# Patient Record
Sex: MALE | Race: WHITE | NOT HISPANIC OR LATINO
[De-identification: names, ages, dates, MRNs, and addresses within clinical notes are randomized per-mention and may not be internally consistent; named-entity substitution may affect disease eponyms.]

---

## 2020-07-30 ENCOUNTER — APPOINTMENT (OUTPATIENT)
Dept: CARDIOLOGY | Facility: CLINIC | Age: 70
End: 2020-07-30
Payer: MEDICARE

## 2020-07-30 ENCOUNTER — NON-APPOINTMENT (OUTPATIENT)
Age: 70
End: 2020-07-30

## 2020-07-30 VITALS
HEIGHT: 70 IN | BODY MASS INDEX: 30.21 KG/M2 | OXYGEN SATURATION: 98 % | WEIGHT: 211 LBS | HEART RATE: 68 BPM | DIASTOLIC BLOOD PRESSURE: 87 MMHG | SYSTOLIC BLOOD PRESSURE: 133 MMHG

## 2020-07-30 DIAGNOSIS — E66.9 OBESITY, UNSPECIFIED: ICD-10-CM

## 2020-07-30 PROBLEM — Z00.00 ENCOUNTER FOR PREVENTIVE HEALTH EXAMINATION: Status: ACTIVE | Noted: 2020-07-30

## 2020-07-30 PROCEDURE — 93000 ELECTROCARDIOGRAM COMPLETE: CPT

## 2020-07-30 PROCEDURE — 99205 OFFICE O/P NEW HI 60 MIN: CPT

## 2020-08-10 PROBLEM — Z71.9 ENCOUNTER FOR CONSULTATION: Status: ACTIVE | Noted: 2020-08-10

## 2020-08-11 ENCOUNTER — APPOINTMENT (OUTPATIENT)
Dept: CARDIOTHORACIC SURGERY | Facility: CLINIC | Age: 70
End: 2020-08-11
Payer: MEDICARE

## 2020-08-11 ENCOUNTER — NON-APPOINTMENT (OUTPATIENT)
Age: 70
End: 2020-08-11

## 2020-08-11 ENCOUNTER — OUTPATIENT (OUTPATIENT)
Dept: OUTPATIENT SERVICES | Facility: HOSPITAL | Age: 70
LOS: 1 days | End: 2020-08-11
Payer: MEDICARE

## 2020-08-11 ENCOUNTER — APPOINTMENT (OUTPATIENT)
Dept: CARDIOTHORACIC SURGERY | Facility: CLINIC | Age: 70
End: 2020-08-11

## 2020-08-11 VITALS — WEIGHT: 206 LBS | BODY MASS INDEX: 29.49 KG/M2 | HEIGHT: 70 IN

## 2020-08-11 VITALS
OXYGEN SATURATION: 97 % | DIASTOLIC BLOOD PRESSURE: 87 MMHG | SYSTOLIC BLOOD PRESSURE: 134 MMHG | TEMPERATURE: 97.8 F | HEART RATE: 66 BPM | RESPIRATION RATE: 13 BRPM

## 2020-08-11 DIAGNOSIS — I35.0 NONRHEUMATIC AORTIC (VALVE) STENOSIS: ICD-10-CM

## 2020-08-11 DIAGNOSIS — Z71.9 COUNSELING, UNSPECIFIED: ICD-10-CM

## 2020-08-11 DIAGNOSIS — K62.5 HEMORRHAGE OF ANUS AND RECTUM: ICD-10-CM

## 2020-08-11 PROCEDURE — 93306 TTE W/DOPPLER COMPLETE: CPT | Mod: 26

## 2020-08-11 PROCEDURE — 93306 TTE W/DOPPLER COMPLETE: CPT

## 2020-08-11 PROCEDURE — 99205 OFFICE O/P NEW HI 60 MIN: CPT

## 2020-08-11 PROCEDURE — 99204 OFFICE O/P NEW MOD 45 MIN: CPT

## 2020-08-11 PROCEDURE — 93000 ELECTROCARDIOGRAM COMPLETE: CPT

## 2020-08-11 RX ORDER — DOCUSATE SODIUM 100 MG/1
100 CAPSULE ORAL DAILY
Refills: 0 | Status: ACTIVE | COMMUNITY
Start: 2020-08-11

## 2020-08-11 NOTE — PHYSICAL EXAM
[General Appearance - Well Developed] : well developed [Normal Appearance] : normal appearance [General Appearance - Well Nourished] : well nourished [Normal Conjunctiva] : the conjunctiva exhibited no abnormalities [Normal Oral Mucosa] : normal oral mucosa [Normal Rate] : normal [Rhythm Regular] : regular [Normal S1] : normal S1 [Normal S2] : normal S2 [No Gallop] : no gallop heard [II] : a grade 2 [2+] : left 2+ [No Pitting Edema] : no pitting edema present [Respiration, Rhythm And Depth] : normal respiratory rhythm and effort [] : no respiratory distress [Auscultation Breath Sounds / Voice Sounds] : lungs were clear to auscultation bilaterally [Bowel Sounds] : normal bowel sounds [Abdomen Soft] : soft [Abdomen Tenderness] : non-tender [Abnormal Walk] : normal gait [Nail Clubbing] : no clubbing of the fingernails [Skin Color & Pigmentation] : normal skin color and pigmentation [Cyanosis, Localized] : no localized cyanosis [Skin Turgor] : normal skin turgor [No Venous Stasis] : no venous stasis [Oriented To Time, Place, And Person] : oriented to person, place, and time [Impaired Insight] : insight and judgment were intact [Affect] : the affect was normal [Mood] : the mood was normal

## 2020-08-11 NOTE — PHYSICAL EXAM
[General Appearance - Alert] : alert [General Appearance - In No Acute Distress] : in no acute distress [Sclera] : the sclera and conjunctiva were normal [PERRL With Normal Accommodation] : pupils were equal in size, round, and reactive to light [Extraocular Movements] : extraocular movements were intact [Jugular Venous Distention Increased] : there was no jugular-venous distention [Outer Ear] : the ears and nose were normal in appearance [Respiration, Rhythm And Depth] : normal respiratory rhythm and effort [] : no respiratory distress [II] : a grade 2 [Examination Of The Chest] : the chest was normal in appearance [Chest Visual Inspection Thoracic Asymmetry] : no chest asymmetry [Bowel Sounds] : normal bowel sounds [Breast Appearance] : normal in appearance [Abdomen Soft] : soft [Cervical Lymph Nodes Enlarged Posterior Bilaterally] : posterior cervical [Skin Color & Pigmentation] : normal skin color and pigmentation [No CVA Tenderness] : no ~M costovertebral angle tenderness [Abnormal Walk] : normal gait [Oriented To Time, Place, And Person] : oriented to person, place, and time [Impaired Insight] : insight and judgment were intact [No Focal Deficits] : no focal deficits [Right Carotid Bruit] : no bruit heard over the right carotid [FreeTextEntry1] : deferred [Left Carotid Bruit] : no bruit heard over the left carotid

## 2020-08-11 NOTE — CONSULT LETTER
[FreeTextEntry2] : Jerry Carrasco M.D.\par [address] [facility]\[place] [address]\par [phone]\par [phone] [FreeTextEntry3] : London Barbour MD\par \par Cardiovascular & Thoracic Surgery\par Professor\par [facility]\par \par

## 2020-08-11 NOTE — HISTORY OF PRESENT ILLNESS
[FreeTextEntry1] : Mr. Sy is a 69 year old male with PMHx of HTN and prostate cancer (s/p radical prostatectomy in 2015).  He is referred by Dr Jerry Carrasco for evaluation of his aortic stenosis. He has had progressive worsening of dyspnea on exertion, especially with stairs (NYHA II). He also notes feeling fatigued "if I do some work" around the house, noting "I feel drained." He has some left sided chest discomfort but notes "that is rare"; he has no syncopal symptoms. His prostate cancer continues to be managed by [facility] and he receives Lupron every 3 months. Of note, he had radiation for his prostate cancer and developed subsequent radiation proctitis, for which he sees Dr. Guerrero (gastroenterologist affiliated with [place]).\par \par An echocardiogram done at [facility] on 6/15/20 showed an ROSALINDA of 0.73, an aortic velocity of 3.5 and a mean gradient of 28. Repeat echo was done today and will be reviewed in detail.  Preliminarily, the AS appears severe. He met with Dr Barbour, who has recommended he proceed with further TAVR work up at this time.\par \par STS risk for AVR: 0.79%

## 2020-08-11 NOTE — DATA REVIEWED
[FreeTextEntry1] : Echocardiogram on 6/20/2020 demonstrated\par severe AS, normal LV function, no pulmonary hypertension, normal LA size, no mitral regurgitation LVEF 60%. \par pGr 50 mmHg, mGr 28 mmHg, ROSALINDA 0.71 cm2\par Moderate aortic valve regurgitation

## 2020-08-11 NOTE — ASSESSMENT
[FreeTextEntry1] : Mr. Sy is a 69 year old male with PMHx of HTN and prostate cancer (s/p radical prostatectomy in 2015).  He is referred by Dr Jerry Carrasco for evaluation of his aortic stenosis, which appears severe on today's study. He was initially hesitant to proceed due to the COVID-19 pandemic, however after our discussion, seems amenable to proceed with scheduling his cardiac structural CT and cardiac catheterization. I discussed the potential risks and benefits of TAVR with him in detail and answered all of his questions. He states he will call when ready to proceed. He mentioned that Dr Barbour specifically discussed with him the risk of sudden cardiac death with untreated symptomatic severe aortic stenosis. \par

## 2020-08-11 NOTE — REVIEW OF SYSTEMS
[Feeling Tired] : feeling tired [Shortness Of Breath] : shortness of breath [SOB on Exertion] : shortness of breath during exertion [Joint Swelling] : joint swelling [Melena] : melarcadio [Easy Bleeding] : a tendency for easy bleeding [Negative] : Endocrine [Chest Pain] : no chest pain [Palpitations] : no palpitations [Joint Pain] : no joint pain [Dizziness] : no dizziness [Anxiety] : no anxiety

## 2020-08-11 NOTE — HISTORY OF PRESENT ILLNESS
[FreeTextEntry1] : Salvador is a 69 year old male with PMH of HTN, obesity, prostate cancer (s/p radical prostatectomy in 2015, radiation and now taking Lupron every three months, [place] Hutson [place] [facility] proctitis in 10/2019, rectal bleeding (f/b Dr Guerrero at [place]) and aortic valve stenosis. Recent echocardiogram on 6/20/2020 demonstrated normal LV function, no pulmonary hypertension, normal LA size, no mitral regurgitation LVEF 60%. Calcified aortic valve with pGr 50 mmHg, mGr 28 mmHg, ROSALINDA 0.71 cm2 and moderate aortic valve regurgitation. He was referred to valve clinic for consideration for EARLE. Dr. Duque referred patient to see Dr. Carrasco for complains of MAGAÑA when bringing laundry up the stairs. [facility] and [facility] reports "a scan there showed my aortic valve was calcified."

## 2020-08-11 NOTE — REVIEW OF SYSTEMS
[Dyspnea on exertion] : dyspnea during exertion [Negative] : Psychiatric [Fever] : no fever [Chills] : no chills [Headache] : no headache [Feeling Fatigued] : not feeling fatigued [Lower Ext Edema] : no extremity edema [Chest Pain] : no chest pain [Leg Claudication] : no intermittent leg claudication [Wheezing] : no wheezing [Cough] : no cough [Coughing Up Blood] : no hemoptysis

## 2020-08-11 NOTE — ASSESSMENT
[FreeTextEntry1] : Salvador is a 69 year old male with PMH of HTN, obesity, prostate cancer (s/p radical prostatectomy in 2015, radiation and now on hormonal therapy) and aortic valve stenosis. Recent echocardiogram on 6/20/2020 demonstrated normal LV function, no pulmonary hypertension, normal LA size, no mitral regurgitation LVEF 60%. Calcified aortic valve with pGr 50 mmHg, mGr 28 mmHg, ROSALINDA 0.71 cm2 and moderate aortic valve regurgitation. He was referred to valve clinic for consideration for EARLE. \par \par Echo repeated today is consistent with severe aortic valve stenosis.  The patient was recommended to undergo TAVR work up and surgery. He expressed some reluctance due to his ongoing rectal bleeding secondary to radiation proctitis and his scheduled Nuclear Stress test.  Dr. Quesada will speak with him further.  The patient was advised about the consequences of delaying surgery with regards to worsening symptoms and low risk of sudden death.

## 2020-08-20 ENCOUNTER — APPOINTMENT (OUTPATIENT)
Dept: CARDIOLOGY | Facility: CLINIC | Age: 70
End: 2020-08-20

## 2020-08-24 ENCOUNTER — APPOINTMENT (OUTPATIENT)
Dept: CARDIOLOGY | Facility: CLINIC | Age: 70
End: 2020-08-24
Payer: MEDICARE

## 2020-08-24 VITALS
OXYGEN SATURATION: 96 % | SYSTOLIC BLOOD PRESSURE: 118 MMHG | HEART RATE: 66 BPM | BODY MASS INDEX: 29.35 KG/M2 | WEIGHT: 205 LBS | DIASTOLIC BLOOD PRESSURE: 81 MMHG | HEIGHT: 70 IN

## 2020-08-24 PROCEDURE — 99214 OFFICE O/P EST MOD 30 MIN: CPT

## 2020-08-24 PROCEDURE — 93000 ELECTROCARDIOGRAM COMPLETE: CPT

## 2020-08-26 ENCOUNTER — RESULT REVIEW (OUTPATIENT)
Age: 70
End: 2020-08-26

## 2020-08-26 ENCOUNTER — APPOINTMENT (OUTPATIENT)
Dept: CARDIOLOGY | Facility: CLINIC | Age: 70
End: 2020-08-26

## 2020-08-26 ENCOUNTER — OUTPATIENT (OUTPATIENT)
Dept: OUTPATIENT SERVICES | Facility: HOSPITAL | Age: 70
LOS: 1 days | End: 2020-08-26
Payer: MEDICARE

## 2020-08-26 DIAGNOSIS — R07.9 CHEST PAIN, UNSPECIFIED: ICD-10-CM

## 2020-08-26 DIAGNOSIS — I35.0 NONRHEUMATIC AORTIC (VALVE) STENOSIS: ICD-10-CM

## 2020-08-26 PROCEDURE — 75572 CT HRT W/3D IMAGE: CPT | Mod: 26

## 2020-08-26 PROCEDURE — 75572 CT HRT W/3D IMAGE: CPT

## 2020-08-31 ENCOUNTER — APPOINTMENT (OUTPATIENT)
Dept: DISASTER EMERGENCY | Facility: CLINIC | Age: 70
End: 2020-08-31

## 2020-08-31 DIAGNOSIS — Z01.818 ENCOUNTER FOR OTHER PREPROCEDURAL EXAMINATION: ICD-10-CM

## 2020-09-01 LAB — SARS-COV-2 N GENE NPH QL NAA+PROBE: NOT DETECTED

## 2020-09-03 ENCOUNTER — OUTPATIENT (OUTPATIENT)
Dept: OUTPATIENT SERVICES | Facility: HOSPITAL | Age: 70
LOS: 1 days | End: 2020-09-03
Payer: MEDICARE

## 2020-09-03 VITALS
DIASTOLIC BLOOD PRESSURE: 66 MMHG | OXYGEN SATURATION: 98 % | RESPIRATION RATE: 16 BRPM | HEART RATE: 74 BPM | SYSTOLIC BLOOD PRESSURE: 124 MMHG

## 2020-09-03 VITALS
WEIGHT: 203.93 LBS | SYSTOLIC BLOOD PRESSURE: 137 MMHG | HEART RATE: 75 BPM | HEIGHT: 70 IN | TEMPERATURE: 98 F | OXYGEN SATURATION: 97 % | RESPIRATION RATE: 18 BRPM | DIASTOLIC BLOOD PRESSURE: 70 MMHG

## 2020-09-03 DIAGNOSIS — I35.0 NONRHEUMATIC AORTIC (VALVE) STENOSIS: ICD-10-CM

## 2020-09-03 DIAGNOSIS — Z90.79 ACQUIRED ABSENCE OF OTHER GENITAL ORGAN(S): Chronic | ICD-10-CM

## 2020-09-03 LAB
ALBUMIN SERPL ELPH-MCNC: 4.8 G/DL — SIGNIFICANT CHANGE UP (ref 3.3–5)
ALP SERPL-CCNC: 75 U/L — SIGNIFICANT CHANGE UP (ref 40–120)
ALT FLD-CCNC: 28 U/L — SIGNIFICANT CHANGE UP (ref 10–45)
ANION GAP SERPL CALC-SCNC: 13 MMOL/L — SIGNIFICANT CHANGE UP (ref 5–17)
AST SERPL-CCNC: 22 U/L — SIGNIFICANT CHANGE UP (ref 10–40)
BILIRUB SERPL-MCNC: 0.8 MG/DL — SIGNIFICANT CHANGE UP (ref 0.2–1.2)
BUN SERPL-MCNC: 22 MG/DL — SIGNIFICANT CHANGE UP (ref 7–23)
CALCIUM SERPL-MCNC: 10.6 MG/DL — HIGH (ref 8.4–10.5)
CHLORIDE SERPL-SCNC: 100 MMOL/L — SIGNIFICANT CHANGE UP (ref 96–108)
CO2 SERPL-SCNC: 26 MMOL/L — SIGNIFICANT CHANGE UP (ref 22–31)
CREAT SERPL-MCNC: 0.95 MG/DL — SIGNIFICANT CHANGE UP (ref 0.5–1.3)
GLUCOSE SERPL-MCNC: 117 MG/DL — HIGH (ref 70–99)
HCT VFR BLD CALC: 41.9 % — SIGNIFICANT CHANGE UP (ref 39–50)
HGB BLD-MCNC: 14.2 G/DL — SIGNIFICANT CHANGE UP (ref 13–17)
MCHC RBC-ENTMCNC: 32.2 PG — SIGNIFICANT CHANGE UP (ref 27–34)
MCHC RBC-ENTMCNC: 33.9 GM/DL — SIGNIFICANT CHANGE UP (ref 32–36)
MCV RBC AUTO: 95 FL — SIGNIFICANT CHANGE UP (ref 80–100)
NRBC # BLD: 0 /100 WBCS — SIGNIFICANT CHANGE UP (ref 0–0)
PLATELET # BLD AUTO: 236 K/UL — SIGNIFICANT CHANGE UP (ref 150–400)
POTASSIUM SERPL-MCNC: 4 MMOL/L — SIGNIFICANT CHANGE UP (ref 3.5–5.3)
POTASSIUM SERPL-SCNC: 4 MMOL/L — SIGNIFICANT CHANGE UP (ref 3.5–5.3)
PROT SERPL-MCNC: 7.5 G/DL — SIGNIFICANT CHANGE UP (ref 6–8.3)
RBC # BLD: 4.41 M/UL — SIGNIFICANT CHANGE UP (ref 4.2–5.8)
RBC # FLD: 12.1 % — SIGNIFICANT CHANGE UP (ref 10.3–14.5)
SODIUM SERPL-SCNC: 139 MMOL/L — SIGNIFICANT CHANGE UP (ref 135–145)
WBC # BLD: 8.63 K/UL — SIGNIFICANT CHANGE UP (ref 3.8–10.5)
WBC # FLD AUTO: 8.63 K/UL — SIGNIFICANT CHANGE UP (ref 3.8–10.5)

## 2020-09-03 PROCEDURE — 93005 ELECTROCARDIOGRAM TRACING: CPT

## 2020-09-03 PROCEDURE — C1769: CPT

## 2020-09-03 PROCEDURE — 93456 R HRT CORONARY ARTERY ANGIO: CPT

## 2020-09-03 PROCEDURE — 85027 COMPLETE CBC AUTOMATED: CPT

## 2020-09-03 PROCEDURE — C1887: CPT

## 2020-09-03 PROCEDURE — 93456 R HRT CORONARY ARTERY ANGIO: CPT | Mod: 26

## 2020-09-03 PROCEDURE — 93010 ELECTROCARDIOGRAM REPORT: CPT

## 2020-09-03 PROCEDURE — 99152 MOD SED SAME PHYS/QHP 5/>YRS: CPT

## 2020-09-03 PROCEDURE — C1894: CPT

## 2020-09-03 PROCEDURE — 80053 COMPREHEN METABOLIC PANEL: CPT

## 2020-09-03 PROCEDURE — 99153 MOD SED SAME PHYS/QHP EA: CPT

## 2020-09-03 RX ORDER — SODIUM CHLORIDE 9 MG/ML
3 INJECTION INTRAMUSCULAR; INTRAVENOUS; SUBCUTANEOUS EVERY 8 HOURS
Refills: 0 | Status: DISCONTINUED | OUTPATIENT
Start: 2020-09-03 | End: 2020-09-17

## 2020-09-03 NOTE — ASU DISCHARGE PLAN (ADULT/PEDIATRIC) - CARE PROVIDER_API CALL
Rich Quesada  INTERVENTIONAL CARDIOLOGY  [address] [place] [address]  Phone: [phone]  Fax: [phone]  Follow Up Time:     Jerry Carrasco (MD)  Cardiovascular Disease  [address] [place] [address]  Phone: [phone]  Fax: [phone]  Follow Up Time:

## 2020-09-03 NOTE — H&P CARDIOLOGY - HISTORY OF PRESENT ILLNESS
68 yo male with PMHx of HTN, prostate cancer in 2015 s/p radical prostatectomy, radiation in 2017, and Lupron on Q 3 months ([place]. Hutson [place] [facility]itis in 10/2019, rectal bleeding (f/b Dr Guerrero at [place]) and aortic valve stenosis. Recent echocardiogram on 6/20/2020 demonstrated normal LV function, no pulmonary hypertension, normal LA size, no mitral regurgitation LVEF 60%. Calcified aortic valve with pGr 50 mmHg, mGr 28 mmHg, ROSALINDA 0.71 cm2 and moderate aortic valve regurgitation. Pt reports he has been feeling MAGAÑA since early in 2019, referred to Dr. Carrasco and was referred to valve clinic for consideration for EARLE. [facility] and patient reports "a scan there showed my aortic valve was calcified."     < from: Transthoracic Echocardiogram (08.11.20 @ 09:28) >  1. Calcified trileaflet aortic valve with decreased opening. Peak transaortic valve gradient equals 58 mm Hg, mean transaortic valve gradient equals 33 mm Hg, estimated  aortic valve area equals 1 sqcm (by continuity equation), aortic valve velocity time integral equals 95 cm, the DVI=0.29, consistent with moderate-severe aortic stenosis. Mild  aortic regurgitation. < end of copied text >

## 2020-09-03 NOTE — H&P CARDIOLOGY - PSH
From: Zee Padilla  To: Bernard Peguero [place]   Thank you   Zee   H/O prostatectomy

## 2020-09-03 NOTE — ASU DISCHARGE PLAN (ADULT/PEDIATRIC) - CALL YOUR DOCTOR IF YOU HAVE ANY OF THE FOLLOWING:
Wound/Surgical Site with redness, or foul smelling discharge or pus/Increased irritability or sluggishness/Pain not relieved by Medications/Swelling that gets worse/Fever greater than (need to indicate Fahrenheit or Celsius)/Bleeding that does not stop/Numbness, tingling, color or temperature change to extremity

## 2020-09-09 ENCOUNTER — APPOINTMENT (OUTPATIENT)
Dept: CARDIOTHORACIC SURGERY | Facility: CLINIC | Age: 70
End: 2020-09-09
Payer: MEDICARE

## 2020-09-09 ENCOUNTER — OUTPATIENT (OUTPATIENT)
Dept: OUTPATIENT SERVICES | Facility: HOSPITAL | Age: 70
LOS: 1 days | End: 2020-09-09
Payer: MEDICARE

## 2020-09-09 ENCOUNTER — RESULT REVIEW (OUTPATIENT)
Age: 70
End: 2020-09-09

## 2020-09-09 VITALS
DIASTOLIC BLOOD PRESSURE: 85 MMHG | BODY MASS INDEX: 29.2 KG/M2 | WEIGHT: 204 LBS | OXYGEN SATURATION: 96 % | HEART RATE: 66 BPM | TEMPERATURE: 98 F | HEIGHT: 70 IN | RESPIRATION RATE: 14 BRPM | SYSTOLIC BLOOD PRESSURE: 127 MMHG

## 2020-09-09 DIAGNOSIS — S30.1XXS CONTUSION OF ABDOMINAL WALL, SEQUELA: ICD-10-CM

## 2020-09-09 DIAGNOSIS — Z90.79 ACQUIRED ABSENCE OF OTHER GENITAL ORGAN(S): Chronic | ICD-10-CM

## 2020-09-09 PROBLEM — I35.0 NONRHEUMATIC AORTIC (VALVE) STENOSIS: Chronic | Status: ACTIVE | Noted: 2020-09-03

## 2020-09-09 PROBLEM — I10 ESSENTIAL (PRIMARY) HYPERTENSION: Chronic | Status: ACTIVE | Noted: 2020-09-03

## 2020-09-09 PROCEDURE — 99213 OFFICE O/P EST LOW 20 MIN: CPT

## 2020-09-09 PROCEDURE — 93926 LOWER EXTREMITY STUDY: CPT

## 2020-09-09 PROCEDURE — 93926 LOWER EXTREMITY STUDY: CPT | Mod: 26,RT

## 2020-09-09 RX ORDER — MESALAMINE 4 G/60 ML
4 KIT RECTAL
Refills: 0 | Status: DISCONTINUED | COMMUNITY
Start: 2020-08-24 | End: 2020-09-09

## 2020-09-09 RX ORDER — ATENOLOL 50 MG/1
50 TABLET ORAL DAILY
Refills: 0 | Status: DISCONTINUED | COMMUNITY
End: 2020-09-09

## 2020-09-09 RX ORDER — CHLORTHALIDONE 25 MG/1
25 TABLET ORAL DAILY
Refills: 0 | Status: DISCONTINUED | COMMUNITY
End: 2020-09-09

## 2020-09-09 NOTE — REASON FOR VISIT
[Follow-Up - Clinic] : a clinic follow-up of [Aortic Stenosis] : aortic stenosis [FreeTextEntry1] : Post coronary angiogram

## 2020-09-09 NOTE — REVIEW OF SYSTEMS
[Feeling Fatigued] : feeling fatigued [Dyspnea on exertion] : dyspnea during exertion [Easy Bleeding] : a tendency for easy bleeding [Easy Bruising] : a tendency for easy bruising [Negative] : Heme/Lymph [Urinary Frequency] : urinary frequency [Fever] : no fever [Chills] : no chills [Shortness Of Breath] : no shortness of breath [Chest  Pressure] : no chest pressure [Chest Pain] : no chest pain [Lower Ext Edema] : no extremity edema [Palpitations] : no palpitations [Cough] : no cough [Dizziness] : no dizziness

## 2020-09-09 NOTE — DISCUSSION/SUMMARY
[FreeTextEntry1] : Mr. Sy comes to the office for evaluation of right groin bruising s/p coronary angiogram from  9/3/2020. He does have extensive bruising with small, ~ 2-3 cm hematoma at the right CFA puncture site with a strong CFA pulse without bruit and palpable distal pulses with brisk capillary refill. Will get right groin arterial duplex scan to evaluation for pseudoaneurysm prior to his TAVR procedure which is to be scheduled. Will call him with results later today.  He was instructed to call should the hematoma become larger.  Will schedule TAVR procedure as well.  \par \par Addendum: Duplex of the right CFA demonstrates a small hematoma, as palpated on exam ,without evidence of a PSA.

## 2020-09-09 NOTE — PHYSICAL EXAM
[General Appearance - Well Developed] : well developed [Normal Appearance] : normal appearance [Well Groomed] : well groomed [General Appearance - Well Nourished] : well nourished [No Deformities] : no deformities [General Appearance - In No Acute Distress] : no acute distress [Normal Conjunctiva] : the conjunctiva exhibited no abnormalities [Normal Oral Mucosa] : normal oral mucosa [No Oral Pallor] : no oral pallor [Normal Jugular Venous A Waves Present] : normal jugular venous A waves present [No Oral Cyanosis] : no oral cyanosis [Normal Jugular Venous V Waves Present] : normal jugular venous V waves present [No Jugular Venous Reynolds A Waves] : no jugular venous reynolds A waves [Respiration, Rhythm And Depth] : normal respiratory rhythm and effort [Exaggerated Use Of Accessory Muscles For Inspiration] : no accessory muscle use [Normal Rate] : normal [Auscultation Breath Sounds / Voice Sounds] : lungs were clear to auscultation bilaterally [Heart Rate ___] : [unfilled] bpm [Rhythm Regular] : regular [No Gallop] : no gallop heard [Normal S2] : normal S2 [Normal S1] : normal S1 [Distant] : the heart sounds were distant [II] : a grade 2 [No Abnormalities] : the abdominal aorta was not enlarged and no bruit was heard [No Pitting Edema] : no pitting edema present [Bowel Sounds] : normal bowel sounds [Abdomen Soft] : soft [Abdomen Tenderness] : non-tender [Nail Clubbing] : no clubbing of the fingernails [Petechial Hemorrhages (___cm)] : no petechial hemorrhages [Cyanosis, Localized] : no localized cyanosis [Skin Color & Pigmentation] : normal skin color and pigmentation [] : no rash [No Venous Stasis] : no venous stasis [Skin Turgor] : normal skin turgor [No Skin Ulcers] : no skin ulcer [Skin Lesions] : no skin lesions [Impaired Insight] : insight and judgment were intact [Oriented To Time, Place, And Person] : oriented to person, place, and time [Affect] : the affect was normal [Mood] : the mood was normal [Memory Recent] : recent memory was not impaired [Memory Remote] : remote memory was not impaired [No Anxiety] : not feeling anxious [2+] : left 2+ [Click] : no click [Pericardial Rub] : no pericardial rub [Bruit] : no bruit heard [Rt] : no varicose veins of the right leg [Lt] : no varicose veins of the left leg [FreeTextEntry1] : right groin with extensive purple bruising above and below groin and to midline extending to 1/2 way down anterior medial thigh. Tender to palpation. ~2-3 cm sized tender hematoma noted at CFA insertion site. No right femoral bruit. Brisk capillary refill distal extremities bilaterally.

## 2020-09-09 NOTE — HISTORY OF PRESENT ILLNESS
[FreeTextEntry1] : HERBIE KERR is a 70 year old male here on 09/09/2020, 4 weeks after he was last seen and 6 days after undergoing coronary angiogram in workup for evaluation for TAVR procedure. He called the office earlier in the week complaining of right groin bruising and mild discomfort post cath and comes in today for evaluation of his groin site. Today he reports bruising at the right groin which began the day after the angiogram and did increase but has been stable since about Saturday though the bruising seems to be crossing over to the midline slightly.  Denies coolness or color change of leg.  No problems with ambulation.  \par \par He does get short of breath with a full flight of stairs, especially if carrying something. He is able to walk his dog 4 blocks without problems. His symptoms have not progressed since he was seen, however. He currently denies fever, chills, palpitations, angina, dizziness, lightheadedness, syncope, or peripheral edema. His energy level is "a little low" and appetite is "fair". He has urinary frequency and urgency due to prostate issues. He wears protection in case he doesn't make it to the bathroom in time. \par \par PCP: Dr. Hesham Duque\par CARD: Dr. Jerry Carrasco\par

## 2020-09-10 ENCOUNTER — APPOINTMENT (OUTPATIENT)
Dept: CARDIOLOGY | Facility: CLINIC | Age: 70
End: 2020-09-10

## 2020-10-05 ENCOUNTER — RESULT REVIEW (OUTPATIENT)
Age: 70
End: 2020-10-05

## 2020-10-05 ENCOUNTER — APPOINTMENT (OUTPATIENT)
Dept: ULTRASOUND IMAGING | Facility: HOSPITAL | Age: 70
End: 2020-10-05

## 2020-10-05 ENCOUNTER — APPOINTMENT (OUTPATIENT)
Dept: DISASTER EMERGENCY | Facility: CLINIC | Age: 70
End: 2020-10-05

## 2020-10-05 ENCOUNTER — OUTPATIENT (OUTPATIENT)
Dept: OUTPATIENT SERVICES | Facility: HOSPITAL | Age: 70
LOS: 1 days | End: 2020-10-05
Payer: MEDICARE

## 2020-10-05 VITALS
SYSTOLIC BLOOD PRESSURE: 136 MMHG | HEIGHT: 68 IN | RESPIRATION RATE: 15 BRPM | TEMPERATURE: 98 F | DIASTOLIC BLOOD PRESSURE: 83 MMHG | WEIGHT: 201.06 LBS | HEART RATE: 68 BPM | OXYGEN SATURATION: 97 %

## 2020-10-05 DIAGNOSIS — I35.0 NONRHEUMATIC AORTIC (VALVE) STENOSIS: ICD-10-CM

## 2020-10-05 DIAGNOSIS — Z90.79 ACQUIRED ABSENCE OF OTHER GENITAL ORGAN(S): Chronic | ICD-10-CM

## 2020-10-05 DIAGNOSIS — Z01.818 ENCOUNTER FOR OTHER PREPROCEDURAL EXAMINATION: ICD-10-CM

## 2020-10-05 DIAGNOSIS — Z29.9 ENCOUNTER FOR PROPHYLACTIC MEASURES, UNSPECIFIED: ICD-10-CM

## 2020-10-05 DIAGNOSIS — Z90.89 ACQUIRED ABSENCE OF OTHER ORGANS: Chronic | ICD-10-CM

## 2020-10-05 LAB
A1C WITH ESTIMATED AVERAGE GLUCOSE RESULT: 5.4 % — SIGNIFICANT CHANGE UP (ref 4–5.6)
ANION GAP SERPL CALC-SCNC: 11 MMOL/L — SIGNIFICANT CHANGE UP (ref 5–17)
BLD GP AB SCN SERPL QL: NEGATIVE — SIGNIFICANT CHANGE UP
BUN SERPL-MCNC: 15 MG/DL — SIGNIFICANT CHANGE UP (ref 7–23)
CALCIUM SERPL-MCNC: 10 MG/DL — SIGNIFICANT CHANGE UP (ref 8.4–10.5)
CHLORIDE SERPL-SCNC: 100 MMOL/L — SIGNIFICANT CHANGE UP (ref 96–108)
CO2 SERPL-SCNC: 23 MMOL/L — SIGNIFICANT CHANGE UP (ref 22–31)
CREAT SERPL-MCNC: 0.77 MG/DL — SIGNIFICANT CHANGE UP (ref 0.5–1.3)
ESTIMATED AVERAGE GLUCOSE: 108 MG/DL — SIGNIFICANT CHANGE UP (ref 68–114)
GLUCOSE SERPL-MCNC: 119 MG/DL — HIGH (ref 70–99)
HCT VFR BLD CALC: 38.3 % — LOW (ref 39–50)
HGB BLD-MCNC: 12.7 G/DL — LOW (ref 13–17)
MCHC RBC-ENTMCNC: 32 PG — SIGNIFICANT CHANGE UP (ref 27–34)
MCHC RBC-ENTMCNC: 33.2 GM/DL — SIGNIFICANT CHANGE UP (ref 32–36)
MCV RBC AUTO: 96.5 FL — SIGNIFICANT CHANGE UP (ref 80–100)
MRSA PCR RESULT.: SIGNIFICANT CHANGE UP
NRBC # BLD: 0 /100 WBCS — SIGNIFICANT CHANGE UP (ref 0–0)
PLATELET # BLD AUTO: 204 K/UL — SIGNIFICANT CHANGE UP (ref 150–400)
POTASSIUM SERPL-MCNC: 3.4 MMOL/L — LOW (ref 3.5–5.3)
POTASSIUM SERPL-SCNC: 3.4 MMOL/L — LOW (ref 3.5–5.3)
RBC # BLD: 3.97 M/UL — LOW (ref 4.2–5.8)
RBC # FLD: 12.5 % — SIGNIFICANT CHANGE UP (ref 10.3–14.5)
RH IG SCN BLD-IMP: POSITIVE — SIGNIFICANT CHANGE UP
S AUREUS DNA NOSE QL NAA+PROBE: SIGNIFICANT CHANGE UP
SODIUM SERPL-SCNC: 134 MMOL/L — LOW (ref 135–145)
WBC # BLD: 6.38 K/UL — SIGNIFICANT CHANGE UP (ref 3.8–10.5)
WBC # FLD AUTO: 6.38 K/UL — SIGNIFICANT CHANGE UP (ref 3.8–10.5)

## 2020-10-05 PROCEDURE — 93880 EXTRACRANIAL BILAT STUDY: CPT

## 2020-10-05 PROCEDURE — 87641 MR-STAPH DNA AMP PROBE: CPT

## 2020-10-05 PROCEDURE — 87640 STAPH A DNA AMP PROBE: CPT

## 2020-10-05 PROCEDURE — 86900 BLOOD TYPING SEROLOGIC ABO: CPT

## 2020-10-05 PROCEDURE — 93880 EXTRACRANIAL BILAT STUDY: CPT | Mod: 26

## 2020-10-05 PROCEDURE — 80048 BASIC METABOLIC PNL TOTAL CA: CPT

## 2020-10-05 PROCEDURE — 85027 COMPLETE CBC AUTOMATED: CPT

## 2020-10-05 PROCEDURE — G0463: CPT

## 2020-10-05 PROCEDURE — 71046 X-RAY EXAM CHEST 2 VIEWS: CPT

## 2020-10-05 PROCEDURE — 71046 X-RAY EXAM CHEST 2 VIEWS: CPT | Mod: 26

## 2020-10-05 PROCEDURE — 83036 HEMOGLOBIN GLYCOSYLATED A1C: CPT

## 2020-10-05 PROCEDURE — 86850 RBC ANTIBODY SCREEN: CPT

## 2020-10-05 PROCEDURE — 86901 BLOOD TYPING SEROLOGIC RH(D): CPT

## 2020-10-05 NOTE — H&P PST ADULT - NSICDXPASTSURGICALHX_GEN_ALL_CORE_FT
PAST SURGICAL HISTORY:  H/O prostatectomy 2015    S/P T&A (status post tonsillectomy and adenoidectomy) child

## 2020-10-05 NOTE — H&P PST ADULT - NSICDXPROBLEM_GEN_ALL_CORE_FT
PROBLEM DIAGNOSES  Problem: Aortic stenosis  Assessment and Plan: Scheduled TAVR  covid swab completed today at [place]  sent for chest xray and carotids  nasal swab collected  fingerstick on admission    Problem: Need for prophylactic measure  Assessment and Plan: The Caprini score indicates that this patient is at high risk for a VTE event (score 6 or greater). Surgical patients in this group will benefit from both pharmacologic prophylaxis and intermittent compression devices.  The surgical team will determine the balance between VTE risk and bleeding risk, and other clinical considerations

## 2020-10-05 NOTE — H&P PST ADULT - HISTORY OF PRESENT ILLNESS
70 yo male with PMHx of HTN, prostate cancer in 2015 s/p radical prostatectomy, radiation in 2017, and Lupron on Q 3 months (followed by Dr. Hutson and Dr. Villalba at [facility]), radial proctitis in 10/2019, rectal bleeding (f/b Dr Guerrero at [place]) and aortic valve stenosis. Recent echocardiogram on 6/20/2020 demonstrated normal LV function, no pulmonary hypertension, normal LA size, no mitral regurgitation LVEF 60%. Calcified aortic valve with pGr 50 mmHg, mGr 28 mmHg, ROSALINDA 0.71 cm2 and moderate aortic valve regurgitation. Pt reports he has been feeling MAGAÑA since early in 2019, referred to Dr. Carrasco and was referred to valve clinic for consideration for EARLE. [facility] and patient reports "a scan there showed my aortic valve was calcified."     < from: Transthoracic Echocardiogram (08.11.20 @ 09:28) >  1. Calcified trileaflet aortic valve with decreased opening. Peak transaortic valve gradient equals 58 mm Hg, mean transaortic valve gradient equals 33 mm Hg, estimated  aortic valve area equals 1 sqcm (by continuity equation), aortic valve velocity time integral equals 95 cm, the DVI=0.29, consistent with moderate-severe aortic stenosis. Mild  aortic regurgitation. < end of copied text > 69 y/o male with PMHx of HTN, prostate cancer in 2015 s/p radical prostatectomy, radiation in 2017 now with radiation proctitis, rectal bleeding, and worsening aortic valve stenosis. Recent echocardiogram on 6/20/2020 demonstrated normal LV function, no pulmonary hypertension, normal LA size, no mitral regurgitation LVEF 60%. Calcified aortic valve with pGr 50 mmHg, mGr 28 mmHg, ROSALINDA 0.71 cm2 and moderate aortic valve regurgitation. Pt reports he has been feeling MAGAÑA and fatigue since early in 2019. Presents for TAVR.     *covid swab done on 10/5 at [place].

## 2020-10-05 NOTE — H&P PST ADULT - NSICDXPASTMEDICALHX_GEN_ALL_CORE_FT
PAST MEDICAL HISTORY:  Aortic stenosis     HTN (hypertension)     Proctitis, radiation from prostate treatment    Prostate cancer RT 2018 and prostatectomy in 2015    Rectal bleeding

## 2020-10-05 NOTE — H&P PST ADULT - TRANSFUSION REACTION, PREVIOUS, PROFILE
no Complex Repair And Rhombic Flap Text: The defect edges were debeveled with a #15 scalpel blade.  The primary defect was closed partially with a complex linear closure.  Given the location of the remaining defect, shape of the defect and the proximity to free margins a rhombic flap was deemed most appropriate for complete closure of the defect.  Using a sterile surgical marker, an appropriate advancement flap was drawn incorporating the defect and placing the expected incisions within the relaxed skin tension lines where possible.    The area thus outlined was incised deep to adipose tissue with a #15 scalpel blade.  The skin margins were undermined to an appropriate distance in all directions utilizing iris scissors.

## 2020-10-06 LAB — SARS-COV-2 N GENE NPH QL NAA+PROBE: NOT DETECTED

## 2020-10-19 ENCOUNTER — NON-APPOINTMENT (OUTPATIENT)
Age: 70
End: 2020-10-19

## 2020-11-11 ENCOUNTER — NON-APPOINTMENT (OUTPATIENT)
Age: 70
End: 2020-11-11

## 2020-11-12 PROBLEM — K62.7 RADIATION PROCTITIS: Chronic | Status: ACTIVE | Noted: 2020-09-03

## 2020-11-12 PROBLEM — C61 MALIGNANT NEOPLASM OF PROSTATE: Chronic | Status: ACTIVE | Noted: 2020-09-03

## 2020-11-16 ENCOUNTER — NON-APPOINTMENT (OUTPATIENT)
Age: 70
End: 2020-11-16

## 2020-11-16 ENCOUNTER — OUTPATIENT (OUTPATIENT)
Dept: OUTPATIENT SERVICES | Facility: HOSPITAL | Age: 70
LOS: 1 days | End: 2020-11-16
Payer: MEDICARE

## 2020-11-16 VITALS
TEMPERATURE: 97 F | HEART RATE: 67 BPM | HEIGHT: 70 IN | WEIGHT: 201.94 LBS | DIASTOLIC BLOOD PRESSURE: 79 MMHG | OXYGEN SATURATION: 96 % | SYSTOLIC BLOOD PRESSURE: 129 MMHG | RESPIRATION RATE: 16 BRPM

## 2020-11-16 DIAGNOSIS — Z90.89 ACQUIRED ABSENCE OF OTHER ORGANS: Chronic | ICD-10-CM

## 2020-11-16 DIAGNOSIS — Z01.818 ENCOUNTER FOR OTHER PREPROCEDURAL EXAMINATION: ICD-10-CM

## 2020-11-16 DIAGNOSIS — I10 ESSENTIAL (PRIMARY) HYPERTENSION: ICD-10-CM

## 2020-11-16 DIAGNOSIS — Z29.9 ENCOUNTER FOR PROPHYLACTIC MEASURES, UNSPECIFIED: ICD-10-CM

## 2020-11-16 DIAGNOSIS — I35.0 NONRHEUMATIC AORTIC (VALVE) STENOSIS: ICD-10-CM

## 2020-11-16 DIAGNOSIS — Z90.79 ACQUIRED ABSENCE OF OTHER GENITAL ORGAN(S): Chronic | ICD-10-CM

## 2020-11-16 DIAGNOSIS — Z87.19 PERSONAL HISTORY OF OTHER DISEASES OF THE DIGESTIVE SYSTEM: ICD-10-CM

## 2020-11-16 LAB
ANION GAP SERPL CALC-SCNC: 12 MMOL/L — SIGNIFICANT CHANGE UP (ref 5–17)
BLD GP AB SCN SERPL QL: NEGATIVE — SIGNIFICANT CHANGE UP
BUN SERPL-MCNC: 16 MG/DL — SIGNIFICANT CHANGE UP (ref 7–23)
CALCIUM SERPL-MCNC: 9.7 MG/DL — SIGNIFICANT CHANGE UP (ref 8.4–10.5)
CHLORIDE SERPL-SCNC: 97 MMOL/L — SIGNIFICANT CHANGE UP (ref 96–108)
CO2 SERPL-SCNC: 23 MMOL/L — SIGNIFICANT CHANGE UP (ref 22–31)
CREAT SERPL-MCNC: 0.8 MG/DL — SIGNIFICANT CHANGE UP (ref 0.5–1.3)
GLUCOSE SERPL-MCNC: 99 MG/DL — SIGNIFICANT CHANGE UP (ref 70–99)
HCT VFR BLD CALC: 40 % — SIGNIFICANT CHANGE UP (ref 39–50)
HGB BLD-MCNC: 13.7 G/DL — SIGNIFICANT CHANGE UP (ref 13–17)
MCHC RBC-ENTMCNC: 31.8 PG — SIGNIFICANT CHANGE UP (ref 27–34)
MCHC RBC-ENTMCNC: 34.3 GM/DL — SIGNIFICANT CHANGE UP (ref 32–36)
MCV RBC AUTO: 92.8 FL — SIGNIFICANT CHANGE UP (ref 80–100)
MRSA PCR RESULT.: SIGNIFICANT CHANGE UP
NRBC # BLD: 0 /100 WBCS — SIGNIFICANT CHANGE UP (ref 0–0)
PLATELET # BLD AUTO: 244 K/UL — SIGNIFICANT CHANGE UP (ref 150–400)
POTASSIUM SERPL-MCNC: 3.6 MMOL/L — SIGNIFICANT CHANGE UP (ref 3.5–5.3)
POTASSIUM SERPL-SCNC: 3.6 MMOL/L — SIGNIFICANT CHANGE UP (ref 3.5–5.3)
RBC # BLD: 4.31 M/UL — SIGNIFICANT CHANGE UP (ref 4.2–5.8)
RBC # FLD: 12.4 % — SIGNIFICANT CHANGE UP (ref 10.3–14.5)
RH IG SCN BLD-IMP: POSITIVE — SIGNIFICANT CHANGE UP
S AUREUS DNA NOSE QL NAA+PROBE: SIGNIFICANT CHANGE UP
SODIUM SERPL-SCNC: 132 MMOL/L — LOW (ref 135–145)
WBC # BLD: 7.8 K/UL — SIGNIFICANT CHANGE UP (ref 3.8–10.5)
WBC # FLD AUTO: 7.8 K/UL — SIGNIFICANT CHANGE UP (ref 3.8–10.5)

## 2020-11-16 PROCEDURE — 86850 RBC ANTIBODY SCREEN: CPT

## 2020-11-16 PROCEDURE — 85027 COMPLETE CBC AUTOMATED: CPT

## 2020-11-16 PROCEDURE — 86900 BLOOD TYPING SEROLOGIC ABO: CPT

## 2020-11-16 PROCEDURE — 80048 BASIC METABOLIC PNL TOTAL CA: CPT

## 2020-11-16 PROCEDURE — 86901 BLOOD TYPING SEROLOGIC RH(D): CPT

## 2020-11-16 PROCEDURE — 87640 STAPH A DNA AMP PROBE: CPT

## 2020-11-16 PROCEDURE — 87641 MR-STAPH DNA AMP PROBE: CPT

## 2020-11-16 PROCEDURE — G0463: CPT

## 2020-11-16 RX ORDER — VANCOMYCIN HCL 1 G
1500 VIAL (EA) INTRAVENOUS ONCE
Refills: 0 | Status: COMPLETED | OUTPATIENT
Start: 2020-12-09 | End: 2020-12-09

## 2020-11-16 NOTE — H&P PST ADULT - HISTORY OF PRESENT ILLNESS
71 y/o male with PMHx of HTN, prostate cancer in 2015 s/p radical prostatectomy, radiation in 2017 now with radiation proctitis, recently hospitalized at [facility] for lower GI bleeding, underwent colonoscopy 10/7/20 polyp removed (tubular adenoma), and source of bleeding was determined to be a 2.5 cm patch of abnormal oozing mucosa with adherent clot and exudate and exudate in the distal anterior rctal wall consistent with radiation proctitis, had follow up on 10/15/20, pt. still with dark pink mucous on tissue wiping, has been cleared by GI, Dr. Muñoz, Pt. was and worsening aortic valve stenosis. Recent echocardiogram on 6/20/2020 demonstrated normal LV function, no pulmonary hypertension, normal LA size, no mitral regurgitation LVEF 60%. Calcified aortic valve with pGr 50 mmHg, mGr 28 mmHg, ROSALINDA 0.71 cm2 and moderate aortic valve regurgitation. Pt reports he has been feeling MAGAÑA and fatigue since early in 2019. Presents for TAVR.     Pt. will scheduled COVID-19 for 12/7/20 at [facility]    aortic valve stenosis for 2019               69 y/o male with PMHx of HTN, prostate cancer in 2015 s/p radical prostatectomy, radiation in 2017 - radiation proctitis, recently hospitalized at [facility] for lower GI bleeding, underwent colonoscopy 10/7/20 polyp removed (tubular adenoma), and source of bleeding was determined to be a 2.5 cm patch of abnormal oozing mucosa with adherent clot and exudate in the distal anterior rectal wall consistent with radiation proctitis. Pt. has worsening aortic valve stenosis, Echocardiogram 8/20202 revealed moderate-sever aortic stenosis- pt. with MAGAÑA for about a year and with fatigue. Pt. presents to [facility] for scheduled TAVR on 12/9/20. Pt. denies COVID-19 infection this year, denies close contact with anyone that has been ill, no fever, cough, dyspnea in past two weeks. Pt. was scheduled to have this procedure done on 10/7/20 but cancelled due to GI bleed - presently states that normally he has some rectal ooze but has not had any in the last week - feels well today.    Pt. will scheduled COVID-19 for 12/7/20 at [place] site    Labs re-drawn today at [facility] (had PST on 9/3/20)               71 y/o male with PMHx of HTN, prostate cancer in 2015 s/p radical prostatectomy, radiation in 2017 - radiation proctitis, recently hospitalized at [facility] for lower GI bleeding, underwent colonoscopy 10/7/20 polyp removed (tubular adenoma), and source of bleeding was determined to be a 2.5 cm patch of abnormal oozing mucosa with adherent clot and exudate in the distal anterior rectal wall consistent with radiation proctitis. Pt. has worsening aortic valve stenosis, Echocardiogram 8/20202 revealed moderate-sever aortic stenosis- pt. with MAGAÑA for about a year and with fatigue. Pt. presents to [facility] for scheduled TAVR on 12/9/20. Pt. denies COVID-19 infection this year, denies close contact with anyone that has been ill, no fever, cough, dyspnea in past two weeks. Pt. was scheduled to have this procedure done on 10/7/20 but cancelled due to GI bleed - presently states that normally he has some rectal ooze but has not had any in the last week - feels well today.    Pt. will scheduled COVID-19 for 12/7/20 at [place] site    Labs re-drawn today at [facility] (had PST on 9/3/20)    Dr. Guerrero, GI,  clearance letter in chart

## 2020-11-16 NOTE — H&P PST ADULT - NSICDXPASTMEDICALHX_GEN_ALL_CORE_FT
PAST MEDICAL HISTORY:  Aortic stenosis     HTN (hypertension)     Proctitis, radiation from prostate treatment    Prostate cancer RT 2018 and prostatectomy in 2015    Rectal bleeding      PAST MEDICAL HISTORY:  Aortic stenosis     [place] (hard of hearing)     HTN (hypertension)     Proctitis, radiation from prostate treatment    Prostate cancer RT 2018 and prostatectomy in 2015    Rectal bleeding last hospitalization 10/6/20 2/2 radiation proctitis

## 2020-11-16 NOTE — H&P PST ADULT - RS GEN PE MLT RESP DETAILS PC
no rales/clear to auscultation bilaterally/no wheezes/no rhonchi/respirations non-labored respirations non-labored/clear to auscultation bilaterally

## 2020-11-16 NOTE — H&P PST ADULT - ASSESSMENT
CAPRINI SCORE [CLOT updated 18]    AGE RELATED RISK FACTORS                                                       MOBILITY RELATED FACTORS  [ ] Age 41-60 years                                            (1 Point)                    [ ] Bed rest                                                        (1 Point)  [ ] Age: 61-74 years                                           (2 Points)                  [ ] Plaster cast                                                   (2 Points)  [ ] Age= 75 years                                              (3 Points)                    [ ] Bed bound for more than 72 hours                 (2 Points)    DISEASE RELATED RISK FACTORS                                               GENDER SPECIFIC FACTORS  [ ] Edema in the lower extremities                       (1 Point)              [ ] Pregnancy                                                     (1 Point)  [ ] Varicose veins                                               (1 Point)                     [ ] Post-partum < 6 weeks                                   (1 Point)             [ ] BMI > 25 Kg/m2                                            (1 Point)                     [ ] Hormonal therapy  or oral contraception          (1 Point)                 [ ] Sepsis (in the previous month)                        (1 Point)               [ ] History of pregnancy complications                 (1 point)  [ ] Pneumonia or serious lung disease                                               [ ] Unexplained or recurrent                     (1 Point)           (in the previous month)                               (1 Point)  [ ] Abnormal pulmonary function test                     (1 Point)                 SURGERY RELATED RISK FACTORS  [ ] Acute myocardial infarction                              (1 Point)               [ ]  Section                                             (1 Point)  [ ] Congestive heart failure (in the previous month)  (1 Point)      [ ] Minor surgery                                                  (1 Point)   [ ] Inflammatory bowel disease                             (1 Point)               [ ] Arthroscopic surgery                                        (2 Points)  [ ] Central venous access                                      (2 Points)                [ ] General surgery lasting more than 45 minutes (2 points)  [ ] Present or previous malignancy                     (2 Points)                [ ] Elective arthroplasty                                         (5 points)    [ ] Stroke (in the previous month)                          (5 Points)                                                                                                                                                           HEMATOLOGY RELATED FACTORS                                                 TRAUMA RELATED RISK FACTORS  [ ] Prior episodes of VTE                                     (3 Points)                [ ] Fracture of the hip, pelvis, or leg                       (5 Points)  [ ] Positive family history for VTE                         (3 Points)             [ ] Acute spinal cord injury (in the previous month)  (5 Points)  [ ] Prothrombin 66095 A                                     (3 Points)               [ ] Paralysis  (less than 1 month)                             (5 Points)  [ ] Factor V Leiden                                             (3 Points)                  [ ] Multiple Trauma within 1 month                        (5 Points)  [ ] Lupus anticoagulants                                     (3 Points)                                                           [ ] Anticardiolipin antibodies                               (3 Points)                                                       [ ] High homocysteine in the blood                      (3 Points)                                             [ ] Other congenital or acquired thrombophilia      (3 Points)                                                [ ] Heparin induced thrombocytopenia                  (3 Points)                                     Total Score [    6      ]

## 2020-11-16 NOTE — H&P PST ADULT - NSICDXPROBLEM_GEN_ALL_CORE_FT
PROBLEM DIAGNOSES  Problem: Aortic stenosis  Assessment and Plan: Scheduled TAVR  covid swab completed today at [facility]  sent for chest xray and carotids  nasal swab collected  fingerstick on admission    Problem: Need for prophylactic measure  Assessment and Plan: The Caprini score indicates that this patient is at high risk for a VTE event (score 6 or greater). Surgical patients in this group will benefit from both pharmacologic prophylaxis and intermittent compression devices.  The surgical team will determine the balance between VTE risk and bleeding risk, and other clinical considerations         PROBLEM DIAGNOSES  Problem: Aortic stenosis  Assessment and Plan: TAVR 12/9/20  Pre-op instructions, including Chlorhexidine soap, provided - all questions answered  Labs done at [facility]  GI note in chart    Problem: Hypertension  Assessment and Plan: Continue BP meds as directed, including am of surgery with sip of water    Problem: History of proctitis  Assessment and Plan: continue enema and fiber supplements as directed    Problem: Prophylactic measure  Assessment and Plan: The Caprini score indicates that this patient is at high risk for a VTE event (score 6 or greater). Surgical patients in this group will benefit from both pharmacologic prophylaxis and intermittent compression devices.  The surgical team will determine the balance between VTE risk and bleeding risk, and other clinical considerations

## 2020-11-20 PROBLEM — H91.90 UNSPECIFIED HEARING LOSS, UNSPECIFIED EAR: Chronic | Status: ACTIVE | Noted: 2020-11-16

## 2020-11-20 PROBLEM — K62.5 HEMORRHAGE OF ANUS AND RECTUM: Chronic | Status: ACTIVE | Noted: 2020-09-03

## 2020-12-07 ENCOUNTER — APPOINTMENT (OUTPATIENT)
Dept: DISASTER EMERGENCY | Facility: CLINIC | Age: 70
End: 2020-12-07

## 2020-12-08 LAB — SARS-COV-2 N GENE NPH QL NAA+PROBE: NOT DETECTED

## 2020-12-09 ENCOUNTER — INPATIENT (INPATIENT)
Facility: HOSPITAL | Age: 70
LOS: 1 days | Discharge: HOME | DRG: 267 | End: 2020-12-11
Attending: THORACIC SURGERY (CARDIOTHORACIC VASCULAR SURGERY) | Admitting: THORACIC SURGERY (CARDIOTHORACIC VASCULAR SURGERY)
Payer: MEDICARE

## 2020-12-09 ENCOUNTER — APPOINTMENT (OUTPATIENT)
Dept: CARDIOTHORACIC SURGERY | Facility: HOSPITAL | Age: 70
End: 2020-12-09

## 2020-12-09 VITALS
RESPIRATION RATE: 16 BRPM | WEIGHT: 201.94 LBS | HEART RATE: 77 BPM | DIASTOLIC BLOOD PRESSURE: 83 MMHG | SYSTOLIC BLOOD PRESSURE: 141 MMHG | TEMPERATURE: 98 F | OXYGEN SATURATION: 99 % | HEIGHT: 70 IN

## 2020-12-09 DIAGNOSIS — I35.0 NONRHEUMATIC AORTIC (VALVE) STENOSIS: ICD-10-CM

## 2020-12-09 DIAGNOSIS — Z90.79 ACQUIRED ABSENCE OF OTHER GENITAL ORGAN(S): Chronic | ICD-10-CM

## 2020-12-09 DIAGNOSIS — Z95.2 PRESENCE OF PROSTHETIC HEART VALVE: ICD-10-CM

## 2020-12-09 DIAGNOSIS — Z90.89 ACQUIRED ABSENCE OF OTHER ORGANS: Chronic | ICD-10-CM

## 2020-12-09 LAB
ALBUMIN SERPL ELPH-MCNC: 3.5 G/DL — SIGNIFICANT CHANGE UP (ref 3.3–5)
ALP SERPL-CCNC: 63 U/L — SIGNIFICANT CHANGE UP (ref 40–120)
ALT FLD-CCNC: 16 U/L — SIGNIFICANT CHANGE UP (ref 10–45)
ANION GAP SERPL CALC-SCNC: 13 MMOL/L — SIGNIFICANT CHANGE UP (ref 5–17)
AST SERPL-CCNC: 17 U/L — SIGNIFICANT CHANGE UP (ref 10–40)
BASOPHILS # BLD AUTO: 0.05 K/UL — SIGNIFICANT CHANGE UP (ref 0–0.2)
BASOPHILS NFR BLD AUTO: 0.8 % — SIGNIFICANT CHANGE UP (ref 0–2)
BILIRUB SERPL-MCNC: 0.4 MG/DL — SIGNIFICANT CHANGE UP (ref 0.2–1.2)
BUN SERPL-MCNC: 14 MG/DL — SIGNIFICANT CHANGE UP (ref 7–23)
CALCIUM SERPL-MCNC: 8.4 MG/DL — SIGNIFICANT CHANGE UP (ref 8.4–10.5)
CHLORIDE SERPL-SCNC: 103 MMOL/L — SIGNIFICANT CHANGE UP (ref 96–108)
CO2 SERPL-SCNC: 23 MMOL/L — SIGNIFICANT CHANGE UP (ref 22–31)
CREAT SERPL-MCNC: 0.81 MG/DL — SIGNIFICANT CHANGE UP (ref 0.5–1.3)
EOSINOPHIL # BLD AUTO: 0.08 K/UL — SIGNIFICANT CHANGE UP (ref 0–0.5)
EOSINOPHIL NFR BLD AUTO: 1.3 % — SIGNIFICANT CHANGE UP (ref 0–6)
GLUCOSE BLDC GLUCOMTR-MCNC: 120 MG/DL — HIGH (ref 70–99)
GLUCOSE SERPL-MCNC: 107 MG/DL — HIGH (ref 70–99)
HCT VFR BLD CALC: 31.4 % — LOW (ref 39–50)
HGB BLD-MCNC: 10.9 G/DL — LOW (ref 13–17)
IMM GRANULOCYTES NFR BLD AUTO: 0.6 % — SIGNIFICANT CHANGE UP (ref 0–1.5)
LYMPHOCYTES # BLD AUTO: 0.51 K/UL — LOW (ref 1–3.3)
LYMPHOCYTES # BLD AUTO: 8 % — LOW (ref 13–44)
MCHC RBC-ENTMCNC: 32.3 PG — SIGNIFICANT CHANGE UP (ref 27–34)
MCHC RBC-ENTMCNC: 34.7 GM/DL — SIGNIFICANT CHANGE UP (ref 32–36)
MCV RBC AUTO: 93.2 FL — SIGNIFICANT CHANGE UP (ref 80–100)
MONOCYTES # BLD AUTO: 0.36 K/UL — SIGNIFICANT CHANGE UP (ref 0–0.9)
MONOCYTES NFR BLD AUTO: 5.7 % — SIGNIFICANT CHANGE UP (ref 2–14)
NEUTROPHILS # BLD AUTO: 5.31 K/UL — SIGNIFICANT CHANGE UP (ref 1.8–7.4)
NEUTROPHILS NFR BLD AUTO: 83.6 % — HIGH (ref 43–77)
NRBC # BLD: 0 /100 WBCS — SIGNIFICANT CHANGE UP (ref 0–0)
PLATELET # BLD AUTO: 169 K/UL — SIGNIFICANT CHANGE UP (ref 150–400)
POTASSIUM SERPL-MCNC: 3.7 MMOL/L — SIGNIFICANT CHANGE UP (ref 3.5–5.3)
POTASSIUM SERPL-SCNC: 3.7 MMOL/L — SIGNIFICANT CHANGE UP (ref 3.5–5.3)
PROT SERPL-MCNC: 5.5 G/DL — LOW (ref 6–8.3)
RBC # BLD: 3.37 M/UL — LOW (ref 4.2–5.8)
RBC # FLD: 12.3 % — SIGNIFICANT CHANGE UP (ref 10.3–14.5)
SODIUM SERPL-SCNC: 139 MMOL/L — SIGNIFICANT CHANGE UP (ref 135–145)
WBC # BLD: 6.35 K/UL — SIGNIFICANT CHANGE UP (ref 3.8–10.5)
WBC # FLD AUTO: 6.35 K/UL — SIGNIFICANT CHANGE UP (ref 3.8–10.5)

## 2020-12-09 PROCEDURE — 93010 ELECTROCARDIOGRAM REPORT: CPT

## 2020-12-09 PROCEDURE — 33361 REPLACE AORTIC VALVE PERQ: CPT | Mod: 62,Q0

## 2020-12-09 RX ORDER — LIDOCAINE HCL 20 MG/ML
0.2 VIAL (ML) INJECTION ONCE
Refills: 0 | Status: DISCONTINUED | OUTPATIENT
Start: 2020-12-09 | End: 2020-12-09

## 2020-12-09 RX ORDER — SODIUM CHLORIDE 9 MG/ML
3 INJECTION INTRAMUSCULAR; INTRAVENOUS; SUBCUTANEOUS EVERY 8 HOURS
Refills: 0 | Status: DISCONTINUED | OUTPATIENT
Start: 2020-12-09 | End: 2020-12-09

## 2020-12-09 RX ORDER — MAGNESIUM SULFATE 500 MG/ML
2 VIAL (ML) INJECTION ONCE
Refills: 0 | Status: COMPLETED | OUTPATIENT
Start: 2020-12-09 | End: 2020-12-09

## 2020-12-09 RX ORDER — POTASSIUM CHLORIDE 20 MEQ
20 PACKET (EA) ORAL ONCE
Refills: 0 | Status: COMPLETED | OUTPATIENT
Start: 2020-12-09 | End: 2020-12-09

## 2020-12-09 RX ORDER — ACETAMINOPHEN 500 MG
1000 TABLET ORAL ONCE
Refills: 0 | Status: COMPLETED | OUTPATIENT
Start: 2020-12-09 | End: 2020-12-09

## 2020-12-09 RX ORDER — PANTOPRAZOLE SODIUM 20 MG/1
40 TABLET, DELAYED RELEASE ORAL
Refills: 0 | Status: DISCONTINUED | OUTPATIENT
Start: 2020-12-10 | End: 2020-12-11

## 2020-12-09 RX ORDER — CEFUROXIME AXETIL 250 MG
1500 TABLET ORAL EVERY 8 HOURS
Refills: 0 | Status: COMPLETED | OUTPATIENT
Start: 2020-12-09 | End: 2020-12-10

## 2020-12-09 RX ORDER — APREPITANT 80 MG/1
40 CAPSULE ORAL ONCE
Refills: 0 | Status: COMPLETED | OUTPATIENT
Start: 2020-12-09 | End: 2020-12-09

## 2020-12-09 RX ORDER — ASPIRIN/CALCIUM CARB/MAGNESIUM 324 MG
81 TABLET ORAL DAILY
Refills: 0 | Status: DISCONTINUED | OUTPATIENT
Start: 2020-12-09 | End: 2020-12-11

## 2020-12-09 RX ADMIN — Medication 400 MILLIGRAM(S): at 20:38

## 2020-12-09 RX ADMIN — APREPITANT 40 MILLIGRAM(S): 80 CAPSULE ORAL at 11:48

## 2020-12-09 RX ADMIN — SODIUM CHLORIDE 3 MILLILITER(S): 9 INJECTION INTRAMUSCULAR; INTRAVENOUS; SUBCUTANEOUS at 11:49

## 2020-12-09 RX ADMIN — Medication 20 MILLIEQUIVALENT(S): at 21:48

## 2020-12-09 RX ADMIN — Medication 50 GRAM(S): at 22:13

## 2020-12-09 RX ADMIN — Medication 81 MILLIGRAM(S): at 18:17

## 2020-12-09 RX ADMIN — Medication 100 MILLIGRAM(S): at 20:41

## 2020-12-09 RX ADMIN — Medication 1000 MILLIGRAM(S): at 21:00

## 2020-12-09 NOTE — CHART NOTE - NSCHARTNOTEFT_GEN_A_CORE
69 y/o male with PMHx of HTN, prostate cancer in 2015 s/p radical prostatectomy, radiation in 2017 - radiation proctitis, recently hospitalized at [facility] for lower GI bleeding, underwent colonoscopy 10/7/20 polyp removed (tubular adenoma), and source of bleeding was determined to be a 2.5 cm patch of abnormal oozing mucosa with adherent clot and exudate in the distal anterior rectal wall consistent with radiation proctitis. Pt. has worsening aortic valve stenosis, Echocardiogram 8/20202 revealed moderate-sever aortic stenosis- pt. with MAGAÑA for about a year and with fatigue. Pt. presents to [facility] for scheduled TAVR on 12/9/20. Pt. denies COVID-19 infection this year, denies close contact with anyone that has been ill, no fever, cough, dyspnea in past two weeks. Pt. was scheduled to have this procedure done on 10/7/20 but cancelled due to GI bleed - presently states that normally he has some rectal ooze but has not had any in the last week - feels well today.    S/P #26 Core valve  Right groin Manta closure devicedry and inatact  Left groin angioseal dry and intact  TVP left groin   EKG #1 and #2 post TAVR remained LBBB  Hx of Proctitis-only Aspirin  Vital signs stable  Report given to CT Surgery ACP

## 2020-12-09 NOTE — PROGRESS NOTE ADULT - SUBJECTIVE AND OBJECTIVE BOX
Interval Hx; Events Overnight:  SUBJECTIVE: " i feel okay  "    LABS:                10.9                 139  | 23   | 14           6.35  >-----------< 169     ------------------------< 107                   31.4                 3.7  | 103  | 0.81                                         Ca 8.4   Mg x     Ph x              VITAL SIGNS    Telemetry: SR 70-80     Daily Height in cm: 177.8 (09 Dec 2020 11:37)    Daily       Vital Signs Last 24 Hrs  T(C): 36.8 (12-09-20 @ 13:35), Max: 36.8 (12-09-20 @ 13:35)  T(F): 98.2 (12-09-20 @ 13:35), Max: 98.2 (12-09-20 @ 13:35)  HR: 83 (12-09-20 @ 18:36) (68 - 83)  BP: 101/55 (12-09-20 @ 18:36) (83/52 - 141/83)  RR: 14 (12-09-20 @ 18:36) (12 - 17)  SpO2: 95% (12-09-20 @ 18:36) (94% - 99%)             I&O's Detail    09 Dec 2020 07:01  -  09 Dec 2020 19:09  --------------------------------------------------------  IN:    IV PiggyBack: 250 mL    Oral Fluid: 120 mL  Total IN: 370 mL    OUT:    Voided (mL): 400 mL  Total OUT: 400 mL    Total NET: -30 mL                    GLUCOSE  CAPILLARY BLOOD GLUCOSE      POCT Blood Glucose.: 120 mg/dL (09 Dec 2020 10:43)                                      PHYSICAL EXAM      General: NAD, well appearing, in no distress  Neurology: A&O x3, non focal, no neuro deficits. Moves all extremities to command.  CV : s1 s2 RRR, no murmurs, gallops, clicks  Lungs: clear to auscultation  Abdomen: soft, nontender, nondistended, positive bowel sounds  :   due to void        Extremities:    no  edema. + pedal pulses      Incision: L/R leg groin sites stable, no bleeding, L TVP VVI 50/20    Skin: intact, no lesions        MEDICATIONS  acetaminophen  IVPB .. 1000 milliGRAM(s) IV Intermittent once  aspirin enteric coated 81 milliGRAM(s) Oral daily  cefuroxime  IVPB 1500 milliGRAM(s) IV Intermittent every 8 hours  potassium chloride    Tablet ER 20 milliEquivalent(s) Oral once

## 2020-12-09 NOTE — BRIEF OPERATIVE NOTE - NSICDXBRIEFPROCEDURE_GEN_ALL_CORE_FT
PROCEDURES:  TAVR, percutaneous femoral approach, using prosthetic valve 09-Dec-2020 13:39:50 [facility]

## 2020-12-09 NOTE — PRE-ANESTHESIA EVALUATION ADULT - NSDENTALSD_ENT_ALL_CORE
` `     [facility]: [phone]            Medication Administration Report for Tad Manning as of 03/19/17 1143   Legend:    Given Hold Not Given Due Canceled Entry Other Actions    Time Time (Time) Time  Time-Action       Inactive    Active    Linked        Medications 03/13/17 03/14/17 03/15/17 03/16/17 03/17/17 03/18/17 03/19/17    acetaminophen (TYLENOL) tablet 650 mg  Dose: 650 mg Freq: EVERY 6 HOURS PRN Route: PO  PRN Reasons: mild pain,fever  Start: 03/07/17 1930   Admin Instructions: Maximum acetaminophen dose from all sources = 75 mg/kg/day not to exceed 4 grams/day.               ascorbic acid (VITAMIN C) tablet 500 mg  Dose: 500 mg Freq: DAILY Route: PO  Start: 02/16/17 0800    0817 (500 mg)-Given        0819 (500 mg)-Given        0855 (500 mg)-Given        0758 (500 mg)-Given        0834 (500 mg)-Given        0900 (500 mg)-Given        0830 (500 mg)-Given           aspirin tablet 325 mg  Dose: 325 mg Freq: DAILY Route: PO  Start: 02/16/17 0800    0818 (325 mg)-Given        0819 (325 mg)-Given        0854 (325 mg)-Given        0756 (325 mg)-Given        0833 (325 mg)-Given        0900 (325 mg)-Given        0835 (325 mg)-Given           atorvastatin (LIPITOR) tablet 40 mg  Dose: 40 mg Freq: DAILY Route: PO  Start: 02/16/17 0800    0820 (40 mg)-Given        0819 (40 mg)-Given        0855 (40 mg)-Given        0757 (40 mg)-Given        0834 (40 mg)-Given        0917 (40 mg)-Given        0830 (40 mg)-Given           bisacodyl (DULCOLAX) Suppository 10 mg  Dose: 10 mg Freq: DAILY PRN Route: RE  PRN Reason: constipation  Start: 02/17/17 1714              cefTRIAXone (ROCEPHIN) 2 g vial to attach to  ml bag for ADULTS or NS 50 ml bag for PEDS  Dose: 2 g Freq: EVERY 12 HOURS Route: IV  Indications of Use: BONE AND JOINT INFECTION  Indications Comment: Epidural abscess  Last Dose: 2 g (03/17/17 0835)  Start: 02/16/17 0100 0822 (2 g)-New Bag       2011 (2 g)-New Bag        0800 (2 g)-New  Bag       1955 (2 g)-New Bag        0850 (2 g)-New Bag       2017 (2 g)-New Bag        0756 (2 g)-New Bag       2048 (2 g)-New Bag        0835 (2 g)-New Bag       1949 (2 g)-New Bag        0900 (2 g)-New Bag       2015 (2 g)-New Bag        0825 (2 g)-New Bag       [ ] 2000           cholecalciferol (vitamin D) tablet 1,000 Units  Dose: 1,000 Units Freq: DAILY Route: PO  Start: 02/16/17 0800    0821 (1,000 Units)-Given        0819 (1,000 Units)-Given        0854 (1,000 Units)-Given        0757 (1,000 Units)-Given        0834 (1,000 Units)-Given        0900 (1,000 Units)-Given        0830 (1,000 Units)-Given           clopidogrel (PLAVIX) tablet 75 mg  Dose: 75 mg Freq: DAILY Route: PO  Start: 02/20/17 1430    0821 (75 mg)-Given        0819 (75 mg)-Given        0854 (75 mg)-Given        0756 (75 mg)-Given        0834 (75 mg)-Given        0900 (75 mg)-Given        0835 (75 mg)-Given           diphenhydrAMINE (BENADRYL) capsule 25 mg  Dose: 25 mg Freq: EVERY 6 HOURS PRN Route: PO  PRN Reasons: itching,allergies  Start: 02/25/17 1730             Or  diphenhydrAMINE (BENADRYL) injection 25 mg  Dose: 25 mg Freq: EVERY 6 HOURS PRN Route: IV  PRN Reasons: itching,allergies  Start: 02/25/17 1730              furosemide (LASIX) tablet 40 mg  Dose: 40 mg Freq: DAILY Route: PO  Start: 03/10/17 0800   Admin Instructions: HOLD FOR SBP <110     0823 (40 mg)-Given        0819 (40 mg)-Given        0855 (40 mg)-Given        0757 (40 mg)-Given        0834 (40 mg)-Given        0901 (40 mg)-Given        0830 (40 mg)-Given           glucose 40 % gel 15-30 g  Dose: 15-30 g Freq: EVERY 15 MIN PRN Route: PO  PRN Reason: low blood sugar  Start: 02/15/17 1529   Admin Instructions: Give 15 g for BG 51 to 69 mg/dL IF patient is conscious and able to swallow. Give 30 g for BG less than or equal to 50 mg/dL IF patient is conscious and able to swallow. Do NOT give glucose gel via enteral tube.  IF patient has enteral tube: give apple juice 120 mL  (4 oz or 15 g of CHO) via enteral tube for BG 51 to 69 mg/dL.  Give apple juice 240 mL (8 oz or 30 g of CHO) via enteral tube for BG less than or equal to 50 mg/dL.              Or  dextrose 50 % injection 25-50 mL  Dose: 25-50 mL Freq: EVERY 15 MIN PRN Route: IV  PRN Reason: low blood sugar  Start: 02/15/17 1529   Admin Instructions: Use if have IV access, BG less than 70 mg/dL and meet dose criteria below:  Dose if conscious and alert (or disorientated) and NPO = 25 mL  Dose if unconscious / not alert = 50 mL  Vesicant.              Or  glucagon injection 1 mg  Dose: 1 mg Freq: EVERY 15 MIN PRN Route: SC  PRN Reason: low blood sugar  PRN Comment: May repeat x 1 only  Start: 02/15/17 1529   Admin Instructions: May give SQ or IM. IF BG less than or equal to 50 mg/dL and no IV access.  ONLY use glucagon IF patient has NO IV access AND is UNABLE to swallow.               heparin lock flush 10 UNIT/ML injection 5-10 mL  Dose: 5-10 mL Freq: EVERY 1 HOUR PRN Route: IK  PRN Reason: other  PRN Comment: to lock each CVC - Open Ended (Tunneled and Non-Tunneled) dormant lumen.  Start: 02/15/17 2012   Admin Instructions: MAX: 5 mL per lumen.     0749 (5 mL)-Given [C]        0701 (5 mL)-Given        0737 (5 mL)-Given       0944 (10 mL)-Given        0527 (5 mL)-Given [C]        0536 (5 mL)-Given [C]             heparin lock flush 10 UNIT/ML injection 5-10 mL  Dose: 5-10 mL Freq: EVERY 24 HOURS Route: IK  Start: 02/15/17 2015   Admin Instructions: To lock each CVC - Open Ended (Tunneled and Non-Tunneled) dormant lumen. Check PRN heparin flush order to see when last dose of PRN heparin was given before administering.  MAX: 5 mL per lumen..     2011 (10 mL)-Given        1955 (5 mL)-Given        2010 (5 mL)-Given        2052 (5 mL)-Given        1950 (5 mL)-Given        2014 (5 mL)-Given        [ ] 2000           insulin glargine (LANTUS) injection 22 Units  Dose: 22 Units Freq: EVERY MORNING BEFORE BREAKFAST Route: SC  Start:  03/19/17 0730          0829 (22 Units)-Given           lisinopril (PRINIVIL/ZESTRIL) tablet 10 mg  Dose: 10 mg Freq: DAILY Route: PO  Start: 03/07/17 0800   Admin Instructions: HOLD FOR SBP <110     0819 (10 mg)-Given        0819 (10 mg)-Given        0855 (10 mg)-Given        0757 (10 mg)-Given        0834 (10 mg)-Given        0900 (10 mg)-Given        0832 (10 mg)-Given           magnesium sulfate 2 g in NS intermittent infusion ([facility]ar[employer] or [facility] Cmpd)  Dose: 2 g Freq: DAILY PRN Route: IV  PRN Reason: magnesium supplementation  Start: 03/12/17 2309   Admin Instructions: For Serum Mg++ 1.6 - 2 mg/dL  Give 2 g and recheck magnesium level next AM.               magnesium sulfate 4 g in 100 mL sterile water (premade)  Dose: 4 g Freq: EVERY 4 HOURS PRN Route: IV  PRN Reason: magnesium supplementation  Last Dose: 4 g (03/13/17 0018)  Start: 03/12/17 2309   Admin Instructions: For serum Mg++ less than 1.6 mg/dL  Give 4 g and recheck magnesium level 2 hours after dose, and next AM.     0018 (4 g)-New Bag                 melatonin tablet 3 mg  Dose: 3 mg Freq: AT BEDTIME PRN Route: PO  PRN Reasons: sleep,Insomnia  Start: 03/10/17 0830     0007 (3 mg)-Given        0104 (3 mg)-Given         2359 (3 mg)-Given        2152 (3 mg)-Given            metFORMIN (GLUCOPHAGE) tablet 500 mg  Dose: 500 mg Freq: 2 TIMES DAILY WITH MEALS Route: PO  Start: 02/20/17 1800   Admin Instructions: If the patient receives intravenous, iodinated contrast, hold metformin for 24 hours before AND 48 hours after procedure. Contact pharmacy if no hold order is written.     0818 (500 mg)-Given       1818 (500 mg)-Given        0818 (500 mg)-Given       1744 (500 mg)-Given        0855 (500 mg)-Given       1851 (500 mg)-Given        0758 (500 mg)-Given       1824 (500 mg)-Given        0834 (500 mg)-Given       1738 (500 mg)-Given        0900 (500 mg)-Given       1756 (500 mg)-Given        0830 (500 mg)-Given       [ ] 1800           metoprolol (LOPRESSOR)  tablet 75 mg  Dose: 75 mg Freq: 2 TIMES DAILY Route: PO  Start: 03/18/17 0915   Admin Instructions: HOLD FOR SBP <100          0917 (75 mg)-Given       2015 (75 mg)-Given        0830 (75 mg)-Given       [ ] 2000           mirtazapine (REMERON) tablet TABS 15 mg  Dose: 15 mg Freq: AT BEDTIME Route: PO  Start: 03/02/17 2200 2009 (15 mg)-Given        1956 (15 mg)-Given        2011 (15 mg)-Given        2050 (15 mg)-Given        1949 (15 mg)-Given        2014 (15 mg)-Given        [ ] 2000           naloxone (NARCAN) injection 0.1-0.4 mg  Dose: 0.1-0.4 mg Freq: EVERY 2 MIN PRN Route: IV  PRN Reason: opioid reversal  Start: 02/15/17 1550   Admin Instructions: For respiratory rate LESS than or EQUAL to 8.  Partial reversal dose:  0.1 mg titrated q 2 minutes for Analgesia Side Effects Monitoring Sedation Level of 3 (frequently drowsy, arousable, drifts to sleep during conversation).Full reversal dose:  0.4 mg bolus for Analgesia Side Effects Monitoring Sedation Level of 4 (somnolent, minimal or no response to stimulation).               Patient is already receiving anticoagulation with heparin, enoxaparin (LOVENOX), warfarin (COUMADIN)  or other anticoagulant medication  Freq: CONTINUOUS PRN Route: XX  Start: 02/15/17 1529              polyethylene glycol (MIRALAX/GLYCOLAX) Packet 17 g  Dose: 17 g Freq: DAILY PRN Route: PO  PRN Reason: constipation  Start: 02/19/17 1000   Admin Instructions: 1 Packet = 17 grams. Mixed prescribed dose in 8 ounces of water. Follow with 8 oz. of water.               potassium chloride (KLOR-CON) Packet 20-40 mEq  Dose: 20-40 mEq Freq: EVERY 2 HOURS PRN Route: ORAL OR FEED  PRN Reason: potassium supplementation  Start: 03/12/17 2309   Admin Instructions: Use if unable to tolerate tablets.    If Serum K+ 3.4-4.0, dose = 20 mEq x1. Recheck K+ level the next AM.  If Serum K+ 3.0-3.3, dose = 60 mEq po total dose (40 mEq x 1 followed in 2 hours by 20 mEq X1). Recheck K+ level 4 hours after dose  and the next AM.  If Serum K+ 2.5-2.9, dose = 80 mEq po total dose (40 mEq Q2H x2). Recheck K+ level 4 hours after dose and the next AM.  If Serum K+ less than 2.5, See IV order.  Dissolve packet contents in 4-8 ounces of cold water or juice.       (1100)-Not Given [C]               potassium chloride 10 mEq in 100 mL intermittent infusion  Dose: 10 mEq Freq: EVERY 1 HOUR PRN Route: IV  PRN Reason: potassium supplementation  Start: 03/12/17 2309   Admin Instructions: Infuse via PERIPHERAL LINE or CENTRAL LINE. Use for central line replacement if patient weight less than 65 kg, if patient is on TPN with high potassium content or if unit does not stock 20 mEq bags.  If Serum K+ 3.4-4.0, dose = 10 mEq/hr x2 doses. Recheck K+ level the next AM.  If Serum K+ 3.0-3.3, dose = 10 mEq/hr x4 doses (40 mEq IV total dose). Recheck K+ level 2 hours after dose and the next AM.  If Serum K+ less than 3.0, dose = 10 mEq/hr x6 doses (60 mEq IV total dose). Recheck K+ level 2 hours after dose and the next AM.               potassium chloride 10 mEq in 100 mL intermittent infusion with 10 mg lidocaine  Dose: 10 mEq Freq: EVERY 1 HOUR PRN Route: IV  PRN Reason: potassium supplementation  Start: 03/12/17 2309   Admin Instructions: Infuse via PERIPHERAL LINE. Use potassium with lidocaine for pain with peripheral administration.  If Serum K+ 3.4-4.0, dose = 10 mEq/hr x2 doses. Recheck K+ level the next AM.  If Serum K+ 3.0-3.3, dose = 10 mEq/hr x4 doses (40 mEq IV total dose). Recheck K+ level 2 hours after dose and the next AM.  If Serum K+ less than 3.0, dose = 10 mEq/hr x6 doses (60 mEq IV total dose). Recheck K+ level 2 hours after dose and the next AM.               potassium chloride 20 mEq in 50 mL intermittent infusion  Dose: 20 mEq Freq: EVERY 1 HOUR PRN Route: IV  PRN Reason: potassium supplementation  Start: 03/12/17 2309   Admin Instructions: Infuse via CENTRAL LINE Only.  May need EKG if less than 65 kg or on TPN - Max rate  is 0.3 mEq/kg/hr for patients not on EKG monitoring.    If Serum K+ 3.4-4.0, dose = 20 mEq/hr x1 doses. Recheck K+ level the next AM.  If Serum K+ 3.0-3.3, dose = 20 mEq/hr x2 doses (40 mEq IV total dose).  Recheck K+ level 2 hours after dose and the next AM.  If Serum K+ less than 3.0, dose = 20 mEq/hr x3 doses (60 mEq IV total dose). Recheck K+ level 2 hours after dose and the next AM.               potassium chloride SA (K-DUR/KLOR-CON M) CR tablet 20 mEq  Dose: 20 mEq Freq: DAILY Route: PO  Start: 03/15/17 1030   Admin Instructions: DO NOT CRUSH.       1049 (20 mEq)-Given        0757 (20 mEq)-Given        0834 (20 mEq)-Given        0900 (20 mEq)-Given        0834 (20 mEq)-Given           potassium chloride SA (K-DUR/KLOR-CON M) CR tablet 20-40 mEq  Dose: 20-40 mEq Freq: EVERY 2 HOURS PRN Route: PO  PRN Reason: potassium supplementation  Start: 03/12/17 2309   Admin Instructions: Use if able to take PO.   If Serum K+ 3.4-4.0, dose = 20 mEq x1. Recheck K+ level the next AM.  If Serum K+ 3.0-3.3, dose = 60 mEq po total dose (40 mEq x1 followed in 2 hours by 20 mEq x1). Recheck K+ level 4 hours after dose and the next AM.  If Serum K+ 2.5-2.9, dose = 80 mEq po total dose (40 mEq Q2H x2). Recheck K+ level 4 hours after dose and the next AM.  If Serum K+ less than 2.5, See IV order.  DO NOT CRUSH.     0135 (20 mEq)-Given       1628 (20 mEq)-Given        1209 (20 mEq)-Given         0803 (20 mEq)-Given              ranitidine (ZANTAC) tablet 150 mg  Dose: 150 mg Freq: 2 TIMES DAILY Route: PO  Start: 02/27/17 1300    0819 (150 mg)-Given       2009 (150 mg)-Given        0819 (150 mg)-Given       1956 (150 mg)-Given        0855 (150 mg)-Given       2011 (150 mg)-Given        0758 (150 mg)-Given       2050 (150 mg)-Given        0834 (150 mg)-Given       1949 (150 mg)-Given        0900 (150 mg)-Given       2014 (150 mg)-Given        0830 (150 mg)-Given       [ ] 2000           senna-docusate (SENOKOT-S;PERICOLACE) 8.6-50 MG  per tablet 1 tablet  Dose: 1 tablet Freq: 2 TIMES DAILY PRN Route: PO  PRN Reason: constipation  Start: 02/19/17 1000              sodium chloride (PF) 0.9% PF flush 10-20 mL  Dose: 10-20 mL Freq: EVERY 1 HOUR PRN Route: IK  PRN Reasons: line flush,post meds or blood draw  Start: 02/15/17 2012   Admin Instructions: to flush CVC - Open Ended (Tunneled and Non-Tunneled).   10 mL post IV meds; 20 mL post blood draw.     0757 (20 mL)-Given [C]        0654 (15 mL)-Given        0736 (20 mL)-Given       0944 (20 mL)-Given        0532 (20 mL)-Given [C]        0543 (20 mL)-Given [C]             tamsulosin (FLOMAX) capsule 0.8 mg  Dose: 0.8 mg Freq: AT BEDTIME Route: PO  Start: 02/15/17 2200   Admin Instructions: Administer 30 minutes after the same meal each day.  Capsules should be swallowed whole; do not crush chew or open.     2009 (0.8 mg)-Given        1956 (0.8 mg)-Given        2011 (0.8 mg)-Given        2049 (0.8 mg)-Given        1949 (0.8 mg)-Given        2015 (0.8 mg)-Given        [ ] 2000           traMADol (ULTRAM) half-tab 25-50 mg  Dose: 25-50 mg Freq: EVERY 6 HOURS PRN Route: PO  PRN Reason: moderate to severe pain  PRN Comment: try first line breakthrough, using oxycodone only second line.  Start: 02/19/17 0922             Completed Medications  Medications 03/13/17 03/14/17 03/15/17 03/16/17 03/17/17 03/18/17 03/19/17         Dose: 2 Units Freq: ONCE Route: SC  Start: 03/18/17 0915   End: 03/18/17 0917 0917 (2 Units)-Given           Discontinued Medications  Medications 03/13/17 03/14/17 03/15/17 03/16/17 03/17/17 03/18/17 03/19/17         Dose: 1-5 Units Freq: AT BEDTIME Route: SC  Start: 02/15/17 2200   End: 03/18/17 0911   Admin Instructions: MEDIUM INSULIN RESISTANCE DOSING    Do Not give Bedtime Correction Insulin if BG less than  200.   For  - 249 give 1 units.   For  - 299 give 2 units.   For  - 349 give 3 units.   For  -399 give 4 units.   For BG greater than or equal to  400 give 5 units.  Notify provider if glucose greater than or equal to 350 mg/dL after administration of correction dose.  If given at mealtime, must be administered 5 min before meal or immediately after.     (2144)-Not Given [C]        (2306)-Not Given        2202 (1 Units)-Given                (2131)-Not Given        0911-Med Discontinued          Dose: 1-7 Units Freq: 3 TIMES DAILY BEFORE MEALS Route: SC  Start: 02/15/17 1700   End: 03/18/17 0911   Admin Instructions: Correction Scale - MEDIUM INSULIN RESISTANCE DOSING     Do Not give Correction Insulin if Pre-Meal BG less than 140.   For Pre-Meal  - 189 give 1 unit.   For Pre-Meal  - 239 give 2 units.   For Pre-Meal  - 289 give 3 units.   For Pre-Meal  - 339 give 4 units.   For Pre-Meal - 399 give 5 units.   For Pre-Meal -449 give 6 units  For Pre-Meal BG greater than or equal to 450 give 7 units.   To be given with prandial insulin, and based on pre-meal blood glucose.    Notify provider if glucose greater than or equal to 350 mg/dL after administration of correction dose.  If given at mealtime, must be administered 5 min before meal or immediately after.     0823 (1 Units)-Given       1205 (2 Units)-Given       1818 (1 Units)-Given        (0820)-Not Given [C]       1210 (1 Units)-Given [C]       (1744)-Not Given        0657 (1 Units)-Given       (1237)-Not Given       (1756)-Not Given        0759 (1 Units)-Given       1237 (2 Units)-Given       (1759)-Not Given        (0854)-Not Given       1153 (1 Units)-Given       1815 (2 Units)-Given        0904 (1 Units)-Given       0911-Med Discontinued          Dose: 20 Units Freq: EVERY MORNING BEFORE BREAKFAST Route: SC  Start: 03/10/17 0730   End: 03/18/17 0911    0824 (20 Units)-Given        0820 (20 Units)-Given        0658 (20 Units)-Given        0758 (20 Units)-Given        0854 (20 Units)-Given        0904 (20 Units)-Given       0911-Med Discontinued          Dose: 50 mg  Freq: 2 TIMES DAILY Route: PO  Start: 02/25/17 2100   End: 03/18/17 0846   Admin Instructions: HOLD FOR SBP <100     0820 (50 mg)-Given       2009 (50 mg)-Given        0819 (50 mg)-Given       1956 (50 mg)-Given        0855 (50 mg)-Given       2010 (50 mg)-Given        0757 (50 mg)-Given       2051 (50 mg)-Given        0834 (50 mg)-Given       1949 (50 mg)-Given        0846-Med Discontinued  (0914)-Not Given               appears normal and intact/poor dentition

## 2020-12-09 NOTE — PROGRESS NOTE ADULT - ASSESSMENT
71 y/o male with PMHx of HTN, prostate cancer in 2015 s/p radical prostatectomy, radiation in 2017 - radiation proctitis, recently hospitalized at [facility] for lower GI bleeding, underwent colonoscopy 10/7/20 polyp removed (tubular adenoma). Increased gonzales now presented for scheduled TAVR.    12/9 TAVR, S/P #26 Core valve, percutaneous femoral approach, using prosthetic valve, new LBBB, L TVP left in place, transferred to SDU

## 2020-12-09 NOTE — PRE-OP CHECKLIST - 1.
10/3/2017    Edmundo Roblero MD  [address] [address]    RE: Balta Drake       Dear Colleague,    I had the pleasure of seeing Balta Drake in the [facility].    The patient is an 87-year-old overweight white male hospitalized at [facility] in 2002 because of increased symptoms of chest discomfort associated with acute coronary syndrome.  He underwent cardiac catheterization and coronary angiography which demonstrated decreased flow in the first obtuse marginal branch of the circumflex.  He was treated with PTCA and stent in the marginal branch of the circumflex.  There was mild disease in the left anterior descending and right coronary artery at that time.  He was treated for sick sinus syndrome with evidence of tachybrady syndrome with paroxysmal atrial fibrillation with permanent pacemaker in [place] in the mid-2000s.  There is also a history of diverticulitis, GI upset, diarrhea, paroxysmal atrial fibrillation with isolated episodes of nonsustained ventricular tachycardia with up to a 4-beat run noted on Holter monitors in the past.  Most recent Cardiolite stress testing was performed in 2014 which showed a small to moderate-sized reversible anterolateral defect consistent with ischemia with moderate-sized partly reversible inferior/inferolateral defect consistent with nontransmural infarct with moderate jax-infarct ischemia.  Because of the significant multi-locations of ischemia, he underwent cardiac catheterization and was noted to have patent stents, good flow to the left main coronary artery.  There was diffuse disease in the circumflex marginal branch with ISABEL 3 flow.  He was felt to be a candidate for aggressive medical therapy.  The ramus intermediate branch was also noted to be closed.  The patient had echocardiography at the time of his last clinic visit that demonstrated a moderate reduction in  overall left ventricular function with ejection fraction of 35%-40% with evidence of diastolic dysfunction.  There was a catheter pacemaker in the right ventricle.  There was mild aortic insufficiency, mild aortic root dilatation as well as ascending aortic dilatation but no significant change from previous year's study.      PRESENT MEDICATIONS:  Include:   1.  Metoprolol XL 50 mg a day.   2.  Amiodarone 100 mg a day.     3.  Ezetimibe/simvastatin 10/40 mg 1 per day.   4.  Eliquis 5 mg twice a day.    5.  Senna as needed.   6.  Acetaminophen as needed.   7.  Flomax 0.4 mg a day.   8.  Celebrex 200 mg a day.   9.  Albuterol inhaler as needed.      The patient denies symptoms of PND, orthopnea, fevers, chills or sweats.  He is most limited by easy fatigability, general malaise and mild dyspnea on exertion.  He appears to be otherwise tolerating his medications.      PHYSICAL EXAMINATION:   VITAL SIGNS:  Showed blood pressure 113/70 with a heart rate of 80-90 and regular.  Weight was 198 pounds which is stable.   NECK:  Without jugular venous distention, carotid bruit or palpable thyroid.   CHEST:  Essentially clear to percussion and auscultation with a few scattered isolated crackles with slight dullness at the bases.   CARDIAC:  Revealed a fairly regular rhythm and 1/6 to 2/6 systolic murmur at the left sternal border with no significant radiation.  No diastolic murmur, rub or S3.   EXTREMITIES:  Without significant cyanosis with trace to 1+ edema bilaterally.      CLINICAL IMPRESSION:   1.  Stable cardiac condition.   2.  Ischemic heart disease status post PTCA and stent placed in the obtuse marginal branch of the circumflex coronary artery with repeat cardiac catheterization more recently demonstrating diffuse 2-vessel disease involving the circumflex with primary recommendation of medical therapy in 2014.    3.  Hyperlipidemia, at goal.   4.  Hypertension with adequate control.   5.  Significant degenerative  joint disease and osteoarthritis with spinal stenosis causing significant pain.   6.  History of paroxysmal atrial fibrillation.   7.  Status post hip replacement surgery.   8.  History of permanent pacemaker insertion for bradydysrhythmia.      LABORATORY DATA:  Showed cholesterol 136, HDL 47, LDL 66, triglycerides 113.  Sodium 137, potassium 4.5, BUN 20, creatinine 1.27 and ALT less than 5.       DISCUSSION:  The patient has actually done well considering the complexity of his cardiac status as well as his advanced age.  He is not complaining at this time as much about easy fatigability and general malaise.  He does have nocturia at night.  He has not had good sleep and technically does not do very well with nutrition because he is personally responsible for it on his own.  He will need followup of his amiodarone in the spring.  Has previously had normal liver and pulmonary function tests.  He will continue anticoagulation with close followup of serum lipids, basic metabolic panel and blood pressure.  Echocardiography will also be performed in 2018 to follow left ventricular function.      RECOMMENDATIONS:   1.  Continue present medications.   2.  Close followup of serum lipids, basic metabolic panel and blood pressure.   3.  Device followup.   4.  Anticoagulation.   5.  Diet and exercise as tolerated.   6.  Attempt to maintain sleep pattern.   7.  Echocardiogram in 6 months to follow left ventricular function.   8.  Routine medical followup.   9.  Amiodarone followup in 6 months.   10.  Cardiology followup in 6 months.      Again, thank you for allowing me to participate in the care of your patient.      Sincerely,    Fran Galo MD     [facility]       Patient and family preoperative education and comfort measures given

## 2020-12-10 LAB
ANION GAP SERPL CALC-SCNC: 11 MMOL/L — SIGNIFICANT CHANGE UP (ref 5–17)
BASOPHILS # BLD AUTO: 0 K/UL — SIGNIFICANT CHANGE UP (ref 0–0.2)
BASOPHILS NFR BLD AUTO: 0 % — SIGNIFICANT CHANGE UP (ref 0–2)
BUN SERPL-MCNC: 16 MG/DL — SIGNIFICANT CHANGE UP (ref 7–23)
CALCIUM SERPL-MCNC: 9.1 MG/DL — SIGNIFICANT CHANGE UP (ref 8.4–10.5)
CHLORIDE SERPL-SCNC: 100 MMOL/L — SIGNIFICANT CHANGE UP (ref 96–108)
CO2 SERPL-SCNC: 23 MMOL/L — SIGNIFICANT CHANGE UP (ref 22–31)
CREAT SERPL-MCNC: 0.85 MG/DL — SIGNIFICANT CHANGE UP (ref 0.5–1.3)
EOSINOPHIL # BLD AUTO: 0 K/UL — SIGNIFICANT CHANGE UP (ref 0–0.5)
EOSINOPHIL NFR BLD AUTO: 0 % — SIGNIFICANT CHANGE UP (ref 0–6)
GLUCOSE SERPL-MCNC: 116 MG/DL — HIGH (ref 70–99)
HCT VFR BLD CALC: 35.5 % — LOW (ref 39–50)
HGB BLD-MCNC: 12 G/DL — LOW (ref 13–17)
LYMPHOCYTES # BLD AUTO: 1 K/UL — SIGNIFICANT CHANGE UP (ref 1–3.3)
LYMPHOCYTES # BLD AUTO: 9.6 % — LOW (ref 13–44)
MANUAL SMEAR VERIFICATION: SIGNIFICANT CHANGE UP
MCHC RBC-ENTMCNC: 31.3 PG — SIGNIFICANT CHANGE UP (ref 27–34)
MCHC RBC-ENTMCNC: 33.8 GM/DL — SIGNIFICANT CHANGE UP (ref 32–36)
MCV RBC AUTO: 92.4 FL — SIGNIFICANT CHANGE UP (ref 80–100)
MONOCYTES # BLD AUTO: 0.45 K/UL — SIGNIFICANT CHANGE UP (ref 0–0.9)
MONOCYTES NFR BLD AUTO: 4.3 % — SIGNIFICANT CHANGE UP (ref 2–14)
NEUTROPHILS # BLD AUTO: 8.95 K/UL — HIGH (ref 1.8–7.4)
NEUTROPHILS NFR BLD AUTO: 86.1 % — HIGH (ref 43–77)
PLAT MORPH BLD: NORMAL — SIGNIFICANT CHANGE UP
PLATELET # BLD AUTO: 174 K/UL — SIGNIFICANT CHANGE UP (ref 150–400)
POTASSIUM SERPL-MCNC: 3.6 MMOL/L — SIGNIFICANT CHANGE UP (ref 3.5–5.3)
POTASSIUM SERPL-SCNC: 3.6 MMOL/L — SIGNIFICANT CHANGE UP (ref 3.5–5.3)
RBC # BLD: 3.84 M/UL — LOW (ref 4.2–5.8)
RBC # FLD: 12.3 % — SIGNIFICANT CHANGE UP (ref 10.3–14.5)
RBC BLD AUTO: NORMAL — SIGNIFICANT CHANGE UP
SODIUM SERPL-SCNC: 134 MMOL/L — LOW (ref 135–145)
WBC # BLD: 10.39 K/UL — SIGNIFICANT CHANGE UP (ref 3.8–10.5)
WBC # FLD AUTO: 10.39 K/UL — SIGNIFICANT CHANGE UP (ref 3.8–10.5)

## 2020-12-10 PROCEDURE — 99233 SBSQ HOSP IP/OBS HIGH 50: CPT

## 2020-12-10 PROCEDURE — 93306 TTE W/DOPPLER COMPLETE: CPT | Mod: 26

## 2020-12-10 PROCEDURE — 93010 ELECTROCARDIOGRAM REPORT: CPT

## 2020-12-10 RX ORDER — SENNA PLUS 8.6 MG/1
2 TABLET ORAL AT BEDTIME
Refills: 0 | Status: DISCONTINUED | OUTPATIENT
Start: 2020-12-10 | End: 2020-12-11

## 2020-12-10 RX ADMIN — PANTOPRAZOLE SODIUM 40 MILLIGRAM(S): 20 TABLET, DELAYED RELEASE ORAL at 06:02

## 2020-12-10 RX ADMIN — Medication 81 MILLIGRAM(S): at 11:03

## 2020-12-10 RX ADMIN — Medication 100 MILLIGRAM(S): at 04:30

## 2020-12-10 RX ADMIN — SENNA PLUS 2 TABLET(S): 8.6 TABLET ORAL at 21:31

## 2020-12-10 NOTE — PROGRESS NOTE ADULT - SUBJECTIVE AND OBJECTIVE BOX
VITAL SIGNS-Telemetry:  SR 77  Vital Signs Last 24 Hrs  T(C): 36.9 (12-10-20 @ 08:00), Max: 36.9 (12-10-20 @ 08:00)  T(F): 98.5 (12-10-20 @ 08:00), Max: 98.5 (12-10-20 @ 08:00)  HR: 76 (12-10-20 @ 08:00) (55 - 85)  BP: 124/66 (12-10-20 @ 08:00) (83/52 - 141/83)  RR: 18 (12-10-20 @ 08:00) (12 - 19)  SpO2: 96% (12-10-20 @ 08:00) (92% - 99%)          07:01  -  12-10 @ 07:00  --------------------------------------------------------  IN: 620 mL / OUT: 1275 mL / NET: -655 mL    Daily Height in cm: 177.8 (09 Dec 2020 11:37)    Daily Weight in k.9 (10 Dec 2020 08:00)       PHYSICAL EXAM:  Neurology: alert and oriented x 3, nonfocal, no gross deficits  CV : S1S2  Lungs: CTA  Abdomen: soft, nontender, nondistended, positive bowel sounds, last bowel movement       pre-op  Extremities:     no edema no calf tenderness, Left groin - TVP d/c'd - site CDI    aspirin enteric coated 81 milliGRAM(s) Oral daily  pantoprazole    Tablet 40 milliGRAM(s) Oral before breakfast      Physical Therapy Rec:   Home  [ x ]   Home w/ PT  [  ]  Rehab  [  ]  Discussed with Cardiothoracic Team at AM rounds.

## 2020-12-10 NOTE — PROGRESS NOTE ADULT - ATTENDING COMMENTS
All questions and concerns were addressed. EKG, laboratory studies and imaging studies were personally reviewed.    All questions and concerns of the patient were addressed.

## 2020-12-10 NOTE — PROGRESS NOTE ADULT - ASSESSMENT
69 y/o male with PMHx of HTN, prostate cancer in 2015 s/p radical prostatectomy, radiation in 2017 - radiation proctitis, recently hospitalized at [facility] for lower GI bleeding, underwent colonoscopy 10/7/20 polyp removed (tubular adenoma). Increased gonzales now presented for scheduled TAVR.    12/9 TAVR, S/P #26 Core valve, percutaneous femoral approach, using prosthetic valve, new LBBB, L TVP left in place, transferred to SDU  12/10 VSS - TVP removed w/o incident.  pt can transfer to floor - ekg/echo done d/c home friday

## 2020-12-10 NOTE — PROGRESS NOTE ADULT - PROBLEM SELECTOR PLAN 1
daily EKG  asa only   TVP removed this am 12/10  tr. to floor  d/c home Friday rounds made w/ dR. Barbour and Dr. Quesada  currently SR with LBBB  d/c with mcot patch likely

## 2020-12-10 NOTE — PROGRESS NOTE ADULT - SUBJECTIVE AND OBJECTIVE BOX
Structural Heart Team    Mr Sy is sitting up in a chair.  He denies chest pain, dizziness and groin pain.  He reports that he had some sob after his 3rd trip walking around the unit.  There were no acute events overnight.          REVIEW OF SYSTEMS:    CONSTITUTIONAL: No weakness, fevers or chills  EYES/ENT: No visual changes;  No vertigo or throat pain   NECK: No pain or stiffness  RESPIRATORY: No cough, wheezing, hemoptysis; No shortness of breath  CARDIOVASCULAR: No chest pain or palpitations  GASTROINTESTINAL: No abdominal or epigastric pain. No nausea, vomiting, or hematemesis; No diarrhea or constipation. No melena or hematochezia.  GENITOURINARY: No dysuria, frequency or hematuria  NEUROLOGICAL: No numbness or weakness  SKIN: No itching, rashes      Allergies    penicillin (Diarrhea; Pruritus; Hives)    Intolerances    codeine (Nausea)    Vital Signs Last 24 Hrs  T(C): 36.4 (10 Dec 2020 16:00), Max: 36.9 (10 Dec 2020 08:00)  T(F): 97.6 (10 Dec 2020 16:00), Max: 98.5 (10 Dec 2020 08:00)  HR: 77 (10 Dec 2020 16:00) (55 - 87)  BP: 115/62 (10 Dec 2020 16:00) (101/55 - 124/66)  BP(mean): 84 (10 Dec 2020 16:00) (80 - 84)  RR: 16 (10 Dec 2020 16:00) (14 - 19)  SpO2: 98% (10 Dec 2020 16:00) (92% - 99%)    MEDICATIONS  (STANDING):  aspirin enteric coated 81 milliGRAM(s) Oral daily  pantoprazole    Tablet 40 milliGRAM(s) Oral before breakfast  senna 2 Tablet(s) Oral at bedtime      Exam-  General: NAD  Cor: s1s2, RRR, no murmurs, rubs or gallops  EKG: SB  Pulm: CTA, no w/r/r b/l  Gastointestinal: soft, nontender, nondistended, +bowel sounds  Extremities: no edema, warm, 1+ DP pulses  Groin: dressings in place, clean and dry, soft, nontender, no hematomas b/l  Neuro: A&Ox3, nonfocal                          12.0   10.39 )-----------( 174      ( 10 Dec 2020 05:19 )             35.5   12-10    134<L>  |  100  |  16  ----------------------------<  116<H>  3.6   |  23  |  0.85    Ca    9.1      10 Dec 2020 05:19    TPro  5.5<L>  /  Alb  3.5  /  TBili  0.4  /  DBili  x   /  AST  17  /  ALT  16  /  AlkPhos  63  12-09    I&O's Summary    09 Dec 2020 07:01  -  10 Dec 2020 07:00  --------------------------------------------------------  IN: 620 mL / OUT: 1275 mL / NET: -655 mL    10 Dec 2020 07:01  -  10 Dec 2020 16:45  --------------------------------------------------------  IN: 540 mL / OUT: 300 mL / NET: 240 mL              Assessment/Plan:  POD 1 s/p TF TAVR (#26 CV) for severe symptomatic AS  - ASA only, no plavix due to proctitis  - ambulate as tolerated  - post TAVR TTE done, will review results  - daily EKG's  - likely d/c home tomorrow  -- will d/c home with an MCOT to monitor for post TAVR conduction delays and arrhythmias for 30 days    - Discharge plan: follow up with Dr. Barbour in one week and follow up with Structural Heart Team in one month.  Echo will be done at 1 month follow up visit.  Plan discussed with patient     LIANET Avery  [phone]     Structural Heart Team    Mr Sy is sitting up in a chair.  He denies chest pain, dizziness and groin pain.  He reports that he had some sob after his 3rd trip walking around the unit.  There were no acute events overnight.          REVIEW OF SYSTEMS:    CONSTITUTIONAL: No weakness, fevers or chills  EYES/ENT: No visual changes;  No vertigo or throat pain   NECK: No pain or stiffness  RESPIRATORY: No cough, wheezing, hemoptysis; No shortness of breath  CARDIOVASCULAR: No chest pain or palpitations  GASTROINTESTINAL: No abdominal or epigastric pain. No nausea, vomiting, or hematemesis; No diarrhea or constipation. No melena or hematochezia.  GENITOURINARY: No dysuria, frequency or hematuria  NEUROLOGICAL: No numbness or weakness  SKIN: No itching, rashes      Allergies    penicillin (Diarrhea; Pruritus; Hives)    Intolerances    codeine (Nausea)    Vital Signs Last 24 Hrs  T(C): 36.4 (10 Dec 2020 16:00), Max: 36.9 (10 Dec 2020 08:00)  T(F): 97.6 (10 Dec 2020 16:00), Max: 98.5 (10 Dec 2020 08:00)  HR: 77 (10 Dec 2020 16:00) (55 - 87)  BP: 115/62 (10 Dec 2020 16:00) (101/55 - 124/66)  BP(mean): 84 (10 Dec 2020 16:00) (80 - 84)  RR: 16 (10 Dec 2020 16:00) (14 - 19)  SpO2: 98% (10 Dec 2020 16:00) (92% - 99%)    MEDICATIONS  (STANDING):  aspirin enteric coated 81 milliGRAM(s) Oral daily  pantoprazole    Tablet 40 milliGRAM(s) Oral before breakfast  senna 2 Tablet(s) Oral at bedtime      Exam-  General: NAD  Cor: s1s2, RRR, no murmurs, rubs or gallops  EKG: SB  Pulm: CTA, no w/r/r b/l  Gastointestinal: soft, nontender, nondistended, +bowel sounds  Extremities: no edema, warm, 1+ DP pulses  Groin: dressings in place, clean and dry, soft, nontender, no hematomas b/l  Neuro: A&Ox3, nonfocal                          12.0   10.39 )-----------( 174      ( 10 Dec 2020 05:19 )             35.5   12-10    134<L>  |  100  |  16  ----------------------------<  116<H>  3.6   |  23  |  0.85    Ca    9.1      10 Dec 2020 05:19    TPro  5.5<L>  /  Alb  3.5  /  TBili  0.4  /  DBili  x   /  AST  17  /  ALT  16  /  AlkPhos  63  12-09    I&O's Summary    09 Dec 2020 07:01  -  10 Dec 2020 07:00  --------------------------------------------------------  IN: 620 mL / OUT: 1275 mL / NET: -655 mL    10 Dec 2020 07:01  -  10 Dec 2020 16:45  --------------------------------------------------------  IN: 540 mL / OUT: 300 mL / NET: 240 mL              Assessment/Plan:  POD 1 s/p TF TAVR (#26 CV) for severe symptomatic AS  - ASA only, no plavix due to proctitis  - ambulate as tolerated  - post TAVR TTE done, will review results  - daily EKG's  - likely d/c home tomorrow  -- will d/c home with an MCOT to monitor for post TAVR conduction delays and arrhythmias for 30 days    - Discharge plan: follow up with Dr. Barbour in one week and follow up with Structural Heart Team in one month.  Echo will be done at 1 month follow up visit.  Plan discussed with patient

## 2020-12-11 ENCOUNTER — TRANSCRIPTION ENCOUNTER (OUTPATIENT)
Age: 70
End: 2020-12-11

## 2020-12-11 VITALS
DIASTOLIC BLOOD PRESSURE: 70 MMHG | OXYGEN SATURATION: 94 % | SYSTOLIC BLOOD PRESSURE: 111 MMHG | RESPIRATION RATE: 19 BRPM | HEART RATE: 95 BPM

## 2020-12-11 LAB
ANION GAP SERPL CALC-SCNC: 11 MMOL/L — SIGNIFICANT CHANGE UP (ref 5–17)
BUN SERPL-MCNC: 16 MG/DL — SIGNIFICANT CHANGE UP (ref 7–23)
CALCIUM SERPL-MCNC: 8.9 MG/DL — SIGNIFICANT CHANGE UP (ref 8.4–10.5)
CHLORIDE SERPL-SCNC: 103 MMOL/L — SIGNIFICANT CHANGE UP (ref 96–108)
CO2 SERPL-SCNC: 25 MMOL/L — SIGNIFICANT CHANGE UP (ref 22–31)
CREAT SERPL-MCNC: 0.83 MG/DL — SIGNIFICANT CHANGE UP (ref 0.5–1.3)
GLUCOSE SERPL-MCNC: 90 MG/DL — SIGNIFICANT CHANGE UP (ref 70–99)
HCT VFR BLD CALC: 36.1 % — LOW (ref 39–50)
HGB BLD-MCNC: 11.7 G/DL — LOW (ref 13–17)
MCHC RBC-ENTMCNC: 31 PG — SIGNIFICANT CHANGE UP (ref 27–34)
MCHC RBC-ENTMCNC: 32.4 GM/DL — SIGNIFICANT CHANGE UP (ref 32–36)
MCV RBC AUTO: 95.8 FL — SIGNIFICANT CHANGE UP (ref 80–100)
NRBC # BLD: 0 /100 WBCS — SIGNIFICANT CHANGE UP (ref 0–0)
PLATELET # BLD AUTO: 145 K/UL — LOW (ref 150–400)
POTASSIUM SERPL-MCNC: 3.6 MMOL/L — SIGNIFICANT CHANGE UP (ref 3.5–5.3)
POTASSIUM SERPL-SCNC: 3.6 MMOL/L — SIGNIFICANT CHANGE UP (ref 3.5–5.3)
RBC # BLD: 3.77 M/UL — LOW (ref 4.2–5.8)
RBC # FLD: 12.4 % — SIGNIFICANT CHANGE UP (ref 10.3–14.5)
SODIUM SERPL-SCNC: 139 MMOL/L — SIGNIFICANT CHANGE UP (ref 135–145)
WBC # BLD: 6.94 K/UL — SIGNIFICANT CHANGE UP (ref 3.8–10.5)
WBC # FLD AUTO: 6.94 K/UL — SIGNIFICANT CHANGE UP (ref 3.8–10.5)

## 2020-12-11 PROCEDURE — 99238 HOSP IP/OBS DSCHRG MGMT 30/<: CPT

## 2020-12-11 PROCEDURE — 93010 ELECTROCARDIOGRAM REPORT: CPT

## 2020-12-11 RX ORDER — POTASSIUM BICARBONATE 978 MG/1
25 TABLET, EFFERVESCENT ORAL ONCE
Refills: 0 | Status: COMPLETED | OUTPATIENT
Start: 2020-12-11 | End: 2020-12-11

## 2020-12-11 RX ORDER — MESALAMINE 400 MG
60 TABLET, DELAYED RELEASE (ENTERIC COATED) ORAL
Qty: 0 | Refills: 0 | DISCHARGE

## 2020-12-11 RX ORDER — ATENOLOL AND CHLORTHALIDONE 50; 25 MG/1; MG/1
0.5 TABLET ORAL
Qty: 0 | Refills: 0 | DISCHARGE

## 2020-12-11 RX ORDER — ASPIRIN/CALCIUM CARB/MAGNESIUM 324 MG
1 TABLET ORAL
Qty: 30 | Refills: 0
Start: 2020-12-11 | End: 2021-01-09

## 2020-12-11 RX ORDER — PANTOPRAZOLE SODIUM 20 MG/1
1 TABLET, DELAYED RELEASE ORAL
Qty: 30 | Refills: 0
Start: 2020-12-11 | End: 2021-01-09

## 2020-12-11 RX ADMIN — POTASSIUM BICARBONATE 25 MILLIEQUIVALENT(S): 978 TABLET, EFFERVESCENT ORAL at 12:00

## 2020-12-11 RX ADMIN — Medication 81 MILLIGRAM(S): at 07:54

## 2020-12-11 RX ADMIN — PANTOPRAZOLE SODIUM 40 MILLIGRAM(S): 20 TABLET, DELAYED RELEASE ORAL at 06:09

## 2020-12-11 NOTE — DISCHARGE NOTE PROVIDER - NSDCCPTREATMENT_GEN_ALL_CORE_FT
PRINCIPAL PROCEDURE  Procedure: TAVR, percutaneous femoral approach, using prosthetic valve  Findings and Treatment: #26 Med[employer]onic Core

## 2020-12-11 NOTE — DISCHARGE NOTE PROVIDER - NSDCFUADDINST_GEN_ALL_CORE_FT
Prophylactic antibiotics prior to dental procedures  Resume activity as tolerated  Resume low cholesterol low sodium diet  Shower daily and wash all incisions with soap and water  Ambulate at least four times daily  Use incentive spirometer every hour during the day  Record daily weight and report changes greater than 3lbs  Please follow up with your cardiologist in 7-10 days  Please follow up with Dr Barbour Tuesday December 15th at 8:15 am  Please follow up with Dr Quesada in 1 month for repeat echo. Please call office for an appointment

## 2020-12-11 NOTE — DISCHARGE NOTE PROVIDER - NSDCCPCAREPLAN_GEN_ALL_CORE_FT
PRINCIPAL DISCHARGE DIAGNOSIS  Diagnosis: S/P TAVR (transcatheter aortic valve replacement)  Assessment and Plan of Treatment: S/P TAVR (transcatheter aortic valve replacement)      SECONDARY DISCHARGE DIAGNOSES  Diagnosis: Aortic stenosis  Assessment and Plan of Treatment:

## 2020-12-11 NOTE — DISCHARGE NOTE PROVIDER - PROVIDER TOKENS
#60 30 days only will be sent, patient has an office visit on 3-   PROVIDER:[TOKEN:[[id number]],FOLLOWUP:[Routine]],PROVIDER:[TOKEN:[[id number]],FOLLOWUP:[1 month]]

## 2020-12-11 NOTE — DISCHARGE NOTE PROVIDER - CARE PROVIDERS DIRECT ADDRESSES
[email or web address]

## 2020-12-11 NOTE — DISCHARGE NOTE PROVIDER - HOSPITAL COURSE
69 y/o male with PMHx of HTN, prostate cancer in 2015 s/p radical prostatectomy, radiation in 2017 - radiation proctitis, recently hospitalized at [facility] for lower GI bleeding, underwent colonoscopy 10/7/20 polyp removed (tubular adenoma). Increased gonzales now presented for scheduled TAVR.    12/9 TAVR, S/P #26 Core valve, percutaneous femoral approach, using prosthetic valve, new LBBB, L TVP left in place, transferred to SDU  12/10 VSS - TVP removed w/o incident.  pt can transfer to floor - ekg/echo done d/c home friday 12/11 TTE with Doppler (w/Cont) (12.10.20 @ 09:50) >    1. #26mm Evolut Pro+ THV.  The valve is well seated with  normal function.  The peak/mean gradients= 21/10mmHg with a  DI= 0.73.  The left ventricular function is hyperdynamic. There is  trace to mild paravalvular aortic regurgitation originating  from about 2-3 O'clock surgical view.  There is no  intravalvular regurgitation seen.    Pt discharged home with MCOT. Aspirin only secondary to recent GI bleed. EKG

## 2020-12-11 NOTE — DISCHARGE NOTE NURSING/CASE MANAGEMENT/SOCIAL WORK - PATIENT PORTAL LINK FT
You can access the FollowMyHealth Patient Portal offered by [facility] by registering at the following website: [email or web address]. By joining [facility]’s FollowMyHealth portal, you will also be able to view your health information using other applications (apps) compatible with our system.

## 2020-12-11 NOTE — DISCHARGE NOTE PROVIDER - CARE PROVIDER_API CALL
London Barbour  THORACIC AND CARDIAC SURGERY  [address] [place] [address]  Phone: [phone]  Fax: [phone]  Follow Up Time: Routine    Rich Quesada  INTERVENTIONAL CARDIOLOGY  [address] [place] [address]  Phone: [phone]  Fax: [phone]  Follow Up Time: 1 month

## 2020-12-11 NOTE — DISCHARGE NOTE PROVIDER - NSDCHHASSISTILLNESS_GEN_ALL_CORE
Ventricular Rate : 76  Atrial Rate : 76  P-R Interval : 142  QRS Duration : 88  Q-T Interval : 410  QTC Calculation(Bazett) : 461  P Axis : 67  R Axis : 75  T Axis : 75  Diagnosis : Normal sinus rhythm  Septal infarct (cited on or before 04-OCT-2019)?  CLINICAL CORRELATION REQUIRED  ****Abnormal ECG****  When compared with ECG of 04-OCT-2019 09:56,  Questionable change in initial forces of Anteroseptal leads  Confirmed by CHASE MELENDEZ MASOOD ([id number]) on 1/28/2020 11:01:43 PM   s/p TAVR

## 2020-12-11 NOTE — DISCHARGE NOTE PROVIDER - NSDCPNSUBOBJ_GEN_ALL_CORE
VITAL SIGNS    Telemetry:    Vital Signs Last 24 Hrs  T(C): 36.7 (20 @ 07:00), Max: 36.7 (20 @ 03:09)  T(F): 98 (20 @ 07:00), Max: 98 (20 @ 03:09)  HR: 84 (20 @ 07:00) (67 - 87)  BP: 130/64 (20 @ 07:00) (103/56 - 130/64)  RR: 19 (20 @ 07:00) (16 - 19)  SpO2: 93% (20 @ 07:00) (93% - 99%)            12-10 @ :01  -   @ 07:00  --------------------------------------------------------  IN: 1050 mL / OUT: 600 mL / NET: 450 mL     @ :01  -   @ 09:44  --------------------------------------------------------  IN: 240 mL / OUT: 450 mL / NET: -210 mL       Daily     Daily Weight in k.8 (11 Dec 2020 07:00)  Admit Wt: Drug Dosing Weight  Height (cm): 177.8 (09 Dec 2020 11:37)  Weight (kg): 91.6 (09 Dec 2020 11:37)  BMI (kg/m2): 29 (09 Dec 2020 11:37)  BSA (m2): 2.1 (09 Dec 2020 11:37)      CAPILLARY BLOOD GLUCOSE              MEDICATIONS  aspirin enteric coated 81 milliGRAM(s) Oral daily  pantoprazole    Tablet 40 milliGRAM(s) Oral before breakfast  senna 2 Tablet(s) Oral at bedtime      >>> <<<  PHYSICAL EXAM  Subjective: NAD  Neurology: alert and oriented x 3, nonfocal, no gross deficits  CV : s1s2  Sternal Wound :  CDI , Stable  Lungs: CTA b/l  Abdomen: soft, NT,ND, (+ )BM  :  voiding  Extremities:  -c/c/e right groin scant ooze, no hematoma. Left groin c/d/i  no sensory deficits to ext. + DP pulses b/l     LABS  12-    139  |  103  |  16  ----------------------------<  90  3.6   |  25  |  0.83    Ca    8.9      11 Dec 2020 06:13    TPro  5.5<L>  /  Alb  3.5  /  TBili  0.4  /  DBili  x   /  AST  17  /  ALT  16  /  AlkPhos  63  12-                                 11.7   6.94  )-----------( 145      ( 11 Dec 2020 06:13 )             36.1                 PAST MEDICAL & SURGICAL HISTORY:  [place] (hard of hearing)    Rectal bleeding  last hospitalization 10/6/20 2/2 radiation proctitis    Aortic stenosis    HTN (hypertension)    Proctitis, 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    Prostate cancer  RT  and prostatectomy in     S/P T&amp;A (status post tonsillectomy and adenoidectomy)  child    H/O prostatectomy

## 2020-12-12 ENCOUNTER — INPATIENT (INPATIENT)
Facility: HOSPITAL | Age: 70
LOS: 1 days | Discharge: HOME | DRG: 244 | End: 2020-12-14
Attending: STUDENT IN AN ORGANIZED HEALTH CARE EDUCATION/TRAINING PROGRAM | Admitting: STUDENT IN AN ORGANIZED HEALTH CARE EDUCATION/TRAINING PROGRAM
Payer: MEDICARE

## 2020-12-12 ENCOUNTER — TRANSCRIPTION ENCOUNTER (OUTPATIENT)
Age: 70
End: 2020-12-12

## 2020-12-12 VITALS
SYSTOLIC BLOOD PRESSURE: 162 MMHG | HEART RATE: 120 BPM | RESPIRATION RATE: 18 BRPM | DIASTOLIC BLOOD PRESSURE: 95 MMHG | OXYGEN SATURATION: 95 % | HEIGHT: 70 IN | WEIGHT: 197.98 LBS | TEMPERATURE: 98 F

## 2020-12-12 DIAGNOSIS — Z90.79 ACQUIRED ABSENCE OF OTHER GENITAL ORGAN(S): Chronic | ICD-10-CM

## 2020-12-12 DIAGNOSIS — Z90.89 ACQUIRED ABSENCE OF OTHER ORGANS: Chronic | ICD-10-CM

## 2020-12-12 DIAGNOSIS — I45.5 OTHER SPECIFIED HEART BLOCK: ICD-10-CM

## 2020-12-12 LAB
ALBUMIN SERPL ELPH-MCNC: 4 G/DL — SIGNIFICANT CHANGE UP (ref 3.3–5)
ALP SERPL-CCNC: 73 U/L — SIGNIFICANT CHANGE UP (ref 40–120)
ALT FLD-CCNC: 21 U/L — SIGNIFICANT CHANGE UP (ref 10–45)
ANION GAP SERPL CALC-SCNC: 10 MMOL/L — SIGNIFICANT CHANGE UP (ref 5–17)
AST SERPL-CCNC: 20 U/L — SIGNIFICANT CHANGE UP (ref 10–40)
BASOPHILS # BLD AUTO: 0.05 K/UL — SIGNIFICANT CHANGE UP (ref 0–0.2)
BASOPHILS NFR BLD AUTO: 0.8 % — SIGNIFICANT CHANGE UP (ref 0–2)
BILIRUB SERPL-MCNC: 0.2 MG/DL — SIGNIFICANT CHANGE UP (ref 0.2–1.2)
BUN SERPL-MCNC: 19 MG/DL — SIGNIFICANT CHANGE UP (ref 7–23)
CALCIUM SERPL-MCNC: 9.2 MG/DL — SIGNIFICANT CHANGE UP (ref 8.4–10.5)
CHLORIDE SERPL-SCNC: 100 MMOL/L — SIGNIFICANT CHANGE UP (ref 96–108)
CO2 SERPL-SCNC: 27 MMOL/L — SIGNIFICANT CHANGE UP (ref 22–31)
CREAT SERPL-MCNC: 0.95 MG/DL — SIGNIFICANT CHANGE UP (ref 0.5–1.3)
EOSINOPHIL # BLD AUTO: 0.17 K/UL — SIGNIFICANT CHANGE UP (ref 0–0.5)
EOSINOPHIL NFR BLD AUTO: 2.6 % — SIGNIFICANT CHANGE UP (ref 0–6)
GLUCOSE SERPL-MCNC: 105 MG/DL — HIGH (ref 70–99)
HCT VFR BLD CALC: 35.1 % — LOW (ref 39–50)
HGB BLD-MCNC: 11.6 G/DL — LOW (ref 13–17)
IMM GRANULOCYTES NFR BLD AUTO: 0.3 % — SIGNIFICANT CHANGE UP (ref 0–1.5)
LYMPHOCYTES # BLD AUTO: 1.12 K/UL — SIGNIFICANT CHANGE UP (ref 1–3.3)
LYMPHOCYTES # BLD AUTO: 17.1 % — SIGNIFICANT CHANGE UP (ref 13–44)
MAGNESIUM SERPL-MCNC: 2 MG/DL — SIGNIFICANT CHANGE UP (ref 1.6–2.6)
MCHC RBC-ENTMCNC: 31.5 PG — SIGNIFICANT CHANGE UP (ref 27–34)
MCHC RBC-ENTMCNC: 33 GM/DL — SIGNIFICANT CHANGE UP (ref 32–36)
MCV RBC AUTO: 95.4 FL — SIGNIFICANT CHANGE UP (ref 80–100)
MONOCYTES # BLD AUTO: 0.9 K/UL — SIGNIFICANT CHANGE UP (ref 0–0.9)
MONOCYTES NFR BLD AUTO: 13.7 % — SIGNIFICANT CHANGE UP (ref 2–14)
NEUTROPHILS # BLD AUTO: 4.3 K/UL — SIGNIFICANT CHANGE UP (ref 1.8–7.4)
NEUTROPHILS NFR BLD AUTO: 65.5 % — SIGNIFICANT CHANGE UP (ref 43–77)
NRBC # BLD: 0 /100 WBCS — SIGNIFICANT CHANGE UP (ref 0–0)
PLATELET # BLD AUTO: 156 K/UL — SIGNIFICANT CHANGE UP (ref 150–400)
POTASSIUM SERPL-MCNC: 3.6 MMOL/L — SIGNIFICANT CHANGE UP (ref 3.5–5.3)
POTASSIUM SERPL-SCNC: 3.6 MMOL/L — SIGNIFICANT CHANGE UP (ref 3.5–5.3)
PROT SERPL-MCNC: 6.5 G/DL — SIGNIFICANT CHANGE UP (ref 6–8.3)
RAPID RVP RESULT: SIGNIFICANT CHANGE UP
RBC # BLD: 3.68 M/UL — LOW (ref 4.2–5.8)
RBC # FLD: 12.5 % — SIGNIFICANT CHANGE UP (ref 10.3–14.5)
SARS-COV-2 RNA SPEC QL NAA+PROBE: SIGNIFICANT CHANGE UP
SODIUM SERPL-SCNC: 137 MMOL/L — SIGNIFICANT CHANGE UP (ref 135–145)
WBC # BLD: 6.56 K/UL — SIGNIFICANT CHANGE UP (ref 3.8–10.5)
WBC # FLD AUTO: 6.56 K/UL — SIGNIFICANT CHANGE UP (ref 3.8–10.5)

## 2020-12-12 PROCEDURE — 93010 ELECTROCARDIOGRAM REPORT: CPT

## 2020-12-12 PROCEDURE — 99223 1ST HOSP IP/OBS HIGH 75: CPT

## 2020-12-12 PROCEDURE — 71045 X-RAY EXAM CHEST 1 VIEW: CPT | Mod: 26

## 2020-12-12 PROCEDURE — 99285 EMERGENCY DEPT VISIT HI MDM: CPT

## 2020-12-12 PROCEDURE — 99221 1ST HOSP IP/OBS SF/LOW 40: CPT | Mod: 57

## 2020-12-12 RX ORDER — POTASSIUM CHLORIDE 20 MEQ
20 PACKET (EA) ORAL ONCE
Refills: 0 | Status: COMPLETED | OUTPATIENT
Start: 2020-12-12 | End: 2020-12-12

## 2020-12-12 RX ORDER — SODIUM CHLORIDE 9 MG/ML
3 INJECTION INTRAMUSCULAR; INTRAVENOUS; SUBCUTANEOUS EVERY 8 HOURS
Refills: 0 | Status: DISCONTINUED | OUTPATIENT
Start: 2020-12-12 | End: 2020-12-14

## 2020-12-12 RX ORDER — PANTOPRAZOLE SODIUM 20 MG/1
40 TABLET, DELAYED RELEASE ORAL
Refills: 0 | Status: DISCONTINUED | OUTPATIENT
Start: 2020-12-12 | End: 2020-12-14

## 2020-12-12 RX ORDER — ASPIRIN/CALCIUM CARB/MAGNESIUM 324 MG
81 TABLET ORAL DAILY
Refills: 0 | Status: DISCONTINUED | OUTPATIENT
Start: 2020-12-12 | End: 2020-12-14

## 2020-12-12 RX ADMIN — Medication 81 MILLIGRAM(S): at 11:36

## 2020-12-12 RX ADMIN — SODIUM CHLORIDE 3 MILLILITER(S): 9 INJECTION INTRAMUSCULAR; INTRAVENOUS; SUBCUTANEOUS at 13:31

## 2020-12-12 RX ADMIN — SODIUM CHLORIDE 3 MILLILITER(S): 9 INJECTION INTRAMUSCULAR; INTRAVENOUS; SUBCUTANEOUS at 22:09

## 2020-12-12 RX ADMIN — PANTOPRAZOLE SODIUM 40 MILLIGRAM(S): 20 TABLET, DELAYED RELEASE ORAL at 08:40

## 2020-12-12 RX ADMIN — Medication 20 MILLIEQUIVALENT(S): at 08:40

## 2020-12-12 NOTE — H&P ADULT - HISTORY OF PRESENT ILLNESS
HPI: Pt is a 70M with a PMH of HTN, prostate ca, radiation proctitis, rectal bleeding, and severe AS s/p Core-Valve TAVR 12/9 c/b new LBBB who was called by MCOT Team / Cardiothoracic Surgery Team for 3.1 second pause noted on MCOT.    On 12/9 pt underwent TAVR with course c/b new LBBB. On 12/11, pt was discharged home. He fell asleep on the couch but woke up to a call from CTS telling him to come to the hospital because MCOT alerted them that there was a 3.1 second pause. Other than the pause, pt denies fever/chills, CP, SOB, nausea, vomiting, abd pain, and dizziness.    ROS: 10 point review of systems was assessed and is unremarkable other than what was mentioned on the HPI.      PAST MEDICAL & SURGICAL HISTORY:  [place] (hard of hearing)    Rectal bleeding  last hospitalization 10/6/20 2/2 radiation proctitis    Aortic stenosis    HTN (hypertension)    Proctitis, 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    Prostate cancer  RT 2018 and prostatectomy in 2015    S/P T&amp;A (status post tonsillectomy and adenoidectomy)  child    H/O prostatectomy  2015        Allergies    penicillin (Diarrhea; Pruritus; Hives)    Intolerances    codeine (Nausea)      SOCIAL HISTORY:  Occupation:  Smoking Hx: denies  Etoh Hx: rarely with meals  IVDA Hx: denies    FAMILY HISTORY:    unless noted, no significant family hx with Mother, Father, Siblings    Vital Signs Last 24 Hrs  T(C): 36.9 (12 Dec 2020 03:10), Max: 36.9 (12 Dec 2020 03:10)  T(F): 98.4 (12 Dec 2020 03:10), Max: 98.4 (12 Dec 2020 03:10)  HR: 87 (12 Dec 2020 06:15) (84 - 120)  BP: 136/69 (12 Dec 2020 06:15) (102/71 - 162/95)  BP(mean): 96 (12 Dec 2020 06:15) (92 - 96)  RR: 23 (12 Dec 2020 06:15) (18 - 23)  SpO2: 98% (12 Dec 2020 06:15) (93% - 100%)    General: WN/WD NAD  Neurology: Awake, nonfocal, SINGH x 4  Eyes: Scleras clear, PERRLA/ EOMI, Gross vision intact  ENT: Gross hearing intact, grossly patent pharynx, no stridor  Neck: Neck supple, trachea midline, No JVD,   Respiratory: CTA B/L, No wheezing, rales, rhonchi  CV: RRR, S1S2, no murmurs, rubs or gallops  Abdominal: Soft, NT, ND +BS,   Extremities: No edema, + peripheral pulses  Skin: No Rashes, Hematoma, Ecchymosis  Lymphatic: No Neck, axilla, groin LAD  Psych: Oriented x 3, normal affect    LABS:                        11.6   6.56  )-----------( 156      ( 12 Dec 2020 03:49 )             35.1     12-12    137  |  100  |  19  ----------------------------<  105<H>  3.6   |  27  |  0.95    Ca    9.2      12 Dec 2020 03:49  Mg     2.0     12-12    TPro  6.5  /  Alb  4.0  /  TBili  0.2  /  DBili  x   /  AST  20  /  ALT  21  /  AlkPhos  73  12-12

## 2020-12-12 NOTE — ED PROVIDER NOTE - OBJECTIVE STATEMENT
70M w/ h/o recent tavr presents w after his home monitor noted a 3 second pause. Occurred while he was sleeping. Patient was called by CT surg PA to come in. Denies any symptoms currently. Denies fevers, chills, cough, lightheadedness, chest pain, n/v, SOB.

## 2020-12-12 NOTE — ED PROVIDER NOTE - PMH
Aortic stenosis    [place] (hard of hearing)    HTN (hypertension)    Proctitis, 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  Prostate cancer  RT 2018 and prostatectomy in 2015  Rectal bleeding  last hospitalization 10/6/20 2/2 radiation proctitis

## 2020-12-12 NOTE — CONSULT NOTE ADULT - ATTENDING COMMENTS
70 year old man with recent TAVR and new LBBB that resolved. He was discharged home with a Biotel. Last night while asleep, he had high grade AV block.    Although the AV block occurred during sleep, his heart rate was in excess of 90 bpm and the traces suggest intra or infra His AV block with periods of conduction with bundle branch block and abrupt non conducted beats with high grade AV block. This type of AV block is likely to progress to complete heart block and hence, he would benefit from cardiac pacing.    I have explained the nature of the arrhythmia and potential benefit from cardiac pacing. He wishes to talk to his regular cardiologists and will let us know if and when he wishes to proceed with pacing.

## 2020-12-12 NOTE — ED PROVIDER NOTE - CARE PLAN
Principal Discharge DX:	Sinus arrest  Secondary Diagnosis:	S/P TAVR (transcatheter aortic valve replacement)

## 2020-12-12 NOTE — ED ADULT NURSE NOTE - PMH
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

## 2020-12-12 NOTE — ED ADULT TRIAGE NOTE - CHIEF COMPLAINT QUOTE
patient had tavr this past week; was discharged home yesterday; approximately 0130 am mcot monitor went off due to patient having a 3.1 second pause with intermittent complete heart block; patient denies chest pain but c/o intermittent shortness of breath

## 2020-12-12 NOTE — PROGRESS NOTE ADULT - SUBJECTIVE AND OBJECTIVE BOX
HERBIE KERR  MRN-[id number]  Patient is a 70y old  Male who presents with a chief complaint of Pause on MCOT (12 Dec 2020 06:40)    HPI:  HPI: Pt is a 70M with a PMH of HTN, prostate ca, radiation proctitis, rectal bleeding, and severe AS s/p Core-Valve TAVR 12/9 c/b new LBBB who was called by MCOT Team / Cardiothoracic Surgery Team for 3.1 second pause noted on MCOT.    On 12/9 pt underwent TAVR with course c/b new LBBB. On 12/11, pt was discharged home. He fell asleep on the couch but woke up to a call from CTS telling him to come to the hospital because MCOT alerted them that there was a 3.1 second pause. Other than the pause, pt denies fever/chills, CP, SOB, nausea, vomiting, abd pain, and dizziness.    (12 Dec 2020 06:40)      Surgery/Hospital Course:  12/09 TAVR   12/12 Re-admitted to [facility] for pause on MCOT    Today:    ICU Vital Signs Last 24 Hrs  T(C): 36.2 (12 Dec 2020 08:00), Max: 36.9 (12 Dec 2020 03:10)  T(F): 97.2 (12 Dec 2020 08:00), Max: 98.4 (12 Dec 2020 03:10)  HR: 73 (12 Dec 2020 08:00) (73 - 120)  BP: 132/63 (12 Dec 2020 08:00) (102/71 - 162/95)  BP(mean): 91 (12 Dec 2020 08:00) (91 - 96)  ABP: --  ABP(mean): --  RR: 16 (12 Dec 2020 08:00) (16 - 23)  SpO2: 99% (12 Dec 2020 08:00) (94% - 100%)      Physical Exam:  Gen:  Awake, alert   CNS: non focal 	  Neck: no JVD  RES : clear , no wheezing              CVS: Regular  rhythm. Normal S1/S2  Abd: Soft, non-distended. Bowel sounds present.  Skin: No rash.  Ext:  no edema    ============================I/O===========================   I&O's Detail    ============================ LABS =========================                        11.6   6.56  )-----------( 156      ( 12 Dec 2020 03:49 )             35.1     12-12    137  |  100  |  19  ----------------------------<  105<H>  3.6   |  27  |  0.95    Ca    9.2      12 Dec 2020 03:49  Mg     2.0     12-12    TPro  6.5  /  Alb  4.0  /  TBili  0.2  /  DBili  x   /  AST  20  /  ALT  21  /  AlkPhos  73  12-12    LIVER FUNCTIONS - ( 12 Dec 2020 03:49 )  Alb: 4.0 g/dL / Pro: 6.5 g/dL / ALK PHOS: 73 U/L / ALT: 21 U/L / AST: 20 U/L / GGT: x                 ======================Micro/Rad/Cardio=================  CXR: Reviewed  Echo: Reviewed  ======================================================  PAST MEDICAL & SURGICAL HISTORY:  [place] (hard of hearing)    Rectal bleeding  last hospitalization 10/6/20 2/2 radiation proctitis    Aortic stenosis    HTN (hypertension)    Proctitis, 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    Prostate cancer  RT 2018 and prostatectomy in 2015    S/P T&amp;A (status post tonsillectomy and adenoidectomy)  child    H/O prostatectomy  2015      ====================ASSESSMENT ==============  Admitted for pause on MCOT  Severe aortic stenosis s/p TAVR 12/09      Plan:  ====================== NEUROLOGY=====================  Nonfocal, continue to monitor neuro status as per ICU protocol    ==================== RESPIRATORY======================  On supplemental O2 via 2L NC  Encourage incentive spirometry, continue pulse ox monitoring, follow ABGs    ====================CARDIOVASCULAR==================  Admitted due to pause on MCOT  Aortic stenosis s/p TAVR c/b new LBBB  ASA for CAD  Monitor hemodynamics    aspirin enteric coated 81 milliGRAM(s) Oral daily    ===================HEMATOLOGIC/ONC ===================  Continue monitoring levels of hemoglobin, hematocrit and platelets     ===================== RENAL =========================  Continue to monitor I/Os, BUN/Cr, and urine output  Replete lytes prn to keep K > 4 and Mg > 2    ==================== GASTROINTESTINAL===================  Tolerating PO DASH/TLC diet.     GI prophylaxis, pantoprazole    Tablet 40 milliGRAM(s) Oral before breakfast  sodium chloride 0.9% lock flush 3 milliLiter(s) IV Push every 8 hours    =======================    ENDOCRINE  =====================  No active issues, continue to monitor blood glucose levels to determine need for initiation of sliding sclae    ========================INFECTIOUS DISEASE================  Temp 97.2F, WBC within normal limits at 6.56  Continue to trend fever curve & WBC      Patient requires continuous monitoring with bedside rhythm monitoring, pulse ox monitoring, and intermittent blood gas analysis. Care plan discussed with ICU care team. Patient remained critical and at risk for life threatening decompensation.     By signing my name below, I, Sunny Sumner, attest that this documentation has been prepared under the direction and in the presence of Kingston Benjamin MD  Electronically signed: Cleo Drummond, 12-12-20 @ 08:52    I, Kingston Benjamin MD, personally performed the services described in this documentation. All medical record entries made by the [facility] were at my direction and in my presence. I have reviewed the chart and agree that the record reflects my personal performance and is accurate and complete  Electronically signed: Kingston Benjamin MD

## 2020-12-12 NOTE — CONSULT NOTE ADULT - SUBJECTIVE AND OBJECTIVE BOX
Patient seen and evaluated at bedside    Chief Complaint:    HPI:  70 year old man with HTN, prostate ca, radiation proctitis, severe AS s/p Core-Valve TAVR 12/9 c/b new LBBB who was recalled by CTS team for 3.1 second pause noted on MCOT.    On 12/9 pt underwent TAVR with course c/b new LBBB. L femoral TVP was placed during the case and subsequently removed the next day. On 12/11 pt was discharged home. He reports that he was able to walk around the house and felt fine. He fell asleep on the couch but woke up to a call from CTS telling him to come to the hospital because MCOT alerted them that there was a 3.1 second pause. He otherwise denies CP/SOB, palpitations, lightheadedness, or dizziness.    PMHx:   [place] (hard of hearing)    Rectal bleeding    Aortic stenosis    HTN (hypertension)    Proctitis, radiation    Prostate cancer        PSHx:   S/P T&amp;A (status post tonsillectomy and adenoidectomy)    H/O prostatectomy      Allergies:  codeine (Nausea)  penicillin (Diarrhea; Pruritus; Hives)    Home Meds:    Current Medications:   ASA 81 daily    FAMILY HISTORY:      Social History:  No toxic habits    REVIEW OF SYSTEMS:  CONSTITUTIONAL: No weakness, fevers or chills  EYES/ENT: No visual changes;  No dysphagia  NECK: No pain or stiffness  RESPIRATORY: No cough, wheezing, hemoptysis; No shortness of breath  CARDIOVASCULAR: No chest pain or palpitations; No lower extremity edema  GASTROINTESTINAL: No abdominal or epigastric pain. No nausea, vomiting, or hematemesis; No diarrhea or constipation. No melena or hematochezia.  BACK: No back pain  GENITOURINARY: No dysuria, frequency or hematuria  NEUROLOGICAL: No numbness or weakness  SKIN: No itching, burning, rashes, or lesions   All other review of systems is negative unless indicated above.    Physical Exam:  T(F): 98.4 (12-12), Max: 98.4 (12-12)  HR: 107 (12-12) (84 - 120)  BP: 157/93 (12-12) (111/70 - 162/95)  RR: 18 (12-12)  SpO2: 100% (12-12)  GENERAL: No acute distress, well-developed  HEAD:  Atraumatic, Normocephalic  ENT: EOMI, PERRLA, conjunctiva and sclera clear, Neck supple, No JVD, moist mucosa  CHEST/LUNG: Clear to auscultation bilaterally; No wheeze, equal breath sounds bilaterally   BACK: No spinal tenderness  HEART: Regular rate and rhythm; No murmurs, rubs, or gallops  ABDOMEN: Soft, Nontender, Nondistended; Bowel sounds present  EXTREMITIES:  No clubbing, cyanosis, or edema  PSYCH: Nl behavior, nl affect  NEUROLOGY: AAOx3, non-focal, cranial nerves intact  SKIN: Normal color, No rashes or lesions    LINES:    Labs:  reviewed                        11.7   6.94  )-----------( 145      ( 11 Dec 2020 06:13 )             36.1     12-11    139  |  103  |  16  ----------------------------<  90  3.6   |  25  |  0.83    Ca    8.9      11 Dec 2020 06:13        Cardiovascular Diagnostic Testing:    ECG: sinus tachycardia with NV interval 188ms    Echo:     Stress Testing:    Cath:    Imaging:    CXR:  reviewed   Patient seen and evaluated at bedside    Chief Complaint:    HPI:  70 year old man with HTN, prostate ca, radiation proctitis, severe AS s/p Core-Valve TAVR 12/9 c/b new LBBB who was recalled by CTS team for 3.1 second pause noted on MCOT.    On 12/9 pt underwent TAVR with course c/b new LBBB. L femoral TVP was placed during the case and subsequently removed the next day. On 12/11 pt was discharged home. He reports that he was able to walk around the house and felt fine. He fell asleep on the couch but woke up to a call from CTS telling him to come to the hospital because MCOT alerted them that there was a 3.1 second pause. He otherwise denies CP/SOB, palpitations, lightheadedness, or dizziness.    PMHx:   [place] (hard of hearing)    Rectal bleeding    Aortic stenosis    HTN (hypertension)    Proctitis, radiation    Prostate cancer        PSHx:   S/P T&amp;A (status post tonsillectomy and adenoidectomy)    H/O prostatectomy      Allergies:  codeine (Nausea)  penicillin (Diarrhea; Pruritus; Hives)    Home Meds:    Current Medications:   ASA 81 daily    FAMILY HISTORY:      Social History:  No toxic habits    REVIEW OF SYSTEMS:  CONSTITUTIONAL: No weakness, fevers or chills  EYES/ENT: No visual changes;  No dysphagia  NECK: No pain or stiffness  RESPIRATORY: No cough, wheezing, hemoptysis; No shortness of breath  CARDIOVASCULAR: No chest pain or palpitations; No lower extremity edema  GASTROINTESTINAL: No abdominal or epigastric pain. No nausea, vomiting, or hematemesis; No diarrhea or constipation. No melena or hematochezia.  BACK: No back pain  GENITOURINARY: No dysuria, frequency or hematuria  NEUROLOGICAL: No numbness or weakness  SKIN: No itching, burning, rashes, or lesions   All other review of systems is negative unless indicated above.    Physical Exam:  T(F): 98.4 (12-12), Max: 98.4 (12-12)  HR: 107 (12-12) (84 - 120)  BP: 157/93 (12-12) (111/70 - 162/95)  RR: 18 (12-12)  SpO2: 100% (12-12)  GENERAL: No acute distress, well-developed  HEAD:  Atraumatic, Normocephalic  ENT: EOMI, PERRLA, conjunctiva and sclera clear, Neck supple, No JVD, moist mucosa  CHEST/LUNG: Clear to auscultation bilaterally; No wheeze, equal breath sounds bilaterally   BACK: No spinal tenderness  HEART: Regular rate and rhythm; No murmurs, rubs, or gallops  ABDOMEN: Soft, Nontender, Nondistended; Bowel sounds present  EXTREMITIES:  No clubbing, cyanosis, or edema  PSYCH: Nl behavior, nl affect  NEUROLOGY: AAOx3, non-focal, cranial nerves intact  SKIN: Normal color, No rashes or lesions    LINES:    Labs:  reviewed                        11.7   6.94  )-----------( 145      ( 11 Dec 2020 06:13 )             36.1     12-11    139  |  103  |  16  ----------------------------<  90  3.6   |  25  |  0.83    Ca    8.9      11 Dec 2020 06:13        Cardiovascular Diagnostic Testing:    ECG: sinus tachycardia with WA interval 188ms    Echo: ------------------------------------------------------------------------  CONCLUSIONS:  1. #26mm Evolut Pro+ THV.  The valve is well seated with  normal function.  The peak/mean gradients= 21/10mmHg with a  DI= 0.73.  The left ventricular function is hyperdynamic. There is  trace to mild paravalvular aortic regurgitation originating  from about 2-3 O'clock surgical view.  There is no  intravalvular regurgitation seen.  *** The left ventricular function is hyperdynamic.  Otherwise, compared with echocardiogram of 12/9/2020, no  significant changes noted.      Stress Testing:    Cath:CORONARY VESSELS: The coronary circulation is right dominant.  LM:   --  LM: Normal.  LAD:   --  LAD: Angiography showed minor luminal irregularities with no  flow limiting lesions.  CX:   --  Circumflex: Angiography showed minor luminal irregularities with  no flow limiting lesions.  RCA:   --  RCA: Angiography showed minor luminal irregularities with no  flow limiting lesions.    Imaging:    CXR:  reviewed

## 2020-12-12 NOTE — ED PROVIDER NOTE - CLINICAL SUMMARY MEDICAL DECISION MAKING FREE TEXT BOX
74.4
70M w/ h/o recent tavr presents after home monitor noted a pause, asymptomatic now, evaluated by ct surg PA at bedside, tba for further monitoring

## 2020-12-12 NOTE — H&P ADULT - ASSESSMENT
ASSESSMENT:   Pt is a 70M with a PMH of HTN, prostate ca, radiation proctitis, rectal bleeding, and severe AS s/p Core-Valve TAVR 12/9 c/b new LBBB who was called by MCOT Team / Cardiothoracic Surgery Team for 3.1 second pause noted on MCOT. On 12/9 pt underwent TAVR with course c/b new LBBB. On 12/11, pt was discharged home. He fell asleep on the couch but woke up to a call from CTS telling him to come to the hospital because MCOT alerted them that there was a 3.1 second pause.      PLAN:  - Pt was discussed with Dr. De La Garza  - Plan to follow    Pt was seen and examined with LIANET Tolentino, PGY-1  Cardiothoracic Surgery ASSESSMENT:   Pt is a 70M with a PMH of HTN, prostate ca, radiation proctitis, rectal bleeding, and severe AS s/p Core-Valve TAVR 12/9 c/b new LBBB who was called by MCOT Team / Cardiothoracic Surgery Team for 3.1 second pause noted on MCOT. On 12/9 pt underwent TAVR with course c/b new LBBB. On 12/11, pt was discharged home. He fell asleep on the couch but woke up to a call from CTS telling him to come to the hospital because MCOT alerted them that there was a 3.1 second pause.      PLAN:  - Plan discussed with Dr. De La Garza overnight  - Will plan for CTICU admission for higher level of monitoring  - EP consult to follow - appreciate recommendations    Pt was seen and examined with LIANET Tolentino. Patient was discussed with cardiothoracic team at AM rounds.    Bertram Fay, PGY-1  Cardiothoracic Surgery

## 2020-12-12 NOTE — CONSULT NOTE ADULT - ASSESSMENT
Assessment and Recommendations:  70 year old man with HTN, prostate ca, radiation proctitis, severe AS s/p Core-Valve TAVR 12/9 c/b new LBBB who was recalled by CTS team for 3.1 second pause noted on MCOT.    #3.1 second pause (while sleeping at home, asymptomatic)  -continue to monitor on telemetry for now  -please try to contact MCOT for strips of the noted event  -ECG currently with normal sinus rhythm, [place] interval 188ms    Todd Adams MD  Cardiology Fellow    All Cardiology service information can be found 24/7 on [email or web address], password: [email or web address]    Note is preliminary until cosigned by an attending Assessment and Recommendations:  70 year old man with HTN, prostate ca, radiation proctitis, severe AS s/p Core-Valve TAVR 12/9 c/b new LBBB who was recalled by CTS team for 3.1 second pause noted on MCOT.    #3.1 second pause (while sleeping at home, asymptomatic)  -continue to monitor on telemetry for now  -please try to contact MCOT for strips of the noted event  -ECG currently with normal sinus rhythm, [place] interval 188ms, QRS duration 88ms    Todd Adams MD  Cardiology Fellow    All Cardiology service information can be found 24/7 on [email or web address], password: [email or web address]    Note is preliminary until cosigned by an attending

## 2020-12-12 NOTE — H&P ADULT - NSICDXPASTMEDICALHX_GEN_ALL_CORE_FT
PAST MEDICAL HISTORY:  Aortic stenosis     [place] (hard of hearing)     HTN (hypertension)     Proctitis, radiation from prostate treatment    Prostate cancer RT 2018 and prostatectomy in 2015    Rectal bleeding last hospitalization 10/6/20 2/2 radiation proctitis

## 2020-12-12 NOTE — ED PROVIDER NOTE - ATTENDING CONTRIBUTION TO CARE
MD Escobar:  patient seen and evaluated personally.   I agree with the History & Physical,  Impression & Plan other than what was detailed in my note.  MD Escobar  70 y/o m hx of recent tavr w/ mcot had three second pause so sent into ED. pt currrently asymptomatic, pt has mild tachycardia to 120. Exam benign MD Escobar:  patient seen and evaluated personally.   I agree with the History & Physical,  Impression & Plan other than what was detailed in my note.  MD Escobar  70 y/o m hx of recent tavr w/ mcot had three second pause so sent into ED. Pt was sleeping during this episode. pt currrently asymptomatic, pt has mild tachycardia to 120. Exam benign, although pt appears anxious. heart/lung sounds normal, abd soft nt, no pitting edema or jvd. CT surg at bedside upon arrival. REcommended cbc, cmp, rvp, pt would go straight to CTICU if did not need a covid. Discussed trop, no need for repeat given recent tavr as per team.

## 2020-12-12 NOTE — ED ADULT NURSE NOTE - OBJECTIVE STATEMENT
70 y male presents from home s/p TAVR Wednesday- discharged from [facility] yesterday. Patient sent home on monitor was called to come back to ED for Sinus Pause noted on monitor. Denies chest pain, lightheadedness, dizziness, N/V. Reports to chronic SOB. A&Ox3. Skin warm dry and intact. Breathing comfortably in bed- no distress. abdomen soft nontender nondistended. Safety maintained- bed locked in lowest position. Placed on cardiac monitor- sinus tach noted on presentation. MD at bedside.

## 2020-12-13 DIAGNOSIS — Z95.2 PRESENCE OF PROSTHETIC HEART VALVE: ICD-10-CM

## 2020-12-13 DIAGNOSIS — I45.5 OTHER SPECIFIED HEART BLOCK: ICD-10-CM

## 2020-12-13 LAB
ALBUMIN SERPL ELPH-MCNC: 4.1 G/DL — SIGNIFICANT CHANGE UP (ref 3.3–5)
ALP SERPL-CCNC: 69 U/L — SIGNIFICANT CHANGE UP (ref 40–120)
ALT FLD-CCNC: 26 U/L — SIGNIFICANT CHANGE UP (ref 10–45)
ANION GAP SERPL CALC-SCNC: 11 MMOL/L — SIGNIFICANT CHANGE UP (ref 5–17)
APTT BLD: 31.6 SEC — SIGNIFICANT CHANGE UP (ref 27.5–35.5)
AST SERPL-CCNC: 23 U/L — SIGNIFICANT CHANGE UP (ref 10–40)
BILIRUB SERPL-MCNC: 0.4 MG/DL — SIGNIFICANT CHANGE UP (ref 0.2–1.2)
BLD GP AB SCN SERPL QL: NEGATIVE — SIGNIFICANT CHANGE UP
BUN SERPL-MCNC: 12 MG/DL — SIGNIFICANT CHANGE UP (ref 7–23)
CALCIUM SERPL-MCNC: 9.3 MG/DL — SIGNIFICANT CHANGE UP (ref 8.4–10.5)
CHLORIDE SERPL-SCNC: 103 MMOL/L — SIGNIFICANT CHANGE UP (ref 96–108)
CO2 SERPL-SCNC: 25 MMOL/L — SIGNIFICANT CHANGE UP (ref 22–31)
CREAT SERPL-MCNC: 0.72 MG/DL — SIGNIFICANT CHANGE UP (ref 0.5–1.3)
GLUCOSE SERPL-MCNC: 101 MG/DL — HIGH (ref 70–99)
HCT VFR BLD CALC: 35.6 % — LOW (ref 39–50)
HGB BLD-MCNC: 11.8 G/DL — LOW (ref 13–17)
INR BLD: 0.99 RATIO — SIGNIFICANT CHANGE UP (ref 0.88–1.16)
MAGNESIUM SERPL-MCNC: 2.3 MG/DL — SIGNIFICANT CHANGE UP (ref 1.6–2.6)
MCHC RBC-ENTMCNC: 31 PG — SIGNIFICANT CHANGE UP (ref 27–34)
MCHC RBC-ENTMCNC: 33.1 GM/DL — SIGNIFICANT CHANGE UP (ref 32–36)
MCV RBC AUTO: 93.4 FL — SIGNIFICANT CHANGE UP (ref 80–100)
NRBC # BLD: 0 /100 WBCS — SIGNIFICANT CHANGE UP (ref 0–0)
PLATELET # BLD AUTO: 152 K/UL — SIGNIFICANT CHANGE UP (ref 150–400)
POTASSIUM SERPL-MCNC: 4 MMOL/L — SIGNIFICANT CHANGE UP (ref 3.5–5.3)
POTASSIUM SERPL-SCNC: 4 MMOL/L — SIGNIFICANT CHANGE UP (ref 3.5–5.3)
PROT SERPL-MCNC: 6.4 G/DL — SIGNIFICANT CHANGE UP (ref 6–8.3)
PROTHROM AB SERPL-ACNC: 11.9 SEC — SIGNIFICANT CHANGE UP (ref 10.6–13.6)
RBC # BLD: 3.81 M/UL — LOW (ref 4.2–5.8)
RBC # FLD: 12.4 % — SIGNIFICANT CHANGE UP (ref 10.3–14.5)
RH IG SCN BLD-IMP: POSITIVE — SIGNIFICANT CHANGE UP
SARS-COV-2 IGG SERPL QL IA: NEGATIVE — SIGNIFICANT CHANGE UP
SARS-COV-2 IGM SERPL IA-ACNC: <0.1 INDEX — SIGNIFICANT CHANGE UP
SODIUM SERPL-SCNC: 139 MMOL/L — SIGNIFICANT CHANGE UP (ref 135–145)
WBC # BLD: 6.78 K/UL — SIGNIFICANT CHANGE UP (ref 3.8–10.5)
WBC # FLD AUTO: 6.78 K/UL — SIGNIFICANT CHANGE UP (ref 3.8–10.5)

## 2020-12-13 PROCEDURE — 71045 X-RAY EXAM CHEST 1 VIEW: CPT | Mod: 26,77

## 2020-12-13 PROCEDURE — 93010 ELECTROCARDIOGRAM REPORT: CPT

## 2020-12-13 PROCEDURE — 99232 SBSQ HOSP IP/OBS MODERATE 35: CPT

## 2020-12-13 PROCEDURE — 71045 X-RAY EXAM CHEST 1 VIEW: CPT | Mod: 26

## 2020-12-13 PROCEDURE — 33208 INSRT HEART PM ATRIAL & VENT: CPT | Mod: KX

## 2020-12-13 PROCEDURE — 99233 SBSQ HOSP IP/OBS HIGH 50: CPT

## 2020-12-13 RX ORDER — ACETAMINOPHEN 500 MG
1000 TABLET ORAL ONCE
Refills: 0 | Status: COMPLETED | OUTPATIENT
Start: 2020-12-13 | End: 2020-12-13

## 2020-12-13 RX ORDER — METOPROLOL TARTRATE 50 MG
25 TABLET ORAL EVERY 6 HOURS
Refills: 0 | Status: DISCONTINUED | OUTPATIENT
Start: 2020-12-13 | End: 2020-12-14

## 2020-12-13 RX ORDER — ACETAMINOPHEN 500 MG
650 TABLET ORAL EVERY 4 HOURS
Refills: 0 | Status: DISCONTINUED | OUTPATIENT
Start: 2020-12-13 | End: 2020-12-14

## 2020-12-13 RX ADMIN — SODIUM CHLORIDE 3 MILLILITER(S): 9 INJECTION INTRAMUSCULAR; INTRAVENOUS; SUBCUTANEOUS at 06:21

## 2020-12-13 RX ADMIN — Medication 25 MILLIGRAM(S): at 18:08

## 2020-12-13 RX ADMIN — SODIUM CHLORIDE 3 MILLILITER(S): 9 INJECTION INTRAMUSCULAR; INTRAVENOUS; SUBCUTANEOUS at 12:11

## 2020-12-13 RX ADMIN — Medication 81 MILLIGRAM(S): at 12:11

## 2020-12-13 RX ADMIN — Medication 1000 MILLIGRAM(S): at 12:51

## 2020-12-13 RX ADMIN — Medication 400 MILLIGRAM(S): at 18:08

## 2020-12-13 RX ADMIN — Medication 25 MILLIGRAM(S): at 23:41

## 2020-12-13 RX ADMIN — Medication 650 MILLIGRAM(S): at 23:41

## 2020-12-13 RX ADMIN — SODIUM CHLORIDE 3 MILLILITER(S): 9 INJECTION INTRAMUSCULAR; INTRAVENOUS; SUBCUTANEOUS at 22:43

## 2020-12-13 RX ADMIN — Medication 400 MILLIGRAM(S): at 12:41

## 2020-12-13 RX ADMIN — Medication 1000 MILLIGRAM(S): at 18:38

## 2020-12-13 NOTE — CHART NOTE - NSCHARTNOTEFT_GEN_A_CORE
Procedure Note    Preoperative diagnosis    Calcific aortic stenosis, s/p TAVR  Post TAVR LBBB resolved  Ambulatory monitoring showed high grade infra His AV block      Post operative diagnosis  Same    Procedure  Dual chamber [employer] pacemaker implant    Findings    RV lead in septal RV  RA lead in lateral RA wall (poor fixation in RAA)    Set DDD  with AV search hysteresis to minimize RV pacing    Plan  CXR stat  ECG today  Avoid heparins    Armin Franco MD  Attending Cardiac Electrophysiologist

## 2020-12-13 NOTE — PROGRESS NOTE ADULT - ASSESSMENT
Pt is a 70M with a PMH of HTN, prostate ca, radiation proctitis, rectal bleeding, and severe AS s/p Core-Valve TAVR 12/9 c/b new LBBB who was called by MCOT Team / Cardiothoracic Surgery Team for 3.1 second pause noted on OT  12/13: Dual chamber [employer] pacemaker implant. Transferred from CTU. VSS.  seen on monitor while pt in bathroom and 112 while at rest. AS d/w Dr Armin Franco from -Ok to start BB Lopressor 25 q 6h.

## 2020-12-13 NOTE — PROGRESS NOTE ADULT - SUBJECTIVE AND OBJECTIVE BOX
VITAL SIGNS    Telemetry:    Vital Signs Last 24 Hrs  T(C): 36.4 (20 @ 15:00), Max: 37.6 (20 @ 00:00)  T(F): 97.5 (20 @ 15:00), Max: 99.7 (20 @ 00:00)  HR: 113 (20 @ 15:00) (67 - 113)  BP: 111/86 (20 @ 15:00) (111/86 - 161/74)  RR: 18 (20 @ 15:00) (12 - 26)  SpO2: 98% (20 @ 15:00) (93% - 100%)             @ 07:01  -   @ 07:00  --------------------------------------------------------  IN: 770 mL / OUT: 2155 mL / NET: -1385 mL     @ 07:01  -   @ 16:11  --------------------------------------------------------  IN: 100 mL / OUT: 575 mL / NET: -475 mL       Daily     Daily Weight in k.4 (13 Dec 2020 03:00)  Admit Wt: Drug Dosing Weight  Height (cm): 177.8 (12 Dec 2020 03:10)  Weight (kg): 93.7 (12 Dec 2020 03:15)  BMI (kg/m2): 29.6 (12 Dec 2020 03:15)  BSA (m2): 2.12 (12 Dec 2020 03:15)     Daily Weight in k.4 (20 @ 03:00)    [facility]      139  |  103  |  12  ----------------------------<  101<H>  4.0   |  25  |  0.72    Ca    9.3      13 Dec 2020 01:48  Mg     2.3         TPro  6.4  /  Alb  4.1  /  TBili  0.4  /  DBili  x   /  AST  23  /  ALT  26  /  AlkPhos  69                                   11.8   6.78  )-----------( 152      ( 13 Dec 2020 01:48 )             35.6          PT/INR - ( 13 Dec 2020 01:48 )   PT: 11.9 sec;   INR: 0.99 ratio         PTT - ( 13 Dec 2020 01:48 )  PTT:31.6 sec        Bilirubin Total, Serum: 0.4 mg/dL ( @ 01:48)    CAPILLARY BLOOD GLUCOSE              Drains:     MS       [  ]   [  ]            L Pleural [  ]            R Pleural  [  ]            JESICA  [  ]           Gr  [  ]    Pacing Wires      [  ]   Settings:                                  Isolated  [  ]                    CXR:      MEDICATIONS  acetaminophen   Tablet .. 650 milliGRAM(s) Oral every 4 hours PRN  acetaminophen  IVPB .. 1000 milliGRAM(s) IV Intermittent once  aspirin enteric coated 81 milliGRAM(s) Oral daily  metoprolol tartrate 25 milliGRAM(s) Oral every 6 hours  pantoprazole    Tablet 40 milliGRAM(s) Oral before breakfast  sodium chloride 0.9% lock flush 3 milliLiter(s) IV Push every 8 hours      PHYSICAL EXAM      Neurology: alert and oriented x 3, nonfocal, no gross deficits  CV :S1S2  Sternal Wound : n/a , PPM site no hematoma  Lungs: cta  Abdomen: soft, nontender, nondistended, positive bowel sounds, last bowel movement   :   voids    Extremities:   1+edema               PAST MEDICAL & SURGICAL HISTORY:  [place] (hard of hearing)    Rectal bleeding  last hospitalization 10/6/20 2/2 radiation proctitis    Aortic stenosis    HTN (hypertension)    Proctitis, 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

## 2020-12-13 NOTE — PROGRESS NOTE ADULT - PROBLEM SELECTOR PLAN 1
S/p PPM placement today  Start Lopressor 25 Q 6 as d/w Dr. Funez from EP  Monitor and titrate lopressor as tolerated.  Likely discharge on Monday

## 2020-12-13 NOTE — PROGRESS NOTE ADULT - SUBJECTIVE AND OBJECTIVE BOX
CRITICAL CARE ATTENDING - CTICU    MEDICATIONS  (STANDING):  aspirin enteric coated 81 milliGRAM(s) Oral daily  pantoprazole    Tablet 40 milliGRAM(s) Oral before breakfast  sodium chloride 0.9% lock flush 3 milliLiter(s) IV Push every 8 hours                                    11.8   6.78  )-----------( 152      ( 13 Dec 2020 01:48 )             35.6           139  |  103  |  12  ----------------------------<  101<H>  4.0   |  25  |  0.72    Ca    9.3      13 Dec 2020 01:48  Mg     2.3         TPro  6.4  /  Alb  4.1  /  TBili  0.4  /  DBili  x   /  AST  23  /  ALT  26  /  AlkPhos  69        PT/INR - ( 13 Dec 2020 01:48 )   PT: 11.9 sec;   INR: 0.99 ratio         PTT - ( 13 Dec 2020 01:48 )  PTT:31.6 sec        Daily     Daily Weight in k.4 (13 Dec 2020 03:00)       @ 07:01  -   @ 07:00  --------------------------------------------------------  IN: 770 mL / OUT: 2155 mL / NET: -1385 mL        Critically Ill patient  : [ ] preoperative ,   [x] post operative    Requires :  [x] Arterial Line   [ ] Central Line  [ ] PA catheter  [ ] IABP  [ ] ECMO  [ ] LVAD  [ ] Ventilator  [ ] pacemaker [ ] Impella.                      [ ] ABG's     [x] Pulse Oxymetry Monitoring  Bedside evaluation , monitoring , treatment of hemodynamics , fluids , IVP/ IVCD meds.        Diagnosis:     POD 4 - TAVR    Re-admitted to [facility] for pause on MCOT    ECG, telemetry for continuous monitoring      I, Danny Lobo, personally performed the services described in this documentation. All medical record entries made by the scribe were at my direction and in my presence. I have reviewed the chart and agree that the record reflects my personal performance and is accurate and complete.   Danny Lobo MD.       By signing my name below, I, Breanna Matthews, attest that this documentation has been prepared under the direction and in the presence of Danny Lobo MD.   Electronically Signed: Breanna Matthews 20 @ 07:55        Discussed with CT surgeon, Physician Assistant - Nurse Practitioner- Critical care medicine team.   Dicussed at  AM / PM rounds.   Chart, labs , films reviewed.    Total Time: CRITICAL CARE ATTENDING - CTICU    MEDICATIONS  (STANDING):  aspirin enteric coated 81 milliGRAM(s) Oral daily  pantoprazole    Tablet 40 milliGRAM(s) Oral before breakfast  sodium chloride 0.9% lock flush 3 milliLiter(s) IV Push every 8 hours                                    11.8   6.78  )-----------( 152      ( 13 Dec 2020 01:48 )             35.6           139  |  103  |  12  ----------------------------<  101<H>  4.0   |  25  |  0.72    Ca    9.3      13 Dec 2020 01:48  Mg     2.3         TPro  6.4  /  Alb  4.1  /  TBili  0.4  /  DBili  x   /  AST  23  /  ALT  26  /  AlkPhos  69        PT/INR - ( 13 Dec 2020 01:48 )   PT: 11.9 sec;   INR: 0.99 ratio         PTT - ( 13 Dec 2020 01:48 )  PTT:31.6 sec        Daily     Daily Weight in k.4 (13 Dec 2020 03:00)       @ 07:01  -   @ 07:00  --------------------------------------------------------  IN: 770 mL / OUT: 2155 mL / NET: -1385 mL        Critically Ill patient  : [ ] preoperative ,   [x] post operative    Requires :  [x] Arterial Line   [ ] Central Line  [ ] PA catheter  [ ] IABP  [ ] ECMO  [ ] LVAD  [ ] Ventilator  [ ] pacemaker [ ] Impella.                      [ x] ABG's     [x] Pulse Oxymetry Monitoring  Bedside evaluation , monitoring , treatment of hemodynamics , fluids , IVP/ IVCD meds.        Diagnosis:     POD 4 - TAVR    Re-admitted to [facility] for pause on MCOT    CHB    Tachy/Nehemiah     EP  -  ? PPM    ECG, telemetry for continuous monitoring      I, Danny Lobo, personally performed the services described in this documentation. All medical record entries made by the scribe were at my direction and in my presence. I have reviewed the chart and agree that the record reflects my personal performance and is accurate and complete.   Danny Lobo MD.       By signing my name below, I, Breanna Matthews, attest that this documentation has been prepared under the direction and in the presence of Danny Lobo MD.   Electronically Signed: Breanna Matthews 20 @ 07:55        Discussed with CT surgeon, Physician Assistant - Nurse Practitioner- Critical care medicine team.   Dicussed at  AM / PM rounds.   Chart, labs , films reviewed.    Total Time: 20 min

## 2020-12-14 ENCOUNTER — TRANSCRIPTION ENCOUNTER (OUTPATIENT)
Age: 70
End: 2020-12-14

## 2020-12-14 VITALS — HEART RATE: 75 BPM | SYSTOLIC BLOOD PRESSURE: 138 MMHG | DIASTOLIC BLOOD PRESSURE: 78 MMHG

## 2020-12-14 PROCEDURE — 93010 ELECTROCARDIOGRAM REPORT: CPT

## 2020-12-14 PROCEDURE — 99239 HOSP IP/OBS DSCHRG MGMT >30: CPT

## 2020-12-14 PROCEDURE — 93010 ELECTROCARDIOGRAM REPORT: CPT | Mod: 77

## 2020-12-14 PROCEDURE — 71046 X-RAY EXAM CHEST 2 VIEWS: CPT | Mod: 26

## 2020-12-14 RX ORDER — ASPIRIN/CALCIUM CARB/MAGNESIUM 324 MG
1 TABLET ORAL
Qty: 0 | Refills: 0 | DISCHARGE
Start: 2020-12-14

## 2020-12-14 RX ORDER — PANTOPRAZOLE SODIUM 20 MG/1
1 TABLET, DELAYED RELEASE ORAL
Qty: 30 | Refills: 0
Start: 2020-12-14 | End: 2021-01-12

## 2020-12-14 RX ORDER — ACETAMINOPHEN 500 MG
1000 TABLET ORAL ONCE
Refills: 0 | Status: COMPLETED | OUTPATIENT
Start: 2020-12-14 | End: 2020-12-14

## 2020-12-14 RX ORDER — METOPROLOL TARTRATE 50 MG
50 TABLET ORAL
Refills: 0 | Status: DISCONTINUED | OUTPATIENT
Start: 2020-12-14 | End: 2020-12-14

## 2020-12-14 RX ORDER — METOPROLOL TARTRATE 50 MG
25 TABLET ORAL ONCE
Refills: 0 | Status: COMPLETED | OUTPATIENT
Start: 2020-12-14 | End: 2020-12-14

## 2020-12-14 RX ORDER — MULTIVIT-MIN/FERROUS GLUCONATE 9 MG/15 ML
1 LIQUID (ML) ORAL
Qty: 0 | Refills: 0 | DISCHARGE

## 2020-12-14 RX ORDER — METOPROLOL TARTRATE 50 MG
1 TABLET ORAL
Qty: 60 | Refills: 0
Start: 2020-12-14 | End: 2021-01-12

## 2020-12-14 RX ORDER — ACETAMINOPHEN 500 MG
2 TABLET ORAL
Qty: 0 | Refills: 0 | DISCHARGE
Start: 2020-12-14

## 2020-12-14 RX ADMIN — Medication 25 MILLIGRAM(S): at 11:08

## 2020-12-14 RX ADMIN — Medication 25 MILLIGRAM(S): at 05:03

## 2020-12-14 RX ADMIN — Medication 81 MILLIGRAM(S): at 11:08

## 2020-12-14 RX ADMIN — Medication 650 MILLIGRAM(S): at 06:00

## 2020-12-14 RX ADMIN — Medication 400 MILLIGRAM(S): at 12:50

## 2020-12-14 RX ADMIN — Medication 1000 MILLIGRAM(S): at 13:15

## 2020-12-14 RX ADMIN — Medication 650 MILLIGRAM(S): at 05:05

## 2020-12-14 RX ADMIN — PANTOPRAZOLE SODIUM 40 MILLIGRAM(S): 20 TABLET, DELAYED RELEASE ORAL at 05:04

## 2020-12-14 RX ADMIN — Medication 650 MILLIGRAM(S): at 00:30

## 2020-12-14 RX ADMIN — SODIUM CHLORIDE 3 MILLILITER(S): 9 INJECTION INTRAMUSCULAR; INTRAVENOUS; SUBCUTANEOUS at 05:02

## 2020-12-14 RX ADMIN — SODIUM CHLORIDE 3 MILLILITER(S): 9 INJECTION INTRAMUSCULAR; INTRAVENOUS; SUBCUTANEOUS at 14:08

## 2020-12-14 NOTE — DISCHARGE NOTE NURSING/CASE MANAGEMENT/SOCIAL WORK - NSDCFUADDAPPT_GEN_ALL_CORE_FT
YOU HAVE AN APPOINTMENT WITH DR LAWRENCE ON 12/22 @0930 AM    YOU HAVE  A FOLLOW UP EPS APPOINTMENT ON 12/23 .PLEASE CALL TO CONFIRM   [phone]

## 2020-12-14 NOTE — DISCHARGE NOTE NURSING/CASE MANAGEMENT/SOCIAL WORK - PATIENT PORTAL LINK FT
You can access the FollowMyHealth Patient Portal offered by [facility] by registering at the following website: [email or web address]. By joining [facility]’s FollowMyHealth portal, you will also be able to view your health information using other applications (apps) compatible with our system.

## 2020-12-14 NOTE — PROGRESS NOTE ADULT - ASSESSMENT
70 year old male with a PMH of HTN, prostate ca, radiation proctitis, rectal bleeding, and severe AS s/p Core-Valve TAVR 12/9 c/b new LBBB who was called by MCOT Team / Cardiothoracic Surgery Team for 3.1 second pause noted on MCOT  S/p Dual chamber [employer] pacemaker implant on 12/13.    1. 3.1 second pause now s/p PPM  -Post PPM education provided to patient at bedside  -Patient is A paced 60-80's on telemetry  -Metoprolol 50mg BID  -Pending CXR to confirm PPM placement   -Follow Up Appointment at the EP clinic on December 23rd at 10:40 am    Meaghan Heller PA-C 70 year old male with a PMH of HTN, prostate ca, radiation proctitis, rectal bleeding, and severe AS s/p Core-Valve TAVR 12/9 c/b new LBBB who was called by MCOT Team / Cardiothoracic Surgery Team for 3.1 second pause noted on MCOT  S/p Dual chamber [employer] pacemaker implant on 12/13.    1. 3.1 second pause now s/p PPM  -Post PPM education provided to patient at bedside  -Device paired and checked by rep  -Metoprolol 50mg BID  -Pending CXR to confirm PPM placement   -Follow Up Appointment at the EP clinic on December 23rd at 10:40 am    Meaghan Heller PA-C 70 year old male with a PMH of HTN, prostate ca, radiation proctitis, rectal bleeding, and severe AS s/p Core-Valve TAVR 12/9 c/b new LBBB who was called by MCOT Team / Cardiothoracic Surgery Team for 3.1 second pause noted on MCOT  S/p Dual chamber [employer] pacemaker implant on 12/13.    1. 3.1 second pause now s/p PPM  -Post PPM education provided to patient at bedside  -Device paired and checked by rep  -Metoprolol 50mg BID  -Pending CXR to confirm PPM placement   -Follow Up Appointment at the EP clinic on December 23rd at 10:40 am    Meaghan Heller PA-C    ADDENDUM:  1. CXR reviewed, leads in good placement  2. Patient cleared from EP perspective

## 2020-12-14 NOTE — DISCHARGE NOTE PROVIDER - NSDCPNSUBOBJ_GEN_ALL_CORE
VITAL SIGNS    Telemetry:      Vital Signs Last 24 Hrs  T(C): 36.6 (20 @ 05:00), Max: 36.6 (20 @ 19:30)  T(F): 97.9 (20 @ 05:00), Max: 97.9 (20 @ 19:30)  HR: 75 (20 @ 11:00) (72 - 113)  BP: 138/78 (20 @ 11:00) (111/86 - 152/78)  RR: 18 (20 @ 05:00) (12 - 20)  SpO2: 95% (20 @ 05:00) (94% - 98%)                    @ 07:01  -   @ 07:00  --------------------------------------------------------  IN: 760 mL / OUT: 1375 mL / NET: -615 mL     @ 07:01  -   @ 11:46  --------------------------------------------------------  IN: 240 mL / OUT: 0 mL / NET: 240 mL          Daily     Daily Weight in k.1 (14 Dec 2020 08:34)            CAPILLARY BLOOD GLUCOSE                Drains:     MS         [  ] Drainage:                 L Pleural  [  ]  Drainage:                R Pleural  [  ]  Drainage:    Pacing Wires        [  ]   Settings:                                  Isolated  [  ]    Coumadin    [ ] YES          [  ]      NO                                   PHYSICAL EXAM        Neurology: alert and oriented x 3, nonfocal, no gross deficits  CV : s1 s2 RRR    Lungs: cta  Abdomen: soft, nontender, nondistended, positive bowel sounds                       :    voiding        Extremities:    -  edema   /  -   calve tenderness ,            acetaminophen   Tablet .. 650 milliGRAM(s) Oral every 4 hours PRN  aspirin enteric coated 81 milliGRAM(s) Oral daily  metoprolol tartrate 50 milliGRAM(s) Oral two times a day  pantoprazole    Tablet 40 milliGRAM(s) Oral before breakfast  sodium chloride 0.9% lock flush 3 milliLiter(s) IV Push every 8 hours                    Physical Therapy Rec:   Home  [  ]   Home w/ PT  [  ]  Rehab  [  ]  Discussed with Cardiothoracic Team at AM rounds.

## 2020-12-14 NOTE — DISCHARGE NOTE PROVIDER - NSDCCPCAREPLAN_GEN_ALL_CORE_FT
PRINCIPAL DISCHARGE DIAGNOSIS  Diagnosis: S/P cardiac pacemaker procedure  Assessment and Plan of Treatment: Pauses noted on MCOT      SECONDARY DISCHARGE DIAGNOSES  Diagnosis: S/P TAVR (transcatheter aortic valve replacement)  Assessment and Plan of Treatment:

## 2020-12-14 NOTE — DISCHARGE NOTE PROVIDER - NSDCACTIVITY_GEN_ALL_CORE
Stairs allowed/Walking - Indoors allowed/Walking - Outdoors allowed/No heavy lifting/straining/Sex allowed/Showering allowed

## 2020-12-14 NOTE — DISCHARGE NOTE PROVIDER - CARE PROVIDER_API CALL
London Barbour  THORACIC AND CARDIAC SURGERY  [address] [place] [address]  Phone: [phone]  Fax: [phone]  Scheduled Appointment: 12/22/2020 09:30 AM

## 2020-12-14 NOTE — DISCHARGE NOTE PROVIDER - NSDCMRMEDTOKEN_GEN_ALL_CORE_FT
acetaminophen 325 mg oral tablet: 2 tab(s) orally every 4 hours, As needed, Temp greater or equal to 38C (100.4F), Moderate Pain (4 - 6)  aspirin 81 mg oral delayed release tablet: 1 tab(s) orally once a day  Calcium 600+D oral tablet: 2 tab(s) orally once a day  Calmol-4 rectal suppository: 1 suppository(ies) rectal once a day  Claritin 10 mg oral tablet: 1 tab(s) orally once a day  Colace 50 mg oral capsule: 1 cap(s) orally once a day  Konsyl 3.4 g/6.5 g oral powder for reconstitution: 1 application orally once a day  Lupron Depot 11.25 mg/3 months intramuscular powder for injection, extended release: 1 dose(s) intramuscular every 3 months  metoprolol tartrate 50 mg oral tablet: 1 tab(s) orally 2 times a day  pantoprazole 40 mg oral delayed release tablet: 1 tab(s) orally once a day (before a meal)  Vitamin C 1000 mg oral tablet: 1 tab(s) orally once a day

## 2020-12-14 NOTE — DISCHARGE NOTE PROVIDER - NSDCFUSCHEDAPPT_GEN_ALL_CORE_FT
HERBIE KERR ; 12/15/2020 ; NPP CT Surg 300 Comm HERBIE Drake ; 12/23/2020 ; NPP Cardio Electro 300 Comm  HERBIE KERR ; 12/22/2020 ; NPP CT Surg 300 Comm HERBIE Drake ; 12/23/2020 ; NPP Cardio Electro 300 Comm

## 2020-12-14 NOTE — DISCHARGE NOTE PROVIDER - NSDCFUADDAPPT_GEN_ALL_CORE_FT
YOU HAVE AN APPOINTMENT WITH DR LAWRENCE ON 12/22 @0930 AM    YOU HAVE  A FOLLOW UP EPS APPOINTMENT ON 12/23 .PLEASE CALL TO CONFIRM   [phone]

## 2020-12-14 NOTE — DISCHARGE NOTE PROVIDER - NSDCFUADDINST_GEN_ALL_CORE_FT
Daily Shower   Weight yourself daily and notify any weight gain greater than 2-3 pounds in 24 hours. Regular diet - low fat, low cholesterol, no added salt.   . Follow Cardiac Surgery Do's and Don'ts discharge instructions.   . No driving until cleared by MD.    No heavy lifting nothing greater than 5 pounds until cleared by MD.    Call / Notify MD any fever greater than 101.0   Increase Activity as tolerated.

## 2020-12-14 NOTE — PROGRESS NOTE ADULT - SUBJECTIVE AND OBJECTIVE BOX
24H hour events: S/p PPM on 12/13. Pt c/o 5/10 pain at PPM site, endorses relief with Tylenol.    MEDICATIONS:  aspirin enteric coated 81 milliGRAM(s) Oral daily  metoprolol tartrate 50 milliGRAM(s) Oral two times a day  acetaminophen   Tablet .. 650 milliGRAM(s) Oral every 4 hours PRN  pantoprazole    Tablet 40 milliGRAM(s) Oral before breakfast  sodium chloride 0.9% lock flush 3 milliLiter(s) IV Push every 8 hours      REVIEW OF SYSTEMS:  See HPI, otherwise ROS negative.    PHYSICAL EXAM:  T(C): 36.6 (12-14-20 @ 05:00), Max: 36.6 (12-13-20 @ 19:30)  HR: 75 (12-14-20 @ 11:00) (72 - 113)  BP: 138/78 (12-14-20 @ 11:00) (111/86 - 152/78)  RR: 18 (12-14-20 @ 05:00) (12 - 20)  SpO2: 95% (12-14-20 @ 05:00) (95% - 98%)  Wt(kg): --  I&O's Summary    13 Dec 2020 07:01  -  14 Dec 2020 07:00  --------------------------------------------------------  IN: 760 mL / OUT: 1375 mL / NET: -615 mL    14 Dec 2020 07:01  -  14 Dec 2020 12:17  --------------------------------------------------------  IN: 240 mL / OUT: 0 mL / NET: 240 mL        Appearance: Alert. NAD	  Cardiovascular: +S1S2 RRR no m/g/r  Respiratory: CTA B/L		  Skin: Left sided PPM site C/D/I without hematoma, erythema or edema  Extremities: No edema BLE  Vascular: Peripheral pulses palpable 2+ bilaterally      LABS:	 	    CBC Full  -  ( 13 Dec 2020 01:48 )  WBC Count : 6.78 K/uL  Hemoglobin : 11.8 g/dL  Hematocrit : 35.6 %  Platelet Count - Automated : 152 K/uL  Mean Cell Volume : 93.4 fl  Mean Cell Hemoglobin : 31.0 pg  Mean Cell Hemoglobin Concentration : 33.1 gm/dL  Auto Neutrophil # : x  Auto Lymphocyte # : x  Auto Monocyte # : x  Auto Eosinophil # : x  Auto Basophil # : x  Auto Neutrophil % : x  Auto Lymphocyte % : x  Auto Monocyte % : x  Auto Eosinophil % : x  Auto Basophil % : x    12-13    139  |  103  |  12  ----------------------------<  101<H>  4.0   |  25  |  0.72    Ca    9.3      13 Dec 2020 01:48  Mg     2.3     12-13    TPro  6.4  /  Alb  4.1  /  TBili  0.4  /  DBili  x   /  AST  23  /  ALT  26  /  AlkPhos  69  12-13    TELEMETRY: A paced 60-80 bpm. HR increased to 120's with ambulation.  	    ECG:

## 2020-12-14 NOTE — DISCHARGE NOTE PROVIDER - HOSPITAL COURSE
Pt is a 70M with a PMH of HTN, prostate ca, radiation proctitis, rectal bleeding, and severe AS s/p Core-Valve TAVR 12/9 c/b new LBBB who was called by MCOT Team / Cardiothoracic Surgery Team for 3.1 second pause noted on OT  12/13: Dual chamber [employer] pacemaker implant. Transferred from CTU. VSS.  seen on monitor while pt in bathroom and 112 while at rest. AS d/w Dr Armin Franco from -Ok to start BB Lopressor 25 q 6h.   12/14 Hr with improved rate.  Change lopressor to 50bid.  D/c planning for today Pt is a 70M with a PMH of HTN, prostate ca, radiation proctitis, rectal bleeding, and severe AS s/p Core-Valve TAVR 12/9 c/b new LBBB who was called by MCOT Team / Cardiothoracic Surgery Team for 3.1 second pause noted on OT  12/13: Dual chamber [employer] pacemaker implant. Transferred from CTU. VSS.  seen on monitor while pt in bathroom and 112 while at rest. AS d/w Dr Armin Franco from -Ok to start BB Lopressor 25 q 6h.   12/14 Hr with improved rate.  Change lopressor to 50bid.  D/c planning for today  Lead placement verified by eps.

## 2020-12-15 ENCOUNTER — APPOINTMENT (OUTPATIENT)
Dept: CARDIOTHORACIC SURGERY | Facility: CLINIC | Age: 70
End: 2020-12-15
Payer: MEDICARE

## 2020-12-15 ENCOUNTER — TRANSCRIPTION ENCOUNTER (OUTPATIENT)
Age: 70
End: 2020-12-15

## 2020-12-16 ENCOUNTER — TRANSCRIPTION ENCOUNTER (OUTPATIENT)
Age: 70
End: 2020-12-16

## 2020-12-16 ENCOUNTER — APPOINTMENT (OUTPATIENT)
Dept: CARE COORDINATION | Facility: HOME HEALTH | Age: 70
End: 2020-12-16

## 2020-12-16 VITALS — BODY MASS INDEX: 29.13 KG/M2 | WEIGHT: 203 LBS

## 2020-12-16 RX ORDER — ATENOLOL AND CHLORTHALIDONE 50; 25 MG/1; MG/1
50-25 TABLET ORAL DAILY
Refills: 0 | Status: DISCONTINUED | COMMUNITY
Start: 2020-09-09 | End: 2020-12-16

## 2020-12-17 ENCOUNTER — TRANSCRIPTION ENCOUNTER (OUTPATIENT)
Age: 70
End: 2020-12-17

## 2020-12-22 ENCOUNTER — APPOINTMENT (OUTPATIENT)
Dept: CARDIOTHORACIC SURGERY | Facility: CLINIC | Age: 70
End: 2020-12-22
Payer: MEDICARE

## 2020-12-22 ENCOUNTER — TRANSCRIPTION ENCOUNTER (OUTPATIENT)
Age: 70
End: 2020-12-22

## 2020-12-22 ENCOUNTER — NON-APPOINTMENT (OUTPATIENT)
Age: 70
End: 2020-12-22

## 2020-12-22 ENCOUNTER — APPOINTMENT (OUTPATIENT)
Dept: ELECTROPHYSIOLOGY | Facility: CLINIC | Age: 70
End: 2020-12-22
Payer: MEDICARE

## 2020-12-22 VITALS
WEIGHT: 200 LBS | OXYGEN SATURATION: 99 % | DIASTOLIC BLOOD PRESSURE: 86 MMHG | HEIGHT: 70 IN | SYSTOLIC BLOOD PRESSURE: 133 MMHG | HEART RATE: 72 BPM | BODY MASS INDEX: 28.63 KG/M2

## 2020-12-22 VITALS
BODY MASS INDEX: 28.49 KG/M2 | RESPIRATION RATE: 14 BRPM | HEIGHT: 70 IN | OXYGEN SATURATION: 99 % | WEIGHT: 199 LBS | HEART RATE: 61 BPM | TEMPERATURE: 98 F | SYSTOLIC BLOOD PRESSURE: 159 MMHG | DIASTOLIC BLOOD PRESSURE: 89 MMHG

## 2020-12-22 DIAGNOSIS — I44.2 ATRIOVENTRICULAR BLOCK, COMPLETE: ICD-10-CM

## 2020-12-22 PROCEDURE — 99072 ADDL SUPL MATRL&STAF TM PHE: CPT

## 2020-12-22 PROCEDURE — 99212 OFFICE O/P EST SF 10 MIN: CPT

## 2020-12-22 PROCEDURE — 99024 POSTOP FOLLOW-UP VISIT: CPT

## 2020-12-23 ENCOUNTER — TRANSCRIPTION ENCOUNTER (OUTPATIENT)
Age: 70
End: 2020-12-23

## 2020-12-28 PROCEDURE — 85025 COMPLETE CBC W/AUTO DIFF WBC: CPT

## 2020-12-28 PROCEDURE — C1887: CPT

## 2020-12-28 PROCEDURE — C1769: CPT

## 2020-12-28 PROCEDURE — 86901 BLOOD TYPING SEROLOGIC RH(D): CPT

## 2020-12-28 PROCEDURE — 71045 X-RAY EXAM CHEST 1 VIEW: CPT

## 2020-12-28 PROCEDURE — 80053 COMPREHEN METABOLIC PANEL: CPT

## 2020-12-28 PROCEDURE — 86769 SARS-COV-2 COVID-19 ANTIBODY: CPT

## 2020-12-28 PROCEDURE — C1760: CPT

## 2020-12-28 PROCEDURE — 86923 COMPATIBILITY TEST ELECTRIC: CPT

## 2020-12-28 PROCEDURE — 93005 ELECTROCARDIOGRAM TRACING: CPT

## 2020-12-28 PROCEDURE — 80048 BASIC METABOLIC PNL TOTAL CA: CPT

## 2020-12-28 PROCEDURE — C1898: CPT

## 2020-12-28 PROCEDURE — 71046 X-RAY EXAM CHEST 2 VIEWS: CPT

## 2020-12-28 PROCEDURE — C1894: CPT

## 2020-12-28 PROCEDURE — 86900 BLOOD TYPING SEROLOGIC ABO: CPT

## 2020-12-28 PROCEDURE — 0225U NFCT DS DNA&RNA 21 SARSCOV2: CPT

## 2020-12-28 PROCEDURE — 86850 RBC ANTIBODY SCREEN: CPT

## 2020-12-28 PROCEDURE — C1785: CPT

## 2020-12-28 PROCEDURE — C1889: CPT

## 2020-12-28 PROCEDURE — C1892: CPT

## 2020-12-28 PROCEDURE — 33208 INSRT HEART PM ATRIAL & VENT: CPT | Mod: KX

## 2020-12-28 PROCEDURE — 76000 FLUOROSCOPY <1 HR PHYS/QHP: CPT

## 2020-12-28 PROCEDURE — C8929: CPT

## 2020-12-28 PROCEDURE — 83735 ASSAY OF MAGNESIUM: CPT

## 2020-12-28 PROCEDURE — 85610 PROTHROMBIN TIME: CPT

## 2020-12-28 PROCEDURE — 82962 GLUCOSE BLOOD TEST: CPT

## 2020-12-28 PROCEDURE — 85730 THROMBOPLASTIN TIME PARTIAL: CPT

## 2020-12-28 PROCEDURE — 85027 COMPLETE CBC AUTOMATED: CPT

## 2020-12-28 PROCEDURE — 99285 EMERGENCY DEPT VISIT HI MDM: CPT

## 2020-12-30 ENCOUNTER — TRANSCRIPTION ENCOUNTER (OUTPATIENT)
Age: 70
End: 2020-12-30

## 2020-12-31 ENCOUNTER — TRANSCRIPTION ENCOUNTER (OUTPATIENT)
Age: 70
End: 2020-12-31

## 2021-01-08 ENCOUNTER — APPOINTMENT (OUTPATIENT)
Dept: CARDIOLOGY | Facility: CLINIC | Age: 71
End: 2021-01-08
Payer: MEDICARE

## 2021-01-08 PROCEDURE — 99213 OFFICE O/P EST LOW 20 MIN: CPT | Mod: 95

## 2021-01-08 RX ORDER — PANTOPRAZOLE 40 MG/1
40 TABLET, DELAYED RELEASE ORAL DAILY
Qty: 30 | Refills: 0 | Status: DISCONTINUED | COMMUNITY
Start: 2020-12-16 | End: 2021-01-08

## 2021-01-08 NOTE — HISTORY OF PRESENT ILLNESS
[FreeTextEntry1] : Appointment initiated at 12:25p [Home] : at home, [unfilled] , at the time of the visit. [Medical Office: ([facility])___] : at the medical office located in  [Verbal consent obtained from patient] : the patient, [unfilled] [FreeTextEntry4] : Ariana Greenwood MA

## 2021-01-10 ENCOUNTER — TRANSCRIPTION ENCOUNTER (OUTPATIENT)
Age: 71
End: 2021-01-10

## 2021-01-11 ENCOUNTER — TRANSCRIPTION ENCOUNTER (OUTPATIENT)
Age: 71
End: 2021-01-11

## 2021-01-13 ENCOUNTER — APPOINTMENT (OUTPATIENT)
Dept: CARDIOTHORACIC SURGERY | Facility: CLINIC | Age: 71
End: 2021-01-13
Payer: MEDICARE

## 2021-01-26 PROBLEM — Z95.0 PACEMAKER: Status: ACTIVE | Noted: 2021-01-26

## 2021-01-26 PROBLEM — I45.5 SINUS PAUSE: Status: RESOLVED | Noted: 2021-01-26 | Resolved: 2021-01-26

## 2021-01-28 ENCOUNTER — APPOINTMENT (OUTPATIENT)
Dept: CV DIAGNOSITCS | Facility: HOSPITAL | Age: 71
End: 2021-01-28

## 2021-02-04 ENCOUNTER — OUTPATIENT (OUTPATIENT)
Dept: OUTPATIENT SERVICES | Facility: HOSPITAL | Age: 71
LOS: 1 days | End: 2021-02-04
Payer: MEDICARE

## 2021-02-04 ENCOUNTER — APPOINTMENT (OUTPATIENT)
Dept: CARDIOTHORACIC SURGERY | Facility: CLINIC | Age: 71
End: 2021-02-04
Payer: MEDICARE

## 2021-02-04 ENCOUNTER — NON-APPOINTMENT (OUTPATIENT)
Age: 71
End: 2021-02-04

## 2021-02-04 ENCOUNTER — APPOINTMENT (OUTPATIENT)
Dept: ELECTROPHYSIOLOGY | Facility: CLINIC | Age: 71
End: 2021-02-04
Payer: MEDICARE

## 2021-02-04 VITALS
DIASTOLIC BLOOD PRESSURE: 74 MMHG | OXYGEN SATURATION: 99 % | WEIGHT: 200 LBS | HEIGHT: 70 IN | HEART RATE: 76 BPM | TEMPERATURE: 98.2 F | RESPIRATION RATE: 16 BRPM | BODY MASS INDEX: 28.63 KG/M2 | SYSTOLIC BLOOD PRESSURE: 120 MMHG

## 2021-02-04 VITALS — DIASTOLIC BLOOD PRESSURE: 70 MMHG | SYSTOLIC BLOOD PRESSURE: 122 MMHG

## 2021-02-04 DIAGNOSIS — Z95.0 PRESENCE OF CARDIAC PACEMAKER: ICD-10-CM

## 2021-02-04 DIAGNOSIS — Z90.79 ACQUIRED ABSENCE OF OTHER GENITAL ORGAN(S): Chronic | ICD-10-CM

## 2021-02-04 DIAGNOSIS — Z90.89 ACQUIRED ABSENCE OF OTHER ORGANS: Chronic | ICD-10-CM

## 2021-02-04 DIAGNOSIS — I45.5 OTHER SPECIFIED HEART BLOCK: ICD-10-CM

## 2021-02-04 DIAGNOSIS — I35.0 NONRHEUMATIC AORTIC (VALVE) STENOSIS: ICD-10-CM

## 2021-02-04 PROCEDURE — 99072 ADDL SUPL MATRL&STAF TM PHE: CPT

## 2021-02-04 PROCEDURE — 93306 TTE W/DOPPLER COMPLETE: CPT

## 2021-02-04 PROCEDURE — 93306 TTE W/DOPPLER COMPLETE: CPT | Mod: 26

## 2021-02-04 PROCEDURE — 99214 OFFICE O/P EST MOD 30 MIN: CPT | Mod: 25

## 2021-02-04 PROCEDURE — 93280 PM DEVICE PROGR EVAL DUAL: CPT

## 2021-02-04 RX ORDER — PSYLLIUM HUSK 6 G/6G
100 GRANULE ORAL
Refills: 0 | Status: ACTIVE | COMMUNITY
Start: 2021-02-04

## 2021-02-05 LAB
ANION GAP SERPL CALC-SCNC: 12 MMOL/L
BASOPHILS # BLD AUTO: 0.11 K/UL
BASOPHILS NFR BLD AUTO: 1.5 %
BUN SERPL-MCNC: 19 MG/DL
CALCIUM SERPL-MCNC: 9.9 MG/DL
CHLORIDE SERPL-SCNC: 104 MMOL/L
CO2 SERPL-SCNC: 22 MMOL/L
CREAT SERPL-MCNC: 0.98 MG/DL
EOSINOPHIL # BLD AUTO: 0.34 K/UL
EOSINOPHIL NFR BLD AUTO: 4.6 %
GLUCOSE SERPL-MCNC: 103 MG/DL
HCT VFR BLD CALC: 40.2 %
HGB BLD-MCNC: 13 G/DL
IMM GRANULOCYTES NFR BLD AUTO: 0.3 %
LYMPHOCYTES # BLD AUTO: 1.43 K/UL
LYMPHOCYTES NFR BLD AUTO: 19.1 %
MAN DIFF?: NORMAL
MCHC RBC-ENTMCNC: 30.8 PG
MCHC RBC-ENTMCNC: 32.3 GM/DL
MCV RBC AUTO: 95.3 FL
MONOCYTES # BLD AUTO: 0.95 K/UL
MONOCYTES NFR BLD AUTO: 12.7 %
NEUTROPHILS # BLD AUTO: 4.62 K/UL
NEUTROPHILS NFR BLD AUTO: 61.8 %
PLATELET # BLD AUTO: 175 K/UL
POTASSIUM SERPL-SCNC: 4.5 MMOL/L
RBC # BLD: 4.22 M/UL
RBC # FLD: 12.3 %
SODIUM SERPL-SCNC: 139 MMOL/L
WBC # FLD AUTO: 7.47 K/UL

## 2021-02-22 ENCOUNTER — NON-APPOINTMENT (OUTPATIENT)
Age: 71
End: 2021-02-22

## 2021-02-22 ENCOUNTER — APPOINTMENT (OUTPATIENT)
Dept: CARDIOLOGY | Facility: CLINIC | Age: 71
End: 2021-02-22
Payer: MEDICARE

## 2021-02-22 VITALS
DIASTOLIC BLOOD PRESSURE: 87 MMHG | SYSTOLIC BLOOD PRESSURE: 175 MMHG | BODY MASS INDEX: 29.63 KG/M2 | HEART RATE: 70 BPM | WEIGHT: 207 LBS | OXYGEN SATURATION: 97 % | HEIGHT: 70 IN

## 2021-02-22 PROCEDURE — 99072 ADDL SUPL MATRL&STAF TM PHE: CPT

## 2021-02-22 PROCEDURE — 93000 ELECTROCARDIOGRAM COMPLETE: CPT

## 2021-02-22 PROCEDURE — 99214 OFFICE O/P EST MOD 30 MIN: CPT

## 2021-03-17 ENCOUNTER — APPOINTMENT (OUTPATIENT)
Dept: CARDIOLOGY | Facility: CLINIC | Age: 71
End: 2021-03-17
Payer: MEDICARE

## 2021-03-17 PROCEDURE — 93296 REM INTERROG EVL PM/IDS: CPT

## 2021-03-17 PROCEDURE — 93294 REM INTERROG EVL PM/LDLS PM: CPT

## 2021-04-05 NOTE — ADDENDUM
[FreeTextEntry1] : 2/5/2021: Visit reviewed with Dr. Quesada. TTE demonstrated well seated EARLE valve with normal function. Mild paravalvular AR with no intravalvular regurgitation seen.  LVEF normal. Pt. OK to have HBO treatment (already discussed with pt. by Dr. Quesada) and OK to have injection into shoulder and cataract extraction and should be pre-treated with prophylactic antibiotics. Pt. called and made aware. Instructed that he needs atbx with any invasive procedure and verbalized understanding.  \par \par 4/5/2021: Reason for ECG: evaluate rhythm and evaluate for rhythm disturbance s/p EARLE.

## 2021-04-05 NOTE — REASON FOR VISIT
[FreeTextEntry1] : 12/9/2020: s/p elective transfemoral transcatheter aortic valve im plantatin using a 26 mm Evolut Pro Plus bioprosthesis

## 2021-04-05 NOTE — HISTORY OF PRESENT ILLNESS
[FreeTextEntry1] : HERBIE KERR is a 70 year old male here on 02/04/2021, 8 weeks after undergoing an elective EARLE using a 26 mm Evolut Pro Plus bioprosthesis on 12/9/2020. His post-operative TTE demonstrated a well seated valve with peak/mean gradients of 21/10 mm Hg. He developed a LBBB post-op and was discharged on 12/11/2020 with an MCOT. He was readmitted the next day after a 3.1 second pause was noted on his MCOT.  A dual chamber [employer] PPM was placed on 12/13/2020. Lopressor was started due to tachycardia.  \par \par He comes to the office today with a new TTE reporting feeling "not where I'd hoped I would be". He is walking 4 blocks a day and feels "OK" while walking. Prior to the procedure, he would walk this as well. He gets a "different feeling" in his chest for the past 5 days. He is not sure if he is "missing a beat" or something else. The feeling can be there for a "while.  At least a few minutes".  He would be sitting on the couch or in the chair when it happens.  He gets lightheadedness "once in a while". It occurs when sitting and doing nothing or when going to a standing from a sitting position. He has an appt. with EP after today's visit. Prior to the EARLE, he would get very fatigued and short of breath with stairs and now he does report it is not as bad now.  \par \par He currently denies fever, chills, cough, loss of smell or taste, angina, dyspnea, dizziness, syncope, or peripheral edema.  Denies bladder problems and he has rectal bleeding at times (chronic) and sees Dr. Guerrero at [facility]. He saw him 2 weeks ago.  He walks for exercise. He saw his PCP last week and states his BP was elevated at 168/94. He takes his BP at home on occasion and states it's higher now than it was when he was taking atenolol/chlorthalidone 50/25 mg 1/2 tablet once a day (changed to metoprolol tartrate 50  mg daily).  He gets HBO treatments for rectal bleeding and needs clearance to resume.  He is going to schedule cataract extraction and cortisone injection into the shoulder in the near future.  \par \par 5 meter walk: 5.21 / 5.69 / 5.29\par NYHA II\par \par PCP: Dr. Duque (saw last week)\par CARD: Dr. Jerry Carrasco\par EP: Dr. Armin Franco\par \par  .\par \par

## 2021-04-05 NOTE — DISCUSSION/SUMMARY
[FreeTextEntry1] : Mr. Sy returns to the office 8 weeks s/p EARLE procedure. He does feel improved in terms of fatigue and shortness of breath when going up and down stairs but doesn't feel much different in terms of his walking. He is able to walk as far as he did prior to the procedure. His BP is at goal here today though states it was high at his echo today and at previous PCP visit. He should continue to monitor and has an appointment with Dr. Carrasco later today.  No med changes made. I will review today's TTE with Dr. Quesada and call with report and will discuss clearance to resume hyperbaric oxygen treatments.  He will follow-up with EP today as scheduled. He will follow-up here in one year with a TTE or sooner if needed.  Numerous questions asked and answered.

## 2021-04-05 NOTE — REVIEW OF SYSTEMS
[Feeling Fatigued] : feeling fatigued [Eyeglasses] : currently wearing eyeglasses [Dyspnea on exertion] : dyspnea during exertion [Palpitations] : palpitations [Negative] : Heme/Lymph [Fever] : no fever [Chills] : no chills [Shortness Of Breath] : no shortness of breath [Chest  Pressure] : no chest pressure [Chest Pain] : no chest pain [Lower Ext Edema] : no extremity edema [Cough] : no cough [Dizziness] : no dizziness

## 2021-04-05 NOTE — PHYSICAL EXAM
[General Appearance - Well Developed] : well developed [Normal Appearance] : normal appearance [Well Groomed] : well groomed [General Appearance - Well Nourished] : well nourished [No Deformities] : no deformities [General Appearance - In No Acute Distress] : no acute distress [Normal Conjunctiva] : the conjunctiva exhibited no abnormalities [Normal Oral Mucosa] : normal oral mucosa [No Oral Pallor] : no oral pallor [No Oral Cyanosis] : no oral cyanosis [Normal Jugular Venous A Waves Present] : normal jugular venous A waves present [Normal Jugular Venous V Waves Present] : normal jugular venous V waves present [No Jugular Venous Reynolds A Waves] : no jugular venous reynolds A waves [Respiration, Rhythm And Depth] : normal respiratory rhythm and effort [Exaggerated Use Of Accessory Muscles For Inspiration] : no accessory muscle use [Auscultation Breath Sounds / Voice Sounds] : lungs were clear to auscultation bilaterally [II] : a grade 2 [2+] : left 2+ [No Abnormalities] : the abdominal aorta was not enlarged and no bruit was heard [No Pitting Edema] : no pitting edema present [Bowel Sounds] : normal bowel sounds [Abdomen Soft] : soft [Abdomen Tenderness] : non-tender [Nail Clubbing] : no clubbing of the fingernails [Cyanosis, Localized] : no localized cyanosis [Petechial Hemorrhages (___cm)] : no petechial hemorrhages [Skin Color & Pigmentation] : normal skin color and pigmentation [Skin Turgor] : normal skin turgor [] : no rash [No Venous Stasis] : no venous stasis [Skin Lesions] : no skin lesions [No Skin Ulcers] : no skin ulcer [Oriented To Time, Place, And Person] : oriented to person, place, and time [Impaired Insight] : insight and judgment were intact [Affect] : the affect was normal [Mood] : the mood was normal [Memory Recent] : recent memory was not impaired [Memory Remote] : remote memory was not impaired [No Anxiety] : not feeling anxious [Normal Rate] : normal [Heart Rate ___] : [unfilled] bpm [Rhythm Regular] : regular [Normal S1] : normal S1 [Normal S2] : normal S2 [No Gallop] : no gallop heard [Prosthetic Aortic Valve] : prosthetic aortic valve heard [Abnormal Walk] : normal gait [Click] : no click [Distant] : the heart sounds were ~L not distant [Pericardial Rub] : no pericardial rub [Right Carotid Bruit] : no bruit heard over the right carotid [Left Carotid Bruit] : no bruit heard over the left carotid [Bruit] : no bruit heard [Rt] : no varicose veins of the right leg [Lt] : no varicose veins of the left leg

## 2021-05-04 NOTE — DISCHARGE NOTE PROVIDER - NSDCMRMEDTOKEN_GEN_ALL_CORE_FT
No
aspirin 81 mg oral delayed release tablet: 1 tab(s) orally once a day  Calcium 600+D oral tablet: 2 tab(s) orally once a day  Calmol-4 rectal suppository: 1 suppository(ies) rectal once a day  Centrum Adults oral tablet: 1 tab(s) orally once a day  Claritin 10 mg oral tablet: 1 tab(s) orally once a day  Colace 50 mg oral capsule: 1 cap(s) orally once a day  Konsyl 3.4 g/6.5 g oral powder for reconstitution: 1 application orally once a day  Lupron Depot 11.25 mg/3 months intramuscular powder for injection, extended release: 1 dose(s) intramuscular every 3 months  pantoprazole 40 mg oral delayed release tablet: 1 tab(s) orally once a day (before a meal)  Vitamin C 1000 mg oral tablet: 1 tab(s) orally once a day

## 2021-05-07 ENCOUNTER — NON-APPOINTMENT (OUTPATIENT)
Age: 71
End: 2021-05-07

## 2021-05-07 ENCOUNTER — APPOINTMENT (OUTPATIENT)
Dept: CARDIOLOGY | Facility: CLINIC | Age: 71
End: 2021-05-07
Payer: MEDICARE

## 2021-05-07 VITALS
WEIGHT: 209 LBS | BODY MASS INDEX: 29.92 KG/M2 | SYSTOLIC BLOOD PRESSURE: 159 MMHG | OXYGEN SATURATION: 93 % | HEIGHT: 70 IN | HEART RATE: 83 BPM | DIASTOLIC BLOOD PRESSURE: 91 MMHG

## 2021-05-07 PROCEDURE — 93000 ELECTROCARDIOGRAM COMPLETE: CPT

## 2021-05-07 PROCEDURE — 99072 ADDL SUPL MATRL&STAF TM PHE: CPT

## 2021-05-07 PROCEDURE — 99214 OFFICE O/P EST MOD 30 MIN: CPT

## 2021-05-07 RX ORDER — MESALAMINE 1000 MG/1
1000 SUPPOSITORY RECTAL
Qty: 7 | Refills: 2 | Status: DISCONTINUED | COMMUNITY
Start: 2020-08-11 | End: 2021-05-07

## 2021-05-07 RX ORDER — ZINC OXIDE AND COCOA BUTTER 270; 2052 MG/1; MG/1
76-10 SUPPOSITORY RECTAL
Refills: 0 | Status: DISCONTINUED | COMMUNITY
Start: 2021-02-04 | End: 2021-05-07

## 2021-05-07 RX ORDER — MULTIVITAMIN/IRON/FOLIC ACID 18MG-0.4MG
TABLET ORAL DAILY
Refills: 0 | Status: DISCONTINUED | COMMUNITY
Start: 2021-02-04 | End: 2021-05-07

## 2021-05-12 ENCOUNTER — APPOINTMENT (OUTPATIENT)
Dept: CARDIOLOGY | Facility: CLINIC | Age: 71
End: 2021-05-12
Payer: MEDICARE

## 2021-05-12 PROCEDURE — 99213 OFFICE O/P EST LOW 20 MIN: CPT

## 2021-05-12 PROCEDURE — 99072 ADDL SUPL MATRL&STAF TM PHE: CPT

## 2021-05-13 ENCOUNTER — APPOINTMENT (OUTPATIENT)
Dept: CARDIOLOGY | Facility: CLINIC | Age: 71
End: 2021-05-13
Payer: MEDICARE

## 2021-05-13 PROCEDURE — 93306 TTE W/DOPPLER COMPLETE: CPT

## 2021-05-13 PROCEDURE — 99072 ADDL SUPL MATRL&STAF TM PHE: CPT

## 2021-05-20 ENCOUNTER — APPOINTMENT (OUTPATIENT)
Dept: CARDIOLOGY | Facility: CLINIC | Age: 71
End: 2021-05-20

## 2021-06-16 ENCOUNTER — APPOINTMENT (OUTPATIENT)
Dept: CARDIOLOGY | Facility: CLINIC | Age: 71
End: 2021-06-16
Payer: MEDICARE

## 2021-06-16 PROCEDURE — 93296 REM INTERROG EVL PM/IDS: CPT

## 2021-06-16 PROCEDURE — 93294 REM INTERROG EVL PM/LDLS PM: CPT

## 2021-07-26 ENCOUNTER — NON-APPOINTMENT (OUTPATIENT)
Age: 71
End: 2021-07-26

## 2021-07-26 ENCOUNTER — APPOINTMENT (OUTPATIENT)
Dept: CARDIOLOGY | Facility: CLINIC | Age: 71
End: 2021-07-26
Payer: MEDICARE

## 2021-07-26 VITALS
HEIGHT: 70 IN | DIASTOLIC BLOOD PRESSURE: 86 MMHG | OXYGEN SATURATION: 96 % | WEIGHT: 209 LBS | SYSTOLIC BLOOD PRESSURE: 146 MMHG | BODY MASS INDEX: 29.92 KG/M2 | HEART RATE: 71 BPM

## 2021-07-26 PROCEDURE — 93000 ELECTROCARDIOGRAM COMPLETE: CPT

## 2021-07-26 PROCEDURE — 99214 OFFICE O/P EST MOD 30 MIN: CPT

## 2021-07-26 PROCEDURE — 99072 ADDL SUPL MATRL&STAF TM PHE: CPT

## 2021-09-15 ENCOUNTER — APPOINTMENT (OUTPATIENT)
Dept: CARDIOLOGY | Facility: CLINIC | Age: 71
End: 2021-09-15
Payer: MEDICARE

## 2021-09-15 PROCEDURE — 93296 REM INTERROG EVL PM/IDS: CPT

## 2021-09-15 PROCEDURE — 93294 REM INTERROG EVL PM/LDLS PM: CPT

## 2021-10-11 ENCOUNTER — APPOINTMENT (OUTPATIENT)
Dept: CARDIOLOGY | Facility: CLINIC | Age: 71
End: 2021-10-11
Payer: MEDICARE

## 2021-10-11 ENCOUNTER — NON-APPOINTMENT (OUTPATIENT)
Age: 71
End: 2021-10-11

## 2021-10-11 VITALS
SYSTOLIC BLOOD PRESSURE: 123 MMHG | HEART RATE: 79 BPM | BODY MASS INDEX: 29.63 KG/M2 | WEIGHT: 207 LBS | OXYGEN SATURATION: 94 % | DIASTOLIC BLOOD PRESSURE: 77 MMHG | HEIGHT: 70 IN

## 2021-10-11 PROCEDURE — 99214 OFFICE O/P EST MOD 30 MIN: CPT

## 2021-10-11 PROCEDURE — 93000 ELECTROCARDIOGRAM COMPLETE: CPT

## 2021-11-18 NOTE — ASU DISCHARGE PLAN (ADULT/PEDIATRIC) - DIET/FLUID RESTRICTION
----- Message from ENA Maher sent at 11/18/2021 11:34 AM EST -----  Alexandrea - please call this patient and let him know that his ferritin is 277 and he will have to come back for phlebotomy. He did not want to stay for results today but he definitely needs it. PSA is still pending.    Darin -please schedule patient for phlebotomy at his earliest convenience.    Thank you both!  ----- Message -----  From: Lab, Background User  Sent: 11/18/2021  10:40 AM EST  To: ENA Maher      
PT WOULD LIKE PSA RESULTS ONCE THEY ARE IN.   
No change

## 2021-12-15 ENCOUNTER — APPOINTMENT (OUTPATIENT)
Dept: CARDIOLOGY | Facility: CLINIC | Age: 71
End: 2021-12-15
Payer: MEDICARE

## 2021-12-15 PROCEDURE — 93296 REM INTERROG EVL PM/IDS: CPT | Mod: NC

## 2021-12-15 PROCEDURE — 93294 REM INTERROG EVL PM/LDLS PM: CPT | Mod: NC

## 2022-01-07 NOTE — H&P PST ADULT - OPHTHALMOLOGIC
LVM for patient to call back [phone] regarding appointment for Back and Spine referral.  (IPM)    Graciela Baig RN     negative

## 2022-01-14 ENCOUNTER — APPOINTMENT (OUTPATIENT)
Dept: CARDIOLOGY | Facility: CLINIC | Age: 72
End: 2022-01-14
Payer: COMMERCIAL

## 2022-01-14 ENCOUNTER — NON-APPOINTMENT (OUTPATIENT)
Age: 72
End: 2022-01-14

## 2022-01-14 VITALS
OXYGEN SATURATION: 95 % | DIASTOLIC BLOOD PRESSURE: 81 MMHG | SYSTOLIC BLOOD PRESSURE: 157 MMHG | WEIGHT: 204 LBS | HEIGHT: 70 IN | HEART RATE: 72 BPM | BODY MASS INDEX: 29.2 KG/M2

## 2022-01-14 PROCEDURE — 99214 OFFICE O/P EST MOD 30 MIN: CPT

## 2022-01-14 PROCEDURE — 93000 ELECTROCARDIOGRAM COMPLETE: CPT

## 2022-02-04 ENCOUNTER — APPOINTMENT (OUTPATIENT)
Dept: ELECTROPHYSIOLOGY | Facility: CLINIC | Age: 72
End: 2022-02-04
Payer: COMMERCIAL

## 2022-02-04 ENCOUNTER — NON-APPOINTMENT (OUTPATIENT)
Age: 72
End: 2022-02-04

## 2022-02-04 VITALS
HEART RATE: 70 BPM | OXYGEN SATURATION: 95 % | DIASTOLIC BLOOD PRESSURE: 85 MMHG | SYSTOLIC BLOOD PRESSURE: 117 MMHG | BODY MASS INDEX: 29.2 KG/M2 | WEIGHT: 204 LBS | HEIGHT: 70 IN

## 2022-02-04 PROCEDURE — 93280 PM DEVICE PROGR EVAL DUAL: CPT

## 2022-02-09 ENCOUNTER — APPOINTMENT (OUTPATIENT)
Dept: CARDIOLOGY | Facility: CLINIC | Age: 72
End: 2022-02-09
Payer: COMMERCIAL

## 2022-02-09 VITALS — HEART RATE: 70 BPM | SYSTOLIC BLOOD PRESSURE: 138 MMHG | OXYGEN SATURATION: 97 % | DIASTOLIC BLOOD PRESSURE: 78 MMHG

## 2022-02-09 PROCEDURE — 93224 XTRNL ECG REC UP TO 48 HRS: CPT

## 2022-02-09 PROCEDURE — 99211 OFF/OP EST MAY X REQ PHY/QHP: CPT

## 2022-03-16 ENCOUNTER — APPOINTMENT (OUTPATIENT)
Dept: CARDIOLOGY | Facility: CLINIC | Age: 72
End: 2022-03-16
Payer: MEDICARE

## 2022-03-16 PROCEDURE — 93294 REM INTERROG EVL PM/LDLS PM: CPT

## 2022-03-16 PROCEDURE — 93296 REM INTERROG EVL PM/IDS: CPT

## 2022-04-15 ENCOUNTER — NON-APPOINTMENT (OUTPATIENT)
Age: 72
End: 2022-04-15

## 2022-04-15 ENCOUNTER — APPOINTMENT (OUTPATIENT)
Dept: CARDIOLOGY | Facility: CLINIC | Age: 72
End: 2022-04-15
Payer: MEDICARE

## 2022-04-15 VITALS
HEART RATE: 65 BPM | WEIGHT: 208 LBS | DIASTOLIC BLOOD PRESSURE: 81 MMHG | SYSTOLIC BLOOD PRESSURE: 165 MMHG | BODY MASS INDEX: 29.78 KG/M2 | OXYGEN SATURATION: 97 % | HEIGHT: 70 IN

## 2022-04-15 PROCEDURE — 99214 OFFICE O/P EST MOD 30 MIN: CPT

## 2022-04-15 PROCEDURE — 93000 ELECTROCARDIOGRAM COMPLETE: CPT

## 2022-04-15 RX ORDER — METOPROLOL TARTRATE 100 MG/1
100 TABLET, FILM COATED ORAL TWICE DAILY
Qty: 180 | Refills: 0 | Status: DISCONTINUED | COMMUNITY
Start: 2020-12-16 | End: 2022-04-15

## 2022-04-15 RX ORDER — METOPROLOL SUCCINATE 50 MG/1
50 TABLET, EXTENDED RELEASE ORAL DAILY
Qty: 90 | Refills: 3 | Status: ACTIVE | COMMUNITY
Start: 2022-04-15 | End: 1900-01-01

## 2022-04-20 ENCOUNTER — APPOINTMENT (OUTPATIENT)
Dept: CARDIOLOGY | Facility: CLINIC | Age: 72
End: 2022-04-20
Payer: MEDICARE

## 2022-04-20 PROCEDURE — 99213 OFFICE O/P EST LOW 20 MIN: CPT

## 2022-05-04 ENCOUNTER — APPOINTMENT (OUTPATIENT)
Dept: ELECTROPHYSIOLOGY | Facility: CLINIC | Age: 72
End: 2022-05-04

## 2022-05-17 ENCOUNTER — APPOINTMENT (OUTPATIENT)
Dept: ORTHOPEDIC SURGERY | Facility: CLINIC | Age: 72
End: 2022-05-17
Payer: MEDICARE

## 2022-05-17 VITALS — BODY MASS INDEX: 29.78 KG/M2 | WEIGHT: 208 LBS | HEIGHT: 70 IN

## 2022-05-17 PROCEDURE — 99213 OFFICE O/P EST LOW 20 MIN: CPT | Mod: 25

## 2022-05-17 PROCEDURE — 20611 DRAIN/INJ JOINT/BURSA W/US: CPT | Mod: RT

## 2022-05-17 PROCEDURE — J3490M: CUSTOM

## 2022-05-17 NOTE — HISTORY OF PRESENT ILLNESS
[8] : 8 [6] : 6 [Throbbing] : throbbing [Retired] : Work status: retired [de-identified] : 5/17/22: Here for follow up.  \par \par 2/17/22: Here for follow up. He reports the injections are working but not lasting as long as he is used to.\par \par 11/2/21: Here for follow up, EMG review. His left shoulder is more painful.\par \par EMG b/l UE: R > L chronic C5, C6 radiculopathy, mild b/l CTS\par \par 8/3/21: Here for follow up. The left side is more painful today. He is complaining of some paresthesias in both arms today and some pain posteriorly.\par \par 5/4/21: Follow up bilateral shoulder. Injections last visit helped. He requests to repeat them today.\par \par 2/4/21: Here for follow up. Injections are still helping. He has some complications after his aortic valve procedure.\par \par 11/10/20: Here for follow up. He did get relief from the injections last visit. 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 During COVID his PT was cancelled. Waking him at night now. L is worse than the R. 3 months relief with prior injections.\par \par 1/28/20: Here for fu and repeat cortisone injection bilateral shoulders. PT helps - no new symptoms. 3 months relief with prior injections.\par \par 1/7/20: Here for follow up. he has been out of PT for the shoulders for 3 weeks. He has been dealing with a sinus infection.\par \par 10/8/19: Here for follow up. he did start PT. The shoulders feel achy.\par \par 7/9/19: Here for follow up. The shoulders have become more painful recently. He wants to consider PT next month. Right is more painful than the left.\par \par 4/2/19: Here for fu. Recurring R shoulder pain, was unable to do PT as planned. Now with L shoulder pain as well x 2-3 months. No injury recalled. Pain with motion. He is unable to lift things, uses caution carrying laundry. Concerned about loss of strength. Advil 400mg prn.\par \par 12/11/18: Here for follow up. He had the injection last visit. He recently had radiation for prostate cancer, just finished.\par \par 6/26/18: He returns for f/u right shoulder. He is having more pain in the shoulder.\par \par 4/24/18: He returns for f/u right shoulder. He continue to have some pain radiating into neck, overall feeling better. He has not been to PT recently.\par \par 1/23/18: Here for follow up. He has had some return of pain in the shoulder. He takes advil. He has been out of PT due to other issues but wants to start again soon.\par \par 12/12/17: Here for follow up. His shoulder feels good today. He has been in PT but recently stopped. He has only mild pain.\par \par 9/12/17: Here for follow up. He does notice some improvement with PT.\par \par 7/25/17: Here for follow up. Has been in PT weekly which is helping. 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 Pain persists intermittently with movement. Requesting injection.  Given subacromial injection.\par \par 6/6/17: Here for follow up. Has been in PT. Only significant pain with movement. Did get improvement with injection.\par \par 4/25/17: 65 y/o RHD male here for the r shoulder. Felt pain in the shoulder in 9/16 when carrying some groceries up the stairs. No specific trauma. Eventually got an MRI. He has been going to PT with some improvement. He also takes\par NSAIDs prn.\par \par MRI R shoulder:\par 1. Advanced rotator cuff tear with complete full-thickness discontinuity in a near complete full-thickness tear of infraspinatus is stable since the prior study. There is also persistent high-grade partial tearing of subscapularis and elongation of subscapularis tendon. No head is high riding. There is atrophy of supraspinatus and infraspinatus muscle bellies.\par 2. Chronic rupture and distal retraction long head of the biceps tendon\par 3. Mild degenerative tearing of the labrum.\par \par Prior MD Notes:\par 3/9/17: Patient is a 67 yo RHD male c/o right shoulder pain since Sept 2016 after he felt pain while carrying groceries. No n/t. Has been seeing Dr. Jennings who did Xrays, ordered MRI and started PT. PT giving some relief, but he would like to discuss his surgical options. Taking advil which gives some relief. No previous injuries or surgeries to right shoulder. 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

## 2022-05-17 NOTE — ASSESSMENT
[FreeTextEntry1] : Complete tear of R supra infra with retraction and atrophy, mild DJD.\par Now also with L GH DJD.\par MRI L shoulder to eval RCT. \par EMG shows: R > L chronic C5, C6 radiculopathy, mild b/l CTS\par Repeat R SA and L SA injection given today.\par Discussed timing of injections.\par He will restart PT.\par RTO 3 months or prn.\par \par Procedure Note:\par Large Joint Injection was performed because of pain and inflammation, failure of conservative treatment.  \par Medications:\par Depo-Medrol: 1 cc, 80 mg.\par Lidocaine: 2 cc, 1%. \par Marcaine: 2 cc, .25%. \par \par Medication was injected in the bilateral shoulder subacromial space. Patient has tried OTC's including aspirin, Ibuprofen, Aleve etc or prescription NSAIDs, and/or exercises at home and/ or physical therapy without satisfactory response. The risks, benefits, and alternatives to cortisone injection were explained in full to the patient. Risks outlined include but are not limited to infection, sepsis, bleeding, scarring, skin discoloration, temporary increase in pain, syncopal episode, failure to resolve symptoms, allergic reaction, symptom recurrence, and elevation of blood sugar in diabetics. Patient understood the risks. All questions were answered. After discussion of options, patient requested an injection. Oral informed consent was obtained and sterile prep of the injection site was performed using alcohol. Sterile technique was utilized for the procedure including the preparation of the solutions used for the injection. Ethyl chloride spray was used topically.  Sterile technique used. Patient tolerated procedure well. Post Procedure Instructions: Patient was advised to call if redness, pain, or fever occur and apply ice for 15 min. out of every hour for the next 12-24 hours as tolerated. patient was advised to rest the joint(s) for 2 days.\par \par Ultrasound Guidance was used for the following reasons: for prior failure or difficult injection and to visualize tearing and inflammation.\par Ultrasound guided injection was performed of the shoulder, visualization of the needle and placement of injection was performed without complication.\par

## 2022-05-17 NOTE — PHYSICAL EXAM
[Bilateral] : shoulder bilaterally [4 ___] : forward flexion 4[unfilled]/5 [4___] : external rotation 4[unfilled]/5 [] : positive Melany [FreeTextEntry9] : FE: R 140, L 140 \par ER: R 40, L 40

## 2022-06-15 ENCOUNTER — APPOINTMENT (OUTPATIENT)
Dept: CARDIOLOGY | Facility: CLINIC | Age: 72
End: 2022-06-15
Payer: MEDICARE

## 2022-06-15 PROCEDURE — 93294 REM INTERROG EVL PM/LDLS PM: CPT

## 2022-06-15 PROCEDURE — 93296 REM INTERROG EVL PM/IDS: CPT

## 2022-06-27 NOTE — PRE-OP CHECKLIST - DNR CLARIFICATION FORM COMPLETED
[FreeTextEntry1] : cerumen:\par - removed bilaterally\par \par right mastoidectomy x 2 for hx cholesteatoma:\par - unable to clear squamous debris from attic\par - f/u with Dr. Fernandez to check and see if needs more debridement or CT scan\par - counseled on importance of yearly mastoid bowl cleanings\par  n/a

## 2022-08-05 ENCOUNTER — NON-APPOINTMENT (OUTPATIENT)
Age: 72
End: 2022-08-05

## 2022-08-11 ENCOUNTER — APPOINTMENT (OUTPATIENT)
Dept: CARDIOLOGY | Facility: CLINIC | Age: 72
End: 2022-08-11

## 2022-08-30 ENCOUNTER — APPOINTMENT (OUTPATIENT)
Dept: ORTHOPEDIC SURGERY | Facility: CLINIC | Age: 72
End: 2022-08-30

## 2022-08-30 VITALS — HEIGHT: 70 IN | BODY MASS INDEX: 29.78 KG/M2 | WEIGHT: 208 LBS

## 2022-08-30 PROCEDURE — 20611 DRAIN/INJ JOINT/BURSA W/US: CPT | Mod: LT

## 2022-08-30 PROCEDURE — J3490M: CUSTOM | Mod: LT

## 2022-08-30 PROCEDURE — 99213 OFFICE O/P EST LOW 20 MIN: CPT | Mod: 25

## 2022-08-30 NOTE — PHYSICAL EXAM
[Bilateral] : shoulder bilaterally [4 ___] : forward flexion 4[unfilled]/5 [4___] : external rotation 4[unfilled]/5 [] : no erythema [FreeTextEntry9] : FE: R 140, L 140 \par ER: R 40, L 40

## 2022-08-30 NOTE — ASSESSMENT
[FreeTextEntry1] : Complete tear of R supra infra with retraction and atrophy, mild DJD.\par Now also with L GH DJD.\par EMG shows: R > L chronic C5, C6 radiculopathy, mild b/l CTS\par Repeat R SA and L SA injection given today.\par Discussed timing of injections.\par He is not in PT currently.\par RTO 3 months or prn.\par \par Procedure Note:\par Large Joint Injection was performed because of pain and inflammation, failure of conservative treatment.  \par Medications:\par Depo-Medrol: 1 cc, 80 mg.\par Lidocaine: 2 cc, 1%. \par Marcaine: 2 cc, .25%. \par \par Medication was injected in the bilateral shoulder subacromial space. Patient has tried OTC's including aspirin, Ibuprofen, Aleve etc or prescription NSAIDs, and/or exercises at home and/ or physical therapy without satisfactory response. The risks, benefits, and alternatives to cortisone injection were explained in full to the patient. Risks outlined include but are not limited to infection, sepsis, bleeding, scarring, skin discoloration, temporary increase in pain, syncopal episode, failure to resolve symptoms, allergic reaction, symptom recurrence, and elevation of blood sugar in diabetics. Patient understood the risks. All questions were answered. After discussion of options, patient requested an injection. Oral informed consent was obtained and sterile prep of the injection site was performed using alcohol. Sterile technique was utilized for the procedure including the preparation of the solutions used for the injection. Ethyl chloride spray was used topically.  Sterile technique used. Patient tolerated procedure well. Post Procedure Instructions: Patient was advised to call if redness, pain, or fever occur and apply ice for 15 min. out of every hour for the next 12-24 hours as tolerated. patient was advised to rest the joint(s) for 2 days.\par \par Ultrasound Guidance was used for the following reasons: for prior failure or difficult injection and to visualize tearing and inflammation.\par Ultrasound guided injection was performed of the shoulder, visualization of the needle and placement of injection was performed without complication.\par

## 2022-08-30 NOTE — HISTORY OF PRESENT ILLNESS
[8] : 8 [6] : 6 [Throbbing] : throbbing [Retired] : Work status: retired [de-identified] : 8/30/22: Here to f/u. Pain started to return approx 1 month ago.\par \par 5/17/22: Here for follow up.  \par \par 2/17/22: Here for follow up. He reports the injections are working but not lasting as long as he is used to.\par \par 11/2/21: Here for follow up, EMG review. His left shoulder is more painful.\par \par EMG b/l UE: R > L chronic C5, C6 radiculopathy, mild b/l CTS\par \par 8/3/21: Here for follow up. The left side is more painful today. He is complaining of some paresthesias in both arms today and some pain posteriorly.\par \par 5/4/21: Follow up bilateral shoulder. Injections last visit helped. He requests to repeat them today.\par \par 2/4/21: Here for follow up. Injections are still helping. He has some complications after his aortic valve procedure.\par \par 11/10/20: Here for follow up. He did get relief from the injections last visit. 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 During COVID his PT was cancelled. Waking him at night now. L is worse than the R. 3 months relief with prior injections.\par \par 1/28/20: Here for fu and repeat cortisone injection bilateral shoulders. PT helps - no new symptoms. 3 months relief with prior injections.\par \par 1/7/20: Here for follow up. he has been out of PT for the shoulders for 3 weeks. He has been dealing with a sinus infection.\par \par 10/8/19: Here for follow up. he did start PT. The shoulders feel achy.\par \par 7/9/19: Here for follow up. The shoulders have become more painful recently. He wants to consider PT next month. Right is more painful than the left.\par \par 4/2/19: Here for fu. Recurring R shoulder pain, was unable to do PT as planned. Now with L shoulder pain as well x 2-3 months. No injury recalled. Pain with motion. He is unable to lift things, uses caution carrying laundry. Concerned about loss of strength. Advil 400mg prn.\par \par 12/11/18: Here for follow up. He had the injection last visit. He recently had radiation for prostate cancer, just finished.\par \par 6/26/18: He returns for f/u right shoulder. He is having more pain in the shoulder.\par \par 4/24/18: He returns for f/u right shoulder. He continue to have some pain radiating into neck, overall feeling better. He has not been to PT recently.\par \par 1/23/18: Here for follow up. He has had some return of pain in the shoulder. He takes advil. He has been out of PT due to other issues but wants to start again soon.\par \par 12/12/17: Here for follow up. His shoulder feels good today. He has been in PT but recently stopped. He has only mild pain.\par \par 9/12/17: Here for follow up. He does notice some improvement with PT.\par \par 7/25/17: Here for follow up. Has been in PT weekly which is helping. 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 Pain persists intermittently with movement. Requesting injection.  Given subacromial injection.\par \par 6/6/17: Here for follow up. Has been in PT. Only significant pain with movement. Did get improvement with injection.\par \par 4/25/17: 67 y/o RHD male here for the r shoulder. Felt pain in the shoulder in 9/16 when carrying some groceries up the stairs. No specific trauma. Eventually got an MRI. He has been going to PT with some improvement. He also takes\par NSAIDs prn.\par \par MRI R shoulder:\par 1. Advanced rotator cuff tear with complete full-thickness discontinuity in a near complete full-thickness tear of infraspinatus is stable since the prior study. There is also persistent high-grade partial tearing of subscapularis and elongation of subscapularis tendon. No head is high riding. There is atrophy of supraspinatus and infraspinatus muscle bellies.\par 2. Chronic rupture and distal retraction long head of the biceps tendon\par 3. Mild degenerative tearing of the labrum.\par \par Prior MD Notes:\par 3/9/17: Patient is a 65 yo RHD male c/o right shoulder pain since Sept 2016 after he felt pain while carrying groceries. No n/t. Has been seeing Dr. Jennings who did Xrays, ordered MRI and started PT. PT giving some relief, but he would like to discuss his surgical options. Taking advil which gives some relief. No previous injuries or surgeries to right shoulder. 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

## 2022-09-09 ENCOUNTER — NON-APPOINTMENT (OUTPATIENT)
Age: 72
End: 2022-09-09

## 2022-09-09 ENCOUNTER — APPOINTMENT (OUTPATIENT)
Dept: CARDIOLOGY | Facility: CLINIC | Age: 72
End: 2022-09-09

## 2022-09-09 VITALS
BODY MASS INDEX: 28.63 KG/M2 | DIASTOLIC BLOOD PRESSURE: 82 MMHG | SYSTOLIC BLOOD PRESSURE: 158 MMHG | WEIGHT: 200 LBS | HEIGHT: 70 IN | HEART RATE: 75 BPM | OXYGEN SATURATION: 94 %

## 2022-09-09 PROCEDURE — 93000 ELECTROCARDIOGRAM COMPLETE: CPT

## 2022-09-09 PROCEDURE — 99214 OFFICE O/P EST MOD 30 MIN: CPT | Mod: 25

## 2022-09-09 NOTE — REVIEW OF SYSTEMS
[Dizziness] : dizziness [Negative] : Integumentary [SOB] : no shortness of breath [Dyspnea on exertion] : not dyspnea during exertion [Leg Claudication] : no intermittent leg claudication [Palpitations] : no palpitations [Syncope] : no syncope [Cough] : no cough [Wheezing] : no wheezing [Abdominal Pain] : no abdominal pain [Nausea] : no nausea [Change in Appetite] : no change in appetite [Change In The Stool] : no change in stool [Constipation] : no constipation [Blood in stool] : no blood in stoo

## 2022-09-09 NOTE — DISCUSSION/SUMMARY
[FreeTextEntry1] : Salvador's symptoms have improved since his TAVR procedure.  He is also now status post [employer] dual permanent pacemaker implantation for a 3.1-second pause.  We had an extensive discussion concerning his issues and he will continue to increase his activity. \par \tricia Remains with a degree of dyspnea, which is more likely functional, then related to any underlying structural heart disease.  His TAVR is probably functioning normally, we will recheck an echocardiogram, for which she was otherwise due in any case.  His pacemaker is functioning well.  He does not have any significant CAD, and I do not think a stress test would be worthwhile at this time.\par \par He is optimized from a cardiovascular perspective for his planned bilateral cataract surgery.  Routine hemodynamic monitoring is recommended.  He will plan on seeing me back in the office in about 6 months.

## 2022-09-09 NOTE — HISTORY OF PRESENT ILLNESS
[FreeTextEntry1] : Salvador presented for a followup evaluation.  He presents in anticipation of surgery.\par \par He is now 72 years old with AV disease s/p TAVR in 12/2020.He developed SSS post TAVR and a PPM was placed. He has minimal CAD by cath from 9/3/2020.\par \par He was seen in 4/2022 and reported dyspnea and lightheadedness.  He was told to follow with neurology for the dizziness.  His dyspnea was felt to be functional.\par \par He presents to the office today feeling well overall.  He reports no symptoms of angina or heart failure. He does have a degree of dyspnea walking stairs.  His pacemaker has been functioning normally, and as of 8/24/22, he has about 12 years of battery life. He is planned for bilateral cataract surgery.\par \par Past medical history is remarkable for hypertension, obesity, prostate cancer in 2015 status post radical prostatectomy, radiation complicated by radiation proctitis, and now hormonal therapy

## 2022-09-09 NOTE — PHYSICAL EXAM
[General Appearance - Well Developed] : well developed [Normal Appearance] : normal appearance [Well Groomed] : well groomed [General Appearance - Well Nourished] : well nourished [No Deformities] : no deformities [General Appearance - In No Acute Distress] : no acute distress [Normal Conjunctiva] : the conjunctiva exhibited no abnormalities [Eyelids - No Xanthelasma] : the eyelids demonstrated no xanthelasmas [Normal Oral Mucosa] : normal oral mucosa [No Oral Pallor] : no oral pallor [No Oral Cyanosis] : no oral cyanosis [Normal Jugular Venous A Waves Present] : normal jugular venous A waves present [Normal Jugular Venous V Waves Present] : normal jugular venous V waves present [No Jugular Venous Reynolds A Waves] : no jugular venous reynolds A waves [Respiration, Rhythm And Depth] : normal respiratory rhythm and effort [Exaggerated Use Of Accessory Muscles For Inspiration] : no accessory muscle use [Auscultation Breath Sounds / Voice Sounds] : lungs were clear to auscultation bilaterally [Abdomen Soft] : soft [Abdomen Tenderness] : non-tender [Abdomen Mass (___ Cm)] : no abdominal mass palpated [Abnormal Walk] : normal gait [Gait - Sufficient For Exercise Testing] : the gait was sufficient for exercise testing [Nail Clubbing] : no clubbing of the fingernails [Cyanosis, Localized] : no localized cyanosis [Petechial Hemorrhages (___cm)] : no petechial hemorrhages [Skin Color & Pigmentation] : normal skin color and pigmentation [] : no rash [No Venous Stasis] : no venous stasis [Skin Lesions] : no skin lesions [No Skin Ulcers] : no skin ulcer [No Xanthoma] : no  xanthoma was observed [Oriented To Time, Place, And Person] : oriented to person, place, and time [Affect] : the affect was normal [Mood] : the mood was normal [No Anxiety] : not feeling anxious [5th Left ICS - MCL] : palpated at the 5th LICS in the midclavicular line [Normal] : normal [No Precordial Heave] : no precordial heave was noted [Normal Rate] : normal [Heart Rate ___] : [unfilled] bpm [Rhythm Regular] : regular [Normal S1] : normal S1 [Normal S2] : normal S2 [A2 Diminished] : had a diminished A2 [No Gallop] : no gallop heard [II] : a grade 2 [Crescendo-Decrescendo] : crescendo-decrescendo [Medium] : medium pitched [Blowing] : blowing [Late] : late [2+] : left 2+ [No Abnormalities] : the abdominal aorta was not enlarged and no bruit was heard [No Pitting Edema] : no pitting edema present [Apical Thrill] : no thrill palpable at the apex [S3] : no S3 [S4] : no S4 [Click] : no click [Pericardial Rub] : no pericardial rub [Right Carotid Bruit] : no bruit heard over the right carotid [Left Carotid Bruit] : no bruit heard over the left carotid [Right Femoral Bruit] : no bruit heard over the right femoral artery [Left Femoral Bruit] : no bruit heard over the left femoral artery [Bruit] : no bruit heard [Rt] : no varicose veins of the right leg [Lt] : no varicose veins of the left leg

## 2022-09-14 ENCOUNTER — APPOINTMENT (OUTPATIENT)
Dept: CARDIOLOGY | Facility: CLINIC | Age: 72
End: 2022-09-14

## 2022-09-14 PROCEDURE — 93296 REM INTERROG EVL PM/IDS: CPT

## 2022-09-14 PROCEDURE — 93294 REM INTERROG EVL PM/LDLS PM: CPT

## 2022-09-15 ENCOUNTER — APPOINTMENT (OUTPATIENT)
Dept: CARDIOLOGY | Facility: CLINIC | Age: 72
End: 2022-09-15

## 2022-09-15 ENCOUNTER — MED ADMIN CHARGE (OUTPATIENT)
Age: 72
End: 2022-09-15

## 2022-09-15 PROCEDURE — 93306 TTE W/DOPPLER COMPLETE: CPT

## 2022-09-15 RX ORDER — PERFLUTREN 6.52 MG/ML
6.52 INJECTION, SUSPENSION INTRAVENOUS
Qty: 1 | Refills: 0 | Status: COMPLETED | OUTPATIENT
Start: 2022-09-15

## 2022-09-15 RX ADMIN — PERFLUTREN MG/ML: 6.52 INJECTION, SUSPENSION INTRAVENOUS at 00:00

## 2022-10-07 ENCOUNTER — NON-APPOINTMENT (OUTPATIENT)
Age: 72
End: 2022-10-07

## 2022-11-04 ENCOUNTER — OFFICE (OUTPATIENT)
Dept: URBAN - METROPOLITAN AREA CLINIC 109 | Facility: CLINIC | Age: 72
Setting detail: OPHTHALMOLOGY
End: 2022-11-04
Payer: MEDICARE

## 2022-11-04 VITALS — HEIGHT: 60 IN

## 2022-11-04 DIAGNOSIS — Z96.1: ICD-10-CM

## 2022-11-04 PROCEDURE — 99024 POSTOP FOLLOW-UP VISIT: CPT | Performed by: OPHTHALMOLOGY

## 2022-11-04 ASSESSMENT — SPHEQUIV_DERIVED
OD_SPHEQUIV: -0.25
OS_SPHEQUIV: 1.125
OD_SPHEQUIV: 3.75
OD_SPHEQUIV: 1.25
OS_SPHEQUIV: 3.75
OS_SPHEQUIV: -0.875

## 2022-11-04 ASSESSMENT — REFRACTION_CURRENTRX
OD_SPHERE: +4.00
OD_OVR_VA: 20/
OD_CYLINDER: -0.50
OS_CYLINDER: -1.00
OS_OVR_VA: 20/
OS_SPHERE: +4.25
OD_AXIS: 95
OS_AXIS: 77

## 2022-11-04 ASSESSMENT — REFRACTION_MANIFEST
OD_CYLINDER: -0.50
OS_SPHERE: +1.50
OD_ADD: +2.50
OS_AXIS: 78
OD_AXIS: 095
OS_AXIS: 077
OS_ADD: +2.50
OD_VA1: 20/NI
OD_CYLINDER: -0.50
OD_SPHERE: +4.00
OS_CYLINDER: -0.75
OS_SPHERE: +4.25
OD_AXIS: 100
OS_CYLINDER: -1.00
OD_SPHERE: +1.50

## 2022-11-04 ASSESSMENT — KERATOMETRY
OS_AXISANGLE_DEGREES: 22
OS_K1POWER_DIOPTERS: 45.12
OD_K1POWER_DIOPTERS: 45.00
OD_K2POWER_DIOPTERS: 45.25
OS_K2POWER_DIOPTERS: 45.50
OD_AXISANGLE_DEGREES: 131

## 2022-11-04 ASSESSMENT — REFRACTION_AUTOREFRACTION
OD_CYLINDER: -0.50
OD_AXIS: 83
OS_SPHERE: -0.50
OD_SPHERE: 0.00
OS_CYLINDER: -0.75
OS_AXIS: 110

## 2022-11-04 ASSESSMENT — AXIALLENGTH_DERIVED
OD_AL: 22.5556
OD_AL: 21.6991
OS_AL: 23.27
OD_AL: 23.1028
OS_AL: 22.54
OS_AL: 21.64

## 2022-11-04 ASSESSMENT — TONOMETRY
OS_IOP_MMHG: 20
OD_IOP_MMHG: 20

## 2022-11-04 ASSESSMENT — CONFRONTATIONAL VISUAL FIELD TEST (CVF)
OS_FINDINGS: FULL
OD_FINDINGS: FULL

## 2022-11-04 ASSESSMENT — VISUAL ACUITY
OD_BCVA: 20/25-1
OS_BCVA: 20/20-1

## 2022-12-01 ENCOUNTER — APPOINTMENT (OUTPATIENT)
Dept: ORTHOPEDIC SURGERY | Facility: CLINIC | Age: 72
End: 2022-12-01

## 2022-12-01 VITALS — HEIGHT: 70 IN | BODY MASS INDEX: 28.63 KG/M2 | WEIGHT: 200 LBS

## 2022-12-01 PROCEDURE — J3490M: CUSTOM | Mod: LT

## 2022-12-01 PROCEDURE — 99213 OFFICE O/P EST LOW 20 MIN: CPT | Mod: 25

## 2022-12-01 PROCEDURE — 20611 DRAIN/INJ JOINT/BURSA W/US: CPT | Mod: LT

## 2022-12-01 NOTE — HISTORY OF PRESENT ILLNESS
[6] : 6 [Throbbing] : throbbing [Constant] : constant [Leisure] : leisure [Lying in bed] : lying in bed [Retired] : Work status: retired [de-identified] : 12/1/22: Here for follow up, he got about 2.5 months relief. \par \par 8/30/22: Here to f/u. Pain started to return approx 1 month ago.\par \par 5/17/22: Here for follow up.  \par \par 2/17/22: Here for follow up. He reports the injections are working but not lasting as long as he is used to.\par \par 11/2/21: Here for follow up, EMG review. His left shoulder is more painful.\par \par EMG b/l UE: R > L chronic C5, C6 radiculopathy, mild b/l CTS\par \par 8/3/21: Here for follow up. The left side is more painful today. He is complaining of some paresthesias in both arms today and some pain posteriorly.\par \par 5/4/21: Follow up bilateral shoulder. Injections last visit helped. He requests to repeat them today.\par \par 2/4/21: Here for follow up. Injections are still helping. He has some complications after his aortic valve procedure.\par \par 11/10/20: Here for follow up. He did get relief from the injections last visit. 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 During COVID his PT was cancelled. Waking him at night now. L is worse than the R. 3 months relief with prior injections.\par \par 1/28/20: Here for fu and repeat cortisone injection bilateral shoulders. PT helps - no new symptoms. 3 months relief with prior injections.\par \par 1/7/20: Here for follow up. he has been out of PT for the shoulders for 3 weeks. He has been dealing with a sinus infection.\par \par 10/8/19: Here for follow up. he did start PT. The shoulders feel achy.\par \par 7/9/19: Here for follow up. The shoulders have become more painful recently. He wants to consider PT next month. Right is more painful than the left.\par \par 4/2/19: Here for fu. Recurring R shoulder pain, was unable to do PT as planned. Now with L shoulder pain as well x 2-3 months. No injury recalled. Pain with motion. He is unable to lift things, uses caution carrying laundry. Concerned about loss of strength. Advil 400mg prn.\par \par 12/11/18: Here for follow up. He had the injection last visit. He recently had radiation for prostate cancer, just finished.\par \par 6/26/18: He returns for f/u right shoulder. He is having more pain in the shoulder.\par \par 4/24/18: He returns for f/u right shoulder. He continue to have some pain radiating into neck, overall feeling better. He has not been to PT recently.\par \par 1/23/18: Here for follow up. He has had some return of pain in the shoulder. He takes advil. He has been out of PT due to other issues but wants to start again soon.\par \par 12/12/17: Here for follow up. His shoulder feels good today. He has been in PT but recently stopped. He has only mild pain.\par \par 9/12/17: Here for follow up. He does notice some improvement with PT.\par \par 7/25/17: Here for follow up. Has been in PT weekly which is helping. 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 Pain persists intermittently with movement. Requesting injection.  Given subacromial injection.\par \par 6/6/17: Here for follow up. Has been in PT. Only significant pain with movement. Did get improvement with injection.\par \par 4/25/17: 67 y/o RHD male here for the r shoulder. Felt pain in the shoulder in 9/16 when carrying some groceries up the stairs. No specific trauma. Eventually got an MRI. He has been going to PT with some improvement. He also takes\par NSAIDs prn.\par \par MRI R shoulder:\par 1. Advanced rotator cuff tear with complete full-thickness discontinuity in a near complete full-thickness tear of infraspinatus is stable since the prior study. There is also persistent high-grade partial tearing of subscapularis and elongation of subscapularis tendon. No head is high riding. There is atrophy of supraspinatus and infraspinatus muscle bellies.\par 2. Chronic rupture and distal retraction long head of the biceps tendon\par 3. Mild degenerative tearing of the labrum.\par \par Prior MD Notes:\par 3/9/17: Patient is a 65 yo RHD male c/o right shoulder pain since Sept 2016 after he felt pain while carrying groceries. No n/t. Has been seeing Dr. Jennings who did Xrays, ordered MRI and started PT. PT giving some relief, but he would like to discuss his surgical options. Taking advil which gives some relief. No previous injuries or surgeries to right shoulder. Occupation: Veterans counselor for [employer] [] : no [FreeTextEntry1] : shoulders

## 2022-12-14 ENCOUNTER — APPOINTMENT (OUTPATIENT)
Dept: CARDIOLOGY | Facility: CLINIC | Age: 72
End: 2022-12-14

## 2022-12-14 PROCEDURE — 93296 REM INTERROG EVL PM/IDS: CPT

## 2022-12-14 PROCEDURE — 93294 REM INTERROG EVL PM/LDLS PM: CPT

## 2023-01-18 ENCOUNTER — APPOINTMENT (OUTPATIENT)
Dept: CARDIOLOGY | Facility: CLINIC | Age: 73
End: 2023-01-18
Payer: MEDICARE

## 2023-01-18 ENCOUNTER — NON-APPOINTMENT (OUTPATIENT)
Age: 73
End: 2023-01-18

## 2023-01-18 VITALS
HEART RATE: 84 BPM | DIASTOLIC BLOOD PRESSURE: 76 MMHG | SYSTOLIC BLOOD PRESSURE: 170 MMHG | BODY MASS INDEX: 29.2 KG/M2 | OXYGEN SATURATION: 98 % | WEIGHT: 204 LBS | HEIGHT: 70 IN

## 2023-01-18 VITALS — SYSTOLIC BLOOD PRESSURE: 140 MMHG | DIASTOLIC BLOOD PRESSURE: 70 MMHG

## 2023-01-18 PROCEDURE — 93000 ELECTROCARDIOGRAM COMPLETE: CPT

## 2023-01-18 PROCEDURE — 99214 OFFICE O/P EST MOD 30 MIN: CPT | Mod: 25

## 2023-01-18 NOTE — DISCUSSION/SUMMARY
[FreeTextEntry1] : Salvador's symptoms have improved since his TAVR procedure.  He is also now status post [employer] dual permanent pacemaker implantation for a 3.1-second pause.   \par \par He remains with a degree of dyspnea, which is more likely functional, than related to any underlying structural heart disease.  His TAVR is functioning normally.  I reviewed his echocardiogram from September 15, 2022.  This revealed an ejection fraction of 62%, with mild mitral regurgitation.  His transcatheter aortic valve was functioning well, with mild paravalvular aortic regurgitation.His pacemaker is functioning well, with his most recent remote interrogation from December 14, 2022 revealing estimated longevity of 11 years, 6 months, and normal pacemaker function.  He does not have any significant CAD, and I do not think a stress test would be worthwhile at this time.\par \par It is not clear what is causing his dizziness.  He will start checking his blood pressure and heart rate during the episodes.  Perhaps he becomes hypotensive.  He will schedule a carotid ultrasound, though I doubt this will reveal any significant pathology which might explain the dizziness.\par \par If all is well, he will see me in 6 months.\par

## 2023-01-18 NOTE — HISTORY OF PRESENT ILLNESS
[FreeTextEntry1] : Salvador presented for a followup evaluation.  He was last seen in the office in September 2022, prior to planned surgery.\par \par He is now 72 years old with AV disease s/p TAVR in 12/2020.He developed SSS post TAVR and a PPM was placed. He has minimal CAD by cath from 9/3/2020.\par \par He was seen in 4/2022 and reported dyspnea and lightheadedness.  He was told to follow with neurology for the dizziness.  His dyspnea was felt to be functional.  No clear explanation was discovered for his dizziness.\par \par I saw him in the office in September 2022, at which time he was being planned for bilateral cataract surgery.  I did not feel that additional testing was required prior to that procedure.\par \par He presents to the office today feeling well overall.  He reports no symptoms of angina or heart failure. He does have a degree of dyspnea walking stairs.  This is unchanged.  His pacemaker has been functioning normally, and as of 8/24/22, he has about 12 years of battery life.  His cataract procedures went well, without complication.  He does report ongoing issues with episodic dizziness.  He describes dizziness that comes on either with exertion or at rest.  He has not checked his heart rate or blood pressure when this happens.  He does not strictly speaking describe vertigo.  His blood pressure has been labile, but commonly very well controlled.\par \par Past medical history is remarkable for hypertension, obesity, prostate cancer in 2015 status post radical prostatectomy, radiation complicated by radiation proctitis, and now hormonal therapy\par

## 2023-01-26 ENCOUNTER — APPOINTMENT (OUTPATIENT)
Dept: CARDIOLOGY | Facility: CLINIC | Age: 73
End: 2023-01-26
Payer: MEDICARE

## 2023-01-26 PROCEDURE — 93880 EXTRACRANIAL BILAT STUDY: CPT

## 2023-01-31 ENCOUNTER — INPATIENT (INPATIENT)
Facility: HOSPITAL | Age: 73
LOS: 34 days | Discharge: ANOTHER HOSPITAL | DRG: 669 | End: 2023-03-07
Attending: FAMILY MEDICINE | Admitting: FAMILY MEDICINE
Payer: MEDICARE

## 2023-01-31 VITALS
HEIGHT: 70 IN | HEART RATE: 74 BPM | DIASTOLIC BLOOD PRESSURE: 118 MMHG | RESPIRATION RATE: 16 BRPM | WEIGHT: 205.03 LBS | SYSTOLIC BLOOD PRESSURE: 173 MMHG | TEMPERATURE: 99 F | OXYGEN SATURATION: 98 %

## 2023-01-31 DIAGNOSIS — Z29.9 ENCOUNTER FOR PROPHYLACTIC MEASURES, UNSPECIFIED: ICD-10-CM

## 2023-01-31 DIAGNOSIS — C61 MALIGNANT NEOPLASM OF PROSTATE: ICD-10-CM

## 2023-01-31 DIAGNOSIS — R33.9 RETENTION OF URINE, UNSPECIFIED: ICD-10-CM

## 2023-01-31 DIAGNOSIS — I10 ESSENTIAL (PRIMARY) HYPERTENSION: ICD-10-CM

## 2023-01-31 DIAGNOSIS — Z90.79 ACQUIRED ABSENCE OF OTHER GENITAL ORGAN(S): Chronic | ICD-10-CM

## 2023-01-31 DIAGNOSIS — Z90.89 ACQUIRED ABSENCE OF OTHER ORGANS: Chronic | ICD-10-CM

## 2023-01-31 DIAGNOSIS — R33.8 OTHER RETENTION OF URINE: ICD-10-CM

## 2023-01-31 LAB
ALBUMIN SERPL ELPH-MCNC: 3.8 G/DL — SIGNIFICANT CHANGE UP (ref 3.3–5)
ALP SERPL-CCNC: 87 U/L — SIGNIFICANT CHANGE UP (ref 40–120)
ALT FLD-CCNC: 25 U/L — SIGNIFICANT CHANGE UP (ref 12–78)
ANION GAP SERPL CALC-SCNC: 7 MMOL/L — SIGNIFICANT CHANGE UP (ref 5–17)
APPEARANCE UR: ABNORMAL
APTT BLD: 22.1 SEC — LOW (ref 27.5–35.5)
AST SERPL-CCNC: 22 U/L — SIGNIFICANT CHANGE UP (ref 15–37)
BASOPHILS # BLD AUTO: 0.09 K/UL — SIGNIFICANT CHANGE UP (ref 0–0.2)
BASOPHILS NFR BLD AUTO: 0.9 % — SIGNIFICANT CHANGE UP (ref 0–2)
BILIRUB SERPL-MCNC: 0.6 MG/DL — SIGNIFICANT CHANGE UP (ref 0.2–1.2)
BILIRUB UR-MCNC: NEGATIVE — SIGNIFICANT CHANGE UP
BUN SERPL-MCNC: 17 MG/DL — SIGNIFICANT CHANGE UP (ref 7–23)
CALCIUM SERPL-MCNC: 9 MG/DL — SIGNIFICANT CHANGE UP (ref 8.5–10.1)
CHLORIDE SERPL-SCNC: 104 MMOL/L — SIGNIFICANT CHANGE UP (ref 96–108)
CO2 SERPL-SCNC: 27 MMOL/L — SIGNIFICANT CHANGE UP (ref 22–31)
COLOR SPEC: ABNORMAL
CREAT SERPL-MCNC: 0.74 MG/DL — SIGNIFICANT CHANGE UP (ref 0.5–1.3)
DIFF PNL FLD: ABNORMAL
EGFR: 95 ML/MIN/1.73M2 — SIGNIFICANT CHANGE UP
EOSINOPHIL # BLD AUTO: 0.16 K/UL — SIGNIFICANT CHANGE UP (ref 0–0.5)
EOSINOPHIL NFR BLD AUTO: 1.5 % — SIGNIFICANT CHANGE UP (ref 0–6)
GLUCOSE SERPL-MCNC: 90 MG/DL — SIGNIFICANT CHANGE UP (ref 70–99)
GLUCOSE UR QL: NEGATIVE — SIGNIFICANT CHANGE UP
HCT VFR BLD CALC: 39.1 % — SIGNIFICANT CHANGE UP (ref 39–50)
HGB BLD-MCNC: 13.1 G/DL — SIGNIFICANT CHANGE UP (ref 13–17)
IMM GRANULOCYTES NFR BLD AUTO: 0.4 % — SIGNIFICANT CHANGE UP (ref 0–0.9)
INR BLD: 1.1 RATIO — SIGNIFICANT CHANGE UP (ref 0.88–1.16)
KETONES UR-MCNC: NEGATIVE — SIGNIFICANT CHANGE UP
LEUKOCYTE ESTERASE UR-ACNC: ABNORMAL
LYMPHOCYTES # BLD AUTO: 1.37 K/UL — SIGNIFICANT CHANGE UP (ref 1–3.3)
LYMPHOCYTES # BLD AUTO: 13.1 % — SIGNIFICANT CHANGE UP (ref 13–44)
MCHC RBC-ENTMCNC: 30.8 PG — SIGNIFICANT CHANGE UP (ref 27–34)
MCHC RBC-ENTMCNC: 33.5 GM/DL — SIGNIFICANT CHANGE UP (ref 32–36)
MCV RBC AUTO: 91.8 FL — SIGNIFICANT CHANGE UP (ref 80–100)
MONOCYTES # BLD AUTO: 0.96 K/UL — HIGH (ref 0–0.9)
MONOCYTES NFR BLD AUTO: 9.2 % — SIGNIFICANT CHANGE UP (ref 2–14)
NEUTROPHILS # BLD AUTO: 7.85 K/UL — HIGH (ref 1.8–7.4)
NEUTROPHILS NFR BLD AUTO: 74.9 % — SIGNIFICANT CHANGE UP (ref 43–77)
NITRITE UR-MCNC: NEGATIVE — SIGNIFICANT CHANGE UP
NRBC # BLD: 0 /100 WBCS — SIGNIFICANT CHANGE UP (ref 0–0)
PH UR: 7 — SIGNIFICANT CHANGE UP (ref 5–8)
PLATELET # BLD AUTO: 187 K/UL — SIGNIFICANT CHANGE UP (ref 150–400)
POTASSIUM SERPL-MCNC: 3.9 MMOL/L — SIGNIFICANT CHANGE UP (ref 3.5–5.3)
POTASSIUM SERPL-SCNC: 3.9 MMOL/L — SIGNIFICANT CHANGE UP (ref 3.5–5.3)
PROT SERPL-MCNC: 6.9 G/DL — SIGNIFICANT CHANGE UP (ref 6–8.3)
PROT UR-MCNC: 500 MG/DL
PROTHROM AB SERPL-ACNC: 12.9 SEC — SIGNIFICANT CHANGE UP (ref 10.5–13.4)
RBC # BLD: 4.26 M/UL — SIGNIFICANT CHANGE UP (ref 4.2–5.8)
RBC # FLD: 12.5 % — SIGNIFICANT CHANGE UP (ref 10.3–14.5)
SARS-COV-2 RNA SPEC QL NAA+PROBE: SIGNIFICANT CHANGE UP
SODIUM SERPL-SCNC: 138 MMOL/L — SIGNIFICANT CHANGE UP (ref 135–145)
SP GR SPEC: 1.01 — SIGNIFICANT CHANGE UP (ref 1.01–1.02)
UROBILINOGEN FLD QL: NEGATIVE — SIGNIFICANT CHANGE UP
WBC # BLD: 10.47 K/UL — SIGNIFICANT CHANGE UP (ref 3.8–10.5)
WBC # FLD AUTO: 10.47 K/UL — SIGNIFICANT CHANGE UP (ref 3.8–10.5)

## 2023-01-31 PROCEDURE — 99285 EMERGENCY DEPT VISIT HI MDM: CPT

## 2023-01-31 PROCEDURE — 99222 1ST HOSP IP/OBS MODERATE 55: CPT | Mod: GC

## 2023-01-31 PROCEDURE — 71045 X-RAY EXAM CHEST 1 VIEW: CPT | Mod: 26

## 2023-01-31 PROCEDURE — 93010 ELECTROCARDIOGRAM REPORT: CPT

## 2023-01-31 RX ORDER — ACETAMINOPHEN 500 MG
650 TABLET ORAL EVERY 6 HOURS
Refills: 0 | Status: DISCONTINUED | OUTPATIENT
Start: 2023-01-31 | End: 2023-03-02

## 2023-01-31 RX ORDER — DOCUSATE SODIUM 100 MG
1 CAPSULE ORAL
Qty: 0 | Refills: 0 | DISCHARGE

## 2023-01-31 RX ORDER — PSYLLIUM SEED (WITH DEXTROSE)
1 POWDER (GRAM) ORAL
Qty: 0 | Refills: 0 | DISCHARGE

## 2023-01-31 RX ORDER — LORATADINE 10 MG/1
1 TABLET ORAL
Qty: 0 | Refills: 0 | DISCHARGE

## 2023-01-31 RX ORDER — POLYETHYLENE GLYCOL 3350 17 G/17G
17 POWDER, FOR SOLUTION ORAL DAILY
Refills: 0 | Status: DISCONTINUED | OUTPATIENT
Start: 2023-01-31 | End: 2023-03-02

## 2023-01-31 RX ORDER — ONDANSETRON 8 MG/1
4 TABLET, FILM COATED ORAL EVERY 8 HOURS
Refills: 0 | Status: DISCONTINUED | OUTPATIENT
Start: 2023-01-31 | End: 2023-03-02

## 2023-01-31 RX ORDER — RELUGOLIX 120 MG/1
1 TABLET, FILM COATED ORAL
Qty: 0 | Refills: 0 | DISCHARGE

## 2023-01-31 RX ORDER — LANOLIN ALCOHOL/MO/W.PET/CERES
3 CREAM (GRAM) TOPICAL AT BEDTIME
Refills: 0 | Status: DISCONTINUED | OUTPATIENT
Start: 2023-01-31 | End: 2023-03-02

## 2023-01-31 RX ORDER — ASCORBIC ACID 60 MG
1 TABLET,CHEWABLE ORAL
Qty: 0 | Refills: 0 | DISCHARGE

## 2023-01-31 RX ORDER — LORATADINE 10 MG/1
10 TABLET ORAL DAILY
Refills: 0 | Status: DISCONTINUED | OUTPATIENT
Start: 2023-01-31 | End: 2023-03-02

## 2023-01-31 RX ORDER — ZINC OXIDE 200 MG/G
1 OINTMENT TOPICAL
Qty: 0 | Refills: 0 | DISCHARGE

## 2023-01-31 RX ORDER — METOPROLOL TARTRATE 50 MG
50 TABLET ORAL DAILY
Refills: 0 | Status: DISCONTINUED | OUTPATIENT
Start: 2023-01-31 | End: 2023-02-04

## 2023-01-31 NOTE — ED PROVIDER NOTE - NS ED ATTENDING STATEMENT MOD
This was a shared visit with the TORREY. I reviewed and verified the documentation and independently performed the documented:

## 2023-01-31 NOTE — PATIENT PROFILE ADULT - NSPROMEDSBROUGHTTOHOSP_GEN_A_NUR
Tumor Conference Information           Documentation / Disclaimer Cancer Tumor Board Note  Cancer tumor board recommendations do not override what is determined to be reasonable care and treatment, which is dependent on the circumstances of a patient's case; the patient's medical, social, and personal concerns; and the clinical judgment of the oncologist [physician].    
no

## 2023-01-31 NOTE — ED PROVIDER NOTE - CLINICAL SUMMARY MEDICAL DECISION MAKING FREE TEXT BOX
here with urinary retention and blood clots, hematuria not clearing with irrigation, patient admitted for further work up and evaluation.

## 2023-01-31 NOTE — ED ADULT NURSE NOTE - OBJECTIVE STATEMENT
Patient received complaining of urinary retention s/p cystoscopy earlier this morning. Patient states this was his 2nd procedure, first time had no issue, today he noticed blood and was unable to void with lower abdominal pain. Patient is AOx4, safety precautions in place, interventions implemented, awaiting evaluation. Patient received complaining of urinary retention s/p cystoscopy earlier this morning. Patient states this was his 2nd procedure, first time had no issue, today he noticed blood and was unable to void with lower abdominal pain. Patient is AOx4, states has pacemaker safety precautions in place, interventions implemented, awaiting evaluation.

## 2023-01-31 NOTE — PATIENT PROFILE ADULT - NSPROPOAPRESSUREINJURY_GEN_A_NUR
"----- Message from Asa Strauss sent at 3/11/2022  8:35 AM CST -----  Consult/Advisory:          Name Of Caller: Axel (Spouse)      Contact Preference?: [phone]      Provider Name: Lenny      Does patient feel the need to be seen today? No      What is the nature of the call?: Calling to speak w/ nurse.Stating pt is currently in the ER due to complications (possibly related to after effects of last week's hysterectomy).           Additional Notes:  "Thank you for all that you do for our patients"      "
Spoke with pt's  who states patient was having symptoms of nausea, fatigue and fever so he brought her to [facility] this morning. Patient is a week post op hysterectomy  is not sure if symptoms are related to the surgery but did want the doctor to know her current state.  
no

## 2023-01-31 NOTE — H&P ADULT - ATTENDING COMMENTS
71 y/o M w/ PMHx of HTN,  AS s/p TAVR, ppm, prostate cancer s/p radical prostatectomy c/b radiation proctitis presents to ED for severe pain and urinary retention s/p cystoscopy. Admit for hematuria / urinary retention     CBI placed in ER. uro Dr. Shaffer consulted. monitor CBC. f/u urology recs

## 2023-01-31 NOTE — H&P ADULT - PROBLEM SELECTOR PLAN 2
Hx of Prostate CA, now s/p radiation therapy and radical prostatectomy  Continue home Orgovyx - patient to bring in medication from home   Patient to f/u with his urologist at [facility] outpatient for further management

## 2023-01-31 NOTE — H&P ADULT - NSHPREVIEWOFSYSTEMS_GEN_ALL_CORE
CONSTITUTIONAL: denies fever, chills, fatigue, weakness  HEENT: denies blurred visions, sore throat  SKIN: denies new lesions, rash  CARDIOVASCULAR: denies chest pain, chest pressure, palpitations  RESPIRATORY: denies shortness of breath, sputum production  GASTROINTESTINAL: denies nausea, vomiting, diarrhea, abdominal pain  GENITOURINARY: denies dysuria, discharge  NEUROLOGICAL: denies numbness, headache, focal weakness  MUSCULOSKELETAL: denies new joint pain, muscle aches  HEMATOLOGIC: denies gross bleeding, bruising  LYMPHATICS: denies enlarged lymph nodes, extremity swelling  PSYCHIATRIC: denies recent changes in anxiety, depression  ENDOCRINOLOGIC: denies sweating, cold or heat intolerance CONSTITUTIONAL: denies fever, chills, fatigue, weakness  HEENT: denies blurred visions, sore throat  SKIN: denies new lesions, rash  CARDIOVASCULAR: denies chest pain, chest pressure, palpitations  RESPIRATORY: denies shortness of breath, sputum production  GASTROINTESTINAL: denies nausea, vomiting, diarrhea, abdominal pain  GENITOURINARY: + pain in suprapubic region, + hematuria   NEUROLOGICAL: denies numbness, headache, focal weakness  MUSCULOSKELETAL: denies new joint pain, muscle aches  HEMATOLOGIC: denies gross bleeding, bruising

## 2023-01-31 NOTE — H&P ADULT - NSHPPHYSICALEXAM_GEN_ALL_CORE
T(C): 36.6 (01-31-23 @ 19:53), Max: 37.1 (01-31-23 @ 15:00)  HR: 77 (01-31-23 @ 19:53) (74 - 77)  BP: 161/80 (01-31-23 @ 19:53) (161/80 - 173/118)  RR: 18 (01-31-23 @ 19:53) (16 - 18)  SpO2: 96% (01-31-23 @ 19:53) (96% - 98%)    CONSTITUTIONAL: Well groomed, no apparent distress  EYES: PERRLA and symmetric, EOMI, No conjunctival or scleral injection, non-icteric  ENMT: Oral mucosa with moist membranes. Normal dentition; no pharyngeal injection or exudates             NECK: Supple, symmetric and without tracheal deviation   RESP: No respiratory distress, no use of accessory muscles; CTA b/l, no WRR  CV: RRR, +S1S2, no MRG; no JVD; no peripheral edema  GI: Soft, NT, ND, no rebound, no guarding; no palpable masses; no hepatosplenomegaly; no hernia palpated  LYMPH: No cervical LAD or tenderness; no axillary LAD or tenderness; no inguinal LAD or tenderness  MSK: Normal gait; No digital clubbing or cyanosis; examination of the (head/neck/spine/ribs/pelvis, RUE, LUE, RLE, LLE) without misalignment,            Normal ROM without pain, no spinal tenderness, normal muscle strength/tone  SKIN: No rashes or ulcers noted; no subcutaneous nodules or induration palpable  NEURO: CN II-XII intact; normal reflexes in upper and lower extremities, sensation intact in upper and lower extremities b/l to light touch   PSYCH: Appropriate insight/judgment; A+O x 3, mood and affect appropriate, recent/remote memory intact T(C): 36.6 (01-31-23 @ 19:53), Max: 37.1 (01-31-23 @ 15:00)  HR: 77 (01-31-23 @ 19:53) (74 - 77)  BP: 161/80 (01-31-23 @ 19:53) (161/80 - 173/118)  RR: 18 (01-31-23 @ 19:53) (16 - 18)  SpO2: 96% (01-31-23 @ 19:53) (96% - 98%)    CONSTITUTIONAL: Well groomed, no apparent distress  EYES: PERRLA and symmetric, EOMI  RESP: No respiratory distress, no use of accessory muscles; CTA b/l, no WRR  CV: RRR, +S1S2, no MRG; no JVD; no peripheral edema  GI: Soft, NT, ND, no rebound, no guarding; no palpable masses  : + Suprapubic tenderness; bowers w/ CBI in place   SKIN: No rashes or ulcers noted; no subcutaneous nodules or induration palpable  NEURO: CN II-XII intact; normal reflexes in upper and lower extremities, sensation intact in upper and lower extremities b/l to light touch   PSYCH: Appropriate insight/judgment; A+O x 3, mood and affect appropriate, recent/remote memory intact T(C): 36.6 (01-31-23 @ 19:53), Max: 37.1 (01-31-23 @ 15:00)  HR: 77 (01-31-23 @ 19:53) (74 - 77)  BP: 161/80 (01-31-23 @ 19:53) (161/80 - 173/118)  RR: 18 (01-31-23 @ 19:53) (16 - 18)  SpO2: 96% (01-31-23 @ 19:53) (96% - 98%)    CONSTITUTIONAL: Well groomed, no apparent distress  EYES: PERRLA and symmetric, EOMI  RESP: No respiratory distress, no use of accessory muscles; CTA b/l, no WRR  CV: RRR, +S1S2, +systolic murmur; no JVD; no peripheral edema  GI: Soft, NT, ND, no rebound, no guarding  : + Suprapubic tenderness; bowers w/ CBI in place   SKIN: No rashes or ulcers noted; no subcutaneous nodules or induration palpable  NEURO: CN II-XII intact; normal reflexes in upper and lower extremities, sensation intact in upper and lower extremities b/l to light touch   PSYCH: Appropriate insight/judgment; A+O x 3, mood and affect appropriate, recent/remote memory intact

## 2023-01-31 NOTE — ED PROVIDER NOTE - ATTENDING APP SHARED VISIT CONTRIBUTION OF CARE
This was a shared visit with TORREY. I reviewed and verified the documentation and independently performed the documented MDM.

## 2023-01-31 NOTE — H&P ADULT - NSICDXPASTMEDICALHX_GEN_ALL_CORE_FT
PAST MEDICAL HISTORY:  Aortic stenosis     [place] (hard of hearing)     HTN (hypertension)     Proctitis, radiation from prostate treatment    Prostate cancer RT 2018 and prostatectomy in 2015    Rectal bleeding last hospitalization 10/6/20 2/2 radiation proctitis

## 2023-01-31 NOTE — CHART NOTE - NSCHARTNOTEFT_GEN_A_CORE
Nurse Bogdan called regarding elevated /80 manual. Pt asymptomatic including no cp or palp. Pt denies any missed doses of home BP meds. Nurse to call if BP is persistently elevated or if pt is symptomatic.

## 2023-01-31 NOTE — H&P ADULT - HISTORY OF PRESENT ILLNESS
71 y/o M w/  PMHx of prostate cancer s/p cystoscopy today presents to ED with urinary retention.       In the ED  Vitals  Labs significant for: UA positive for blood, protein, bacteria   Meds - none   CBI placed   Imaging   CXr - f/u final read  71 y/o M w/  PMHx of HTN, AS s/p TAVR, ppm, prostate cancer s/p radical prostatectomy c/b radiation proctitis presents to ED for severe pain and urinary retention s/p cystoscopy. Patient follows with urology at [facility]; was being seen for cystoscopy after experiencing hematuria. He has had hematuria before approximately one year ago; underwent cystoscopy without complications at that time. He underwent multiple rounds of radiation therapy with ultimate radical prostatectomy for prostate CA. He has been diagnosed with radiation proctitis. Patient feels that this his current presentation is likely 2/2  radiation cystitis. Patient reports pain is improved after bowers/CBI placed. Currently comfortable in bed.         In the ED  Vitals: 173/118, HR 74, T 98.7, SpO2 98% RA   Labs significant for: UA positive for blood, protein, bacteria   Meds - none   CBI placed   Imaging   CXR- f/u final read  73 y/o M w/  PMHx of HTN, AS s/p TAVR, ppm, prostate cancer s/p radical prostatectomy c/b radiation proctitis presents to ED for severe pain and urinary retention s/p cystoscopy. Patient follows with urology at [facility]; was being seen for cystoscopy after experiencing hematuria. He has had hematuria before approximately one year ago; underwent cystoscopy without complications at that time. He underwent multiple rounds of radiation therapy with ultimate radical prostatectomy for prostate CA. He has been diagnosed with radiation proctitis. Patient feels that this his current presentation is likely 2/2  radiation cystitis. Patient reports pain is improved after bowers/CBI placed. Currently comfortable in bed.       In the ED  Vitals: 173/118->161/80, HR 74, T 98.7, SpO2 98% RA   Labs significant for: UA positive for blood, protein, bacteria   Meds - none   CBI placed   Imaging   CXR- grossly clear (wet read) f/u final read   EKG NSR

## 2023-01-31 NOTE — H&P ADULT - PROBLEM SELECTOR PLAN 3
SCDs? Chronic  Continue home metoprolol w/ hold parameters Chronic, initially elevated due to discomfort  Continue home metoprolol w/ hold parameters

## 2023-01-31 NOTE — ED PROVIDER NOTE - DIFFERENTIAL DIAGNOSIS
Differential Diagnosis mild hematuria v clinically significant hematuria, bladder injury from procedure, hemorraghic cystitis

## 2023-01-31 NOTE — ED ADULT NURSE REASSESSMENT NOTE - NS ED NURSE REASSESS COMMENT FT1
BLadder scan shows about 330+ mL of urine BLadder scan shows about 330+ mL of urine    16 fr Gr placed, drained about 300ml of bloody urine with clots, will continue to monitor

## 2023-01-31 NOTE — PATIENT PROFILE ADULT - FALL HARM RISK - RISK INTERVENTIONS

## 2023-01-31 NOTE — H&P ADULT - NSHPSOCIALHISTORY_GEN_ALL_CORE
Patient lives at home and ambulates/performs ADLs independantly. Denies tobacco/alc/substance use. Patient lives at home and ambulates/performs ADLs independently. Denies tobacco/alc/substance use.

## 2023-01-31 NOTE — ED PROVIDER NOTE - NSICDXPASTMEDICALHX_GEN_ALL_CORE_FT
PAST MEDICAL HISTORY:  Aortic stenosis     [place] (hard of hearing)     HTN (hypertension)     Proctitis, radiation from prostate treatment    Prostate cancer RT 2018 and prostatectomy in 2015    Rectal bleeding last hospitalization 10/6/20 2/2 radiation proctitis

## 2023-01-31 NOTE — H&P ADULT - NSICDXFAMILYHX_GEN_ALL_CORE_FT
FAMILY HISTORY:  Father  Still living? Unknown  FH: cancer, Age at diagnosis: Age Unknown    Mother  Still living? Unknown  FH: cancer, Age at diagnosis: Age Unknown

## 2023-01-31 NOTE — H&P ADULT - ASSESSMENT
71 y/o M w/ PMHx of prostate cancer presents w/ urinary retention s/p cystoscopy. Admit for hematuria/urinary retention.  73 y/o M w/ PMHx of HTN,  AS s/p TAVR, ppm, prostate cancer s/p radical prostatectomy c/b radiation proctitis presents to ED for severe pain and urinary retention s/p cystoscopy. Admit for hematuria / urinary retention

## 2023-01-31 NOTE — ED PROVIDER NOTE - OBJECTIVE STATEMENT
73 yo M PMHx prostate CA sp cystoscopy earlier today presents to ED c/o urinary retention since procedure. Pt otherwise asymptomatic

## 2023-01-31 NOTE — PROVIDER CONTACT NOTE (OTHER) - ACTION/TREATMENT ORDERED:
Recheck BP in 30 mins and inform MD. Recheck BP in 30 mins and inform MD. If under 170 systolic, then continue to monitor.

## 2023-01-31 NOTE — H&P ADULT - PROBLEM SELECTOR PLAN 1
P/w urinary retention s/p cystoscopy; hematuria on UA     CBI in place, f/u output  Urology consulted; f/u recs in AM P/w urinary retention s/p cystoscopy; hematuria on UA, asymptomatic at this time   Patient is s/p prostatectomy; hematuria is likely 2/2 radiation cystitis and retention due to clots.  Patient with CBI in place at this time draining red urine with some clots. monitor output  Tylenol for pain PRN   Urology consulted; f/u recs in AM P/w urinary retention s/p cystoscopy; hematuria on UA, asymptomatic at this time   Patient is s/p prostatectomy; hematuria is likely 2/2 radiation cystitis and retention due to clots.  Patient with CBI in place at this time draining red urine with some clots. monitor output  Tylenol for pain PRN   Urology Dr. Shaffer consulted; f/u recs in AM

## 2023-02-01 ENCOUNTER — TRANSCRIPTION ENCOUNTER (OUTPATIENT)
Age: 73
End: 2023-02-01

## 2023-02-01 LAB
ANION GAP SERPL CALC-SCNC: 7 MMOL/L — SIGNIFICANT CHANGE UP (ref 5–17)
BASOPHILS # BLD AUTO: 0.07 K/UL — SIGNIFICANT CHANGE UP (ref 0–0.2)
BASOPHILS NFR BLD AUTO: 0.8 % — SIGNIFICANT CHANGE UP (ref 0–2)
BUN SERPL-MCNC: 16 MG/DL — SIGNIFICANT CHANGE UP (ref 7–23)
CALCIUM SERPL-MCNC: 8.5 MG/DL — SIGNIFICANT CHANGE UP (ref 8.5–10.1)
CHLORIDE SERPL-SCNC: 104 MMOL/L — SIGNIFICANT CHANGE UP (ref 96–108)
CO2 SERPL-SCNC: 27 MMOL/L — SIGNIFICANT CHANGE UP (ref 22–31)
CREAT SERPL-MCNC: 0.69 MG/DL — SIGNIFICANT CHANGE UP (ref 0.5–1.3)
CULTURE RESULTS: SIGNIFICANT CHANGE UP
EGFR: 97 ML/MIN/1.73M2 — SIGNIFICANT CHANGE UP
EOSINOPHIL # BLD AUTO: 0.25 K/UL — SIGNIFICANT CHANGE UP (ref 0–0.5)
EOSINOPHIL NFR BLD AUTO: 3 % — SIGNIFICANT CHANGE UP (ref 0–6)
GLUCOSE SERPL-MCNC: 88 MG/DL — SIGNIFICANT CHANGE UP (ref 70–99)
HCT VFR BLD CALC: 38 % — LOW (ref 39–50)
HCV AB S/CO SERPL IA: 0.07 S/CO — SIGNIFICANT CHANGE UP (ref 0–0.99)
HCV AB SERPL-IMP: SIGNIFICANT CHANGE UP
HGB BLD-MCNC: 12.5 G/DL — LOW (ref 13–17)
IMM GRANULOCYTES NFR BLD AUTO: 0.2 % — SIGNIFICANT CHANGE UP (ref 0–0.9)
LYMPHOCYTES # BLD AUTO: 1.28 K/UL — SIGNIFICANT CHANGE UP (ref 1–3.3)
LYMPHOCYTES # BLD AUTO: 15.2 % — SIGNIFICANT CHANGE UP (ref 13–44)
MCHC RBC-ENTMCNC: 30.3 PG — SIGNIFICANT CHANGE UP (ref 27–34)
MCHC RBC-ENTMCNC: 32.9 GM/DL — SIGNIFICANT CHANGE UP (ref 32–36)
MCV RBC AUTO: 92 FL — SIGNIFICANT CHANGE UP (ref 80–100)
MONOCYTES # BLD AUTO: 1.06 K/UL — HIGH (ref 0–0.9)
MONOCYTES NFR BLD AUTO: 12.6 % — SIGNIFICANT CHANGE UP (ref 2–14)
NEUTROPHILS # BLD AUTO: 5.76 K/UL — SIGNIFICANT CHANGE UP (ref 1.8–7.4)
NEUTROPHILS NFR BLD AUTO: 68.2 % — SIGNIFICANT CHANGE UP (ref 43–77)
NRBC # BLD: 0 /100 WBCS — SIGNIFICANT CHANGE UP (ref 0–0)
PLATELET # BLD AUTO: 187 K/UL — SIGNIFICANT CHANGE UP (ref 150–400)
POTASSIUM SERPL-MCNC: 3.9 MMOL/L — SIGNIFICANT CHANGE UP (ref 3.5–5.3)
POTASSIUM SERPL-SCNC: 3.9 MMOL/L — SIGNIFICANT CHANGE UP (ref 3.5–5.3)
RBC # BLD: 4.13 M/UL — LOW (ref 4.2–5.8)
RBC # FLD: 12.6 % — SIGNIFICANT CHANGE UP (ref 10.3–14.5)
SODIUM SERPL-SCNC: 138 MMOL/L — SIGNIFICANT CHANGE UP (ref 135–145)
SPECIMEN SOURCE: SIGNIFICANT CHANGE UP
WBC # BLD: 8.44 K/UL — SIGNIFICANT CHANGE UP (ref 3.8–10.5)
WBC # FLD AUTO: 8.44 K/UL — SIGNIFICANT CHANGE UP (ref 3.8–10.5)

## 2023-02-01 PROCEDURE — 99232 SBSQ HOSP IP/OBS MODERATE 35: CPT | Mod: GC

## 2023-02-01 RX ADMIN — POLYETHYLENE GLYCOL 3350 17 GRAM(S): 17 POWDER, FOR SOLUTION ORAL at 13:52

## 2023-02-01 RX ADMIN — Medication 50 MILLIGRAM(S): at 05:00

## 2023-02-01 RX ADMIN — LORATADINE 10 MILLIGRAM(S): 10 TABLET ORAL at 11:35

## 2023-02-01 NOTE — DISCHARGE NOTE PROVIDER - CARE PROVIDER_API CALL
HEATHER CENTENO  Urology  [address] [place] [address]  Phone: [phone]  Fax: [phone]  Follow Up Time:     TAMMI MUKUL  [address] [place] [address]  Phone: [phone]  Fax: ([phone]  Follow Up Time:

## 2023-02-01 NOTE — CARE COORDINATION ASSESSMENT. - OTHER PERTINENT DISCHARGE PLANNING INFORMATION:
Role of CM/discharge planning explained to patient and son. Patient and son verbalize understanding. Cabrera lives in private home with wife, 0 MILA, 8 steps inside. Patient is independent with all ADL's and ambulation, drives PTA. No needs/HCS present PTA. New needs unclear at this time. Patient currently with CBI running. DC with robinson unclear plan. Patients son Bogdan will transport home whenDC ready. DC planning information provided to patient at this time.

## 2023-02-01 NOTE — PROGRESS NOTE ADULT - SUBJECTIVE AND OBJECTIVE BOX
Patient is a 72y old  Male who presents with a chief complaint of Hematuria/Urinary Retention (2023 10:58)      INTERVAL HPI/OVERNIGHT EVENTS: Patient seen and examined at bedside. CBI running and Gr in place, draining bloody urine. Patient states that suprapubic pain has improved, however returns  when he feels like he is passing multiple clots simultaneously. Manual irrigation from RN has helped clear out those transient blocks.     MEDICATIONS  (STANDING):  loratadine 10 milliGRAM(s) Oral daily  metoprolol succinate ER 50 milliGRAM(s) Oral daily  Orgovyx (relugolix) 120mg tab 1 Tablet(s) 1 Tablet(s) Oral daily  polyethylene glycol 3350 17 Gram(s) Oral daily    MEDICATIONS  (PRN):  acetaminophen     Tablet .. 650 milliGRAM(s) Oral every 6 hours PRN Temp greater or equal to 38C (100.4F), Mild Pain (1 - 3)  aluminum hydroxide/magnesium hydroxide/simethicone Suspension 30 milliLiter(s) Oral every 4 hours PRN Dyspepsia  melatonin 3 milliGRAM(s) Oral at bedtime PRN Insomnia  ondansetron Injectable 4 milliGRAM(s) IV Push every 8 hours PRN Nausea and/or Vomiting      Allergies    penicillin (Diarrhea; Pruritus; Hives)  penicillin V potassium (Rash)    Intolerances    codeine (Nausea)      REVIEW OF SYSTEMS:  CONSTITUTIONAL: No fever or chills  HEENT:  No headache, no sore throat  RESPIRATORY: No cough, wheezing, or shortness of breath  CARDIOVASCULAR: No chest pain, palpitations  GASTROINTESTINAL: + suprapubic pain (improved). No nausea, vomiting, or diarrhea  GENITOURINARY: +hematuria. No dysuria, frequency  NEUROLOGICAL: no focal weakness or dizziness  MUSCULOSKELETAL: no myalgias     Vital Signs Last 24 Hrs  T(C): 36.6 (2023 11:34), Max: 37.1 (2023 15:00)  T(F): 97.8 (2023 11:34), Max: 98.7 (2023 15:00)  HR: 72 (2023 11:34) (72 - 79)  BP: 135/62 (2023 11:34) (135/62 - 183/83)  BP(mean): --  RR: 18 (2023 11:34) (16 - 18)  SpO2: 95% (2023 11:34) (95% - 98%)    Parameters below as of 2023 11:34  Patient On (Oxygen Delivery Method): room air        PHYSICAL EXAM:  GENERAL: NAD  HEENT:  anicteric, moist mucous membranes  CHEST/LUNG:  CTA b/l, no rales, wheezes, or rhonchi  HEART:  RRR, S1, S2. Systolic murmur auscultated on exam  ABDOMEN:  BS+, soft, nontender, nondistended  EXTREMITIES: no edema, cyanosis, or calf tenderness  NERVOUS SYSTEM: answers questions and follows commands appropriately  : Mild suprapubic tenderness to palpation, Gr with CBI in place draining bloody urine    LABS:                        12.5   8.44  )-----------( 187      ( 2023 06:08 )             38.0     CBC Full  -  ( 2023 06:08 )  WBC Count : 8.44 K/uL  Hemoglobin : 12.5 g/dL  Hematocrit : 38.0 %  Platelet Count - Automated : 187 K/uL  Mean Cell Volume : 92.0 fl  Mean Cell Hemoglobin : 30.3 pg  Mean Cell Hemoglobin Concentration : 32.9 gm/dL  Auto Neutrophil # : 5.76 K/uL  Auto Lymphocyte # : 1.28 K/uL  Auto Monocyte # : 1.06 K/uL  Auto Eosinophil # : 0.25 K/uL  Auto Basophil # : 0.07 K/uL  Auto Neutrophil % : 68.2 %  Auto Lymphocyte % : 15.2 %  Auto Monocyte % : 12.6 %  Auto Eosinophil % : 3.0 %  Auto Basophil % : 0.8 %    2023 06:08    138    |  104    |  16     ----------------------------<  88     3.9     |  27     |  0.69     Ca    8.5        2023 06:08    TPro  6.9    /  Alb  3.8    /  TBili  0.6    /  DBili  x      /  AST  22     /  ALT  25     /  AlkPhos  87     2023 19:15    PT/INR - ( 2023 18:30 )   PT: 12.9 sec;   INR: 1.10 ratio         PTT - ( 2023 18:30 )  PTT:22.1 sec  Urinalysis Basic - ( 2023 17:30 )    Color: Red / Appearance: bloody / S.015 / pH: x  Gluc: x / Ketone: Negative  / Bili: Negative / Urobili: Negative   Blood: x / Protein: 500 mg/dL / Nitrite: Negative   Leuk Esterase: Trace / RBC: TNTC /HPF / WBC 6-10   Sq Epi: x / Non Sq Epi: Occasional / Bacteria: Many      CAPILLARY BLOOD GLUCOSE              RADIOLOGY & ADDITIONAL TESTS:    Personally reviewed.     Consultant(s) Notes Reviewed:  [x] YES  [ ] NO

## 2023-02-01 NOTE — PROGRESS NOTE ADULT - PROBLEM SELECTOR PLAN 1
P/w urinary retention s/p cystoscopy ; hematuria on UA, asymptomatic at this time   Patient is s/p prostatectomy (1/31); hematuria is likely 2/2 radiation cystitis and retention due to clots.  - Continue Gr/CBI, hand irrigate clots PRN  - Tylenol for pain PRN   - f/u with Dr. Luis post discharge for HBOT  - Urology Dr. Shaffer consulted, recs appreciated P/w urinary retention s/p cystoscopy ; hematuria on UA, asymptomatic at this time   Patient is s/p prostatectomy (1/31); hematuria is likely 2/2 radiation cystitis and retention due to clots.  - Continue Gr/CBI, hand irrigate clots PRN  - Tylenol for pain PRN   - f/u with Dr. Luis post discharge for HBOT  - Type and screen ordered with AM labs  - Urology Dr. Shaffer consulted, recs appreciated P/w urinary retention s/p cystoscopy ; hematuria on UA, asymptomatic at this time   Patient is s/p prostatectomy (1/31); hematuria is likely 2/2 radiation cystitis and retention due to clots.  - Continue Gr/CBI, hand irrigate clots PRN  - Tylenol for pain PRN   - H/H stable, continue to monitor with AM CBC  - Type and screen ordered with AM labs  - Urology Dr. Shaffer consulted, recs appreciated  - f/u with Urologist, Dr. Luis post discharge for HBOT

## 2023-02-01 NOTE — CARE COORDINATION ASSESSMENT. - NSCAREPROVIDERS_GEN_ALL_CORE_FT
CARE PROVIDERS:  Accepting Physician: Gideon Thompson  Admitting: Gideon Thompson  Attending: Saul Tubbs  Case Management: Lashaun Puente  Consultant: Leobardo Shaffer  Covering Team: Vincenzo Egan ED ACP: Brenda Noble ED Attending: Giorgi Kidd  ED Nurse: Saul Tapia  Nurse: Kristin Rios  Nurse: Bogdan Tellez  Nurse: Mary Paulino  Nurse: Atif Beaulieu  Override: Atif Beaulieu  Override: Elen Cota  PCA/Nursing Assistant: Bill Munoz  PCA/Nursing Assistant: Evelyn Noble  Primary Team: Anthony Goddard  Primary Team: Kathleen Archer  Primary Team: Saul Tubbs  Primary Team: Jemma Rivers  Primary Team: Gideon Thompson  Registered Dietitian: Maribell Georges// Supp. Assoc.: Radha Oropeza

## 2023-02-01 NOTE — DISCHARGE NOTE PROVIDER - DETAILS OF MALNUTRITION DIAGNOSIS/DIAGNOSES
This patient has been assessed with a concern for Malnutrition and was treated during this hospitalization for the following Nutrition diagnosis/diagnoses:     -  02/27/2023: Moderate protein-calorie malnutrition

## 2023-02-01 NOTE — CONSULT NOTE ADULT - ASSESSMENT
73 y/o M w/  PMHx of HTN, AS s/p TAVR, ppm, prostate cancer s/p radical prostatectomy f/b IGRT c/b radiation cystitis and proctitis presents to ED w/ urinary clot retention s/p cystoscopy at [facility] [place], [place] by Dr Juan Francisco Odom. Improving w/ CBI      Cont bowers/cbi  Hand irrigate prn clots  f/u w/ Dr Luis post discharge for HBOT

## 2023-02-01 NOTE — DISCHARGE NOTE PROVIDER - HOSPITAL COURSE
FROM ADMISSION H+P:   HPI:  73 y/o M w/  PMHx of HTN, AS s/p TAVR, ppm, prostate cancer s/p radical prostatectomy c/b radiation proctitis presents to ED for severe pain and urinary retention s/p cystoscopy. Patient follows with urology at [facility]; was being seen for cystoscopy after experiencing hematuria. He has had hematuria before approximately one year ago; underwent cystoscopy without complications at that time. He underwent multiple rounds of radiation therapy with ultimate radical prostatectomy for prostate CA. He has been diagnosed with radiation proctitis. Patient feels that this his current presentation is likely 2/2  radiation cystitis. Patient reports pain is improved after bowers/CBI placed. Currently comfortable in bed.       In the ED  Vitals: 173/118->161/80, HR 74, T 98.7, SpO2 98% RA   Labs significant for: UA positive for blood, protein, bacteria   Meds - none   CBI placed   Imaging   CXR- grossly clear (wet read) f/u final read   EKG NSR (31 Jan 2023 19:54)      ---  HOSPITAL COURSE/PERTINENT LABS/PROCEDURES PERFORMED/PENDING TESTS:  Patient admitted for hematuria and urinary retention. Bowers placed and CBI started along with hand irrigation for clots as needed, suprapubic pain improved as urinary retention cleared. Urology (Deena) consulted for hematuria/urinary retention, recommended continuing Bowers/CBI and hand irrigation PRN, as well as outpatient follow-up with patient's own urologist after discharge. Patient has history of prostate cancer and was instructed to bring his medication from home.    Patient seen and examined on the day of discharge. Patient is clinically stable and medically optimized for discharge to ____________ with close outpatient follow-up.     ---  PATIENT CONDITION:  - stable    ---  PHYSICAL EXAM ON DAY OF DISCHARGE:    ---  CONSULTANTS:     ---  ADVANCED CARE PLANNING:  - Code status:    Full code  - MOLST completed:      [ X ] NO     [  ] YES    ---  TIME SPENT:  I, the attending physician, was physically present for the key portions of the evaluation and management (E/M) service provided. The total amount of time spent reviewing the hospital notes, laboratory values, imaging findings, assessing/counseling the patient, discussing with consultant physicians, social work, nursing staff was -- minutes FROM ADMISSION H+P:   HPI:  71 y/o M w/  PMHx of HTN, AS s/p TAVR, ppm, prostate cancer s/p radical prostatectomy c/b radiation proctitis presents to ED for severe pain and urinary retention s/p cystoscopy. Patient follows with urology at [facility]; was being seen for cystoscopy after experiencing hematuria. He has had hematuria before approximately one year ago; underwent cystoscopy without complications at that time. He underwent multiple rounds of radiation therapy with ultimate radical prostatectomy for prostate CA. He has been diagnosed with radiation proctitis. Patient feels that this his current presentation is likely 2/2  radiation cystitis. Patient reports pain is improved after bowers/CBI placed. Currently comfortable in bed.       In the ED  Vitals: 173/118->161/80, HR 74, T 98.7, SpO2 98% RA   Labs significant for: UA positive for blood, protein, bacteria   Meds - none   CBI placed   Imaging   CXR- grossly clear (wet read) f/u final read   EKG NSR (31 Jan 2023 19:54)    ---  HOSPITAL COURSE/PERTINENT LABS/PROCEDURES PERFORMED/PENDING TESTS:  Patient admitted for hematuria and urinary retention. Bowers placed and CBI started along with hand irrigation for clots as needed, suprapubic pain improved as urinary retention cleared. Urology (Deena) consulted for hematuria/urinary retention, recommended continuing Bowers/CBI and hand irrigation PRN, as well as outpatient follow-up with patient's own urologist after discharge. Patient has history of prostate cancer and was instructed to bring his medication from home.    Patient seen and examined on the day of discharge. Patient is clinically stable and medically optimized for discharge to ____________ with close outpatient follow-up.     ---  PATIENT CONDITION:  - stable    ---  PHYSICAL EXAM ON DAY OF DISCHARGE:    ---  CONSULTANTS:     ---  ADVANCED CARE PLANNING:  - Code status:    Full code  - MOLST completed:      [ X ] NO     [  ] YES    ---  TIME SPENT:  I, the attending physician, was physically present for the key portions of the evaluation and management (E/M) service provided. The total amount of time spent reviewing the hospital notes, laboratory values, imaging findings, assessing/counseling the patient, discussing with consultant physicians, social work, nursing staff was -- minutes FROM ADMISSION H+P:   HPI:  73 y/o M w/  PMHx of HTN, AS s/p TAVR, ppm, prostate cancer s/p radical prostatectomy c/b radiation proctitis presents to ED for severe pain and urinary retention s/p cystoscopy. Patient follows with urology at [facility]; was being seen for cystoscopy after experiencing hematuria. He has had hematuria before approximately one year ago; underwent cystoscopy without complications at that time. He underwent multiple rounds of radiation therapy with ultimate radical prostatectomy for prostate CA. He has been diagnosed with radiation proctitis. Patient feels that this his current presentation is likely 2/2  radiation cystitis. Patient reports pain is improved after bowers/CBI placed. Currently comfortable in bed.       In the ED  Vitals: 173/118->161/80, HR 74, T 98.7, SpO2 98% RA   Labs significant for: UA positive for blood, protein, bacteria   Meds - none   CBI placed   Imaging   CXR- grossly clear (wet read) f/u final read   EKG NSR (31 Jan 2023 19:54)    ---  HOSPITAL COURSE/PERTINENT LABS/PROCEDURES PERFORMED/PENDING TESTS:  Patient admitted for hematuria and urinary retention. Bowers placed and CBI started along with hand irrigation for clots as needed, suprapubic pain improved as urinary retention cleared. Urology (Deena) consulted for hematuria/urinary retention, recommended continuing Bowers/CBI and hand irrigation PRN, as well as outpatient follow-up with patient's own urologist after discharge. Patient has history of prostate cancer and was instructed to bring his medication from home.    Patient seen and examined on the day of discharge. Patient is clinically stable and medically optimized for discharge to ____________ with close outpatient follow-up.     ---  PATIENT CONDITION:  - stable    ---  PHYSICAL EXAM ON DAY OF DISCHARGE:    ---  CONSULTANTS:   Urology- Dr. Shaffer    ---  ADVANCED CARE PLANNING:  - Code status:    Full code  - MOLST completed:      [ X ] NO     [  ] YES    ---  TIME SPENT:  I, the attending physician, was physically present for the key portions of the evaluation and management (E/M) service provided. The total amount of time spent reviewing the hospital notes, laboratory values, imaging findings, assessing/counseling the patient, discussing with consultant physicians, social work, nursing staff was -- minutes FROM ADMISSION H+P:   HPI:  71 y/o M w/  PMHx of HTN, AS s/p TAVR, ppm, prostate cancer s/p radical prostatectomy c/b radiation proctitis presents to ED for severe pain and urinary retention s/p cystoscopy. Patient follows with urology at [facility]; was being seen for cystoscopy after experiencing hematuria. He has had hematuria before approximately one year ago; underwent cystoscopy without complications at that time. He underwent multiple rounds of radiation therapy with ultimate radical prostatectomy for prostate CA. He has been diagnosed with radiation proctitis. Patient feels that this his current presentation is likely 2/2  radiation cystitis. Patient reports pain is improved after bowers/CBI placed. Currently comfortable in bed.       In the ED  Vitals: 173/118->161/80, HR 74, T 98.7, SpO2 98% RA   Labs significant for: UA positive for blood, protein, bacteria   Meds - none   CBI placed   Imaging   CXR- grossly clear (wet read) f/u final read   EKG NSR (31 Jan 2023 19:54)    ---  HOSPITAL COURSE/PERTINENT LABS/PROCEDURES PERFORMED/PENDING TESTS:  Patient admitted for hematuria and urinary retention. Bowers placed and CBI started along with hand irrigation for clots as needed, suprapubic pain improved as urinary retention cleared. Urology (Deena) consulted for hematuria/urinary retention, recommended continuing Bowers/CBI and hand irrigation PRN, as well as outpatient follow-up with patient's own urologist after discharge. Patient has history of prostate cancer and was instructed to bring his medication from home. Hematuria improved on Bowers with CBI.    Patient seen and examined on the day of discharge. Patient is clinically stable and medically optimized for discharge to ____________ with close outpatient follow-up.     ---  PATIENT CONDITION:  - stable    ---  PHYSICAL EXAM ON DAY OF DISCHARGE:    ---  CONSULTANTS:   Urology- Dr. Shaffer    ---  ADVANCED CARE PLANNING:  - Code status:    Full code  - MOLST completed:      [ X ] NO     [  ] YES    ---  TIME SPENT:  I, the attending physician, was physically present for the key portions of the evaluation and management (E/M) service provided. The total amount of time spent reviewing the hospital notes, laboratory values, imaging findings, assessing/counseling the patient, discussing with consultant physicians, social work, nursing staff was -- minutes FROM ADMISSION H+P:   HPI:  73 y/o M w/  PMHx of HTN, AS s/p TAVR, ppm, prostate cancer s/p radical prostatectomy c/b radiation proctitis presents to ED for severe pain and urinary retention s/p cystoscopy. Patient follows with urology at [facility]; was being seen for cystoscopy after experiencing hematuria. He has had hematuria before approximately one year ago; underwent cystoscopy without complications at that time. He underwent multiple rounds of radiation therapy with ultimate radical prostatectomy for prostate CA. He has been diagnosed with radiation proctitis. Patient feels that this his current presentation is likely 2/2  radiation cystitis. Patient reports pain is improved after bowers/CBI placed. Currently comfortable in bed.       In the ED  Vitals: 173/118->161/80, HR 74, T 98.7, SpO2 98% RA   Labs significant for: UA positive for blood, protein, bacteria   Meds - none   CBI placed   Imaging   CXR- grossly clear (wet read) f/u final read   EKG NSR (31 Jan 2023 19:54)    ---  HOSPITAL COURSE/PERTINENT LABS/PROCEDURES PERFORMED/PENDING TESTS:  Patient admitted for hematuria and urinary retention. Bowers placed and CBI started along with hand irrigation for clots as needed, suprapubic pain improved as urinary retention cleared. Urology (Deena) consulted for hematuria/urinary retention, recommended continuing Bowers/CBI and hand irrigation PRN, as well as outpatient follow-up with patient's own urologist after discharge. Patient has history of prostate cancer and was instructed to bring his medication from home. Hematuria improved on Bowers with CBI; however, patient has had bloody urine for multiple days and expressed frustration at lack of improvement. Urology has followed patient and recommended Amicar to help stop bleeding, but patient declined due to concern for systemic clots.    Patient seen and examined on the day of discharge. Patient is clinically stable and medically optimized for discharge to ____________ with close outpatient follow-up.     ---  PATIENT CONDITION:  - stable    ---  PHYSICAL EXAM ON DAY OF DISCHARGE:    ---  CONSULTANTS:   Urology- Dr. Shaffer    ---  ADVANCED CARE PLANNING:  - Code status:    Full code  - MOLST completed:      [ X ] NO     [  ] YES    ---  TIME SPENT:  I, the attending physician, was physically present for the key portions of the evaluation and management (E/M) service provided. The total amount of time spent reviewing the hospital notes, laboratory values, imaging findings, assessing/counseling the patient, discussing with consultant physicians, social work, nursing staff was -- minutes FROM ADMISSION H+P:   HPI:  73 y/o M w/  PMHx of HTN, AS s/p TAVR, ppm, prostate cancer s/p radical prostatectomy c/b radiation proctitis presents to ED for severe pain and urinary retention s/p cystoscopy. Patient follows with urology at [facility]; was being seen for cystoscopy after experiencing hematuria. He has had hematuria before approximately one year ago; underwent cystoscopy without complications at that time. He underwent multiple rounds of radiation therapy with ultimate radical prostatectomy for prostate CA. He has been diagnosed with radiation proctitis. Patient feels that this his current presentation is likely 2/2  radiation cystitis. Patient reports pain is improved after bowers/CBI placed. Currently comfortable in bed.       In the ED  Vitals: 173/118->161/80, HR 74, T 98.7, SpO2 98% RA   Labs significant for: UA positive for blood, protein, bacteria   Meds - none   CBI placed   Imaging   CXR- grossly clear (wet read) f/u final read   EKG NSR (31 Jan 2023 19:54)    ---  HOSPITAL COURSE/PERTINENT LABS/PROCEDURES PERFORMED/PENDING TESTS:  Patient admitted for hematuria and urinary retention. Bowers placed and CBI started along with hand irrigation for clots as needed, suprapubic pain improved as urinary retention cleared. Urology (Deena) consulted for hematuria/urinary retention, recommended continuing Bowers/CBI and hand irrigation PRN, as well as outpatient follow-up with patient's own urologist after discharge. Patient has history of prostate cancer and was instructed to bring his medication from home. Hematuria improved on Bowers with CBI; however, patient has had bloody urine for multiple days and expressed frustration at lack of improvement. Urology has followed patient and recommended Amicar to help stop bleeding, which patient initially declined due to concern for systemic clots but eventually agreed. Amicar added to CBI bags and Oxybutynin started for overactive bladder.    Patient seen and examined on the day of discharge. Patient is clinically stable and medically optimized for discharge to ____________ with close outpatient follow-up.     ---  PATIENT CONDITION:  - stable    ---  PHYSICAL EXAM ON DAY OF DISCHARGE:    ---  CONSULTANTS:   Urology- Dr. Shaffer    ---  ADVANCED CARE PLANNING:  - Code status:    Full code  - MOLST completed:      [ X ] NO     [  ] YES    ---  TIME SPENT:  I, the attending physician, was physically present for the key portions of the evaluation and management (E/M) service provided. The total amount of time spent reviewing the hospital notes, laboratory values, imaging findings, assessing/counseling the patient, discussing with consultant physicians, social work, nursing staff was -- minutes FROM ADMISSION H+P:   HPI:  71 y/o M w/  PMHx of HTN, AS s/p TAVR, ppm, prostate cancer s/p radical prostatectomy c/b radiation proctitis presents to ED for severe pain and urinary retention s/p cystoscopy. Patient follows with urology at [facility]; was being seen for cystoscopy after experiencing hematuria. He has had hematuria before approximately one year ago; underwent cystoscopy without complications at that time. He underwent multiple rounds of radiation therapy with ultimate radical prostatectomy for prostate CA. He has been diagnosed with radiation proctitis.      In the ED  Vitals: 173/118->161/80, HR 74, T 98.7, SpO2 98% RA   Labs significant for: negative urine culture   Imaging   CXR- grossly clear (wet read) f/u final read   EKG NSR (31 Jan 2023 19:54)  CT: < from: CT Abdomen and Pelvis Urogram w/wo IV Cont (02.10.23 @ 14:41) >  Radical prostatectomy.  Thick-walled bladder secondary to underdistention, infectious cystitis or   radiation cystitis. Bowers catheter in situ.Nonobstructing left renal calculi.  5 mm left lower lobe subpleural nodule. Recommend comparison with prior   studies to show stability. If none are available, recommend 3 month chest   CT follow-up.    HOSPITAL COURSE/PERTINENT LABS/PROCEDURES PERFORMED/PENDING TESTS:  Patient admitted for hematuria and urinary retention. Urology (Deena) consulted for hematuria/urinary retention, Bowers placed and CBI started along with irrigation for clots as needed, failed 2 voiding trial after bowers removal, with recurrent hematuria and repeat CBI and CBI with amicar, started amicar oral and condition improved, plan to taper upon discharge. s/p one unit blood transfusion due to symptomatic anemia, hemoglobin improved. urology  recommended continuing Bowers upon discharge , irrigation PRN, as well as outpatient follow-up with patient's own urologist after discharge.     Patient seen and examined on the day of discharge. Patient is clinically stable and medically optimized for discharge      PATIENT CONDITION:  - stable     FROM ADMISSION H+P:   HPI:  73 y/o M w/  PMHx of HTN, AS s/p TAVR, ppm, prostate cancer s/p radical prostatectomy c/b radiation proctitis presents to ED for severe pain and urinary retention s/p cystoscopy. Patient follows with urology at [facility]; was being seen for cystoscopy after experiencing hematuria. He has had hematuria before approximately one year ago; underwent cystoscopy without complications at that time. He underwent multiple rounds of radiation therapy with ultimate radical prostatectomy for prostate CA. He has been diagnosed with radiation proctitis.      In the ED  Vitals: 173/118->161/80, HR 74, T 98.7, SpO2 98% RA   Labs significant for: negative urine culture   Imaging   CXR- grossly clear (wet read) f/u final read   EKG NSR (31 Jan 2023 19:54)  CT: < from: CT Abdomen and Pelvis Urogram w/wo IV Cont (02.10.23 @ 14:41) >  Radical prostatectomy.  Thick-walled bladder secondary to underdistention, infectious cystitis or   radiation cystitis. Bowers catheter in situ.Nonobstructing left renal calculi.  5 mm left lower lobe subpleural nodule. Recommend comparison with prior   studies to show stability. If none are available, recommend 3 month chest   CT follow-up.    HOSPITAL COURSE/PERTINENT LABS/PROCEDURES PERFORMED/PENDING TESTS:  Patient admitted for hematuria and urinary retention. Urology (Deena) consulted for hematuria/urinary retention, Bowers placed and CBI started along with irrigation for clots as needed, failed 2 voiding trial after bowers removal, with recurrent hematuria and repeat CBI and CBI with amicar, started amicar oral and condition improved, plan to taper upon discharge. s/p one unit blood transfusion due to symptomatic anemia, hemoglobin improved. urology  recommended continuing Bowers upon discharge , irrigation PRN, as well as outpatient follow-up with patient's own urologist after discharge.     Patient seen and examined on the day of discharge. Patient is clinically stable and medically optimized for discharge. Afebrile and hemodynamically stable.           FROM ADMISSION H+P:   HPI:  71 y/o M w/  PMHx of HTN, AS s/p TAVR, ppm, prostate cancer s/p radical prostatectomy c/b radiation proctitis presents to ED for severe pain and urinary retention s/p cystoscopy. Patient follows with urology at [facility]; was being seen for cystoscopy after experiencing hematuria. He has had hematuria before approximately one year ago; underwent cystoscopy without complications at that time. He underwent multiple rounds of radiation therapy with ultimate radical prostatectomy for prostate CA. He has been diagnosed with radiation proctitis.      In the ED  Vitals: 173/118->161/80, HR 74, T 98.7, SpO2 98% RA   Labs significant for: negative urine culture   Imaging   CXR- grossly clear (wet read) f/u final read   EKG NSR (31 Jan 2023 19:54)  CT: < from: CT Abdomen and Pelvis Urogram w/wo IV Cont (02.10.23 @ 14:41) >  Radical prostatectomy.  Thick-walled bladder secondary to underdistention, infectious cystitis or   radiation cystitis. Bowers catheter in situ.Nonobstructing left renal calculi.  5 mm left lower lobe subpleural nodule. Recommend comparison with prior   studies to show stability. If none are available, recommend 3 month chest   CT follow-up.    HOSPITAL COURSE/PERTINENT LABS/PROCEDURES PERFORMED/PENDING TESTS:  Patient admitted for hematuria and urinary retention. Urology (Deena) consulted for hematuria/urinary retention, Bowers placed and CBI started along with irrigation for clots as needed, failed 2 voiding trial after bowers removal, with recurrent hematuria and repeat CBI and CBI with amicar, started amicar oral and condition improved, plan to taper upon discharge. s/p total of 3 units blood transfusion due to symptomatic anemia, hemoglobin improved. Due to recurring hematuria and pelvic discomfort urology  recommended cystoscopy. Cystoscopy on 3/1 showed _____________. Continue Bowers upon discharge , irrigation PRN, as well as outpatient follow-up with patient's own urologist after discharge.     Patient seen and examined on the day of discharge. Patient is clinically stable and medically optimized for discharge. Afebrile and hemodynamically stable.           FROM ADMISSION H+P:   HPI:  73 y/o M w/  PMHx of HTN, AS s/p TAVR, ppm, prostate cancer s/p radical prostatectomy c/b radiation proctitis presents to ED for severe pain and urinary retention,  s/p cystoscopy for hematuria about 5weeks ago by his urology at [facility]. He has had hematuria before approximately one year ago; underwent cystoscopy without complications at that time. He underwent multiple rounds of radiation therapy (last one in 2018)with ultimate radical prostatectomy for prostate CA. He has been diagnosed with radiation proctitis.      In the ED  Vitals: 173/118->161/80, HR 74, T 98.7, SpO2 98% RA   Labs significant for: negative urine culture   Imaging   CXR- grossly clear (wet read) f/u final read   EKG NSR (31 Jan 2023 19:54)  CT: < from: CT Abdomen and Pelvis Urogram w/wo IV Cont (02.10.23 @ 14:41) >  Radical prostatectomy.  Thick-walled bladder secondary to underdistention, infectious cystitis or   radiation cystitis. Bowers catheter in situ.Nonobstructing left renal calculi.  5 mm left lower lobe subpleural nodule. Recommend comparison with prior   studies to show stability. If none are available, recommend 3 month chest   CT follow-up.    HOSPITAL COURSE/PERTINENT LABS/PROCEDURES PERFORMED/PENDING TESTS:  Patient admitted for hematuria and urinary retention. Urology (Deena) consulted for hematuria/urinary retention, Bowers placed and CBI started along with irrigation for clots as needed, failed 2 voiding trial after bowers removal, with recurrent hematuria and repeat CBI and CBI with amicar, started amicar oral,  s/p total of 3 units blood transfusion due to symptomatic anemia.  s/p Cystoscopy on 3/2 cystoscopy  with fulguration of hemorrhaging blood vessel and evacuation of clot on 3/2. continued with CBI, but hematuria persist. transfer to [facility] for further evaluation and treatment.

## 2023-02-01 NOTE — DISCHARGE NOTE PROVIDER - NSDCCPCAREPLAN_GEN_ALL_CORE_FT
PRINCIPAL DISCHARGE DIAGNOSIS  Diagnosis: Urinary retention  Assessment and Plan of Treatment: You presented with urinary retention for which a bowers and bladder irrigation was done which revealed blood and clots. This likely occurred due to the recent cystoscopy you had. You were seen and evaulated by urology.   Please follow up with your outpatient urologist and primary care provider.       PRINCIPAL DISCHARGE DIAGNOSIS  Diagnosis: Urinary retention  Assessment and Plan of Treatment: You presented with urinary retention for which a bowers and bladder irrigation was done which revealed blood and clots. This likely occurred due to the recent cystoscopy you had. You were seen and evaulated by urology.   Please follow up with your outpatient urologist and primary care provider after discharge to monitor and manage this condition.       PRINCIPAL DISCHARGE DIAGNOSIS  Diagnosis: Urinary retention  Assessment and Plan of Treatment: You presented with urinary retention for which a bowers and bladder irrigation was done which revealed blood and clots. This likely occurred due to radiation cystitis. urology was consulted. you are discharged with bowers catheter, catheter care as per instruction.   Please follow up with your outpatient urologist and primary care provider after discharge to monitor and manage this condition.      SECONDARY DISCHARGE DIAGNOSES  Diagnosis: Prostate cancer  Assessment and Plan of Treatment: follow up with your urologist    Diagnosis: Pulmonary nodule  Assessment and Plan of Treatment: 5 mm left lower lobe subpleural nodule , follow up with your primary care doctor    Diagnosis: Renal stone  Assessment and Plan of Treatment: Nonobstructing 4mm left renal calculi was noticed on CT, follow up urology    Diagnosis: Anemia due to acute blood loss  Assessment and Plan of Treatment: you received on eunit of blood transfusion, monitor hemoglobin level as out patient     PRINCIPAL DISCHARGE DIAGNOSIS  Diagnosis: Urinary retention  Assessment and Plan of Treatment: You presented with urinary retention for which a bowers and bladder irrigation was done which revealed blood and clots. This likely occurred due to radiation cystitis. urology was consulted. s/p CBI/Cystoscopy, blood transfusion, hematuria recur, transfer to [facility] for further evalution and treatment      SECONDARY DISCHARGE DIAGNOSES  Diagnosis: Prostate cancer  Assessment and Plan of Treatment: follow up with your urologist    Diagnosis: Pulmonary nodule  Assessment and Plan of Treatment: 5 mm left lower lobe subpleural nodule , follow up with your primary care doctor    Diagnosis: Renal stone  Assessment and Plan of Treatment: Nonobstructing 4mm left renal calculi was noticed on CT, follow up urology    Diagnosis: Anemia due to acute blood loss  Assessment and Plan of Treatment: you received on eunit of blood transfusion, monitor hemoglobin level

## 2023-02-01 NOTE — PROGRESS NOTE ADULT - ASSESSMENT
73 y/o M w/ PMHx of HTN,  AS s/p TAVR, ppm, prostate cancer s/p radical prostatectomy c/b radiation proctitis presents to ED for severe pain and urinary retention s/p cystoscopy. Admit for hematuria / urinary retention

## 2023-02-01 NOTE — DISCHARGE NOTE PROVIDER - NSDCMRMEDTOKEN_GEN_ALL_CORE_FT
Claritin 10 mg oral tablet: 1 tab(s) orally once a day  Colace 50 mg oral capsule: 1 cap(s) orally once a day  metoprolol succinate 50 mg oral tablet, extended release: 1 tab(s) orally once a day  Orgovyx 120 mg oral tablet: 1 tab(s) orally once a day   acetaminophen 325 mg oral tablet: 2 tab(s) orally every 6 hours, As needed, Temp greater or equal to 38C (100.4F), Mild Pain (1 - 3)  aminocaproic acid 500 mg oral tablet: 1 tab(s) orally every 12 hours x 3 days, than take 1 tab orally daily x 3 days.   Claritin 10 mg oral tablet: 1 tab(s) orally once a day  Colace 50 mg oral capsule: 1 cap(s) orally once a day  metoprolol succinate 50 mg oral tablet, extended release: 1 tab(s) orally once a day  Orgovyx 120 mg oral tablet: 1 tab(s) orally once a day   acetaminophen 325 mg oral tablet: 2 tab(s) orally every 6 hours, As needed, Temp greater or equal to 38C (100.4F), Mild Pain (1 - 3)  aminocaproic acid 500 mg oral tablet: 1 tab(s) orally every 12 hours x 3 days, than take 1 tab orally daily x 3 days.   Claritin 10 mg oral tablet: 1 tab(s) orally once a day  Colace 50 mg oral capsule: 1 cap(s) orally once a day  metoprolol succinate 50 mg oral tablet, extended release: 1 tab(s) orally once a day  Orgovyx 120 mg oral tablet: 1 tab(s) orally once a day  Rolling walker: Use as directed   acetaminophen 325 mg oral tablet: 2 tab(s) orally every 6 hours, As needed, Temp greater or equal to 38C (100.4F), Mild Pain (1 - 3)  acetaminophen 325 mg oral tablet: 2 tab(s) orally every 6 hours, As needed, Temp greater or equal to 38C (100.4F), Mild Pain (1 - 3)  aluminum hydroxide-magnesium hydroxide 200 mg-200 mg/5 mL oral suspension: 30 milliliter(s) orally every 4 hours, As needed, Dyspepsia  aminocaproic acid 500 mg oral tablet: 1 tab(s) orally every 12 hours x 3 days, than take 1 tab orally daily x 3 days.   Claritin 10 mg oral tablet: 1 tab(s) orally once a day  Colace 50 mg oral capsule: 1 cap(s) orally once a day  fluticasone 50 mcg/inh nasal spray: 1 spray(s) nasal 2 times a day  metoprolol succinate 50 mg oral tablet, extended release: 1 tab(s) orally once a day  Multiple Vitamins oral tablet: 1 tab(s) orally once a day  Orgovyx 120 mg oral tablet: 1 tab(s) orally once a day  polyethylene glycol 3350 oral powder for reconstitution: 17 gram(s) orally once a day  Protonix 40 mg oral delayed release tablet: 1 tab(s) orally once a day

## 2023-02-01 NOTE — DISCHARGE NOTE PROVIDER - NSDCFUSCHEDAPPT_GEN_ALL_CORE_FT
[facility]  Card[address]  Scheduled Appointment: 02/06/2023    Stanislav Claudio  [facility]  [address]  Scheduled Appointment: 03/02/2023    Anthony Huang  [facility]  CARDIOLOGY [address]  Scheduled Appointment: 03/08/2023    [facility]  CARDIOLOGY [address]  Scheduled Appointment: 03/15/2023     Stanislav Claudio  [facility]  [address]  Scheduled Appointment: 03/02/2023    Anthony Huang  [facility]  CARDIOLOGY [address]  Scheduled Appointment: 03/08/2023    [facility]  CARDIOLOGY [address]  Scheduled Appointment: 03/15/2023     Anthony Huang  [facility]  CARDIOLOGY [address] [facility] [address]  Scheduled Appointment: 03/08/2023    [facility]  CARDIOLOGY [address] [facility] [address]  Scheduled Appointment: 03/15/2023    [facility]  Card[address]  Scheduled Appointment: 06/02/2023     [facility]  CARDIOLOGY [address] [facility] [address]  Scheduled Appointment: 03/15/2023    Anthony Huang  [facility]  CARDIOLOGY [address]  Scheduled Appointment: 04/24/2023    [facility]  Cardio [address]  Scheduled Appointment: 06/02/2023

## 2023-02-01 NOTE — CARE COORDINATION ASSESSMENT. - MET/SPOKE WITH
Met with patient and son Bogdan at bedside. Pt authorized conversation to take place with son present/patient/son

## 2023-02-01 NOTE — CARE COORDINATION ASSESSMENT. - NSPASTMEDSURGHISTORY_GEN_ALL_CORE_FT
PAST MEDICAL & SURGICAL HISTORY:  Rectal bleeding  last hospitalization 10/6/20 2/2 radiation proctitis      Aortic stenosis      HTN (hypertension)      Proctitis, 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      Prostate cancer  RT 2018 and prostatectomy in 2015      H/O prostatectomy  2015      S/P T&amp;A (status post tonsillectomy and adenoidectomy)  child      [place] ([place] hearing)

## 2023-02-02 LAB
ANION GAP SERPL CALC-SCNC: 6 MMOL/L — SIGNIFICANT CHANGE UP (ref 5–17)
BUN SERPL-MCNC: 14 MG/DL — SIGNIFICANT CHANGE UP (ref 7–23)
CALCIUM SERPL-MCNC: 8.5 MG/DL — SIGNIFICANT CHANGE UP (ref 8.5–10.1)
CHLORIDE SERPL-SCNC: 105 MMOL/L — SIGNIFICANT CHANGE UP (ref 96–108)
CO2 SERPL-SCNC: 26 MMOL/L — SIGNIFICANT CHANGE UP (ref 22–31)
CREAT SERPL-MCNC: 0.81 MG/DL — SIGNIFICANT CHANGE UP (ref 0.5–1.3)
EGFR: 93 ML/MIN/1.73M2 — SIGNIFICANT CHANGE UP
GLUCOSE SERPL-MCNC: 94 MG/DL — SIGNIFICANT CHANGE UP (ref 70–99)
HCT VFR BLD CALC: 37.8 % — LOW (ref 39–50)
HGB BLD-MCNC: 12.8 G/DL — LOW (ref 13–17)
MCHC RBC-ENTMCNC: 31.2 PG — SIGNIFICANT CHANGE UP (ref 27–34)
MCHC RBC-ENTMCNC: 33.9 GM/DL — SIGNIFICANT CHANGE UP (ref 32–36)
MCV RBC AUTO: 92.2 FL — SIGNIFICANT CHANGE UP (ref 80–100)
NRBC # BLD: 0 /100 WBCS — SIGNIFICANT CHANGE UP (ref 0–0)
PLATELET # BLD AUTO: 183 K/UL — SIGNIFICANT CHANGE UP (ref 150–400)
POTASSIUM SERPL-MCNC: 3.8 MMOL/L — SIGNIFICANT CHANGE UP (ref 3.5–5.3)
POTASSIUM SERPL-SCNC: 3.8 MMOL/L — SIGNIFICANT CHANGE UP (ref 3.5–5.3)
RBC # BLD: 4.1 M/UL — LOW (ref 4.2–5.8)
RBC # FLD: 12.3 % — SIGNIFICANT CHANGE UP (ref 10.3–14.5)
SODIUM SERPL-SCNC: 137 MMOL/L — SIGNIFICANT CHANGE UP (ref 135–145)
WBC # BLD: 10.17 K/UL — SIGNIFICANT CHANGE UP (ref 3.8–10.5)
WBC # FLD AUTO: 10.17 K/UL — SIGNIFICANT CHANGE UP (ref 3.8–10.5)

## 2023-02-02 PROCEDURE — 99232 SBSQ HOSP IP/OBS MODERATE 35: CPT | Mod: GC

## 2023-02-02 RX ADMIN — Medication 50 MILLIGRAM(S): at 05:10

## 2023-02-02 RX ADMIN — POLYETHYLENE GLYCOL 3350 17 GRAM(S): 17 POWDER, FOR SOLUTION ORAL at 11:47

## 2023-02-02 RX ADMIN — LORATADINE 10 MILLIGRAM(S): 10 TABLET ORAL at 11:48

## 2023-02-02 NOTE — PROGRESS NOTE ADULT - SUBJECTIVE AND OBJECTIVE BOX
Patient is a 72y old  Male who presents with a chief complaint of Hematuria/Urinary Retention (2023 13:03)      INTERVAL HPI/OVERNIGHT EVENTS: Patient seen and examined at bedside, sitting up and eating breakfast in bed. Patient reports improvement in suprapubic pain, only feeling it intermittently which improves after manual irrigation (received 2x overnight). Gr remains in place with CBI, urine looking light pink in color today. Denies fevers, chills, headache, lightheadedness, chest pain, dyspnea, abdominal pain, n/v/d/c.    MEDICATIONS  (STANDING):  loratadine 10 milliGRAM(s) Oral daily  metoprolol succinate ER 50 milliGRAM(s) Oral daily  Orgovyx (relugolix) 120mg tab 1 Tablet(s) 1 Tablet(s) Oral daily  polyethylene glycol 3350 17 Gram(s) Oral daily    MEDICATIONS  (PRN):  acetaminophen     Tablet .. 650 milliGRAM(s) Oral every 6 hours PRN Temp greater or equal to 38C (100.4F), Mild Pain (1 - 3)  aluminum hydroxide/magnesium hydroxide/simethicone Suspension 30 milliLiter(s) Oral every 4 hours PRN Dyspepsia  melatonin 3 milliGRAM(s) Oral at bedtime PRN Insomnia  ondansetron Injectable 4 milliGRAM(s) IV Push every 8 hours PRN Nausea and/or Vomiting      Allergies    penicillin (Diarrhea; Pruritus; Hives)  penicillin V potassium (Rash)    Intolerances    codeine (Nausea)      REVIEW OF SYSTEMS:  CONSTITUTIONAL: No fever or chills  HEENT:  No headache, no sore throat  RESPIRATORY: No cough, wheezing, or shortness of breath  CARDIOVASCULAR: No chest pain, palpitations  GASTROINTESTINAL: No abdominal pain, nausea, vomiting, or diarrhea  GENITOURINARY: + hematuria. No dysuria, frequency  NEUROLOGICAL: no focal weakness or dizziness  MUSCULOSKELETAL: no myalgias     Vital Signs Last 24 Hrs  T(C): 36.6 (2023 05:01), Max: 36.9 (2023 19:38)  T(F): 97.9 (2023 05:01), Max: 98.4 (2023 19:38)  HR: 84 (2023 05:01) (72 - 84)  BP: 146/80 (2023 05:01) (135/62 - 157/73)  BP(mean): --  RR: 18 (2023 05:01) (18 - 18)  SpO2: 93% (2023 05:01) (93% - 95%)    Parameters below as of 2023 05:01  Patient On (Oxygen Delivery Method): room air        PHYSICAL EXAM:  GENERAL: NAD  HEENT:  anicteric, moist mucous membranes  CHEST/LUNG:  CTA b/l, no rales, wheezes, or rhonchi  HEART:  RRR, S1, S2. Systolic murmur auscultated on exam  ABDOMEN:  BS+, soft, nontender, nondistended  EXTREMITIES: no edema, cyanosis, or calf tenderness  NERVOUS SYSTEM: answers questions and follows commands appropriately  : Denies suprapubic pain at this time, Gr with CBI in place draining light pink urine    LABS:                        12.8   10.17 )-----------( 183      ( 2023 06:20 )             37.8     CBC Full  -  ( 2023 06:20 )  WBC Count : 10.17 K/uL  Hemoglobin : 12.8 g/dL  Hematocrit : 37.8 %  Platelet Count - Automated : 183 K/uL  Mean Cell Volume : 92.2 fl  Mean Cell Hemoglobin : 31.2 pg  Mean Cell Hemoglobin Concentration : 33.9 gm/dL  Auto Neutrophil # : x  Auto Lymphocyte # : x  Auto Monocyte # : x  Auto Eosinophil # : x  Auto Basophil # : x  Auto Neutrophil % : x  Auto Lymphocyte % : x  Auto Monocyte % : x  Auto Eosinophil % : x  Auto Basophil % : x    2023 06:20    137    |  105    |  14     ----------------------------<  94     3.8     |  26     |  0.81     Ca    8.5        2023 06:20      PT/INR - ( 2023 18:30 )   PT: 12.9 sec;   INR: 1.10 ratio         PTT - ( 2023 18:30 )  PTT:22.1 sec  Urinalysis Basic - ( 2023 17:30 )    Color: Red / Appearance: bloody / S.015 / pH: x  Gluc: x / Ketone: Negative  / Bili: Negative / Urobili: Negative   Blood: x / Protein: 500 mg/dL / Nitrite: Negative   Leuk Esterase: Trace / RBC: TNTC /HPF / WBC 6-10   Sq Epi: x / Non Sq Epi: Occasional / Bacteria: Many      CAPILLARY BLOOD GLUCOSE            Culture - Urine (collected 23 @ 17:30)  Source: Clean Catch Clean Catch (Midstream)  Final Report (23 @ 23:28):    <10,000 CFU/mL Normal Urogenital Kassidy        RADIOLOGY & ADDITIONAL TESTS:    Personally reviewed.     Consultant(s) Notes Reviewed:  [x] YES  [ ] NO     Patient is a 72y old  Male who presents with a chief complaint of Hematuria/Urinary Retention (2023 13:03)      INTERVAL HPI/OVERNIGHT EVENTS: Patient seen and examined at bedside, sitting up and eating breakfast in bed. Patient reports improvement in suprapubic pain, only feeling it intermittently which improves after manual irrigation (received 2x overnight). Gr remains in place with CBI, urine looking light pink in color today. Denies fevers, chills, headache, lightheadedness, chest pain, dyspnea, abdominal pain, n/v/d/c.    MEDICATIONS  (STANDING):  loratadine 10 milliGRAM(s) Oral daily  metoprolol succinate ER 50 milliGRAM(s) Oral daily  Orgovyx (relugolix) 120mg tab 1 Tablet(s) 1 Tablet(s) Oral daily  polyethylene glycol 3350 17 Gram(s) Oral daily    MEDICATIONS  (PRN):  acetaminophen     Tablet .. 650 milliGRAM(s) Oral every 6 hours PRN Temp greater or equal to 38C (100.4F), Mild Pain (1 - 3)  aluminum hydroxide/magnesium hydroxide/simethicone Suspension 30 milliLiter(s) Oral every 4 hours PRN Dyspepsia  melatonin 3 milliGRAM(s) Oral at bedtime PRN Insomnia  ondansetron Injectable 4 milliGRAM(s) IV Push every 8 hours PRN Nausea and/or Vomiting      Allergies    penicillin (Diarrhea; Pruritus; Hives)  penicillin V potassium (Rash)    Intolerances    codeine (Nausea)      REVIEW OF SYSTEMS:  CONSTITUTIONAL: No fever or chills  HEENT:  No headache, no sore throat  RESPIRATORY: No cough, wheezing, or shortness of breath  CARDIOVASCULAR: No chest pain, palpitations  GASTROINTESTINAL: No abdominal pain, nausea, vomiting, or diarrhea  GENITOURINARY: + hematuria. No dysuria, frequency  NEUROLOGICAL: no focal weakness or dizziness  MUSCULOSKELETAL: no myalgias     Vital Signs Last 24 Hrs  T(C): 36.6 (2023 05:01), Max: 36.9 (2023 19:38)  T(F): 97.9 (2023 05:01), Max: 98.4 (2023 19:38)  HR: 84 (2023 05:01) (72 - 84)  BP: 146/80 (2023 05:01) (135/62 - 157/73)  BP(mean): --  RR: 18 (2023 05:01) (18 - 18)  SpO2: 93% (2023 05:01) (93% - 95%)    Parameters below as of 2023 05:01  Patient On (Oxygen Delivery Method): room air        PHYSICAL EXAM:  GENERAL: NAD  HEENT:  anicteric, moist mucous membranes  CHEST/LUNG:  CTA b/l, no rales, wheezes, or rhonchi  HEART:  RRR, S1, S2. Systolic murmur auscultated on exam  ABDOMEN:  BS+, soft, nontender, nondistended  EXTREMITIES: no edema, cyanosis, or calf tenderness  NERVOUS SYSTEM: answers questions and follows commands appropriately  : No suprapubic tenderness to palpation, Gr with CBI in place draining light pink urine    LABS:                        12.8   10.17 )-----------( 183      ( 2023 06:20 )             37.8     CBC Full  -  ( 2023 06:20 )  WBC Count : 10.17 K/uL  Hemoglobin : 12.8 g/dL  Hematocrit : 37.8 %  Platelet Count - Automated : 183 K/uL  Mean Cell Volume : 92.2 fl  Mean Cell Hemoglobin : 31.2 pg  Mean Cell Hemoglobin Concentration : 33.9 gm/dL  Auto Neutrophil # : x  Auto Lymphocyte # : x  Auto Monocyte # : x  Auto Eosinophil # : x  Auto Basophil # : x  Auto Neutrophil % : x  Auto Lymphocyte % : x  Auto Monocyte % : x  Auto Eosinophil % : x  Auto Basophil % : x    2023 06:20    137    |  105    |  14     ----------------------------<  94     3.8     |  26     |  0.81     Ca    8.5        2023 06:20      PT/INR - ( 2023 18:30 )   PT: 12.9 sec;   INR: 1.10 ratio         PTT - ( 2023 18:30 )  PTT:22.1 sec  Urinalysis Basic - ( 2023 17:30 )    Color: Red / Appearance: bloody / S.015 / pH: x  Gluc: x / Ketone: Negative  / Bili: Negative / Urobili: Negative   Blood: x / Protein: 500 mg/dL / Nitrite: Negative   Leuk Esterase: Trace / RBC: TNTC /HPF / WBC 6-10   Sq Epi: x / Non Sq Epi: Occasional / Bacteria: Many      CAPILLARY BLOOD GLUCOSE            Culture - Urine (collected 23 @ 17:30)  Source: Clean Catch Clean Catch (Midstream)  Final Report (23 @ 23:28):    <10,000 CFU/mL Normal Urogenital Kassidy        RADIOLOGY & ADDITIONAL TESTS:    Personally reviewed.     Consultant(s) Notes Reviewed:  [x] YES  [ ] NO

## 2023-02-02 NOTE — PROGRESS NOTE ADULT - ASSESSMENT
71 y/o M w/ PMHx of HTN,  AS s/p TAVR, ppm, prostate cancer s/p radical prostatectomy c/b radiation proctitis presents to ED for severe pain and urinary retention s/p cystoscopy. Admit for hematuria / urinary retention

## 2023-02-02 NOTE — PROGRESS NOTE ADULT - PROBLEM SELECTOR PLAN 1
P/w urinary retention s/p cystoscopy ; hematuria on UA, asymptomatic at this time   Patient is s/p prostatectomy (1/31); hematuria is likely 2/2 radiation cystitis and retention due to clots.  - Continue Gr/CBI, hand irrigate clots PRN  - Tylenol for pain PRN   - H/H stable, continue to monitor with AM CBC  - Type and screen in chart  - Urology Dr. Shaffer consulted, recs appreciated  - f/u with Urologist, Dr. Luis post discharge for HBOT

## 2023-02-03 LAB
ANION GAP SERPL CALC-SCNC: 6 MMOL/L — SIGNIFICANT CHANGE UP (ref 5–17)
BUN SERPL-MCNC: 11 MG/DL — SIGNIFICANT CHANGE UP (ref 7–23)
CALCIUM SERPL-MCNC: 8.5 MG/DL — SIGNIFICANT CHANGE UP (ref 8.5–10.1)
CHLORIDE SERPL-SCNC: 104 MMOL/L — SIGNIFICANT CHANGE UP (ref 96–108)
CO2 SERPL-SCNC: 27 MMOL/L — SIGNIFICANT CHANGE UP (ref 22–31)
CREAT SERPL-MCNC: 0.74 MG/DL — SIGNIFICANT CHANGE UP (ref 0.5–1.3)
EGFR: 95 ML/MIN/1.73M2 — SIGNIFICANT CHANGE UP
GLUCOSE SERPL-MCNC: 95 MG/DL — SIGNIFICANT CHANGE UP (ref 70–99)
HCT VFR BLD CALC: 34.8 % — LOW (ref 39–50)
HGB BLD-MCNC: 12 G/DL — LOW (ref 13–17)
MCHC RBC-ENTMCNC: 31.3 PG — SIGNIFICANT CHANGE UP (ref 27–34)
MCHC RBC-ENTMCNC: 34.5 GM/DL — SIGNIFICANT CHANGE UP (ref 32–36)
MCV RBC AUTO: 90.9 FL — SIGNIFICANT CHANGE UP (ref 80–100)
NRBC # BLD: 0 /100 WBCS — SIGNIFICANT CHANGE UP (ref 0–0)
PLATELET # BLD AUTO: 181 K/UL — SIGNIFICANT CHANGE UP (ref 150–400)
POTASSIUM SERPL-MCNC: 3.8 MMOL/L — SIGNIFICANT CHANGE UP (ref 3.5–5.3)
POTASSIUM SERPL-SCNC: 3.8 MMOL/L — SIGNIFICANT CHANGE UP (ref 3.5–5.3)
RBC # BLD: 3.83 M/UL — LOW (ref 4.2–5.8)
RBC # FLD: 12.4 % — SIGNIFICANT CHANGE UP (ref 10.3–14.5)
SODIUM SERPL-SCNC: 137 MMOL/L — SIGNIFICANT CHANGE UP (ref 135–145)
WBC # BLD: 8.98 K/UL — SIGNIFICANT CHANGE UP (ref 3.8–10.5)
WBC # FLD AUTO: 8.98 K/UL — SIGNIFICANT CHANGE UP (ref 3.8–10.5)

## 2023-02-03 PROCEDURE — 99233 SBSQ HOSP IP/OBS HIGH 50: CPT | Mod: GC

## 2023-02-03 RX ADMIN — Medication 50 MILLIGRAM(S): at 06:00

## 2023-02-03 RX ADMIN — POLYETHYLENE GLYCOL 3350 17 GRAM(S): 17 POWDER, FOR SOLUTION ORAL at 11:42

## 2023-02-03 RX ADMIN — LORATADINE 10 MILLIGRAM(S): 10 TABLET ORAL at 11:42

## 2023-02-03 NOTE — PROGRESS NOTE ADULT - SUBJECTIVE AND OBJECTIVE BOX
INTERVAL HPI/OVERNIGHT EVENTS:  No new c/o. No clots. +BM.    MEDICATIONS  (STANDING):  loratadine 10 milliGRAM(s) Oral daily  metoprolol succinate ER 50 milliGRAM(s) Oral daily  Orgovyx (relugolix) 120mg tab 1 Tablet(s) 1 Tablet(s) Oral daily  polyethylene glycol 3350 17 Gram(s) Oral daily    MEDICATIONS  (PRN):  acetaminophen     Tablet .. 650 milliGRAM(s) Oral every 6 hours PRN Temp greater or equal to 38C (100.4F), Mild Pain (1 - 3)  aluminum hydroxide/magnesium hydroxide/simethicone Suspension 30 milliLiter(s) Oral every 4 hours PRN Dyspepsia  melatonin 3 milliGRAM(s) Oral at bedtime PRN Insomnia  ondansetron Injectable 4 milliGRAM(s) IV Push every 8 hours PRN Nausea and/or Vomiting        Vital Signs Last 24 Hrs  T(C): 36.5 (03 Feb 2023 05:13), Max: 36.9 (02 Feb 2023 20:47)  T(F): 97.7 (03 Feb 2023 05:13), Max: 98.4 (02 Feb 2023 20:47)  HR: 78 (03 Feb 2023 05:13) (70 - 78)  BP: 149/78 (03 Feb 2023 05:13) (125/66 - 163/78)  BP(mean): --  RR: 18 (03 Feb 2023 05:13) (18 - 18)  SpO2: 91% (03 Feb 2023 05:13) (91% - 96%)    Parameters below as of 03 Feb 2023 05:13  Patient On (Oxygen Delivery Method): room air        PHYSICAL EXAM:    No CVAT  ABDOMEN: benign  GENITALIA: bowers - light pink w/ mod cbi    LABS:                        12.8   10.17 )-----------( 183      ( 02 Feb 2023 06:20 )             37.8     02-02    137  |  105  |  14  ----------------------------<  94  3.8   |  26  |  0.81    Ca    8.5      02 Feb 2023 06:20          Urine culture:  01-31 @ 17:30 --   <10,000 CFU/mL Normal Urogenital Kassidy    Blood Cultures:  01-31 @ 17:30   <10,000 CFU/mL Normal Urogenital Kassidy  --  --    RADIOLOGY & ADDITIONAL TESTS:

## 2023-02-03 NOTE — PROGRESS NOTE ADULT - PROBLEM SELECTOR PLAN 1
P/w urinary retention s/p cystoscopy ; hematuria on UA, asymptomatic at this time   Patient is s/p prostatectomy (1/31); hematuria is likely 2/2 radiation cystitis and retention due to clots.  - Hematuria improving. Continue Gr/CBI, hand irrigate clots PRN  - Tylenol for pain PRN   - H/H stable with known hematuria, continue to monitor with AM CBC  - Type and screen in chart  - Urology Dr. Shaffer consulted, recs appreciated  - f/u with Urologist, Dr. Luis post discharge for HBOT

## 2023-02-03 NOTE — PROGRESS NOTE ADULT - SUBJECTIVE AND OBJECTIVE BOX
Patient is a 72y old  Male who presents with a chief complaint of Hematuria/Urinary Retention (03 Feb 2023 07:43)      INTERVAL HPI/OVERNIGHT EVENTS: Patient seen and examined at bedside. Hematuria continues to improve on Gr with CBI. Patient reports that he has not received any manual irrigations for clots overnight. He states that has some intermittent suprapubic discomfort which he attributes to the Gr not draining appropriately; however this resolves when he moves the tube and allows for it to drain into the bag. Denies fevers, chills, headache, lightheadedness, chest pain, dyspnea, abdominal pain, n/v/d/c.    MEDICATIONS  (STANDING):  loratadine 10 milliGRAM(s) Oral daily  metoprolol succinate ER 50 milliGRAM(s) Oral daily  Orgovyx (relugolix) 120mg tab 1 Tablet(s) 1 Tablet(s) Oral daily  polyethylene glycol 3350 17 Gram(s) Oral daily    MEDICATIONS  (PRN):  acetaminophen     Tablet .. 650 milliGRAM(s) Oral every 6 hours PRN Temp greater or equal to 38C (100.4F), Mild Pain (1 - 3)  aluminum hydroxide/magnesium hydroxide/simethicone Suspension 30 milliLiter(s) Oral every 4 hours PRN Dyspepsia  melatonin 3 milliGRAM(s) Oral at bedtime PRN Insomnia  ondansetron Injectable 4 milliGRAM(s) IV Push every 8 hours PRN Nausea and/or Vomiting      Allergies    penicillin (Diarrhea; Pruritus; Hives)  penicillin V potassium (Rash)    Intolerances    codeine (Nausea)      REVIEW OF SYSTEMS:  CONSTITUTIONAL: No fever or chills  HEENT:  No headache, no sore throat  RESPIRATORY: No cough, wheezing, or shortness of breath  CARDIOVASCULAR: No chest pain, palpitations  GASTROINTESTINAL: No abdominal pain, nausea, vomiting, or diarrhea  GENITOURINARY: + hematuria. No dysuria, frequency  NEUROLOGICAL: no focal weakness or dizziness  MUSCULOSKELETAL: no myalgias    Vital Signs Last 24 Hrs  T(C): 36.5 (03 Feb 2023 05:13), Max: 36.9 (02 Feb 2023 20:47)  T(F): 97.7 (03 Feb 2023 05:13), Max: 98.4 (02 Feb 2023 20:47)  HR: 78 (03 Feb 2023 05:13) (77 - 78)  BP: 149/78 (03 Feb 2023 05:13) (149/78 - 163/78)  BP(mean): --  RR: 18 (03 Feb 2023 05:13) (18 - 18)  SpO2: 91% (03 Feb 2023 05:13) (91% - 96%)    Parameters below as of 03 Feb 2023 05:13  Patient On (Oxygen Delivery Method): room air        PHYSICAL EXAM:  GENERAL: NAD  HEENT:  anicteric, moist mucous membranes  CHEST/LUNG:  CTA b/l, no rales, wheezes, or rhonchi  HEART:  RRR, S1, S2. Systolic murmur auscultated on exam  ABDOMEN:  BS+, soft, nontender, nondistended  EXTREMITIES: no edema, cyanosis, or calf tenderness  NERVOUS SYSTEM: answers questions and follows commands appropriately  : No suprapubic tenderness to palpation, Gr with CBI in place draining light pink urine    LABS:                        12.0   8.98  )-----------( 181      ( 03 Feb 2023 06:31 )             34.8     CBC Full  -  ( 03 Feb 2023 06:31 )  WBC Count : 8.98 K/uL  Hemoglobin : 12.0 g/dL  Hematocrit : 34.8 %  Platelet Count - Automated : 181 K/uL  Mean Cell Volume : 90.9 fl  Mean Cell Hemoglobin : 31.3 pg  Mean Cell Hemoglobin Concentration : 34.5 gm/dL  Auto Neutrophil # : x  Auto Lymphocyte # : x  Auto Monocyte # : x  Auto Eosinophil # : x  Auto Basophil # : x  Auto Neutrophil % : x  Auto Lymphocyte % : x  Auto Monocyte % : x  Auto Eosinophil % : x  Auto Basophil % : x    03 Feb 2023 06:31    137    |  104    |  11     ----------------------------<  95     3.8     |  27     |  0.74     Ca    8.5        03 Feb 2023 06:31          CAPILLARY BLOOD GLUCOSE            Culture - Urine (collected 01-31-23 @ 17:30)  Source: Clean Catch Clean Catch (Midstream)  Final Report (02-01-23 @ 23:28):    <10,000 CFU/mL Normal Urogenital Kassidy        RADIOLOGY & ADDITIONAL TESTS:    Personally reviewed.     Consultant(s) Notes Reviewed:  [x] YES  [ ] NO     Patient is a 72y old  Male who presents with a chief complaint of Hematuria/Urinary Retention (03 Feb 2023 07:43)      INTERVAL HPI/OVERNIGHT EVENTS: Patient seen and examined at bedside. Hematuria continues to improve on Gr with CBI. Patient reports that he has not received any manual irrigations for clots overnight. He states that has some intermittent suprapubic discomfort which he attributes to the Gr not draining appropriately; however this resolves when he moves the tube and allows for it to drain into the bag. Denies fevers, chills, headache, lightheadedness, chest pain, dyspnea, abdominal pain, n/v/d/c.     MEDICATIONS  (STANDING):  loratadine 10 milliGRAM(s) Oral daily  metoprolol succinate ER 50 milliGRAM(s) Oral daily  Orgovyx (relugolix) 120mg tab 1 Tablet(s) 1 Tablet(s) Oral daily  polyethylene glycol 3350 17 Gram(s) Oral daily    MEDICATIONS  (PRN):  acetaminophen     Tablet .. 650 milliGRAM(s) Oral every 6 hours PRN Temp greater or equal to 38C (100.4F), Mild Pain (1 - 3)  aluminum hydroxide/magnesium hydroxide/simethicone Suspension 30 milliLiter(s) Oral every 4 hours PRN Dyspepsia  melatonin 3 milliGRAM(s) Oral at bedtime PRN Insomnia  ondansetron Injectable 4 milliGRAM(s) IV Push every 8 hours PRN Nausea and/or Vomiting      Allergies    penicillin (Diarrhea; Pruritus; Hives)  penicillin V potassium (Rash)    Intolerances    codeine (Nausea)      REVIEW OF SYSTEMS:  CONSTITUTIONAL: No fever or chills  HEENT:  No headache, no sore throat  RESPIRATORY: No cough, wheezing, or shortness of breath  CARDIOVASCULAR: No chest pain, palpitations  GASTROINTESTINAL: No abdominal pain, nausea, vomiting, or diarrhea  GENITOURINARY: + hematuria. No dysuria, frequency  NEUROLOGICAL: no focal weakness or dizziness  MUSCULOSKELETAL: no myalgias    Vital Signs Last 24 Hrs  T(C): 36.5 (03 Feb 2023 05:13), Max: 36.9 (02 Feb 2023 20:47)  T(F): 97.7 (03 Feb 2023 05:13), Max: 98.4 (02 Feb 2023 20:47)  HR: 78 (03 Feb 2023 05:13) (77 - 78)  BP: 149/78 (03 Feb 2023 05:13) (149/78 - 163/78)  BP(mean): --  RR: 18 (03 Feb 2023 05:13) (18 - 18)  SpO2: 91% (03 Feb 2023 05:13) (91% - 96%)    Parameters below as of 03 Feb 2023 05:13  Patient On (Oxygen Delivery Method): room air        PHYSICAL EXAM:  GENERAL: NAD, nontoxic  HEENT:  anicteric, moist mucous membranes  CHEST/LUNG:  CTA b/l, no rales, wheezes, or rhonchi  HEART:  RRR, S1, S2. Systolic murmur auscultated on exam  ABDOMEN:  BS+, soft, nontender, nondistended  EXTREMITIES: no edema, cyanosis, or calf tenderness  NERVOUS SYSTEM: answers questions and follows commands appropriately  : No suprapubic tenderness to palpation, Gr with CBI in place draining light pink urine    LABS:                        12.0   8.98  )-----------( 181      ( 03 Feb 2023 06:31 )             34.8     CBC Full  -  ( 03 Feb 2023 06:31 )  WBC Count : 8.98 K/uL  Hemoglobin : 12.0 g/dL  Hematocrit : 34.8 %  Platelet Count - Automated : 181 K/uL  Mean Cell Volume : 90.9 fl  Mean Cell Hemoglobin : 31.3 pg  Mean Cell Hemoglobin Concentration : 34.5 gm/dL  Auto Neutrophil # : x  Auto Lymphocyte # : x  Auto Monocyte # : x  Auto Eosinophil # : x  Auto Basophil # : x  Auto Neutrophil % : x  Auto Lymphocyte % : x  Auto Monocyte % : x  Auto Eosinophil % : x  Auto Basophil % : x    03 Feb 2023 06:31    137    |  104    |  11     ----------------------------<  95     3.8     |  27     |  0.74     Ca    8.5        03 Feb 2023 06:31          CAPILLARY BLOOD GLUCOSE            Culture - Urine (collected 01-31-23 @ 17:30)  Source: Clean Catch Clean Catch (Midstream)  Final Report (02-01-23 @ 23:28):    <10,000 CFU/mL Normal Urogenital Kassidy        RADIOLOGY & ADDITIONAL TESTS:    Personally reviewed.     Consultant(s) Notes Reviewed:  [x] YES  [ ] NO

## 2023-02-04 LAB
ANION GAP SERPL CALC-SCNC: 5 MMOL/L — SIGNIFICANT CHANGE UP (ref 5–17)
BUN SERPL-MCNC: 12 MG/DL — SIGNIFICANT CHANGE UP (ref 7–23)
CALCIUM SERPL-MCNC: 8.2 MG/DL — LOW (ref 8.5–10.1)
CHLORIDE SERPL-SCNC: 104 MMOL/L — SIGNIFICANT CHANGE UP (ref 96–108)
CO2 SERPL-SCNC: 28 MMOL/L — SIGNIFICANT CHANGE UP (ref 22–31)
CREAT SERPL-MCNC: 0.69 MG/DL — SIGNIFICANT CHANGE UP (ref 0.5–1.3)
EGFR: 97 ML/MIN/1.73M2 — SIGNIFICANT CHANGE UP
GLUCOSE SERPL-MCNC: 96 MG/DL — SIGNIFICANT CHANGE UP (ref 70–99)
HCT VFR BLD CALC: 33.9 % — LOW (ref 39–50)
HGB BLD-MCNC: 11.7 G/DL — LOW (ref 13–17)
MCHC RBC-ENTMCNC: 31 PG — SIGNIFICANT CHANGE UP (ref 27–34)
MCHC RBC-ENTMCNC: 34.5 GM/DL — SIGNIFICANT CHANGE UP (ref 32–36)
MCV RBC AUTO: 89.9 FL — SIGNIFICANT CHANGE UP (ref 80–100)
NRBC # BLD: 0 /100 WBCS — SIGNIFICANT CHANGE UP (ref 0–0)
PLATELET # BLD AUTO: 179 K/UL — SIGNIFICANT CHANGE UP (ref 150–400)
POTASSIUM SERPL-MCNC: 3.7 MMOL/L — SIGNIFICANT CHANGE UP (ref 3.5–5.3)
POTASSIUM SERPL-SCNC: 3.7 MMOL/L — SIGNIFICANT CHANGE UP (ref 3.5–5.3)
RBC # BLD: 3.77 M/UL — LOW (ref 4.2–5.8)
RBC # FLD: 12.4 % — SIGNIFICANT CHANGE UP (ref 10.3–14.5)
SODIUM SERPL-SCNC: 137 MMOL/L — SIGNIFICANT CHANGE UP (ref 135–145)
WBC # BLD: 10.07 K/UL — SIGNIFICANT CHANGE UP (ref 3.8–10.5)
WBC # FLD AUTO: 10.07 K/UL — SIGNIFICANT CHANGE UP (ref 3.8–10.5)

## 2023-02-04 PROCEDURE — 99233 SBSQ HOSP IP/OBS HIGH 50: CPT | Mod: GC

## 2023-02-04 RX ORDER — METOPROLOL TARTRATE 50 MG
50 TABLET ORAL DAILY
Refills: 0 | Status: DISCONTINUED | OUTPATIENT
Start: 2023-02-04 | End: 2023-03-02

## 2023-02-04 RX ADMIN — POLYETHYLENE GLYCOL 3350 17 GRAM(S): 17 POWDER, FOR SOLUTION ORAL at 12:45

## 2023-02-04 RX ADMIN — LORATADINE 10 MILLIGRAM(S): 10 TABLET ORAL at 12:45

## 2023-02-04 RX ADMIN — Medication 50 MILLIGRAM(S): at 05:43

## 2023-02-04 NOTE — PROGRESS NOTE ADULT - SUBJECTIVE AND OBJECTIVE BOX
Patient is a 72y old  Male who presents with a chief complaint of Hematuria/Urinary Retention (03 Feb 2023 12:13)      INTERVAL HPI/OVERNIGHT EVENTS: Pt was seen and examined at bedside. No acute overnight events. Pt states that he still has suprapubic pressure and notices occasional clots flowing through the Gr.   Pt denies headache, dizziness, lightheadedness, fever, chills, body aches, CP, SOB, palpitations, abdominal pain, n/v, numbness/tingling.  No other complaints at this time.     MEDICATIONS  (STANDING):  loratadine 10 milliGRAM(s) Oral daily  metoprolol succinate ER 50 milliGRAM(s) Oral daily  Orgovyx (relugolix) 120mg tab 1 Tablet(s) 1 Tablet(s) Oral daily  polyethylene glycol 3350 17 Gram(s) Oral daily    MEDICATIONS  (PRN):  acetaminophen     Tablet .. 650 milliGRAM(s) Oral every 6 hours PRN Temp greater or equal to 38C (100.4F), Mild Pain (1 - 3)  aluminum hydroxide/magnesium hydroxide/simethicone Suspension 30 milliLiter(s) Oral every 4 hours PRN Dyspepsia  melatonin 3 milliGRAM(s) Oral at bedtime PRN Insomnia  ondansetron Injectable 4 milliGRAM(s) IV Push every 8 hours PRN Nausea and/or Vomiting      Allergies    penicillin (Diarrhea; Pruritus; Hives)  penicillin V potassium (Rash)    Intolerances    codeine (Nausea)      REVIEW OF SYSTEMS:  CONSTITUTIONAL: No fever or chills  HEENT:  No headache, no sore throat  RESPIRATORY: No cough, wheezing, or shortness of breath  CARDIOVASCULAR: No chest pain, palpitations  GASTROINTESTINAL: No abdominal pain, nausea, vomiting, or diarrhea, +suprapubic pressure  GENITOURINARY: +hematuria. No dysuria, frequency  NEUROLOGICAL: no focal weakness or dizziness  MUSCULOSKELETAL: no myalgias    Vital Signs Last 24 Hrs  T(C): 36.6 (04 Feb 2023 05:10), Max: 36.9 (03 Feb 2023 19:38)  T(F): 97.8 (04 Feb 2023 05:10), Max: 98.4 (03 Feb 2023 19:38)  HR: 86 (04 Feb 2023 05:10) (72 - 86)  BP: 122/66 (04 Feb 2023 05:10) (122/66 - 135/72)  BP(mean): --  RR: 18 (04 Feb 2023 05:10) (18 - 18)  SpO2: 90% (04 Feb 2023 05:10) (90% - 95%)    Parameters below as of 04 Feb 2023 05:10  Patient On (Oxygen Delivery Method): room air        PHYSICAL EXAM:  GENERAL: NAD, nontoxic  HEENT:  anicteric, moist mucous membranes  CHEST/LUNG:  CTA b/l, no rales, wheezes, or rhonchi  HEART:  RRR, S1, S2. Systolic murmur auscultated on exam  ABDOMEN:  BS+, soft, nontender, nondistended  EXTREMITIES: no edema, cyanosis, or calf tenderness  NERVOUS SYSTEM: answers questions and follows commands appropriately  : No suprapubic tenderness to palpation, Gr with CBI in place draining bright pink urine, no clots noted     LABS:                        11.7   10.07 )-----------( 179      ( 04 Feb 2023 06:05 )             33.9     CBC Full  -  ( 04 Feb 2023 06:05 )  WBC Count : 10.07 K/uL  Hemoglobin : 11.7 g/dL  Hematocrit : 33.9 %  Platelet Count - Automated : 179 K/uL  Mean Cell Volume : 89.9 fl  Mean Cell Hemoglobin : 31.0 pg  Mean Cell Hemoglobin Concentration : 34.5 gm/dL  Auto Neutrophil # : x  Auto Lymphocyte # : x  Auto Monocyte # : x  Auto Eosinophil # : x  Auto Basophil # : x  Auto Neutrophil % : x  Auto Lymphocyte % : x  Auto Monocyte % : x  Auto Eosinophil % : x  Auto Basophil % : x    04 Feb 2023 06:05    137    |  104    |  12     ----------------------------<  96     3.7     |  28     |  0.69     Ca    8.2        04 Feb 2023 06:05          CAPILLARY BLOOD GLUCOSE            Culture - Urine (collected 01-31-23 @ 17:30)  Source: Clean Catch Clean Catch (Midstream)  Final Report (02-01-23 @ 23:28):    <10,000 CFU/mL Normal Urogenital Kassidy        RADIOLOGY & ADDITIONAL TESTS:     Personally reviewed.     Consultant(s) Notes Reviewed:  [x] YES  [ ] NO

## 2023-02-04 NOTE — PROGRESS NOTE ADULT - ASSESSMENT
71 y/o M w/ PMHx of HTN,  AS s/p TAVR, ppm, prostate cancer s/p radical prostatectomy c/b radiation proctitis presents to ED for severe pain and urinary retention s/p cystoscopy. Admit for hematuria / urinary retention.

## 2023-02-05 LAB
ANION GAP SERPL CALC-SCNC: 5 MMOL/L — SIGNIFICANT CHANGE UP (ref 5–17)
BUN SERPL-MCNC: 12 MG/DL — SIGNIFICANT CHANGE UP (ref 7–23)
CALCIUM SERPL-MCNC: 8.4 MG/DL — LOW (ref 8.5–10.1)
CHLORIDE SERPL-SCNC: 104 MMOL/L — SIGNIFICANT CHANGE UP (ref 96–108)
CO2 SERPL-SCNC: 27 MMOL/L — SIGNIFICANT CHANGE UP (ref 22–31)
CREAT SERPL-MCNC: 0.74 MG/DL — SIGNIFICANT CHANGE UP (ref 0.5–1.3)
EGFR: 95 ML/MIN/1.73M2 — SIGNIFICANT CHANGE UP
GLUCOSE SERPL-MCNC: 100 MG/DL — HIGH (ref 70–99)
HCT VFR BLD CALC: 32 % — LOW (ref 39–50)
HGB BLD-MCNC: 10.8 G/DL — LOW (ref 13–17)
MAGNESIUM SERPL-MCNC: 2.3 MG/DL — SIGNIFICANT CHANGE UP (ref 1.6–2.6)
MCHC RBC-ENTMCNC: 30.7 PG — SIGNIFICANT CHANGE UP (ref 27–34)
MCHC RBC-ENTMCNC: 33.8 GM/DL — SIGNIFICANT CHANGE UP (ref 32–36)
MCV RBC AUTO: 90.9 FL — SIGNIFICANT CHANGE UP (ref 80–100)
NRBC # BLD: 0 /100 WBCS — SIGNIFICANT CHANGE UP (ref 0–0)
PHOSPHATE SERPL-MCNC: 3.3 MG/DL — SIGNIFICANT CHANGE UP (ref 2.5–4.5)
PLATELET # BLD AUTO: 198 K/UL — SIGNIFICANT CHANGE UP (ref 150–400)
POTASSIUM SERPL-MCNC: 3.8 MMOL/L — SIGNIFICANT CHANGE UP (ref 3.5–5.3)
POTASSIUM SERPL-SCNC: 3.8 MMOL/L — SIGNIFICANT CHANGE UP (ref 3.5–5.3)
RBC # BLD: 3.52 M/UL — LOW (ref 4.2–5.8)
RBC # FLD: 12.5 % — SIGNIFICANT CHANGE UP (ref 10.3–14.5)
SODIUM SERPL-SCNC: 136 MMOL/L — SIGNIFICANT CHANGE UP (ref 135–145)
WBC # BLD: 7.84 K/UL — SIGNIFICANT CHANGE UP (ref 3.8–10.5)
WBC # FLD AUTO: 7.84 K/UL — SIGNIFICANT CHANGE UP (ref 3.8–10.5)

## 2023-02-05 PROCEDURE — 99233 SBSQ HOSP IP/OBS HIGH 50: CPT | Mod: GC

## 2023-02-05 RX ADMIN — LORATADINE 10 MILLIGRAM(S): 10 TABLET ORAL at 11:57

## 2023-02-05 RX ADMIN — POLYETHYLENE GLYCOL 3350 17 GRAM(S): 17 POWDER, FOR SOLUTION ORAL at 12:00

## 2023-02-05 RX ADMIN — Medication 50 MILLIGRAM(S): at 05:03

## 2023-02-05 NOTE — PROGRESS NOTE ADULT - SUBJECTIVE AND OBJECTIVE BOX
INTERVAL HPI/OVERNIGHT EVENTS:  Bowers irrigated several times yesterday for clots. +BM.    MEDICATIONS  (STANDING):  loratadine 10 milliGRAM(s) Oral daily  metoprolol succinate ER 50 milliGRAM(s) Oral daily  Orgovyx (relugolix) 120mg tab 1 Tablet(s) 1 Tablet(s) Oral daily  polyethylene glycol 3350 17 Gram(s) Oral daily    MEDICATIONS  (PRN):  acetaminophen     Tablet .. 650 milliGRAM(s) Oral every 6 hours PRN Temp greater or equal to 38C (100.4F), Mild Pain (1 - 3)  aluminum hydroxide/magnesium hydroxide/simethicone Suspension 30 milliLiter(s) Oral every 4 hours PRN Dyspepsia  melatonin 3 milliGRAM(s) Oral at bedtime PRN Insomnia  ondansetron Injectable 4 milliGRAM(s) IV Push every 8 hours PRN Nausea and/or Vomiting        Vital Signs Last 24 Hrs  T(C): 36.7 (05 Feb 2023 04:30), Max: 36.9 (04 Feb 2023 11:43)  T(F): 98 (05 Feb 2023 04:30), Max: 98.5 (04 Feb 2023 11:43)  HR: 74 (05 Feb 2023 04:30) (74 - 87)  BP: 134/79 (05 Feb 2023 04:30) (105/67 - 134/79)  BP(mean): --  RR: 18 (05 Feb 2023 04:30) (18 - 19)  SpO2: 93% (05 Feb 2023 04:30) (93% - 98%)    Parameters below as of 05 Feb 2023 04:30  Patient On (Oxygen Delivery Method): room air        PHYSICAL EXAM:    No CVAT  ABDOMEN: benign  GENITALIA: bowers - clear w/ mod cbi    LABS:                        10.8   7.84  )-----------( 198      ( 05 Feb 2023 06:50 )             32.0     02-05    136  |  104  |  12  ----------------------------<  100<H>  3.8   |  27  |  0.74    Ca    8.4<L>      05 Feb 2023 06:50  Phos  3.3     02-05  Mg     2.3     02-05            Blood Cultures:    RADIOLOGY & ADDITIONAL TESTS:

## 2023-02-05 NOTE — PROGRESS NOTE ADULT - SUBJECTIVE AND OBJECTIVE BOX
Patient is a 72y old  Male who presents with a chief complaint of Hematuria/Urinary Retention (05 Feb 2023 07:56)      INTERVAL HPI/OVERNIGHT EVENTS: Patient seen and examined at bedside. Patient reported episode of large BM yesterday and subsequently having bleeding from urethra with Gr in place. Patient states that he continues to have intermittent suprapubic pressure and visualizes some clots in his Gr. Patient frustrated at lack of progress regarding hematuria; CBI cNo overnight events occurred. Patient has no complaints at this time. Denies fevers, chills, headache, lightheadedness, chest pain, dyspnea, abdominal pain, n/v/d/c.    MEDICATIONS  (STANDING):  loratadine 10 milliGRAM(s) Oral daily  metoprolol succinate ER 50 milliGRAM(s) Oral daily  Orgovyx (relugolix) 120mg tab 1 Tablet(s) 1 Tablet(s) Oral daily  polyethylene glycol 3350 17 Gram(s) Oral daily    MEDICATIONS  (PRN):  acetaminophen     Tablet .. 650 milliGRAM(s) Oral every 6 hours PRN Temp greater or equal to 38C (100.4F), Mild Pain (1 - 3)  aluminum hydroxide/magnesium hydroxide/simethicone Suspension 30 milliLiter(s) Oral every 4 hours PRN Dyspepsia  melatonin 3 milliGRAM(s) Oral at bedtime PRN Insomnia  ondansetron Injectable 4 milliGRAM(s) IV Push every 8 hours PRN Nausea and/or Vomiting      Allergies    penicillin (Diarrhea; Pruritus; Hives)  penicillin V potassium (Rash)    Intolerances    codeine (Nausea)      REVIEW OF SYSTEMS:  CONSTITUTIONAL: No fever or chills  HEENT:  No headache, no sore throat  RESPIRATORY: No cough, wheezing, or shortness of breath  CARDIOVASCULAR: No chest pain, palpitations  GASTROINTESTINAL: No abd pain, nausea, vomiting, or diarrhea  GENITOURINARY: No dysuria, frequency, or hematuria  NEUROLOGICAL: no focal weakness or dizziness  MUSCULOSKELETAL: no myalgias     Vital Signs Last 24 Hrs  T(C): 36.7 (05 Feb 2023 04:30), Max: 36.9 (04 Feb 2023 11:43)  T(F): 98 (05 Feb 2023 04:30), Max: 98.5 (04 Feb 2023 11:43)  HR: 74 (05 Feb 2023 04:30) (74 - 87)  BP: 134/79 (05 Feb 2023 04:30) (105/67 - 134/79)  BP(mean): --  RR: 18 (05 Feb 2023 04:30) (18 - 19)  SpO2: 93% (05 Feb 2023 04:30) (93% - 98%)    Parameters below as of 05 Feb 2023 04:30  Patient On (Oxygen Delivery Method): room air        PHYSICAL EXAM:  GENERAL: NAD  HEENT:  anicteric, moist mucous membranes  CHEST/LUNG:  CTA b/l, no rales, wheezes, or rhonchi  HEART:  RRR, S1, S2  ABDOMEN:  BS+, soft, nontender, nondistended  EXTREMITIES: no edema, cyanosis, or calf tenderness  NERVOUS SYSTEM: answers questions and follows commands appropriately    LABS:                        10.8   7.84  )-----------( 198      ( 05 Feb 2023 06:50 )             32.0     CBC Full  -  ( 05 Feb 2023 06:50 )  WBC Count : 7.84 K/uL  Hemoglobin : 10.8 g/dL  Hematocrit : 32.0 %  Platelet Count - Automated : 198 K/uL  Mean Cell Volume : 90.9 fl  Mean Cell Hemoglobin : 30.7 pg  Mean Cell Hemoglobin Concentration : 33.8 gm/dL  Auto Neutrophil # : x  Auto Lymphocyte # : x  Auto Monocyte # : x  Auto Eosinophil # : x  Auto Basophil # : x  Auto Neutrophil % : x  Auto Lymphocyte % : x  Auto Monocyte % : x  Auto Eosinophil % : x  Auto Basophil % : x    05 Feb 2023 06:50    136    |  104    |  12     ----------------------------<  100    3.8     |  27     |  0.74     Ca    8.4        05 Feb 2023 06:50  Phos  3.3       05 Feb 2023 06:50  Mg     2.3       05 Feb 2023 06:50          CAPILLARY BLOOD GLUCOSE            Culture - Urine (collected 01-31-23 @ 17:30)  Source: Clean Catch Clean Catch (Midstream)  Final Report (02-01-23 @ 23:28):    <10,000 CFU/mL Normal Urogenital Kassidy        RADIOLOGY & ADDITIONAL TESTS:    Personally reviewed.     Consultant(s) Notes Reviewed:  [x] YES  [ ] NO     Patient is a 72y old  Male who presents with a chief complaint of Hematuria/Urinary Retention (05 Feb 2023 07:56)      INTERVAL HPI/OVERNIGHT EVENTS: Patient seen and examined at bedside. Patient reported episode of large BM yesterday and subsequently having bleeding from urethra with Gr in place after attempting to ambulate to the bathroom. Patient states that he continues to have intermittent suprapubic pressure and visualizes some clots in his Gr; manual irrigation helps to relieve pressure, patient attributes suprapubic pressure to passing clots. Patient frustrated at lack of progress regarding hematuria; CBI ongoing.     MEDICATIONS  (STANDING):  loratadine 10 milliGRAM(s) Oral daily  metoprolol succinate ER 50 milliGRAM(s) Oral daily  Orgovyx (relugolix) 120mg tab 1 Tablet(s) 1 Tablet(s) Oral daily  polyethylene glycol 3350 17 Gram(s) Oral daily    MEDICATIONS  (PRN):  acetaminophen     Tablet .. 650 milliGRAM(s) Oral every 6 hours PRN Temp greater or equal to 38C (100.4F), Mild Pain (1 - 3)  aluminum hydroxide/magnesium hydroxide/simethicone Suspension 30 milliLiter(s) Oral every 4 hours PRN Dyspepsia  melatonin 3 milliGRAM(s) Oral at bedtime PRN Insomnia  ondansetron Injectable 4 milliGRAM(s) IV Push every 8 hours PRN Nausea and/or Vomiting      Allergies    penicillin (Diarrhea; Pruritus; Hives)  penicillin V potassium (Rash)    Intolerances    codeine (Nausea)      REVIEW OF SYSTEMS:  CONSTITUTIONAL: No fever or chills  HEENT:  No headache, no sore throat  RESPIRATORY: No cough, wheezing, or shortness of breath  CARDIOVASCULAR: No chest pain, palpitations  GASTROINTESTINAL: + intermittent suprapubic pressure. No abdominal pain, nausea, vomiting, or diarrhea  GENITOURINARY: + hematuria. No dysuria, frequency  NEUROLOGICAL: no focal weakness or dizziness  MUSCULOSKELETAL: no myalgias    Vital Signs Last 24 Hrs  T(C): 36.7 (05 Feb 2023 04:30), Max: 36.9 (04 Feb 2023 11:43)  T(F): 98 (05 Feb 2023 04:30), Max: 98.5 (04 Feb 2023 11:43)  HR: 74 (05 Feb 2023 04:30) (74 - 87)  BP: 134/79 (05 Feb 2023 04:30) (105/67 - 134/79)  BP(mean): --  RR: 18 (05 Feb 2023 04:30) (18 - 19)  SpO2: 93% (05 Feb 2023 04:30) (93% - 98%)    Parameters below as of 05 Feb 2023 04:30  Patient On (Oxygen Delivery Method): room air        PHYSICAL EXAM:  GENERAL: NAD, nontoxic  HEENT:  anicteric, moist mucous membranes  CHEST/LUNG:  CTA b/l, no rales, wheezes, or rhonchi  HEART:  RRR, S1, S2. Systolic murmur auscultated on exam  ABDOMEN:  BS+, soft, nontender, nondistended  EXTREMITIES: no edema, cyanosis, or calf tenderness  NERVOUS SYSTEM: answers questions and follows commands appropriately  : No suprapubic tenderness to palpation, Gr with CBI in place draining light pink urine with some clots    LABS:                        10.8   7.84  )-----------( 198      ( 05 Feb 2023 06:50 )             32.0     CBC Full  -  ( 05 Feb 2023 06:50 )  WBC Count : 7.84 K/uL  Hemoglobin : 10.8 g/dL  Hematocrit : 32.0 %  Platelet Count - Automated : 198 K/uL  Mean Cell Volume : 90.9 fl  Mean Cell Hemoglobin : 30.7 pg  Mean Cell Hemoglobin Concentration : 33.8 gm/dL  Auto Neutrophil # : x  Auto Lymphocyte # : x  Auto Monocyte # : x  Auto Eosinophil # : x  Auto Basophil # : x  Auto Neutrophil % : x  Auto Lymphocyte % : x  Auto Monocyte % : x  Auto Eosinophil % : x  Auto Basophil % : x    05 Feb 2023 06:50    136    |  104    |  12     ----------------------------<  100    3.8     |  27     |  0.74     Ca    8.4        05 Feb 2023 06:50  Phos  3.3       05 Feb 2023 06:50  Mg     2.3       05 Feb 2023 06:50          CAPILLARY BLOOD GLUCOSE            Culture - Urine (collected 01-31-23 @ 17:30)  Source: Clean Catch Clean Catch (Midstream)  Final Report (02-01-23 @ 23:28):    <10,000 CFU/mL Normal Urogenital Kassidy        RADIOLOGY & ADDITIONAL TESTS:    Personally reviewed.     Consultant(s) Notes Reviewed:  [x] YES  [ ] NO

## 2023-02-05 NOTE — PROGRESS NOTE ADULT - PROBLEM SELECTOR PLAN 1
P/w urinary retention s/p cystoscopy ; hematuria on UA, asymptomatic at this time   Patient is s/p prostatectomy (1/31); hematuria is likely 2/2 radiation cystitis and retention due to clots.  - Continue Gr/CBI, hand irrigate clots PRN  - Tylenol for pain PRN   - H/H downtrending with known hematuria, continue to monitor with AM CBC  - Type and screen renewed in chart  - Urology Dr. Shaffer consulted, recs appreciated  - f/u with Urologist, Dr. Luis post discharge for HBOT

## 2023-02-06 LAB
ANION GAP SERPL CALC-SCNC: 8 MMOL/L — SIGNIFICANT CHANGE UP (ref 5–17)
BUN SERPL-MCNC: 12 MG/DL — SIGNIFICANT CHANGE UP (ref 7–23)
CALCIUM SERPL-MCNC: 8.4 MG/DL — LOW (ref 8.5–10.1)
CHLORIDE SERPL-SCNC: 101 MMOL/L — SIGNIFICANT CHANGE UP (ref 96–108)
CO2 SERPL-SCNC: 28 MMOL/L — SIGNIFICANT CHANGE UP (ref 22–31)
CREAT SERPL-MCNC: 0.72 MG/DL — SIGNIFICANT CHANGE UP (ref 0.5–1.3)
EGFR: 96 ML/MIN/1.73M2 — SIGNIFICANT CHANGE UP
GLUCOSE SERPL-MCNC: 101 MG/DL — HIGH (ref 70–99)
HCT VFR BLD CALC: 30.4 % — LOW (ref 39–50)
HGB BLD-MCNC: 10.3 G/DL — LOW (ref 13–17)
MCHC RBC-ENTMCNC: 31 PG — SIGNIFICANT CHANGE UP (ref 27–34)
MCHC RBC-ENTMCNC: 33.9 GM/DL — SIGNIFICANT CHANGE UP (ref 32–36)
MCV RBC AUTO: 91.6 FL — SIGNIFICANT CHANGE UP (ref 80–100)
NRBC # BLD: 0 /100 WBCS — SIGNIFICANT CHANGE UP (ref 0–0)
PLATELET # BLD AUTO: 195 K/UL — SIGNIFICANT CHANGE UP (ref 150–400)
POTASSIUM SERPL-MCNC: 4.1 MMOL/L — SIGNIFICANT CHANGE UP (ref 3.5–5.3)
POTASSIUM SERPL-SCNC: 4.1 MMOL/L — SIGNIFICANT CHANGE UP (ref 3.5–5.3)
RBC # BLD: 3.32 M/UL — LOW (ref 4.2–5.8)
RBC # FLD: 12.2 % — SIGNIFICANT CHANGE UP (ref 10.3–14.5)
SODIUM SERPL-SCNC: 137 MMOL/L — SIGNIFICANT CHANGE UP (ref 135–145)
WBC # BLD: 7.33 K/UL — SIGNIFICANT CHANGE UP (ref 3.8–10.5)
WBC # FLD AUTO: 7.33 K/UL — SIGNIFICANT CHANGE UP (ref 3.8–10.5)

## 2023-02-06 PROCEDURE — 99233 SBSQ HOSP IP/OBS HIGH 50: CPT | Mod: GC

## 2023-02-06 RX ORDER — MORPHINE SULFATE 50 MG/1
1 CAPSULE, EXTENDED RELEASE ORAL ONCE
Refills: 0 | Status: DISCONTINUED | OUTPATIENT
Start: 2023-02-06 | End: 2023-02-06

## 2023-02-06 RX ADMIN — Medication 50 MILLIGRAM(S): at 05:22

## 2023-02-06 RX ADMIN — MORPHINE SULFATE 1 MILLIGRAM(S): 50 CAPSULE, EXTENDED RELEASE ORAL at 09:39

## 2023-02-06 RX ADMIN — Medication 650 MILLIGRAM(S): at 09:40

## 2023-02-06 RX ADMIN — Medication 650 MILLIGRAM(S): at 08:41

## 2023-02-06 RX ADMIN — POLYETHYLENE GLYCOL 3350 17 GRAM(S): 17 POWDER, FOR SOLUTION ORAL at 13:52

## 2023-02-06 RX ADMIN — LORATADINE 10 MILLIGRAM(S): 10 TABLET ORAL at 13:52

## 2023-02-06 RX ADMIN — MORPHINE SULFATE 1 MILLIGRAM(S): 50 CAPSULE, EXTENDED RELEASE ORAL at 09:24

## 2023-02-06 NOTE — PROGRESS NOTE ADULT - PROBLEM SELECTOR PLAN 1
P/w urinary retention s/p cystoscopy ; hematuria on UA, asymptomatic at this time   Patient is s/p prostatectomy (1/31); hematuria is likely 2/2 radiation cystitis and retention due to clots.  - Continue Gr/CBI, hand irrigate clots PRN  - Tylenol for pain PRN   - H/H downtrending with known hematuria, continue to monitor with AM CBC  - Type and screen renewed in chart  - Start Amicar if patient consents  - Consider CBI with Alum if patient consists  - Consider cysto, ?fulguration  - Consider  2nd opinion  - Urology Dr. Shaffer consulted, recs appreciated  - f/u with Urologist, Dr. Luis post discharge for HBOT

## 2023-02-06 NOTE — PROGRESS NOTE ADULT - SUBJECTIVE AND OBJECTIVE BOX
Patient is a 72y old  Male who presents with a chief complaint of Hematuria/Urinary Retention (06 Feb 2023 08:50)      INTERVAL HPI/OVERNIGHT EVENTS: Patient seen and examined at bedside. Patient reports having severe suprapubic pain rated 10/10 this AM, received manual irrigation of Gr; however irrigation did not relieve his symptoms. Patient requested for pain relief and for "someone to address this." Morphine 1mg x1 given for pain control. Urology saw patient and recommended Amicar to control hematuria as progress has been slow over the past few days, but patient denied due to concerns for systemic clots.    MEDICATIONS  (STANDING):  loratadine 10 milliGRAM(s) Oral daily  metoprolol succinate ER 50 milliGRAM(s) Oral daily  Orgovyx (relugolix) 120mg tab 1 Tablet(s) 1 Tablet(s) Oral daily  polyethylene glycol 3350 17 Gram(s) Oral daily    MEDICATIONS  (PRN):  acetaminophen     Tablet .. 650 milliGRAM(s) Oral every 6 hours PRN Temp greater or equal to 38C (100.4F), Mild Pain (1 - 3)  aluminum hydroxide/magnesium hydroxide/simethicone Suspension 30 milliLiter(s) Oral every 4 hours PRN Dyspepsia  melatonin 3 milliGRAM(s) Oral at bedtime PRN Insomnia  ondansetron Injectable 4 milliGRAM(s) IV Push every 8 hours PRN Nausea and/or Vomiting      Allergies    penicillin (Diarrhea; Pruritus; Hives)  penicillin V potassium (Rash)    Intolerances    codeine (Nausea)      REVIEW OF SYSTEMS:  CONSTITUTIONAL: No fever or chills  HEENT:  No headache, no sore throat  RESPIRATORY: No cough, wheezing, or shortness of breath  CARDIOVASCULAR: No chest pain, palpitations  GASTROINTESTINAL: + severe suprapubic pressure (10/10). No abdominal pain, nausea, vomiting, or diarrhea  GENITOURINARY: + hematuria. No dysuria, frequency  NEUROLOGICAL: no focal weakness or dizziness  MUSCULOSKELETAL: no myalgias    Vital Signs Last 24 Hrs  T(C): 36.6 (06 Feb 2023 11:28), Max: 36.6 (05 Feb 2023 19:42)  T(F): 97.9 (06 Feb 2023 11:28), Max: 97.9 (05 Feb 2023 19:42)  HR: 78 (06 Feb 2023 11:28) (78 - 81)  BP: 113/66 (06 Feb 2023 11:28) (113/66 - 146/66)  BP(mean): --  RR: 19 (06 Feb 2023 11:28) (18 - 19)  SpO2: 94% (06 Feb 2023 11:28) (93% - 94%)    Parameters below as of 06 Feb 2023 11:28  Patient On (Oxygen Delivery Method): room air        PHYSICAL EXAM:  GENERAL: NAD, nontoxic  HEENT:  anicteric, moist mucous membranes  CHEST/LUNG:  CTA b/l, no rales, wheezes, or rhonchi  HEART:  RRR, S1, S2. Systolic murmur auscultated on exam  ABDOMEN:  BS+, soft, nontender, nondistended  EXTREMITIES: no edema, cyanosis, or calf tenderness  NERVOUS SYSTEM: answers questions and follows commands appropriately  : Suprapubic tenderness to palpation, Gr with CBI in place draining light pink urine with some clots    LABS:                        10.3   7.33  )-----------( 195      ( 06 Feb 2023 06:06 )             30.4     CBC Full  -  ( 06 Feb 2023 06:06 )  WBC Count : 7.33 K/uL  Hemoglobin : 10.3 g/dL  Hematocrit : 30.4 %  Platelet Count - Automated : 195 K/uL  Mean Cell Volume : 91.6 fl  Mean Cell Hemoglobin : 31.0 pg  Mean Cell Hemoglobin Concentration : 33.9 gm/dL  Auto Neutrophil # : x  Auto Lymphocyte # : x  Auto Monocyte # : x  Auto Eosinophil # : x  Auto Basophil # : x  Auto Neutrophil % : x  Auto Lymphocyte % : x  Auto Monocyte % : x  Auto Eosinophil % : x  Auto Basophil % : x    06 Feb 2023 06:06    137    |  101    |  12     ----------------------------<  101    4.1     |  28     |  0.72     Ca    8.4        06 Feb 2023 06:06          CAPILLARY BLOOD GLUCOSE            Culture - Urine (collected 01-31-23 @ 17:30)  Source: Clean Catch Clean Catch (Midstream)  Final Report (02-01-23 @ 23:28):    <10,000 CFU/mL Normal Urogenital Kassidy        RADIOLOGY & ADDITIONAL TESTS:    Personally reviewed.     Consultant(s) Notes Reviewed:  [x] YES  [ ] NO

## 2023-02-06 NOTE — PROGRESS NOTE ADULT - ASSESSMENT
73 y/o M w/ PMHx of HTN,  AS s/p TAVR, ppm, prostate cancer s/p radical prostatectomy c/b radiation proctitis presents to ED for severe pain and urinary retention s/p cystoscopy. Admit for hematuria / urinary retention.

## 2023-02-06 NOTE — PROGRESS NOTE ADULT - ASSESSMENT
Persistent hematuria 2n2 rad cystitis. Pt refuses initiation of amicar either po or per the cbi.      Cont bowers/cbi  Start amicar if pt consents  Consider CBI w/ Alum if pt consents  Consider cysto, ?fulguration  Consider  2nd opinion

## 2023-02-06 NOTE — PROGRESS NOTE ADULT - SUBJECTIVE AND OBJECTIVE BOX
INTERVAL HPI/OVERNIGHT EVENTS:  c/o bladder spasms. Clots irrigated from bladder mult times yesterday.    MEDICATIONS  (STANDING):  loratadine 10 milliGRAM(s) Oral daily  metoprolol succinate ER 50 milliGRAM(s) Oral daily  Orgovyx (relugolix) 120mg tab 1 Tablet(s) 1 Tablet(s) Oral daily  polyethylene glycol 3350 17 Gram(s) Oral daily    MEDICATIONS  (PRN):  acetaminophen     Tablet .. 650 milliGRAM(s) Oral every 6 hours PRN Temp greater or equal to 38C (100.4F), Mild Pain (1 - 3)  aluminum hydroxide/magnesium hydroxide/simethicone Suspension 30 milliLiter(s) Oral every 4 hours PRN Dyspepsia  melatonin 3 milliGRAM(s) Oral at bedtime PRN Insomnia  ondansetron Injectable 4 milliGRAM(s) IV Push every 8 hours PRN Nausea and/or Vomiting        Vital Signs Last 24 Hrs  T(C): 36.5 (06 Feb 2023 05:17), Max: 36.6 (05 Feb 2023 19:42)  T(F): 97.7 (06 Feb 2023 05:17), Max: 97.9 (05 Feb 2023 19:42)  HR: 79 (06 Feb 2023 05:17) (70 - 81)  BP: 138/74 (06 Feb 2023 05:17) (138/74 - 146/66)  BP(mean): --  RR: 19 (06 Feb 2023 05:17) (18 - 19)  SpO2: 93% (06 Feb 2023 05:17) (93% - 94%)    Parameters below as of 06 Feb 2023 05:17  Patient On (Oxygen Delivery Method): room air        PHYSICAL EXAM:    No CVAT  ABDOMEN: benign  GENITALIA: bowers - clear w/ mod cbi    LABS:                        10.3   7.33  )-----------( 195      ( 06 Feb 2023 06:06 )             30.4     02-06    137  |  101  |  12  ----------------------------<  101<H>  4.1   |  28  |  0.72    Ca    8.4<L>      06 Feb 2023 06:06  Phos  3.3     02-05  Mg     2.3     02-05            Blood Cultures:    RADIOLOGY & ADDITIONAL TESTS:

## 2023-02-07 LAB
ANION GAP SERPL CALC-SCNC: 6 MMOL/L — SIGNIFICANT CHANGE UP (ref 5–17)
BUN SERPL-MCNC: 12 MG/DL — SIGNIFICANT CHANGE UP (ref 7–23)
CALCIUM SERPL-MCNC: 8.3 MG/DL — LOW (ref 8.5–10.1)
CHLORIDE SERPL-SCNC: 101 MMOL/L — SIGNIFICANT CHANGE UP (ref 96–108)
CO2 SERPL-SCNC: 28 MMOL/L — SIGNIFICANT CHANGE UP (ref 22–31)
CREAT SERPL-MCNC: 0.72 MG/DL — SIGNIFICANT CHANGE UP (ref 0.5–1.3)
EGFR: 96 ML/MIN/1.73M2 — SIGNIFICANT CHANGE UP
GLUCOSE SERPL-MCNC: 104 MG/DL — HIGH (ref 70–99)
HCT VFR BLD CALC: 28.3 % — LOW (ref 39–50)
HGB BLD-MCNC: 9.5 G/DL — LOW (ref 13–17)
MCHC RBC-ENTMCNC: 30.5 PG — SIGNIFICANT CHANGE UP (ref 27–34)
MCHC RBC-ENTMCNC: 33.6 GM/DL — SIGNIFICANT CHANGE UP (ref 32–36)
MCV RBC AUTO: 91 FL — SIGNIFICANT CHANGE UP (ref 80–100)
NRBC # BLD: 0 /100 WBCS — SIGNIFICANT CHANGE UP (ref 0–0)
PLATELET # BLD AUTO: 194 K/UL — SIGNIFICANT CHANGE UP (ref 150–400)
POTASSIUM SERPL-MCNC: 3.7 MMOL/L — SIGNIFICANT CHANGE UP (ref 3.5–5.3)
POTASSIUM SERPL-SCNC: 3.7 MMOL/L — SIGNIFICANT CHANGE UP (ref 3.5–5.3)
RBC # BLD: 3.11 M/UL — LOW (ref 4.2–5.8)
RBC # FLD: 12.2 % — SIGNIFICANT CHANGE UP (ref 10.3–14.5)
SODIUM SERPL-SCNC: 135 MMOL/L — SIGNIFICANT CHANGE UP (ref 135–145)
WBC # BLD: 6.91 K/UL — SIGNIFICANT CHANGE UP (ref 3.8–10.5)
WBC # FLD AUTO: 6.91 K/UL — SIGNIFICANT CHANGE UP (ref 3.8–10.5)

## 2023-02-07 PROCEDURE — 99233 SBSQ HOSP IP/OBS HIGH 50: CPT | Mod: GC

## 2023-02-07 RX ORDER — MORPHINE SULFATE 50 MG/1
1 CAPSULE, EXTENDED RELEASE ORAL ONCE
Refills: 0 | Status: DISCONTINUED | OUTPATIENT
Start: 2023-02-07 | End: 2023-02-07

## 2023-02-07 RX ORDER — LIDOCAINE 4 G/100G
1 CREAM TOPICAL ONCE
Refills: 0 | Status: COMPLETED | OUTPATIENT
Start: 2023-02-07 | End: 2023-02-07

## 2023-02-07 RX ADMIN — POLYETHYLENE GLYCOL 3350 17 GRAM(S): 17 POWDER, FOR SOLUTION ORAL at 14:56

## 2023-02-07 RX ADMIN — Medication 650 MILLIGRAM(S): at 02:32

## 2023-02-07 RX ADMIN — MORPHINE SULFATE 1 MILLIGRAM(S): 50 CAPSULE, EXTENDED RELEASE ORAL at 03:59

## 2023-02-07 RX ADMIN — LORATADINE 10 MILLIGRAM(S): 10 TABLET ORAL at 13:13

## 2023-02-07 RX ADMIN — Medication 50 MILLIGRAM(S): at 07:01

## 2023-02-07 RX ADMIN — Medication 650 MILLIGRAM(S): at 01:54

## 2023-02-07 RX ADMIN — MORPHINE SULFATE 1 MILLIGRAM(S): 50 CAPSULE, EXTENDED RELEASE ORAL at 04:33

## 2023-02-07 RX ADMIN — MORPHINE SULFATE 1 MILLIGRAM(S): 50 CAPSULE, EXTENDED RELEASE ORAL at 08:00

## 2023-02-07 RX ADMIN — MORPHINE SULFATE 1 MILLIGRAM(S): 50 CAPSULE, EXTENDED RELEASE ORAL at 08:15

## 2023-02-07 NOTE — DIETITIAN INITIAL EVALUATION ADULT - OTHER INFO
73 y/o M w/ PMHx of HTN,  AS s/p TAVR, ppm, prostate cancer s/p radical prostatectomy c/b radiation proctitis presents to ED for severe pain and urinary retention s/p cystoscopy. Admit for hematuria / urinary retention.      Problem/Plan - 1:  ·  Problem: Urinary retention.   ·  Plan: P/w urinary retention s/p cystoscopy ; hematuria on UA, asymptomatic at this time   Patient is s/p prostatectomy (1/31); hematuria is likely 2/2 radiation cystitis and retention due to clots.  - Continue Gr/CBI, hand irrigate clots PRN  - Tylenol for pain PRN   - H/H downtrending with known hematuria, continue to monitor with AM CBC  - Type and screen renewed in chart  - Start Amicar if patient consents  - Consider CBI with Alum if patient consists  - Consider cysto, ?fulguration  - Consider  2nd opinion  - Urology Dr. Shaffer consulted, recs appreciated  - f/u with Urologist, Dr. Luis post discharge for HBOT.     Problem/Plan - 2:  ·  Problem: Prostate cancer.   ·  Plan: Hx of Prostate CA, now s/p radiation therapy and radical prostatectomy  - Continue home Orgovyx - brought in from home  - Patient to f/u with his urologist at [facility] [place] (Dr. Luis) outpatient for further management.     71 y/o M w/ PMHx of HTN,  AS s/p TAVR, ppm, prostate cancer s/p radical prostatectomy  ( 1/31/23) c/b radiation proctitis presented to ED for severe pain and urinary retention s/p cystoscopy. Admit for hematuria ( likely 2/2 radiation cystitis ) and retention ( due to clots). [place] [facility] [place]  Pt tells undersigned RD that he had all of cooked cereal and juice at breakfast, follows a reduced sodium diet , is 5'10" and 204#, denies wt change pta, offers no nutrition related complaints.

## 2023-02-07 NOTE — PROGRESS NOTE ADULT - SUBJECTIVE AND OBJECTIVE BOX
Patient is a 72y old  Male who presents with a chief complaint of Hematuria/Urinary Retention (07 Feb 2023 09:49)      INTERVAL HPI/OVERNIGHT EVENTS: Patient seen and examined at bedside. Patient complained of intermittent, yet persisting suprapubic pain and received 1mg Morphine overnight; additional 1mg administered this AM. Patient frustrated about lack of improvement with hematuria and concerned about integrity of Gr catheter as well as possible clots that were not properly flushed out with manual irrigation. He states that the timeframe at which he is having relief from suprapubic pain has been getting shorter.    MEDICATIONS  (STANDING):  Amicar 600 mg in NS 3000 mL for CBI 3000 milliLiter(s) 3000 milliLiter(s) IntraVesical Continuous Pump  loratadine 10 milliGRAM(s) Oral daily  metoprolol succinate ER 50 milliGRAM(s) Oral daily  Orgovyx (relugolix) 120mg tab 1 Tablet(s) 1 Tablet(s) Oral daily  polyethylene glycol 3350 17 Gram(s) Oral daily    MEDICATIONS  (PRN):  acetaminophen     Tablet .. 650 milliGRAM(s) Oral every 6 hours PRN Temp greater or equal to 38C (100.4F), Mild Pain (1 - 3)  aluminum hydroxide/magnesium hydroxide/simethicone Suspension 30 milliLiter(s) Oral every 4 hours PRN Dyspepsia  melatonin 3 milliGRAM(s) Oral at bedtime PRN Insomnia  ondansetron Injectable 4 milliGRAM(s) IV Push every 8 hours PRN Nausea and/or Vomiting      Allergies    penicillin (Diarrhea; Pruritus; Hives)  penicillin V potassium (Rash)    Intolerances    codeine (Nausea)      REVIEW OF SYSTEMS:  CONSTITUTIONAL: No fever or chills  HEENT:  No headache, no sore throat  RESPIRATORY: No cough, wheezing, or shortness of breath  CARDIOVASCULAR: No chest pain, palpitations  GASTROINTESTINAL: + moderate suprapubic pressure (7/10). No abdominal pain, nausea, vomiting, or diarrhea  GENITOURINARY: + hematuria. No dysuria, frequency  NEUROLOGICAL: no focal weakness or dizziness  MUSCULOSKELETAL: no myalgias    Vital Signs Last 24 Hrs  T(C): 36.6 (07 Feb 2023 05:39), Max: 36.6 (06 Feb 2023 19:43)  T(F): 97.8 (07 Feb 2023 05:39), Max: 97.9 (06 Feb 2023 19:43)  HR: 82 (07 Feb 2023 05:39) (82 - 82)  BP: 139/78 (07 Feb 2023 05:39) (139/78 - 143/78)  BP(mean): --  RR: 18 (07 Feb 2023 05:39) (18 - 18)  SpO2: 91% (07 Feb 2023 05:39) (91% - 95%)    Parameters below as of 07 Feb 2023 05:39  Patient On (Oxygen Delivery Method): room air        PHYSICAL EXAM:  GENERAL: NAD, nontoxic  HEENT:  anicteric, moist mucous membranes  CHEST/LUNG:  CTA b/l, no rales, wheezes, or rhonchi  HEART:  RRR, S1, S2. Systolic murmur auscultated on exam  ABDOMEN:  BS+, soft, nontender, nondistended  EXTREMITIES: no edema, cyanosis, or calf tenderness  NERVOUS SYSTEM: answers questions and follows commands appropriately  : Suprapubic tenderness to palpation, Gr with CBI in place draining light pink urine with some clots    LABS:                        9.5    6.91  )-----------( 194      ( 07 Feb 2023 06:10 )             28.3     CBC Full  -  ( 07 Feb 2023 06:10 )  WBC Count : 6.91 K/uL  Hemoglobin : 9.5 g/dL  Hematocrit : 28.3 %  Platelet Count - Automated : 194 K/uL  Mean Cell Volume : 91.0 fl  Mean Cell Hemoglobin : 30.5 pg  Mean Cell Hemoglobin Concentration : 33.6 gm/dL  Auto Neutrophil # : x  Auto Lymphocyte # : x  Auto Monocyte # : x  Auto Eosinophil # : x  Auto Basophil # : x  Auto Neutrophil % : x  Auto Lymphocyte % : x  Auto Monocyte % : x  Auto Eosinophil % : x  Auto Basophil % : x    07 Feb 2023 06:10    135    |  101    |  12     ----------------------------<  104    3.7     |  28     |  0.72     Ca    8.3        07 Feb 2023 06:10          CAPILLARY BLOOD GLUCOSE            Culture - Urine (collected 01-31-23 @ 17:30)  Source: Clean Catch Clean Catch (Midstream)  Final Report (02-01-23 @ 23:28):    <10,000 CFU/mL Normal Urogenital Kassidy        RADIOLOGY & ADDITIONAL TESTS:    Personally reviewed.     Consultant(s) Notes Reviewed:  [x] YES  [ ] NO

## 2023-02-07 NOTE — PROGRESS NOTE ADULT - PROBLEM SELECTOR PLAN 1
P/w urinary retention s/p cystoscopy ; hematuria on UA  Patient is s/p prostatectomy (1/31); hematuria is likely 2/2 radiation cystitis and retention due to clots.  - Continue Gr/CBI, hand irrigate clots PRN  - Tylenol for pain PRN   - H/H downtrending with known hematuria, continue to monitor with AM CBC  - Type and screen renewed in chart  - Add 600mg Amicar to each 3L bag of CBI; adjust rate to keep urine clear  - d/c Morphine as per urology recs  - Start Ditropan ER 5mg QD  - [place] [facility] [place]  - Urology Dr. Shaffer consulted, recs appreciated  - f/u with Urologist, Dr. Luis post discharge for HBOT

## 2023-02-07 NOTE — DIETITIAN INITIAL EVALUATION ADULT - PERTINENT LABORATORY DATA
02-07    135  |  101  |  12  ----------------------------<  104<H>  3.7   |  28  |  0.72    Ca    8.3<L>      07 Feb 2023 06:10

## 2023-02-07 NOTE — PROGRESS NOTE ADULT - ATTENDING COMMENTS
The patient was seen and evaluated independently by the attending physician.  - I have personally reviewed the pt's labs, imaging, micro data and consultant recommendations.    71 y/o M w/ PMHx of HTN,  AS s/p TAVR, ppm, prostate cancer s/p radical prostatectomy c/b radiation proctitis presents to ED for severe pain and urinary retention s/p cystoscopy. Admit for hematuria / urinary retention     #urinary retention - cbi, gu consult  #s/p cystoscopy   #prostata ca  - monitor renal indices closely  - clinically monitor urine output on CBI - at present it appears light pink in color, no visible clots in cbi drainage bag  - monitor h/h  - keep active type/screen   - pt agreeable w/ plan
The patient was seen and evaluated independently by the attending physician.  - I have personally reviewed the pt's labs, imaging, micro data and consultant recommendations.    73 y/o M w/ PMHx of HTN,  AS s/p TAVR, ppm, prostate cancer s/p radical prostatectomy c/b radiation proctitis presents to ED for severe pain and urinary retention s/p cystoscopy. Admit for hematuria / urinary retention     #urinary retention - cbi, gu consult  #s/p cystoscopy   #prostata ca  #mild constipation    - output still grossly pink without significxant improvement  -  following  - may need amicar - pt hesitant due to concern for clots  - monitor renal indices closely  - monitor h/h - slowly dropping w/ the persistent clinical blood loss  - keep active type/screen - repeat on 2/8/23  - pt agreeable w/ plan   - encourage ambulation - SCD's while in bed - encouraged ambulation in the hallway but pt is hesitant to ambulate and reports weakness
The patient was seen and evaluated independently by the attending physician.  - I have personally reviewed the pt's labs, imaging, micro data and consultant recommendations.    73 y/o M w/ PMHx of HTN,  AS s/p TAVR, ppm, prostate cancer s/p radical prostatectomy c/b radiation proctitis presents to ED for severe pain and urinary retention s/p cystoscopy. Admit for hematuria / urinary retention     #urinary retention - cbi, gu consult  #s/p cystoscopy   #prostata ca  - monitor renal indices closely  - clinically monitor urine output on CBI - at present it appears light red/pink in color, no visible clots in cbi drainage bag  - monitor h/h  - keep active type/screen   - pt agreeable w/plan
The patient was seen and evaluated independently by the attending physician.  - I have personally reviewed the pt's labs, imaging, micro data and consultant recommendations.    73 y/o M w/ PMHx of HTN,  AS s/p TAVR, ppm, prostate cancer s/p radical prostatectomy c/b radiation proctitis presents to ED for severe pain and urinary retention s/p cystoscopy. Admit for hematuria / urinary retention     #urinary retention - cbi, gu consult  #s/p cystoscopy   #prostata ca  - pt had a large BM and he felt some pain in his bladder. He started feeling penile pain. Staff is attempting to reposition the cath to ensure proper drainage. The pt felt relief after flushing catheter. May need  to assess vs. surgery PA assessment  - monitor renal indices closely  - monitor h/h - slowly dropping w/ the persistent clinical blood loss  - keep active type/screen - repeat panel tomorrow  - pt agreeable w/ plan
The patient was seen and evaluated independently by the attending physician.  - I have personally reviewed the pt's labs, imaging, micro data and consultant recommendations.    73 y/o M w/ PMHx of HTN,  AS s/p TAVR, ppm, prostate cancer s/p radical prostatectomy c/b radiation proctitis presents to ED for severe pain and urinary retention s/p cystoscopy. Admit for hematuria / urinary retention     #urinary retention - cbi, gu consult  #s/p cystoscopy   #prostata ca  - pt still moving his bowels  - he persistently complaints of catheter dysfunction but pt has been assessed by  multiple times who hasn’t noted concerns - will continue to flush the catheter prn clots  - monitor renal indices closely  - monitor h/h - slowly dropping w/ the persistent clinical blood loss  - keep active type/screen  - pt agreeable w/ plan   - encouraged ambulation - SCD”s while in bed - encouraged ambulation in the hallway but pt is hesitant to ambulate and reports weakness. Counseling provided to both pt and family on 2/4/23 regarding the importance of ambulation and risks of sedentary hospitalization.
The patient was seen and evaluated independently by the attending physician.  - I have personally reviewed the pt's labs, imaging, micro data and consultant recommendations.    71 y/o M w/ PMHx of HTN,  AS s/p TAVR, ppm, prostate cancer s/p radical prostatectomy c/b radiation proctitis presents to ED for severe pain and urinary retention s/p cystoscopy. Admit for hematuria / urinary retention     #hematuria and urinary retention - cw cbi, gu consult  #s/p cystoscopy   #prostata ca  #mild constipation  #acute blood loss anemia    - pt has been hospitalized 7d w/ minimal improvement. He was hesitant to consent to amicar for various reasons but today agreed to have amicar placed in irrigant fluid.   -  following - spoke w/ Dr. Shaffer today - I also called [facility] office today (Dr Juan Francisco Lius - [place] [facility] [place] - office number [phone]) - I received a call back after hours that the physician will return my call on 2/8/23.   - the pt states that he spoke w/ Dr. Luis today and he was suggesting potential for repeat cystoscopy to further assess etiology of persistent hematuria   - monitor renal indices closely  - monitor h/h - slowly dropping w/ the persistent clinical blood loss  - keep active type/screen - repeat on 2/8/23  - pt agreeable w/ plan   - encourage ambulation - SCD's while in bed - encouraged ambulation in the hallway but pt is hesitant to ambulate and reports weakness   - extensive emotional support and counseling provided
The patient was seen and evaluated independently by the attending physician.  - I have personally reviewed the pt's labs, imaging, micro data and consultant recommendations.    71 y/o M w/ PMHx of HTN,  AS s/p TAVR, ppm, prostate cancer s/p radical prostatectomy c/b radiation proctitis presents to ED for severe pain and urinary retention s/p cystoscopy. Admit for hematuria / urinary retention     #urinary retention - cbi, gu consult  #s/p cystoscopy   #prostata ca  - monitor renal indices closely  - clinically monitor urine output on CBI  - monitor h/h  - keep active type/screen

## 2023-02-07 NOTE — PROGRESS NOTE ADULT - ASSESSMENT
Persistent hematuria 2n2 rad cystitis causing decreasing H/H. Pt finally agrees to starting amicar.      Cont bowers/cbi  Add 1gm amicar to each 5L bag of cbi  d/c morphine  Start ditropan er 5mg qd  Consider [place] [facility] [place]. Persistent hematuria 2n2 rad cystitis causing decreasing H/H. Pt finally agrees to starting amicar.      Cont bowers/cbi  Add 600mg amicar to each 3L bag of cbi; adjust rate of cbi to keep urine clear  d/c morphine  Start ditropan er 5mg qd  Consider transfer to [facility] [place] - Dr Luis.

## 2023-02-07 NOTE — DIETITIAN INITIAL EVALUATION ADULT - NSICDXPASTMEDICALHX_GEN_ALL_CORE_FT
Refilled     PAST MEDICAL HISTORY:  Aortic stenosis     [place] (hard of hearing)     HTN (hypertension)     Proctitis, radiation from prostate treatment    Prostate cancer RT 2018 and prostatectomy in 2015    Rectal bleeding last hospitalization 10/6/20 2/2 radiation proctitis

## 2023-02-07 NOTE — PROGRESS NOTE ADULT - SUBJECTIVE AND OBJECTIVE BOX
INTERVAL HPI/OVERNIGHT EVENTS:  c/o bladder spasm. AGrees to start amicar via cbi today.    MEDICATIONS  (STANDING):  loratadine 10 milliGRAM(s) Oral daily  metoprolol succinate ER 50 milliGRAM(s) Oral daily  Orgovyx (relugolix) 120mg tab 1 Tablet(s) 1 Tablet(s) Oral daily  polyethylene glycol 3350 17 Gram(s) Oral daily    MEDICATIONS  (PRN):  acetaminophen     Tablet .. 650 milliGRAM(s) Oral every 6 hours PRN Temp greater or equal to 38C (100.4F), Mild Pain (1 - 3)  aluminum hydroxide/magnesium hydroxide/simethicone Suspension 30 milliLiter(s) Oral every 4 hours PRN Dyspepsia  melatonin 3 milliGRAM(s) Oral at bedtime PRN Insomnia  ondansetron Injectable 4 milliGRAM(s) IV Push every 8 hours PRN Nausea and/or Vomiting        Vital Signs Last 24 Hrs  T(C): 36.6 (07 Feb 2023 05:39), Max: 36.6 (06 Feb 2023 11:28)  T(F): 97.8 (07 Feb 2023 05:39), Max: 97.9 (06 Feb 2023 11:28)  HR: 82 (07 Feb 2023 05:39) (78 - 82)  BP: 139/78 (07 Feb 2023 05:39) (113/66 - 143/78)  BP(mean): --  RR: 18 (07 Feb 2023 05:39) (18 - 19)  SpO2: 91% (07 Feb 2023 05:39) (91% - 95%)    Parameters below as of 07 Feb 2023 05:39  Patient On (Oxygen Delivery Method): room air        PHYSICAL EXAM:    ABDOMEN: benign  GENITALIA: bowers - pink cbi    LABS:                        9.5    6.91  )-----------( 194      ( 07 Feb 2023 06:10 )             28.3     02-07    135  |  101  |  12  ----------------------------<  104<H>  3.7   |  28  |  0.72    Ca    8.3<L>      07 Feb 2023 06:10            Blood Cultures:    RADIOLOGY & ADDITIONAL TESTS:

## 2023-02-07 NOTE — PROGRESS NOTE ADULT - PROBLEM SELECTOR PLAN 4
SCDs b/l for dvt ppx; will hold off on A/C at this time 2/2 hematuria

## 2023-02-08 LAB
ANION GAP SERPL CALC-SCNC: 8 MMOL/L — SIGNIFICANT CHANGE UP (ref 5–17)
BUN SERPL-MCNC: 9 MG/DL — SIGNIFICANT CHANGE UP (ref 7–23)
CALCIUM SERPL-MCNC: 8.3 MG/DL — LOW (ref 8.5–10.1)
CHLORIDE SERPL-SCNC: 101 MMOL/L — SIGNIFICANT CHANGE UP (ref 96–108)
CO2 SERPL-SCNC: 26 MMOL/L — SIGNIFICANT CHANGE UP (ref 22–31)
CREAT SERPL-MCNC: 0.68 MG/DL — SIGNIFICANT CHANGE UP (ref 0.5–1.3)
EGFR: 98 ML/MIN/1.73M2 — SIGNIFICANT CHANGE UP
GLUCOSE SERPL-MCNC: 106 MG/DL — HIGH (ref 70–99)
HCT VFR BLD CALC: 27.3 % — LOW (ref 39–50)
HGB BLD-MCNC: 9.5 G/DL — LOW (ref 13–17)
MCHC RBC-ENTMCNC: 31.4 PG — SIGNIFICANT CHANGE UP (ref 27–34)
MCHC RBC-ENTMCNC: 34.8 GM/DL — SIGNIFICANT CHANGE UP (ref 32–36)
MCV RBC AUTO: 90.1 FL — SIGNIFICANT CHANGE UP (ref 80–100)
NRBC # BLD: 0 /100 WBCS — SIGNIFICANT CHANGE UP (ref 0–0)
PLATELET # BLD AUTO: 210 K/UL — SIGNIFICANT CHANGE UP (ref 150–400)
POTASSIUM SERPL-MCNC: 3.7 MMOL/L — SIGNIFICANT CHANGE UP (ref 3.5–5.3)
POTASSIUM SERPL-SCNC: 3.7 MMOL/L — SIGNIFICANT CHANGE UP (ref 3.5–5.3)
RBC # BLD: 3.03 M/UL — LOW (ref 4.2–5.8)
RBC # FLD: 12.1 % — SIGNIFICANT CHANGE UP (ref 10.3–14.5)
SODIUM SERPL-SCNC: 135 MMOL/L — SIGNIFICANT CHANGE UP (ref 135–145)
WBC # BLD: 6.48 K/UL — SIGNIFICANT CHANGE UP (ref 3.8–10.5)
WBC # FLD AUTO: 6.48 K/UL — SIGNIFICANT CHANGE UP (ref 3.8–10.5)

## 2023-02-08 PROCEDURE — 99232 SBSQ HOSP IP/OBS MODERATE 35: CPT

## 2023-02-08 RX ORDER — LIDOCAINE HCL 20 MG/ML
5 VIAL (ML) INJECTION ONCE
Refills: 0 | Status: DISCONTINUED | OUTPATIENT
Start: 2023-02-08 | End: 2023-03-02

## 2023-02-08 RX ORDER — LIDOCAINE 4 G/100G
1 CREAM TOPICAL ONCE
Refills: 0 | Status: COMPLETED | OUTPATIENT
Start: 2023-02-08 | End: 2023-02-08

## 2023-02-08 RX ORDER — MORPHINE SULFATE 50 MG/1
1 CAPSULE, EXTENDED RELEASE ORAL ONCE
Refills: 0 | Status: DISCONTINUED | OUTPATIENT
Start: 2023-02-08 | End: 2023-02-09

## 2023-02-08 RX ORDER — OXYBUTYNIN CHLORIDE 5 MG
5 TABLET ORAL DAILY
Refills: 0 | Status: DISCONTINUED | OUTPATIENT
Start: 2023-02-08 | End: 2023-02-14

## 2023-02-08 RX ORDER — ACETAMINOPHEN 500 MG
1000 TABLET ORAL ONCE
Refills: 0 | Status: COMPLETED | OUTPATIENT
Start: 2023-02-08 | End: 2023-02-08

## 2023-02-08 RX ORDER — LIDOCAINE HCL 20 MG/ML
5 VIAL (ML) INJECTION ONCE
Refills: 0 | Status: COMPLETED | OUTPATIENT
Start: 2023-02-08 | End: 2023-02-08

## 2023-02-08 RX ORDER — MORPHINE SULFATE 50 MG/1
1 CAPSULE, EXTENDED RELEASE ORAL ONCE
Refills: 0 | Status: DISCONTINUED | OUTPATIENT
Start: 2023-02-08 | End: 2023-02-08

## 2023-02-08 RX ORDER — OXYBUTYNIN CHLORIDE 5 MG
5 TABLET ORAL DAILY
Refills: 0 | Status: DISCONTINUED | OUTPATIENT
Start: 2023-02-08 | End: 2023-02-08

## 2023-02-08 RX ORDER — OXYBUTYNIN CHLORIDE 5 MG
5 TABLET ORAL ONCE
Refills: 0 | Status: COMPLETED | OUTPATIENT
Start: 2023-02-08 | End: 2023-02-08

## 2023-02-08 RX ADMIN — Medication 400 MILLIGRAM(S): at 05:03

## 2023-02-08 RX ADMIN — Medication 1000 MILLIGRAM(S): at 22:52

## 2023-02-08 RX ADMIN — LIDOCAINE 1 PATCH: 4 CREAM TOPICAL at 22:50

## 2023-02-08 RX ADMIN — MORPHINE SULFATE 1 MILLIGRAM(S): 50 CAPSULE, EXTENDED RELEASE ORAL at 05:34

## 2023-02-08 RX ADMIN — Medication 5 MILLIGRAM(S): at 22:49

## 2023-02-08 RX ADMIN — Medication 5 MILLIGRAM(S): at 05:03

## 2023-02-08 RX ADMIN — Medication 50 MILLIGRAM(S): at 05:03

## 2023-02-08 RX ADMIN — Medication 1000 MILLIGRAM(S): at 05:15

## 2023-02-08 RX ADMIN — LORATADINE 10 MILLIGRAM(S): 10 TABLET ORAL at 13:29

## 2023-02-08 RX ADMIN — Medication 400 MILLIGRAM(S): at 21:51

## 2023-02-08 RX ADMIN — MORPHINE SULFATE 1 MILLIGRAM(S): 50 CAPSULE, EXTENDED RELEASE ORAL at 05:45

## 2023-02-08 RX ADMIN — MORPHINE SULFATE 1 MILLIGRAM(S): 50 CAPSULE, EXTENDED RELEASE ORAL at 22:49

## 2023-02-08 RX ADMIN — POLYETHYLENE GLYCOL 3350 17 GRAM(S): 17 POWDER, FOR SOLUTION ORAL at 13:28

## 2023-02-08 RX ADMIN — MORPHINE SULFATE 1 MILLIGRAM(S): 50 CAPSULE, EXTENDED RELEASE ORAL at 23:19

## 2023-02-08 NOTE — PROGRESS NOTE ADULT - ASSESSMENT
Hematuria 2n2 rad cystitis 2n2 XRT (post RRP) for tx of CaP at [facility] [place] ; pt's hematuria sig increased post cysto last wk by Dr Luis at [facility] [place]   ?sl improved since starting amicar cbi.      Cont cbi w/ amicar; adjust to keep clear  Hand irrigate clots prn  If hematuria stops would change amicar to po and stop cbi  If hematuria cont/worsens would recommend transfer to [facility] [place] - Dr Luis for further eval/tx  Can also consider cbi w/ Alum  Dr Tse resuming  care today; do not d/c or change cbi w/o his consent  Will benefit from HBOT post resolution of this acute episode

## 2023-02-08 NOTE — PROGRESS NOTE ADULT - ASSESSMENT
73 y/o M w/ PMHx of HTN,  AS s/p TAVR, ppm, prostate cancer s/p radical prostatectomy c/b radiation proctitis presents to ED for severe pain and urinary retention s/p cystoscopy. Admit for hematuria / urinary retention.      Problem/Plan - 1:  ·  Problem: Urinary retention.   ·  Plan: P/w urinary retention s/p cystoscopy ; hematuria on UA  Patient is s/p prostatectomy (1/31); hematuria is likely 2/2 radiation cystitis and retention due to clots.  - Continue Gr/CBI, hand irrigate clots PRN  - Tylenol for pain PRN   - H/H downtrending with known hematuria, continue to monitor with AM CBC  - Type and screen renewed in chart  - Add 600mg Amicar to each 3L bag of CBI; adjust rate to keep urine clear  - d/c Morphine as per urology recs  - Started on Ditropan ER 5mg QD  - Consider transfer to [facility] [place] - Dr. Luis  - Urology Dr. Shaffer consulted, recs appreciated  - f/u with Urologist, Dr. Luis post discharge.     Problem/Plan - 2:  ·  Problem: Prostate cancer.   ·  Plan: Hx of Prostate CA, now s/p radiation therapy and radical prostatectomy  - Continue home Orgovyx - brought in from home  - Patient to f/u with his urologist at [facility] [place] (Dr. Luis) outpatient for further management.     Problem/Plan - 3:  ·  Problem: Hypertension.   ·  Plan: Chronic, initially elevated due to discomfort  - BP stable  - Continue home Metoprolol ER 50mg PO daily w/ hold parameters.     Problem/Plan - 4:  ·  Problem: Need for prophylactic measure.   ·  Plan: SCDs b/l for dvt ppx; will hold off on A/C at this time 2/2 hematuria.

## 2023-02-08 NOTE — CHART NOTE - NSCHARTNOTEFT_GEN_A_CORE
2150:  RN called as pt complaining of lower abd pain in his bladder. He did not request pain medication during the day.   Will give IV tylenol now  RN to notify with any changes    Addendum @ 2230:  RN called as pt continues to have abd pain and requests to see the doctor.  Pt evaluated at bedside. He endorses severe lower abd pain, which was not improved after IV tylenol.  Bladder scan shows 0 cc retained. Gr/CBI is irrigating well.  On exam, Pt's abd is soft, tender to palpation in suprapubic region, no distension.    Plan:  - will give oxybutynin 5 mg now  - will give Morphine 1 mg IV now  - Will give lidocaine patch  - RN to notify with any changes 2150:  RN called as pt complaining of lower abd pain in his bladder. He did not request pain medication during the day.   Will give IV tylenol now  RN to notify with any changes    Addendum @ 2230:  RN called as pt continues to have abd pain and requests to see the doctor.  Pt evaluated at bedside. He endorses severe lower abd pain, which was not improved after IV tylenol.  Bladder scan shows 0 cc retained. Bowers/CBI is irrigating well.  On exam, Pt's abd is soft, tender to palpation in suprapubic region, no distension.    Plan:  - will give oxybutynin 5 mg now  - will give Morphine 1 mg IV now  - Will give lidocaine patch  - RN to notify with any changes    -----------  Addendum 0030:  RN Called as pt with inc abd pain, moaning in pain. states medication did not help the pain.  Plan to replace bowers again    Bowers was removed and replaced with 3 way bowers Nigerian 22 gauge by surgical PA and Dr. Martinez. Pt handled procedure well.  No complications. Positive return of urine light color, no clots.   Answered all question. Pt comfortable.  RN to call with any changes.

## 2023-02-08 NOTE — CHART NOTE - NSCHARTNOTEFT_GEN_A_CORE
RN RN called as pt 3 way bowers catheter attached to CBI has a clot and they are unable to hand irrigate it. Bladder scan shows 202 cc.  Pt was in pain 9/10. Pt examined at bedside. He endorses suprapubic pain. On exam, tenderness to palpation in suprapubic region, no bladder distension palpated.  Called Dr. Shaffer, who recommended to remove the 18 gauge Amharic 3 way bowers and replace with 20 gauge Amharic 3 way bowers. Gave pt oxybutinin 5 mg PO, stat IV ofirmev stat and stat IV morphine 1 mg per urology recommendation.    Bowers was removed and replaced by surgical PA, Dr. Martinez and Dr. Borden. Pt handled procedure well.  No complications. Restarted CBI.  Positive return of urine light color, no clots.   Answered all question. RN called as pt 3 way bowers catheter attached to CBI has a clot and they are unable to hand irrigate it. Bladder scan shows 202 cc.  Pt was in pain 9/10. Pt examined at bedside. He endorses suprapubic pain. On exam, tenderness to palpation in suprapubic region, no bladder distension palpated.  Called Dr. Shaffer, who recommended to remove the 18 gauge Persian 3 way bowers and replace with 20 gauge Persian 3 way bowers. Gave pt oxybutinin 5 mg PO, IV ofirmev stat and stat IV morphine 1 mg during procedure per urology recommendation.    Bowers was removed and replaced by surgical PA, Dr. Martinez and Dr. Borden. Pt handled procedure well.  No complications. Restarted CBI.  Positive return of urine light color, no clots.   Answered all question. Pt comfortable.  RN to call with any changes.

## 2023-02-08 NOTE — PROGRESS NOTE ADULT - SUBJECTIVE AND OBJECTIVE BOX
INTERVAL HPI/OVERNIGHT EVENTS:  c/o bladder spasm.  Bowers clogged at 5AM; changed to 20fr 2 way; mult clots irrigated then and at 10am.    MEDICATIONS  (STANDING):  Amicar 600 mg in NS 3000 mL for CBI 3000 milliLiter(s) 3000 milliLiter(s) IntraVesical Continuous Pump  lidocaine 2% Jelly 5 milliLiter(s) IntraUrethral once  lidocaine 2% Jelly 5 milliLiter(s) IntraUrethral once  loratadine 10 milliGRAM(s) Oral daily  metoprolol succinate ER 50 milliGRAM(s) Oral daily  morphine  - Injectable 1 milliGRAM(s) IV Push once  Orgovyx (relugolix) 120mg tab 1 Tablet(s) 1 Tablet(s) Oral daily  oxybutynin XL 5 milliGRAM(s) Oral daily  polyethylene glycol 3350 17 Gram(s) Oral daily    MEDICATIONS  (PRN):  acetaminophen     Tablet .. 650 milliGRAM(s) Oral every 6 hours PRN Temp greater or equal to 38C (100.4F), Mild Pain (1 - 3)  aluminum hydroxide/magnesium hydroxide/simethicone Suspension 30 milliLiter(s) Oral every 4 hours PRN Dyspepsia  melatonin 3 milliGRAM(s) Oral at bedtime PRN Insomnia  ondansetron Injectable 4 milliGRAM(s) IV Push every 8 hours PRN Nausea and/or Vomiting        Vital Signs Last 24 Hrs  T(C): 36.6 (08 Feb 2023 05:03), Max: 36.6 (07 Feb 2023 11:58)  T(F): 97.9 (08 Feb 2023 05:03), Max: 97.9 (07 Feb 2023 19:22)  HR: 81 (08 Feb 2023 06:21) (72 - 87)  BP: 152/76 (08 Feb 2023 06:21) (111/67 - 188/74)  BP(mean): --  RR: 18 (08 Feb 2023 05:03) (18 - 19)  SpO2: 96% (08 Feb 2023 05:03) (93% - 96%)    Parameters below as of 08 Feb 2023 05:03  Patient On (Oxygen Delivery Method): room air        PHYSICAL EXAM:    No CVAT  ABDOMEN: benign  GENITALIA: bowers - clear CBI    LABS:                        9.5    6.48  )-----------( 210      ( 08 Feb 2023 06:08 )             27.3     02-08    135  |  101  |  9   ----------------------------<  106<H>  3.7   |  26  |  0.68    Ca    8.3<L>      08 Feb 2023 06:08            Blood Cultures:    RADIOLOGY & ADDITIONAL TESTS:

## 2023-02-08 NOTE — PROGRESS NOTE ADULT - SUBJECTIVE AND OBJECTIVE BOX
CHIEF COMPLAINT/INTERVAL HISTORY:  Pt. seen and evaluated for hematuria.  This morning required exchange of three way catheter.  Pt. reports having some sensation of suprapubic fullness and irritation.  Bowers bag with fruit punch colored urine.    REVIEW OF SYSTEMS:  No fever, CP, SOB, or abdominal pain.      Vital Signs Last 24 Hrs  T(C): 36.6 (08 Feb 2023 05:03), Max: 36.6 (07 Feb 2023 11:58)  T(F): 97.9 (08 Feb 2023 05:03), Max: 97.9 (07 Feb 2023 19:22)  HR: 81 (08 Feb 2023 06:21) (72 - 87)  BP: 152/76 (08 Feb 2023 06:21) (111/67 - 188/74)  BP(mean): --  RR: 18 (08 Feb 2023 05:03) (18 - 19)  SpO2: 96% (08 Feb 2023 05:03) (93% - 96%)    Parameters below as of 08 Feb 2023 05:03  Patient On (Oxygen Delivery Method): room air        PHYSICAL EXAM:  GENERAL: NAD  HEENT: EOMI, hearing normal, conjunctiva and sclera clear  Chest: CTA bilaterally, no wheezing  CV: S1S2, RRR,   GI: soft, +BS, NT/ND  :  +bowers catheter with CBI draining light fruit punch colored urine  Musculoskeletal: no edema  Psychiatric: affect nL, mood nL  Skin: warm and dry    LABS:                        9.5    6.48  )-----------( 210      ( 08 Feb 2023 06:08 )             27.3     02-08    135  |  101  |  9   ----------------------------<  106<H>  3.7   |  26  |  0.68    Ca    8.3<L>      08 Feb 2023 06:08

## 2023-02-09 LAB
ANION GAP SERPL CALC-SCNC: 6 MMOL/L — SIGNIFICANT CHANGE UP (ref 5–17)
BUN SERPL-MCNC: 9 MG/DL — SIGNIFICANT CHANGE UP (ref 7–23)
CALCIUM SERPL-MCNC: 8.7 MG/DL — SIGNIFICANT CHANGE UP (ref 8.5–10.1)
CHLORIDE SERPL-SCNC: 100 MMOL/L — SIGNIFICANT CHANGE UP (ref 96–108)
CO2 SERPL-SCNC: 27 MMOL/L — SIGNIFICANT CHANGE UP (ref 22–31)
CREAT SERPL-MCNC: 0.82 MG/DL — SIGNIFICANT CHANGE UP (ref 0.5–1.3)
EGFR: 92 ML/MIN/1.73M2 — SIGNIFICANT CHANGE UP
GLUCOSE SERPL-MCNC: 98 MG/DL — SIGNIFICANT CHANGE UP (ref 70–99)
HCT VFR BLD CALC: 28.5 % — LOW (ref 39–50)
HGB BLD-MCNC: 9.7 G/DL — LOW (ref 13–17)
MAGNESIUM SERPL-MCNC: 2.2 MG/DL — SIGNIFICANT CHANGE UP (ref 1.6–2.6)
MCHC RBC-ENTMCNC: 31.2 PG — SIGNIFICANT CHANGE UP (ref 27–34)
MCHC RBC-ENTMCNC: 34 GM/DL — SIGNIFICANT CHANGE UP (ref 32–36)
MCV RBC AUTO: 91.6 FL — SIGNIFICANT CHANGE UP (ref 80–100)
NRBC # BLD: 0 /100 WBCS — SIGNIFICANT CHANGE UP (ref 0–0)
PLATELET # BLD AUTO: 274 K/UL — SIGNIFICANT CHANGE UP (ref 150–400)
POTASSIUM SERPL-MCNC: 4.1 MMOL/L — SIGNIFICANT CHANGE UP (ref 3.5–5.3)
POTASSIUM SERPL-SCNC: 4.1 MMOL/L — SIGNIFICANT CHANGE UP (ref 3.5–5.3)
RBC # BLD: 3.11 M/UL — LOW (ref 4.2–5.8)
RBC # FLD: 12.3 % — SIGNIFICANT CHANGE UP (ref 10.3–14.5)
SODIUM SERPL-SCNC: 133 MMOL/L — LOW (ref 135–145)
WBC # BLD: 10.02 K/UL — SIGNIFICANT CHANGE UP (ref 3.8–10.5)
WBC # FLD AUTO: 10.02 K/UL — SIGNIFICANT CHANGE UP (ref 3.8–10.5)

## 2023-02-09 PROCEDURE — 99232 SBSQ HOSP IP/OBS MODERATE 35: CPT

## 2023-02-09 PROCEDURE — 51702 INSERT TEMP BLADDER CATH: CPT

## 2023-02-09 RX ORDER — MORPHINE SULFATE 50 MG/1
1 CAPSULE, EXTENDED RELEASE ORAL EVERY 12 HOURS
Refills: 0 | Status: DISCONTINUED | OUTPATIENT
Start: 2023-02-09 | End: 2023-02-09

## 2023-02-09 RX ORDER — ALPRAZOLAM 0.25 MG
0.25 TABLET ORAL DAILY
Refills: 0 | Status: DISCONTINUED | OUTPATIENT
Start: 2023-02-09 | End: 2023-02-09

## 2023-02-09 RX ADMIN — Medication 5 MILLILITER(S): at 00:46

## 2023-02-09 RX ADMIN — POLYETHYLENE GLYCOL 3350 17 GRAM(S): 17 POWDER, FOR SOLUTION ORAL at 12:15

## 2023-02-09 RX ADMIN — Medication 50 MILLIGRAM(S): at 05:38

## 2023-02-09 RX ADMIN — LORATADINE 10 MILLIGRAM(S): 10 TABLET ORAL at 12:40

## 2023-02-09 RX ADMIN — Medication 5 MILLIGRAM(S): at 12:14

## 2023-02-09 NOTE — PROGRESS NOTE ADULT - SUBJECTIVE AND OBJECTIVE BOX
CHIEF COMPLAINT/INTERVAL HISTORY:  Pt. seen and evaluated for hematuria and urinary retention.  Bowers catheter changed again last night.  Hematuria appears to be clearing.  Currently patient is comfortable and denies significant pain.      REVIEW OF SYSTEMS:  No fever, CP, SOB, or abdominal pain    Vital Signs Last 24 Hrs  T(C): 36.6 (09 Feb 2023 05:27), Max: 36.7 (08 Feb 2023 19:30)  T(F): 97.8 (09 Feb 2023 05:27), Max: 98.1 (08 Feb 2023 19:30)  HR: 86 (09 Feb 2023 05:27) (72 - 86)  BP: 114/67 (09 Feb 2023 05:27) (114/67 - 164/74)  BP(mean): --  RR: 18 (09 Feb 2023 05:27) (18 - 18)  SpO2: 96% (09 Feb 2023 05:27) (94% - 96%)    Parameters below as of 09 Feb 2023 05:27  Patient On (Oxygen Delivery Method): room air        PHYSICAL EXAM:  GENERAL: NAD  HEENT: EOMI, hearing normal, conjunctiva and sclera clear  Chest: CTA bilaterally, no wheezing  CV: S1S2, RRR,   GI: soft, +BS, NT/ND  : +bowers catheter with CBI light pinkish urine.    Musculoskeletal: no edema  Psychiatric: affect nL, mood nL  Skin: warm and dry    LABS:                        9.7    10.02 )-----------( 274      ( 09 Feb 2023 06:25 )             28.5     02-08    135  |  101  |  9   ----------------------------<  106<H>  3.7   |  26  |  0.68    Ca    8.3<L>      08 Feb 2023 06:08

## 2023-02-09 NOTE — PROGRESS NOTE ADULT - ASSESSMENT
73 y/o M w/ PMHx of HTN,  AS s/p TAVR, ppm, prostate cancer s/p radical prostatectomy c/b radiation proctitis presents to ED for severe pain and urinary retention s/p cystoscopy. Admit for hematuria / urinary retention.      Problem/Plan - 1:  ·  Problem: Urinary retention.   ·  Plan: P/w urinary retention s/p cystoscopy ; hematuria on UA  Patient is s/p prostatectomy (1/31); hematuria is likely 2/2 radiation cystitis and retention due to clots.  - Continue Gr/CBI, hand irrigate clots PRN  - Tylenol for pain PRN   - Trend H/H  - Type and screen renewed in chart  - Add 600mg Amicar to each 3L bag of CBI; adjust rate to keep urine clear  - d/c Morphine as per urology recs  - Started on Ditropan ER 5mg QD  - Consider transfer to [facility] [place] - Dr. Luis  - Urology Dr. Shaffer consulted, recs appreciated  - f/u with Urologist, Dr. Luis post discharge.     Problem/Plan - 2:  ·  Problem: Prostate cancer.   ·  Plan: Hx of Prostate CA, now s/p radiation therapy and radical prostatectomy  - Continue home Orgovyx - brought in from home  - Patient to f/u with his urologist at [facility] [place] (Dr. Luis) outpatient for further management.     Problem/Plan - 3:  ·  Problem: Hypertension.   ·  Plan: Chronic, initially elevated due to discomfort  - BP stable  - Continue home Metoprolol ER 50mg PO daily w/ hold parameters.     Problem/Plan - 4:  ·  Problem: Need for prophylactic measure.   ·  Plan: SCDs b/l for dvt ppx; will hold off on A/C at this time 2/2 hematuria.

## 2023-02-09 NOTE — PROGRESS NOTE ADULT - SUBJECTIVE AND OBJECTIVE BOX
Gu Progress Note:    Resting, has had intermittent clots requiring irrigation, CBII with Amicar in progress although it has been mixed with NS Bag therefore diluting it.  Has pain with retention otherwise ok.  PO and BM +    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      HTN (hypertension)      Aortic stenosis      Rectal bleeding  last hospitalization 10/6/20 2/2 radiation proctitis      [place] (hard of hearing)      H/O prostatectomy  2015      S/P T&amp;A (status post tonsillectomy and adenoidectomy)  child            MEDICATIONS  (STANDING):  Amicar 600 mg in NS 3000 mL for CBI 3000 milliLiter(s) 3000 milliLiter(s) IntraVesical Continuous Pump  lidocaine 2% Jelly 5 milliLiter(s) IntraUrethral once  lidocaine 2% Jelly 5 milliLiter(s) IntraUrethral once  loratadine 10 milliGRAM(s) Oral daily  metoprolol succinate ER 50 milliGRAM(s) Oral daily  morphine  - Injectable 1 milliGRAM(s) IV Push once  Orgovyx (relugolix) 120mg tab 1 Tablet(s) 1 Tablet(s) Oral daily  oxybutynin XL 5 milliGRAM(s) Oral daily  polyethylene glycol 3350 17 Gram(s) Oral daily    MEDICATIONS  (PRN):  acetaminophen     Tablet .. 650 milliGRAM(s) Oral every 6 hours PRN Temp greater or equal to 38C (100.4F), Mild Pain (1 - 3)  ALPRAZolam 0.25 milliGRAM(s) Oral daily PRN anxiety  aluminum hydroxide/magnesium hydroxide/simethicone Suspension 30 milliLiter(s) Oral every 4 hours PRN Dyspepsia  melatonin 3 milliGRAM(s) Oral at bedtime PRN Insomnia  ondansetron Injectable 4 milliGRAM(s) IV Push every 8 hours PRN Nausea and/or Vomiting      Allergies    penicillin (Diarrhea; Pruritus; Hives)  penicillin V potassium (Rash)    Intolerances    codeine (Nausea)          FAMILY HISTORY:  FH: cancer (Father, Mother)        Vital Signs Last 24 Hrs  T(C): 36.7 (09 Feb 2023 11:54), Max: 36.7 (08 Feb 2023 19:30)  T(F): 98 (09 Feb 2023 11:54), Max: 98.1 (08 Feb 2023 19:30)  HR: 82 (09 Feb 2023 11:54) (82 - 86)  BP: 126/68 (09 Feb 2023 11:54) (114/67 - 144/72)  BP(mean): --  RR: 18 (09 Feb 2023 11:54) (18 - 18)  SpO2: 95% (09 Feb 2023 11:54) (95% - 96%)    Parameters below as of 09 Feb 2023 11:54  Patient On (Oxygen Delivery Method): room air        PHYSICAL EXAM:    Constitutional: NAD, well-developed  Asymptomatic, AO  Abd: BS+, soft, NT/ND, No CVAT  : Normal   phallus, patent  meatus, bilateral descended testes, no masses CBIf with light pink urine  Extremities: No peripheral edema    LABS:                        9.7    10.02 )-----------( 274      ( 09 Feb 2023 06:25 )             28.5     02-09    133<L>  |  100  |  9   ----------------------------<  98  4.1   |  27  |  0.82    Ca    8.7      09 Feb 2023 06:25  Mg     2.2     02-09          Urine Culture:   Hemoglobin: 9.7 g/dL (02-09 @ 06:25)  Hematocrit: 28.5 % (02-09 @ 06:25)  Hemoglobin: 9.5 g/dL (02-08 @ 06:08)  Hematocrit: 27.3 % (02-08 @ 06:08)      RADIOLOGY & ADDITIONAL STUDIES:

## 2023-02-09 NOTE — PROGRESS NOTE ADULT - ASSESSMENT
Presumptive Radiation Hemorrhagic Cystitis undergoing Amicar irrigation patient has refused oral Amicar  Prostate cancer  Anemia exacerbated b blood loss    d/w nursing and hospitalist    Patients concerns addressed      Chart reviewed    Covering for Dr. Shaffer

## 2023-02-10 LAB
ANION GAP SERPL CALC-SCNC: 6 MMOL/L — SIGNIFICANT CHANGE UP (ref 5–17)
APTT BLD: 30 SEC — SIGNIFICANT CHANGE UP (ref 27.5–35.5)
BUN SERPL-MCNC: 11 MG/DL — SIGNIFICANT CHANGE UP (ref 7–23)
CALCIUM SERPL-MCNC: 8.6 MG/DL — SIGNIFICANT CHANGE UP (ref 8.5–10.1)
CHLORIDE SERPL-SCNC: 102 MMOL/L — SIGNIFICANT CHANGE UP (ref 96–108)
CO2 SERPL-SCNC: 28 MMOL/L — SIGNIFICANT CHANGE UP (ref 22–31)
CREAT SERPL-MCNC: 0.79 MG/DL — SIGNIFICANT CHANGE UP (ref 0.5–1.3)
EGFR: 93 ML/MIN/1.73M2 — SIGNIFICANT CHANGE UP
GLUCOSE SERPL-MCNC: 99 MG/DL — SIGNIFICANT CHANGE UP (ref 70–99)
HCT VFR BLD CALC: 26.2 % — LOW (ref 39–50)
HGB BLD-MCNC: 9 G/DL — LOW (ref 13–17)
MCHC RBC-ENTMCNC: 31.5 PG — SIGNIFICANT CHANGE UP (ref 27–34)
MCHC RBC-ENTMCNC: 34.4 GM/DL — SIGNIFICANT CHANGE UP (ref 32–36)
MCV RBC AUTO: 91.6 FL — SIGNIFICANT CHANGE UP (ref 80–100)
NRBC # BLD: 0 /100 WBCS — SIGNIFICANT CHANGE UP (ref 0–0)
PLATELET # BLD AUTO: 273 K/UL — SIGNIFICANT CHANGE UP (ref 150–400)
POTASSIUM SERPL-MCNC: 4.2 MMOL/L — SIGNIFICANT CHANGE UP (ref 3.5–5.3)
POTASSIUM SERPL-SCNC: 4.2 MMOL/L — SIGNIFICANT CHANGE UP (ref 3.5–5.3)
RBC # BLD: 2.86 M/UL — LOW (ref 4.2–5.8)
RBC # FLD: 12.7 % — SIGNIFICANT CHANGE UP (ref 10.3–14.5)
SODIUM SERPL-SCNC: 136 MMOL/L — SIGNIFICANT CHANGE UP (ref 135–145)
WBC # BLD: 8.77 K/UL — SIGNIFICANT CHANGE UP (ref 3.8–10.5)
WBC # FLD AUTO: 8.77 K/UL — SIGNIFICANT CHANGE UP (ref 3.8–10.5)

## 2023-02-10 PROCEDURE — 99232 SBSQ HOSP IP/OBS MODERATE 35: CPT

## 2023-02-10 PROCEDURE — 74178 CT ABD&PLV WO CNTR FLWD CNTR: CPT | Mod: 26

## 2023-02-10 RX ADMIN — Medication 5 MILLIGRAM(S): at 11:08

## 2023-02-10 RX ADMIN — LORATADINE 10 MILLIGRAM(S): 10 TABLET ORAL at 11:08

## 2023-02-10 NOTE — PROGRESS NOTE ADULT - ASSESSMENT
Radiation induced hemorrhagic cystitis, responding to Amicar bladder irrigations  Prostate cancer s/p Radicel Prostatectomy, adjuvant EBRT and on ADT    Prior history radiation proctitis and did not tolerate HBOT           Patients questions and concerns addresed, reassured    T 35 minutes

## 2023-02-10 NOTE — PROGRESS NOTE ADULT - SUBJECTIVE AND OBJECTIVE BOX
CHIEF COMPLAINT/INTERVAL HISTORY:  Pt. seen and evaluated for hematuria and urinary retention.  Pt. is in no distress.  On CBI with clear urine.      REVIEW OF SYSTEMS:  No fever, CP, SOB, or abdominal pain    Vital Signs Last 24 Hrs  T(C): 36.7 (10 Feb 2023 04:33), Max: 36.8 (09 Feb 2023 19:37)  T(F): 98 (10 Feb 2023 04:33), Max: 98.3 (09 Feb 2023 19:37)  HR: 94 (10 Feb 2023 04:33) (79 - 94)  BP: 106/64 (10 Feb 2023 04:33) (106/64 - 126/68)  BP(mean): --  RR: 18 (10 Feb 2023 04:33) (18 - 18)  SpO2: 95% (10 Feb 2023 04:33) (95% - 96%)    Parameters below as of 10 Feb 2023 04:33  Patient On (Oxygen Delivery Method): room air        PHYSICAL EXAM:  GENERAL: NAD  HEENT: EOMI, hearing normal, conjunctiva and sclera clear  Chest: CTA bilaterally, no wheezing  CV: S1S2, RRR,   GI: soft, +BS, NT/ND  : +bowers catheter with CBI and clear urine  Musculoskeletal: no edema  Psychiatric: affect nL, mood nL  Skin: warm and dry    LABS:                        9.0    8.77  )-----------( 273      ( 10 Feb 2023 06:35 )             26.2     02-10    136  |  102  |  11  ----------------------------<  99  4.2   |  28  |  0.79    Ca    8.6      10 Feb 2023 06:35  Mg     2.2     02-09      PTT - ( 10 Feb 2023 06:35 )  PTT:30.0 sec

## 2023-02-10 NOTE — PROGRESS NOTE ADULT - ASSESSMENT
71 y/o M w/ PMHx of HTN,  AS s/p TAVR, ppm, prostate cancer s/p radical prostatectomy c/b radiation proctitis presents to ED for severe pain and urinary retention s/p cystoscopy. Admit for hematuria / urinary retention.      Problem/Plan - 1:  ·  Problem: Urinary retention.   ·  Plan: P/w urinary retention s/p cystoscopy ; hematuria on UA  Patient is s/p prostatectomy (1/31); hematuria is likely 2/2 radiation cystitis and retention due to clots.  - Continue Gr/CBI, hand irrigate clots PRN  - Tylenol for pain PRN   - Trend H/H  - Type and screen renewed in chart  - Added 600mg Amicar to each 3L bag of CBI; adjust rate to keep urine clear  - Started on Ditropan ER 5mg QD  - Urology Dr. Shaffer consulted, recs appreciated  - f/u with Urologist, Dr. Luis post discharge.     Problem/Plan - 2:  ·  Problem: Prostate cancer.   ·  Plan: Hx of Prostate CA, now s/p radiation therapy and radical prostatectomy  - Continue home Orgovyx - brought in from home  - Patient to f/u with his urologist at [facility] [place] (Dr. Luis) outpatient for further management.     Problem/Plan - 3:  ·  Problem: Hypertension.   ·  Plan: Chronic, initially elevated due to discomfort  - BP stable  - Continue home Metoprolol ER 50mg PO daily w/ hold parameters.     Problem/Plan - 4:  ·  Problem: Need for prophylactic measure.   ·  Plan: SCDs b/l for dvt ppx; will hold off on A/C at this time 2/2 hematuria.

## 2023-02-10 NOTE — PROGRESS NOTE ADULT - SUBJECTIVE AND OBJECTIVE BOX
Gu Progress Note:    Radiation hemorrhagic cystitis resolving with Amicar CBI      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      HTN (hypertension)      Aortic stenosis      Rectal bleeding  last hospitalization 10/6/20 2/2 radiation proctitis      [place] (hard of hearing)      H/O prostatectomy  2015      S/P T&amp;A (status post tonsillectomy and adenoidectomy)  child            MEDICATIONS  (STANDING):  Amicar 600 mg in NS 3000 mL for CBI 3000 milliLiter(s) 3000 milliLiter(s) IntraVesical Continuous Pump  lidocaine 2% Jelly 5 milliLiter(s) IntraUrethral once  lidocaine 2% Jelly 5 milliLiter(s) IntraUrethral once  loratadine 10 milliGRAM(s) Oral daily  metoprolol succinate ER 50 milliGRAM(s) Oral daily  Orgovyx (relugolix) 120mg tab 1 Tablet(s) 1 Tablet(s) Oral daily  oxybutynin XL 5 milliGRAM(s) Oral daily  polyethylene glycol 3350 17 Gram(s) Oral daily    MEDICATIONS  (PRN):  acetaminophen     Tablet .. 650 milliGRAM(s) Oral every 6 hours PRN Temp greater or equal to 38C (100.4F), Mild Pain (1 - 3)  ALPRAZolam 0.25 milliGRAM(s) Oral daily PRN anxiety  aluminum hydroxide/magnesium hydroxide/simethicone Suspension 30 milliLiter(s) Oral every 4 hours PRN Dyspepsia  melatonin 3 milliGRAM(s) Oral at bedtime PRN Insomnia  morphine  - Injectable 1 milliGRAM(s) IV Push every 12 hours PRN Severe Pain (7 - 10)  ondansetron Injectable 4 milliGRAM(s) IV Push every 8 hours PRN Nausea and/or Vomiting      Allergies    penicillin (Diarrhea; Pruritus; Hives)  penicillin V potassium (Rash)    Intolerances    codeine (Nausea)          FAMILY HISTORY:  FH: cancer (Father, Mother)        Vital Signs Last 24 Hrs  T(C): 36.7 (10 Feb 2023 04:33), Max: 36.8 (09 Feb 2023 19:37)  T(F): 98 (10 Feb 2023 04:33), Max: 98.3 (09 Feb 2023 19:37)  HR: 94 (10 Feb 2023 04:33) (79 - 94)  BP: 106/64 (10 Feb 2023 04:33) (106/64 - 126/68)  BP(mean): --  RR: 18 (10 Feb 2023 04:33) (18 - 18)  SpO2: 95% (10 Feb 2023 04:33) (95% - 96%)    Parameters below as of 10 Feb 2023 04:33  Patient On (Oxygen Delivery Method): room air        PHYSICAL EXAM:    Constitutional: NAD, well-developed    Asymptomatic, AO  Abd: BS+, soft, NT/ND, No CVAT  : Normal  circumcised phallus, patent  meatus, bilateral descended testes, no masses CBI with light pink tinged urine  Extremities: No peripheral edema    LABS:                        9.0    8.77  )-----------( 273      ( 10 Feb 2023 06:35 )             26.2     02-10    136  |  102  |  11  ----------------------------<  99  4.2   |  28  |  0.79    Ca    8.6      10 Feb 2023 06:35  Mg     2.2     02-09      PTT - ( 10 Feb 2023 06:35 )  PTT:30.0 sec    Urine Culture:   Hemoglobin: 9.0 g/dL (02-10 @ 06:35)  Hematocrit: 26.2 % (02-10 @ 06:35)  Hemoglobin: 9.7 g/dL (02-09 @ 06:25)  Hematocrit: 28.5 % (02-09 @ 06:25)      RADIOLOGY & ADDITIONAL STUDIES:

## 2023-02-11 DIAGNOSIS — R31.0 GROSS HEMATURIA: ICD-10-CM

## 2023-02-11 LAB
ANION GAP SERPL CALC-SCNC: 6 MMOL/L — SIGNIFICANT CHANGE UP (ref 5–17)
BUN SERPL-MCNC: 10 MG/DL — SIGNIFICANT CHANGE UP (ref 7–23)
CALCIUM SERPL-MCNC: 8.3 MG/DL — LOW (ref 8.5–10.1)
CHLORIDE SERPL-SCNC: 102 MMOL/L — SIGNIFICANT CHANGE UP (ref 96–108)
CO2 SERPL-SCNC: 27 MMOL/L — SIGNIFICANT CHANGE UP (ref 22–31)
CREAT SERPL-MCNC: 0.68 MG/DL — SIGNIFICANT CHANGE UP (ref 0.5–1.3)
EGFR: 98 ML/MIN/1.73M2 — SIGNIFICANT CHANGE UP
GLUCOSE SERPL-MCNC: 102 MG/DL — HIGH (ref 70–99)
HCT VFR BLD CALC: 25.5 % — LOW (ref 39–50)
HGB BLD-MCNC: 8.6 G/DL — LOW (ref 13–17)
MAGNESIUM SERPL-MCNC: 2.3 MG/DL — SIGNIFICANT CHANGE UP (ref 1.6–2.6)
MCHC RBC-ENTMCNC: 30.9 PG — SIGNIFICANT CHANGE UP (ref 27–34)
MCHC RBC-ENTMCNC: 33.7 GM/DL — SIGNIFICANT CHANGE UP (ref 32–36)
MCV RBC AUTO: 91.7 FL — SIGNIFICANT CHANGE UP (ref 80–100)
NRBC # BLD: 0 /100 WBCS — SIGNIFICANT CHANGE UP (ref 0–0)
PLATELET # BLD AUTO: 281 K/UL — SIGNIFICANT CHANGE UP (ref 150–400)
POTASSIUM SERPL-MCNC: 3.9 MMOL/L — SIGNIFICANT CHANGE UP (ref 3.5–5.3)
POTASSIUM SERPL-SCNC: 3.9 MMOL/L — SIGNIFICANT CHANGE UP (ref 3.5–5.3)
RBC # BLD: 2.78 M/UL — LOW (ref 4.2–5.8)
RBC # FLD: 12.7 % — SIGNIFICANT CHANGE UP (ref 10.3–14.5)
SODIUM SERPL-SCNC: 135 MMOL/L — SIGNIFICANT CHANGE UP (ref 135–145)
WBC # BLD: 7.4 K/UL — SIGNIFICANT CHANGE UP (ref 3.8–10.5)
WBC # FLD AUTO: 7.4 K/UL — SIGNIFICANT CHANGE UP (ref 3.8–10.5)

## 2023-02-11 PROCEDURE — 99232 SBSQ HOSP IP/OBS MODERATE 35: CPT

## 2023-02-11 RX ADMIN — Medication 5 MILLIGRAM(S): at 12:48

## 2023-02-11 RX ADMIN — LORATADINE 10 MILLIGRAM(S): 10 TABLET ORAL at 12:48

## 2023-02-11 RX ADMIN — POLYETHYLENE GLYCOL 3350 17 GRAM(S): 17 POWDER, FOR SOLUTION ORAL at 12:48

## 2023-02-11 RX ADMIN — Medication 50 MILLIGRAM(S): at 05:14

## 2023-02-11 NOTE — PROGRESS NOTE ADULT - ASSESSMENT
71 y/o M w/ PMHx of HTN,  AS s/p TAVR, ppm, prostate cancer s/p radical prostatectomy c/b radiation proctitis presents to ED for severe pain and urinary retention s/p cystoscopy. Admit for hematuria / urinary retention.      Problem/Plan - 1:  ·  Problem: Urinary retention.   ·  Plan: P/w urinary retention s/p cystoscopy ; hematuria on UA  Patient is s/p prostatectomy (1/31); hematuria is likely 2/2 radiation cystitis and retention due to clots.  - Continue Gr.  Hand irrigate clots PRN  - Tylenol for pain PRN   - Trend H/H  - Type and screen renewed in chart  - Added 600mg Amicar to each 3L bag of CBI; CBI now clamped per urology  - Started on Ditropan ER 5mg QD  - Urology Dr. Shaffer consulted, recs appreciated  - f/u with Urologist, Dr. Luis post discharge.     Problem/Plan - 2:  ·  Problem: Prostate cancer.   ·  Plan: Hx of Prostate CA, now s/p radiation therapy and radical prostatectomy  - Continue home Orgovyx - brought in from home  - Patient to f/u with his urologist at [facility] [place] (Dr. Luis) outpatient for further management.     Problem/Plan - 3:  ·  Problem: Hypertension.   ·  Plan: Chronic, initially elevated due to discomfort  - BP stable  - Continue home Metoprolol ER 50mg PO daily w/ hold parameters.     Problem/Plan - 4:  ·  Problem: Need for prophylactic measure.   ·  Plan: SCDs b/l for dvt ppx; will hold off on A/C at this time 2/2 hematuria.

## 2023-02-11 NOTE — PROGRESS NOTE ADULT - SUBJECTIVE AND OBJECTIVE BOX
INTERVAL HPI/OVERNIGHT EVENTS: Urine clear overnight    MEDICATIONS  (STANDING):  Amicar 600 mg in NS 3000 mL for CBI 3000 milliLiter(s) 3000 milliLiter(s) IntraVesical Continuous Pump  lidocaine 2% Jelly 5 milliLiter(s) IntraUrethral once  lidocaine 2% Jelly 5 milliLiter(s) IntraUrethral once  loratadine 10 milliGRAM(s) Oral daily  metoprolol succinate ER 50 milliGRAM(s) Oral daily  Orgovyx (relugolix) 120mg tab 1 Tablet(s) 1 Tablet(s) Oral daily  oxybutynin XL 5 milliGRAM(s) Oral daily  polyethylene glycol 3350 17 Gram(s) Oral daily    MEDICATIONS  (PRN):  acetaminophen     Tablet .. 650 milliGRAM(s) Oral every 6 hours PRN Temp greater or equal to 38C (100.4F), Mild Pain (1 - 3)  ALPRAZolam 0.25 milliGRAM(s) Oral daily PRN anxiety  aluminum hydroxide/magnesium hydroxide/simethicone Suspension 30 milliLiter(s) Oral every 4 hours PRN Dyspepsia  melatonin 3 milliGRAM(s) Oral at bedtime PRN Insomnia  morphine  - Injectable 1 milliGRAM(s) IV Push every 12 hours PRN Severe Pain (7 - 10)  ondansetron Injectable 4 milliGRAM(s) IV Push every 8 hours PRN Nausea and/or Vomiting        Vital Signs Last 24 Hrs  T(C): 37.1 (11 Feb 2023 04:53), Max: 37.1 (11 Feb 2023 04:53)  T(F): 98.7 (11 Feb 2023 04:53), Max: 98.7 (11 Feb 2023 04:53)  HR: 98 (11 Feb 2023 04:53) (88 - 111)  BP: 163/75 (11 Feb 2023 04:53) (119/73 - 163/75)  BP(mean): --  RR: 18 (11 Feb 2023 04:53) (18 - 18)  SpO2: 94% (11 Feb 2023 04:53) (92% - 96%)    Parameters below as of 11 Feb 2023 04:53  Patient On (Oxygen Delivery Method): room air        PHYSICAL EXAM:    ABDOMEN: No SP tenderness or distention  GENITALIA: Gr draining well. Urine clear.    LABS:                        8.6    7.40  )-----------( 281      ( 11 Feb 2023 07:31 )             25.5     02-11    135  |  102  |  10  ----------------------------<  102<H>  3.9   |  27  |  0.68    Ca    8.3<L>      11 Feb 2023 07:31  Mg     2.3     02-11      PTT - ( 10 Feb 2023 06:35 )  PTT:30.0 sec        RADIOLOGY & ADDITIONAL TESTS:    < from: CT Abdomen and Pelvis Urogram w/wo IV Cont (02.10.23 @ 14:41) >    ACC: [id number] EXAM:  CT ABD PELV UROGRAM WAW IC   ORDERED BY: CLARIBEL STRONG     PROCEDURE DATE:  02/10/2023          INTERPRETATION:  CLINICAL INFORMATION: 72 years  Male with hematuria.   History of prostate cancer. Radiation therapy in 2018 and radical   prostatectomy in 2015. History of radiation proctitis. Radiation   hemorrhagic cystitis. Urinary retention after cystoscopy.    ADDITIONAL CLINICAL INFORMATION: Hematuria R31.9    COMPARISON: None.    CONTRAST/COMPLICATIONS:  IV Contrast: Omnipaque 350  90 cc administered   10 cc discarded  Oral Contrast: NONE  Complications: None reported at time of study completion    PROCEDURE:  CT of the Abdomen and Pelvis was performed.  Precontrast, Nephrographic and Excretory phases were acquired (CT   UROGRAM).  Sagittal and coronal reformats were performed.    FINDINGS:  LOWER CHEST: TAVR. Cardiac pacemaker wires 5 mm left lower lobe   subpleural nodule (4:1). Mild bilateral lower lobe dependent atelectasis.    LIVER: Scattered cystsmeasuring up to 1.5 cm and hypodensities too small   to characterize.  BILE DUCTS: Normal caliber.  GALLBLADDER: Within normal limits.  SPLEEN: Within normal limits.  PANCREAS: Within normal limits.  ADRENALS: Within normal limits.  KIDNEYS/URETERS: 3.0 cm right midpole cyst. Left midpole hypodensity too   small to characterize. 2 adjacent 4 mm nonobstructing left midpole   calculi. No hydroureteronephrosis bilaterally. Symmetrical nephrograms.    BLADDER: Thick-walled bladder collapsed around Rg catheter. Iatrogenic   intraluminal air.  REPRODUCTIVE ORGANS: Radical prostatectomy.    BOWEL: No bowel obstruction. Appendix is normal. Moderate descending and   sigmoid colonic diverticulosis without diverticulitis.  PERITONEUM: No ascites.  VESSELS: Within normal limits. Circumaortic left renal vein.  RETROPERITONEUM/LYMPH NODES: No lymphadenopathy.  ABDOMINAL WALL: Small fat-containing right inguinal hernia.  BONES: Within normal limits.    IMPRESSION:    Radical prostatectomy.    Thick-walled bladder secondary to underdistention, infectious cystitis or   radiation cystitis. Gr catheter in situ.    Nonobstructing left renal calculi.    5 mm left lower lobe subpleural nodule. Recommend comparison with prior   studies to show stability. If none are available, recommend 3 month chest   CT follow-up.        --- End of Report ---            KIMBERLYN HOLLEY MD; Attending Radiologist  This document has been electronically signed. Feb 10 2023  3:17PM    < end of copied text >

## 2023-02-11 NOTE — PROGRESS NOTE ADULT - SUBJECTIVE AND OBJECTIVE BOX
CHIEF COMPLAINT/INTERVAL HISTORY:  Pt. seen and evaluated for hematuria and urinary retention.  Pt. is in no distress.  Urine is clear.  CBI clamped this morning.      REVIEW OF SYSTEMS:  No fever, CP, SOB, or abdominal pain    Vital Signs Last 24 Hrs  T(C): 37.1 (11 Feb 2023 04:53), Max: 37.1 (11 Feb 2023 04:53)  T(F): 98.7 (11 Feb 2023 04:53), Max: 98.7 (11 Feb 2023 04:53)  HR: 98 (11 Feb 2023 04:53) (88 - 111)  BP: 163/75 (11 Feb 2023 04:53) (119/73 - 163/75)  BP(mean): --  RR: 18 (11 Feb 2023 04:53) (18 - 18)  SpO2: 94% (11 Feb 2023 04:53) (92% - 96%)    Parameters below as of 11 Feb 2023 04:53  Patient On (Oxygen Delivery Method): room air        PHYSICAL EXAM:  GENERAL: NAD  HEENT: EOMI, hearing normal, conjunctiva and sclera clear  Chest: CTA bilaterally, no wheezing  CV: S1S2, RRR,   GI: soft, +BS, NT/ND  : +bowers catheter with clear urine  Musculoskeletal: no edema  Psychiatric: affect nL, mood nL  Skin: warm and dry    LABS:                        8.6    7.40  )-----------( 281      ( 11 Feb 2023 07:31 )             25.5     02-11    135  |  102  |  10  ----------------------------<  102<H>  3.9   |  27  |  0.68    Ca    8.3<L>      11 Feb 2023 07:31  Mg     2.3     02-11      PTT - ( 10 Feb 2023 06:35 )  PTT:30.0 sec

## 2023-02-12 LAB
ANION GAP SERPL CALC-SCNC: 5 MMOL/L — SIGNIFICANT CHANGE UP (ref 5–17)
BUN SERPL-MCNC: 13 MG/DL — SIGNIFICANT CHANGE UP (ref 7–23)
CALCIUM SERPL-MCNC: 8.4 MG/DL — LOW (ref 8.5–10.1)
CHLORIDE SERPL-SCNC: 104 MMOL/L — SIGNIFICANT CHANGE UP (ref 96–108)
CO2 SERPL-SCNC: 28 MMOL/L — SIGNIFICANT CHANGE UP (ref 22–31)
CREAT SERPL-MCNC: 0.75 MG/DL — SIGNIFICANT CHANGE UP (ref 0.5–1.3)
EGFR: 95 ML/MIN/1.73M2 — SIGNIFICANT CHANGE UP
GLUCOSE SERPL-MCNC: 98 MG/DL — SIGNIFICANT CHANGE UP (ref 70–99)
HCT VFR BLD CALC: 25.8 % — LOW (ref 39–50)
HGB BLD-MCNC: 8.7 G/DL — LOW (ref 13–17)
MCHC RBC-ENTMCNC: 31.3 PG — SIGNIFICANT CHANGE UP (ref 27–34)
MCHC RBC-ENTMCNC: 33.7 GM/DL — SIGNIFICANT CHANGE UP (ref 32–36)
MCV RBC AUTO: 92.8 FL — SIGNIFICANT CHANGE UP (ref 80–100)
NRBC # BLD: 0 /100 WBCS — SIGNIFICANT CHANGE UP (ref 0–0)
PLATELET # BLD AUTO: 282 K/UL — SIGNIFICANT CHANGE UP (ref 150–400)
POTASSIUM SERPL-MCNC: 4.5 MMOL/L — SIGNIFICANT CHANGE UP (ref 3.5–5.3)
POTASSIUM SERPL-SCNC: 4.5 MMOL/L — SIGNIFICANT CHANGE UP (ref 3.5–5.3)
RBC # BLD: 2.78 M/UL — LOW (ref 4.2–5.8)
RBC # FLD: 12.7 % — SIGNIFICANT CHANGE UP (ref 10.3–14.5)
SODIUM SERPL-SCNC: 137 MMOL/L — SIGNIFICANT CHANGE UP (ref 135–145)
WBC # BLD: 7.09 K/UL — SIGNIFICANT CHANGE UP (ref 3.8–10.5)
WBC # FLD AUTO: 7.09 K/UL — SIGNIFICANT CHANGE UP (ref 3.8–10.5)

## 2023-02-12 PROCEDURE — 99232 SBSQ HOSP IP/OBS MODERATE 35: CPT

## 2023-02-12 RX ADMIN — Medication 5 MILLIGRAM(S): at 11:15

## 2023-02-12 RX ADMIN — POLYETHYLENE GLYCOL 3350 17 GRAM(S): 17 POWDER, FOR SOLUTION ORAL at 11:14

## 2023-02-12 RX ADMIN — LORATADINE 10 MILLIGRAM(S): 10 TABLET ORAL at 11:15

## 2023-02-12 RX ADMIN — Medication 50 MILLIGRAM(S): at 06:08

## 2023-02-12 NOTE — CHART NOTE - NSCHARTNOTEFT_GEN_A_CORE
Called by RN for pt reporting hematuria with bright red blood. Per RN no clots were irrigated, but bright red blood visible in bag. Per Urology note, ok to restart CBI. Follow up AM CBC to monitor hematuria.  RN to call if bleeding increases or acute changes in pt occur.

## 2023-02-12 NOTE — PROGRESS NOTE ADULT - PROBLEM SELECTOR PLAN 3
Chronic, initially elevated due to discomfort  - /78  - Continue home Metoprolol w/ hold parameters
Chronic, initially elevated due to discomfort  - /71  - Continue home Metoprolol w/ hold parameters
Resolving. Hgb stable this AM. Possible voiding trial tomorrow. Dr. Tse to follow up.
Chronic, initially elevated due to discomfort  - BP stable  - Continue home Metoprolol w/ hold parameters
Chronic, initially elevated due to discomfort  - BP stable  - Continue home Metoprolol w/ hold parameters
Resolving. Clamp CBI and restart prn.
Chronic, initially elevated due to discomfort  - BP stable  - Continue home Metoprolol w/ hold parameters
Chronic, initially elevated due to discomfort  - /80  - Continue home Metoprolol w/ hold parameters
Chronic, initially elevated due to discomfort  - BP stable  - Continue home Metoprolol w/ hold parameters

## 2023-02-12 NOTE — PROGRESS NOTE ADULT - SUBJECTIVE AND OBJECTIVE BOX
CHIEF COMPLAINT/INTERVAL HISTORY:  Pt. seen and evaluated for hematuria and urinary retention.  Had brief episode of hematuria last night and was started on CBI again.  Urine had cleared and now CBI is clamped.      REVIEW OF SYSTEMS:  No fever, CP, SOB, or abdominal pain.     Vital Signs Last 24 Hrs  T(C): 36.8 (12 Feb 2023 05:02), Max: 36.8 (12 Feb 2023 05:02)  T(F): 98.3 (12 Feb 2023 05:02), Max: 98.3 (12 Feb 2023 05:02)  HR: 79 (12 Feb 2023 05:02) (78 - 85)  BP: 143/74 (12 Feb 2023 05:02) (116/63 - 143/74)  BP(mean): --  RR: 18 (12 Feb 2023 05:02) (18 - 18)  SpO2: 97% (12 Feb 2023 05:02) (95% - 97%)    Parameters below as of 12 Feb 2023 05:02  Patient On (Oxygen Delivery Method): room air        PHYSICAL EXAM:  GENERAL: NAD  HEENT: EOMI, hearing normal, conjunctiva and sclera clear  Chest: CTA bilaterally, no wheezing  CV: S1S2, RRR,   GI: soft, +BS, NT/ND  : +bowers catheter with slight pink tinge urine  Musculoskeletal: no edema  Psychiatric: affect nL, mood nL  Skin: warm and dry    LABS:                        8.7    7.09  )-----------( 282      ( 12 Feb 2023 05:43 )             25.8     02-12    137  |  104  |  13  ----------------------------<  98  4.5   |  28  |  0.75    Ca    8.4<L>      12 Feb 2023 05:43  Mg     2.3     02-11

## 2023-02-12 NOTE — PROGRESS NOTE ADULT - PROBLEM SELECTOR PROBLEM 3
Gross hematuria
Hypertension
Gross hematuria
Hypertension

## 2023-02-12 NOTE — PROGRESS NOTE ADULT - SUBJECTIVE AND OBJECTIVE BOX
INTERVAL HPI/OVERNIGHT EVENTS: Urine essentially clear during the course of the day. CBI was reinitiated overnight for hematuria that resolved quickly.    MEDICATIONS  (STANDING):  Amicar 600 mg in NS 3000 mL for CBI 3000 milliLiter(s) 3000 milliLiter(s) IntraVesical Continuous Pump  lidocaine 2% Jelly 5 milliLiter(s) IntraUrethral once  lidocaine 2% Jelly 5 milliLiter(s) IntraUrethral once  loratadine 10 milliGRAM(s) Oral daily  metoprolol succinate ER 50 milliGRAM(s) Oral daily  Orgovyx (relugolix) 120mg tab 1 Tablet(s) 1 Tablet(s) Oral daily  oxybutynin XL 5 milliGRAM(s) Oral daily  polyethylene glycol 3350 17 Gram(s) Oral daily    MEDICATIONS  (PRN):  acetaminophen     Tablet .. 650 milliGRAM(s) Oral every 6 hours PRN Temp greater or equal to 38C (100.4F), Mild Pain (1 - 3)  ALPRAZolam 0.25 milliGRAM(s) Oral daily PRN anxiety  aluminum hydroxide/magnesium hydroxide/simethicone Suspension 30 milliLiter(s) Oral every 4 hours PRN Dyspepsia  melatonin 3 milliGRAM(s) Oral at bedtime PRN Insomnia  morphine  - Injectable 1 milliGRAM(s) IV Push every 12 hours PRN Severe Pain (7 - 10)  ondansetron Injectable 4 milliGRAM(s) IV Push every 8 hours PRN Nausea and/or Vomiting        Vital Signs Last 24 Hrs  T(C): 36.8 (12 Feb 2023 05:02), Max: 36.8 (12 Feb 2023 05:02)  T(F): 98.3 (12 Feb 2023 05:02), Max: 98.3 (12 Feb 2023 05:02)  HR: 79 (12 Feb 2023 05:02) (78 - 85)  BP: 143/74 (12 Feb 2023 05:02) (116/63 - 143/74)  BP(mean): --  RR: 18 (12 Feb 2023 05:02) (18 - 18)  SpO2: 97% (12 Feb 2023 05:02) (95% - 97%)    Parameters below as of 12 Feb 2023 05:02  Patient On (Oxygen Delivery Method): room air        PHYSICAL EXAM:    ABDOMEN: No SP tenderness or distention    LABS:                        8.7    7.09  )-----------( 282      ( 12 Feb 2023 05:43 )             25.8     02-12    137  |  104  |  13  ----------------------------<  98  4.5   |  28  |  0.75    Ca    8.4<L>      12 Feb 2023 05:43  Mg     2.3     02-11

## 2023-02-12 NOTE — PROGRESS NOTE ADULT - ASSESSMENT
71 y/o M w/ PMHx of HTN,  AS s/p TAVR, ppm, prostate cancer s/p radical prostatectomy c/b radiation proctitis presents to ED for severe pain and urinary retention s/p cystoscopy. Admit for hematuria / urinary retention.      Problem/Plan - 1:  ·  Problem: Urinary retention.   ·  Plan: P/w urinary retention s/p cystoscopy ; hematuria on UA  Patient is s/p prostatectomy (1/31); hematuria is likely 2/2 radiation cystitis and retention due to clots.  - Continue Gr.  Hand irrigate clots PRN  - Tylenol for pain PRN   - Trend H/H  - Type and screen renewed in chart  - Added 600mg Amicar to each 3L bag of CBI; CBI clamped per urology  - Started on Ditropan ER 5mg QD  - Urology Dr. Shaffer consulted, recs appreciated  - f/u with Urologist, Dr. uLis post discharge.     Problem/Plan - 2:  ·  Problem: Prostate cancer.   ·  Plan: Hx of Prostate CA, now s/p radiation therapy and radical prostatectomy  - Continue home Orgovyx - brought in from home  - Patient to f/u with his urologist at [facility] (Dr. Luis) outpatient for further management.     Problem/Plan - 3:  ·  Problem: Hypertension.   ·  Plan: Chronic, initially elevated due to discomfort  - BP stable  - Continue home Metoprolol ER 50mg PO daily w/ hold parameters.     Problem/Plan - 4:  ·  Problem: Need for prophylactic measure.   ·  Plan: SCDs b/l for dvt ppx; will hold off on A/C at this time 2/2 hematuria.

## 2023-02-13 LAB
ANION GAP SERPL CALC-SCNC: 9 MMOL/L — SIGNIFICANT CHANGE UP (ref 5–17)
BUN SERPL-MCNC: 14 MG/DL — SIGNIFICANT CHANGE UP (ref 7–23)
CALCIUM SERPL-MCNC: 8.4 MG/DL — LOW (ref 8.5–10.1)
CHLORIDE SERPL-SCNC: 104 MMOL/L — SIGNIFICANT CHANGE UP (ref 96–108)
CO2 SERPL-SCNC: 24 MMOL/L — SIGNIFICANT CHANGE UP (ref 22–31)
CREAT SERPL-MCNC: 0.69 MG/DL — SIGNIFICANT CHANGE UP (ref 0.5–1.3)
EGFR: 97 ML/MIN/1.73M2 — SIGNIFICANT CHANGE UP
GLUCOSE SERPL-MCNC: 92 MG/DL — SIGNIFICANT CHANGE UP (ref 70–99)
HCT VFR BLD CALC: 26.4 % — LOW (ref 39–50)
HGB BLD-MCNC: 8.8 G/DL — LOW (ref 13–17)
MAGNESIUM SERPL-MCNC: 2.5 MG/DL — SIGNIFICANT CHANGE UP (ref 1.6–2.6)
MCHC RBC-ENTMCNC: 31 PG — SIGNIFICANT CHANGE UP (ref 27–34)
MCHC RBC-ENTMCNC: 33.3 GM/DL — SIGNIFICANT CHANGE UP (ref 32–36)
MCV RBC AUTO: 93 FL — SIGNIFICANT CHANGE UP (ref 80–100)
NRBC # BLD: 0 /100 WBCS — SIGNIFICANT CHANGE UP (ref 0–0)
PLATELET # BLD AUTO: 303 K/UL — SIGNIFICANT CHANGE UP (ref 150–400)
POTASSIUM SERPL-MCNC: 4.4 MMOL/L — SIGNIFICANT CHANGE UP (ref 3.5–5.3)
POTASSIUM SERPL-SCNC: 4.4 MMOL/L — SIGNIFICANT CHANGE UP (ref 3.5–5.3)
RBC # BLD: 2.84 M/UL — LOW (ref 4.2–5.8)
RBC # FLD: 12.6 % — SIGNIFICANT CHANGE UP (ref 10.3–14.5)
SODIUM SERPL-SCNC: 137 MMOL/L — SIGNIFICANT CHANGE UP (ref 135–145)
WBC # BLD: 6.52 K/UL — SIGNIFICANT CHANGE UP (ref 3.8–10.5)
WBC # FLD AUTO: 6.52 K/UL — SIGNIFICANT CHANGE UP (ref 3.8–10.5)

## 2023-02-13 PROCEDURE — 99232 SBSQ HOSP IP/OBS MODERATE 35: CPT

## 2023-02-13 RX ADMIN — Medication 50 MILLIGRAM(S): at 05:53

## 2023-02-13 RX ADMIN — LORATADINE 10 MILLIGRAM(S): 10 TABLET ORAL at 11:49

## 2023-02-13 RX ADMIN — Medication 5 MILLIGRAM(S): at 11:49

## 2023-02-13 NOTE — PROGRESS NOTE ADULT - SUBJECTIVE AND OBJECTIVE BOX
Gu Progress Note:     Slowly resolving hematuria clear this AM, some clots irrigated over weekend - will hi old CBI and observe    Covering fro Dr. Shaffer    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      HTN (hypertension)      Aortic stenosis      Rectal bleeding  last hospitalization 10/6/20 2/2 radiation proctitis      [place] ([place] of hearing)      H/O prostatectomy  2015      S/P T&amp;A (status post tonsillectomy and adenoidectomy)  child            MEDICATIONS  (STANDING):  Amicar 600 mg in NS 3000 mL for CBI 3000 milliLiter(s) 3000 milliLiter(s) IntraVesical Continuous Pump  lidocaine 2% Jelly 5 milliLiter(s) IntraUrethral once  lidocaine 2% Jelly 5 milliLiter(s) IntraUrethral once  loratadine 10 milliGRAM(s) Oral daily  metoprolol succinate ER 50 milliGRAM(s) Oral daily  Orgovyx (relugolix) 120mg tab 1 Tablet(s) 1 Tablet(s) Oral daily  oxybutynin XL 5 milliGRAM(s) Oral daily  polyethylene glycol 3350 17 Gram(s) Oral daily    MEDICATIONS  (PRN):  acetaminophen     Tablet .. 650 milliGRAM(s) Oral every 6 hours PRN Temp greater or equal to 38C (100.4F), Mild Pain (1 - 3)  ALPRAZolam 0.25 milliGRAM(s) Oral daily PRN anxiety  aluminum hydroxide/magnesium hydroxide/simethicone Suspension 30 milliLiter(s) Oral every 4 hours PRN Dyspepsia  melatonin 3 milliGRAM(s) Oral at bedtime PRN Insomnia  morphine  - Injectable 1 milliGRAM(s) IV Push every 12 hours PRN Severe Pain (7 - 10)  ondansetron Injectable 4 milliGRAM(s) IV Push every 8 hours PRN Nausea and/or Vomiting      Allergies    penicillin (Diarrhea; Pruritus; Hives)  penicillin V potassium (Rash)    Intolerances    codeine (Nausea)          FAMILY HISTORY:  FH: cancer (Father, Mother)        Vital Signs Last 24 Hrs  T(C): 36.3 (13 Feb 2023 11:45), Max: 36.8 (13 Feb 2023 05:50)  T(F): 97.4 (13 Feb 2023 11:45), Max: 98.2 (13 Feb 2023 05:50)  HR: 83 (13 Feb 2023 11:45) (58 - 83)  BP: 155/80 (13 Feb 2023 11:45) (135/68 - 155/80)  BP(mean): --  RR: 19 (13 Feb 2023 11:45) (17 - 19)  SpO2: 98% (13 Feb 2023 11:45) (91% - 98%)    Parameters below as of 13 Feb 2023 11:45  Patient On (Oxygen Delivery Method): room air        PHYSICAL EXAM:    Constitutional: NAD, well-developed    Asymptomatic, AO  Abd: BS+, soft, NT/ND, No CVAT  : Normal   phallus, patent  meatus, bilateral descended testes, no masses bowers with grossly clear urine  Extremities: No peripheral edema    LABS:                        8.8    6.52  )-----------( 303      ( 13 Feb 2023 06:17 )             26.4     02-13    137  |  104  |  14  ----------------------------<  92  4.4   |  24  |  0.69    Ca    8.4<L>      13 Feb 2023 06:17  Mg     2.5     02-13          Urine Culture:   Hemoglobin: 8.8 g/dL (02-13 @ 06:17)  Hematocrit: 26.4 % (02-13 @ 06:17)  Hemoglobin: 8.7 g/dL (02-12 @ 05:43)  Hematocrit: 25.8 % (02-12 @ 05:43)      RADIOLOGY & ADDITIONAL STUDIES:

## 2023-02-13 NOTE — PROGRESS NOTE ADULT - ASSESSMENT
Resolving hemorrhagic cystitis, continue Gr   and restart  CBI if recurrence of hematuria    Dr. Shaffer will be returning to care for  patient

## 2023-02-13 NOTE — PROGRESS NOTE ADULT - ASSESSMENT
71 y/o M w/ PMHx of HTN,  AS s/p TAVR, ppm, prostate cancer s/p radical prostatectomy c/b radiation proctitis presents to ED for severe pain and urinary retention s/p cystoscopy. Admit for hematuria / urinary retention.      Problem/Plan - 1:  ·  Problem: Urinary retention.   ·  Plan: P/w urinary retention s/p cystoscopy ; hematuria on UA  Patient is s/p prostatectomy (1/31); hematuria is likely 2/2 radiation cystitis and retention due to clots.  - Continue Gr.  Currrently on CBI.  Hand irrigate clots PRN  - Tylenol for pain PRN.  Morphine 1mg IV Q12h PRN for severe pain  - Trend H/H  - Type and screen renewed in chart  - Added 600mg Amicar to each 3L bag of CBI; CBI clamped per urology  - Started on Ditropan ER 5mg QD  - Urology Dr. Shaffer consulted, recs appreciated  - f/u with Urologist, Dr. Luis post discharge.     Problem/Plan - 2:  ·  Problem: Prostate cancer.   ·  Plan: Hx of Prostate CA, now s/p radiation therapy and radical prostatectomy  - Continue home Orgovyx - brought in from home  - Patient to f/u with his urologist at [facility] (Dr. Luis) outpatient for further management.     Problem/Plan - 3:  ·  Problem: Hypertension.   ·  Plan: Chronic, initially elevated due to discomfort  - BP stable  - Continue home Metoprolol ER 50mg PO daily w/ hold parameters.     Problem/Plan - 4:  ·  Problem: Need for prophylactic measure.   ·  Plan: SCDs b/l for dvt ppx; will hold off on A/C at this time 2/2 hematuria.

## 2023-02-13 NOTE — PROGRESS NOTE ADULT - SUBJECTIVE AND OBJECTIVE BOX
CHIEF COMPLAINT/INTERVAL HISTORY:  Pt. seen and evaluated for hematuria and urinary incontinence.  Pt. is in no distress.  On CBI with clear urine in collection bag.  Denies having any pain.      REVIEW OF SYSTEMS:  No fever, CP, SOB, or abdominal pain    Vital Signs Last 24 Hrs  T(C): 36.8 (13 Feb 2023 05:50), Max: 36.8 (13 Feb 2023 05:50)  T(F): 98.2 (13 Feb 2023 05:50), Max: 98.2 (13 Feb 2023 05:50)  HR: 78 (13 Feb 2023 05:50) (58 - 79)  BP: 148/68 (13 Feb 2023 05:50) (135/68 - 148/68)  BP(mean): --  RR: 19 (13 Feb 2023 05:50) (17 - 19)  SpO2: 92% (13 Feb 2023 05:50) (91% - 96%)    Parameters below as of 13 Feb 2023 05:50  Patient On (Oxygen Delivery Method): room air        PHYSICAL EXAM:  GENERAL: NAD  HEENT: EOMI, hearing normal, conjunctiva and sclera clear  Chest: CTA bilaterally, no wheezing  CV: S1S2, RRR,   GI: soft, +BS, NT/ND  : +bowers catheter with clear urine.  +CBI  Musculoskeletal: no edema  Psychiatric: affect nL, mood nL  Skin: warm and dry    LABS:                        8.8    6.52  )-----------( 303      ( 13 Feb 2023 06:17 )             26.4     02-13    137  |  104  |  14  ----------------------------<  92  4.4   |  24  |  0.69    Ca    8.4<L>      13 Feb 2023 06:17  Mg     2.5     02-13

## 2023-02-14 LAB
ANION GAP SERPL CALC-SCNC: 8 MMOL/L — SIGNIFICANT CHANGE UP (ref 5–17)
BUN SERPL-MCNC: 11 MG/DL — SIGNIFICANT CHANGE UP (ref 7–23)
CALCIUM SERPL-MCNC: 8.9 MG/DL — SIGNIFICANT CHANGE UP (ref 8.5–10.1)
CHLORIDE SERPL-SCNC: 104 MMOL/L — SIGNIFICANT CHANGE UP (ref 96–108)
CO2 SERPL-SCNC: 26 MMOL/L — SIGNIFICANT CHANGE UP (ref 22–31)
CREAT SERPL-MCNC: 0.75 MG/DL — SIGNIFICANT CHANGE UP (ref 0.5–1.3)
EGFR: 95 ML/MIN/1.73M2 — SIGNIFICANT CHANGE UP
GLUCOSE SERPL-MCNC: 97 MG/DL — SIGNIFICANT CHANGE UP (ref 70–99)
HCT VFR BLD CALC: 28.3 % — LOW (ref 39–50)
HGB BLD-MCNC: 9.5 G/DL — LOW (ref 13–17)
MCHC RBC-ENTMCNC: 30.9 PG — SIGNIFICANT CHANGE UP (ref 27–34)
MCHC RBC-ENTMCNC: 33.6 GM/DL — SIGNIFICANT CHANGE UP (ref 32–36)
MCV RBC AUTO: 92.2 FL — SIGNIFICANT CHANGE UP (ref 80–100)
NRBC # BLD: 0 /100 WBCS — SIGNIFICANT CHANGE UP (ref 0–0)
PLATELET # BLD AUTO: 334 K/UL — SIGNIFICANT CHANGE UP (ref 150–400)
POTASSIUM SERPL-MCNC: 4.2 MMOL/L — SIGNIFICANT CHANGE UP (ref 3.5–5.3)
POTASSIUM SERPL-SCNC: 4.2 MMOL/L — SIGNIFICANT CHANGE UP (ref 3.5–5.3)
RBC # BLD: 3.07 M/UL — LOW (ref 4.2–5.8)
RBC # FLD: 12.8 % — SIGNIFICANT CHANGE UP (ref 10.3–14.5)
SODIUM SERPL-SCNC: 138 MMOL/L — SIGNIFICANT CHANGE UP (ref 135–145)
WBC # BLD: 6.47 K/UL — SIGNIFICANT CHANGE UP (ref 3.8–10.5)
WBC # FLD AUTO: 6.47 K/UL — SIGNIFICANT CHANGE UP (ref 3.8–10.5)

## 2023-02-14 PROCEDURE — 99232 SBSQ HOSP IP/OBS MODERATE 35: CPT

## 2023-02-14 PROCEDURE — 51702 INSERT TEMP BLADDER CATH: CPT

## 2023-02-14 RX ADMIN — LORATADINE 10 MILLIGRAM(S): 10 TABLET ORAL at 12:18

## 2023-02-14 RX ADMIN — Medication 50 MILLIGRAM(S): at 05:18

## 2023-02-14 RX ADMIN — POLYETHYLENE GLYCOL 3350 17 GRAM(S): 17 POWDER, FOR SOLUTION ORAL at 12:19

## 2023-02-14 NOTE — PROGRESS NOTE ADULT - ASSESSMENT
73 y/o M w/ PMHx of HTN,  AS s/p TAVR, ppm, prostate cancer s/p radical prostatectomy c/b radiation proctitis presents to ED for severe pain and urinary retention s/p cystoscopy. Admit for hematuria / urinary retention.      Problem/Plan - 1:  ·  Problem: Urinary retention.   ·  Plan: P/w urinary retention s/p cystoscopy ; hematuria on UA  Patient is s/p prostatectomy (1/31); hematuria is likely 2/2 radiation cystitis and retention due to clots.  - Continue Gr.  Hand irrigate clots PRN  - Tylenol for pain PRN.  Morphine 1mg IV Q12h PRN for severe pain  - Trend H/H daily.  Hbg stable   - Type and screen renewed in chart  - Added 600mg Amicar to each 3L bag of CBI; CBI clamped per urology  - Started on Ditropan ER 5mg QD  - Urology Dr. Shaffer consulted, recs appreciated  - f/u with Urologist, Dr. Luis post discharge.     Problem/Plan - 2:  ·  Problem: Prostate cancer.   ·  Plan: Hx of Prostate CA, now s/p radiation therapy and radical prostatectomy  - Continue home Orgovyx - brought in from home  - Patient to f/u with his urologist at [facility] (Dr. Luis) outpatient for further management.     Problem/Plan - 3:  ·  Problem: Hypertension.   ·  Plan: Chronic, initially elevated due to discomfort  - BP stable  - Continue home Metoprolol ER 50mg PO daily w/ hold parameters.     Problem/Plan - 4:  ·  Problem: Need for prophylactic measure.   ·  Plan: SCDs b/l for dvt ppx; will hold off on A/C at this time 2/2 hematuria.

## 2023-02-14 NOTE — PROGRESS NOTE ADULT - SUBJECTIVE AND OBJECTIVE BOX
CHIEF COMPLAINT/INTERVAL HISTORY:  Pt. seen and evaluated for hematuria and urinary retention.  Pt. is in no distress.  Off CBI.  Urine is clear.      REVIEW OF SYSTEMS:  No fever, CP, SOB, or abdominal pain    Vital Signs Last 24 Hrs  T(C): 36.6 (14 Feb 2023 04:51), Max: 37.1 (13 Feb 2023 21:19)  T(F): 97.8 (14 Feb 2023 04:51), Max: 98.8 (13 Feb 2023 21:19)  HR: 71 (14 Feb 2023 04:51) (71 - 96)  BP: 128/66 (14 Feb 2023 04:51) (128/66 - 155/80)  BP(mean): --  RR: 18 (14 Feb 2023 04:51) (18 - 19)  SpO2: 94% (14 Feb 2023 04:51) (93% - 98%)    Parameters below as of 14 Feb 2023 04:51  Patient On (Oxygen Delivery Method): room air        PHYSICAL EXAM:  GENERAL: NAD  HEENT: EOMI, hearing normal, conjunctiva and sclera clear  Chest: CTA bilaterally, no wheezing  CV: S1S2, RRR,   GI: soft, +BS, NT/ND  : +bowers catheter with clear yellow urine  Musculoskeletal: no edema  Psychiatric: affect nL, mood nL  Skin: warm and dry    LABS:                        9.5    6.47  )-----------( 334      ( 14 Feb 2023 07:25 )             28.3     02-14    138  |  104  |  11  ----------------------------<  97  4.2   |  26  |  0.75    Ca    8.9      14 Feb 2023 07:25  Mg     2.5     02-13

## 2023-02-14 NOTE — CHART NOTE - NSCHARTNOTEFT_GEN_A_CORE
Assessment: patient seen for follow up   72y old  Male who presents with a chief complaint of Hematuria/Urinary Retention prostate Cancer   patient reports ate a little quiche this AM likes cream of wheat and yogurt prefers apple juice  2/14 small BM this AM  patient want to try strawberry ensure Plus HP      Factors impacting intake: [x ] none [ ] nausea  [ ] vomiting [ ] diarrhea [ ] constipation  [ ]chewing problems [ ] swallowing issues  [ ] other:     Diet Prescription: Diet, Regular:   DASH/TLC {Sodium & Cholesterol Restricted} (01-31-23 @ 20:28)    Intake: up to 100% per flow sheet     Current Weight: 2/9 wt 200# no edema noted      Pertinent Medications: MEDICATIONS  (STANDING):  Amicar 600 mg in NS 3000 mL for CBI 3000 milliLiter(s) 3000 milliLiter(s) IntraVesical Continuous Pump  lidocaine 2% Jelly 5 milliLiter(s) IntraUrethral once  lidocaine 2% Jelly 5 milliLiter(s) IntraUrethral once  loratadine 10 milliGRAM(s) Oral daily  metoprolol succinate ER 50 milliGRAM(s) Oral daily  Orgovyx (relugolix) 120mg tab 1 Tablet(s) 1 Tablet(s) Oral daily  polyethylene glycol 3350 17 Gram(s) Oral daily    MEDICATIONS  (PRN):  acetaminophen     Tablet .. 650 milliGRAM(s) Oral every 6 hours PRN Temp greater or equal to 38C (100.4F), Mild Pain (1 - 3)  ALPRAZolam 0.25 milliGRAM(s) Oral daily PRN anxiety  aluminum hydroxide/magnesium hydroxide/simethicone Suspension 30 milliLiter(s) Oral every 4 hours PRN Dyspepsia  melatonin 3 milliGRAM(s) Oral at bedtime PRN Insomnia  morphine  - Injectable 1 milliGRAM(s) IV Push every 12 hours PRN Severe Pain (7 - 10)  ondansetron Injectable 4 milliGRAM(s) IV Push every 8 hours PRN Nausea and/or Vomiting      Skin: no pressure injury     Estimated Needs:   [ x] no change since previous assessment based on 205# [id number] 25-30kcals/kg and .9-1.1 gms protein/kg 83-102gms   [ ] recalculated:     Previous Nutrition Diagnosis:   [x ] Inadequate Energy Intake [ ]Inadequate Oral Intake [ ] Excessive Energy Intake   [ ] Underweight [ ] Increased Nutrient Needs [ ] Overweight/Obesity   [ ] Altered GI Function [ ] Unintended Weight Loss [ ] Food & Nutrition Related Knowledge Deficit [ ] Malnutrition   (X) decreased nutrient needs  Nutrition Diagnosis is [x ] ongoing but with improving PO intake noted  [ ] resolved [ ] not applicable     New Nutrition Diagnosis: [x ] not applicable       Interventions:   Recommend  [ ] Change Diet To:  [ ] Nutrition Supplement  [ ] Nutrition Support  [x ] Other: recommend add MVI , recommend activate ensure plus HP once day    Monitoring and Evaluation:   [x ] PO intake [ x ] Tolerance to diet prescription [ x ] weights [ x ] labs[ x ] follow up per protocol  [ ] other:

## 2023-02-15 LAB
ANION GAP SERPL CALC-SCNC: 6 MMOL/L — SIGNIFICANT CHANGE UP (ref 5–17)
BUN SERPL-MCNC: 14 MG/DL — SIGNIFICANT CHANGE UP (ref 7–23)
CALCIUM SERPL-MCNC: 8.8 MG/DL — SIGNIFICANT CHANGE UP (ref 8.5–10.1)
CHLORIDE SERPL-SCNC: 103 MMOL/L — SIGNIFICANT CHANGE UP (ref 96–108)
CO2 SERPL-SCNC: 26 MMOL/L — SIGNIFICANT CHANGE UP (ref 22–31)
CREAT SERPL-MCNC: 0.76 MG/DL — SIGNIFICANT CHANGE UP (ref 0.5–1.3)
EGFR: 94 ML/MIN/1.73M2 — SIGNIFICANT CHANGE UP
GLUCOSE SERPL-MCNC: 94 MG/DL — SIGNIFICANT CHANGE UP (ref 70–99)
HCT VFR BLD CALC: 27.4 % — LOW (ref 39–50)
HGB BLD-MCNC: 9 G/DL — LOW (ref 13–17)
MCHC RBC-ENTMCNC: 30.3 PG — SIGNIFICANT CHANGE UP (ref 27–34)
MCHC RBC-ENTMCNC: 32.8 GM/DL — SIGNIFICANT CHANGE UP (ref 32–36)
MCV RBC AUTO: 92.3 FL — SIGNIFICANT CHANGE UP (ref 80–100)
NRBC # BLD: 0 /100 WBCS — SIGNIFICANT CHANGE UP (ref 0–0)
PLATELET # BLD AUTO: 346 K/UL — SIGNIFICANT CHANGE UP (ref 150–400)
POTASSIUM SERPL-MCNC: 4.3 MMOL/L — SIGNIFICANT CHANGE UP (ref 3.5–5.3)
POTASSIUM SERPL-SCNC: 4.3 MMOL/L — SIGNIFICANT CHANGE UP (ref 3.5–5.3)
RBC # BLD: 2.97 M/UL — LOW (ref 4.2–5.8)
RBC # FLD: 12.8 % — SIGNIFICANT CHANGE UP (ref 10.3–14.5)
SODIUM SERPL-SCNC: 135 MMOL/L — SIGNIFICANT CHANGE UP (ref 135–145)
WBC # BLD: 7.78 K/UL — SIGNIFICANT CHANGE UP (ref 3.8–10.5)
WBC # FLD AUTO: 7.78 K/UL — SIGNIFICANT CHANGE UP (ref 3.8–10.5)

## 2023-02-15 PROCEDURE — 99233 SBSQ HOSP IP/OBS HIGH 50: CPT

## 2023-02-15 RX ORDER — OXYBUTYNIN CHLORIDE 5 MG
5 TABLET ORAL DAILY
Refills: 0 | Status: DISCONTINUED | OUTPATIENT
Start: 2023-02-15 | End: 2023-02-19

## 2023-02-15 RX ADMIN — POLYETHYLENE GLYCOL 3350 17 GRAM(S): 17 POWDER, FOR SOLUTION ORAL at 12:03

## 2023-02-15 RX ADMIN — LORATADINE 10 MILLIGRAM(S): 10 TABLET ORAL at 12:09

## 2023-02-15 RX ADMIN — Medication 5 MILLIGRAM(S): at 12:04

## 2023-02-15 RX ADMIN — Medication 50 MILLIGRAM(S): at 05:10

## 2023-02-15 NOTE — PHYSICAL THERAPY INITIAL EVALUATION ADULT - PERTINENT HX OF CURRENT PROBLEM, REHAB EVAL
73 y/o M w/  PMHx of HTN, AS s/p TAVR, ppm, prostate cancer s/p radical prostatectomy c/b radiation proctitis presents to ED for severe pain and urinary retention s/p cystoscopy. [facility]; was being seen for cystoscopy after experiencing hematuria. He has had hematuria before approximately one year ago; underwent cystoscopy without complications at that time. He underwent multiple rounds of radiation therapy with ultimate radical prostatectomy for prostate CA. He has been diagnosed with radiation proctitis. Patient feels that this his current presentation is likely 2/2  radiation cystitis. Patient reports pain is improved after bowers/CBI placed.

## 2023-02-15 NOTE — CASE MANAGEMENT PROGRESS NOTE - NSCMPROGRESSNOTE_GEN_ALL_CORE
The patient is medically acute needed New Gr reinserted 2/14 and CBI restarted. CM will continue to monitor.

## 2023-02-15 NOTE — PROGRESS NOTE ADULT - SUBJECTIVE AND OBJECTIVE BOX
INTERVAL HPI/OVERNIGHT EVENTS:  FTV 2/14. Gr replaced for 350cc and cbi/amicar restarted for mild-mod hematuria.    MEDICATIONS  (STANDING):  Amicar 600 mg in NS 3000 mL for CBI 3000 milliLiter(s) 3000 milliLiter(s) IntraVesical Continuous Pump  lidocaine 2% Jelly 5 milliLiter(s) IntraUrethral once  lidocaine 2% Jelly 5 milliLiter(s) IntraUrethral once  loratadine 10 milliGRAM(s) Oral daily  metoprolol succinate ER 50 milliGRAM(s) Oral daily  Orgovyx (relugolix) 120mg tab 1 Tablet(s) 1 Tablet(s) Oral daily  oxybutynin 5 milliGRAM(s) Oral daily  polyethylene glycol 3350 17 Gram(s) Oral daily    MEDICATIONS  (PRN):  acetaminophen     Tablet .. 650 milliGRAM(s) Oral every 6 hours PRN Temp greater or equal to 38C (100.4F), Mild Pain (1 - 3)  ALPRAZolam 0.25 milliGRAM(s) Oral daily PRN anxiety  aluminum hydroxide/magnesium hydroxide/simethicone Suspension 30 milliLiter(s) Oral every 4 hours PRN Dyspepsia  melatonin 3 milliGRAM(s) Oral at bedtime PRN Insomnia  morphine  - Injectable 1 milliGRAM(s) IV Push every 12 hours PRN Severe Pain (7 - 10)  ondansetron Injectable 4 milliGRAM(s) IV Push every 8 hours PRN Nausea and/or Vomiting        Vital Signs Last 24 Hrs  T(C): 36.6 (16 Feb 2023 05:01), Max: 36.6 (16 Feb 2023 05:01)  T(F): 97.9 (16 Feb 2023 05:01), Max: 97.9 (16 Feb 2023 05:01)  HR: 80 (16 Feb 2023 05:01) (80 - 86)  BP: 121/70 (16 Feb 2023 05:01) (100/63 - 130/73)  BP(mean): --  RR: 18 (16 Feb 2023 05:01) (18 - 20)  SpO2: 94% (16 Feb 2023 05:01) (94% - 96%)    Parameters below as of 16 Feb 2023 05:01  Patient On (Oxygen Delivery Method): room air        PHYSICAL EXAM:    ABDOMEN: benign  GENITALIA: clear cbi    LABS:                        8.5    6.86  )-----------( 305      ( 16 Feb 2023 06:25 )             25.2     02-16    136  |  104  |  16  ----------------------------<  91  4.0   |  26  |  0.73    Ca    8.6      16 Feb 2023 06:25            Blood Cultures:    RADIOLOGY & ADDITIONAL TESTS:

## 2023-02-15 NOTE — PROGRESS NOTE ADULT - SUBJECTIVE AND OBJECTIVE BOX
Patient is a 72y old  Male who presents with a chief complaint of Hematuria/Urinary Retention (14 Feb 2023 08:53)      Subjective:  INTERVAL HPI/OVERNIGHT EVENTS: Patient seen and examined at bedside.  Patient c/o recurrent hematuria, able to ambulate yesterday    MEDICATIONS  (STANDING):  Amicar 600 mg in NS 3000 mL for CBI 3000 milliLiter(s) 3000 milliLiter(s) IntraVesical Continuous Pump  lidocaine 2% Jelly 5 milliLiter(s) IntraUrethral once  lidocaine 2% Jelly 5 milliLiter(s) IntraUrethral once  loratadine 10 milliGRAM(s) Oral daily  metoprolol succinate ER 50 milliGRAM(s) Oral daily  Orgovyx (relugolix) 120mg tab 1 Tablet(s) 1 Tablet(s) Oral daily  polyethylene glycol 3350 17 Gram(s) Oral daily    MEDICATIONS  (PRN):  acetaminophen     Tablet .. 650 milliGRAM(s) Oral every 6 hours PRN Temp greater or equal to 38C (100.4F), Mild Pain (1 - 3)  ALPRAZolam 0.25 milliGRAM(s) Oral daily PRN anxiety  aluminum hydroxide/magnesium hydroxide/simethicone Suspension 30 milliLiter(s) Oral every 4 hours PRN Dyspepsia  melatonin 3 milliGRAM(s) Oral at bedtime PRN Insomnia  morphine  - Injectable 1 milliGRAM(s) IV Push every 12 hours PRN Severe Pain (7 - 10)  ondansetron Injectable 4 milliGRAM(s) IV Push every 8 hours PRN Nausea and/or Vomiting      Allergies    penicillin (Diarrhea; Pruritus; Hives)  penicillin V potassium (Rash)    Intolerances    codeine (Nausea)      REVIEW OF SYSTEMS:  CONSTITUTIONAL: No fever or chills  HEENT:  No headache, no sore throat  RESPIRATORY: No cough or shortness of breath  CARDIOVASCULAR: No chest pain or palpitations  GASTROINTESTINAL: No abd pain, nausea, vomiting, or diarrhea      Objective:  Vital Signs Last 24 Hrs  T(C): 36.6 (15 Feb 2023 05:00), Max: 36.8 (14 Feb 2023 20:42)  T(F): 97.9 (15 Feb 2023 05:00), Max: 98.2 (14 Feb 2023 20:42)  HR: 79 (15 Feb 2023 05:00) (73 - 92)  BP: 123/67 (15 Feb 2023 05:00) (123/67 - 163/77)  BP(mean): --  RR: 18 (15 Feb 2023 05:00) (18 - 18)  SpO2: 94% (15 Feb 2023 05:00) (92% - 94%)    Parameters below as of 15 Feb 2023 05:00  Patient On (Oxygen Delivery Method): room air        GENERAL: NAD, lying in bed comfortably  HEAD:  Normocephalic  EYES:  conjunctiva and sclera clear  ENT: Moist mucous membranes  NECK: Supple  CHEST/LUNG: Clear to auscultation bilaterally; No rales or rhonchi; no wheezing. Unlabored respirations  HEART: Regular rate and rhythm; S1S2+  ABDOMEN: Bowel sounds present; Soft, Nontender, Nondistended.   EXTREMITIES:  + distal Peripheral Pulses;  No cyanosis, or edema  NERVOUS SYSTEM:  Alert & Oriented X3;  No gross focal deficits   MSK: moves all extremities  SKIN: No rashes   Urinary: + bowers, ON CBI, + hematuria     LABS:                        9.0    7.78  )-----------( 346      ( 15 Feb 2023 06:37 )             27.4     15 Feb 2023 06:37    135    |  103    |  14     ----------------------------<  94     4.3     |  26     |  0.76     Ca    8.8        15 Feb 2023 06:37          CAPILLARY BLOOD GLUCOSE              RADIOLOGY & ADDITIONAL TESTS:    Personally reviewed.     Consultant(s) Notes Reviewed:  [x] YES  [ ] NO    Plan of care discussed with patient /family; all questions answered

## 2023-02-15 NOTE — PROGRESS NOTE ADULT - ASSESSMENT
Hematuria 2n2 rad cystitis - improved w/ amicar  UR      Cont bowers  If urine remains clear will change amicar to 1gm po q6h and d/c cbi

## 2023-02-15 NOTE — PHYSICAL THERAPY INITIAL EVALUATION ADULT - ADDITIONAL COMMENTS
Pt lives in private residence, No MILA, Full flight of stairs to reach bedroom on second level. pt has access to bathroom on both levels and ability to use stall or tub shower. Pt states pta he was independent with ambulation and completion of ADLs.

## 2023-02-15 NOTE — PROGRESS NOTE ADULT - ASSESSMENT
71 y/o M w/ PMHx of HTN,  AS s/p TAVR, ppm, prostate cancer s/p radical prostatectomy c/b radiation proctitis presents to ED for severe pain and urinary retention s/p cystoscopy. Admit for hematuria / urinary retention.      Problem/Plan - 1:  ·  Problem: Urinary retention.   ·  Plan: P/w urinary retention s/p cystoscopy ; hematuria on UA  Patient is s/p prostatectomy (1/31); hematuria is likely 2/2 radiation cystitis and retention due to clots.  - Tylenol for pain PRN.  Morphine 1mg IV Q12h PRN for severe pain  - Trend H/H daily.  Hbg stable   - Added 600mg Amicar to each 3L bag of CBI; CBI clamped per urology  - Started on Ditropan ER 5mg QD  - Urology Dr. Shaffer consulted, recs appreciated  - f/u with Urologist, Dr. Luis post discharge.  patient has recurrent hematuria after bowers clamping, CBI resumed, bowers replaced, will need to  f/u urology.      Problem/Plan - 2:  ·  Problem: Prostate cancer.   ·  Plan: Hx of Prostate CA, now s/p radiation therapy and radical prostatectomy  - Continue home Orgovyx - brought in from home  - Patient to f/u with his urologist at [facility] (Dr. Luis) outpatient for further management.     Problem/Plan - 3:  ·  Problem: Hypertension.   - BP stable  - Continue home Metoprolol ER 50mg PO daily w/ hold parameters.     Problem/Plan - 4:  ·  Problem: Need for prophylactic measure.   ·  Plan: SCDs b/l for dvt ppx; will hold off on A/C at this time 2/2 hematuria.     Problem/Plan - 5:   acute blood loss anemia:  monitor H/H    Plan to transfuse if <8

## 2023-02-16 LAB
ANION GAP SERPL CALC-SCNC: 6 MMOL/L — SIGNIFICANT CHANGE UP (ref 5–17)
BUN SERPL-MCNC: 16 MG/DL — SIGNIFICANT CHANGE UP (ref 7–23)
CALCIUM SERPL-MCNC: 8.6 MG/DL — SIGNIFICANT CHANGE UP (ref 8.5–10.1)
CHLORIDE SERPL-SCNC: 104 MMOL/L — SIGNIFICANT CHANGE UP (ref 96–108)
CO2 SERPL-SCNC: 26 MMOL/L — SIGNIFICANT CHANGE UP (ref 22–31)
CREAT SERPL-MCNC: 0.73 MG/DL — SIGNIFICANT CHANGE UP (ref 0.5–1.3)
EGFR: 95 ML/MIN/1.73M2 — SIGNIFICANT CHANGE UP
GLUCOSE SERPL-MCNC: 91 MG/DL — SIGNIFICANT CHANGE UP (ref 70–99)
HCT VFR BLD CALC: 25.2 % — LOW (ref 39–50)
HGB BLD-MCNC: 8.5 G/DL — LOW (ref 13–17)
MCHC RBC-ENTMCNC: 30.9 PG — SIGNIFICANT CHANGE UP (ref 27–34)
MCHC RBC-ENTMCNC: 33.7 GM/DL — SIGNIFICANT CHANGE UP (ref 32–36)
MCV RBC AUTO: 91.6 FL — SIGNIFICANT CHANGE UP (ref 80–100)
NRBC # BLD: 0 /100 WBCS — SIGNIFICANT CHANGE UP (ref 0–0)
PLATELET # BLD AUTO: 305 K/UL — SIGNIFICANT CHANGE UP (ref 150–400)
POTASSIUM SERPL-MCNC: 4 MMOL/L — SIGNIFICANT CHANGE UP (ref 3.5–5.3)
POTASSIUM SERPL-SCNC: 4 MMOL/L — SIGNIFICANT CHANGE UP (ref 3.5–5.3)
RBC # BLD: 2.75 M/UL — LOW (ref 4.2–5.8)
RBC # FLD: 12.9 % — SIGNIFICANT CHANGE UP (ref 10.3–14.5)
SODIUM SERPL-SCNC: 136 MMOL/L — SIGNIFICANT CHANGE UP (ref 135–145)
WBC # BLD: 6.86 K/UL — SIGNIFICANT CHANGE UP (ref 3.8–10.5)
WBC # FLD AUTO: 6.86 K/UL — SIGNIFICANT CHANGE UP (ref 3.8–10.5)

## 2023-02-16 PROCEDURE — 99233 SBSQ HOSP IP/OBS HIGH 50: CPT

## 2023-02-16 RX ORDER — AMINOCAPROIC ACID 500 MG/1
1000 TABLET ORAL EVERY 6 HOURS
Refills: 0 | Status: DISCONTINUED | OUTPATIENT
Start: 2023-02-16 | End: 2023-02-19

## 2023-02-16 RX ADMIN — Medication 5 MILLIGRAM(S): at 11:52

## 2023-02-16 RX ADMIN — Medication 50 MILLIGRAM(S): at 05:38

## 2023-02-16 RX ADMIN — LORATADINE 10 MILLIGRAM(S): 10 TABLET ORAL at 11:56

## 2023-02-16 RX ADMIN — AMINOCAPROIC ACID 1000 MILLIGRAM(S): 500 TABLET ORAL at 12:59

## 2023-02-16 RX ADMIN — AMINOCAPROIC ACID 1000 MILLIGRAM(S): 500 TABLET ORAL at 18:49

## 2023-02-16 RX ADMIN — POLYETHYLENE GLYCOL 3350 17 GRAM(S): 17 POWDER, FOR SOLUTION ORAL at 11:53

## 2023-02-16 NOTE — PROGRESS NOTE ADULT - SUBJECTIVE AND OBJECTIVE BOX
Patient is a 72y old  Male who presents with a chief complaint of Hematuria/Urinary Retention (15 Feb 2023 10:29)      Subjective:  INTERVAL HPI/OVERNIGHT EVENTS: Patient seen and examined at bedside.  patient states he still had blood clot that his bowers need to be flushed last night, had episode of lower abd pain   MEDICATIONS  (STANDING):  aminocaproic acid Tablet 1000 milliGRAM(s) Oral every 6 hours  lidocaine 2% Jelly 5 milliLiter(s) IntraUrethral once  lidocaine 2% Jelly 5 milliLiter(s) IntraUrethral once  loratadine 10 milliGRAM(s) Oral daily  metoprolol succinate ER 50 milliGRAM(s) Oral daily  Orgovyx (relugolix) 120mg tab 1 Tablet(s) 1 Tablet(s) Oral daily  oxybutynin 5 milliGRAM(s) Oral daily  polyethylene glycol 3350 17 Gram(s) Oral daily    MEDICATIONS  (PRN):  acetaminophen     Tablet .. 650 milliGRAM(s) Oral every 6 hours PRN Temp greater or equal to 38C (100.4F), Mild Pain (1 - 3)  ALPRAZolam 0.25 milliGRAM(s) Oral daily PRN anxiety  aluminum hydroxide/magnesium hydroxide/simethicone Suspension 30 milliLiter(s) Oral every 4 hours PRN Dyspepsia  melatonin 3 milliGRAM(s) Oral at bedtime PRN Insomnia  morphine  - Injectable 1 milliGRAM(s) IV Push every 12 hours PRN Severe Pain (7 - 10)  ondansetron Injectable 4 milliGRAM(s) IV Push every 8 hours PRN Nausea and/or Vomiting      Allergies    penicillin (Diarrhea; Pruritus; Hives)  penicillin V potassium (Rash)    Intolerances    codeine (Nausea)      REVIEW OF SYSTEMS:  CONSTITUTIONAL: No fever or chills  HEENT:  No headache, no sore throat  RESPIRATORY: No cough or shortness of breath  CARDIOVASCULAR: No chest pain or palpitations  GASTROINTESTINAL: No abd pain, nausea, vomiting, or diarrhea      Objective:  Vital Signs Last 24 Hrs  T(C): 36.7 (16 Feb 2023 12:34), Max: 36.7 (16 Feb 2023 12:34)  T(F): 98 (16 Feb 2023 12:34), Max: 98 (16 Feb 2023 12:34)  HR: 84 (16 Feb 2023 12:34) (80 - 86)  BP: 138/76 (16 Feb 2023 12:34) (121/70 - 138/76)  BP(mean): --  RR: 20 (16 Feb 2023 12:34) (18 - 20)  SpO2: 97% (16 Feb 2023 12:34) (94% - 97%)    Parameters below as of 16 Feb 2023 12:34  Patient On (Oxygen Delivery Method): room air        GENERAL: NAD, lying in bed comfortably  HEAD:  Normocephalic  EYES:  conjunctiva and sclera clear  ENT: Moist mucous membranes  NECK: Supple  CHEST/LUNG: Clear to auscultation bilaterally; No rales or rhonchi; no wheezing. Unlabored respirations  HEART: Regular rate and rhythm; S1S2+  ABDOMEN: Bowel sounds present; Soft, Nontender, Nondistended.   EXTREMITIES:  + distal Peripheral Pulses;  No cyanosis, or edema  NERVOUS SYSTEM:  Alert & Oriented X3;  No gross focal deficits   MSK: moves all extremities  SKIN: No rashes     LABS:                        8.5    6.86  )-----------( 305      ( 16 Feb 2023 06:25 )             25.2     16 Feb 2023 06:25    136    |  104    |  16     ----------------------------<  91     4.0     |  26     |  0.73     Ca    8.6        16 Feb 2023 06:25          CAPILLARY BLOOD GLUCOSE              RADIOLOGY & ADDITIONAL TESTS:    Personally reviewed.     Consultant(s) Notes Reviewed:  [x] YES  [ ] NO    Plan of care discussed with patient /family; all questions answered

## 2023-02-16 NOTE — PROGRESS NOTE ADULT - ASSESSMENT
71 y/o M w/ PMHx of HTN,  AS s/p TAVR, ppm, prostate cancer s/p radical prostatectomy c/b radiation proctitis presents to ED for severe pain and urinary retention s/p cystoscopy. Admit for hematuria / urinary retention.      Problem/Plan - 1:  ·  Problem: Urinary retention.   ·  Plan: P/w urinary retention s/p cystoscopy ; hematuria on UA  Patient is s/p prostatectomy (1/31); hematuria is likely 2/2 radiation cystitis and retention due to clots.  - Tylenol for pain PRN.  Morphine 1mg IV Q12h PRN for severe pain  - Trend H/H daily.  Hbg stable   - Added 600mg Amicar to each 3L bag of CBI; CBI clamped per urology  - Started on Ditropan ER 5mg QD  - Urology Dr. Shaffer consulted, recs appreciated: Cont bowers, If urine remains clear will change amicar to 1gm po q6h and d/c cbi  - f/u with Urologist, Dr. Luis post discharge.       Problem/Plan - 2:  ·  Problem: Prostate cancer.   ·  Plan: Hx of Prostate CA, now s/p radiation therapy and radical prostatectomy  - Continue home Orgovyx - brought in from home  - Patient to f/u with [place]rolog[place] [facility] ([place]. Luis) outpatient for further management.     Problem/Plan - 3:  ·  Problem: Hypertension.   - BP stable  - Continue home Metoprolol ER 50mg PO daily w/ hold parameters.     Problem/Plan - 4:  ·  Problem: Need for prophylactic measure.   ·  Plan: SCDs b/l for dvt ppx; will hold off on A/C at this time 2/2 hematuria.     Problem/Plan - 5:   acute blood loss anemia:  monitor H/H    Plan to transfuse if <8

## 2023-02-17 ENCOUNTER — TRANSCRIPTION ENCOUNTER (OUTPATIENT)
Age: 73
End: 2023-02-17

## 2023-02-17 LAB
HCT VFR BLD CALC: 25.4 % — LOW (ref 39–50)
HGB BLD-MCNC: 8.4 G/DL — LOW (ref 13–17)
MCHC RBC-ENTMCNC: 30.2 PG — SIGNIFICANT CHANGE UP (ref 27–34)
MCHC RBC-ENTMCNC: 33.1 GM/DL — SIGNIFICANT CHANGE UP (ref 32–36)
MCV RBC AUTO: 91.4 FL — SIGNIFICANT CHANGE UP (ref 80–100)
NRBC # BLD: 0 /100 WBCS — SIGNIFICANT CHANGE UP (ref 0–0)
PLATELET # BLD AUTO: 324 K/UL — SIGNIFICANT CHANGE UP (ref 150–400)
RBC # BLD: 2.78 M/UL — LOW (ref 4.2–5.8)
RBC # FLD: 12.8 % — SIGNIFICANT CHANGE UP (ref 10.3–14.5)
WBC # BLD: 6.18 K/UL — SIGNIFICANT CHANGE UP (ref 3.8–10.5)
WBC # FLD AUTO: 6.18 K/UL — SIGNIFICANT CHANGE UP (ref 3.8–10.5)

## 2023-02-17 PROCEDURE — 99233 SBSQ HOSP IP/OBS HIGH 50: CPT

## 2023-02-17 RX ADMIN — Medication 5 MILLIGRAM(S): at 12:33

## 2023-02-17 RX ADMIN — LORATADINE 10 MILLIGRAM(S): 10 TABLET ORAL at 12:33

## 2023-02-17 RX ADMIN — AMINOCAPROIC ACID 1000 MILLIGRAM(S): 500 TABLET ORAL at 07:48

## 2023-02-17 RX ADMIN — AMINOCAPROIC ACID 1000 MILLIGRAM(S): 500 TABLET ORAL at 18:08

## 2023-02-17 RX ADMIN — AMINOCAPROIC ACID 1000 MILLIGRAM(S): 500 TABLET ORAL at 12:33

## 2023-02-17 RX ADMIN — AMINOCAPROIC ACID 1000 MILLIGRAM(S): 500 TABLET ORAL at 01:55

## 2023-02-17 RX ADMIN — AMINOCAPROIC ACID 1000 MILLIGRAM(S): 500 TABLET ORAL at 23:03

## 2023-02-17 RX ADMIN — Medication 50 MILLIGRAM(S): at 06:02

## 2023-02-17 NOTE — PROGRESS NOTE ADULT - SUBJECTIVE AND OBJECTIVE BOX
Patient is a 72y old  Male who presents with a chief complaint of Hematuria/Urinary Retention (17 Feb 2023 08:18)      Subjective:  INTERVAL HPI/OVERNIGHT EVENTS: Patient seen and examined at bedside.  Patient c/o hematuria   MEDICATIONS  (STANDING):  aminocaproic acid Tablet 1000 milliGRAM(s) Oral every 6 hours  lidocaine 2% Jelly 5 milliLiter(s) IntraUrethral once  lidocaine 2% Jelly 5 milliLiter(s) IntraUrethral once  loratadine 10 milliGRAM(s) Oral daily  metoprolol succinate ER 50 milliGRAM(s) Oral daily  Orgovyx (relugolix) 120mg tab 1 Tablet(s) 1 Tablet(s) Oral daily  oxybutynin 5 milliGRAM(s) Oral daily  polyethylene glycol 3350 17 Gram(s) Oral daily    MEDICATIONS  (PRN):  acetaminophen     Tablet .. 650 milliGRAM(s) Oral every 6 hours PRN Temp greater or equal to 38C (100.4F), Mild Pain (1 - 3)  aluminum hydroxide/magnesium hydroxide/simethicone Suspension 30 milliLiter(s) Oral every 4 hours PRN Dyspepsia  melatonin 3 milliGRAM(s) Oral at bedtime PRN Insomnia  ondansetron Injectable 4 milliGRAM(s) IV Push every 8 hours PRN Nausea and/or Vomiting      Allergies    penicillin (Diarrhea; Pruritus; Hives)  penicillin V potassium (Rash)    Intolerances    codeine (Nausea)      REVIEW OF SYSTEMS:  CONSTITUTIONAL: No fever or chills  HEENT:  No headache, no sore throat  RESPIRATORY: No cough or shortness of breath  CARDIOVASCULAR: No chest pain or palpitations  GASTROINTESTINAL: No abd pain, nausea, vomiting, or diarrhea      Objective:  Vital Signs Last 24 Hrs  T(C): 36.8 (17 Feb 2023 12:40), Max: 36.9 (17 Feb 2023 05:19)  T(F): 98.2 (17 Feb 2023 12:40), Max: 98.5 (17 Feb 2023 05:19)  HR: 75 (17 Feb 2023 13:48) (73 - 75)  BP: 127/75 (17 Feb 2023 13:48) (119/64 - 131/68)  BP(mean): --  RR: 18 (17 Feb 2023 12:40) (18 - 18)  SpO2: 94% (17 Feb 2023 13:48) (93% - 94%)    Parameters below as of 17 Feb 2023 13:48  Patient On (Oxygen Delivery Method): room air        GENERAL: NAD, lying in bed  HEAD:  Normocephalic  EYES:  conjunctiva and sclera clear  ENT: Moist mucous membranes  NECK: Supple  CHEST/LUNG: Clear to auscultation bilaterally; No rales or rhonchi; no wheezing. Unlabored respirations  HEART: Regular rate and rhythm; S1S2+  ABDOMEN: Bowel sounds present; Soft, Nontender, Nondistended.   EXTREMITIES:  + distal Peripheral Pulses;  No cyanosis, or edema  NERVOUS SYSTEM:  Alert & Oriented X3;  No gross focal deficits   MSK: moves all extremities  SKIN: No rashes   genital urinary: + bowers, pink appearing urine     LABS:                        8.4    6.18  )-----------( 324      ( 17 Feb 2023 06:30 )             25.4       Ca    8.6        16 Feb 2023 06:25          CAPILLARY BLOOD GLUCOSE              RADIOLOGY & ADDITIONAL TESTS:    Personally reviewed.     Consultant(s) Notes Reviewed:  [x] YES  [ ] NO    Plan of care discussed with patient /family; all questions answered

## 2023-02-17 NOTE — PROGRESS NOTE ADULT - ASSESSMENT
73 y/o M w/ PMHx of HTN,  AS s/p TAVR, ppm, prostate cancer s/p radical prostatectomy c/b radiation proctitis presents to ED for severe pain and urinary retention s/p cystoscopy. Admit for hematuria / urinary retention.      Problem/Plan - 1:  ·  Problem: Urinary retention.   ·  Plan: P/w urinary retention s/p cystoscopy ; hematuria on UA  Patient is s/p prostatectomy (1/31); hematuria is likely 2/2 radiation cystitis and retention due to clots.  - Tylenol for pain PRN.  Morphine 1mg IV Q12h PRN for severe pain  - Trend H/H daily.  Hbg stable   CBI clamped per urology, Started on Ditropan  and amicar  - Urology Dr. Shaffer consult appreciated   - f/u with Urologist, Dr. Luis post discharge.       Problem/Plan - 2:  ·  Problem: Prostate cancer.   ·  Plan: Hx of Prostate CA, now s/p radiation therapy and radical prostatectomy  - Continue home Orgovyx - brought in from home  - Patient to f/u with [place] [facility] [place] ([place]. [place]) [place] for further management.     Problem/Plan - 3:  ·  Problem: Hypertension.   - BP stable  - Continue home Metoprolol ER 50mg PO daily w/ hold parameters.     Problem/Plan - 4:  ·  Problem: Need for prophylactic measure.   ·  Plan: SCDs b/l for dvt ppx; will hold off on A/C at this time 2/2 hematuria.     Problem/Plan - 5:   acute blood loss anemia:  monitor H/H    Plan to transfuse if <8

## 2023-02-17 NOTE — PROGRESS NOTE ADULT - ASSESSMENT
Improving rad hem cystitis.      Cont bowers  cont po amicar  d/c cbi  If urine remains clear may send home late afternoon  [id number] fluids  Limit activity  Outpt [facility] w/ Dr Luis at [facility]. Pt has also seen Dr Osito Vega - in remote past.

## 2023-02-17 NOTE — PATIENT CHOICE NOTE. - NSPTCHOICESTATE_GEN_ALL_CORE

## 2023-02-17 NOTE — DISCHARGE NOTE NURSING/CASE MANAGEMENT/SOCIAL WORK - PATIENT PORTAL LINK FT
You can access the FollowMyHealth Patient Portal offered by [facility] by registering at the following website: [email or web address]. By joining [facility]’s FollowMyHealth portal, you will also be able to view your health information using other applications (apps) compatible with our system.

## 2023-02-17 NOTE — CASE MANAGEMENT PROGRESS NOTE - NSCMPROGRESSNOTE_GEN_ALL_CORE
Spoke to patient as well as MD regarding DC planning. The patient will most likely go home with a  new bowers. The [facility] is [facility] for [facility] needs for new bowers. The patient is likely to be DC over the weekend. Referral started but not sent to [facility]. Patient is aware he will needs to follow-up with Urology.  CM will continue to monitor.

## 2023-02-18 LAB
HCT VFR BLD CALC: 25.7 % — LOW (ref 39–50)
HGB BLD-MCNC: 8.4 G/DL — LOW (ref 13–17)
MCHC RBC-ENTMCNC: 30 PG — SIGNIFICANT CHANGE UP (ref 27–34)
MCHC RBC-ENTMCNC: 32.7 GM/DL — SIGNIFICANT CHANGE UP (ref 32–36)
MCV RBC AUTO: 91.8 FL — SIGNIFICANT CHANGE UP (ref 80–100)
NRBC # BLD: 0 /100 WBCS — SIGNIFICANT CHANGE UP (ref 0–0)
PLATELET # BLD AUTO: 300 K/UL — SIGNIFICANT CHANGE UP (ref 150–400)
RBC # BLD: 2.8 M/UL — LOW (ref 4.2–5.8)
RBC # FLD: 12.6 % — SIGNIFICANT CHANGE UP (ref 10.3–14.5)
WBC # BLD: 5.99 K/UL — SIGNIFICANT CHANGE UP (ref 3.8–10.5)
WBC # FLD AUTO: 5.99 K/UL — SIGNIFICANT CHANGE UP (ref 3.8–10.5)

## 2023-02-18 PROCEDURE — 99233 SBSQ HOSP IP/OBS HIGH 50: CPT

## 2023-02-18 PROCEDURE — 99222 1ST HOSP IP/OBS MODERATE 55: CPT

## 2023-02-18 RX ADMIN — AMINOCAPROIC ACID 1000 MILLIGRAM(S): 500 TABLET ORAL at 12:01

## 2023-02-18 RX ADMIN — AMINOCAPROIC ACID 1000 MILLIGRAM(S): 500 TABLET ORAL at 18:17

## 2023-02-18 RX ADMIN — LORATADINE 10 MILLIGRAM(S): 10 TABLET ORAL at 12:02

## 2023-02-18 RX ADMIN — AMINOCAPROIC ACID 1000 MILLIGRAM(S): 500 TABLET ORAL at 23:08

## 2023-02-18 RX ADMIN — AMINOCAPROIC ACID 1000 MILLIGRAM(S): 500 TABLET ORAL at 06:13

## 2023-02-18 RX ADMIN — Medication 50 MILLIGRAM(S): at 06:13

## 2023-02-18 RX ADMIN — Medication 5 MILLIGRAM(S): at 12:01

## 2023-02-18 NOTE — PROGRESS NOTE ADULT - ASSESSMENT
73 y/o M w/ PMHx of HTN,  AS s/p TAVR, ppm, prostate cancer s/p radical prostatectomy c/b radiation proctitis presents to ED for severe pain and urinary retention s/p cystoscopy. Admit for hematuria / urinary retention.      Problem/Plan - 1:  ·  Problem: Urinary retention with hematuria :   s/p RRP for Ca Prostate by Eusebio Martinez, XRT for recurrence, on Orgovyx  [place] [facility] [place]  admitted for clot retention, hematuria likely due to radiation cystitis  CBI clamped per urology, Started on Ditropan  and amicar  hematuria improved, will follow, if further improvement, may consider d/c bowers as per urology recommendation          Problem/Plan - 2:  ·  Problem: Prostate cancer.   ·  Plan: Hx of Prostate CA, now s/p radiation therapy and radical prostatectomy  - Continue home Orgovyx - brought in from home  - Patient to f[place] [facility] [place] for further management.     Problem/Plan - 3:  ·  Problem: Hypertension.   - BP stable  - Continue home Metoprolol ER 50mg PO daily w/ hold parameters.     Problem/Plan - 4:  ·  Problem: Need for prophylactic measure.   ·  Plan: SCDs b/l for dvt ppx; will hold off on A/C at this time 2/2 hematuria.     Problem/Plan - 5:   acute blood loss anemia:  monitor H/H  8.4 today    Plan to transfuse if <8

## 2023-02-18 NOTE — PROGRESS NOTE ADULT - ASSESSMENT
s/p RRP for Ca Prostate by Eusebio Martinez, XRT for recurrence, on Orgovyx  s/p negative cysto x2 [place] Dr. Luis at [facility], last one 5 weeks ago  admitted for clot retention, hematuria likely due to radiation cystitis  hematuria markedly improved on Amicar    no need to restart CBI for now  If urine continues to be clear, can have TOV prior to discharge.  Continue amicar for now.    Discussed with pt and Dr. Nolan at length.

## 2023-02-18 NOTE — CONSULT NOTE ADULT - ASSESSMENT
Salvador is a 72 year old male with AV disease, s/p TAVR in 12/20, with post op SSS requiring PPM, here with hematuria and radiation hemorrhagic cystitis.  He called the office yesterday for evaluation.    - no sign of acute ischemia. He has minimal CAD based on cath in 2020.  - no sign of volume overload  - on orgovyx and qtc is acceptable on ekg  - antiplatelets are on hold. no issue with continuing amicar  - bp acceptable, cont with metoprolol  - trend hb and urology fu  - will follow with you

## 2023-02-18 NOTE — PROGRESS NOTE ADULT - SUBJECTIVE AND OBJECTIVE BOX
Patient is a 72y old  Male who presents with a chief complaint of Hematuria/Urinary Retention (18 Feb 2023 11:08)      Subjective:  INTERVAL HPI/OVERNIGHT EVENTS: Patient seen and examined at bedside.  Patient c/o some lightheadedness in the bathroom this am. no n/v/cp/sob  MEDICATIONS  (STANDING):  aminocaproic acid Tablet 1000 milliGRAM(s) Oral every 6 hours  lidocaine 2% Jelly 5 milliLiter(s) IntraUrethral once  lidocaine 2% Jelly 5 milliLiter(s) IntraUrethral once  loratadine 10 milliGRAM(s) Oral daily  metoprolol succinate ER 50 milliGRAM(s) Oral daily  Orgovyx (relugolix) 120mg tab 1 Tablet(s) 1 Tablet(s) Oral daily  oxybutynin 5 milliGRAM(s) Oral daily  polyethylene glycol 3350 17 Gram(s) Oral daily    MEDICATIONS  (PRN):  acetaminophen     Tablet .. 650 milliGRAM(s) Oral every 6 hours PRN Temp greater or equal to 38C (100.4F), Mild Pain (1 - 3)  aluminum hydroxide/magnesium hydroxide/simethicone Suspension 30 milliLiter(s) Oral every 4 hours PRN Dyspepsia  melatonin 3 milliGRAM(s) Oral at bedtime PRN Insomnia  ondansetron Injectable 4 milliGRAM(s) IV Push every 8 hours PRN Nausea and/or Vomiting      Allergies    penicillin (Diarrhea; Pruritus; Hives)  penicillin V potassium (Rash)    Intolerances    codeine (Nausea)      REVIEW OF SYSTEMS:  CONSTITUTIONAL: No fever or chills  HEENT:  No headache, no sore throat  RESPIRATORY: No cough or shortness of breath  CARDIOVASCULAR: No chest pain or palpitations  GASTROINTESTINAL: No abd pain, nausea, vomiting, or diarrhea      Objective:  Vital Signs Last 24 Hrs  T(C): 36.4 (18 Feb 2023 12:06), Max: 36.8 (17 Feb 2023 20:04)  T(F): 97.6 (18 Feb 2023 12:06), Max: 98.2 (17 Feb 2023 20:04)  HR: 72 (18 Feb 2023 12:06) (72 - 75)  BP: 118/71 (18 Feb 2023 12:06) (118/71 - 153/77)  BP(mean): --  RR: 20 (18 Feb 2023 12:06) (18 - 20)  SpO2: 95% (18 Feb 2023 12:06) (94% - 96%)    Parameters below as of 18 Feb 2023 12:06  Patient On (Oxygen Delivery Method): room air        GENERAL: NAD, lying in bed comfortably  HEAD:  Normocephalic  EYES:  conjunctiva and sclera clear  ENT: Moist mucous membranes  NECK: Supple  CHEST/LUNG: Clear to auscultation bilaterally; No rales or rhonchi; no wheezing. Unlabored respirations  HEART: Regular rate and rhythm; S1S2+  ABDOMEN: Bowel sounds present; Soft, Nontender, Nondistended.   EXTREMITIES:  + distal Peripheral Pulses;  No cyanosis, or edema  NERVOUS SYSTEM:  Alert & Oriented X3;  No gross focal deficits   MSK: moves all extremities  SKIN: No rashes   +bowers: hematuria improved    LABS:                        8.4    5.99  )-----------( 300      ( 18 Feb 2023 06:37 )             25.7         Consultant(s) Notes Reviewed:  [x] YES  [ ] NO    Plan of care discussed with patient /family; all questions answered

## 2023-02-18 NOTE — CASE MANAGEMENT PROGRESS NOTE - NSCMPROGRESSNOTE_GEN_ALL_CORE
Discussed patient with Dr. Nolan this AM. Bowers remains in place with CBI clamped off. Urology will monitor for clear urine and hopeful to DC bowers w/TOV on Mon. As per Dr. Nolan, likely Dfor late Mon or Tues. CM will continue to follow.

## 2023-02-18 NOTE — CHART NOTE - NSCHARTNOTEFT_GEN_A_CORE
Called by RN for slight change in urine color. As per RN, urine was clear earlier this evening but now has small clots passing with a pink tinged color in bowers. Patient seen and examined at bedside, resting comfortably in bed. Clots and color of urine appreciated on examination.    Assessment/Plan  71 y/o M w/ PMHx of HTN,  AS s/p TAVR, ppm, prostate cancer s/p radical prostatectomy c/b radiation proctitis presents to ED for severe pain and urinary retention s/p cystoscopy. Admit for hematuria / urinary retention.   - CBI clamped per urology  - c/w bowers, PO amicar as per urology recs  - OK to flush bowers  - Will continue to monitor  - RN to call if any changes

## 2023-02-18 NOTE — PROGRESS NOTE ADULT - SUBJECTIVE AND OBJECTIVE BOX
Coverage for Dr. Shaffer  INTERVAL Hx:  Pt had CBI clamped.  Required manual irrigation last night to remove a small clot.  On Amicar.  Gr draining urine with a minimal tinge of pink.  I manually irrigated catheter and removed small amounts of light colored clots.  Spent 20 minutes answering patient's numerous questions.    MEDICATIONS  (STANDING):  aminocaproic acid Tablet 1000 milliGRAM(s) Oral every 6 hours  lidocaine 2% Jelly 5 milliLiter(s) IntraUrethral once  lidocaine 2% Jelly 5 milliLiter(s) IntraUrethral once  loratadine 10 milliGRAM(s) Oral daily  metoprolol succinate ER 50 milliGRAM(s) Oral daily  Orgovyx (relugolix) 120mg tab 1 Tablet(s) 1 Tablet(s) Oral daily  oxybutynin 5 milliGRAM(s) Oral daily  polyethylene glycol 3350 17 Gram(s) Oral daily    MEDICATIONS  (PRN):  acetaminophen     Tablet .. 650 milliGRAM(s) Oral every 6 hours PRN Temp greater or equal to 38C (100.4F), Mild Pain (1 - 3)  aluminum hydroxide/magnesium hydroxide/simethicone Suspension 30 milliLiter(s) Oral every 4 hours PRN Dyspepsia  melatonin 3 milliGRAM(s) Oral at bedtime PRN Insomnia  ondansetron Injectable 4 milliGRAM(s) IV Push every 8 hours PRN Nausea and/or Vomiting        Vital Signs Last 24 Hrs  T(C): 36.4 (18 Feb 2023 05:02), Max: 36.8 (17 Feb 2023 12:40)  T(F): 97.5 (18 Feb 2023 05:02), Max: 98.2 (17 Feb 2023 12:40)  HR: 73 (18 Feb 2023 05:02) (73 - 75)  BP: 153/77 (18 Feb 2023 05:02) (120/62 - 153/77)  BP(mean): --  RR: 18 (18 Feb 2023 05:02) (18 - 18)  SpO2: 94% (18 Feb 2023 05:02) (93% - 96%)    Parameters below as of 18 Feb 2023 05:02  Patient On (Oxygen Delivery Method): room air        PHYSICAL EXAM:    ABDOMEN: soft, NT    LABS:                        8.4    5.99  )-----------( 300      ( 18 Feb 2023 06:37 )             25.7

## 2023-02-19 LAB
ANION GAP SERPL CALC-SCNC: 8 MMOL/L — SIGNIFICANT CHANGE UP (ref 5–17)
BUN SERPL-MCNC: 10 MG/DL — SIGNIFICANT CHANGE UP (ref 7–23)
CALCIUM SERPL-MCNC: 8.2 MG/DL — LOW (ref 8.5–10.1)
CHLORIDE SERPL-SCNC: 101 MMOL/L — SIGNIFICANT CHANGE UP (ref 96–108)
CO2 SERPL-SCNC: 26 MMOL/L — SIGNIFICANT CHANGE UP (ref 22–31)
CREAT SERPL-MCNC: 0.7 MG/DL — SIGNIFICANT CHANGE UP (ref 0.5–1.3)
EGFR: 97 ML/MIN/1.73M2 — SIGNIFICANT CHANGE UP
GLUCOSE SERPL-MCNC: 95 MG/DL — SIGNIFICANT CHANGE UP (ref 70–99)
HCT VFR BLD CALC: 25.3 % — LOW (ref 39–50)
HGB BLD-MCNC: 8.2 G/DL — LOW (ref 13–17)
MCHC RBC-ENTMCNC: 29.3 PG — SIGNIFICANT CHANGE UP (ref 27–34)
MCHC RBC-ENTMCNC: 32.4 GM/DL — SIGNIFICANT CHANGE UP (ref 32–36)
MCV RBC AUTO: 90.4 FL — SIGNIFICANT CHANGE UP (ref 80–100)
NRBC # BLD: 0 /100 WBCS — SIGNIFICANT CHANGE UP (ref 0–0)
PLATELET # BLD AUTO: 308 K/UL — SIGNIFICANT CHANGE UP (ref 150–400)
POTASSIUM SERPL-MCNC: 4 MMOL/L — SIGNIFICANT CHANGE UP (ref 3.5–5.3)
POTASSIUM SERPL-SCNC: 4 MMOL/L — SIGNIFICANT CHANGE UP (ref 3.5–5.3)
RBC # BLD: 2.8 M/UL — LOW (ref 4.2–5.8)
RBC # FLD: 12.6 % — SIGNIFICANT CHANGE UP (ref 10.3–14.5)
SODIUM SERPL-SCNC: 135 MMOL/L — SIGNIFICANT CHANGE UP (ref 135–145)
WBC # BLD: 6.31 K/UL — SIGNIFICANT CHANGE UP (ref 3.8–10.5)
WBC # FLD AUTO: 6.31 K/UL — SIGNIFICANT CHANGE UP (ref 3.8–10.5)

## 2023-02-19 PROCEDURE — 99233 SBSQ HOSP IP/OBS HIGH 50: CPT

## 2023-02-19 PROCEDURE — 99232 SBSQ HOSP IP/OBS MODERATE 35: CPT

## 2023-02-19 RX ORDER — AMINOCAPROIC ACID 500 MG/1
1000 TABLET ORAL EVERY 8 HOURS
Refills: 0 | Status: DISCONTINUED | OUTPATIENT
Start: 2023-02-19 | End: 2023-02-21

## 2023-02-19 RX ADMIN — Medication 50 MILLIGRAM(S): at 06:08

## 2023-02-19 RX ADMIN — AMINOCAPROIC ACID 1000 MILLIGRAM(S): 500 TABLET ORAL at 14:22

## 2023-02-19 RX ADMIN — LORATADINE 10 MILLIGRAM(S): 10 TABLET ORAL at 11:35

## 2023-02-19 RX ADMIN — AMINOCAPROIC ACID 1000 MILLIGRAM(S): 500 TABLET ORAL at 21:04

## 2023-02-19 RX ADMIN — AMINOCAPROIC ACID 1000 MILLIGRAM(S): 500 TABLET ORAL at 06:08

## 2023-02-19 NOTE — PROGRESS NOTE ADULT - ASSESSMENT
Resolved hematuria.  Taper amicar  d/c bowers tonight at  for TOV    Dr. Shaffer to resume care tomorrow

## 2023-02-19 NOTE — PROGRESS NOTE ADULT - SUBJECTIVE AND OBJECTIVE BOX
[facility] -- Michael Fernandez, Reina, Steve Luna Savella, [place]  Office # [phone]    Follow Up:  Severe AS s/p TAVR, SSS s/p PM    Subjective/Observations: Awake and alert, comfortable on RA. No hematuria noted.  Denies any form of respiratory or cardiac discomfort.  Tolerating RA     REVIEW OF SYSTEMS: All other review of systems is negative unless indicated above  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  HTN (hypertension)  Aortic stenosis  Rectal bleeding  last hospitalization 10/6/20 2/2 radiation proctitis  [place]  H/O prostatectomy  2015  S/P T&amp;A (status post tonsillectomy and adenoidectomy)  child    MEDICATIONS  (STANDING):  aminocaproic acid Tablet 1000 milliGRAM(s) Oral every 6 hours  lidocaine 2% Jelly 5 milliLiter(s) IntraUrethral once  lidocaine 2% Jelly 5 milliLiter(s) IntraUrethral once  loratadine 10 milliGRAM(s) Oral daily  metoprolol succinate ER 50 milliGRAM(s) Oral daily  Orgovyx (relugolix) 120mg tab 1 Tablet(s) 1 Tablet(s) Oral daily  oxybutynin 5 milliGRAM(s) Oral daily  polyethylene glycol 3350 17 Gram(s) Oral daily    MEDICATIONS  (PRN):  acetaminophen     Tablet .. 650 milliGRAM(s) Oral every 6 hours PRN Temp greater or equal to 38C (100.4F), Mild Pain (1 - 3)  aluminum hydroxide/magnesium hydroxide/simethicone Suspension 30 milliLiter(s) Oral every 4 hours PRN Dyspepsia  melatonin 3 milliGRAM(s) Oral at bedtime PRN Insomnia  ondansetron Injectable 4 milliGRAM(s) IV Push every 8 hours PRN Nausea and/or Vomiting    Allergies    penicillin (Diarrhea; Pruritus; Hives)  penicillin V potassium (Rash)    Intolerances    codeine (Nausea)    Vital Signs Last 24 Hrs  T(C): 36.6 (19 Feb 2023 04:56), Max: 37.3 (18 Feb 2023 20:06)  T(F): 97.9 (19 Feb 2023 04:56), Max: 99.2 (18 Feb 2023 20:06)  HR: 70 (19 Feb 2023 04:56) (70 - 72)  BP: 123/68 (19 Feb 2023 04:56) (118/71 - 125/71)  BP(mean): --  RR: 18 (19 Feb 2023 04:56) (18 - 20)  SpO2: 95% (19 Feb 2023 04:56) (93% - 95%)    Parameters below as of 19 Feb 2023 04:56  Patient On (Oxygen Delivery Method): room air    I&O's Summary    18 Feb 2023 07:01  -  19 Feb 2023 07:00  --------------------------------------------------------  IN: 0 mL / OUT: 1000 mL / NET: -1000 mL      PHYSICAL EXAM:  TELE: Not on tele  Constitutional: NAD, awake and alert, well-developed  HEENT: Moist Mucous Membranes, Anicteric  Pulmonary: Non-labored, breath sounds are clear bilaterally, No wheezing, rales or rhonchi  Cardiovascular: Regular, S1 and S2, +murmurs, no rubs, gallops or clicks  Gastrointestinal: Bowel Sounds present, soft, nontender.   Lymph: No peripheral edema. No lymphadenopathy.  Skin: No visible rashes or ulcers.  Psych:  Mood & affect appropriate  LABS: All Labs Reviewed:                        8.2    6.31  )-----------( 308      ( 19 Feb 2023 07:39 )             25.3                         8.4    5.99  )-----------( 300      ( 18 Feb 2023 06:37 )             25.7                         8.4    6.18  )-----------( 324      ( 17 Feb 2023 06:30 )             25.4     19 Feb 2023 07:39    135    |  101    |  10     ----------------------------<  95     4.0     |  26     |  0.70     Ca    8.2        19 Feb 2023 07:39      Patient name: HERBIE KERR  YOB: 1950   Age: 70 (M)   MR#: [id number]  Study Date: 2/4/2021  Location: O/PSonographer: Ramona HornFAHAD  Study quality: Technically good  Referring Physician: Rich Quesada MD  Blood Pressure: 168/87 mmHg  Height: 178 cm  Weight: 91 kg  BSA: 2.1 m2  Heart Rate: 76 mmHg  ------------------------------------------------------------------------  PROCEDURE: Transthoracic echocardiogram with 2-D, M-Mode  and complete spectral and color flow Doppler.  INDICATION: Nonrheumatic aortic (valve) stenosis (I35.0)  ------------------------------------------------------------------------  MEASUREMENTS: (See below)  ------------------------------------------------------------------------  OBSERVATIONS:  Mitral Valve: There is mitral annular calcification with  calcification on the anterior mitral leaflet. Mild mitral  regurgitation.  Aortic Root: Normal aortic root size. (Ao: 3.4 cm at the  sinuses of Valsalva).  Aortic Valve: #26mm Evolut Pro+ THV. The valve is well  seated with normal function.  The peak/mean gradients=  13/7mmHg with a DVI= 0.63. Peak transaortic valve gradient  equals 13 mm Hg, mean transaortic valve gradient equals 7  mm Hg, aortic valve velocity time integral equals 35 cm,  estimated aortic valve area equals 2.3 sqcm. There is a  mild paravalvular regurgitation jet originating from about  2 O'clock TTE surgical view.  There is no intravalvular  regurgitation seen. Peak left ventricular outflow tract  gradient equals 4mm Hg, mean gradient is equal to 3 mm Hg,  LVOT velocity time integral equals 22 cm.  Left Atrium: Mildly dilated left atrium.  LA volume index =  39 cc/m2.  Left Ventricle: Normal left ventricular systolic function.  No segmental wall motion abnormalities.  The LVEF about  55-60%. Normal left ventricular internal dimensions and  wall thicknesses. Mild diastolic dysfunction (Stage I).  Right Heart: Normal right atrium.  RA area= 13cm2, RA volume= 32ml. Normal right ventricular  size and function.  There is a device wire in the right heart. Normal tricuspid  valve. Minimal tricuspid regurgitation. Normal pulmonic  valve. Minimal pulmonic regurgitation.  Pericardium/PleuraNormal pericardium with no pericardial  effusion.  Hemodynamic: The estimated right atrial pressure is normal.  ------------------------------------------------------------------------  CONCLUSIONS:  1. #26mm Evolut Pro+ THV.  The valve is well seated with  normal function.  The peak/mean gradients= 13/7mmHg with a  DVI= 0.63. There is a mild paravalvular regurgitation jet  originating from about 2 O'clock TTE surgical view.  There  is no intravalvular regurgitation seen.  *** Compared with echocardiogram of 12/10/2020, no  significant changes noted.  ------------------------------------------------------------------------  PROCEDURE DESCRIPTION: Transthoracic echocardiogram with  2-D, M-Mode and complete spectral and color flow Doppler.  ------------------------------------------------------------------------  ECHOCARDIOGRAPHIC EXAMINATION:  AORTIC ROOT:  Aortic Root (Leaflet): 3.4 cm  Aortic Root Index: 0.9  LEFT ATRIUM:  AP Dimensions: 3.6 cm  LA Volume Index: 39.00 cc/sqm  LA Volume: 81.3 cc  LEFT VENTRICLE:  LVIDd: 4.1 cm  LVIDs: 2.9 cm  IVS: 1.18  ILWT: 1.0 cm  RWT: 0.47  LV Mass: 155.0 gm  LV Mass Index: 74 gm/sqm  EF (Visual Estimate): 55-60%  HR and BP:  HR: 76 bpm  BP: 168/87  BSA: 2.09  ------------------------------------------------------------------------  HEMODYNAMICS:  RA Pressure Estimate: 8  LA Pressure Estimate: < 20  IVRT: 53 ms  ------------------------------------------------------------------------  COLOR FLOW and SPECIAL DOPPLER:  AORTIC VALVE:  AV Peak Velocity: 1.7 M/sec  AV Peak Gradient: 12 mm Hg  AV Mean Gradient: 7 mm Hg  Valve Area: 2.3 sqcm  LVOT:  LVOT Velocity: 1.0 M/sec  LVOT Diameter: 2.1 cm  LVOT Peak Gradient Rest: 4 mm Hg  LVOT Mean Gradient Rest: 3 mm Hg  ------------------------------------------------------------------------  ------------------------------------------------------------------------  Confirmed on  2/4/2021 - 13:25:59 by Caroline Rizzo M.D.  ------------------------------------------------------------------------

## 2023-02-19 NOTE — PROGRESS NOTE ADULT - SUBJECTIVE AND OBJECTIVE BOX
Coverage for Dr. Shaffer  INTERVAL Hx:  No acute events overnight.  Gr draining clear, colorless urine with some tan colored clots.  On amicar.    MEDICATIONS  (STANDING):  aminocaproic acid Tablet 1000 milliGRAM(s) Oral every 8 hours  lidocaine 2% Jelly 5 milliLiter(s) IntraUrethral once  lidocaine 2% Jelly 5 milliLiter(s) IntraUrethral once  loratadine 10 milliGRAM(s) Oral daily  metoprolol succinate ER 50 milliGRAM(s) Oral daily  Orgovyx (relugolix) 120mg tab 1 Tablet(s) 1 Tablet(s) Oral daily  polyethylene glycol 3350 17 Gram(s) Oral daily    MEDICATIONS  (PRN):  acetaminophen     Tablet .. 650 milliGRAM(s) Oral every 6 hours PRN Temp greater or equal to 38C (100.4F), Mild Pain (1 - 3)  aluminum hydroxide/magnesium hydroxide/simethicone Suspension 30 milliLiter(s) Oral every 4 hours PRN Dyspepsia  melatonin 3 milliGRAM(s) Oral at bedtime PRN Insomnia  ondansetron Injectable 4 milliGRAM(s) IV Push every 8 hours PRN Nausea and/or Vomiting        Vital Signs Last 24 Hrs  T(C): 36.6 (19 Feb 2023 04:56), Max: 37.3 (18 Feb 2023 20:06)  T(F): 97.9 (19 Feb 2023 04:56), Max: 99.2 (18 Feb 2023 20:06)  HR: 70 (19 Feb 2023 04:56) (70 - 72)  BP: 123/68 (19 Feb 2023 04:56) (118/71 - 125/71)  BP(mean): --  RR: 18 (19 Feb 2023 04:56) (18 - 20)  SpO2: 95% (19 Feb 2023 04:56) (93% - 95%)    Parameters below as of 19 Feb 2023 04:56  Patient On (Oxygen Delivery Method): room air        PHYSICAL EXAM:    ABDOMEN: soft, NT    Gr: clear, colorless urine    LABS:                        8.2    6.31  )-----------( 308      ( 19 Feb 2023 07:39 )             25.3     02-19    135  |  101  |  10  ----------------------------<  95  4.0   |  26  |  0.70    Ca    8.2<L>      19 Feb 2023 07:39

## 2023-02-19 NOTE — PROGRESS NOTE ADULT - ASSESSMENT
72 year old male with AV disease, s/p TAVR in 12/20, with post op SSS requiring PPM, here with hematuria and radiation hemorrhagic cystitis.  He called the office yesterday for evaluation.    Severe AS s/p TAVR, SSS s/p PPM  - No sign of acute ischemia. He has minimal CAD based on cath in 2020.  - No anginal complaints or volume overload    - On Orgovyx and Qtc is acceptable on EKG (436 ms) from 1/31.  Would repeat today  - Antiplatelets are on hold in the setting of hematuria.  On Amicar PO  - Follow Uro recs    - BP stable and controlled, continue with BB    - Monitor electrolytes, replete to keep K>4 and Mag>2   - Will continue to follow    Justyna Linda DNP, NP-C, AGACNP-C  Cardiology   Spectra #[phone]

## 2023-02-19 NOTE — PROGRESS NOTE ADULT - ASSESSMENT
71 y/o M w/ PMHx of HTN,  AS s/p TAVR, ppm, prostate cancer s/p radical prostatectomy c/b radiation proctitis presents to ED for severe pain and urinary retention s/p cystoscopy. Admit for hematuria / urinary retention.      Problem/Plan - 1:  ·  Problem: Urinary retention with hematuria :   s/p RRP for Ca Prostate by Eusebio Martinez, XRT for recurrence, on Orgovyx  [place] [facility] [place]  admitted for clot retention, hematuria likely due to radiation cystitis  CBI clamped per urology, urine clear, taper  amicar, d/c oxybutinin , plan for d/c bowers if urine remain clear           Problem/Plan - 2:  ·  Problem: Prostate cancer.   ·  Plan: Hx of Prostate CA, now s/p radiation therapy and radical prostatectomy  - Continue home Orgovyx - brought in from home  - Patient to f[place] [facility] [place] for further management.     Problem/Plan - 3:  ·  Problem: Hypertension.   - BP stable  - Continue home Metoprolol ER 50mg PO daily w/ hold parameters.     Problem/Plan - 4:  ·  Problem: Need for prophylactic measure.   ·  Plan: SCDs b/l for dvt ppx; will hold off on A/C at this time 2/2 hematuria.     Problem/Plan - 5:   acute blood loss anemia:  monitor H/H  8.2 today    Plan to transfuse if <8                       71 y/o M w/ PMHx of HTN,  AS s/p TAVR, ppm, prostate cancer s/p radical prostatectomy c/b radiation proctitis presents to ED for severe pain and urinary retention s/p cystoscopy. Admit for hematuria / urinary retention.      Problem/Plan - 1:  ·  Problem: Urinary retention with hematuria :   s/p RRP for Ca Prostate by Eusebio Martinez, XRT for recurrence, on Orgovyx  s/p negative cysto x2 by Dr. Luis at [facility] [place], last one 5 weeks ago  admitted for clot retention, hematuria likely due to radiation cystitis  CBI clamped per urology, urine clear, taper  amicar, d/c oxybutinin , d/c bowers tonight, TOV   plan of discharge tomorrow pm if no recurrent hematuria            Problem/Plan - 2:  ·  Problem: Prostate cancer.   ·  Plan: Hx of Prostate CA, now s/p radiation therapy and radical prostatectomy  - Continue home Orgovyx - brought in from home  - Patient to f/u with his urologist at [facility] [place] (Dr. Luis) outpatient for further management.     Problem/Plan - 3:  ·  Problem: Hypertension.   - BP stable  - Continue home Metoprolol ER 50mg PO daily w/ hold parameters.     Problem/Plan - 4:  ·  Problem: Need for prophylactic measure.   ·  Plan: SCDs b/l for dvt ppx; will hold off on A/C at this time 2/2 hematuria.     Problem/Plan - 5:   acute blood loss anemia:  monitor H/H  8.2 today    patient has recurrent dizziness upon standing position, possible due to anemia, will transfuse one unit blood today

## 2023-02-20 LAB
HCT VFR BLD CALC: 29.4 % — LOW (ref 39–50)
HGB BLD-MCNC: 9.7 G/DL — LOW (ref 13–17)
MCHC RBC-ENTMCNC: 29.2 PG — SIGNIFICANT CHANGE UP (ref 27–34)
MCHC RBC-ENTMCNC: 33 GM/DL — SIGNIFICANT CHANGE UP (ref 32–36)
MCV RBC AUTO: 88.6 FL — SIGNIFICANT CHANGE UP (ref 80–100)
NRBC # BLD: 0 /100 WBCS — SIGNIFICANT CHANGE UP (ref 0–0)
PLATELET # BLD AUTO: 315 K/UL — SIGNIFICANT CHANGE UP (ref 150–400)
RBC # BLD: 3.32 M/UL — LOW (ref 4.2–5.8)
RBC # FLD: 12.8 % — SIGNIFICANT CHANGE UP (ref 10.3–14.5)
WBC # BLD: 8 K/UL — SIGNIFICANT CHANGE UP (ref 3.8–10.5)
WBC # FLD AUTO: 8 K/UL — SIGNIFICANT CHANGE UP (ref 3.8–10.5)

## 2023-02-20 PROCEDURE — 93010 ELECTROCARDIOGRAM REPORT: CPT

## 2023-02-20 PROCEDURE — 99232 SBSQ HOSP IP/OBS MODERATE 35: CPT

## 2023-02-20 PROCEDURE — 99233 SBSQ HOSP IP/OBS HIGH 50: CPT

## 2023-02-20 RX ADMIN — AMINOCAPROIC ACID 1000 MILLIGRAM(S): 500 TABLET ORAL at 14:48

## 2023-02-20 RX ADMIN — LORATADINE 10 MILLIGRAM(S): 10 TABLET ORAL at 11:06

## 2023-02-20 RX ADMIN — AMINOCAPROIC ACID 1000 MILLIGRAM(S): 500 TABLET ORAL at 21:51

## 2023-02-20 RX ADMIN — Medication 50 MILLIGRAM(S): at 05:19

## 2023-02-20 RX ADMIN — AMINOCAPROIC ACID 1000 MILLIGRAM(S): 500 TABLET ORAL at 05:19

## 2023-02-20 RX ADMIN — POLYETHYLENE GLYCOL 3350 17 GRAM(S): 17 POWDER, FOR SOLUTION ORAL at 11:06

## 2023-02-20 NOTE — PROGRESS NOTE ADULT - ASSESSMENT
Rad cystitis improving  UR persistent.      Cont bowers  Taper cbi prn  Cont amicar taper - 1gm q8h today; 500mg q8h 2/21  Can d/c pt to home w/ bowers when urine clear off cbi  oUTPT wilfred F/U W/ dR [place] AT [facility] [place].

## 2023-02-20 NOTE — CHART NOTE - NSCHARTNOTEFT_GEN_A_CORE
Assessment: Pt seen for nutrition follow-up. Chart reviewed, hospital course noted.    Brief hx: Pt is a "73 y/o M w/ PMHx of HTN,  AS s/p TAVR, ppm, prostate cancer s/p radical prostatectomy c/b radiation proctitis presents to ED for severe pain and urinary retention s/p cystoscopy. Admit for hematuria / urinary retention."    Visited pt at bedside this am. Pt reports fair appetite/intake. Pt states that he consumed cream of wheat and yogurt this am. PO intakes % per nursing documentation. Tolerating diet well. Denies N/V/D/C. +BM 2/19. Consuming ensure plus HP (strawberry flavor) daily. Food preferences obtained to optimize po intake and tolerance. Pt with no nutrition question/concerns at this time.     Factors impacting intake: [X] none [ ] nausea  [ ] vomiting [ ] diarrhea [ ] constipation  [ ]chewing problems [ ] swallowing issues  [ ] other:     Diet Prescription: Diet, Regular:   Low Sodium  Supplement Feeding Modality:  Oral  Ensure Plus High Protein Cans or Servings Per Day:  1       Frequency:  Daily (02-14-23 @ 10:37)    Intake: fair    Current Weight: Weight (kg): 93 (31 Jan 2023 15:00), 2/20 200.6# (no edema noted)  % Weight Change    Pertinent Medications: MEDICATIONS  (STANDING):  aminocaproic acid Tablet 1000 milliGRAM(s) Oral every 8 hours  lidocaine 2% Jelly 5 milliLiter(s) IntraUrethral once  lidocaine 2% Jelly 5 milliLiter(s) IntraUrethral once  loratadine 10 milliGRAM(s) Oral daily  metoprolol succinate ER 50 milliGRAM(s) Oral daily  Orgovyx (relugolix) 120mg tab 1 Tablet(s) 1 Tablet(s) Oral daily  polyethylene glycol 3350 17 Gram(s) Oral daily    MEDICATIONS  (PRN):  acetaminophen     Tablet .. 650 milliGRAM(s) Oral every 6 hours PRN Temp greater or equal to 38C (100.4F), Mild Pain (1 - 3)  aluminum hydroxide/magnesium hydroxide/simethicone Suspension 30 milliLiter(s) Oral every 4 hours PRN Dyspepsia  melatonin 3 milliGRAM(s) Oral at bedtime PRN Insomnia  ondansetron Injectable 4 milliGRAM(s) IV Push every 8 hours PRN Nausea and/or Vomiting    Pertinent Labs: 02-19 Na135 mmol/L Glu 95 mg/dL K+ 4.0 mmol/L Cr  0.70 mg/dL BUN 10 mg/dL    Skin: intact    Estimated Needs:   [X] no change since previous assessment: based on 205# [id number] 25-30kcals/kg and .9-1.1 gms protein/kg 83-102gms  [ ] recalculated:     Previous Nutrition Diagnosis:   [X] Inadequate Energy Intake [ ]Inadequate Oral Intake [ ] Excessive Energy Intake   [ ] Underweight [X] Decreased Nutrient Needs [ ] Overweight/Obesity   [ ] Altered GI Function [ ] Unintended Weight Loss [ ] Food & Nutrition Related Knowledge Deficit [ ] Malnutrition     Nutrition Diagnosis is [X] ongoing (improving po intake) [ ] resolved [ ] not applicable     New Nutrition Diagnosis: [X] not applicable     Interventions:   Recommend  [ ] Change Diet To:  [ ] Nutrition Supplement  [ ] Nutrition Support  [X] Other: Continue current diet as ordered; regular, low sodium diet, ensure plus HP daily  Recommend MVI daily    Monitoring and Evaluation:   [X] PO intake [ x ] Tolerance to diet prescription [ x ] weights [ x ] labs[ x ] follow up per protocol  [X] other: s/s GI distress, bowel function, skin integrity/ edema

## 2023-02-20 NOTE — PROGRESS NOTE ADULT - SUBJECTIVE AND OBJECTIVE BOX
INTERVAL HPI/OVERNIGHT EVENTS:  Bowers replaced overnight for ?amount. CI restarted for minimal bleeding.    MEDICATIONS  (STANDING):  aminocaproic acid Tablet 1000 milliGRAM(s) Oral every 8 hours  lidocaine 2% Jelly 5 milliLiter(s) IntraUrethral once  lidocaine 2% Jelly 5 milliLiter(s) IntraUrethral once  loratadine 10 milliGRAM(s) Oral daily  metoprolol succinate ER 50 milliGRAM(s) Oral daily  Orgovyx (relugolix) 120mg tab 1 Tablet(s) 1 Tablet(s) Oral daily  polyethylene glycol 3350 17 Gram(s) Oral daily    MEDICATIONS  (PRN):  acetaminophen     Tablet .. 650 milliGRAM(s) Oral every 6 hours PRN Temp greater or equal to 38C (100.4F), Mild Pain (1 - 3)  aluminum hydroxide/magnesium hydroxide/simethicone Suspension 30 milliLiter(s) Oral every 4 hours PRN Dyspepsia  melatonin 3 milliGRAM(s) Oral at bedtime PRN Insomnia  ondansetron Injectable 4 milliGRAM(s) IV Push every 8 hours PRN Nausea and/or Vomiting        Vital Signs Last 24 Hrs  T(C): 36.6 (20 Feb 2023 05:13), Max: 36.9 (19 Feb 2023 19:20)  T(F): 97.8 (20 Feb 2023 05:13), Max: 98.4 (19 Feb 2023 19:20)  HR: 70 (20 Feb 2023 05:13) (70 - 73)  BP: 120/71 (20 Feb 2023 05:13) (120/71 - 160/74)  BP(mean): --  RR: 18 (20 Feb 2023 05:13) (18 - 20)  SpO2: 94% (20 Feb 2023 05:13) (94% - 97%)    Parameters below as of 20 Feb 2023 05:13  Patient On (Oxygen Delivery Method): room air        PHYSICAL EXAM:    ABDOMEN: benign  GENITALIA: bowers - clear cbi    LABS:                        9.7    8.00  )-----------( 315      ( 20 Feb 2023 07:01 )             29.4     02-19    135  |  101  |  10  ----------------------------<  95  4.0   |  26  |  0.70    Ca    8.2<L>      19 Feb 2023 07:39            Blood Cultures:    RADIOLOGY & ADDITIONAL TESTS:

## 2023-02-20 NOTE — PROGRESS NOTE ADULT - SUBJECTIVE AND OBJECTIVE BOX
[facility] -- Reina Rodriguez, Steve Luna Savella, [place]: Office # [phone]    Follow Up:  Severe AS s/p TAVR, SSS s/p PM    Subjective/Observations: Patient seen and examined. Pt awake and alert, comfortable on RA. Pt with CBI, mild hematuria noted.  Denies any form of respiratory or cardiac discomfort.  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      HTN (hypertension)      Aortic stenosis      Rectal bleeding  last hospitalization 10/6/20 2/2 radiation proctitis      [place] (hard of hearing)      H/O prostatectomy  2015      S/P T&amp;A (status post tonsillectomy and adenoidectomy)  child    MEDICATIONS  (STANDING):  aminocaproic acid Tablet 1000 milliGRAM(s) Oral every 8 hours  lidocaine 2% Jelly 5 milliLiter(s) IntraUrethral once  lidocaine 2% Jelly 5 milliLiter(s) IntraUrethral once  loratadine 10 milliGRAM(s) Oral daily  metoprolol succinate ER 50 milliGRAM(s) Oral daily  Orgovyx (relugolix) 120mg tab 1 Tablet(s) 1 Tablet(s) Oral daily  polyethylene glycol 3350 17 Gram(s) Oral daily    MEDICATIONS  (PRN):  acetaminophen     Tablet .. 650 milliGRAM(s) Oral every 6 hours PRN Temp greater or equal to 38C (100.4F), Mild Pain (1 - 3)  aluminum hydroxide/magnesium hydroxide/simethicone Suspension 30 milliLiter(s) Oral every 4 hours PRN Dyspepsia  melatonin 3 milliGRAM(s) Oral at bedtime PRN Insomnia  ondansetron Injectable 4 milliGRAM(s) IV Push every 8 hours PRN Nausea and/or Vomiting    Allergies    penicillin (Diarrhea; Pruritus; Hives)  penicillin V potassium (Rash)    Intolerances  codeine (Nausea)    Vital Signs Last 24 Hrs  T(C): 36.6 (20 Feb 2023 05:13), Max: 36.9 (19 Feb 2023 19:20)  T(F): 97.8 (20 Feb 2023 05:13), Max: 98.4 (19 Feb 2023 19:20)  HR: 70 (20 Feb 2023 05:13) (70 - 73)  BP: 120/71 (20 Feb 2023 05:13) (120/71 - 160/74)  BP(mean): --  RR: 18 (20 Feb 2023 05:13) (18 - 20)  SpO2: 94% (20 Feb 2023 05:13) (94% - 97%)    Parameters below as of 20 Feb 2023 05:13  Patient On (Oxygen Delivery Method): room air      I&O's Summary    19 Feb 2023 07:01  -  20 Feb 2023 07:00  --------------------------------------------------------  IN: 350 mL / OUT: 6200 mL / NET: -5850 mL          TELE: Not on telemetry   PHYSICAL EXAM:  Constitutional: NAD, awake and alert  HEENT: Moist Mucous Membranes, Anicteric  Pulmonary: Non-labored, breath sounds are clear bilaterally, No wheezing, rales or rhonchi  Cardiovascular: Regular, S1 and S2, No murmurs, No rubs, gallops or clicks  Gastrointestinal:  soft, nontender, nondistended   : + Gr with CBI  Lymph: No peripheral edema. No lymphadenopathy.   Skin: No visible rashes or ulcers.  Psych:  Mood & affect appropriate      LABS: All Labs Reviewed:                        9.7    8.00  )-----------( 315      ( 20 Feb 2023 07:01 )             29.4                         8.2    6.31  )-----------( 308      ( 19 Feb 2023 07:39 )             25.3                         8.4    5.99  )-----------( 300      ( 18 Feb 2023 06:37 )             25.7     19 Feb 2023 07:39    135    |  101    |  10     ----------------------------<  95     4.0     |  26     |  0.70     Ca    8.2        19 Feb 2023 07:39         12 Lead ECG:   Ventricular Rate 75 BPM    Atrial Rate 75 BPM    P-R Interval 200 ms    QRS Duration 100 ms    Q-T Interval 390 ms    QTC Calculation(Bazett) 435 ms    P Axis 37 degrees    R Axis 69 degrees    T Axis 32 degrees    Diagnosis Line Normal sinus rhythm  Normal ECG  No previous ECGs available  Confirmed by Nena Arteaga ([id number]) on 2/1/2023 7:56:02 AM (01-31-23 @ 19:56)      Patient name: HERBIE KERR  YOB: 1950   Age: 70 (M)   MR#: [id number]  Study Date: 2/4/2021  [facility]  Study quality: Technically good  Referring Physician: Rich Quesada MD  Blood Pressure: 168/87 mmHg  Height: 178 cm  Weight: 91 kg  BSA: 2.1 m2  Heart Rate: 76 mmHg  ------------------------------------------------------------------------  PROCEDURE: Transthoracic echocardiogram with 2-D, M-Mode  and complete spectral and color flow Doppler.  INDICATION: Nonrheumatic aortic (valve) stenosis (I35.0)  ------------------------------------------------------------------------  MEASUREMENTS: (See below)  ------------------------------------------------------------------------  OBSERVATIONS:  Mitral Valve: There is mitral annular calcification with  calcification on the anterior mitral leaflet. Mild mitral  regurgitation.  Aortic Root: Normal aortic root size. (Ao: 3.4 cm at the  sinuses of Valsalva).  Aortic Valve: #26mm Evolut Pro+ THV. The valve is well  seated with normal function.  The peak/mean gradients=  13/7mmHg with a DVI= 0.63. Peak transaortic valve gradient  equals 13 mm Hg, mean transaortic valve gradient equals 7  mm Hg, aortic valve velocity time integral equals 35 cm,  estimated aortic valve area equals 2.3 sqcm. There is a  mild paravalvular regurgitation jet originating from about  2 O'clock TTE surgical view.  There is no intravalvular  regurgitation seen. Peak left ventricular outflow tract  gradient equals 4mm Hg, mean gradient is equal to 3 mm Hg,  LVOT velocity time integral equals 22 cm.  Left Atrium: Mildly dilated left atrium.  LA volume index =  39 cc/m2.  Left Ventricle: Normal left ventricular systolic function.  No segmental wall motion abnormalities.  The LVEF about  55-60%. Normal left ventricular internal dimensions and  wall thicknesses. Mild diastolic dysfunction (Stage I).  Right Heart: Normal right atrium.  RA area= 13cm2, RA volume= 32ml. Normal right ventricular  size and function.  There is a device wire in the right heart. Normal tricuspid  valve. Minimal tricuspid regurgitation. Normal pulmonic  valve. Minimal pulmonic regurgitation.  Pericardium/PleuraNormal pericardium with no pericardial  effusion.  Hemodynamic: The estimated right atrial pressure is normal.  ------------------------------------------------------------------------  CONCLUSIONS:  1. #26mm Evolut Pro+ THV.  The valve is well seated with  normal function.  The peak/mean gradients= 13/7mmHg with a  DVI= 0.63. There is a mild paravalvular regurgitation jet  originating from about 2 O'clock TTE surgical view.  There  is no intravalvular regurgitation seen.  *** Compared with echocardiogram of 12/10/2020, no  significant changes noted.  ------------------------------------------------------------------------  PROCEDURE DESCRIPTION: Transthoracic echocardiogram with  2-D, M-Mode and complete spectral and color flow Doppler.  ------------------------------------------------------------------------  ECHOCARDIOGRAPHIC EXAMINATION:  AORTIC ROOT:  Aortic Root (Leaflet): 3.4 cm  Aortic Root Index: 0.9  LEFT ATRIUM:  AP Dimensions: 3.6 cm  LA Volume Index: 39.00 cc/sqm  LA Volume: 81.3 cc  LEFT VENTRICLE:  LVIDd: 4.1 cm  LVIDs: 2.9 cm  IVS: 1.18  ILWT: 1.0 cm  RWT: 0.47  LV Mass: 155.0 gm  LV Mass Index: 74 gm/sqm  EF (Visual Estimate): 55-60%  HR and BP:  HR: 76 bpm  BP: 168/87  BSA: 2.09  ------------------------------------------------------------------------  HEMODYNAMICS:  RA Pressure Estimate: 8  LA Pressure Estimate: < 20  IVRT: 53 ms  ------------------------------------------------------------------------  COLOR FLOW and SPECIAL DOPPLER:  AORTIC VALVE:  AV Peak Velocity: 1.7 M/sec  AV Peak Gradient: 12 mm Hg  AV Mean Gradient: 7 mm Hg  Valve Area: 2.3 sqcm  LVOT:  LVOT Velocity: 1.0 M/sec  LVOT Diameter: 2.1 cm  LVOT Peak Gradient Rest: 4 mm Hg  LVOT Mean Gradient Rest: 3 mm Hg  ------------------------------------------------------------------------  DIASTOLIC FUNCTION:  DT:187 ms  E/A: 0.90  e' Septal: 7.0 cm/s  E/e' Septal: 14.2  e' Lateral: 8.0 cm/s  E/e' Lateral: 12.5  MV E wave: 1.0 m/s  MV A wave: 1.1 m/s  Septal a': 10.0 cm/s  Lateral a': 11.0 cm/s  ------------------------------------------------------------------------  Confirmed on  2/4/2021 - 13:25:59 by Caroline Rizzo M.D.  ------------------------------------------------------------------------

## 2023-02-20 NOTE — PROVIDER CONTACT NOTE (OTHER) - ACTION/TREATMENT ORDERED:
Provider notified. Assessed at bedside. New CBI to be placed by surgery. Irrigate as needed. Will continue plan of care

## 2023-02-20 NOTE — PROGRESS NOTE ADULT - SUBJECTIVE AND OBJECTIVE BOX
Patient is a 72y old  Male who presents with a chief complaint of Hematuria/Urinary Retention (20 Feb 2023 08:52)      Subjective:  INTERVAL HPI/OVERNIGHT EVENTS: Patient seen and examined at bedside.  Patient c/o abd pain not able to void last night   MEDICATIONS  (STANDING):  aminocaproic acid Tablet 1000 milliGRAM(s) Oral every 8 hours  lidocaine 2% Jelly 5 milliLiter(s) IntraUrethral once  lidocaine 2% Jelly 5 milliLiter(s) IntraUrethral once  loratadine 10 milliGRAM(s) Oral daily  metoprolol succinate ER 50 milliGRAM(s) Oral daily  Orgovyx (relugolix) 120mg tab 1 Tablet(s) 1 Tablet(s) Oral daily  polyethylene glycol 3350 17 Gram(s) Oral daily    MEDICATIONS  (PRN):  acetaminophen     Tablet .. 650 milliGRAM(s) Oral every 6 hours PRN Temp greater or equal to 38C (100.4F), Mild Pain (1 - 3)  aluminum hydroxide/magnesium hydroxide/simethicone Suspension 30 milliLiter(s) Oral every 4 hours PRN Dyspepsia  melatonin 3 milliGRAM(s) Oral at bedtime PRN Insomnia  ondansetron Injectable 4 milliGRAM(s) IV Push every 8 hours PRN Nausea and/or Vomiting      Allergies    penicillin (Diarrhea; Pruritus; Hives)  penicillin V potassium (Rash)    Intolerances    codeine (Nausea)      REVIEW OF SYSTEMS:  CONSTITUTIONAL: No fever or chills  HEENT:  No headache, no sore throat  RESPIRATORY: No cough or shortness of breath  CARDIOVASCULAR: No chest pain or palpitations  GASTROINTESTINAL: No abd pain, nausea, vomiting, or diarrhea      Objective:  Vital Signs Last 24 Hrs  T(C): 36.6 (20 Feb 2023 05:13), Max: 36.9 (19 Feb 2023 19:20)  T(F): 97.8 (20 Feb 2023 05:13), Max: 98.4 (19 Feb 2023 19:20)  HR: 70 (20 Feb 2023 05:13) (70 - 73)  BP: 120/71 (20 Feb 2023 05:13) (120/71 - 160/74)  BP(mean): --  RR: 18 (20 Feb 2023 05:13) (18 - 20)  SpO2: 94% (20 Feb 2023 05:13) (94% - 97%)    Parameters below as of 20 Feb 2023 05:13  Patient On (Oxygen Delivery Method): room air        GENERAL: NAD, lying in bed comfortably  HEAD:  Normocephalic  EYES:  conjunctiva and sclera clear  ENT: Moist mucous membranes  NECK: Supple  CHEST/LUNG: Clear to auscultation bilaterally; No rales or rhonchi; no wheezing. Unlabored respirations  HEART: Regular rate and rhythm; S1S2+  ABDOMEN: Bowel sounds present; Soft, Nontender, Nondistended.   EXTREMITIES:  + distal Peripheral Pulses;  No cyanosis, or edema  NERVOUS SYSTEM:  Alert & Oriented X3;  No gross focal deficits   MSK: moves all extremities  SKIN: No rashes     LABS:                        9.7    8.00  )-----------( 315      ( 20 Feb 2023 07:01 )             29.4       Ca    8.2        19 Feb 2023 07:39          CAPILLARY BLOOD GLUCOSE              RADIOLOGY & ADDITIONAL TESTS:    Personally reviewed.     Consultant(s) Notes Reviewed:  [x] YES  [ ] NO    Plan of care discussed with patient /family; all questions answered

## 2023-02-20 NOTE — PROGRESS NOTE ADULT - ASSESSMENT
71 y/o M w/ PMHx of HTN,  AS s/p TAVR, ppm, prostate cancer s/p radical prostatectomy c/b radiation proctitis presents to ED for severe pain and urinary retention s/p cystoscopy. Admit for hematuria / urinary retention.      Problem/Plan - 1:  ·  Problem: Urinary retention with hematuria :   s/p RRP for Ca Prostate by Eusebio Martinez, XRT for recurrence, on Orgovyx  s/p negative cysto x2 by Dr. Luis at [facility] [place] [facility], last one 5 weeks ago  admitted for clot retention, hematuria likely due to radiation cystitis  bowers was discontinued last night, not be able to void and hematuria recur , bowers was placed back   now pinkish colored urine in the bag, clear in thetube.   urology recommendation noted  suggested plan of discharge to [facility]  d/c plan D/W SW            Problem/Plan - 2:  ·  Problem: Prostate cancer.   ·  Plan: Hx of Prostate CA, now s/p radiation therapy and radical prostatectomy  - Continue home Orgovyx - brought in from home  - Patient to f/u with his urologist at [facility] (Dr. Luis) outpatient for further management.     Problem/Plan - 3:  ·  Problem: Hypertension.   - BP stable  - Continue home Metoprolol ER 50mg PO daily w/ hold parameters.     Problem/Plan - 4:  ·  Problem: Need for prophylactic measure.   ·  Plan: SCDs b/l for dvt ppx; will hold off on A/C at this time 2/2 hematuria.     Problem/Plan - 5:   acute blood loss anemia:  monitor H/H  8.2 today   s/p blood transfusion Hb 9.7 today, patient feels improvement clinically

## 2023-02-20 NOTE — CHART NOTE - NSCHARTNOTEFT_GEN_A_CORE
Called by RN at 2AM for patient with c/o abdominal pain s/p Bowers removal at 10PM. Retaining 520cc on bladder scan. 3 way bowers re-inserted by surgery PA, noted to have blood clots in urine. Will restart CBI Called by RN at 2AM for patient with c/o abdominal pain s/p Bowers removal at 10PM. Retaining 520cc on bladder scan. 3 way bowers re-inserted by surgery PA, noted to have blood clots in urine with hematuria. Will restart CBI, urology following.

## 2023-02-20 NOTE — PROGRESS NOTE ADULT - ASSESSMENT
72 year old male with AV disease, s/p TAVR in 12/20, with post op SSS requiring PPM, here with hematuria and radiation hemorrhagic cystitis. He called the office yesterday for evaluation.    Severe AS s/p TAVR, SSS s/p PPM  - No sign of acute ischemia. He has minimal CAD based on cath in 2020.  - No anginal complaints or volume overload  - BP stable and controlled, continue with BB    - On Orgovyx and Qtc is acceptable on EKG (441) 2/20  - Antiplatelets are on hold in the setting of hematuria.  On Amicar PO  - Follow Uro recs    - Monitor and replete lytes, keep K>4, Mg>2.  - Will continue to follow.    Jess Mckeon, MS FNP, [facility]  Nurse Practitioner- Cardiology   Spectra # [id number] /[phone]

## 2023-02-21 PROCEDURE — 99232 SBSQ HOSP IP/OBS MODERATE 35: CPT

## 2023-02-21 PROCEDURE — 99233 SBSQ HOSP IP/OBS HIGH 50: CPT

## 2023-02-21 RX ORDER — MAGNESIUM HYDROXIDE 400 MG/1
30 TABLET, CHEWABLE ORAL ONCE
Refills: 0 | Status: COMPLETED | OUTPATIENT
Start: 2023-02-21 | End: 2023-02-21

## 2023-02-21 RX ORDER — AMINOCAPROIC ACID 500 MG/1
500 TABLET ORAL
Refills: 0 | Status: DISCONTINUED | OUTPATIENT
Start: 2023-02-21 | End: 2023-02-21

## 2023-02-21 RX ORDER — AMINOCAPROIC ACID 500 MG/1
500 TABLET ORAL EVERY 8 HOURS
Refills: 0 | Status: DISCONTINUED | OUTPATIENT
Start: 2023-02-21 | End: 2023-02-27

## 2023-02-21 RX ADMIN — MAGNESIUM HYDROXIDE 30 MILLILITER(S): 400 TABLET, CHEWABLE ORAL at 18:25

## 2023-02-21 RX ADMIN — Medication 50 MILLIGRAM(S): at 05:09

## 2023-02-21 RX ADMIN — AMINOCAPROIC ACID 500 MILLIGRAM(S): 500 TABLET ORAL at 13:19

## 2023-02-21 RX ADMIN — AMINOCAPROIC ACID 1000 MILLIGRAM(S): 500 TABLET ORAL at 05:09

## 2023-02-21 RX ADMIN — LORATADINE 10 MILLIGRAM(S): 10 TABLET ORAL at 13:19

## 2023-02-21 RX ADMIN — AMINOCAPROIC ACID 500 MILLIGRAM(S): 500 TABLET ORAL at 21:07

## 2023-02-21 RX ADMIN — POLYETHYLENE GLYCOL 3350 17 GRAM(S): 17 POWDER, FOR SOLUTION ORAL at 13:22

## 2023-02-21 NOTE — PROGRESS NOTE ADULT - SUBJECTIVE AND OBJECTIVE BOX
Patient is a 72y old  Male who presents with a chief complaint of Hematuria/Urinary Retention (20 Feb 2023 11:45)      Subjective:  INTERVAL HPI/OVERNIGHT EVENTS: Patient seen and examined at bedside.  Patient c/o hematuria  MEDICATIONS  (STANDING):  aminocaproic acid Tablet 500 milliGRAM(s) Oral <User Schedule>  lidocaine 2% Jelly 5 milliLiter(s) IntraUrethral once  lidocaine 2% Jelly 5 milliLiter(s) IntraUrethral once  loratadine 10 milliGRAM(s) Oral daily  metoprolol succinate ER 50 milliGRAM(s) Oral daily  Orgovyx (relugolix) 120mg tab 1 Tablet(s) 1 Tablet(s) Oral daily  polyethylene glycol 3350 17 Gram(s) Oral daily    MEDICATIONS  (PRN):  acetaminophen     Tablet .. 650 milliGRAM(s) Oral every 6 hours PRN Temp greater or equal to 38C (100.4F), Mild Pain (1 - 3)  aluminum hydroxide/magnesium hydroxide/simethicone Suspension 30 milliLiter(s) Oral every 4 hours PRN Dyspepsia  melatonin 3 milliGRAM(s) Oral at bedtime PRN Insomnia  ondansetron Injectable 4 milliGRAM(s) IV Push every 8 hours PRN Nausea and/or Vomiting      Allergies    penicillin (Diarrhea; Pruritus; Hives)  penicillin V potassium (Rash)    Intolerances    codeine (Nausea)      REVIEW OF SYSTEMS:  CONSTITUTIONAL: No fever or chills  HEENT:  No headache, no sore throat  RESPIRATORY: No cough or shortness of breath  CARDIOVASCULAR: No chest pain or palpitations  GASTROINTESTINAL: No abd pain, nausea, vomiting, or diarrhea      Objective:  Vital Signs Last 24 Hrs  T(C): 36.6 (21 Feb 2023 04:56), Max: 36.6 (21 Feb 2023 04:56)  T(F): 97.9 (21 Feb 2023 04:56), Max: 97.9 (21 Feb 2023 04:56)  HR: 71 (21 Feb 2023 04:56) (71 - 78)  BP: 118/69 (21 Feb 2023 04:56) (118/69 - 119/70)  BP(mean): --  RR: 19 (21 Feb 2023 04:56) (18 - 20)  SpO2: 92% (21 Feb 2023 04:56) (92% - 98%)    Parameters below as of 21 Feb 2023 04:56  Patient On (Oxygen Delivery Method): room air        GENERAL: NAD, lying in bed comfortably  HEAD:  Normocephalic  EYES:  conjunctiva and sclera clear  ENT: Moist mucous membranes  NECK: Supple  CHEST/LUNG: Clear to auscultation bilaterally; No rales or rhonchi; no wheezing. Unlabored respirations  HEART: Regular rate and rhythm; S1S2+  ABDOMEN: Bowel sounds present; Soft, Nontender, Nondistended.   EXTREMITIES:  + distal Peripheral Pulses;  No cyanosis, or edema  NERVOUS SYSTEM:  Alert & Oriented X3;  No gross focal deficits   MSK: moves all extremities  SKIN: No rashes   + bowers: light pink color     LABS:              CAPILLARY BLOOD GLUCOSE              RADIOLOGY & ADDITIONAL TESTS:    Personally reviewed.     Consultant(s) Notes Reviewed:  [x] YES  [ ] NO    Plan of care discussed with patient /family; all questions answered

## 2023-02-21 NOTE — SOCIAL WORK PROGRESS NOTE - NSSWPROGRESSNOTE_GEN_ALL_CORE
Patient would like to go to rehab for managing his cath. He reports pain while  flushing. Educated him on the possibility of getting accepted and then getting authorization as he does not need physical therapy. Awaiting response from [facility]ATE to call in AM. Patient is medically ready as per Md

## 2023-02-21 NOTE — PROGRESS NOTE ADULT - ASSESSMENT
73 y/o M w/ PMHx of HTN,  AS s/p TAVR, ppm, prostate cancer s/p radical prostatectomy c/b radiation proctitis presents to ED for severe pain and urinary retention s/p cystoscopy. Admit for hematuria / urinary retention.      Problem/Plan - 1:  ·  Problem: Urinary retention with hematuria :   s/p RRP for Ca Prostate by Eusebio Martinez, XRT for recurrence, on Orgovyx  s/p negative cysto x2 by Dr. Luis at [facility], last one 5 weeks ago  admitted for clot retention, hematuria likely due to radiation cystitis  bowers was discontinued last night, not be able to void and hematuria recur , bowers was placed back   now pinkish colored urine in the bag, on CBI to clamp today , taper amicar  urology recommendation noted  suggested plan of discharge to [facility]  d/c plan D/W SW            Problem/Plan - 2:  ·  Problem: Prostate cancer.   ·  Plan: Hx of Prostate CA, now s/p radiation therapy and radical prostatectomy  - Continue home Orgovyx - brought in from home  - Patient to f/u with his urologist at [facility] (Dr. Luis) outpatient for further management.     Problem/Plan - 3:  ·  Problem: Hypertension.   - BP stable  - Continue home Metoprolol ER 50mg PO daily w/ hold parameters.     Problem/Plan - 4:  ·  Problem: Need for prophylactic measure.   ·  Plan: SCDs b/l for dvt ppx; will hold off on A/C at this time 2/2 hematuria.     Problem/Plan - 5:   acute blood loss anemia:  monitor H/H  8.2 today   s/p blood transfusion Hb 9.7 , patient feels improvement clinically                        73 y/o M w/ PMHx of HTN,  AS s/p TAVR, ppm, prostate cancer s/p radical prostatectomy c/b radiation proctitis presents to ED for severe pain and urinary retention s/p cystoscopy. Admit for hematuria / urinary retention.      Problem/Plan - 1:  ·  Problem: Urinary retention with hematuria :   s/p RRP for Ca Prostate by Eusebio Martinez, XRT for recurrence, on Orgovyx  s/p negative cysto x2 by Dr. Luis at [facility], last one 5 weeks ago  admitted for clot retention, hematuria likely due to radiation cystitis  bowers was discontinued last night, not be able to void and hematuria recur , bowers was placed back   now light pinkish/brown  colored urine in the bag, CBI clamped today   called and discussed with Dr. Luis at [facility], [place] for discharge even if the urine is light pink color.   Plan to d/c home tomorrow with bowers, discussed with nursing about teach patient catheter care and flushing/irrigation   taper amicar: amicar 500mg bid for 3 days , then 500mg once a day for 3 days              Problem/Plan - 2:  ·  Problem: Prostate cancer.   ·  Plan: Hx of Prostate CA, now s/p radiation therapy and radical prostatectomy  - Continue home Orgovyx - brought in from home  - Patient to f/u with his urologist at [facility] (Dr. Luis) outpatient for further management.     Problem/Plan - 3:  ·  Problem: Hypertension.   - BP stable  - Continue home Metoprolol ER 50mg PO daily w/ hold parameters.     Problem/Plan - 4:  ·  Problem: Need for prophylactic measure.   ·  Plan: SCDs b/l for dvt ppx; will hold off on A/C at this time 2/2 hematuria.     Problem/Plan - 5:   acute blood loss anemia:   s/p blood transfusion Hb 9.7 , patient feels improvement clinically

## 2023-02-21 NOTE — PROGRESS NOTE ADULT - SUBJECTIVE AND OBJECTIVE BOX
[facility] -- Michael Fernandez, Jeremy Huang Patel, [place], Goodger  Office # [phone]    Follow Up:  Severe AS s/p TAVR, SSS s/p PM    Subjective/Observations: seen and examined, awake and alert, comfortable on RA, denies any chest pain, dyspnea , palpitations, CBI in progress, mild hematuria noted.     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      HTN (hypertension)      Aortic stenosis      Rectal bleeding  last hospitalization 10/6/20 2/2 radiation proctitis      [place] (hard of hearing)      H/O prostatectomy  2015      S/P T&amp;A (status post tonsillectomy and adenoidectomy)  child        MEDICATIONS  (STANDING):  aminocaproic acid Tablet 500 milliGRAM(s) Oral every 8 hours  lidocaine 2% Jelly 5 milliLiter(s) IntraUrethral once  lidocaine 2% Jelly 5 milliLiter(s) IntraUrethral once  loratadine 10 milliGRAM(s) Oral daily  metoprolol succinate ER 50 milliGRAM(s) Oral daily  Orgovyx (relugolix) 120mg tab 1 Tablet(s) 1 Tablet(s) Oral daily  polyethylene glycol 3350 17 Gram(s) Oral daily    MEDICATIONS  (PRN):  acetaminophen     Tablet .. 650 milliGRAM(s) Oral every 6 hours PRN Temp greater or equal to 38C (100.4F), Mild Pain (1 - 3)  aluminum hydroxide/magnesium hydroxide/simethicone Suspension 30 milliLiter(s) Oral every 4 hours PRN Dyspepsia  melatonin 3 milliGRAM(s) Oral at bedtime PRN Insomnia  ondansetron Injectable 4 milliGRAM(s) IV Push every 8 hours PRN Nausea and/or Vomiting    Allergies    penicillin (Diarrhea; Pruritus; Hives)  penicillin V potassium (Rash)    Intolerances    codeine (Nausea)    Vital Signs Last 24 Hrs  T(C): 36.6 (21 Feb 2023 04:56), Max: 36.6 (21 Feb 2023 04:56)  T(F): 97.9 (21 Feb 2023 04:56), Max: 97.9 (21 Feb 2023 04:56)  HR: 71 (21 Feb 2023 04:56) (71 - 78)  BP: 118/69 (21 Feb 2023 04:56) (118/69 - 119/70)  BP(mean): --  RR: 19 (21 Feb 2023 04:56) (18 - 20)  SpO2: 92% (21 Feb 2023 04:56) (92% - 98%)    Parameters below as of 21 Feb 2023 04:56  Patient On (Oxygen Delivery Method): room air      I&O's Summary    20 Feb 2023 07:01  -  21 Feb 2023 07:00  --------------------------------------------------------  IN: 500 mL / OUT: 7400 mL / NET: -6900 mL    21 Feb 2023 07:01  -  21 Feb 2023 10:54  --------------------------------------------------------  IN: 240 mL / OUT: 0 mL / NET: 240 mL        TELE: Not on telemetry   PHYSICAL EXAM:  Constitutional: NAD, awake and alert, well appearing   HEENT: Moist Mucous Membranes, Anicteric  Pulmonary: Non-labored, breath sounds are clear bilaterally, No wheezing, rales or rhonchi  Cardiovascular: Regular, S1 and S2, No murmurs, rubs, gallops or clicks  Gastrointestinal: Bowel Sounds present, soft, nontender.   : 3 way bowers with CBI   Lymph: No peripheral edema. No lymphadenopathy.  Skin: No visible rashes or ulcers.  Psych:  Mood & affect appropriate  LABS: All Labs Reviewed:                        9.7    8.00  )-----------( 315      ( 20 Feb 2023 07:01 )             29.4                         8.2    6.31  )-----------( 308      ( 19 Feb 2023 07:39 )             25.3     19 Feb 2023 07:39    135    |  101    |  10     ----------------------------<  95     4.0     |  26     |  0.70     Ca    8.2        19 Feb 2023 07:39            12 Lead ECG:   Ventricular Rate 81 BPM    Atrial Rate 81 BPM    P-R Interval 198 ms    QRS Duration 128 ms    Q-T Interval 380 ms    QTC Calculation(Bazett) 441 ms    P Axis 12 degrees    R Axis 76 degrees    T Axis 18 degrees    Diagnosis Line Normal sinus rhythm  Right bundle branch block  Abnormal ECG  Confirmed by eldon Heller ([id number]) on 2/20/2023 1:36:02 PM (02-20-23 @ 10:41)      Patient name: HERBIE KERR  YOB: 1950   Age: 70 (M)   MR#: [id number]  Study Date: 2/4/2021  Location: O/PSonographer: Ramona Horn RDCS  Study quality: Technically good  Referring Physician: Rich Quesada MD  Blood Pressure: 168/87 mmHg  Height: 178 cm  Weight: 91 kg  BSA: 2.1 m2  Heart Rate: 76 mmHg  ------------------------------------------------------------------------  PROCEDURE: Transthoracic echocardiogram with 2-D, M-Mode  and complete spectral and color flow Doppler.  INDICATION: Nonrheumatic aortic (valve) stenosis (I35.0)  ------------------------------------------------------------------------  MEASUREMENTS: (See below)  ------------------------------------------------------------------------  OBSERVATIONS:  Mitral Valve: There is mitral annular calcification with  calcification on the anterior mitral leaflet. Mild mitral  regurgitation.  Aortic Root: Normal aortic root size. (Ao: 3.4 cm at the  sinuses of Valsalva).  Aortic Valve: #26mm Evolut Pro+ THV. The valve is well  seated with normal function.  The peak/mean gradients=  13/7mmHg with a DVI= 0.63. Peak transaortic valve gradient  equals 13 mm Hg, mean transaortic valve gradient equals 7  mm Hg, aortic valve velocity time integral equals 35 cm,  estimated aortic valve area equals 2.3 sqcm. There is a  mild paravalvular regurgitation jet originating from about  2 O'clock TTE surgical view.  There is no intravalvular  regurgitation seen. Peak left ventricular outflow tract  gradient equals 4mm Hg, mean gradient is equal to 3 mm Hg,  LVOT velocity time integral equals 22 cm.  Left Atrium: Mildly dilated left atrium.  LA volume index =  39 cc/m2.  Left Ventricle: Normal left ventricular systolic function.  No segmental wall motion abnormalities.  The LVEF about  55-60%. Normal left ventricular internal dimensions and  wall thicknesses. Mild diastolic dysfunction (Stage I).  Right Heart: Normal right atrium.  RA area= 13cm2, RA volume= 32ml. Normal right ventricular  size and function.  There is a device wire in the right heart. Normal tricuspid  valve. Minimal tricuspid regurgitation. Normal pulmonic  valve. Minimal pulmonic regurgitation.  Pericardium/PleuraNormal pericardium with no pericardial  effusion.  Hemodynamic: The estimated right atrial pressure is normal.  ------------------------------------------------------------------------  CONCLUSIONS:  1. #26mm Evolut Pro+ THV.  The valve is well seated with  normal function.  The peak/mean gradients= 13/7mmHg with a  DVI= 0.63. There is a mild paravalvular regurgitation jet  originating from about 2 O'clock TTE surgical view.  There  is no intravalvular regurgitation seen.  *** Compared with echocardiogram of 12/10/2020, no  significant changes noted.  ------------------------------------------------------------------------  PROCEDURE DESCRIPTION: Transthoracic echocardiogram with  2-D, M-Mode and complete spectral and color flow Doppler.  ------------------------------------------------------------------------  ECHOCARDIOGRAPHIC EXAMINATION:  AORTIC ROOT:  Aortic Root (Leaflet): 3.4 cm  Aortic Root Index: 0.9  LEFT ATRIUM:  AP Dimensions: 3.6 cm  LA Volume Index: 39.00 cc/sqm  LA Volume: 81.3 cc  LEFT VENTRICLE:  LVIDd: 4.1 cm  LVIDs: 2.9 cm  IVS: 1.18  ILWT: 1.0 cm  RWT: 0.47  LV Mass: 155.0 gm  LV Mass Index: 74 gm/sqm  EF (Visual Estimate): 55-60%  HR and BP:  HR: 76 bpm  BP: 168/87  BSA: 2.09  ------------------------------------------------------------------------  HEMODYNAMICS:  RA Pressure Estimate: 8  LA Pressure Estimate: < 20  IVRT: 53 ms  ------------------------------------------------------------------------  COLOR FLOW and SPECIAL DOPPLER:  AORTIC VALVE:  AV Peak Velocity: 1.7 M/sec  AV Peak Gradient: 12 mm Hg  AV Mean Gradient: 7 mm Hg  Valve Area: 2.3 sqcm  LVOT:  LVOT Velocity: 1.0 M/sec  LVOT Diameter: 2.1 cm  LVOT Peak Gradient Rest: 4 mm Hg  LVOT Mean Gradient Rest: 3 mm Hg  ------------------------------------------------------------------------  DIASTOLIC FUNCTION:  DT:187 ms  E/A: 0.90  e' Septal: 7.0 cm/s  E/e' Septal: 14.2  e' Lateral: 8.0 cm/s  E/e' Lateral: 12.5  MV E wave: 1.0 m/s  MV A wave: 1.1 m/s  Septal a': 10.0 cm/s  Lateral a': 11.0 cm/s  ------------------------------------------------------------------------  Confirmed on  2/4/2021 - 13:25:59 by Caroline Rizzo M.D.  ------------------------------------------------------------------------

## 2023-02-21 NOTE — PROGRESS NOTE ADULT - ASSESSMENT
72 year old male with AV disease, s/p TAVR in 12/20, with post op SSS requiring PPM, here with hematuria and radiation hemorrhagic cystitis. He called the office yesterday for evaluation.    Severe AS s/p TAVR, SSS s/p PPM  - No sign of acute ischemia.   - No anginal complaints or volume overload  - He has minimal CAD based on cath in 2020.  - BP stable and controlled, continue with BB  - On Orgovyx and Qtc is acceptable on EKG (441) 2/20    - + mild hematuria noted, ongoing CBI, urology following on Amicar taper   - Antiplatelets are on hold in the setting of hematuria.     - Monitor and replete Lytes. Keep K > 4 and Mg > 2  - Will continue to follow.    Jenny White, [facility]  Nurse Practitioner - Cardiology   Spectra #[phone]/ [phone]

## 2023-02-22 LAB
ANION GAP SERPL CALC-SCNC: 7 MMOL/L — SIGNIFICANT CHANGE UP (ref 5–17)
BUN SERPL-MCNC: 11 MG/DL — SIGNIFICANT CHANGE UP (ref 7–23)
CALCIUM SERPL-MCNC: 8.2 MG/DL — LOW (ref 8.5–10.1)
CHLORIDE SERPL-SCNC: 101 MMOL/L — SIGNIFICANT CHANGE UP (ref 96–108)
CO2 SERPL-SCNC: 26 MMOL/L — SIGNIFICANT CHANGE UP (ref 22–31)
CREAT SERPL-MCNC: 0.67 MG/DL — SIGNIFICANT CHANGE UP (ref 0.5–1.3)
EGFR: 98 ML/MIN/1.73M2 — SIGNIFICANT CHANGE UP
GLUCOSE SERPL-MCNC: 96 MG/DL — SIGNIFICANT CHANGE UP (ref 70–99)
HCT VFR BLD CALC: 29.1 % — LOW (ref 39–50)
HGB BLD-MCNC: 9.4 G/DL — LOW (ref 13–17)
MCHC RBC-ENTMCNC: 29.1 PG — SIGNIFICANT CHANGE UP (ref 27–34)
MCHC RBC-ENTMCNC: 32.3 GM/DL — SIGNIFICANT CHANGE UP (ref 32–36)
MCV RBC AUTO: 90.1 FL — SIGNIFICANT CHANGE UP (ref 80–100)
NRBC # BLD: 0 /100 WBCS — SIGNIFICANT CHANGE UP (ref 0–0)
PLATELET # BLD AUTO: 286 K/UL — SIGNIFICANT CHANGE UP (ref 150–400)
POTASSIUM SERPL-MCNC: 3.9 MMOL/L — SIGNIFICANT CHANGE UP (ref 3.5–5.3)
POTASSIUM SERPL-SCNC: 3.9 MMOL/L — SIGNIFICANT CHANGE UP (ref 3.5–5.3)
RBC # BLD: 3.23 M/UL — LOW (ref 4.2–5.8)
RBC # FLD: 12.7 % — SIGNIFICANT CHANGE UP (ref 10.3–14.5)
SODIUM SERPL-SCNC: 134 MMOL/L — LOW (ref 135–145)
WBC # BLD: 9.45 K/UL — SIGNIFICANT CHANGE UP (ref 3.8–10.5)
WBC # FLD AUTO: 9.45 K/UL — SIGNIFICANT CHANGE UP (ref 3.8–10.5)

## 2023-02-22 PROCEDURE — 99232 SBSQ HOSP IP/OBS MODERATE 35: CPT

## 2023-02-22 RX ADMIN — LORATADINE 10 MILLIGRAM(S): 10 TABLET ORAL at 11:29

## 2023-02-22 RX ADMIN — AMINOCAPROIC ACID 500 MILLIGRAM(S): 500 TABLET ORAL at 21:46

## 2023-02-22 RX ADMIN — AMINOCAPROIC ACID 500 MILLIGRAM(S): 500 TABLET ORAL at 05:15

## 2023-02-22 RX ADMIN — AMINOCAPROIC ACID 500 MILLIGRAM(S): 500 TABLET ORAL at 14:46

## 2023-02-22 RX ADMIN — Medication 50 MILLIGRAM(S): at 05:16

## 2023-02-22 NOTE — SOCIAL WORK PROGRESS NOTE - NSSWPROGRESSNOTE_GEN_ALL_CORE
Sw unable to place patient at [facility] as Gr management is not a skilled need that insurance will pay for though attempts were made at patients request. MD informed

## 2023-02-22 NOTE — PROGRESS NOTE ADULT - ASSESSMENT
71 y/o M w/ PMHx of HTN,  AS s/p TAVR, ppm, prostate cancer s/p radical prostatectomy c/b radiation proctitis presents to ED for severe pain and urinary retention s/p cystoscopy. Admit for hematuria / urinary retention.      Problem/Plan - 1:  ·  Problem: Urinary retention with hematuria :   s/p RRP for Ca Prostate by Eusebio aMrtinez, XRT for recurrence, on Orgovyx  s/p negative cysto x2 by Dr. Luis at [facility], last one 5 weeks ago  admitted for clot retention, hematuria likely due to radiation cystitis  bowers was discontinued, not being able to void and hematuria recur , bowers was placed back   [facility], ok for discharge even if the urine is light pink color.   Plan to d/c with bowers, discussed with nursing about teach patient catheter care and flushing/irrigation   taper amicar: amicar 500mg bid for 3 days , then 500mg once a day for 3 days          Problem/Plan - 2:  ·  Problem: Prostate cancer.   ·  Plan: Hx of Prostate CA, now s/p radiation therapy and radical prostatectomy  - Continue home Orgovyx - brought in from home  - Patient to f/u with his urologist at [facility] (Dr. Luis) outpatient for further management.     Problem/Plan - 3:  ·  Problem: Hypertension.   - BP stable  - Continue home Metoprolol ER 50mg PO daily w/ hold parameters.     Problem/Plan - 4:  ·  Problem: Need for prophylactic measure.   ·  Plan: SCDs b/l for dvt ppx; will hold off on A/C at this time 2/2 hematuria.     Problem/Plan - 5:   acute blood loss anemia:   s/p blood transfusion, patient feels improvement clinically

## 2023-02-22 NOTE — PROGRESS NOTE ADULT - ASSESSMENT
72 year old male with AV disease, s/p TAVR in 12/20, with post op SSS requiring PPM, here with hematuria and radiation hemorrhagic cystitis. He called the office yesterday for evaluation.    Severe AS s/p TAVR, SSS s/p PPM  - No sign of acute ischemia.   - No anginal complaints or volume overload  - He has minimal CAD based on cath in 2020.  - BP stable and controlled, continue with BB  - On Orgovyx and Qtc is acceptable on EKG (441) 2/20    - +hematuria noted, s/p CBI, urology following on Amicar taper   - Antiplatelets on hold in the setting of hematuria.     - Monitor and replete Lytes. Keep K > 4 and Mg > 2  - Will continue to follow.    Jenny White, [facility]  Nurse Practitioner - Cardiology   Spectra #[phone]/ [phone]

## 2023-02-22 NOTE — PROGRESS NOTE ADULT - SUBJECTIVE AND OBJECTIVE BOX
CHIEF COMPLAINT/INTERVAL HISTORY:  Pt. seen and evaluated for hematuria and urinary retention.  Pt. is in no distress.  Currently with dark urine in collection bag but no hematuria.  Reports that he had BM last night.      REVIEW OF SYSTEMS:  No fever, CP, SOB, or abdominal pain    Vital Signs Last 24 Hrs  T(C): 37.1 (22 Feb 2023 05:08), Max: 37.1 (22 Feb 2023 05:08)  T(F): 98.7 (22 Feb 2023 05:08), Max: 98.7 (22 Feb 2023 05:08)  HR: 80 (22 Feb 2023 05:08) (75 - 85)  BP: 145/80 (22 Feb 2023 05:08) (107/60 - 145/80)  BP(mean): --  RR: 18 (22 Feb 2023 05:08) (18 - 18)  SpO2: 92% (22 Feb 2023 05:08) (92% - 97%)    Parameters below as of 22 Feb 2023 05:08  Patient On (Oxygen Delivery Method): room air        PHYSICAL EXAM:  GENERAL: NAD  HEENT: EOMI, hearing normal, conjunctiva and sclera clear  Chest: CTA bilaterally, no wheezing  CV: S1S2, RRR,   GI: soft, +BS, NT/ND  : +bowers catheter with dark urine  Musculoskeletal: no edema  Psychiatric: affect nL, mood nL  Skin: warm and dry    LABS:                        9.4    9.45  )-----------( 286      ( 22 Feb 2023 06:10 )             29.1     02-22    134<L>  |  101  |  11  ----------------------------<  96  3.9   |  26  |  0.67    Ca    8.2<L>      22 Feb 2023 06:10

## 2023-02-22 NOTE — PROGRESS NOTE ADULT - SUBJECTIVE AND OBJECTIVE BOX
02/14/23      Dorita Fonseca  [address]  [facility] [address]      IMPORTANT INFORMATION REGARDING YOUR SURGERY        Surgery date of Thursday 3/2/2023 at 2:30 PM with arrival time of 12:30 PM at [facility] 3rd Floor Ambulatory Surgery, [address], [place] [address],   Surgery info letter reviewed with Pt including NPO (nothing to eat or drink) after midnight the day before your surgery/procedure. Wear loose comfortable clothing, no artificial nails, no nail polish, no make up, and please leave valuables & jewelry at home.    Take the [place] elevators to the 3rd Floor, make quick left then right to Ambulatory Surgery Center on the left.    Post-op appointment Wednesday 3/22/2023 at 11:00 AM with Dr. Todd at [facility], [address], [address], [place] [address]    Pt may bring two visitors. Visitors CANNOT leave the patient's pre-op room until patient is ready for discharge. You will need another  to drive you home after surgery due to anesthesia.     Patient informed surgery information will be sent through [employer] linh. Patient verbalized understaning and was encouraged to call back with any additional questions, concerns or rescheduling at [phone]. Please read attachment. Thank you.               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      HTN (hypertension)      Aortic stenosis      Rectal bleeding  last hospitalization 10/6/20 2/2 radiation proctitis      [place] (hard of hearing)      H/O prostatectomy  2015      S/P T&amp;A (status post tonsillectomy and adenoidectomy)  child        MEDICATIONS  (STANDING):  aminocaproic acid Tablet 500 milliGRAM(s) Oral every 8 hours  lidocaine 2% Jelly 5 milliLiter(s) IntraUrethral once  lidocaine 2% Jelly 5 milliLiter(s) IntraUrethral once  loratadine 10 milliGRAM(s) Oral daily  metoprolol succinate ER 50 milliGRAM(s) Oral daily  Orgovyx (relugolix) 120mg tab 1 Tablet(s) 1 Tablet(s) Oral daily  polyethylene glycol 3350 17 Gram(s) Oral daily    MEDICATIONS  (PRN):  acetaminophen     Tablet .. 650 milliGRAM(s) Oral every 6 hours PRN Temp greater or equal to 38C (100.4F), Mild Pain (1 - 3)  aluminum hydroxide/magnesium hydroxide/simethicone Suspension 30 milliLiter(s) Oral every 4 hours PRN Dyspepsia  melatonin 3 milliGRAM(s) Oral at bedtime PRN Insomnia  ondansetron Injectable 4 milliGRAM(s) IV Push every 8 hours PRN Nausea and/or Vomiting    Allergies    penicillin (Diarrhea; Pruritus; Hives)  penicillin V potassium (Rash)    Intolerances    codeine (Nausea)    Vital Signs Last 24 Hrs  T(C): 37.1 (22 Feb 2023 05:08), Max: 37.1 (22 Feb 2023 05:08)  T(F): 98.7 (22 Feb 2023 05:08), Max: 98.7 (22 Feb 2023 05:08)  HR: 80 (22 Feb 2023 05:08) (75 - 85)  BP: 145/80 (22 Feb 2023 05:08) (107/60 - 145/80)  BP(mean): --  RR: 18 (22 Feb 2023 05:08) (18 - 18)  SpO2: 92% (22 Feb 2023 05:08) (92% - 97%)    Parameters below as of 22 Feb 2023 05:08  Patient On (Oxygen Delivery Method): room air      I&O's Summary    21 Feb 2023 07:01  -  22 Feb 2023 07:00  --------------------------------------------------------  IN: 480 mL / OUT: 0 mL / NET: 480 mL        TELE: Not on telemetry   PHYSICAL EXAM:  Constitutional: NAD, awake and alert  HEENT: Moist Mucous Membranes, Anicteric  Pulmonary: Non-labored, breath sounds are clear bilaterally, No wheezing, rales or rhonchi  Cardiovascular: Regular, S1 and S2, No murmurs, rubs, gallops or clicks  Gastrointestinal: Bowel Sounds present, soft, nontender.   Lymph: No peripheral edema. No lymphadenopathy.  Skin: No visible rashes or ulcers.  Psych:  Mood & affect appropriate  LABS: All Labs Reviewed:                        9.4    9.45  )-----------( 286      ( 22 Feb 2023 06:10 )             29.1                         9.7    8.00  )-----------( 315      ( 20 Feb 2023 07:01 )             29.4     22 Feb 2023 06:10    134    |  101    |  11     ----------------------------<  96     3.9     |  26     |  0.67     Ca    8.2        22 Feb 2023 06:10            12 Lead ECG:   Ventricular Rate 81 BPM    Atrial Rate 81 BPM    P-R Interval 198 ms    QRS Duration 128 ms    Q-T Interval 380 ms    QTC Calculation(Bazett) 441 ms    P Axis 12 degrees    R Axis 76 degrees    T Axis 18 degrees    Diagnosis Line Normal sinus rhythm  Right bundle branch block  Abnormal ECG  Confirmed by eldon Heller ([id number]) on 2/20/2023 1:36:02 PM (02-20-23 @ 10:41)      Patient name: HERBIE KERR  YOB: 1950   Age: 70 (M)   MR#: [id number]  Study Date: 2/4/2021  Location: O/PSonographer: Ramona Horn RDCS  Study quality: Technically good  Referring Physician: Rich Quesada MD  Blood Pressure: 168/87 mmHg  Height: 178 cm  Weight: 91 kg  BSA: 2.1 m2  Heart Rate: 76 mmHg  ------------------------------------------------------------------------  PROCEDURE: Transthoracic echocardiogram with 2-D, M-Mode  and complete spectral and color flow Doppler.  INDICATION: Nonrheumatic aortic (valve) stenosis (I35.0)  ------------------------------------------------------------------------  MEASUREMENTS: (See below)  ------------------------------------------------------------------------  OBSERVATIONS:  Mitral Valve: There is mitral annular calcification with  calcification on the anterior mitral leaflet. Mild mitral  regurgitation.  Aortic Root: Normal aortic root size. (Ao: 3.4 cm at the  sinuses of Valsalva).  Aortic Valve: #26mm Evolut Pro+ THV. The valve is well  seated with normal function.  The peak/mean gradients=  13/7mmHg with a DVI= 0.63. Peak transaortic valve gradient  equals 13 mm Hg, mean transaortic valve gradient equals 7  mm Hg, aortic valve velocity time integral equals 35 cm,  estimated aortic valve area equals 2.3 sqcm. There is a  mild paravalvular regurgitation jet originating from about  2 O'clock TTE surgical view.  There is no intravalvular  regurgitation seen. Peak left ventricular outflow tract  gradient equals 4mm Hg, mean gradient is equal to 3 mm Hg,  LVOT velocity time integral equals 22 cm.  Left Atrium: Mildly dilated left atrium.  LA volume index =  39 cc/m2.  Left Ventricle: Normal left ventricular systolic function.  No segmental wall motion abnormalities.  The LVEF about  55-60%. Normal left ventricular internal dimensions and  wall thicknesses. Mild diastolic dysfunction (Stage I).  Right Heart: Normal right atrium.  RA area= 13cm2, RA volume= 32ml. Normal right ventricular  size and function.  There is a device wire in the right heart. Normal tricuspid  valve. Minimal tricuspid regurgitation. Normal pulmonic  valve. Minimal pulmonic regurgitation.  Pericardium/PleuraNormal pericardium with no pericardial  effusion.  Hemodynamic: The estimated right atrial pressure is normal.  ------------------------------------------------------------------------  CONCLUSIONS:  1. #26mm Evolut Pro+ THV.  The valve is well seated with  normal function.  The peak/mean gradients= 13/7mmHg with a  DVI= 0.63. There is a mild paravalvular regurgitation jet  originating from about 2 O'clock TTE surgical view.  There  is no intravalvular regurgitation seen.  *** Compared with echocardiogram of 12/10/2020, no  significant changes noted.  ------------------------------------------------------------------------  PROCEDURE DESCRIPTION: Transthoracic echocardiogram with  2-D, M-Mode and complete spectral and color flow Doppler.  ------------------------------------------------------------------------  ECHOCARDIOGRAPHIC EXAMINATION:  AORTIC ROOT:  Aortic Root (Leaflet): 3.4 cm  Aortic Root Index: 0.9  LEFT ATRIUM:  AP Dimensions: 3.6 cm  LA Volume Index: 39.00 cc/sqm  LA Volume: 81.3 cc  LEFT VENTRICLE:  LVIDd: 4.1 cm  LVIDs: 2.9 cm  IVS: 1.18  ILWT: 1.0 cm  RWT: 0.47  LV Mass: 155.0 gm  LV Mass Index: 74 gm/sqm  EF (Visual Estimate): 55-60%  HR and BP:  HR: 76 bpm  BP: 168/87  BSA: 2.09  ------------------------------------------------------------------------  HEMODYNAMICS:  RA Pressure Estimate: 8  LA Pressure Estimate: < 20  IVRT: 53 ms  ------------------------------------------------------------------------  COLOR FLOW and SPECIAL DOPPLER:  AORTIC VALVE:  AV Peak Velocity: 1.7 M/sec  AV Peak Gradient: 12 mm Hg  AV Mean Gradient: 7 mm Hg  Valve Area: 2.3 sqcm  LVOT:  LVOT Velocity: 1.0 M/sec  LVOT Diameter: 2.1 cm  LVOT Peak Gradient Rest: 4 mm Hg  LVOT Mean Gradient Rest: 3 mm Hg  ------------------------------------------------------------------------  DIASTOLIC FUNCTION:  DT:187 ms  E/A: 0.90  e' Septal: 7.0 cm/s  E/e' Septal: 14.2  e' Lateral: 8.0 cm/s  E/e' Lateral: 12.5  MV E wave: 1.0 m/s  MV A wave: 1.1 m/s  Septal a': 10.0 cm/s  Lateral a': 11.0 cm/s  ------------------------------------------------------------------------  Confirmed on  2/4/2021 - 13:25:59 by Caroline Rizzo M.D.  ------------------------------------------------------------------------

## 2023-02-23 LAB
ANION GAP SERPL CALC-SCNC: 7 MMOL/L — SIGNIFICANT CHANGE UP (ref 5–17)
BUN SERPL-MCNC: 9 MG/DL — SIGNIFICANT CHANGE UP (ref 7–23)
CALCIUM SERPL-MCNC: 8.3 MG/DL — LOW (ref 8.5–10.1)
CHLORIDE SERPL-SCNC: 103 MMOL/L — SIGNIFICANT CHANGE UP (ref 96–108)
CO2 SERPL-SCNC: 25 MMOL/L — SIGNIFICANT CHANGE UP (ref 22–31)
CREAT SERPL-MCNC: 0.59 MG/DL — SIGNIFICANT CHANGE UP (ref 0.5–1.3)
EGFR: 102 ML/MIN/1.73M2 — SIGNIFICANT CHANGE UP
GLUCOSE SERPL-MCNC: 99 MG/DL — SIGNIFICANT CHANGE UP (ref 70–99)
HCT VFR BLD CALC: 29 % — LOW (ref 39–50)
HCT VFR BLD CALC: 30.9 % — LOW (ref 39–50)
HGB BLD-MCNC: 9.6 G/DL — LOW (ref 13–17)
HGB BLD-MCNC: 9.9 G/DL — LOW (ref 13–17)
MCHC RBC-ENTMCNC: 29.8 PG — SIGNIFICANT CHANGE UP (ref 27–34)
MCHC RBC-ENTMCNC: 33.1 GM/DL — SIGNIFICANT CHANGE UP (ref 32–36)
MCV RBC AUTO: 90.1 FL — SIGNIFICANT CHANGE UP (ref 80–100)
NRBC # BLD: 0 /100 WBCS — SIGNIFICANT CHANGE UP (ref 0–0)
PLATELET # BLD AUTO: 271 K/UL — SIGNIFICANT CHANGE UP (ref 150–400)
POTASSIUM SERPL-MCNC: 4.1 MMOL/L — SIGNIFICANT CHANGE UP (ref 3.5–5.3)
POTASSIUM SERPL-SCNC: 4.1 MMOL/L — SIGNIFICANT CHANGE UP (ref 3.5–5.3)
RBC # BLD: 3.22 M/UL — LOW (ref 4.2–5.8)
RBC # FLD: 12.6 % — SIGNIFICANT CHANGE UP (ref 10.3–14.5)
SODIUM SERPL-SCNC: 135 MMOL/L — SIGNIFICANT CHANGE UP (ref 135–145)
WBC # BLD: 7.11 K/UL — SIGNIFICANT CHANGE UP (ref 3.8–10.5)
WBC # FLD AUTO: 7.11 K/UL — SIGNIFICANT CHANGE UP (ref 3.8–10.5)

## 2023-02-23 PROCEDURE — 99232 SBSQ HOSP IP/OBS MODERATE 35: CPT

## 2023-02-23 RX ORDER — AMINOCAPROIC ACID 500 MG/1
1 TABLET ORAL
Qty: 9 | Refills: 0
Start: 2023-02-23

## 2023-02-23 RX ORDER — ACETAMINOPHEN 500 MG
3 TABLET ORAL
Qty: 0 | Refills: 0 | DISCHARGE
Start: 2023-02-23

## 2023-02-23 RX ADMIN — AMINOCAPROIC ACID 500 MILLIGRAM(S): 500 TABLET ORAL at 06:17

## 2023-02-23 RX ADMIN — Medication 30 MILLILITER(S): at 09:54

## 2023-02-23 RX ADMIN — AMINOCAPROIC ACID 500 MILLIGRAM(S): 500 TABLET ORAL at 14:39

## 2023-02-23 RX ADMIN — Medication 50 MILLIGRAM(S): at 06:17

## 2023-02-23 RX ADMIN — AMINOCAPROIC ACID 500 MILLIGRAM(S): 500 TABLET ORAL at 21:41

## 2023-02-23 RX ADMIN — LORATADINE 10 MILLIGRAM(S): 10 TABLET ORAL at 12:21

## 2023-02-23 NOTE — PROGRESS NOTE ADULT - ASSESSMENT
73 y/o M w/ PMHx of HTN,  AS s/p TAVR, ppm, prostate cancer s/p radical prostatectomy c/b radiation proctitis presents to ED for severe pain and urinary retention s/p cystoscopy. Admit for hematuria / urinary retention.      Problem/Plan - 1:  ·  Problem: Urinary retention with hematuria :   s/p RRP for Ca Prostate by Eusebio Martinez, XRT for recurrence, on Orgovyx  s/p negative cysto x2 by Dr. Luis at [facility], last one 5 weeks ago  admitted for clot retention, hematuria likely due to radiation cystitis  bowers was discontinued, not being able to void and hematuria recur , bowers was placed back   called and discussed with Dr. Luis at [facility], ok for discharge even if the urine is light pink color.   Plan to d/c with bowers, discussed with nursing about teach patient catheter care and flushing/irrigation   taper amicar: amicar 500mg bid for 3 days , then 500mg once a day for 3 days          Problem/Plan - 2:  ·  Problem: Prostate cancer.   ·  Plan: Hx of Prostate CA, now s/p radiation therapy and radical prostatectomy  - Continue home Orgovyx - brought in from home  - Patient to f/u with his urologist at [facility] (Dr. Luis) outpatient for further management.     Problem/Plan - 3:  ·  Problem: Hypertension.   - BP stable  - Continue home Metoprolol ER 50mg PO daily w/ hold parameters.     Problem/Plan - 4:  ·  Problem: Need for prophylactic measure.   ·  Plan: SCDs b/l for dvt ppx; will hold off on A/C at this time 2/2 hematuria.     Problem/Plan - 5:   acute blood loss anemia:   s/p blood transfusion, patient feels improvement clinically.  Hbg stable over last 4 days.

## 2023-02-23 NOTE — PROGRESS NOTE ADULT - SUBJECTIVE AND OBJECTIVE BOX
CHIEF COMPLAINT/INTERVAL HISTORY:  Pt. seen and evaluated for hematuria and urinary retention.  Pt. is in no distress.  Currently with no hematuria in bowers bag.  Hbg stable at 9.6 this morning.      REVIEW OF SYSTEMS:  No fever, CP, SOB, or abdominal pain    Vital Signs Last 24 Hrs  T(C): 36.8 (23 Feb 2023 05:10), Max: 36.8 (23 Feb 2023 05:10)  T(F): 98.2 (23 Feb 2023 05:10), Max: 98.2 (23 Feb 2023 05:10)  HR: 73 (23 Feb 2023 05:10) (73 - 79)  BP: 129/75 (23 Feb 2023 05:10) (109/66 - 133/65)  BP(mean): --  RR: 18 (23 Feb 2023 05:10) (18 - 20)  SpO2: 90% (23 Feb 2023 05:10) (90% - 96%)    Parameters below as of 23 Feb 2023 05:10  Patient On (Oxygen Delivery Method): room air        PHYSICAL EXAM:  GENERAL: NAD  HEENT: EOMI, hearing normal, conjunctiva and sclera clear  Chest: CTA bilaterally, no wheezing  CV: S1S2, RRR,   GI: soft, +BS, NT/ND  : +bowers catheter.  No hematuria  Musculoskeletal: no edema  Psychiatric: affect nL, mood nL  Skin: warm and dry    LABS:                        9.6    7.11  )-----------( 271      ( 23 Feb 2023 06:58 )             29.0     02-23    135  |  103  |  9   ----------------------------<  99  4.1   |  25  |  0.59    Ca    8.3<L>      23 Feb 2023 06:58

## 2023-02-23 NOTE — PROGRESS NOTE ADULT - ASSESSMENT
72 year old male with AV disease, s/p TAVR in 12/20, with post op SSS requiring PPM, here with hematuria and radiation hemorrhagic cystitis. He called the office yesterday for evaluation.    VHD, SSS s/p PPM  - No sign of acute ischemia.   - No anginal complaints or volume overload  - He has minimal CAD based on cath in 2020.  - BP, HR stable and controlled  - continue with BB  - Monitor and replete Lytes. Keep K > 4 and Mg > 2  - On Orgovyx and Qtc is acceptable on EKG (441) 2/20    - hematuria resolved, s/p CBI, urology following on Amicar taper   - Antiplatelets on hold in the setting of hematuria.     - DC planning per primary   - Will continue to follow.    Jenny White [facility]  Nurse Practitioner - Cardiology   Spectra #[id number]/ [phone]

## 2023-02-23 NOTE — PROGRESS NOTE ADULT - SUBJECTIVE AND OBJECTIVE BOX
[facility] -- Michael Fernandez Wachsman, Pannella, Patel, Savella, Goodger  Office # [phone]    Follow Up: VHD, SSS s/p PM    Subjective/Observations: Patient seen and examined, awake, alert, resting comfortably in bed, denies chest pain, dyspnea, palpitations or dizziness, orthopnea and PND. Tolerating room air. 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      HTN (hypertension)      Aortic stenosis      Rectal bleeding  last hospitalization 10/6/20 2/2 radiation proctitis      [place] (hard of hearing)      H/O prostatectomy  2015      S/P T&amp;A (status post tonsillectomy and adenoidectomy)  child        MEDICATIONS  (STANDING):  aminocaproic acid Tablet 500 milliGRAM(s) Oral every 8 hours  lidocaine 2% Jelly 5 milliLiter(s) IntraUrethral once  lidocaine 2% Jelly 5 milliLiter(s) IntraUrethral once  loratadine 10 milliGRAM(s) Oral daily  metoprolol succinate ER 50 milliGRAM(s) Oral daily  Orgovyx (relugolix) 120mg tab 1 Tablet(s) 1 Tablet(s) Oral daily  polyethylene glycol 3350 17 Gram(s) Oral daily    MEDICATIONS  (PRN):  acetaminophen     Tablet .. 650 milliGRAM(s) Oral every 6 hours PRN Temp greater or equal to 38C (100.4F), Mild Pain (1 - 3)  aluminum hydroxide/magnesium hydroxide/simethicone Suspension 30 milliLiter(s) Oral every 4 hours PRN Dyspepsia  melatonin 3 milliGRAM(s) Oral at bedtime PRN Insomnia  ondansetron Injectable 4 milliGRAM(s) IV Push every 8 hours PRN Nausea and/or Vomiting    Allergies    penicillin (Diarrhea; Pruritus; Hives)  penicillin V potassium (Rash)    Intolerances    codeine (Nausea)    Vital Signs Last 24 Hrs  T(C): 36.8 (23 Feb 2023 05:10), Max: 36.8 (23 Feb 2023 05:10)  T(F): 98.2 (23 Feb 2023 05:10), Max: 98.2 (23 Feb 2023 05:10)  HR: 73 (23 Feb 2023 05:10) (73 - 79)  BP: 129/75 (23 Feb 2023 05:10) (109/66 - 133/65)  BP(mean): --  RR: 18 (23 Feb 2023 05:10) (18 - 20)  SpO2: 90% (23 Feb 2023 05:10) (90% - 96%)    Parameters below as of 23 Feb 2023 05:10  Patient On (Oxygen Delivery Method): room air      I&O's Summary    22 Feb 2023 07:01  -  23 Feb 2023 07:00  --------------------------------------------------------  IN: 0 mL / OUT: 3600 mL / NET: -3600 mL        TELE: Not on telemetry   PHYSICAL EXAM:  Constitutional: NAD, awake and alert  HEENT: Moist Mucous Membranes, Anicteric  Pulmonary: Non-labored, breath sounds are clear bilaterally, No wheezing, rales or rhonchi  Cardiovascular: Regular, S1 and S2, No murmurs, rubs, gallops or clicks  Gastrointestinal: Bowel Sounds present, soft, nontender.   : + three way bowers, s/p cbi   Lymph: No peripheral edema. No lymphadenopathy.  Skin: No visible rashes or ulcers.  Psych:  Mood & affect appropriate  LABS: All Labs Reviewed:                        9.6    7.11  )-----------( 271      ( 23 Feb 2023 06:58 )             29.0                         9.4    9.45  )-----------( 286      ( 22 Feb 2023 06:10 )             29.1     23 Feb 2023 06:58    135    |  103    |  9      ----------------------------<  99     4.1     |  25     |  0.59   22 Feb 2023 06:10    134    |  101    |  11     ----------------------------<  96     3.9     |  26     |  0.67     Ca    8.3        23 Feb 2023 06:58  Ca    8.2        22 Feb 2023 06:10            12 Lead ECG:   Ventricular Rate 81 BPM    Atrial Rate 81 BPM    P-R Interval 198 ms    QRS Duration 128 ms    Q-T Interval 380 ms    QTC Calculation(Bazett) 441 ms    P Axis 12 degrees    R Axis 76 degrees    T Axis 18 degrees    Diagnosis Line Normal sinus rhythm  Right bundle branch block  Abnormal ECG  Confirmed by eldon Heller ([id number]) on 2/20/2023 1:36:02 PM (02-20-23 @ 10:41)      Patient name: HERBIE KERR  YOB: 1950   Age: 70 (M)   MR#: [id number]  Study Date: 2/4/2021  Location: O/PSonographer: Ramona HornFAHAD  Study quality: Technically good  Referring Physician: Rich Quesada MD  Blood Pressure: 168/87 mmHg  Height: 178 cm  Weight: 91 kg  BSA: 2.1 m2  Heart Rate: 76 mmHg  ------------------------------------------------------------------------  PROCEDURE: Transthoracic echocardiogram with 2-D, M-Mode  and complete spectral and color flow Doppler.  INDICATION: Nonrheumatic aortic (valve) stenosis (I35.0)  ------------------------------------------------------------------------  MEASUREMENTS: (See below)  ------------------------------------------------------------------------  OBSERVATIONS:  Mitral Valve: There is mitral annular calcification with  calcification on the anterior mitral leaflet. Mild mitral  regurgitation.  Aortic Root: Normal aortic root size. (Ao: 3.4 cm at the  sinuses of Valsalva).  Aortic Valve: #26mm Evolut Pro+ THV. The valve is well  seated with normal function.  The peak/mean gradients=  13/7mmHg with a DVI= 0.63. Peak transaortic valve gradient  equals 13 mm Hg, mean transaortic valve gradient equals 7  mm Hg, aortic valve velocity time integral equals 35 cm,  estimated aortic valve area equals 2.3 sqcm. There is a  mild paravalvular regurgitation jet originating from about  2 O'clock TTE surgical view.  There is no intravalvular  regurgitation seen. Peak left ventricular outflow tract  gradient equals 4mm Hg, mean gradient is equal to 3 mm Hg,  LVOT velocity time integral equals 22 cm.  Left Atrium: Mildly dilated left atrium.  LA volume index =  39 cc/m2.  Left Ventricle: Normal left ventricular systolic function.  No segmental wall motion abnormalities.  The LVEF about  55-60%. Normal left ventricular internal dimensions and  wall thicknesses. Mild diastolic dysfunction (Stage I).  Right Heart: Normal right atrium.  RA area= 13cm2, RA volume= 32ml. Normal right ventricular  size and function.  There is a device wire in the right heart. Normal tricuspid  valve. Minimal tricuspid regurgitation. Normal pulmonic  valve. Minimal pulmonic regurgitation.  Pericardium/PleuraNormal pericardium with no pericardial  effusion.  Hemodynamic: The estimated right atrial pressure is normal.  ------------------------------------------------------------------------  CONCLUSIONS:  1. #26mm Evolut Pro+ THV.  The valve is well seated with  normal function.  The peak/mean gradients= 13/7mmHg with a  DVI= 0.63. There is a mild paravalvular regurgitation jet  originating from about 2 O'clock TTE surgical view.  There  is no intravalvular regurgitation seen.  *** Compared with echocardiogram of 12/10/2020, no  significant changes noted.  ------------------------------------------------------------------------  PROCEDURE DESCRIPTION: Transthoracic echocardiogram with  2-D, M-Mode and complete spectral and color flow Doppler.  ------------------------------------------------------------------------  ECHOCARDIOGRAPHIC EXAMINATION:  AORTIC ROOT:  Aortic Root (Leaflet): 3.4 cm  Aortic Root Index: 0.9  LEFT ATRIUM:  AP Dimensions: 3.6 cm  LA Volume Index: 39.00 cc/sqm  LA Volume: 81.3 cc  LEFT VENTRICLE:  LVIDd: 4.1 cm  LVIDs: 2.9 cm  IVS: 1.18  ILWT: 1.0 cm  RWT: 0.47  LV Mass: 155.0 gm  LV Mass Index: 74 gm/sqm  EF (Visual Estimate): 55-60%  HR and BP:  HR: 76 bpm  BP: 168/87  BSA: 2.09  ------------------------------------------------------------------------  HEMODYNAMICS:  RA Pressure Estimate: 8  LA Pressure Estimate: < 20  IVRT: 53 ms  ------------------------------------------------------------------------  COLOR FLOW and SPECIAL DOPPLER:  AORTIC VALVE:  AV Peak Velocity: 1.7 M/sec  AV Peak Gradient: 12 mm Hg  AV Mean Gradient: 7 mm Hg  Valve Area: 2.3 sqcm  LVOT:  LVOT Velocity: 1.0 M/sec  LVOT Diameter: 2.1 cm  LVOT Peak Gradient Rest: 4 mm Hg  LVOT Mean Gradient Rest: 3 mm Hg  ------------------------------------------------------------------------  DIASTOLIC FUNCTION:  DT:187 ms  E/A: 0.90  e' Septal: 7.0 cm/s  E/e' Septal: 14.2  e' Lateral: 8.0 cm/s  E/e' Lateral: 12.5  MV E wave: 1.0 m/s  MV A wave: 1.1 m/s  Septal a': 10.0 cm/s  Lateral a': 11.0 cm/s  ------------------------------------------------------------------------  Confirmed on  2/4/2021 - 13:25:59 by Caroline Rizzo M.D.  ------------------------------------------------------------------------

## 2023-02-24 LAB
ANION GAP SERPL CALC-SCNC: 7 MMOL/L — SIGNIFICANT CHANGE UP (ref 5–17)
BUN SERPL-MCNC: 12 MG/DL — SIGNIFICANT CHANGE UP (ref 7–23)
CALCIUM SERPL-MCNC: 8.2 MG/DL — LOW (ref 8.5–10.1)
CHLORIDE SERPL-SCNC: 103 MMOL/L — SIGNIFICANT CHANGE UP (ref 96–108)
CO2 SERPL-SCNC: 25 MMOL/L — SIGNIFICANT CHANGE UP (ref 22–31)
CREAT SERPL-MCNC: 0.64 MG/DL — SIGNIFICANT CHANGE UP (ref 0.5–1.3)
EGFR: 99 ML/MIN/1.73M2 — SIGNIFICANT CHANGE UP
GLUCOSE SERPL-MCNC: 98 MG/DL — SIGNIFICANT CHANGE UP (ref 70–99)
HCT VFR BLD CALC: 26.5 % — LOW (ref 39–50)
HGB BLD-MCNC: 8.7 G/DL — LOW (ref 13–17)
MCHC RBC-ENTMCNC: 29.2 PG — SIGNIFICANT CHANGE UP (ref 27–34)
MCHC RBC-ENTMCNC: 32.8 GM/DL — SIGNIFICANT CHANGE UP (ref 32–36)
MCV RBC AUTO: 88.9 FL — SIGNIFICANT CHANGE UP (ref 80–100)
NRBC # BLD: 0 /100 WBCS — SIGNIFICANT CHANGE UP (ref 0–0)
PLATELET # BLD AUTO: 256 K/UL — SIGNIFICANT CHANGE UP (ref 150–400)
POTASSIUM SERPL-MCNC: 4.1 MMOL/L — SIGNIFICANT CHANGE UP (ref 3.5–5.3)
POTASSIUM SERPL-SCNC: 4.1 MMOL/L — SIGNIFICANT CHANGE UP (ref 3.5–5.3)
RBC # BLD: 2.98 M/UL — LOW (ref 4.2–5.8)
RBC # FLD: 12.7 % — SIGNIFICANT CHANGE UP (ref 10.3–14.5)
SODIUM SERPL-SCNC: 135 MMOL/L — SIGNIFICANT CHANGE UP (ref 135–145)
WBC # BLD: 7.34 K/UL — SIGNIFICANT CHANGE UP (ref 3.8–10.5)
WBC # FLD AUTO: 7.34 K/UL — SIGNIFICANT CHANGE UP (ref 3.8–10.5)

## 2023-02-24 PROCEDURE — 99232 SBSQ HOSP IP/OBS MODERATE 35: CPT

## 2023-02-24 RX ORDER — FLUTICASONE PROPIONATE 50 MCG
1 SPRAY, SUSPENSION NASAL
Refills: 0 | Status: DISCONTINUED | OUTPATIENT
Start: 2023-02-24 | End: 2023-03-02

## 2023-02-24 RX ORDER — OXYBUTYNIN CHLORIDE 5 MG
5 TABLET ORAL ONCE
Refills: 0 | Status: COMPLETED | OUTPATIENT
Start: 2023-02-24 | End: 2023-02-24

## 2023-02-24 RX ADMIN — AMINOCAPROIC ACID 500 MILLIGRAM(S): 500 TABLET ORAL at 06:21

## 2023-02-24 RX ADMIN — Medication 50 MILLIGRAM(S): at 06:21

## 2023-02-24 RX ADMIN — AMINOCAPROIC ACID 500 MILLIGRAM(S): 500 TABLET ORAL at 21:39

## 2023-02-24 RX ADMIN — Medication 5 MILLIGRAM(S): at 01:23

## 2023-02-24 RX ADMIN — Medication 1 SPRAY(S): at 17:50

## 2023-02-24 RX ADMIN — AMINOCAPROIC ACID 500 MILLIGRAM(S): 500 TABLET ORAL at 13:22

## 2023-02-24 RX ADMIN — POLYETHYLENE GLYCOL 3350 17 GRAM(S): 17 POWDER, FOR SOLUTION ORAL at 13:22

## 2023-02-24 RX ADMIN — LORATADINE 10 MILLIGRAM(S): 10 TABLET ORAL at 11:50

## 2023-02-24 NOTE — PROGRESS NOTE ADULT - SUBJECTIVE AND OBJECTIVE BOX
INTERVAL HPI/OVERNIGHT EVENTS:  CBI restarted 2/23/23    MEDICATIONS  (STANDING):  aminocaproic acid Tablet 500 milliGRAM(s) Oral every 8 hours  lidocaine 2% Jelly 5 milliLiter(s) IntraUrethral once  lidocaine 2% Jelly 5 milliLiter(s) IntraUrethral once  loratadine 10 milliGRAM(s) Oral daily  metoprolol succinate ER 50 milliGRAM(s) Oral daily  Orgovyx (relugolix) 120mg tab 1 Tablet(s) 1 Tablet(s) Oral daily  polyethylene glycol 3350 17 Gram(s) Oral daily    MEDICATIONS  (PRN):  acetaminophen     Tablet .. 650 milliGRAM(s) Oral every 6 hours PRN Temp greater or equal to 38C (100.4F), Mild Pain (1 - 3)  aluminum hydroxide/magnesium hydroxide/simethicone Suspension 30 milliLiter(s) Oral every 4 hours PRN Dyspepsia  melatonin 3 milliGRAM(s) Oral at bedtime PRN Insomnia  ondansetron Injectable 4 milliGRAM(s) IV Push every 8 hours PRN Nausea and/or Vomiting        Vital Signs Last 24 Hrs  T(C): 36.5 (24 Feb 2023 05:04), Max: 36.8 (23 Feb 2023 12:07)  T(F): 97.7 (24 Feb 2023 05:04), Max: 98.2 (23 Feb 2023 12:07)  HR: 77 (24 Feb 2023 05:04) (74 - 77)  BP: 136/78 (24 Feb 2023 05:04) (127/70 - 137/77)  BP(mean): --  RR: 18 (24 Feb 2023 05:04) (18 - 20)  SpO2: 91% (24 Feb 2023 05:04) (91% - 93%)    Parameters below as of 24 Feb 2023 05:04  Patient On (Oxygen Delivery Method): room air        PHYSICAL EXAM:    ABDOMEN: benign  GENITALIA: bowers - clear cbi    LABS:                        9.9    x     )-----------( x        ( 23 Feb 2023 16:18 )             30.9     02-23    135  |  103  |  9   ----------------------------<  99  4.1   |  25  |  0.59    Ca    8.3<L>      23 Feb 2023 06:58            Blood Cultures:    RADIOLOGY & ADDITIONAL TESTS:

## 2023-02-24 NOTE — PROGRESS NOTE ADULT - SUBJECTIVE AND OBJECTIVE BOX
CHIEF COMPLAINT/INTERVAL HISTORY:  Pt. seen and evaluated for hematuria and urinary retention.  Pt. currently on CBI with dark pink urine in collection bag.      REVIEW OF SYSTEMS:  No fever, CP, SOB, or abdominal pain    Vital Signs Last 24 Hrs  T(C): 36.5 (24 Feb 2023 05:04), Max: 36.8 (23 Feb 2023 12:07)  T(F): 97.7 (24 Feb 2023 05:04), Max: 98.2 (23 Feb 2023 12:07)  HR: 77 (24 Feb 2023 05:04) (74 - 77)  BP: 136/78 (24 Feb 2023 05:04) (127/70 - 137/77)  BP(mean): --  RR: 18 (24 Feb 2023 05:04) (18 - 20)  SpO2: 91% (24 Feb 2023 05:04) (91% - 93%)    Parameters below as of 24 Feb 2023 05:04  Patient On (Oxygen Delivery Method): room air        PHYSICAL EXAM:  GENERAL: NAD  HEENT: EOMI, hearing normal, conjunctiva and sclera clear  Chest: CTA bilaterally, no wheezing  CV: S1S2, RRR,   GI: soft, +BS, NT/ND  : +bowers catheter with dark pinkish urine  Musculoskeletal: no edema  Psychiatric: affect nL, mood nL  Skin: warm and dry    LABS:                        8.7    7.34  )-----------( 256      ( 24 Feb 2023 07:00 )             26.5     02-24    135  |  103  |  12  ----------------------------<  98  4.1   |  25  |  0.64    Ca    8.2<L>      24 Feb 2023 07:00

## 2023-02-24 NOTE — PROGRESS NOTE ADULT - ASSESSMENT
72 year old male with AV disease, s/p TAVR in 12/20, with post op SSS requiring PPM, here with hematuria and radiation hemorrhagic cystitis. He called the office yesterday for evaluation.    VHD, SSS s/p PPM  - No sign of acute ischemia.   - No anginal complaints or volume overload  - He has minimal CAD based on cath in 2020.    - BP, HR stable and controlled  - continue with BB  - Monitor and replete Lytes. Keep K > 4 and Mg > 2  - On Orgovyx and Qtc is acceptable on EKG (441) 2/20    - hematuria resolved, s/p CBI, urology following on Amicar taper   - H/H 8.7/26.5 downtrending, transfuse per primary    - Antiplatelets on hold in the setting of hematuria.     - DC planning per primary   - Will continue to follow.    Jenny White [facility]  Nurse Practitioner - Cardiology   Spectra #[phone]/ [phone]

## 2023-02-24 NOTE — PROGRESS NOTE ADULT - SUBJECTIVE AND OBJECTIVE BOX
[facility] -- Michael Fernandez,  Jeremy Huang Patel, [place], [place]  Office # [phone]    Follow Up:  VHD, SSS s/p PPM    Subjective/Observations: Patient seen and examined, awake, alert, resting comfortably in bed, denies chest pain, dyspnea, palpitations or dizziness, orthopnea and PND. Tolerating room air.     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      HTN (hypertension)      Aortic stenosis      Rectal bleeding  last hospitalization 10/6/20 2/2 radiation proctitis      [place] (hard of hearing)      H/O prostatectomy  2015      S/P T&amp;A (status post tonsillectomy and adenoidectomy)  child        MEDICATIONS  (STANDING):  aminocaproic acid Tablet 500 milliGRAM(s) Oral every 8 hours  fluticasone propionate 50 MICROgram(s)/spray Nasal Spray 1 Spray(s) Both Nostrils two times a day  lidocaine 2% Jelly 5 milliLiter(s) IntraUrethral once  lidocaine 2% Jelly 5 milliLiter(s) IntraUrethral once  loratadine 10 milliGRAM(s) Oral daily  metoprolol succinate ER 50 milliGRAM(s) Oral daily  Orgovyx (relugolix) 120mg tab 1 Tablet(s) 1 Tablet(s) Oral daily  polyethylene glycol 3350 17 Gram(s) Oral daily    MEDICATIONS  (PRN):  acetaminophen     Tablet .. 650 milliGRAM(s) Oral every 6 hours PRN Temp greater or equal to 38C (100.4F), Mild Pain (1 - 3)  aluminum hydroxide/magnesium hydroxide/simethicone Suspension 30 milliLiter(s) Oral every 4 hours PRN Dyspepsia  melatonin 3 milliGRAM(s) Oral at bedtime PRN Insomnia  ondansetron Injectable 4 milliGRAM(s) IV Push every 8 hours PRN Nausea and/or Vomiting    Allergies    penicillin (Diarrhea; Pruritus; Hives)  penicillin V potassium (Rash)    Intolerances    codeine (Nausea)    Vital Signs Last 24 Hrs  T(C): 36.5 (24 Feb 2023 05:04), Max: 36.8 (23 Feb 2023 12:07)  T(F): 97.7 (24 Feb 2023 05:04), Max: 98.2 (23 Feb 2023 12:07)  HR: 77 (24 Feb 2023 05:04) (74 - 77)  BP: 136/78 (24 Feb 2023 05:04) (127/70 - 137/77)  BP(mean): --  RR: 18 (24 Feb 2023 05:04) (18 - 20)  SpO2: 91% (24 Feb 2023 05:04) (91% - 93%)    Parameters below as of 24 Feb 2023 05:04  Patient On (Oxygen Delivery Method): room air      I&O's Summary    23 Feb 2023 07:01  -  24 Feb 2023 07:00  --------------------------------------------------------  IN: 0 mL / OUT: 2500 mL / NET: -2500 mL        TELE: Not on telemetry   PHYSICAL EXAM:  Constitutional: NAD, awake and alert  HEENT: Moist Mucous Membranes, Anicteric  Pulmonary: Non-labored, breath sounds are clear bilaterally, No wheezing, rales or rhonchi  Cardiovascular: Regular, S1 and S2, No murmurs, rubs, gallops or clicks  Gastrointestinal: Bowel Sounds present, soft, nontender.   : + three way bowers, s/p cbi   Lymph: No peripheral edema. No lymphadenopathy.  Skin: No visible rashes or ulcers.  Psych:  Mood & affect appropriate      LABS: All Labs Reviewed:                        8.7    7.34  )-----------( 256      ( 24 Feb 2023 07:00 )             26.5                         9.9    x     )-----------( x        ( 23 Feb 2023 16:18 )             30.9                         9.6    7.11  )-----------( 271      ( 23 Feb 2023 06:58 )             29.0     24 Feb 2023 07:00    135    |  103    |  12     ----------------------------<  98     4.1     |  25     |  0.64   23 Feb 2023 06:58    135    |  103    |  9      ----------------------------<  99     4.1     |  25     |  0.59   22 Feb 2023 06:10    134    |  101    |  11     ----------------------------<  96     3.9     |  26     |  0.67     Ca    8.2        24 Feb 2023 07:00  Ca    8.3        23 Feb 2023 06:58  Ca    8.2        22 Feb 2023 06:10            12 Lead ECG:   Ventricular Rate 81 BPM    Atrial Rate 81 BPM    P-R Interval 198 ms    QRS Duration 128 ms    Q-T Interval 380 ms    QTC Calculation(Bazett) 441 ms    P Axis 12 degrees    R Axis 76 degrees    T Axis 18 degrees    Diagnosis Line Normal sinus rhythm  Right bundle branch block  Abnormal ECG  Confirmed by eldno Heller ([id number]) on 2/20/2023 1:36:02 PM (02-20-23 @ 10:41)      Patient name: HERBIE KERR  YOB: 1950   Age: 70 (M)   MR#: [id number]  Study Date: 2/4/2021  Location: O/PSonographer: Ramona Horn RDCS  Study quality: Technically good  Referring Physician: Rich Quesada MD  Blood Pressure: 168/87 mmHg  Height: 178 cm  Weight: 91 kg  BSA: 2.1 m2  Heart Rate: 76 mmHg  ------------------------------------------------------------------------  PROCEDURE: Transthoracic echocardiogram with 2-D, M-Mode  and complete spectral and color flow Doppler.  INDICATION: Nonrheumatic aortic (valve) stenosis (I35.0)  ------------------------------------------------------------------------  MEASUREMENTS: (See below)  ------------------------------------------------------------------------  OBSERVATIONS:  Mitral Valve: There is mitral annular calcification with  calcification on the anterior mitral leaflet. Mild mitral  regurgitation.  Aortic Root: Normal aortic root size. (Ao: 3.4 cm at the  sinuses of Valsalva).  Aortic Valve: #26mm Evolut Pro+ THV. The valve is well  seated with normal function.  The peak/mean gradients=  13/7mmHg with a DVI= 0.63. Peak transaortic valve gradient  equals 13 mm Hg, mean transaortic valve gradient equals 7  mm Hg, aortic valve velocity time integral equals 35 cm,  estimated aortic valve area equals 2.3 sqcm. There is a  mild paravalvular regurgitation jet originating from about  2 O'clock TTE surgical view.  There is no intravalvular  regurgitation seen. Peak left ventricular outflow tract  gradient equals 4mm Hg, mean gradient is equal to 3 mm Hg,  LVOT velocity time integral equals 22 cm.  Left Atrium: Mildly dilated left atrium.  LA volume index =  39 cc/m2.  Left Ventricle: Normal left ventricular systolic function.  No segmental wall motion abnormalities.  The LVEF about  55-60%. Normal left ventricular internal dimensions and  wall thicknesses. Mild diastolic dysfunction (Stage I).  Right Heart: Normal right atrium.  RA area= 13cm2, RA volume= 32ml. Normal right ventricular  size and function.  There is a device wire in the right heart. Normal tricuspid  valve. Minimal tricuspid regurgitation. Normal pulmonic  valve. Minimal pulmonic regurgitation.  Pericardium/PleuraNormal pericardium with no pericardial  effusion.  Hemodynamic: The estimated right atrial pressure is normal.  ------------------------------------------------------------------------  CONCLUSIONS:  1. #26mm Evolut Pro+ THV.  The valve is well seated with  normal function.  The peak/mean gradients= 13/7mmHg with a  DVI= 0.63. There is a mild paravalvular regurgitation jet  originating from about 2 O'clock TTE surgical view.  There  is no intravalvular regurgitation seen.  *** Compared with echocardiogram of 12/10/2020, no  significant changes noted.  ------------------------------------------------------------------------  PROCEDURE DESCRIPTION: Transthoracic echocardiogram with  2-D, M-Mode and complete spectral and color flow Doppler.  ------------------------------------------------------------------------  ECHOCARDIOGRAPHIC EXAMINATION:  AORTIC ROOT:  Aortic Root (Leaflet): 3.4 cm  Aortic Root Index: 0.9  LEFT ATRIUM:  AP Dimensions: 3.6 cm  LA Volume Index: 39.00 cc/sqm  LA Volume: 81.3 cc  LEFT VENTRICLE:  LVIDd: 4.1 cm  LVIDs: 2.9 cm  IVS: 1.18  ILWT: 1.0 cm  RWT: 0.47  LV Mass: 155.0 gm  LV Mass Index: 74 gm/sqm  EF (Visual Estimate): 55-60%  HR and BP:  HR: 76 bpm  BP: 168/87  BSA: 2.09  ------------------------------------------------------------------------  HEMODYNAMICS:  RA Pressure Estimate: 8  LA Pressure Estimate: < 20  IVRT: 53 ms  ------------------------------------------------------------------------  COLOR FLOW and SPECIAL DOPPLER:  AORTIC VALVE:  AV Peak Velocity: 1.7 M/sec  AV Peak Gradient: 12 mm Hg  AV Mean Gradient: 7 mm Hg  Valve Area: 2.3 sqcm  LVOT:  LVOT Velocity: 1.0 M/sec  LVOT Diameter: 2.1 cm  LVOT Peak Gradient Rest: 4 mm Hg  LVOT Mean Gradient Rest: 3 mm Hg  ------------------------------------------------------------------------  DIASTOLIC FUNCTION:  DT:187 ms  E/A: 0.90  e' Septal: 7.0 cm/s  E/e' Septal: 14.2  e' Lateral: 8.0 cm/s  E/e' Lateral: 12.5  MV E wave: 1.0 m/s  MV A wave: 1.1 m/s  Septal a': 10.0 cm/s  Lateral a': 11.0 cm/s  ------------------------------------------------------------------------  Confirmed on  2/4/2021 - 13:25:59 by Caroline Rizzo M.D.  ------------------------------------------------------------------------

## 2023-02-24 NOTE — PROGRESS NOTE ADULT - ASSESSMENT
73 y/o M w/ PMHx of HTN,  AS s/p TAVR, ppm, prostate cancer s/p radical prostatectomy c/b radiation proctitis presents to ED for severe pain and urinary retention s/p cystoscopy. Admit for hematuria / urinary retention.      Problem/Plan - 1:  ·  Problem: Urinary retention with hematuria :   s/p RRP for Ca Prostate by Eusebio Martinez, XRT for recurrence, on Orgovyx  s/p negative cysto x2 by Dr. Luis at [facility] [place], last one 5 weeks ago  admitted for clot retention, hematuria likely due to radiation cystitis  bowers was discontinued, not being able to void and hematuria recur , bowers was placed back   called and discussed with Dr. Luis at [facility], ok for discharge even if the urine is light pink color.   Plan to d/c with bowers, discussed with nursing about teach patient catheter care and flushing/irrigation   taper amicar: amicar 500mg bid for 3 days , then 500mg once a day for 3 days upon discharge  Currently on CBI after recurrent hematuria on 2/24.  Will continue to monitor hemoglobin.          Problem/Plan - 2:  ·  Problem: Prostate cancer.   ·  Plan: Hx of Prostate CA, now s/p radiation therapy and radical prostatectomy  - Continue home Orgovyx - brought in from home  - Patient to f/u with his urologist at [facility] (Dr. Luis) outpatient for further management.     Problem/Plan - 3:  ·  Problem: Hypertension.   - BP stable  - Continue home Metoprolol ER 50mg PO daily w/ hold parameters.     Problem/Plan - 4:  ·  Problem: Need for prophylactic measure.   ·  Plan: SCDs b/l for dvt ppx; will hold off on A/C at this time 2/2 hematuria.     Problem/Plan - 5:   acute blood loss anemia:   s/p 1 unit blood transfusion on 2/19.  Continue to monitor hbg.

## 2023-02-25 LAB
ANION GAP SERPL CALC-SCNC: 6 MMOL/L — SIGNIFICANT CHANGE UP (ref 5–17)
BUN SERPL-MCNC: 12 MG/DL — SIGNIFICANT CHANGE UP (ref 7–23)
CALCIUM SERPL-MCNC: 8.2 MG/DL — LOW (ref 8.5–10.1)
CHLORIDE SERPL-SCNC: 101 MMOL/L — SIGNIFICANT CHANGE UP (ref 96–108)
CO2 SERPL-SCNC: 28 MMOL/L — SIGNIFICANT CHANGE UP (ref 22–31)
CREAT SERPL-MCNC: 0.62 MG/DL — SIGNIFICANT CHANGE UP (ref 0.5–1.3)
EGFR: 100 ML/MIN/1.73M2 — SIGNIFICANT CHANGE UP
GLUCOSE SERPL-MCNC: 96 MG/DL — SIGNIFICANT CHANGE UP (ref 70–99)
HCT VFR BLD CALC: 28.4 % — LOW (ref 39–50)
HGB BLD-MCNC: 9.3 G/DL — LOW (ref 13–17)
MCHC RBC-ENTMCNC: 29.5 PG — SIGNIFICANT CHANGE UP (ref 27–34)
MCHC RBC-ENTMCNC: 32.7 GM/DL — SIGNIFICANT CHANGE UP (ref 32–36)
MCV RBC AUTO: 90.2 FL — SIGNIFICANT CHANGE UP (ref 80–100)
NRBC # BLD: 0 /100 WBCS — SIGNIFICANT CHANGE UP (ref 0–0)
PLATELET # BLD AUTO: 261 K/UL — SIGNIFICANT CHANGE UP (ref 150–400)
POTASSIUM SERPL-MCNC: 3.9 MMOL/L — SIGNIFICANT CHANGE UP (ref 3.5–5.3)
POTASSIUM SERPL-SCNC: 3.9 MMOL/L — SIGNIFICANT CHANGE UP (ref 3.5–5.3)
RBC # BLD: 3.15 M/UL — LOW (ref 4.2–5.8)
RBC # FLD: 12.6 % — SIGNIFICANT CHANGE UP (ref 10.3–14.5)
SODIUM SERPL-SCNC: 135 MMOL/L — SIGNIFICANT CHANGE UP (ref 135–145)
WBC # BLD: 5.64 K/UL — SIGNIFICANT CHANGE UP (ref 3.8–10.5)
WBC # FLD AUTO: 5.64 K/UL — SIGNIFICANT CHANGE UP (ref 3.8–10.5)

## 2023-02-25 PROCEDURE — 99232 SBSQ HOSP IP/OBS MODERATE 35: CPT

## 2023-02-25 RX ORDER — ACETAMINOPHEN 500 MG
1000 TABLET ORAL ONCE
Refills: 0 | Status: COMPLETED | OUTPATIENT
Start: 2023-02-25 | End: 2023-02-25

## 2023-02-25 RX ADMIN — POLYETHYLENE GLYCOL 3350 17 GRAM(S): 17 POWDER, FOR SOLUTION ORAL at 12:36

## 2023-02-25 RX ADMIN — LORATADINE 10 MILLIGRAM(S): 10 TABLET ORAL at 12:36

## 2023-02-25 RX ADMIN — Medication 1 SPRAY(S): at 18:09

## 2023-02-25 RX ADMIN — AMINOCAPROIC ACID 500 MILLIGRAM(S): 500 TABLET ORAL at 05:23

## 2023-02-25 RX ADMIN — Medication 400 MILLIGRAM(S): at 10:12

## 2023-02-25 RX ADMIN — Medication 1 SPRAY(S): at 05:23

## 2023-02-25 RX ADMIN — Medication 1000 MILLIGRAM(S): at 11:12

## 2023-02-25 RX ADMIN — AMINOCAPROIC ACID 500 MILLIGRAM(S): 500 TABLET ORAL at 21:56

## 2023-02-25 RX ADMIN — AMINOCAPROIC ACID 500 MILLIGRAM(S): 500 TABLET ORAL at 13:43

## 2023-02-25 NOTE — PROGRESS NOTE ADULT - PROBLEM SELECTOR PLAN 2
Hx of Prostate CA, now s/p radiation therapy and radical prostatectomy  - Continue home Orgovyx - brought in from home  - Patient to f/u with his urologist at [facility] (Dr. Luis) outpatient for further management
Hx of Prostate CA, now s/p radiation therapy and radical prostatectomy  - Continue home Orgovyx - brought in from home  - Patient to f/u with [place]rologist [place] [facility] ([place]. Luis) outpatient for further management
Hx of Prostate CA, now s/p radiation therapy and radical prostatectomy  - Continue home Orgovyx - brought in from home  - Patient to f/u with his urologist at [facility] [place] (Dr. Luis) outpatient for further management
On androgen deprivation therapy s/p radical prostatectomy and RT
resolving, will continue to decrease cbi
Hx of Prostate CA, now s/p radiation therapy and radical prostatectomy  - Continue home Orgovyx - brought in from home  - Patient to f/u with his urologist at [facility] [place] (Dr. Luis) outpatient for further management
S/p radical prostatectomy and adjuvant RT on androgen deprivation therapy due to biochemical recurrence.
Hx of Prostate CA, now s/p radiation therapy and radical prostatectomy  - Continue home Orgovyx - brought in from home  - Patient to f[place] [facility] for further management
Hx of Prostate CA, now s/p radiation therapy and radical prostatectomy  - Continue home Orgovyx - brought in from home  - Patient to f/u with his urologist at [facility] (Dr. Luis) outpatient for further management
Hx of Prostate CA, now s/p radiation therapy and radical prostatectomy  - Continue home Orgovyx - brought in from home  - Patient to f/u with his urologist at [facility] (Dr. Luis) outpatient for further management

## 2023-02-25 NOTE — PROGRESS NOTE ADULT - SUBJECTIVE AND OBJECTIVE BOX
INTERVAL HPI/OVERNIGHT EVENTS: c/o lower abd pressure and leaking from urethra    MEDICATIONS  (STANDING):  aminocaproic acid Tablet 500 milliGRAM(s) Oral every 8 hours  fluticasone propionate 50 MICROgram(s)/spray Nasal Spray 1 Spray(s) Both Nostrils two times a day  lidocaine 2% Jelly 5 milliLiter(s) IntraUrethral once  lidocaine 2% Jelly 5 milliLiter(s) IntraUrethral once  loratadine 10 milliGRAM(s) Oral daily  metoprolol succinate ER 50 milliGRAM(s) Oral daily  Orgovyx (relugolix) 120mg tab 1 Tablet(s) 1 Tablet(s) Oral daily  polyethylene glycol 3350 17 Gram(s) Oral daily    MEDICATIONS  (PRN):  acetaminophen     Tablet .. 650 milliGRAM(s) Oral every 6 hours PRN Temp greater or equal to 38C (100.4F), Mild Pain (1 - 3)  aluminum hydroxide/magnesium hydroxide/simethicone Suspension 30 milliLiter(s) Oral every 4 hours PRN Dyspepsia  melatonin 3 milliGRAM(s) Oral at bedtime PRN Insomnia  ondansetron Injectable 4 milliGRAM(s) IV Push every 8 hours PRN Nausea and/or Vomiting        Vital Signs Last 24 Hrs  T(C): 36.7 (25 Feb 2023 04:25), Max: 37.1 (24 Feb 2023 19:31)  T(F): 98 (25 Feb 2023 04:25), Max: 98.8 (24 Feb 2023 19:31)  HR: 73 (25 Feb 2023 04:25) (73 - 91)  BP: 108/62 (25 Feb 2023 04:25) (108/62 - 129/74)  BP(mean): --  RR: 17 (25 Feb 2023 04:25) (17 - 18)  SpO2: 92% (25 Feb 2023 04:25) (92% - 97%)    Parameters below as of 25 Feb 2023 04:25  Patient On (Oxygen Delivery Method): room air        PHYSICAL EXAM:    ABDOMEN: soft NT  GENITALIA: urine light pink    LABS:                        9.3    5.64  )-----------( 261      ( 25 Feb 2023 06:20 )             28.4     02-25    135  |  101  |  12  ----------------------------<  96  3.9   |  28  |  0.62    Ca    8.2<L>      25 Feb 2023 06:20              RADIOLOGY & ADDITIONAL TESTS:

## 2023-02-25 NOTE — PROGRESS NOTE ADULT - SUBJECTIVE AND OBJECTIVE BOX
[facility]    Reina Rodriguez, Jeremy, Pina Wilson      [phone]    CHIEF COMPLAINT: Patient is a 72y old  Male who presents with a chief complaint of Hematuria/Urinary Retention (25 Feb 2023 09:09)      Follow Up: hematuria, s/p tavr, ppm    Interim history: The patient reports no new symptoms.  Denies chest discomfort and shortness of breath.  No new neurologic symptoms. Remains with bowers cbi and hematuria, and is frustrated      MEDICATIONS  (STANDING):  acetaminophen   IVPB .. 1000 milliGRAM(s) IV Intermittent once  aminocaproic acid Tablet 500 milliGRAM(s) Oral every 8 hours  fluticasone propionate 50 MICROgram(s)/spray Nasal Spray 1 Spray(s) Both Nostrils two times a day  lidocaine 2% Jelly 5 milliLiter(s) IntraUrethral once  lidocaine 2% Jelly 5 milliLiter(s) IntraUrethral once  loratadine 10 milliGRAM(s) Oral daily  metoprolol succinate ER 50 milliGRAM(s) Oral daily  Orgovyx (relugolix) 120mg tab 1 Tablet(s) 1 Tablet(s) Oral daily  polyethylene glycol 3350 17 Gram(s) Oral daily    MEDICATIONS  (PRN):  acetaminophen     Tablet .. 650 milliGRAM(s) Oral every 6 hours PRN Temp greater or equal to 38C (100.4F), Mild Pain (1 - 3)  aluminum hydroxide/magnesium hydroxide/simethicone Suspension 30 milliLiter(s) Oral every 4 hours PRN Dyspepsia  melatonin 3 milliGRAM(s) Oral at bedtime PRN Insomnia  ondansetron Injectable 4 milliGRAM(s) IV Push every 8 hours PRN Nausea and/or Vomiting      REVIEW OF SYSTEMS:  eye, ent, GI, , allergic, dermatologic, musculoskeletal and neurologic are negative except as described above    Vital Signs Last 24 Hrs  T(C): 36.7 (25 Feb 2023 04:25), Max: 37.1 (24 Feb 2023 19:31)  T(F): 98 (25 Feb 2023 04:25), Max: 98.8 (24 Feb 2023 19:31)  HR: 73 (25 Feb 2023 04:25) (73 - 91)  BP: 108/62 (25 Feb 2023 04:25) (108/62 - 129/74)  BP(mean): --  RR: 17 (25 Feb 2023 04:25) (17 - 18)  SpO2: 92% (25 Feb 2023 04:25) (92% - 97%)    Parameters below as of 25 Feb 2023 04:25  Patient On (Oxygen Delivery Method): room air        I&O's Summary    25 Feb 2023 07:01  -  25 Feb 2023 10:14  --------------------------------------------------------  IN: 0 mL / OUT: 2000 mL / NET: -2000 mL        Telemetry past 24h: off    PHYSICAL EXAM:    Constitutional: well-nourished, well-developed, NAD   HEENT:  MMM, sclerae anicteric, conjunctivae clear, no oral cyanosis.  Pulmonary: Non-labored, breath sounds are clear bilaterally, No wheezing, rales or rhonchi  Cardiovascular: Regular, S1 and S2.  1/6 sys murmur.  No rubs, gallops or clicks  Gastrointestinal: Bowel Sounds present, soft, nontender.   Lymph: No peripheral edema.   Neurological: Alert, no focal deficits  Skin: No rashes.  Psych:  Mood & affect appropriate    LABS: All Labs Reviewed:                        9.3    5.64  )-----------( 261      ( 25 Feb 2023 06:20 )             28.4                         8.7    7.34  )-----------( 256      ( 24 Feb 2023 07:00 )             26.5                         9.9    x     )-----------( x        ( 23 Feb 2023 16:18 )             30.9     25 Feb 2023 06:20    135    |  101    |  12     ----------------------------<  96     3.9     |  28     |  0.62   24 Feb 2023 07:00    135    |  103    |  12     ----------------------------<  98     4.1     |  25     |  0.64   23 Feb 2023 06:58    135    |  103    |  9      ----------------------------<  99     4.1     |  25     |  0.59     Ca    8.2        25 Feb 2023 06:20  Ca    8.2        24 Feb 2023 07:00  Ca    8.3        23 Feb 2023 06:58            Blood Culture:         RADIOLOGY:    EKG:    Echo:

## 2023-02-25 NOTE — PROGRESS NOTE ADULT - ASSESSMENT
72 year old male with AV disease, s/p TAVR in 12/20, with post op SSS requiring PPM, here with hematuria and radiation hemorrhagic cystitis. He called the office yesterday for evaluation.    VHD, SSS s/p PPM  - No sign of acute ischemia.   - No anginal complaints or volume overload  - He has minimal CAD based on cath in 2020.    - BP, HR stable and controlled  - continue with BB  - Monitor and replete Lytes. Keep K > 4 and Mg > 2  - On Orgovyx and Qtc is acceptable on EKG (441) 2/20    - hematuria persists and remains on CBI, po Amicar    - hgb up and down, transfuse per primary    - Antiplatelets on hold in the setting of hematuria.      - Will continue to follow.

## 2023-02-25 NOTE — PROGRESS NOTE ADULT - SUBJECTIVE AND OBJECTIVE BOX
CHIEF COMPLAINT/INTERVAL HISTORY:  Pt. seen and evaluated for hematuria and urinary retention.  Pt. is in no distress.  Bowers catheter bag with hematuria.  Hbg this morning stable.      REVIEW OF SYSTEMS:  No fever, CP, SOB, or abdominal pain    Vital Signs Last 24 Hrs  T(C): 36.7 (25 Feb 2023 04:25), Max: 37.1 (24 Feb 2023 19:31)  T(F): 98 (25 Feb 2023 04:25), Max: 98.8 (24 Feb 2023 19:31)  HR: 73 (25 Feb 2023 04:25) (73 - 91)  BP: 108/62 (25 Feb 2023 04:25) (108/62 - 129/74)  BP(mean): --  RR: 17 (25 Feb 2023 04:25) (17 - 18)  SpO2: 92% (25 Feb 2023 04:25) (92% - 97%)    Parameters below as of 25 Feb 2023 04:25  Patient On (Oxygen Delivery Method): room air        PHYSICAL EXAM:  GENERAL: NAD  HEENT: EOMI, hearing normal, conjunctiva and sclera clear  Chest: CTA bilaterally, no wheezing  CV: S1S2, RRR,   GI: soft, +BS, NT/ND  : +bowers catheter with hematuria  Musculoskeletal: no edema  Psychiatric: affect nL, mood nL  Skin: warm and dry    LABS:                        9.3    5.64  )-----------( 261      ( 25 Feb 2023 06:20 )             28.4     02-25    135  |  101  |  12  ----------------------------<  96  3.9   |  28  |  0.62    Ca    8.2<L>      25 Feb 2023 06:20

## 2023-02-25 NOTE — PROGRESS NOTE ADULT - ASSESSMENT
73 y/o M w/ PMHx of HTN,  AS s/p TAVR, ppm, prostate cancer s/p radical prostatectomy c/b radiation proctitis presents to ED for severe pain and urinary retention s/p cystoscopy. Admit for hematuria / urinary retention.      Problem/Plan - 1:  ·  Problem: Urinary retention with hematuria :   s/p RRP for Ca Prostate by Eusebio Martinez, XRT for recurrence, on Orgovyx  s/p negative cysto x2 by Dr. Luis at [facility] [place], last one 5 weeks ago  admitted for clot retention, hematuria likely due to radiation cystitis  bowers was discontinued, not being able to void and hematuria recur , bowers was placed back   called and discussed with Dr. Luis at [facility], ok for discharge even if the urine is light pink color.   Plan to d/c with bowers, discussed with nursing about teach patient catheter care and flushing/irrigation   taper amicar: amicar 500mg bid for 3 days , then 500mg once a day for 3 days upon discharge  Currently on CBI after recurrent hematuria on 2/24.  Will continue to monitor hemoglobin. Continue amicar 500mg PO Q8h         Problem/Plan - 2:  ·  Problem: Prostate cancer.   ·  Plan: Hx of Prostate CA, now s/p radiation therapy and radical prostatectomy  - Continue home Orgovyx - brought in from home  - Patient to f/u with his urologist at [facility] (Dr. Luis) outpatient for further management.     Problem/Plan - 3:  ·  Problem: Hypertension.   - BP stable  - Continue home Metoprolol ER 50mg PO daily w/ hold parameters.     Problem/Plan - 4:  ·  Problem: Need for prophylactic measure.   ·  Plan: SCDs b/l for dvt ppx; will hold off on A/C at this time 2/2 hematuria.     Problem/Plan - 5:   acute blood loss anemia:   s/p 1 unit blood transfusion on 2/19.  Continue to monitor hbg.

## 2023-02-26 LAB
ANION GAP SERPL CALC-SCNC: 8 MMOL/L — SIGNIFICANT CHANGE UP (ref 5–17)
BUN SERPL-MCNC: 9 MG/DL — SIGNIFICANT CHANGE UP (ref 7–23)
CALCIUM SERPL-MCNC: 8.2 MG/DL — LOW (ref 8.5–10.1)
CHLORIDE SERPL-SCNC: 98 MMOL/L — SIGNIFICANT CHANGE UP (ref 96–108)
CO2 SERPL-SCNC: 26 MMOL/L — SIGNIFICANT CHANGE UP (ref 22–31)
CREAT SERPL-MCNC: 0.65 MG/DL — SIGNIFICANT CHANGE UP (ref 0.5–1.3)
EGFR: 99 ML/MIN/1.73M2 — SIGNIFICANT CHANGE UP
GLUCOSE SERPL-MCNC: 100 MG/DL — HIGH (ref 70–99)
HCT VFR BLD CALC: 26.6 % — LOW (ref 39–50)
HGB BLD-MCNC: 8.6 G/DL — LOW (ref 13–17)
MCHC RBC-ENTMCNC: 28.5 PG — SIGNIFICANT CHANGE UP (ref 27–34)
MCHC RBC-ENTMCNC: 32.3 GM/DL — SIGNIFICANT CHANGE UP (ref 32–36)
MCV RBC AUTO: 88.1 FL — SIGNIFICANT CHANGE UP (ref 80–100)
NRBC # BLD: 0 /100 WBCS — SIGNIFICANT CHANGE UP (ref 0–0)
PLATELET # BLD AUTO: 258 K/UL — SIGNIFICANT CHANGE UP (ref 150–400)
POTASSIUM SERPL-MCNC: 4.1 MMOL/L — SIGNIFICANT CHANGE UP (ref 3.5–5.3)
POTASSIUM SERPL-SCNC: 4.1 MMOL/L — SIGNIFICANT CHANGE UP (ref 3.5–5.3)
RBC # BLD: 3.02 M/UL — LOW (ref 4.2–5.8)
RBC # FLD: 12.8 % — SIGNIFICANT CHANGE UP (ref 10.3–14.5)
SODIUM SERPL-SCNC: 132 MMOL/L — LOW (ref 135–145)
WBC # BLD: 7.48 K/UL — SIGNIFICANT CHANGE UP (ref 3.8–10.5)
WBC # FLD AUTO: 7.48 K/UL — SIGNIFICANT CHANGE UP (ref 3.8–10.5)

## 2023-02-26 PROCEDURE — 99232 SBSQ HOSP IP/OBS MODERATE 35: CPT

## 2023-02-26 RX ORDER — ACETAMINOPHEN 500 MG
1000 TABLET ORAL ONCE
Refills: 0 | Status: COMPLETED | OUTPATIENT
Start: 2023-02-26 | End: 2023-02-26

## 2023-02-26 RX ORDER — MULTIVIT-MIN/FERROUS GLUCONATE 9 MG/15 ML
1 LIQUID (ML) ORAL DAILY
Refills: 0 | Status: DISCONTINUED | OUTPATIENT
Start: 2023-02-26 | End: 2023-03-02

## 2023-02-26 RX ADMIN — Medication 1000 MILLIGRAM(S): at 23:45

## 2023-02-26 RX ADMIN — Medication 50 MILLIGRAM(S): at 05:27

## 2023-02-26 RX ADMIN — Medication 1 SPRAY(S): at 17:39

## 2023-02-26 RX ADMIN — AMINOCAPROIC ACID 500 MILLIGRAM(S): 500 TABLET ORAL at 05:27

## 2023-02-26 RX ADMIN — Medication 650 MILLIGRAM(S): at 03:00

## 2023-02-26 RX ADMIN — LORATADINE 10 MILLIGRAM(S): 10 TABLET ORAL at 11:43

## 2023-02-26 RX ADMIN — Medication 1 SPRAY(S): at 05:27

## 2023-02-26 RX ADMIN — AMINOCAPROIC ACID 500 MILLIGRAM(S): 500 TABLET ORAL at 14:15

## 2023-02-26 RX ADMIN — Medication 400 MILLIGRAM(S): at 23:30

## 2023-02-26 RX ADMIN — AMINOCAPROIC ACID 500 MILLIGRAM(S): 500 TABLET ORAL at 21:51

## 2023-02-26 RX ADMIN — Medication 1 TABLET(S): at 21:51

## 2023-02-26 RX ADMIN — Medication 650 MILLIGRAM(S): at 02:15

## 2023-02-26 RX ADMIN — POLYETHYLENE GLYCOL 3350 17 GRAM(S): 17 POWDER, FOR SOLUTION ORAL at 11:43

## 2023-02-26 NOTE — PROGRESS NOTE ADULT - SUBJECTIVE AND OBJECTIVE BOX
[facility]    Reina Rodriguez, Jeremy, Pina Wilson      [phone]    CHIEF COMPLAINT: Patient is a 72y old  Male who presents with a chief complaint of Hematuria/Urinary Retention (26 Feb 2023 09:33)      Follow Up: hx tavr, ppm, hematuria    Interim history: The patient reports no new symptoms.  Denies chest discomfort and shortness of breath.  No abdominal pain.  No new neurologic symptoms. Persistent hematuria      MEDICATIONS  (STANDING):  aminocaproic acid Tablet 500 milliGRAM(s) Oral every 8 hours  fluticasone propionate 50 MICROgram(s)/spray Nasal Spray 1 Spray(s) Both Nostrils two times a day  lidocaine 2% Jelly 5 milliLiter(s) IntraUrethral once  lidocaine 2% Jelly 5 milliLiter(s) IntraUrethral once  loratadine 10 milliGRAM(s) Oral daily  metoprolol succinate ER 50 milliGRAM(s) Oral daily  Orgovyx (relugolix) 120mg tab 1 Tablet(s) 1 Tablet(s) Oral daily  polyethylene glycol 3350 17 Gram(s) Oral daily    MEDICATIONS  (PRN):  acetaminophen     Tablet .. 650 milliGRAM(s) Oral every 6 hours PRN Temp greater or equal to 38C (100.4F), Mild Pain (1 - 3)  aluminum hydroxide/magnesium hydroxide/simethicone Suspension 30 milliLiter(s) Oral every 4 hours PRN Dyspepsia  melatonin 3 milliGRAM(s) Oral at bedtime PRN Insomnia  ondansetron Injectable 4 milliGRAM(s) IV Push every 8 hours PRN Nausea and/or Vomiting      REVIEW OF SYSTEMS:  eye, ent, GI, , allergic, dermatologic, musculoskeletal and neurologic are negative except as described above    Vital Signs Last 24 Hrs  T(C): 36.9 (26 Feb 2023 05:11), Max: 37 (25 Feb 2023 20:47)  T(F): 98.4 (26 Feb 2023 05:11), Max: 98.6 (25 Feb 2023 20:47)  HR: 81 (26 Feb 2023 05:11) (81 - 93)  BP: 134/62 (26 Feb 2023 05:11) (123/73 - 134/62)  BP(mean): --  RR: 18 (26 Feb 2023 05:11) (18 - 18)  SpO2: 95% (26 Feb 2023 05:11) (94% - 97%)    Parameters below as of 26 Feb 2023 05:11  Patient On (Oxygen Delivery Method): room air        I&O's Summary    25 Feb 2023 07:01  -  26 Feb 2023 07:00  --------------------------------------------------------  IN: 880 mL / OUT: 39632 mL / NET: -75114 mL        Telemetry past 24h: off    PHYSICAL EXAM:    Constitutional: well-nourished, well-developed, NAD   HEENT:  MMM, sclerae anicteric, conjunctivae clear, no oral cyanosis.  Pulmonary: Non-labored, breath sounds are clear bilaterally, No wheezing, rales or rhonchi  Cardiovascular: Regular, S1 and S2.  1/6 sys murmur.  No rubs, gallops or clicks  Gastrointestinal: Bowel Sounds present, soft, nontender.   Lymph: No peripheral edema.   Neurological: Alert, no focal deficits  Skin: No rashes.  Psych:  Mood & affect appropriate    LABS: All Labs Reviewed:                        8.6    7.48  )-----------( 258      ( 26 Feb 2023 07:05 )             26.6                         9.3    5.64  )-----------( 261      ( 25 Feb 2023 06:20 )             28.4                         8.7    7.34  )-----------( 256      ( 24 Feb 2023 07:00 )             26.5     26 Feb 2023 07:05    132    |  98     |  9      ----------------------------<  100    4.1     |  26     |  0.65   25 Feb 2023 06:20    135    |  101    |  12     ----------------------------<  96     3.9     |  28     |  0.62   24 Feb 2023 07:00    135    |  103    |  12     ----------------------------<  98     4.1     |  25     |  0.64     Ca    8.2        26 Feb 2023 07:05  Ca    8.2        25 Feb 2023 06:20  Ca    8.2        24 Feb 2023 07:00            Blood Culture:         RADIOLOGY:    EKG:    Echo:

## 2023-02-26 NOTE — PROGRESS NOTE ADULT - SUBJECTIVE AND OBJECTIVE BOX
CHIEF COMPLAINT/INTERVAL HISTORY:  Pt. seen and evaluated for hematuria and urinary retention.  Pt. is in no distress.  Reports having intermittent discomfort every couple hours from bowers catheter.  Bowers currently with very light pink urine.      REVIEW OF SYSTEMS:  No fever, CP, SOB, or abdominal pain    Vital Signs Last 24 Hrs  T(C): 36.9 (26 Feb 2023 05:11), Max: 37 (25 Feb 2023 20:47)  T(F): 98.4 (26 Feb 2023 05:11), Max: 98.6 (25 Feb 2023 20:47)  HR: 81 (26 Feb 2023 05:11) (81 - 93)  BP: 134/62 (26 Feb 2023 05:11) (123/73 - 134/62)  BP(mean): --  RR: 18 (26 Feb 2023 05:11) (18 - 18)  SpO2: 95% (26 Feb 2023 05:11) (94% - 97%)    Parameters below as of 26 Feb 2023 05:11  Patient On (Oxygen Delivery Method): room air        PHYSICAL EXAM:  GENERAL: NAD  HEENT: EOMI, hearing normal, conjunctiva and sclera clear  Chest: CTA bilaterally, no wheezing  CV: S1S2, RRR,   GI: soft, +BS, NT/ND  : +bowers catheter with very light pink urine  Musculoskeletal: no edema  Psychiatric: affect nL, mood nL  Skin: warm and dry    LABS:                        8.6    7.48  )-----------( 258      ( 26 Feb 2023 07:05 )             26.6     02-26    132<L>  |  98  |  9   ----------------------------<  100<H>  4.1   |  26  |  0.65    Ca    8.2<L>      26 Feb 2023 07:05

## 2023-02-26 NOTE — PROGRESS NOTE ADULT - ASSESSMENT
71 y/o M w/ PMHx of HTN,  AS s/p TAVR, ppm, prostate cancer s/p radical prostatectomy c/b radiation proctitis presents to ED for severe pain and urinary retention s/p cystoscopy. Admit for hematuria / urinary retention.      Problem/Plan - 1:  ·  Problem: Urinary retention with hematuria :   s/p RRP for Ca Prostate by Eusebio Martinez, XRT for recurrence, on Orgovyx  s/p negative cy[place] x2 [place] Dr. Luis at [facility], last one 5 weeks ago  admitted for clot retention, hematuria likely due to radiation cystitis  bowers was discontinued, not being able to void and hematuria recur , bowers was placed back   called and discussed with Dr. Luis at [facility], ok for discharge even if the urine is light pink color.   Plan to d/c with bowers, discussed with nursing about teach patient catheter care and flushing/irrigation   taper amicar: amicar 500mg bid for 3 days , then 500mg once a day for 3 days upon discharge  Currently on CBI after recurrent hematuria on 2/24.  Will continue to monitor hemoglobin. Continue amicar 500mg PO Q8h         Problem/Plan - 2:  ·  Problem: Prostate cancer.   ·  Plan: Hx of Prostate CA, now s/p radiation therapy and radical prostatectomy  - Continue home Orgovyx - brought in from home  - Patient to f/u with his urologist at [facility] (Dr. Luis) outpatient for further management.     Problem/Plan - 3:  ·  Problem: Hypertension.   - BP stable  - Continue home Metoprolol ER 50mg PO daily w/ hold parameters.     Problem/Plan - 4:  ·  Problem: Need for prophylactic measure.   ·  Plan: SCDs b/l for dvt ppx; will hold off on A/C at this time 2/2 hematuria.     Problem/Plan - 5:   acute blood loss anemia:   s/p 1 unit blood transfusion on 2/19.  Continue to monitor hbg.

## 2023-02-26 NOTE — PROGRESS NOTE ADULT - SUBJECTIVE AND OBJECTIVE BOX
INTERVAL HPI/OVERNIGHT EVENTS: c/o occasional spasms, afebrile, no clots    MEDICATIONS  (STANDING):  aminocaproic acid Tablet 500 milliGRAM(s) Oral every 8 hours  fluticasone propionate 50 MICROgram(s)/spray Nasal Spray 1 Spray(s) Both Nostrils two times a day  lidocaine 2% Jelly 5 milliLiter(s) IntraUrethral once  lidocaine 2% Jelly 5 milliLiter(s) IntraUrethral once  loratadine 10 milliGRAM(s) Oral daily  metoprolol succinate ER 50 milliGRAM(s) Oral daily  Orgovyx (relugolix) 120mg tab 1 Tablet(s) 1 Tablet(s) Oral daily  polyethylene glycol 3350 17 Gram(s) Oral daily    MEDICATIONS  (PRN):  acetaminophen     Tablet .. 650 milliGRAM(s) Oral every 6 hours PRN Temp greater or equal to 38C (100.4F), Mild Pain (1 - 3)  aluminum hydroxide/magnesium hydroxide/simethicone Suspension 30 milliLiter(s) Oral every 4 hours PRN Dyspepsia  melatonin 3 milliGRAM(s) Oral at bedtime PRN Insomnia  ondansetron Injectable 4 milliGRAM(s) IV Push every 8 hours PRN Nausea and/or Vomiting        Vital Signs Last 24 Hrs  T(C): 36.9 (26 Feb 2023 05:11), Max: 37 (25 Feb 2023 20:47)  T(F): 98.4 (26 Feb 2023 05:11), Max: 98.6 (25 Feb 2023 20:47)  HR: 81 (26 Feb 2023 05:11) (81 - 93)  BP: 134/62 (26 Feb 2023 05:11) (123/73 - 134/62)  BP(mean): --  RR: 18 (26 Feb 2023 05:11) (18 - 18)  SpO2: 95% (26 Feb 2023 05:11) (94% - 97%)    Parameters below as of 26 Feb 2023 05:11  Patient On (Oxygen Delivery Method): room air        PHYSICAL EXAM:      GENITALIA: bowers in proper position, efflux clear on cbi    LABS:                        8.6    7.48  )-----------( 258      ( 26 Feb 2023 07:05 )             26.6     02-26    132<L>  |  98  |  9   ----------------------------<  100<H>  4.1   |  26  |  0.65    Ca    8.2<L>      26 Feb 2023 07:05              RADIOLOGY & ADDITIONAL TESTS:

## 2023-02-26 NOTE — PROGRESS NOTE ADULT - ASSESSMENT
72 year old male with AV disease, s/p TAVR in 12/20, with post op SSS requiring PPM, here with hematuria and radiation hemorrhagic cystitis. He called the office yesterday for evaluation.    VHD, SSS s/p PPM  - No sign of acute ischemia.   - No anginal complaints or volume overload  - He has minimal CAD based on cath in 2020.    - BP, HR stable and controlled  - continue with BB  - Monitor and replete Lytes. Keep K > 4 and Mg > 2  - On Orgovyx and Qtc is acceptable on EKG (441) 2/20    - hematuria persists and remains on CBI, po Amicar    - hgb up and down, transfuse per primary    - had been off antiplatelets for well over a year before this event, no need to resume nito in light of the ongoing issues with hematuria     - Will continue to follow.

## 2023-02-26 NOTE — PROGRESS NOTE ADULT - PROBLEM SELECTOR PROBLEM 2
Gross hematuria
Prostate cancer
Gross hematuria
Prostate cancer

## 2023-02-27 LAB
ANION GAP SERPL CALC-SCNC: 8 MMOL/L — SIGNIFICANT CHANGE UP (ref 5–17)
BUN SERPL-MCNC: 9 MG/DL — SIGNIFICANT CHANGE UP (ref 7–23)
CALCIUM SERPL-MCNC: 8 MG/DL — LOW (ref 8.5–10.1)
CHLORIDE SERPL-SCNC: 96 MMOL/L — SIGNIFICANT CHANGE UP (ref 96–108)
CO2 SERPL-SCNC: 23 MMOL/L — SIGNIFICANT CHANGE UP (ref 22–31)
CREAT SERPL-MCNC: 0.69 MG/DL — SIGNIFICANT CHANGE UP (ref 0.5–1.3)
EGFR: 97 ML/MIN/1.73M2 — SIGNIFICANT CHANGE UP
GLUCOSE SERPL-MCNC: 109 MG/DL — HIGH (ref 70–99)
HCT VFR BLD CALC: 22.8 % — LOW (ref 39–50)
HGB BLD-MCNC: 7.6 G/DL — LOW (ref 13–17)
MCHC RBC-ENTMCNC: 28.8 PG — SIGNIFICANT CHANGE UP (ref 27–34)
MCHC RBC-ENTMCNC: 33.3 GM/DL — SIGNIFICANT CHANGE UP (ref 32–36)
MCV RBC AUTO: 86.4 FL — SIGNIFICANT CHANGE UP (ref 80–100)
NRBC # BLD: 0 /100 WBCS — SIGNIFICANT CHANGE UP (ref 0–0)
PLATELET # BLD AUTO: 266 K/UL — SIGNIFICANT CHANGE UP (ref 150–400)
POTASSIUM SERPL-MCNC: 3.8 MMOL/L — SIGNIFICANT CHANGE UP (ref 3.5–5.3)
POTASSIUM SERPL-SCNC: 3.8 MMOL/L — SIGNIFICANT CHANGE UP (ref 3.5–5.3)
RBC # BLD: 2.64 M/UL — LOW (ref 4.2–5.8)
RBC # FLD: 12.9 % — SIGNIFICANT CHANGE UP (ref 10.3–14.5)
SODIUM SERPL-SCNC: 127 MMOL/L — LOW (ref 135–145)
WBC # BLD: 7.29 K/UL — SIGNIFICANT CHANGE UP (ref 3.8–10.5)
WBC # FLD AUTO: 7.29 K/UL — SIGNIFICANT CHANGE UP (ref 3.8–10.5)

## 2023-02-27 PROCEDURE — 99233 SBSQ HOSP IP/OBS HIGH 50: CPT

## 2023-02-27 PROCEDURE — 99232 SBSQ HOSP IP/OBS MODERATE 35: CPT

## 2023-02-27 PROCEDURE — 51702 INSERT TEMP BLADDER CATH: CPT

## 2023-02-27 RX ORDER — AMINOCAPROIC ACID 500 MG/1
500 TABLET ORAL
Refills: 0 | Status: DISCONTINUED | OUTPATIENT
Start: 2023-02-27 | End: 2023-03-02

## 2023-02-27 RX ORDER — MORPHINE SULFATE 50 MG/1
1 CAPSULE, EXTENDED RELEASE ORAL EVERY 12 HOURS
Refills: 0 | Status: DISCONTINUED | OUTPATIENT
Start: 2023-02-27 | End: 2023-02-28

## 2023-02-27 RX ORDER — LIDOCAINE HCL 20 MG/ML
10 VIAL (ML) INJECTION ONCE
Refills: 0 | Status: DISCONTINUED | OUTPATIENT
Start: 2023-02-27 | End: 2023-03-07

## 2023-02-27 RX ORDER — ACETAMINOPHEN 500 MG
1000 TABLET ORAL ONCE
Refills: 0 | Status: COMPLETED | OUTPATIENT
Start: 2023-02-27 | End: 2023-02-27

## 2023-02-27 RX ORDER — UREA 15 G
15 POWDER IN PACKET (EA) ORAL DAILY
Refills: 0 | Status: DISCONTINUED | OUTPATIENT
Start: 2023-02-27 | End: 2023-02-28

## 2023-02-27 RX ORDER — HYDROMORPHONE HYDROCHLORIDE 2 MG/ML
0.25 INJECTION INTRAMUSCULAR; INTRAVENOUS; SUBCUTANEOUS ONCE
Refills: 0 | Status: DISCONTINUED | OUTPATIENT
Start: 2023-02-27 | End: 2023-02-27

## 2023-02-27 RX ORDER — HYDROMORPHONE HYDROCHLORIDE 2 MG/ML
0.5 INJECTION INTRAMUSCULAR; INTRAVENOUS; SUBCUTANEOUS ONCE
Refills: 0 | Status: DISCONTINUED | OUTPATIENT
Start: 2023-02-27 | End: 2023-02-27

## 2023-02-27 RX ADMIN — HYDROMORPHONE HYDROCHLORIDE 0.5 MILLIGRAM(S): 2 INJECTION INTRAMUSCULAR; INTRAVENOUS; SUBCUTANEOUS at 19:50

## 2023-02-27 RX ADMIN — HYDROMORPHONE HYDROCHLORIDE 0.5 MILLIGRAM(S): 2 INJECTION INTRAMUSCULAR; INTRAVENOUS; SUBCUTANEOUS at 20:00

## 2023-02-27 RX ADMIN — HYDROMORPHONE HYDROCHLORIDE 0.25 MILLIGRAM(S): 2 INJECTION INTRAMUSCULAR; INTRAVENOUS; SUBCUTANEOUS at 22:15

## 2023-02-27 RX ADMIN — MORPHINE SULFATE 1 MILLIGRAM(S): 50 CAPSULE, EXTENDED RELEASE ORAL at 18:08

## 2023-02-27 RX ADMIN — AMINOCAPROIC ACID 500 MILLIGRAM(S): 500 TABLET ORAL at 20:54

## 2023-02-27 RX ADMIN — MORPHINE SULFATE 1 MILLIGRAM(S): 50 CAPSULE, EXTENDED RELEASE ORAL at 17:53

## 2023-02-27 RX ADMIN — Medication 1000 MILLIGRAM(S): at 19:02

## 2023-02-27 RX ADMIN — Medication 400 MILLIGRAM(S): at 18:47

## 2023-02-27 RX ADMIN — AMINOCAPROIC ACID 500 MILLIGRAM(S): 500 TABLET ORAL at 05:57

## 2023-02-27 RX ADMIN — HYDROMORPHONE HYDROCHLORIDE 0.25 MILLIGRAM(S): 2 INJECTION INTRAMUSCULAR; INTRAVENOUS; SUBCUTANEOUS at 21:57

## 2023-02-27 RX ADMIN — Medication 1 TABLET(S): at 12:35

## 2023-02-27 RX ADMIN — Medication 50 MILLIGRAM(S): at 05:57

## 2023-02-27 RX ADMIN — LORATADINE 10 MILLIGRAM(S): 10 TABLET ORAL at 12:36

## 2023-02-27 RX ADMIN — POLYETHYLENE GLYCOL 3350 17 GRAM(S): 17 POWDER, FOR SOLUTION ORAL at 12:36

## 2023-02-27 RX ADMIN — Medication 400 MILLIGRAM(S): at 10:01

## 2023-02-27 RX ADMIN — Medication 1 SPRAY(S): at 05:57

## 2023-02-27 RX ADMIN — Medication 1000 MILLIGRAM(S): at 10:16

## 2023-02-27 RX ADMIN — Medication 1 SPRAY(S): at 17:53

## 2023-02-27 NOTE — PROGRESS NOTE ADULT - SUBJECTIVE AND OBJECTIVE BOX
CHIEF COMPLAINT/INTERVAL HISTORY:  Pt. seen and evaluated for hematuria and urinary retention.  Pt. is having significant pelvic pain.  +fruit punch colored urine.  Multiple attempts at irrigation x 4.  Pt. requesting IV tylenol.      REVIEW OF SYSTEMS:  No fever, CP, or SOB.      Vital Signs Last 24 Hrs  T(C): 36.6 (27 Feb 2023 05:28), Max: 36.7 (26 Feb 2023 20:56)  T(F): 97.9 (27 Feb 2023 05:28), Max: 98.1 (26 Feb 2023 20:56)  HR: 79 (27 Feb 2023 05:28) (70 - 89)  BP: 157/64 (27 Feb 2023 05:28) (114/73 - 157/64)  BP(mean): --  RR: 18 (27 Feb 2023 05:28) (18 - 18)  SpO2: 98% (27 Feb 2023 05:28) (94% - 98%)    Parameters below as of 27 Feb 2023 05:28  Patient On (Oxygen Delivery Method): room air        PHYSICAL EXAM:  GENERAL: moderate distress 2/2 pain  HEENT: EOMI, hearing normal, conjunctiva and sclera clear  Chest: CTA bilaterally, no wheezing  CV: S1S2, RRR,   GI: soft, +BS, ND, lower quadrant tenderness  :  +bowers catheter with fruit punch colored urine  Musculoskeletal: no edema  Psychiatric: affect nL, mood nL  Skin: warm and dry    LABS:                        8.6    7.48  )-----------( 258      ( 26 Feb 2023 07:05 )             26.6     02-26    132<L>  |  98  |  9   ----------------------------<  100<H>  4.1   |  26  |  0.65    Ca    8.2<L>      26 Feb 2023 07:05

## 2023-02-27 NOTE — PROCEDURE NOTE - NSINDICATIONS_GEN_A_CORE
continuous bladder irrigation
continuous bladder irrigation/urinry obstruction or retention
continuous bladder irrigation/urinry obstruction or retention
pain/leaking/continuous bladder irrigation/other
bladder distention/continuous bladder irrigation/urinry obstruction or retention

## 2023-02-27 NOTE — PROCEDURE NOTE - NSURITECHNIQUE_GU_A_CORE
Proper hand hygiene was performed/Sterile gloves were worn for the duration of the procedure/A sterile drape was used to cover all adjacent areas/The catheter was appropriately lubricated/The catheter was secured with a securement device (e.g. StatLock)
Proper hand hygiene was performed/Sterile gloves were worn for the duration of the procedure/A sterile drape was used to cover all adjacent areas/The catheter was appropriately lubricated/The catheter was secured with a securement device (e.g. StatLock)/The urinary drainage system is closed at the end of the procedure/The collection bag is below the level of the patient and urinary bladder
Proper hand hygiene was performed/Sterile gloves were worn for the duration of the procedure/A sterile drape was used to cover all adjacent areas/The site was cleaned with soap/water and sterile solution (betadine)/The catheter was appropriately lubricated/The catheter was secured with a securement device (e.g. StatLock)/The urinary drainage system is closed at the end of the procedure/The collection bag is below the level of the patient and urinary bladder/All applicable medical record documentation is completed
Proper hand hygiene was performed/A sterile drape was used to cover all adjacent areas/The catheter was appropriately lubricated
Proper hand hygiene was performed/Sterile gloves were worn for the duration of the procedure/A sterile drape was used to cover all adjacent areas/The catheter was appropriately lubricated/The catheter was secured with a securement device (e.g. StatLock)/The urinary drainage system is closed at the end of the procedure/The collection bag is below the level of the patient and urinary bladder

## 2023-02-27 NOTE — PROCEDURE NOTE - NSFINDINGS_GEN_A_CORE
hematuria/positive return of urine in the collection bag
pink urine
hematuria
hematuria
hematuria/positive return of urine in the collection bag

## 2023-02-27 NOTE — PROCEDURE NOTE - NSSITEPREP_SKIN_A_CORE
povidone iodine (if allergic to chlorhexidine)

## 2023-02-27 NOTE — PROCEDURE NOTE - NSICDXPROCEDURE_GEN_ALL_CORE_FT
PROCEDURES:  Continuous bladder irrigation 20-Feb-2023 03:12:37  Kristin Gonsales  
PROCEDURES:  Continuous irrigation of bladder using 3-way Gr catheter 27-Feb-2023 23:12:31  Vera Reeves  Simple replacement of bladder catheter 27-Feb-2023 23:13:24  Vera Reeves

## 2023-02-27 NOTE — PROGRESS NOTE ADULT - ASSESSMENT
72 year old male with AV disease, s/p TAVR in 12/20, with post op SSS requiring PPM, here with hematuria and radiation hemorrhagic cystitis. He called the office yesterday for evaluation.    VHD, SSS s/p PPM  - No sign of acute ischemia.   - No anginal complaints or volume overload  - He has minimal CAD based on cath in 2020.    - BP, HR stable and controlled  - continue with BB  - Monitor and replete Lytes. Keep K > 4 and Mg > 2  - On Orgovyx and Qtc is acceptable on EKG (441) 2/20    - + hematuria, CBI ongoing, urology following, on po Amicar    - hgb up and down, transfuse per primary    - had been off antiplatelets for well over a year before this event, no need to resume nito in light of the ongoing issues with hematuria     - Will continue to follow.    Jenny White, [facility]  Nurse Practitioner - Cardiology   Spectra #[phone]/ [phone]

## 2023-02-27 NOTE — CONSULT NOTE ADULT - ASSESSMENT
Hyponatremia Hyponatremia  Hematuria  HTN  Anemia  Prostate Caner S/P Prostatectomy and radiation    -Hyponatremia likely multifactoria, decreased solute intake + increased solvent intake + component of SIADH  -Urine lytes will be unrevealing Patient on CBI  -Take of salt restriction  -PRBCs will help with hyponatremia given osm of blood  -Add Ure-Na to give solute  -Will not fluid restrict at this time, as he drinks to help augment urine output to help with hematuria, but maybe necessary   -Avoid NSAIDs for pain, can worsen hyponatremia    d/w primary

## 2023-02-27 NOTE — PROGRESS NOTE ADULT - SUBJECTIVE AND OBJECTIVE BOX
[facility] -- Michael Fernandez,  Reina, Steve Luna Savella, [place]  Office # [phone]    Follow Up:  VHD, ppm, hematuria    Subjective/Observations: Patient seen and examined, awake, alert,  c/o pressure, bladder pain, + hemuturia in bowers, CBI ongoing  denies chest pain, dyspnea, palpitations or dizziness. Tolerating room air.      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      HTN (hypertension)      Aortic stenosis      Rectal bleeding  last hospitalization 10/6/20 2/2 radiation proctitis      [place] (hard of hearing)      H/O prostatectomy  2015      S/P T&amp;A (status post tonsillectomy and adenoidectomy)  child        MEDICATIONS  (STANDING):  aminocaproic acid Tablet 500 milliGRAM(s) Oral <User Schedule>  fluticasone propionate 50 MICROgram(s)/spray Nasal Spray 1 Spray(s) Both Nostrils two times a day  lidocaine 2% Jelly 5 milliLiter(s) IntraUrethral once  lidocaine 2% Jelly 5 milliLiter(s) IntraUrethral once  loratadine 10 milliGRAM(s) Oral daily  metoprolol succinate ER 50 milliGRAM(s) Oral daily  multivitamin/minerals 1 Tablet(s) Oral daily  Orgovyx (relugolix) 120mg tab 1 Tablet(s) 1 Tablet(s) Oral daily  polyethylene glycol 3350 17 Gram(s) Oral daily    MEDICATIONS  (PRN):  acetaminophen     Tablet .. 650 milliGRAM(s) Oral every 6 hours PRN Temp greater or equal to 38C (100.4F), Mild Pain (1 - 3)  aluminum hydroxide/magnesium hydroxide/simethicone Suspension 30 milliLiter(s) Oral every 4 hours PRN Dyspepsia  melatonin 3 milliGRAM(s) Oral at bedtime PRN Insomnia  ondansetron Injectable 4 milliGRAM(s) IV Push every 8 hours PRN Nausea and/or Vomiting    Allergies    penicillin (Diarrhea; Pruritus; Hives)  penicillin V potassium (Rash)    Intolerances    codeine (Nausea)    Vital Signs Last 24 Hrs  T(C): 36.6 (27 Feb 2023 05:28), Max: 36.7 (26 Feb 2023 20:56)  T(F): 97.9 (27 Feb 2023 05:28), Max: 98.1 (26 Feb 2023 20:56)  HR: 79 (27 Feb 2023 05:28) (70 - 89)  BP: 157/64 (27 Feb 2023 05:28) (114/73 - 157/64)  BP(mean): --  RR: 18 (27 Feb 2023 05:28) (18 - 18)  SpO2: 98% (27 Feb 2023 05:28) (94% - 98%)    Parameters below as of 27 Feb 2023 05:28  Patient On (Oxygen Delivery Method): room air      I&O's Summary    26 Feb 2023 07:01  -  27 Feb 2023 07:00  --------------------------------------------------------  IN: 300 mL / OUT: 27432 mL / NET: -03821 mL    27 Feb 2023 07:01  -  27 Feb 2023 11:14  --------------------------------------------------------  IN: 0 mL / OUT: 3000 mL / NET: -3000 mL        TELE: Not on telemetry   PHYSICAL EXAM:  Constitutional: NAD, awake and alert, well-developed  HEENT: Moist Mucous Membranes, Anicteric  Pulmonary: Non-labored, breath sounds are clear bilaterally, No wheezing, rales or rhonchi  Cardiovascular: Regular, S1 and S2, + murmurs, rubs, gallops or clicks  Gastrointestinal: Bowel Sounds present, soft, nontender.   : + hematuria on CBI   Lymph: No peripheral edema. No lymphadenopathy.  Skin: No visible rashes or ulcers.  Psych:  Mood & affect appropriate  LABS: All Labs Reviewed:                        7.6    7.29  )-----------( 266      ( 27 Feb 2023 10:20 )             22.8                         8.6    7.48  )-----------( 258      ( 26 Feb 2023 07:05 )             26.6                         9.3    5.64  )-----------( 261      ( 25 Feb 2023 06:20 )             28.4     27 Feb 2023 10:20    127    |  96     |  9      ----------------------------<  109    3.8     |  23     |  0.69   26 Feb 2023 07:05    132    |  98     |  9      ----------------------------<  100    4.1     |  26     |  0.65   25 Feb 2023 06:20    135    |  101    |  12     ----------------------------<  96     3.9     |  28     |  0.62     Ca    8.0        27 Feb 2023 10:20  Ca    8.2        26 Feb 2023 07:05  Ca    8.2        25 Feb 2023 06:20            12 Lead ECG:   Ventricular Rate 81 BPM    Atrial Rate 81 BPM    P-R Interval 198 ms    QRS Duration 128 ms    Q-T Interval 380 ms    QTC Calculation(Bazett) 441 ms    P Axis 12 degrees    R Axis 76 degrees    T Axis 18 degrees    Diagnosis Line Normal sinus rhythm  Right bundle branch block  Abnormal ECG  Confirmed by eldon Heller ([id number]) on 2/20/2023 1:36:02 PM (02-20-23 @ 10:41)      Patient name: HERBIE KERR  YOB: 1950   Age: 70 (M)   MR#: [id number]  Study Date: 2/4/2021  Location: O/PSonographer: Ramona Horn RDCS  Study quality: Technically good  Referring Physician: Rich Quesada MD  Blood Pressure: 168/87 mmHg  Height: 178 cm  Weight: 91 kg  BSA: 2.1 m2  Heart Rate: 76 mmHg  ------------------------------------------------------------------------  PROCEDURE: Transthoracic echocardiogram with 2-D, M-Mode  and complete spectral and color flow Doppler.  INDICATION: Nonrheumatic aortic (valve) stenosis (I35.0)  ------------------------------------------------------------------------  MEASUREMENTS: (See below)  ------------------------------------------------------------------------  OBSERVATIONS:  Mitral Valve: There is mitral annular calcification with  calcification on the anterior mitral leaflet. Mild mitral  regurgitation.  Aortic Root: Normal aortic root size. (Ao: 3.4 cm at the  sinuses of Valsalva).  Aortic Valve: #26mm Evolut Pro+ THV. The valve is well  seated with normal function.  The peak/mean gradients=  13/7mmHg with a DVI= 0.63. Peak transaortic valve gradient  equals 13 mm Hg, mean transaortic valve gradient equals 7  mm Hg, aortic valve velocity time integral equals 35 cm,  estimated aortic valve area equals 2.3 sqcm. There is a  mild paravalvular regurgitation jet originating from about  2 O'clock TTE surgical view.  There is no intravalvular  regurgitation seen. Peak left ventricular outflow tract  gradient equals 4mm Hg, mean gradient is equal to 3 mm Hg,  LVOT velocity time integral equals 22 cm.  Left Atrium: Mildly dilated left atrium.  LA volume index =  39 cc/m2.  Left Ventricle: Normal left ventricular systolic function.  No segmental wall motion abnormalities.  The LVEF about  55-60%. Normal left ventricular internal dimensions and  wall thicknesses. Mild diastolic dysfunction (Stage I).  Right Heart: Normal right atrium.  RA area= 13cm2, RA volume= 32ml. Normal right ventricular  size and function.  There is a device wire in the right heart. Normal tricuspid  valve. Minimal tricuspid regurgitation. Normal pulmonic  valve. Minimal pulmonic regurgitation.  Pericardium/PleuraNormal pericardium with no pericardial  effusion.  Hemodynamic: The estimated right atrial pressure is normal.  ------------------------------------------------------------------------  CONCLUSIONS:  1. #26mm Evolut Pro+ THV.  The valve is well seated with  normal function.  The peak/mean gradients= 13/7mmHg with a  DVI= 0.63. There is a mild paravalvular regurgitation jet  originating from about 2 O'clock TTE surgical view.  There  is no intravalvular regurgitation seen.  *** Compared with echocardiogram of 12/10/2020, no  significant changes noted.  ------------------------------------------------------------------------  PROCEDURE DESCRIPTION: Transthoracic echocardiogram with  2-D, M-Mode and complete spectral and color flow Doppler.  ------------------------------------------------------------------------  ECHOCARDIOGRAPHIC EXAMINATION:  AORTIC ROOT:  Aortic Root (Leaflet): 3.4 cm  Aortic Root Index: 0.9  LEFT ATRIUM:  AP Dimensions: 3.6 cm  LA Volume Index: 39.00 cc/sqm  LA Volume: 81.3 cc  LEFT VENTRICLE:  LVIDd: 4.1 cm  LVIDs: 2.9 cm  IVS: 1.18  ILWT: 1.0 cm  RWT: 0.47  LV Mass: 155.0 gm  LV Mass Index: 74 gm/sqm  EF (Visual Estimate): 55-60%  HR and BP:  HR: 76 bpm  BP: 168/87  BSA: 2.09  ------------------------------------------------------------------------  HEMODYNAMICS:  RA Pressure Estimate: 8  LA Pressure Estimate: < 20  IVRT: 53 ms  ------------------------------------------------------------------------  COLOR FLOW and SPECIAL DOPPLER:  AORTIC VALVE:  AV Peak Velocity: 1.7 M/sec  AV Peak Gradient: 12 mm Hg  AV Mean Gradient: 7 mm Hg  Valve Area: 2.3 sqcm  LVOT:  LVOT Velocity: 1.0 M/sec  LVOT Diameter: 2.1 cm  LVOT Peak Gradient Rest: 4 mm Hg  LVOT Mean Gradient Rest: 3 mm Hg  ------------------------------------------------------------------------  DIASTOLIC FUNCTION:  DT:187 ms  E/A: 0.90  e' Septal: 7.0 cm/s  E/e' Septal: 14.2  e' Lateral: 8.0 cm/s  E/e' Lateral: 12.5  MV E wave: 1.0 m/s  MV A wave: 1.1 m/s  Septal a': 10.0 cm/s  Lateral a': 11.0 cm/s  ------------------------------------------------------------------------  Confirmed on  2/4/2021 - 13:25:59 by Caroline Rizzo M.D.  ------------------------------------------------------------------------

## 2023-02-27 NOTE — CHART NOTE - NSCHARTNOTEFT_GEN_A_CORE
RN called for patient in lower abdominal/ bladder pain. RN stated that patient asking to talk to MD. Pt seen and examined at bedside. Pt in severe pain, s/p morphine given at 17:53 and IV Tylenol 18:47. On exam, pain is localized in lower abdomen only. No other complaints. Dilaudid .5 IVP ordered STAT. CT abdomen pelvis ordered. RN informed to stop CBI for 4 hrs. Will follow up CT result. RN to call for any changes. RN called for patient in lower abdominal/ bladder pain. RN stated that patient asking to talk to MD. Pt seen and examined at bedside. Pt in severe pain, s/p morphine given at 17:53 and IV Tylenol 18:47. On exam, pain is localized in lower abdomen only. No other complaints. Dilaudid .5 IVP ordered STAT. CT abdomen pelvis ordered. RN informed to stop CBI for 4 hrs. Will follow up CT result. RN to call for any changes.    ADDENDUM:     RN called again for continued pain, and CBI not irrigating appropriately. Pt seen and examined at bedside, in continued pain. Dr. Shaffer contacted, and recommended replacing 3 way bowers cath. 0.25 dilaudid ordered for pain. Catheter was replaced at bedside successfully with proper irrigation. RN to call for any changes.

## 2023-02-27 NOTE — DIETITIAN NUTRITION RISK NOTIFICATION - TREATMENT: THE FOLLOWING DIET HAS BEEN RECOMMENDED
Diet, Regular:   Low Sodium  Supplement Feeding Modality:  Oral  Ensure Plus High Protein Cans or Servings Per Day:  1       Frequency:  Three Times a day (02-27-23 @ 11:20) [Pending Verification By Attending]  Diet, Regular:   Low Sodium  Supplement Feeding Modality:  Oral  Ensure Plus High Protein Cans or Servings Per Day:  1       Frequency:  Daily (02-14-23 @ 10:37) [Active]

## 2023-02-27 NOTE — PROCEDURE NOTE - ADDITIONAL PROCEDURE DETAILS
Prior bowers- a 20 [place] 3 way hematuria catheter was removed at bedside. Lidocaine jelly was inserted into meatus, and placed on new catheter.  Pt advised of procedure.  Bowers was inserted without difficulty.  No resistance met.  Immediately noted dark bloody urine at catheter tubing into catch bag.
CBI set up
20F three way bowers removed, replaced with 22F three way bowers
Old CBI device, 18F removed, replaced with 20F three way catheter, CBI running per previous orders

## 2023-02-27 NOTE — PROCEDURE NOTE - NSPROCNAME_GEN_A_CORE
Urinary Device Placement

## 2023-02-27 NOTE — CHART NOTE - NSCHARTNOTEFT_GEN_A_CORE
Assessment: 73 y/o male adm with severe pain and urinary retention s/p cystoscopy; hematuria. PMH HTN, AS s/p TAVR, ppm, prostate ca s/p radical prostatectomy c/b radiation proctitis.   Pt visited at bedside this am. Pt's son present. Pt reports that his appetite is fair to good, tolerating meals, no complaints. Pt drinking Ensure. Offers no food preferences at this time. Last BM 2/26.   Factors impacting intake: [ ] none [ ] nausea  [ ] vomiting [ ] diarrhea [ ] constipation  [ ]chewing problems [ ] swallowing issues  [ ] other:     Diet Presciption: Diet, Regular:   Low Sodium  Supplement Feeding Modality:  Oral  Ensure Plus High Protein Cans or Servings Per Day:  1       Frequency:  Daily (02-14-23 @ 10:37)    Intake: 50-75%    Current Weight: 2/13 206.# 2/25 197.3#   1+edema left/right leg     % Weight Change    Pertinent Medications: MEDICATIONS  (STANDING):  aminocaproic acid Tablet 500 milliGRAM(s) Oral <User Schedule>  fluticasone propionate 50 MICROgram(s)/spray Nasal Spray 1 Spray(s) Both Nostrils two times a day  lidocaine 2% Jelly 5 milliLiter(s) IntraUrethral once  lidocaine 2% Jelly 5 milliLiter(s) IntraUrethral once  loratadine 10 milliGRAM(s) Oral daily  metoprolol succinate ER 50 milliGRAM(s) Oral daily  multivitamin/minerals 1 Tablet(s) Oral daily  Orgovyx (relugolix) 120mg tab 1 Tablet(s) 1 Tablet(s) Oral daily  polyethylene glycol 3350 17 Gram(s) Oral daily    MEDICATIONS  (PRN):  acetaminophen     Tablet .. 650 milliGRAM(s) Oral every 6 hours PRN Temp greater or equal to 38C (100.4F), Mild Pain (1 - 3)  aluminum hydroxide/magnesium hydroxide/simethicone Suspension 30 milliLiter(s) Oral every 4 hours PRN Dyspepsia  melatonin 3 milliGRAM(s) Oral at bedtime PRN Insomnia  ondansetron Injectable 4 milliGRAM(s) IV Push every 8 hours PRN Nausea and/or Vomiting    Pertinent Labs: 02-27 Na127 mmol/L<L> Glu 109 mg/dL<H> K+ 3.8 mmol/L Cr  0.69 mg/dL BUN 9 mg/dL     CAPILLARY BLOOD GLUCOSE        Skin: no pressure ulcers     Estimated Needs:   [x ] no change since previous assessment  [ ] recalculated:     Previous Nutrition Diagnosis:   [ x] Inadequate Energy Intake [ ]Inadequate Oral Intake [ ] Excessive Energy Intake   [ ] Underweight [ ] Increased Nutrient Needs [ ] Overweight/Obesity   [ ] Altered GI Function [ ] Unintended Weight Loss [ ] Food & Nutrition Related Knowledge Deficit [ ] Malnutrition     Nutrition Diagnosis is [ x] ongoing  [ ] resolved [ ] not applicable     New Nutrition Diagnosis: [ ] not applicable   [x] malnutrition acute moderate related to decreased energy intake as evidenced by <75% est needs met, 4.2% wt loss, mild edema.    Interventions:   Recommend  [ ] Change Diet To:  [x ] Nutrition Supplement: rx increasing Ensure supplement to TID.  [ ] Nutrition Support  [x ] Other: encourage po intake; honor pt's food preferences/tolerances.     Monitoring and Evaluation:   [ x] PO intake [ x ] Tolerance to diet prescription [ x ] weights [ x ] labs[ x ] follow up per protocol  [ ] other:

## 2023-02-27 NOTE — PROGRESS NOTE ADULT - ASSESSMENT
73 y/o M w/ PMHx of HTN,  AS s/p TAVR, ppm, prostate cancer s/p radical prostatectomy c/b radiation proctitis presents to ED for severe pain and urinary retention s/p cystoscopy. Admit for hematuria / urinary retention.      Problem/Plan - 1:  ·  Problem: Urinary retention with hematuria :   s/p RRP for Ca Prostate by Eusebio Martinez, XRT for recurrence, on Orgovyx  s/p negative cysto x2 by Dr. Luis at [facility], last one 5 weeks ago  admitted for clot retention, hematuria likely due to radiation cystitis  bowers was discontinued, not being able to void and hematuria recur , bowers was placed back   called and discussed with Dr. Luis at [facility], ok for discharge even if the urine is light pink color.   Plan to d/c with bowers, discussed with nursing about teach patient catheter care and flushing/irrigation   taper amicar: amicar 500mg bid for 3 days , then 500mg once a day for 3 days upon discharge  Currently on CBI after recurrent hematuria.  Will continue to monitor hemoglobin. Continue amicar 500mg PO Q8h  Ofirmev x 1.    Urology f/u         Problem/Plan - 2:  ·  Problem: Prostate cancer.   ·  Plan: Hx of Prostate CA, now s/p radiation therapy and radical prostatectomy  - Continue home Orgovyx - brought in from home  - Patient to f/u with his urologist at [facility] (Dr. Luis) outpatient for further management.     Problem/Plan - 3:  ·  Problem: Hypertension.   - BP stable  - Continue home Metoprolol ER 50mg PO daily w/ hold parameters.     Problem/Plan - 4:  ·  Problem: Need for prophylactic measure.   ·  Plan: SCDs b/l for dvt ppx; will hold off on A/C at this time 2/2 hematuria.     Problem/Plan - 5:   acute blood loss anemia:   s/p 1 unit blood transfusion on 2/19.  Continue to monitor hbg.

## 2023-02-27 NOTE — CHART NOTE - NSCHARTNOTEFT_GEN_A_CORE
Called to bedside by resident and nurse.  Pt in discomfort from CBI.  Bowers in place.  Nurse states she is unable to irrigate the bowers.  With medical resident, Dr. Shaffer notified.  Recommends bowers irrigation and if still not irrigating properly, then change out the bowers.  Pt currently has a hematologic 22fr 3 way.  Will replace with 22 fr hematologic 3 way.  Irrigation attempts alleviated some of the bladder pressure and discomfort.  Unfortunately pain returned once CBI was started up again.  CBI stopped at this time, and bowers was continued to gravity.  Will change bowers at this time.  Pt will be pre medicated with dilaudid and local lidocaine jelly.  Will reevaluate post bowers reinsertion.

## 2023-02-27 NOTE — PROCEDURE NOTE - NSAPPROACH_GEN_A_CORE
via natural/artificial opening

## 2023-02-28 LAB
ANION GAP SERPL CALC-SCNC: 13 MMOL/L — SIGNIFICANT CHANGE UP (ref 5–17)
ANION GAP SERPL CALC-SCNC: 9 MMOL/L — SIGNIFICANT CHANGE UP (ref 5–17)
APTT BLD: 26.9 SEC — LOW (ref 27.5–35.5)
BASOPHILS # BLD AUTO: 0.05 K/UL — SIGNIFICANT CHANGE UP (ref 0–0.2)
BASOPHILS NFR BLD AUTO: 0.3 % — SIGNIFICANT CHANGE UP (ref 0–2)
BUN SERPL-MCNC: 17 MG/DL — SIGNIFICANT CHANGE UP (ref 7–23)
BUN SERPL-MCNC: 21 MG/DL — SIGNIFICANT CHANGE UP (ref 7–23)
CALCIUM SERPL-MCNC: 8.1 MG/DL — LOW (ref 8.5–10.1)
CALCIUM SERPL-MCNC: 8.6 MG/DL — SIGNIFICANT CHANGE UP (ref 8.5–10.1)
CHLORIDE SERPL-SCNC: 94 MMOL/L — LOW (ref 96–108)
CHLORIDE SERPL-SCNC: 95 MMOL/L — LOW (ref 96–108)
CK MB BLD-MCNC: 1.1 % — SIGNIFICANT CHANGE UP (ref 0–3.5)
CK MB CFR SERPL CALC: 1 NG/ML — SIGNIFICANT CHANGE UP (ref 0–3.6)
CK SERPL-CCNC: 93 U/L — SIGNIFICANT CHANGE UP (ref 26–308)
CO2 SERPL-SCNC: 18 MMOL/L — LOW (ref 22–31)
CO2 SERPL-SCNC: 22 MMOL/L — SIGNIFICANT CHANGE UP (ref 22–31)
CREAT SERPL-MCNC: 2.2 MG/DL — HIGH (ref 0.5–1.3)
CREAT SERPL-MCNC: 2.5 MG/DL — HIGH (ref 0.5–1.3)
EGFR: 26 ML/MIN/1.73M2 — LOW
EGFR: 31 ML/MIN/1.73M2 — LOW
EOSINOPHIL # BLD AUTO: 0 K/UL — SIGNIFICANT CHANGE UP (ref 0–0.5)
EOSINOPHIL NFR BLD AUTO: 0 % — SIGNIFICANT CHANGE UP (ref 0–6)
GLUCOSE SERPL-MCNC: 118 MG/DL — HIGH (ref 70–99)
GLUCOSE SERPL-MCNC: 186 MG/DL — HIGH (ref 70–99)
HCOV PNL SPEC NAA+PROBE: DETECTED
HCT VFR BLD CALC: 24.4 % — LOW (ref 39–50)
HGB BLD-MCNC: 8.1 G/DL — LOW (ref 13–17)
IMM GRANULOCYTES NFR BLD AUTO: 0.8 % — SIGNIFICANT CHANGE UP (ref 0–0.9)
INR BLD: 1.14 RATIO — SIGNIFICANT CHANGE UP (ref 0.88–1.16)
LYMPHOCYTES # BLD AUTO: 1.34 K/UL — SIGNIFICANT CHANGE UP (ref 1–3.3)
LYMPHOCYTES # BLD AUTO: 7.2 % — LOW (ref 13–44)
MAGNESIUM SERPL-MCNC: 2.3 MG/DL — SIGNIFICANT CHANGE UP (ref 1.6–2.6)
MCHC RBC-ENTMCNC: 28.6 PG — SIGNIFICANT CHANGE UP (ref 27–34)
MCHC RBC-ENTMCNC: 33.2 GM/DL — SIGNIFICANT CHANGE UP (ref 32–36)
MCV RBC AUTO: 86.2 FL — SIGNIFICANT CHANGE UP (ref 80–100)
MONOCYTES # BLD AUTO: 1.47 K/UL — HIGH (ref 0–0.9)
MONOCYTES NFR BLD AUTO: 7.9 % — SIGNIFICANT CHANGE UP (ref 2–14)
NEUTROPHILS # BLD AUTO: 15.7 K/UL — HIGH (ref 1.8–7.4)
NEUTROPHILS NFR BLD AUTO: 83.8 % — HIGH (ref 43–77)
NRBC # BLD: 0 /100 WBCS — SIGNIFICANT CHANGE UP (ref 0–0)
PHOSPHATE SERPL-MCNC: 5.1 MG/DL — HIGH (ref 2.5–4.5)
PLATELET # BLD AUTO: 321 K/UL — SIGNIFICANT CHANGE UP (ref 150–400)
POTASSIUM SERPL-MCNC: 4.4 MMOL/L — SIGNIFICANT CHANGE UP (ref 3.5–5.3)
POTASSIUM SERPL-MCNC: 4.5 MMOL/L — SIGNIFICANT CHANGE UP (ref 3.5–5.3)
POTASSIUM SERPL-SCNC: 4.4 MMOL/L — SIGNIFICANT CHANGE UP (ref 3.5–5.3)
POTASSIUM SERPL-SCNC: 4.5 MMOL/L — SIGNIFICANT CHANGE UP (ref 3.5–5.3)
PROTHROM AB SERPL-ACNC: 13.3 SEC — SIGNIFICANT CHANGE UP (ref 10.5–13.4)
RAPID RVP RESULT: DETECTED
RBC # BLD: 2.83 M/UL — LOW (ref 4.2–5.8)
RBC # FLD: 13.2 % — SIGNIFICANT CHANGE UP (ref 10.3–14.5)
SARS-COV-2 RNA SPEC QL NAA+PROBE: SIGNIFICANT CHANGE UP
SODIUM SERPL-SCNC: 125 MMOL/L — LOW (ref 135–145)
SODIUM SERPL-SCNC: 126 MMOL/L — LOW (ref 135–145)
TROPONIN I, HIGH SENSITIVITY RESULT: 9 NG/L — SIGNIFICANT CHANGE UP
WBC # BLD: 18.71 K/UL — HIGH (ref 3.8–10.5)
WBC # FLD AUTO: 18.71 K/UL — HIGH (ref 3.8–10.5)

## 2023-02-28 PROCEDURE — 99222 1ST HOSP IP/OBS MODERATE 55: CPT

## 2023-02-28 PROCEDURE — 99232 SBSQ HOSP IP/OBS MODERATE 35: CPT

## 2023-02-28 PROCEDURE — 93010 ELECTROCARDIOGRAM REPORT: CPT

## 2023-02-28 PROCEDURE — 71045 X-RAY EXAM CHEST 1 VIEW: CPT | Mod: 26

## 2023-02-28 PROCEDURE — 99233 SBSQ HOSP IP/OBS HIGH 50: CPT

## 2023-02-28 PROCEDURE — 70450 CT HEAD/BRAIN W/O DYE: CPT | Mod: 26

## 2023-02-28 PROCEDURE — 74176 CT ABD & PELVIS W/O CONTRAST: CPT | Mod: 26

## 2023-02-28 PROCEDURE — 72125 CT NECK SPINE W/O DYE: CPT | Mod: 26

## 2023-02-28 RX ORDER — SODIUM CHLORIDE 9 MG/ML
1000 INJECTION INTRAMUSCULAR; INTRAVENOUS; SUBCUTANEOUS
Refills: 0 | Status: DISCONTINUED | OUTPATIENT
Start: 2023-03-01 | End: 2023-03-01

## 2023-02-28 RX ORDER — METRONIDAZOLE 500 MG
500 TABLET ORAL ONCE
Refills: 0 | Status: DISCONTINUED | OUTPATIENT
Start: 2023-02-28 | End: 2023-02-28

## 2023-02-28 RX ORDER — MORPHINE SULFATE 50 MG/1
1 CAPSULE, EXTENDED RELEASE ORAL EVERY 6 HOURS
Refills: 0 | Status: DISCONTINUED | OUTPATIENT
Start: 2023-02-28 | End: 2023-03-02

## 2023-02-28 RX ORDER — CIPROFLOXACIN LACTATE 400MG/40ML
400 VIAL (ML) INTRAVENOUS ONCE
Refills: 0 | Status: DISCONTINUED | OUTPATIENT
Start: 2023-02-28 | End: 2023-02-28

## 2023-02-28 RX ORDER — SODIUM CHLORIDE 9 MG/ML
1 INJECTION INTRAMUSCULAR; INTRAVENOUS; SUBCUTANEOUS THREE TIMES A DAY
Refills: 0 | Status: DISCONTINUED | OUTPATIENT
Start: 2023-02-28 | End: 2023-03-02

## 2023-02-28 RX ORDER — ACETAMINOPHEN 500 MG
1000 TABLET ORAL ONCE
Refills: 0 | Status: COMPLETED | OUTPATIENT
Start: 2023-02-28 | End: 2023-02-28

## 2023-02-28 RX ORDER — SODIUM BICARBONATE 1 MEQ/ML
650 SYRINGE (ML) INTRAVENOUS THREE TIMES A DAY
Refills: 0 | Status: DISCONTINUED | OUTPATIENT
Start: 2023-02-28 | End: 2023-03-02

## 2023-02-28 RX ORDER — ONDANSETRON 8 MG/1
4 TABLET, FILM COATED ORAL ONCE
Refills: 0 | Status: COMPLETED | OUTPATIENT
Start: 2023-02-28 | End: 2023-02-28

## 2023-02-28 RX ORDER — SODIUM CHLORIDE 9 MG/ML
1000 INJECTION INTRAMUSCULAR; INTRAVENOUS; SUBCUTANEOUS
Refills: 0 | Status: DISCONTINUED | OUTPATIENT
Start: 2023-02-28 | End: 2023-03-01

## 2023-02-28 RX ADMIN — ONDANSETRON 4 MILLIGRAM(S): 8 TABLET, FILM COATED ORAL at 16:32

## 2023-02-28 RX ADMIN — SODIUM CHLORIDE 1 GRAM(S): 9 INJECTION INTRAMUSCULAR; INTRAVENOUS; SUBCUTANEOUS at 14:24

## 2023-02-28 RX ADMIN — Medication 1 TABLET(S): at 11:32

## 2023-02-28 RX ADMIN — ONDANSETRON 4 MILLIGRAM(S): 8 TABLET, FILM COATED ORAL at 05:28

## 2023-02-28 RX ADMIN — MORPHINE SULFATE 1 MILLIGRAM(S): 50 CAPSULE, EXTENDED RELEASE ORAL at 10:54

## 2023-02-28 RX ADMIN — Medication 1 SPRAY(S): at 18:02

## 2023-02-28 RX ADMIN — POLYETHYLENE GLYCOL 3350 17 GRAM(S): 17 POWDER, FOR SOLUTION ORAL at 11:32

## 2023-02-28 RX ADMIN — Medication 650 MILLIGRAM(S): at 14:24

## 2023-02-28 RX ADMIN — Medication 50 MILLIGRAM(S): at 08:23

## 2023-02-28 RX ADMIN — Medication 1 SPRAY(S): at 08:25

## 2023-02-28 RX ADMIN — MORPHINE SULFATE 1 MILLIGRAM(S): 50 CAPSULE, EXTENDED RELEASE ORAL at 10:37

## 2023-02-28 RX ADMIN — LORATADINE 10 MILLIGRAM(S): 10 TABLET ORAL at 11:32

## 2023-02-28 RX ADMIN — Medication 400 MILLIGRAM(S): at 08:24

## 2023-02-28 RX ADMIN — AMINOCAPROIC ACID 500 MILLIGRAM(S): 500 TABLET ORAL at 08:23

## 2023-02-28 RX ADMIN — SODIUM CHLORIDE 75 MILLILITER(S): 9 INJECTION INTRAMUSCULAR; INTRAVENOUS; SUBCUTANEOUS at 08:24

## 2023-02-28 NOTE — PROVIDER CONTACT NOTE (OTHER) - DATE AND TIME:
18-Feb-2023 03:12
12-Feb-2023 13:25
14-Feb-2023 14:40
31-Jan-2023 22:40
20-Feb-2023 02:00
08-Feb-2023 21:50
18-Feb-2023 05:17
28-Feb-2023 21:00

## 2023-02-28 NOTE — CHART NOTE - NSCHARTNOTEFT_GEN_A_CORE
Resident Rapid Response Note    Patient is a 72y old  Male who presents with a chief complaint of Hematuria/Urinary Retention (27 Feb 2023 12:32)      Rapid response was called at on this 72y Male patient for witnessed syncopal episode while using the bedside commode. Pt moved to bed and began to projectile vomit clear, non bilious emesis. AOx3, appropriately responsive. Denies chest pain, SOB, difficulty breathing, abdominal pain, headache, or weakness. Pt is intermittently drowsy during assessment, but responsive to questioning. Patient was seen and examined at the bedside by the rapid response team, primary team, and ICU PA. STAT EKG completed at bedside, Sinus Tachycardia to 115 bpm with RBBB, no acute ischemic changes.      Rapid Response Vital Signs:  BP: 200/114  HR: 118   RR: 20  SpO2: 94% on  RA  Temp: 97.8   FS: 187     Vital Signs Last 24 Hrs  T(C): 36.5 (28 Feb 2023 04:30), Max: 36.7 (27 Feb 2023 21:23)  T(F): 97.7 (28 Feb 2023 04:30), Max: 98.1 (27 Feb 2023 21:23)  HR: 108 (28 Feb 2023 04:30) (79 - 108)  BP: 124/76 (28 Feb 2023 04:30) (116/63 - 157/64)  BP(mean): --  RR: 18 (28 Feb 2023 04:30) (18 - 18)  SpO2: 92% (28 Feb 2023 04:30) (92% - 98%)    Parameters below as of 28 Feb 2023 04:30  Patient On (Oxygen Delivery Method): room air        Physical Exam: ***** pending       LABS:                        7.6    7.29  )-----------( 266      ( 27 Feb 2023 10:20 )             22.8     27 Feb 2023 10:20    127    |  96     |  9      ----------------------------<  109    3.8     |  23     |  0.69     Ca    8.0        27 Feb 2023 10:20          CAPILLARY BLOOD GLUCOSE      POCT Blood Glucose.: 187 mg/dL (28 Feb 2023 05:09)        RADIOLOGY & ADDITIONAL TESTS:      Assessment/Plan:  73 y/o M w/ PMHx of HTN,  AS s/p TAVR, ppm, prostate cancer s/p radical prostatectomy c/b radiation proctitis presents to ED for severe pain and urinary retention s/p cystoscopy. Admit for hematuria / urinary retention. Rapid response for syncopal episode and vomiting     - EKG completed at bedside, Sinus Tachycardia to 115 bpm with RBBB, no acute ischemic changes, no change from prior EKG   - STAT CBC + diff, CMP, Lactate, Cardiac Enzymes, RVP, Norovirus PCR, Mag, Phos, CT Head/Cervical Spine/ Abdomen/Pelvis non con  - STAT Labetalol 10 mg with pressure stabilization to 148/85   - STAT Zofran 4mg IVP with resolution of Nausea and Vomiting   - Cardiology following, will evaluate in AM   - Remote Tele   - Will continue to follow, RN to call if any changes. Resident Rapid Response Note    Patient is a 72y old  Male who presents with a chief complaint of Hematuria/Urinary Retention (27 Feb 2023 12:32)      Rapid response was called at 5:10 am on this 72y Male patient for witnessed syncopal episode while using the bedside commode. Pt moved to bed and began to projectile vomit clear, non bilious emesis. AOx3, appropriately responsive. Denies chest pain, SOB, difficulty breathing, abdominal pain, headache, or weakness. Pt is intermittently drowsy during assessment, but responsive to questioning. Patient was seen and examined at the bedside by the rapid response team, primary team, and ICU PA. STAT EKG completed at bedside, Sinus Tachycardia to 115 bpm with RBBB, no acute ischemic changes.      Rapid Response Vital Signs:  BP: 200/114  HR: 118   RR: 20  SpO2: 94% on  RA  Temp: 97.8   FS: 187     Vital Signs Last 24 Hrs  T(C): 36.5 (28 Feb 2023 04:30), Max: 36.7 (27 Feb 2023 21:23)  T(F): 97.7 (28 Feb 2023 04:30), Max: 98.1 (27 Feb 2023 21:23)  HR: 108 (28 Feb 2023 04:30) (79 - 108)  BP: 124/76 (28 Feb 2023 04:30) (116/63 - 157/64)  BP(mean): --  RR: 18 (28 Feb 2023 04:30) (18 - 18)  SpO2: 92% (28 Feb 2023 04:30) (92% - 98%)    Parameters below as of 28 Feb 2023 04:30  Patient On (Oxygen Delivery Method): room air        Physical Exam:   Constitutional: looks pale, intermittent drowsy, responsive to questions   Pulmonary: Non-labored, breath sounds are clear bilaterally, No wheezing, rales or rhonchi  Cardiovascular: sinus tachycardia, S1 and S2, + murmurs, rubs, gallops or clicks  Gastrointestinal: Bowel Sounds present, soft, nontender.   : + hematuria on CBI , 3 way Gr in place     LABS:                        7.6    7.29  )-----------( 266      ( 27 Feb 2023 10:20 )             22.8     27 Feb 2023 10:20    127    |  96     |  9      ----------------------------<  109    3.8     |  23     |  0.69     Ca    8.0        27 Feb 2023 10:20          CAPILLARY BLOOD GLUCOSE      POCT Blood Glucose.: 187 mg/dL (28 Feb 2023 05:09)        RADIOLOGY & ADDITIONAL TESTS:      Assessment/Plan:  71 y/o M w/ PMHx of HTN,  AS s/p TAVR, ppm, prostate cancer s/p radical prostatectomy c/b radiation proctitis presents to ED for severe pain and urinary retention s/p cystoscopy. Admit for hematuria / urinary retention. Rapid response for syncopal episode and vomiting, likely HTN emergency vs viral Klaudia virus  - EKG completed at bedside, Sinus Tachycardia to 115 bpm with RBBB, no acute ischemic changes, no change from prior EKG   - STAT CBC + diff, CMP, Lactate, Cardiac Enzymes, RVP, Norovirus PCR, Mag, Phos,   - STAT CT Head/Cervical Spine/ Abdomen/Pelvis non con performed   - STAT Labetalol 10 mg with pressure stabilization to 148/85   - STAT Zofran 4mg IVP with resolution of Nausea and Vomiting   - Cardiology following, Dr Luna will evaluate in AM   - Remote Tele  - Son Bogdan [phone] called and updated  - RN to call if any changes     Addendum @   - CT head shows   - CT A/P shows   - hyponatremia 2/2  likely multifactorial n/v vs decreased po intake, with possible component of SIADH  - Need 1 U PRBCs will help with hyponatremia given osm of blood  - Cr elevated to 2.50  <--0.6 likely pre vs post, ordered urine lytes/ FeNa   - RN to call if any changes Resident Rapid Response Note    Patient is a 72y old  Male who presents with a chief complaint of Hematuria/Urinary Retention (27 Feb 2023 12:32)      Rapid response was called at 5:10 am on this 72y Male patient for witnessed syncopal episode while using the bedside commode. Pt moved to bed and began to projectile vomit clear, non bilious emesis. AOx3, appropriately responsive. Denies chest pain, SOB, difficulty breathing, abdominal pain, headache, or weakness. Pt is intermittently drowsy during assessment, but responsive to questioning. Patient was seen and examined at the bedside by the rapid response team, primary team, and ICU PA. STAT EKG completed at bedside, Sinus Tachycardia to 115 bpm with RBBB, no acute ischemic changes.      Rapid Response Vital Signs:  BP: 200/114  HR: 118   RR: 20  SpO2: 94% on  RA  Temp: 97.8   FS: 187     Vital Signs Last 24 Hrs  T(C): 36.5 (28 Feb 2023 04:30), Max: 36.7 (27 Feb 2023 21:23)  T(F): 97.7 (28 Feb 2023 04:30), Max: 98.1 (27 Feb 2023 21:23)  HR: 108 (28 Feb 2023 04:30) (79 - 108)  BP: 124/76 (28 Feb 2023 04:30) (116/63 - 157/64)  BP(mean): --  RR: 18 (28 Feb 2023 04:30) (18 - 18)  SpO2: 92% (28 Feb 2023 04:30) (92% - 98%)    Parameters below as of 28 Feb 2023 04:30  Patient On (Oxygen Delivery Method): room air        Physical Exam:   Constitutional: looks pale, intermittent drowsy, responsive to questions   Pulmonary: Non-labored, breath sounds are clear bilaterally, No wheezing, rales or rhonchi  Cardiovascular: sinus tachycardia, S1 and S2, + murmurs, rubs, gallops or clicks  Gastrointestinal: Bowel Sounds present, soft, nontender.   : + hematuria on CBI , 3 way Gr in place     LABS:                        7.6    7.29  )-----------( 266      ( 27 Feb 2023 10:20 )             22.8     27 Feb 2023 10:20    127    |  96     |  9      ----------------------------<  109    3.8     |  23     |  0.69     Ca    8.0        27 Feb 2023 10:20          CAPILLARY BLOOD GLUCOSE      POCT Blood Glucose.: 187 mg/dL (28 Feb 2023 05:09)        RADIOLOGY & ADDITIONAL TESTS:      Assessment/Plan:  71 y/o M w/ PMHx of HTN,  AS s/p TAVR, ppm, prostate cancer s/p radical prostatectomy c/b radiation proctitis presents to ED for severe pain and urinary retention s/p cystoscopy. Admit for hematuria / urinary retention. Rapid response for syncopal episode and vomiting, likely HTN emergency vs viral Klaudia virus  - EKG completed at bedside, Sinus Tachycardia to 115 bpm with RBBB, no acute ischemic changes, no change from prior EKG   - STAT CBC + diff, CMP, Lactate, Cardiac Enzymes, RVP, Norovirus PCR, Mag, Phos,   - STAT CT Head/Cervical Spine/ Abdomen/Pelvis non con performed   - STAT Labetalol 10 mg with pressure stabilization to 148/85   - STAT Zofran 4mg IVP with resolution of Nausea and Vomiting   - Cardiology following, Dr Luna will evaluate in AM   - Remote Tele  - Son Bogdan [phone] called and updated  - RN to call if any changes     Addendum @   - CT head shows No intracranial bleed, mass effect or depressed skull fracture.  - CT CERVICAL SPINE: Multilevel degenerative changes of the cervical spine without acute fracture deformity or acute subluxation.  - CT A/P shows   - hyponatremia 2/2  likely multifactorial n/v vs decreased po intake, with possible component of SIADH  - Need 1 U PRBCs will help with hyponatremia given osm of blood  - Cr elevated to 2.50  <--0.6 likely pre vs post, ordered urine lytes/ FeNa   - RN to call if any changes Resident Rapid Response Note    Patient is a 72y old  Male who presents with a chief complaint of Hematuria/Urinary Retention (27 Feb 2023 12:32)      Rapid response was called at 5:10 am on this 72y Male patient for witnessed syncopal episode while using the bedside commode. Pt moved to bed and began to projectile vomit clear, non bilious emesis. AOx3, appropriately responsive. Denies chest pain, SOB, difficulty breathing, abdominal pain, headache, or weakness. Pt is intermittently drowsy during assessment, but responsive to questioning. Patient was seen and examined at the bedside by the rapid response team, primary team, and ICU PA. STAT EKG completed at bedside, Sinus Tachycardia to 115 bpm with RBBB, no acute ischemic changes.      Rapid Response Vital Signs:  BP: 200/114  HR: 118   RR: 20  SpO2: 94% on  RA  Temp: 97.8   FS: 187     Vital Signs Last 24 Hrs  T(C): 36.5 (28 Feb 2023 04:30), Max: 36.7 (27 Feb 2023 21:23)  T(F): 97.7 (28 Feb 2023 04:30), Max: 98.1 (27 Feb 2023 21:23)  HR: 108 (28 Feb 2023 04:30) (79 - 108)  BP: 124/76 (28 Feb 2023 04:30) (116/63 - 157/64)  BP(mean): --  RR: 18 (28 Feb 2023 04:30) (18 - 18)  SpO2: 92% (28 Feb 2023 04:30) (92% - 98%)    Parameters below as of 28 Feb 2023 04:30  Patient On (Oxygen Delivery Method): room air        Physical Exam:   Constitutional: looks pale, intermittent drowsy, responsive to questions   Pulmonary: Non-labored, breath sounds are clear bilaterally, No wheezing, rales or rhonchi  Cardiovascular: sinus tachycardia, S1 and S2, + murmurs, rubs, gallops or clicks  Gastrointestinal: Bowel Sounds present, soft, nontender.   : + hematuria on CBI , 3 way Gr in place     LABS:                        7.6    7.29  )-----------( 266      ( 27 Feb 2023 10:20 )             22.8     27 Feb 2023 10:20    127    |  96     |  9      ----------------------------<  109    3.8     |  23     |  0.69     Ca    8.0        27 Feb 2023 10:20          CAPILLARY BLOOD GLUCOSE      POCT Blood Glucose.: 187 mg/dL (28 Feb 2023 05:09)        RADIOLOGY & ADDITIONAL TESTS:      Assessment/Plan:  71 y/o M w/ PMHx of HTN,  AS s/p TAVR, ppm, prostate cancer s/p radical prostatectomy c/b radiation proctitis presents to ED for severe pain and urinary retention s/p cystoscopy. Admit for hematuria / urinary retention. Rapid response for syncopal episode and vomiting, likely HTN emergency vs viral Klaudia virus  - EKG completed at bedside, Sinus Tachycardia to 115 bpm with RBBB, no acute ischemic changes, no change from prior EKG   - STAT CBC + diff, CMP, Lactate, Cardiac Enzymes, RVP, Norovirus PCR, Mag, Phos,   - STAT CT Head/Cervical Spine/ Abdomen/Pelvis non con performed   - STAT Labetalol 10 mg with pressure stabilization to 148/85   - STAT Zofran 4mg IVP with resolution of Nausea and Vomiting   - Cardiology following, Dr Luna will evaluate in AM   - Remote Tele  - Son Bogdan [phone] called and updated  - RN to call if any changes     Addendum @   - CT head shows No intracranial bleed, mass effect or depressed skull fracture.  - CT CERVICAL SPINE: Multilevel degenerative changes of the cervical spine without acute fracture deformity or acute subluxation.  - CT A/P shows   - hyponatremia 2/2  likely multifactorial n/v vs decreased po intake, with possible component of SIADH  - Need 1 U PRBCs will help with hyponatremia given osm of blood  - Cr elevated to 2.50  <--0.6 likely pre vs post, ordered urine lytes/ FeNa   - RN to call if any changes    Addendum @6:55  - Meets sepsis criteria, with leukocytosis and tachycardia, unknown source   - F/u blood cultures  - F/u CXR  - Will not send urine cultures or UA in the setting of active [facility] Resident Rapid Response Note    Patient is a 72y old  Male who presents with a chief complaint of Hematuria/Urinary Retention (27 Feb 2023 12:32)      Rapid response was called at 5:10 am on this 72y Male patient for witnessed syncopal episode while using the bedside commode. Pt moved to bed and began to projectile vomit clear, non bilious emesis. AOx3, appropriately responsive. Denies chest pain, SOB, difficulty breathing, abdominal pain, headache, or weakness. Pt is intermittently drowsy during assessment, but responsive to questioning. Patient was seen and examined at the bedside by the rapid response team, primary team, and ICU PA. STAT EKG completed at bedside, Sinus Tachycardia to 115 bpm with RBBB, no acute ischemic changes.      Rapid Response Vital Signs:  BP: 200/114  HR: 118   RR: 20  SpO2: 94% on  RA  Temp: 97.8   FS: 187     Vital Signs Last 24 Hrs  T(C): 36.5 (28 Feb 2023 04:30), Max: 36.7 (27 Feb 2023 21:23)  T(F): 97.7 (28 Feb 2023 04:30), Max: 98.1 (27 Feb 2023 21:23)  HR: 108 (28 Feb 2023 04:30) (79 - 108)  BP: 124/76 (28 Feb 2023 04:30) (116/63 - 157/64)  BP(mean): --  RR: 18 (28 Feb 2023 04:30) (18 - 18)  SpO2: 92% (28 Feb 2023 04:30) (92% - 98%)    Parameters below as of 28 Feb 2023 04:30  Patient On (Oxygen Delivery Method): room air        Physical Exam:   Constitutional: looks pale, intermittent drowsy, responsive to questions   Pulmonary: Non-labored, breath sounds are clear bilaterally, No wheezing, rales or rhonchi  Cardiovascular: sinus tachycardia, S1 and S2, + murmurs, rubs, gallops or clicks  Gastrointestinal: Bowel Sounds present, soft, nontender.   : + hematuria on CBI , 3 way Gr in place     LABS:                        7.6    7.29  )-----------( 266      ( 27 Feb 2023 10:20 )             22.8     27 Feb 2023 10:20    127    |  96     |  9      ----------------------------<  109    3.8     |  23     |  0.69     Ca    8.0        27 Feb 2023 10:20          CAPILLARY BLOOD GLUCOSE      POCT Blood Glucose.: 187 mg/dL (28 Feb 2023 05:09)        RADIOLOGY & ADDITIONAL TESTS:      Assessment/Plan:  71 y/o M w/ PMHx of HTN,  AS s/p TAVR, ppm, prostate cancer s/p radical prostatectomy c/b radiation proctitis presents to ED for severe pain and urinary retention s/p cystoscopy. Admit for hematuria / urinary retention. Rapid response for syncopal episode and vomiting, likely HTN emergency vs viral Klaudia virus  - EKG completed at bedside, Sinus Tachycardia to 115 bpm with RBBB, no acute ischemic changes, no change from prior EKG   - STAT CBC + diff, CMP, Lactate, Cardiac Enzymes, RVP, Norovirus PCR, Mag, Phos,   - STAT CT Head/Cervical Spine/ Abdomen/Pelvis non con performed   - STAT Labetalol 10 mg with pressure stabilization to 148/85   - STAT Zofran 4mg IVP with resolution of Nausea and Vomiting   - Cardiology following, Dr Luna will evaluate in AM   - Remote Tele  - Son Bogdan [phone] called and updated  - RN to call if any changes     Addendum @ 6:50  - CT head shows No intracranial bleed, mass effect or depressed skull fracture.  - CT CERVICAL SPINE: Multilevel degenerative changes of the cervical spine without acute fracture deformity or acute subluxation.  - CT A/P shows pending read   - hyponatremia 2/2  likely multifactorial n/v vs decreased po intake, with possible component of SIADH  - Need 1 U PRBCs will help with hyponatremia given osm of blood  - Cr elevated to 2.50  <--0.6 likely pre vs post, ordered urine lytes/ FeNa   - RN to call if any changes    Addendum @6:55  - Meets sepsis criteria, with leukocytosis and tachycardia, unknown source   - F/u blood cultures  - F/u CXR  - Will not send urine cultures or UA in the setting of active [facility] Resident Rapid Response Note    Patient is a 72y old  Male who presents with a chief complaint of Hematuria/Urinary Retention (27 Feb 2023 12:32)      Rapid response was called at 5:10 am on this 72y Male patient for witnessed syncopal episode while using the bedside commode. Pt moved to bed and began to projectile vomit clear, non bilious emesis. AOx3, appropriately responsive. Denies chest pain, SOB, difficulty breathing, abdominal pain, headache, or weakness. Pt is intermittently drowsy during assessment, but responsive to questioning. Patient was seen and examined at the bedside by the rapid response team, primary team, and ICU PA. STAT EKG completed at bedside, Sinus Tachycardia to 115 bpm with RBBB, no acute ischemic changes.      Rapid Response Vital Signs:  BP: 200/114  HR: 118   RR: 20  SpO2: 94% on  RA  Temp: 97.8   FS: 187     Vital Signs Last 24 Hrs  T(C): 36.5 (28 Feb 2023 04:30), Max: 36.7 (27 Feb 2023 21:23)  T(F): 97.7 (28 Feb 2023 04:30), Max: 98.1 (27 Feb 2023 21:23)  HR: 108 (28 Feb 2023 04:30) (79 - 108)  BP: 124/76 (28 Feb 2023 04:30) (116/63 - 157/64)  BP(mean): --  RR: 18 (28 Feb 2023 04:30) (18 - 18)  SpO2: 92% (28 Feb 2023 04:30) (92% - 98%)    Parameters below as of 28 Feb 2023 04:30  Patient On (Oxygen Delivery Method): room air        Physical Exam:   Constitutional: looks pale, intermittent drowsy, responsive to questions   Pulmonary: Non-labored, breath sounds are clear bilaterally, No wheezing, rales or rhonchi  Cardiovascular: sinus tachycardia, S1 and S2, + murmurs, rubs, gallops or clicks  Gastrointestinal: Bowel Sounds present, soft, nontender.   : + hematuria on CBI , 3 way Gr in place     LABS:                        7.6    7.29  )-----------( 266      ( 27 Feb 2023 10:20 )             22.8     27 Feb 2023 10:20    127    |  96     |  9      ----------------------------<  109    3.8     |  23     |  0.69     Ca    8.0        27 Feb 2023 10:20          CAPILLARY BLOOD GLUCOSE      POCT Blood Glucose.: 187 mg/dL (28 Feb 2023 05:09)        RADIOLOGY & ADDITIONAL TESTS:      Assessment/Plan:  71 y/o M w/ PMHx of HTN,  AS s/p TAVR, ppm, prostate cancer s/p radical prostatectomy c/b radiation proctitis presents to ED for severe pain and urinary retention s/p cystoscopy. Admit for hematuria / urinary retention. Rapid response for syncopal episode and vomiting, likely HTN emergency vs viral Klaudia virus  - EKG completed at bedside, Sinus Tachycardia to 115 bpm with RBBB, no acute ischemic changes, no change from prior EKG   - STAT CBC + diff, CMP, Lactate, Cardiac Enzymes, RVP, Norovirus PCR, Mag, Phos,   - STAT CT Head/Cervical Spine/ Abdomen/Pelvis non con performed   - STAT Labetalol 10 mg with pressure stabilization to 148/85   - STAT Zofran 4mg IVP with resolution of Nausea and Vomiting   - Cardiology following, Dr Luna will evaluate in AM   - Remote Tele  - Son Bogdan [phone] called and updated  - RN to call if any changes     Addendum @ 6:50  - CT head shows No intracranial bleed, mass effect or depressed skull fracture.  - CT CERVICAL SPINE: Multilevel degenerative changes of the cervical spine without acute fracture deformity or acute subluxation.  - CT A/P shows pending read   - hyponatremia 2/2  likely multifactorial n/v vs decreased po intake, with possible component of SIADH  - Need 1 U PRBCs will help with hyponatremia given osm of blood  - Cr elevated to 2.50  <--0.6 likely pre vs post, ordered urine lytes/ FeNa   - RN to call if any changes    Addendum @6:55  - Meets sepsis criteria, with leukocytosis and tachycardia, unknown source   - F/u blood cultures  - F/u CXR  - Will not send urine cultures or UA in the setting of active [facility]  - Day team to start empiric antibiotics once cultures have been drawn

## 2023-02-28 NOTE — CONSULT NOTE ADULT - SUBJECTIVE AND OBJECTIVE BOX
CHIEF COMPLAINT:    HPI:  73 y/o M w/  PMHx of HTN, AS s/p TAVR, ppm, prostate cancer s/p radical prostatectomy f/b IGRT c/b radiation cystitis and proctitis presents to ED for severe pain and urinary clot retention s/p cystoscopy [place] [facility] by [place] Juan Francisco Odom. Patient reports pain is improved after bowers/CBI placed. Currently comfortable in bed.        PAST MEDICAL & SURGICAL HISTORY:  Prostate cancer  RT  and prostatectomy in       Proctitis, 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      HTN (hypertension)      Aortic stenosis      Rectal bleeding  last hospitalization 10/6/20 2 radiation proctitis      [place] (hard of hearing)      H/O prostatectomy        S/P T&amp;A (status post tonsillectomy and adenoidectomy)  child          REVIEW OF SYSTEMS:    CONSTITUTIONAL: No weakness, fevers or chills  EYES/ENT: No visual changes;  No vertigo or throat pain   NECK: No pain or stiffness  RESPIRATORY: No cough, wheezing, hemoptysis; No shortness of breath  CARDIOVASCULAR: No chest pain or palpitations  GASTROINTESTINAL: No epigastric pain. No nausea, vomiting, or hematemesis; No diarrhea or constipation. No melena or hematochezia.  GENITOURINARY: per hpi  NEUROLOGICAL: No numbness or weakness  SKIN: No itching, burning, rashes, or lesions   All other review of systems is negative unless indicated above.    MEDICATIONS  (STANDING):  loratadine 10 milliGRAM(s) Oral daily  metoprolol succinate ER 50 milliGRAM(s) Oral daily  Orgovyx (relugolix) 120mg tab 1 Tablet(s) 1 Tablet(s) Oral daily  polyethylene glycol 3350 17 Gram(s) Oral daily    MEDICATIONS  (PRN):  acetaminophen     Tablet .. 650 milliGRAM(s) Oral every 6 hours PRN Temp greater or equal to 38C (100.4F), Mild Pain (1 - 3)  aluminum hydroxide/magnesium hydroxide/simethicone Suspension 30 milliLiter(s) Oral every 4 hours PRN Dyspepsia  melatonin 3 milliGRAM(s) Oral at bedtime PRN Insomnia  ondansetron Injectable 4 milliGRAM(s) IV Push every 8 hours PRN Nausea and/or Vomiting      Allergies    penicillin (Diarrhea; Pruritus; Hives)  penicillin V potassium (Rash)    Intolerances    codeine (Nausea)      Social History:  Alcohol: Denied  Smoking: Nonsmoker  Drug Use: Denied  Marital Status:     FAMILY HISTORY:  FH: cancer (Father, Mother)        Vital Signs Last 24 Hrs  T(C): 36.7 (2023 04:39), Max: 37.1 (2023 15:00)  T(F): 98.1 (2023 04:39), Max: 98.7 (2023 15:00)  HR: 79 (2023 04:39) (74 - 79)  BP: 153/71 (2023 04:39) (145/78 - 183/83)  BP(mean): --  RR: 18 (2023 04:39) (16 - 18)  SpO2: 96% (2023 04:39) (96% - 98%)    Parameters below as of 2023 04:39  Patient On (Oxygen Delivery Method): room air        PHYSICAL EXAM:    Constitutional: NAD, well-developed  HEENT: EOMI, Normal Hearing  Abd: BS+, soft, NT/ND, No CVAT  : Normal phallus,open meatus,bilateral descended testes, no masses  Bowers - clear CBI  Neurological: A/O x 3, no focal deficits  Psychiatric: Normal mood, normal affect      LABS:                        12.5   8.44  )-----------( 187      ( 2023 06:08 )             38.0     02-01    138  |  104  |  16  ----------------------------<  88  3.9   |  27  |  0.69    Ca    8.5      2023 06:08    TPro  6.9  /  Alb  3.8  /  TBili  0.6  /  DBili  x   /  AST  22  /  ALT  25  /  AlkPhos  87      PT/INR - ( 2023 18:30 )   PT: 12.9 sec;   INR: 1.10 ratio         PTT - ( 2023 18:30 )  PTT:22.1 sec  Urinalysis Basic - ( 2023 17:30 )    Color: Red / Appearance: bloody / S.015 / pH: x  Gluc: x / Ketone: Negative  / Bili: Negative / Urobili: Negative   Blood: x / Protein: 500 mg/dL / Nitrite: Negative   Leuk Esterase: Trace / RBC: TNTC /HPF / WBC 6-10   Sq Epi: x / Non Sq Epi: Occasional / Bacteria: Many      Urine Culture:   Blood Cultures      RADIOLOGY & ADDITIONAL STUDIES:
[facility]  INFECTIOUS DISEASES   [address] [place] [address]  Tel: [phone]     Fax: [phone]  ========================================================  MD Elana Perry Kaushal, MD Cho, Michelle, MD Sunjit, Jaspal, MD  ========================================================    MRN-[id number]  HERBIE KERR     CC: Hematuria/Urinary Retention     HPI:  71 y/o man with PMH of HTN, AS s/p TAVR and PPM,  prostate cancer s/p radical prostatectomy with radiation proctitis was admitted with severe pain and urinary retention s/p cystoscopy.  Patient follows with urology at [facility]; was being seen for cystoscopy after experiencing hematuria. He has had hematuria before approximately one year ago; underwent cystoscopy without complications at that time.   He underwent multiple rounds of radiation therapy with ultimate radical prostatectomy for prostate CA. He has been diagnosed with radiation proctitis and this looks like radiation cystitis with multiple clots and blood seen in bladder in CT.   Seen by urology and plan is cystoscopy to remove clots. Early this morning had RRT and was found with tachy cardia, low H/H, transfusion given and now WBC jumped to 18k so ID was called for possible SIRS.   No fever, no leukocytosis in admission.   UA positive for blood, protein, bacteria     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  HTN (hypertension)  Aortic stenosis  Rectal bleeding  last hospitalization 10/6/20 2/2 radiation proctitis  [place] (hard of hearing)  H/O prostatectomy  2015  S/P T&amp;A (status post tonsillectomy and adenoidectomy)  child    Social Hx: no smoking, ETOH or drugs     FAMILY HISTORY:  FH: cancer (Father, Mother)    Allergies  penicillin (Diarrhea; Pruritus; Hives)  penicillin V potassium (Rash)    Intolerances  codeine (Nausea)    Antibiotics:  MEDICATIONS  (STANDING):  aminocaproic acid Tablet 500 milliGRAM(s) Oral <User Schedule>  fluticasone propionate 50 MICROgram(s)/spray Nasal Spray 1 Spray(s) Both Nostrils two times a day  lidocaine 2% Jelly 5 milliLiter(s) IntraUrethral once  lidocaine 2% Jelly 5 milliLiter(s) IntraUrethral once  lidocaine 2% Jelly 10 milliLiter(s) IntraUrethral once  loratadine 10 milliGRAM(s) Oral daily  metoprolol succinate ER 50 milliGRAM(s) Oral daily  multivitamin/minerals 1 Tablet(s) Oral daily  Orgovyx (relugolix) 120mg tab 1 Tablet(s) 1 Tablet(s) Oral daily  polyethylene glycol 3350 17 Gram(s) Oral daily  sodium chloride 0.9%. 1000 milliLiter(s) (75 mL/Hr) IV Continuous <Continuous>  urea Oral Powder 15 Gram(s) Oral daily    MEDICATIONS  (PRN):  acetaminophen     Tablet .. 650 milliGRAM(s) Oral every 6 hours PRN Temp greater or equal to 38C (100.4F), Mild Pain (1 - 3)  aluminum hydroxide/magnesium hydroxide/simethicone Suspension 30 milliLiter(s) Oral every 4 hours PRN Dyspepsia  melatonin 3 milliGRAM(s) Oral at bedtime PRN Insomnia  morphine  - Injectable 1 milliGRAM(s) IV Push every 6 hours PRN Severe Pain (7 - 10)  ondansetron Injectable 4 milliGRAM(s) IV Push every 8 hours PRN Nausea and/or Vomiting    REVIEW OF SYSTEMS:  CONSTITUTIONAL:  No Fever or chills  HEENT:  No diplopia or blurred vision.  No sore throat or runny nose.  CARDIOVASCULAR:  No chest pain or SOB.  RESPIRATORY:  No cough, shortness of breath, PND or orthopnea.  GASTROINTESTINAL:  No nausea, vomiting or diarrhea.  GENITOURINARY:  No dysuria, frequency or urgency. No Blood in urine  MUSCULOSKELETAL:  no joint aches, no muscle pain  SKIN:  No change in skin, hair or nails.  NEUROLOGIC:  No paresthesias or weakness.  PSYCHIATRIC:  No disorder of thought or mood.  ENDOCRINE:  No heat or cold intolerance, polyuria or polydipsia.  HEMATOLOGICAL:  No easy bruising or bleeding.     Physical Exam:  Vital Signs Last 24 Hrs  T(C): 36.5 (28 Feb 2023 04:30), Max: 36.7 (27 Feb 2023 21:23)  T(F): 97.7 (28 Feb 2023 04:30), Max: 98.1 (27 Feb 2023 21:23)  HR: 93 (28 Feb 2023 06:45) (80 - 108)  BP: 158/83 (28 Feb 2023 06:45) (116/63 - 158/83)  RR: 18 (28 Feb 2023 04:30) (18 - 18)  SpO2: 92% (28 Feb 2023 04:30) (92% - 94%)  Parameters below as of 28 Feb 2023 04:30  Patient On (Oxygen Delivery Method): room air  GEN: NAD  HEENT: normocephalic and atraumatic. EOMI. PERRL.    NECK: Supple.  No lymphadenopathy   LUNGS: Clear to auscultation.  HEART: Irregular rate and rhythm   ABDOMEN: Soft, nontender, and nondistended.  Positive bowel sounds.    : No CVA tenderness, bowers with bloody urine  EXTREMITIES: +edema  NEUROLOGIC: grossly intact.  PSYCHIATRIC: Appropriate affect .  SKIN: No rash     Labs:  02-28    125<L>  |  94<L>  |  17  ----------------------------<  186<H>  4.5   |  18<L>  |  2.50<H>    Ca    8.6      28 Feb 2023 05:34  Phos  5.1     02-28  Mg     2.3     02-28                        8.1    18.71 )-----------( 321      ( 28 Feb 2023 05:34 )             24.4     PT/INR - ( 28 Feb 2023 05:34 )   PT: 13.3 sec;   INR: 1.14 ratio    PTT - ( 28 Feb 2023 05:34 )  PTT:26.9 sec    CARDIAC MARKERS ( 28 Feb 2023 05:34 )  x     / x     / 93 U/L / x     / 1.0 ng/mL    COVID-19 PCR: NotDetec (01-31-23 @ 19:00)    All imaging and other data have been reviewed.  < from: CT Abdomen and Pelvis No Cont (02.28.23 @ 06:17) >  IMPRESSION:  Mild bilateral hydronephrosis.  Distended urinary bladder with high attenuation intraluminal contents   suspected intravesicular hematoma.    Assessment and Plan:   71 y/o man with PMH of HTN, AS s/p TAVR and PPM,  prostate cancer s/p radical prostatectomy with radiation proctitis was admitted with severe pain and urinary retention s/p cystoscopy.  Patient follows with urology at [facility]; was being seen for cystoscopy after experiencing hematuria.  He underwent multiple rounds of radiation therapy with ultimate radical prostatectomy for prostate CA. He has been diagnosed with radiation proctitis and this looks like radiation cystitis with multiple clots and blood seen in bladder in CT.   Seen by urology and plan is cystoscopy to remove clots. Early this morning had RRT and was found with tachy cardia, low H/H, transfusion given and now WBC jumped to 18k so ID was called for possible SIRS.   No fever, no leukocytosis in admission.   UA positive for blood   Most likely transfusion and heamturia is causing his symptoms, no sign of infection at this time, I would stop antibiotics and follow labs and cultures.     Recommendations:  - Follow Blood and urine cultures   - Stop ciprofloxacin and metronidazole  - Monitor WBC  -  follow up noted, for possible cystoscopy tomorrow  - Antibiotics perioperatively as per urology   - If becomes hemodynamically unstable, persistent fever, will start ABx empirically     Thank you for courtesy of this consult.     Will follow.  Discussed with the primary team and Son at the bedside.     Nica Prasad MD  Division of Infectious Diseases   Please call ID service at [phone] with any question.      55 minutes spent on total encounter assessing patient, examination, chart review, counseling and coordinating care by the attending physician/nurse/care manager.  
[facility] - Reina Rodriguez Pannella, Patel, Savella  Office Number: [phone]    Initial Consult Note    CHIEF COMPLAINT: Patient is a 72y old  Male who presents with a chief complaint of Hematuria/Urinary Retention (17 Feb 2023 14:04)      HPI:  71 y/o M w/  PMHx of HTN, AS s/p TAVR, ppm, prostate cancer s/p radical prostatectomy c/b radiation proctitis presents to ED for severe pain and urinary retention s/p cystoscopy. Patient follows with urology at [facility]; was being seen for cystoscopy after experiencing hematuria. He has had hematuria before approximately one year ago; underwent cystoscopy without complications at that time. He underwent multiple rounds of radiation therapy with ultimate radical prostatectomy for prostate CA. He has been diagnosed with radiation proctitis. Patient feels that this his current presentation is likely 2/2  radiation cystitis. Patient reports pain is improved after bowers/CBI placed. Currently comfortable in bed.     He has radiation hemorrhagic cystitis, and has been on cbi until yesterday  says he feels well from a cv standpoint  would like me to review his medications and make sure everything is ok  on amicar      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      HTN (hypertension)      Aortic stenosis      Rectal bleeding  last hospitalization 10/6/20 2/2 radiation proctitis      [place] (hard of hearing)      H/O prostatectomy  2015      S/P T&amp;A (status post tonsillectomy and adenoidectomy)  child          SOCIAL HISTORY:  No tobacco, ethanol, or drug abuse.    FAMILY HISTORY:  FH: cancer (Father, Mother)      No family history of acute MI or sudden cardiac death.    MEDICATIONS  (STANDING):  aminocaproic acid Tablet 1000 milliGRAM(s) Oral every 6 hours  lidocaine 2% Jelly 5 milliLiter(s) IntraUrethral once  lidocaine 2% Jelly 5 milliLiter(s) IntraUrethral once  loratadine 10 milliGRAM(s) Oral daily  metoprolol succinate ER 50 milliGRAM(s) Oral daily  Orgovyx (relugolix) 120mg tab 1 Tablet(s) 1 Tablet(s) Oral daily  oxybutynin 5 milliGRAM(s) Oral daily  polyethylene glycol 3350 17 Gram(s) Oral daily    MEDICATIONS  (PRN):  acetaminophen     Tablet .. 650 milliGRAM(s) Oral every 6 hours PRN Temp greater or equal to 38C (100.4F), Mild Pain (1 - 3)  aluminum hydroxide/magnesium hydroxide/simethicone Suspension 30 milliLiter(s) Oral every 4 hours PRN Dyspepsia  melatonin 3 milliGRAM(s) Oral at bedtime PRN Insomnia  ondansetron Injectable 4 milliGRAM(s) IV Push every 8 hours PRN Nausea and/or Vomiting      Allergies    penicillin (Diarrhea; Pruritus; Hives)  penicillin V potassium (Rash)    Intolerances    codeine (Nausea)      REVIEW OF SYSTEMS:    CONSTITUTIONAL: No weakness, fevers or chills  EYES/ENT: No visual changes;  No vertigo or throat pain   NECK: No pain or stiffness  RESPIRATORY: No cough, wheezing, hemoptysis; No shortness of breath  CARDIOVASCULAR: No chest pain or palpitations  GASTROINTESTINAL: No abdominal pain. No nausea, vomiting, or hematemesis; No diarrhea or constipation. No melena or hematochezia.  GENITOURINARY: No dysuria, frequency; + hematuria  NEUROLOGICAL: No numbness or weakness  SKIN: No itching or rash  All other review of systems is negative unless indicated above    VITAL SIGNS:   Vital Signs Last 24 Hrs  T(C): 36.4 (18 Feb 2023 05:02), Max: 36.8 (17 Feb 2023 12:40)  T(F): 97.5 (18 Feb 2023 05:02), Max: 98.2 (17 Feb 2023 12:40)  HR: 73 (18 Feb 2023 05:02) (73 - 75)  BP: 153/77 (18 Feb 2023 05:02) (120/62 - 153/77)  BP(mean): --  RR: 18 (18 Feb 2023 05:02) (18 - 18)  SpO2: 94% (18 Feb 2023 05:02) (93% - 96%)    Parameters below as of 18 Feb 2023 05:02  Patient On (Oxygen Delivery Method): room air        I&O's Summary    17 Feb 2023 07:01  -  18 Feb 2023 07:00  --------------------------------------------------------  IN: 30 mL / OUT: 2030 mL / NET: -2000 mL        On Exam:    Constitutional: NAD, alert and oriented x 3  Lungs:  Non-labored, breath sounds are clear bilaterally, No wheezing, rales or rhonchi  Cardiovascular: RRR.  S1 and S2 positive.  No murmurs, rubs, gallops or clicks  Gastrointestinal: Bowel Sounds present, soft, nontender.   Lymph: No peripheral edema. No cervical lymphadenopathy.  Neurological: Alert, no focal deficits  Skin: No rashes or ulcers   Psych:  Mood & affect appropriate.    LABS: All Labs Reviewed:                        8.4    5.99  )-----------( 300      ( 18 Feb 2023 06:37 )             25.7                         8.4    6.18  )-----------( 324      ( 17 Feb 2023 06:30 )             25.4                         8.5    6.86  )-----------( 305      ( 16 Feb 2023 06:25 )             25.2     16 Feb 2023 06:25    136    |  104    |  16     ----------------------------<  91     4.0     |  26     |  0.73     Ca    8.6        16 Feb 2023 06:25            Blood Culture:         RADIOLOGY:    EKG: sr      
Patient is a 72y old  Male who presents with a chief complaint of Hematuria/Urinary Retention (27 Feb 2023 12:32)       HPI:  71 y/o M w/  PMHx of HTN, AS s/p TAVR, ppm, prostate cancer s/p radical prostatectomy c/b radiation proctitis presents to ED for severe pain and urinary retention s/p cystoscopy. Patient follows with urology at [facility]; was being seen for cystoscopy after experiencing hematuria. He has had hematuria before approximately one year ago; underwent cystoscopy without complications at that time. He underwent multiple rounds of radiation therapy with ultimate radical prostatectomy for prostate CA. He has been diagnosed with radiation proctitis. Patient feels that this his current presentation is likely 2/2  radiation cystitis. Patient reports pain is improved after bowers/CBI placed.   Patient developed hyponatremia prompting renal consult.  He has been having ongoing hematuria. On CBI.  Denies any N/V.  Has lightheadedness at times.        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      HTN (hypertension)      Aortic stenosis      Rectal bleeding  last hospitalization 10/6/20 2/2 radiation proctitis      [place] (hard of hearing)      H/O prostatectomy  2015      S/P T&amp;A (status post tonsillectomy and adenoidectomy)  child           FAMILY HISTORY:  FH: cancer (Father, Mother)    [place]    Social History:Non smoker    MEDICATIONS  (STANDING):  aminocaproic acid Tablet 500 milliGRAM(s) Oral <User Schedule>  fluticasone propionate 50 MICROgram(s)/spray Nasal Spray 1 Spray(s) Both Nostrils two times a day  lidocaine 2% Jelly 5 milliLiter(s) IntraUrethral once  lidocaine 2% Jelly 5 milliLiter(s) IntraUrethral once  loratadine 10 milliGRAM(s) Oral daily  metoprolol succinate ER 50 milliGRAM(s) Oral daily  multivitamin/minerals 1 Tablet(s) Oral daily  Orgovyx (relugolix) 120mg tab 1 Tablet(s) 1 Tablet(s) Oral daily  polyethylene glycol 3350 17 Gram(s) Oral daily  urea Oral Powder 15 Gram(s) Oral daily    MEDICATIONS  (PRN):  acetaminophen     Tablet .. 650 milliGRAM(s) Oral every 6 hours PRN Temp greater or equal to 38C (100.4F), Mild Pain (1 - 3)  aluminum hydroxide/magnesium hydroxide/simethicone Suspension 30 milliLiter(s) Oral every 4 hours PRN Dyspepsia  melatonin 3 milliGRAM(s) Oral at bedtime PRN Insomnia  morphine  - Injectable 1 milliGRAM(s) IV Push every 12 hours PRN Severe Pain (7 - 10)  ondansetron Injectable 4 milliGRAM(s) IV Push every 8 hours PRN Nausea and/or Vomiting   Meds reviewed    Allergies    penicillin (Diarrhea; Pruritus; Hives)  penicillin V potassium (Rash)    Intolerances    codeine (Nausea)       REVIEW OF SYSTEMS:    Review of Systems:   Constitutional: Denies fatigue  HEENT: Denies headaches and dizziness  Respiratory: denies SOB, cough, or wheezing  Cardiovascular: denies CP, palpitations  Gastrointestinal: Denies nausea, denies vomiting, diarrhea, constipation, abdominal pain, or bloody stools  Genitourinary:+ hematuria  Skin: denies rashes or itching  Musculoskeletal: denies muscle aches, joint swelling  Neurologic: Denies generalized weakness, denies loss of sensation, numbness, or tingling      Vital Signs Last 24 Hrs  T(C): 36.6 (27 Feb 2023 13:35), Max: 36.7 (26 Feb 2023 20:56)  T(F): 97.8 (27 Feb 2023 13:35), Max: 98.1 (26 Feb 2023 20:56)  HR: 84 (27 Feb 2023 13:35) (70 - 87)  BP: 123/72 (27 Feb 2023 13:35) (116/63 - 157/64)  BP(mean): --  RR: 18 (27 Feb 2023 13:35) (18 - 18)  SpO2: 94% (27 Feb 2023 13:35) (94% - 98%)    Parameters below as of 27 Feb 2023 13:35  Patient On (Oxygen Delivery Method): room air      Daily     Daily     PHYSICAL EXAM:    GENERAL: NAD  HEAD:  Atraumatic, Normocephalic  EYES: EOMI, conjunctiva and sclera clear  ENMT: No Drainage from nares, No drainage from ears  NECK: Supple, neck  veins full  NERVOUS SYSTEM:  Awake and Alert  CHEST/LUNG: Clear to percussion bilaterally; No rales, rhonchi, wheezing, or rubs  HEART: Regular rate and rhythm; No murmurs, rubs, or gallops  ABDOMEN: Soft, Nontender, Nondistended; Bowel sounds present  EXTREMITIES:  No Edema  SKIN: No rashes No obvious ecchymosis  +Bowers-bloody      LABS:                        7.6    7.29  )-----------( 266      ( 27 Feb 2023 10:20 )             22.8     02-27    127<L>  |  96  |  9   ----------------------------<  109<H>  3.8   |  23  |  0.69    Ca    8.0<L>      27 Feb 2023 10:20                  RADIOLOGY & ADDITIONAL TESTS:

## 2023-02-28 NOTE — CONSULT NOTE ADULT - REASON FOR ADMISSION
Hematuria/Urinary Retention

## 2023-02-28 NOTE — PROGRESS NOTE ADULT - ASSESSMENT
SANJUANA  Hyponatremia  Metabolic acidosis  Hematuria  HTN  Anemia  Prostate Caner S/P Prostatectomy and radiation      -SANJUANA clinically behaving as obstructive uropathy but bladder scan showed no urine suggest ATN instead. Gr replaced. CBI per urology   -IVF x NS 1 day  -Gr  -Add oral bicarb  -Add Sodium chloride tabs   -Hyponatremia likely multifactorial, decreased solute intake + increased solvent intake + component of SIADH  -Urine lytes will be unrevealing Patient on CBI  -Take off salt restriction. Started on regular diet   -PRBCs will help with hyponatremia given osm of blood. Getting PRBCs now   -DC UreNa  -Will not fluid restrict at this time, as he drinks to help augment urine output to help with hematuria, but maybe necessary   -Avoid NSAIDs for pain, can worsen hyponatremia    D/w family at bedside in detail     d/w primary

## 2023-02-28 NOTE — PROVIDER CONTACT NOTE (OTHER) - RECOMMENDATIONS
Have doctor assess and possibly order for flush
Notify provider
attempt to irrigate or replace bowers
Have provider come to bedside and order another irrigation to ensure patency
Notify provider
Additional dose of BP medication?

## 2023-02-28 NOTE — PROVIDER CONTACT NOTE (OTHER) - SITUATION
Patient is having hematuria again.
Patient admitted for urinary retention and hematuria and was placed on CBI which is now clamped as per orders
Pt complaining of increased abdominal pain 10/10
patient bowers removed at 950 am.  patient only voids 50cc bloody urine, no blood clots
pt c/o pain and spasms. attempted to irrigate bowers and was unable. urine dark red
Patient states he feels slight bladder discomfort/pressure.
Patient with BP of 175/80 manually.
Pt complaining of inability to urinate

## 2023-02-28 NOTE — PROVIDER CONTACT NOTE (OTHER) - ACTION/TREATMENT ORDERED:
surgical pa irrigated bowers but pain increased with cbi.  PA changed bowers inserted 22f 30cc balloon. few clots removed.   bowers patent cbi urine punch colored no clots

## 2023-02-28 NOTE — PROVIDER CONTACT NOTE (OTHER) - ASSESSMENT
Patient denies any symptoms. Patient took his daily dose of metoprolol this AM.
urine dark red. pain in lower abdomen
Patient has no urine output flowing from catheter. Bladder scan completed which showed 284 mL.
Patient has slight change in urine color from clear early this evening with small clots passing to now pink tinged in bowers catheter
Pt a&o4, VSS, Pt has CBI running, fruit punch colored urine draining. Now complaining of increased lower abdominal pain. At times Pt has output from penis.
Pt a&o4, VSS, Pt bowers was removed at 2230 2/19 as per orders. Pt passed TOV and voided bloody urine in urinal. Now Pt yelling in pain that he cannot void. bladder scan showed 520cc.

## 2023-02-28 NOTE — PROGRESS NOTE ADULT - ASSESSMENT
Persistent hematuria 2n2 rad cystitis; organized clot in bladder per CT.      If cleared by cardiology, will proceed w/ cysto, clot evacuation, fulguration 3/1. O/B/R/Q discussed w/ pt. Consent obtained.  Cont bowers/cbi wide open  Monitor renal indices

## 2023-02-28 NOTE — PROGRESS NOTE ADULT - ASSESSMENT
72 year old male with AV disease, s/p TAVR in 12/20, with post op SSS requiring PPM, here with hematuria and radiation hemorrhagic cystitis.     VHD, SSS s/p PPM  - No sign of acute ischemia.   - S/p TAVR, no anginal complaints or volume overload  - He has minimal CAD based on cath in 2020.    - BP, HR stable and controlled  - Continue with BB  - On Orgovyx and Qtc is acceptable on EKG (441) 2/20    - + hematuria, CBI ongoing, urology following, on po Amicar    - hgb up and down, transfuse per primary    - had been off antiplatelets for well over a year before this event, no need to resume nito in light of the ongoing issues with hematuria     - Has syncope 2/28 am, no with worsening creatinine. Trend for now.     - Monitor and replete lytes, keep K>4, Mg>2.  - Will continue to follow.    Jess Mckeon, MS FNP, [facility]  Nurse Practitioner- Cardiology   Spectra #[phone] 72 year old male with AV disease, s/p TAVR in 12/20, with post op SSS requiring PPM, here with hematuria and radiation hemorrhagic cystitis.     VHD, SSS s/p PPM  - No sign of acute ischemia.   - S/p TAVR, no anginal complaints or volume overload  - He has minimal CAD based on cath in 2020.    - BP, HR stable and controlled  - Continue with BB  - On Orgovyx and Qtc is acceptable on EKG (441) 2/20    - + hematuria, CBI ongoing, urology following  - hgb up and down, transfuse per primary    - had been off antiplatelets for well over a year before this event, no need to resume nito in light of the ongoing issues with hematuria     - Has syncope 2/28 am, no with worsening creatinine. Trend for now.     - Monitor and replete lytes, keep K>4, Mg>2.  - Will continue to follow.    Jess Mckeon, MS FNP, [facility]  Nurse Practitioner- Cardiology   Spectra #[phone]

## 2023-02-28 NOTE — PROGRESS NOTE ADULT - SUBJECTIVE AND OBJECTIVE BOX
INTERVAL HPI/OVERNIGHT EVENTS:  Syncopal episode 5am while on commode for BM. c/o bladder spasm. Persistent hematuiria.  Bowers changed overnight.    MEDICATIONS  (STANDING):  aminocaproic acid Tablet 500 milliGRAM(s) Oral <User Schedule>  ciprofloxacin   IVPB 400 milliGRAM(s) IV Intermittent once  fluticasone propionate 50 MICROgram(s)/spray Nasal Spray 1 Spray(s) Both Nostrils two times a day  lidocaine 2% Jelly 5 milliLiter(s) IntraUrethral once  lidocaine 2% Jelly 5 milliLiter(s) IntraUrethral once  lidocaine 2% Jelly 10 milliLiter(s) IntraUrethral once  loratadine 10 milliGRAM(s) Oral daily  metoprolol succinate ER 50 milliGRAM(s) Oral daily  metroNIDAZOLE  IVPB 500 milliGRAM(s) IV Intermittent once  multivitamin/minerals 1 Tablet(s) Oral daily  Orgovyx (relugolix) 120mg tab 1 Tablet(s) 1 Tablet(s) Oral daily  polyethylene glycol 3350 17 Gram(s) Oral daily  sodium chloride 0.9%. 1000 milliLiter(s) (75 mL/Hr) IV Continuous <Continuous>  urea Oral Powder 15 Gram(s) Oral daily    MEDICATIONS  (PRN):  acetaminophen     Tablet .. 650 milliGRAM(s) Oral every 6 hours PRN Temp greater or equal to 38C (100.4F), Mild Pain (1 - 3)  aluminum hydroxide/magnesium hydroxide/simethicone Suspension 30 milliLiter(s) Oral every 4 hours PRN Dyspepsia  melatonin 3 milliGRAM(s) Oral at bedtime PRN Insomnia  morphine  - Injectable 1 milliGRAM(s) IV Push every 12 hours PRN Severe Pain (7 - 10)  ondansetron Injectable 4 milliGRAM(s) IV Push every 8 hours PRN Nausea and/or Vomiting        Vital Signs Last 24 Hrs  T(C): 36.5 (28 Feb 2023 04:30), Max: 36.7 (27 Feb 2023 21:23)  T(F): 97.7 (28 Feb 2023 04:30), Max: 98.1 (27 Feb 2023 21:23)  HR: 93 (28 Feb 2023 06:45) (80 - 108)  BP: 158/83 (28 Feb 2023 06:45) (116/63 - 158/83)  BP(mean): --  RR: 18 (28 Feb 2023 04:30) (18 - 18)  SpO2: 92% (28 Feb 2023 04:30) (92% - 94%)    Parameters below as of 28 Feb 2023 04:30  Patient On (Oxygen Delivery Method): room air        PHYSICAL EXAM:    ABDOMEN: benign  GENITALIA: bowers - light pink cbi    LABS:                        8.1    18.71 )-----------( 321      ( 28 Feb 2023 05:34 )             24.4     02-28    125<L>  |  94<L>  |  17  ----------------------------<  186<H>  4.5   |  18<L>  |  2.50<H>    Ca    8.6      28 Feb 2023 05:34  Phos  5.1     02-28  Mg     2.3     02-28      PT/INR - ( 28 Feb 2023 05:34 )   PT: 13.3 sec;   INR: 1.14 ratio         PTT - ( 28 Feb 2023 05:34 )  PTT:26.9 sec      Blood Cultures:    RADIOLOGY & ADDITIONAL TESTS:  < from: CT Abdomen and Pelvis No Cont (02.28.23 @ 06:17) >    ******PRELIMINARY REPORT******      ******PRELIMINARY REPORT******         ACC: [id number] EXAM:  CT ABDOMEN AND PELVIS   ORDERED BY: NAYELY BARRAZA     PROCEDURE DATE:  02/28/2023    ******PRELIMINARY REPORT******      ******PRELIMINARY REPORT******          INTERPRETATION:  Distend urinary bladder with high attenuation material   and gas surrounding the bowers catheter balloon likely representing an   intravesicular hematoma. Correlate with urinalysis.        ******PRELIMINARY REPORT******      ******PRELIMINARY REPORT******         ANGELITO TAM MD; Attending Radiologist  This document is a PRELIMINARY interpretation and is pending final   attending approval. Feb 28 2023  6:59AM    < end of copied text >

## 2023-02-28 NOTE — PROGRESS NOTE ADULT - ASSESSMENT
73 y/o M w/ PMHx of HTN,  AS s/p TAVR, ppm, prostate cancer s/p radical prostatectomy c/b radiation proctitis presents to ED for severe pain and urinary retention s/p cystoscopy. Admit for hematuria / urinary retention.      Problem/Plan - 1:  ·  Problem: Urinary retention with hematuria :   s/p RRP for Ca Prostate by Eusebio Martinez, XRT for recurrence, on Orgovyx  s/p negative cysto x2 by Dr. Luis at [facility] [place], last one 5 weeks ago  admitted for clot retention, hematuria likely due to radiation cystitis  bowers was discontinued, not being able to void and hematuria recur , bowers was placed back   called and discussed with Dr. Luis at [facility], ok for discharge even if the urine is light pink color.   Plan to d/c with bowers, discussed with nursing about teach patient catheter care and flushing/irrigation   taper amicar: amicar 500mg bid for 3 days , then 500mg once a day for 3 days upon discharge  Currently on CBI after recurrent hematuria.  Will continue to monitor hemoglobin. Continue amicar 500mg PO Q12h  Pt. scheduled for cystoscopy tomorrow.  Pt. without s/s of ACS, decompensated CHF, or unstable arrhythmia.  No absolute contraindication from medical perspective to proceed with cystoscopy.    Urology f/u         Problem/Plan - 2:  ·  Problem: Prostate cancer.   ·  Plan: Hx of Prostate CA, now s/p radiation therapy and radical prostatectomy  - Continue home Orgovyx - brought in from home  - Patient to f/u with his urologist at [facility] [place] (Dr. Luis) outpatient for further management.     Problem/Plan - 3:  ·  Problem: Hypertension.   - BP stable  - Continue home Metoprolol ER 50mg PO daily w/ hold parameters.     Problem/Plan - 4:  ·  Problem: Need for prophylactic measure.   ·  Plan: SCDs b/l for dvt ppx; will hold off on A/C at this time 2/2 hematuria.     Problem/Plan - 5:   acute blood loss anemia:   s/p 2 unit blood transfusion.  Will receive another unit today.  Continue to monitor hbg.     #SIRS: Pt. given dose of IV Cipro and Flagyl.  Check blood cx and CXR.     #Syncope:  likely vasovagal.  Check echo.  NS hydration    #SANJUANA and hyponatremia:  not in retention.  Bowers draining well.  NS@75cc/hr x 12 hours.  Nephrology f/u           73 y/o M w/ PMHx of HTN,  AS s/p TAVR, ppm, prostate cancer s/p radical prostatectomy c/b radiation proctitis presents to ED for severe pain and urinary retention s/p cystoscopy. Admit for hematuria / urinary retention.      Problem/Plan - 1:  ·  Problem: Urinary retention with hematuria :   s/p RRP for Ca Prostate by Eusebio Martinez, XRT for recurrence, on Orgovyx  [place] [facility] [place]  admitted for clot retention, hematuria likely due to radiation cystitis  bowers was discontinued, not being able to void and hematuria recur , bowers was placed back   called and discussed with Dr. Luis at [facility], ok for discharge even if the urine is light pink color.   Plan to d/c with bowers, discussed with nursing about teach patient catheter care and flushing/irrigation   taper amicar: amicar 500mg bid for 3 days , then 500mg once a day for 3 days upon discharge  Currently on CBI after recurrent hematuria.  Will continue to monitor hemoglobin. Continue amicar 500mg PO Q12h  Pt. scheduled for cystoscopy tomorrow.  Pt. without s/s of ACS, decompensated CHF, or unstable arrhythmia.  No absolute contraindication from medical perspective to proceed with cystoscopy.    Urology f/u         Problem/Plan - 2:  ·  Problem: Prostate cancer.   ·  Plan: Hx of Prostate CA, now s/p radiation therapy and radical prostatectomy  - Continue home Orgovyx - brought in from home  - Patient to f[place] [facility] [place] for further management.     Problem/Plan - 3:  ·  Problem: Hypertension.   - BP stable  - Continue home Metoprolol ER 50mg PO daily w/ hold parameters.     Problem/Plan - 4:  ·  Problem: Need for prophylactic measure.   ·  Plan: SCDs b/l for dvt ppx; will hold off on A/C at this time 2/2 hematuria.     Problem/Plan - 5:   acute blood loss anemia:   s/p 2 unit blood transfusion.  Will receive another unit today.  Continue to monitor hbg.     #SIRS: Pt. given dose of IV Cipro and Flagyl.  Check blood cx and CXR. ID consult.    #Syncope:  likely vasovagal.  Check echo.  NS hydration    #SANJUANA and hyponatremia:  not in retention.  Bowers draining well.  NS@75cc/hr x 12 hours.  Nephrology f/u           73 y/o M w/ PMHx of HTN,  AS s/p TAVR, ppm, prostate cancer s/p radical prostatectomy c/b radiation proctitis presents to ED for severe pain and urinary retention s/p cystoscopy. Admit for hematuria / urinary retention.      Problem/Plan - 1:  ·  Problem: Urinary retention with hematuria :   s/p RRP for Ca Prostate by Eusebio Martinez, XRT for recurrence, on Orgovyx  s/p negative cysto x2 [place] Dr. Luis at [facility] [place], last one 5 weeks ago  admitted for clot retention, hematuria likely due to radiation cystitis  bowers was discontinued, not being able to void and hematuria recur , bowers was placed back   called and discussed with Dr. Luis at [facility], ok for discharge even if the urine is light pink color.   Plan to d/c with bowers, discussed with nursing about teach patient catheter care and flushing/irrigation   taper amicar: amicar 500mg bid for 3 days , then 500mg once a day for 3 days upon discharge  Currently on CBI after recurrent hematuria.  Will continue to monitor hemoglobin. Continue amicar 500mg PO Q12h  Pt. scheduled for cystoscopy tomorrow.  Pt. without s/s of ACS, decompensated CHF, or unstable arrhythmia.  No absolute contraindication from medical perspective to proceed with cystoscopy.    Urology f/u         Problem/Plan - 2:  ·  Problem: Prostate cancer.   ·  Plan: Hx of Prostate CA, now s/p radiation therapy and radical prostatectomy  - Continue home Orgovyx - brought in from home  - Patient to f/u with his urologist at [facility] [place] (Dr. Luis) outpatient for further management.     Problem/Plan - 3:  ·  Problem: Hypertension.   - BP stable  - Continue home Metoprolol ER 50mg PO daily w/ hold parameters.     Problem/Plan - 4:  ·  Problem: Need for prophylactic measure.   ·  Plan: SCDs b/l for dvt ppx; will hold off on A/C at this time 2/2 hematuria.     Problem/Plan - 5:   acute blood loss anemia:   s/p 2 unit blood transfusion.  Will receive another unit today.  Continue to monitor hbg.     #SIRS: Pt. given a dose of IV Cipro and Flagyl.  Check blood cx and CXR. ID consult.    #Syncope:  likely vasovagal.  Check echo.  NS hydration    #SANJUANA and hyponatremia:  not in retention.  Bowers draining well.  NS@75cc/hr x 12 hours.  Nephrology f/u           73 y/o M w/ PMHx of HTN,  AS s/p TAVR, ppm, prostate cancer s/p radical prostatectomy c/b radiation proctitis presents to ED for severe pain and urinary retention s/p cystoscopy. Admit for hematuria / urinary retention.      Problem/Plan - 1:  ·  Problem: Urinary retention with hematuria :   s/p RRP for Ca Prostate by Eusebio Martinez, XRT for recurrence, on Orgovyx  s/p negative cysto x2 by Dr. Luis at [facility] [place], last one 5 weeks ago  admitted for clot retention, hematuria likely due to radiation cystitis  bowers was discontinued, not being able to void and hematuria recur , bowers was placed back   called and discussed with Dr. Luis at [facility], ok for discharge even if the urine is light pink color.   Plan to d/c with bowers, discussed with nursing about teach patient catheter care and flushing/irrigation   taper amicar: amicar 500mg bid for 3 days , then 500mg once a day for 3 days upon discharge  Currently on CBI after recurrent hematuria.  Will continue to monitor hemoglobin. Continue amicar 500mg PO Q12h  Pt. scheduled for cystoscopy tomorrow.  Pt. without s/s of ACS, decompensated CHF, or unstable arrhythmia.  No absolute contraindication from medical perspective to proceed with cystoscopy.  Will need to continue monitoring sodium levels.   Urology f/u         Problem/Plan - 2:  ·  Problem: Prostate cancer.   ·  Plan: Hx of Prostate CA, now s/p radiation therapy and radical prostatectomy  - Continue home Orgovyx - brought in from home  - Patient to f/u with his urologist at [facility] [place] (Dr. Luis) outpatient for further management.     Problem/Plan - 3:  ·  Problem: Hypertension.   - BP stable  - Continue home Metoprolol ER 50mg PO daily w/ hold parameters.     Problem/Plan - 4:  ·  Problem: Need for prophylactic measure.   ·  Plan: SCDs b/l for dvt ppx; will hold off on A/C at this time 2/2 hematuria.     Problem/Plan - 5:   acute blood loss anemia:   s/p 2 unit blood transfusion.  Will receive another unit today.  Continue to monitor hbg.     #SIRS: Pt. given a dose of IV Cipro and Flagyl.  Check blood cx and CXR. ID consult.    #Syncope:  likely vasovagal.  Check echo.  NS hydration    #SANJUANA and hyponatremia:  not in retention.  Bowers draining well.  NS@75cc/hr x 12 hours.  Nephrology f/u

## 2023-02-28 NOTE — PROGRESS NOTE ADULT - SUBJECTIVE AND OBJECTIVE BOX
Patient is a 72y old  Male who presents with a chief complaint of Hematuria/Urinary Retention (27 Feb 2023 12:32)       HPI:  71 y/o M w/  PMHx of HTN, AS s/p TAVR, ppm, prostate cancer s/p radical prostatectomy c/b radiation proctitis presents to ED for severe pain and urinary retention s/p cystoscopy. Patient follows with urology at [facility]; was being seen for cystoscopy after experiencing hematuria. He has had hematuria before approximately one year ago; underwent cystoscopy without complications at that time. He underwent multiple rounds of radiation therapy with ultimate radical prostatectomy for prostate CA. He has been diagnosed with radiation proctitis. Patient feels that this his current presentation is likely 2/2  radiation cystitis. Patient reports pain is improved after bowers/CBI placed.   Patient developed hyponatremia prompting renal consult.  He has been having ongoing hematuria. On CBI.  Denies any N/V.  Has lightheadedness at times.      2/28/23: Had syncope overnight while walking to commode. Renal function much worsen today. C/o bladder pain. Bowers replaced. Bladder scan showed no PVR    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      HTN (hypertension)      Aortic stenosis      Rectal bleeding  last hospitalization 10/6/20 2/2 radiation proctitis      [place] ([place] of hearing)      H/O prostatectomy  2015      S/P T&amp;A (status post tonsillectomy and adenoidectomy)  child           FAMILY HISTORY:  FH: cancer (Father, Mother)    NC    Social History:Non smoker    MEDICATIONS  (STANDING):  aminocaproic acid Tablet 500 milliGRAM(s) Oral <User Schedule>  fluticasone propionate 50 MICROgram(s)/spray Nasal Spray 1 Spray(s) Both Nostrils two times a day  lidocaine 2% Jelly 5 milliLiter(s) IntraUrethral once  lidocaine 2% Jelly 5 milliLiter(s) IntraUrethral once  loratadine 10 milliGRAM(s) Oral daily  metoprolol succinate ER 50 milliGRAM(s) Oral daily  multivitamin/minerals 1 Tablet(s) Oral daily  Orgovyx (relugolix) 120mg tab 1 Tablet(s) 1 Tablet(s) Oral daily  polyethylene glycol 3350 17 Gram(s) Oral daily  urea Oral Powder 15 Gram(s) Oral daily    MEDICATIONS  (PRN):  acetaminophen     Tablet .. 650 milliGRAM(s) Oral every 6 hours PRN Temp greater or equal to 38C (100.4F), Mild Pain (1 - 3)  aluminum hydroxide/magnesium hydroxide/simethicone Suspension 30 milliLiter(s) Oral every 4 hours PRN Dyspepsia  melatonin 3 milliGRAM(s) Oral at bedtime PRN Insomnia  morphine  - Injectable 1 milliGRAM(s) IV Push every 12 hours PRN Severe Pain (7 - 10)  ondansetron Injectable 4 milliGRAM(s) IV Push every 8 hours PRN Nausea and/or Vomiting   Meds reviewed    Allergies    penicillin (Diarrhea; Pruritus; Hives)  penicillin V potassium (Rash)    Intolerances    codeine (Nausea)       REVIEW OF SYSTEMS:    Review of Systems:   Constitutional: Denies fatigue  HEENT: Denies headaches and dizziness  Respiratory: denies SOB, cough, or wheezing  Cardiovascular: denies CP, palpitations  Gastrointestinal: Denies nausea, denies vomiting, diarrhea, constipation, abdominal pain, or bloody stools  Genitourinary:+ hematuria  Skin: denies rashes or itching  Musculoskeletal: denies muscle aches, joint swelling  Neurologic: Denies generalized weakness, denies loss of sensation, numbness, or tingling      Vital Signs Last 24 Hrs  T(C): 36.5 (28 Feb 2023 04:30), Max: 36.7 (27 Feb 2023 21:23)  T(F): 97.7 (28 Feb 2023 04:30), Max: 98.1 (27 Feb 2023 21:23)  HR: 93 (28 Feb 2023 06:45) (80 - 108)  BP: 158/83 (28 Feb 2023 06:45) (116/63 - 158/83)  BP(mean): --  RR: 18 (28 Feb 2023 04:30) (18 - 18)  SpO2: 92% (28 Feb 2023 04:30) (92% - 94%)    Parameters below as of 28 Feb 2023 04:30  Patient On (Oxygen Delivery Method): room air        PHYSICAL EXAM:    GENERAL: NAD  HEAD:  Atraumatic, Normocephalic  EYES: EOMI, conjunctiva and sclera clear  ENMT: No Drainage from nares, No drainage from ears  NECK: Supple, neck  veins full  NERVOUS SYSTEM:  Awake and Alert  CHEST/LUNG: Clear to percussion bilaterally; No rales, rhonchi, wheezing, or rubs  HEART: Regular rate and rhythm; No murmurs, rubs, or gallops  ABDOMEN: Soft, Nontender, Nondistended; Bowel sounds present  EXTREMITIES:  No Edema  SKIN: No rashes No obvious ecchymosis  +Bowers-bloody      LABS:                        8.1    18.71 )-----------( 321      ( 28 Feb 2023 05:34 )             24.4     02-28    125<L>  |  94<L>  |  17  ----------------------------<  186<H>  4.5   |  18<L>  |  2.50<H>    Ca    8.6      28 Feb 2023 05:34  Phos  5.1     02-28  Mg     2.3     02-28      PT/INR - ( 28 Feb 2023 05:34 )   PT: 13.3 sec;   INR: 1.14 ratio         PTT - ( 28 Feb 2023 05:34 )  PTT:26.9 sec

## 2023-02-28 NOTE — PROGRESS NOTE ADULT - SUBJECTIVE AND OBJECTIVE BOX
CHIEF COMPLAINT/INTERVAL HISTORY:  Pt. seen and evaluated for hematuria and urinary retention.  Events from early AM noted with rapid response for syncope and SIRS.  Pt. reports having pelvic discomfort.  Given IV ofirmev.  Given dose of IV Cipro and Flagyl.  Also started on IVF for SANUJANA.      REVIEW OF SYSTEMS:  No fever, CP, or SOB.      Vital Signs Last 24 Hrs  T(C): 36.5 (28 Feb 2023 04:30), Max: 36.7 (27 Feb 2023 21:23)  T(F): 97.7 (28 Feb 2023 04:30), Max: 98.1 (27 Feb 2023 21:23)  HR: 93 (28 Feb 2023 06:45) (80 - 108)  BP: 158/83 (28 Feb 2023 06:45) (116/63 - 158/83)  BP(mean): --  RR: 18 (28 Feb 2023 04:30) (18 - 18)  SpO2: 92% (28 Feb 2023 04:30) (92% - 94%)    Parameters below as of 28 Feb 2023 04:30  Patient On (Oxygen Delivery Method): room air        PHYSICAL EXAM:  GENERAL: mild distress 2/2 pain  HEENT: EOMI, hearing normal, conjunctiva and sclera clear  Chest: CTA bilaterally, no wheezing  CV: S1S2, RRR,   GI: soft, +BS, ND, suprapubic discomfort  : +bowers catheter with pinkish urine  Musculoskeletal: no edema  Psychiatric: affect nL, mood nL  Skin: warm and dry    LABS:                        8.1    18.71 )-----------( 321      ( 28 Feb 2023 05:34 )             24.4     02-28    125<L>  |  94<L>  |  17  ----------------------------<  186<H>  4.5   |  18<L>  |  2.50<H>    Ca    8.6      28 Feb 2023 05:34  Phos  5.1     02-28  Mg     2.3     02-28      PT/INR - ( 28 Feb 2023 05:34 )   PT: 13.3 sec;   INR: 1.14 ratio         PTT - ( 28 Feb 2023 05:34 )  PTT:26.9 sec

## 2023-02-28 NOTE — PROVIDER CONTACT NOTE (OTHER) - REASON
Pt complaining of inability to urinate
Patient complaint of slight bladder discomfort
Patient is havin hematuria again.
update on patient status
Patient with BP of 175/80 manually.
Pink tinged urine output in catheter
Pt complaining of increased abdominal pain 10/10
c/o spasms and unable to irrigate bowers

## 2023-02-28 NOTE — PROVIDER CONTACT NOTE (OTHER) - NAME OF MD/NP/PA/DO NOTIFIED:
[place]
Dr. Finnegan
MD Borden
Surgical LIANET lopez and resident
Dr Egan ([phone])
Dr. Archer
Dr. NABEEL Arevalo.
Dr. Archer

## 2023-02-28 NOTE — PROGRESS NOTE ADULT - SUBJECTIVE AND OBJECTIVE BOX
[facility] -- Reina Rodriguez Pannella, Patel, Savella, Goodger: Office # [phone]    Follow Up: VHD, ppm, hematuria    Subjective/Observations: Patient seen and examined. Patient awake, alert, resting in bed. No complaints of chest pain, dyspnea, palpitations or dizziness. No signs of orthopnea or PND. Rapid response was called at 5:10 am for witnessed syncopal episode while using the bedside commode followed by projectile vomit. Continuing with hematuria. 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      HTN (hypertension)      Aortic stenosis      Rectal bleeding  last hospitalization 10/6/20 2/2 radiation proctitis      [place] (hard of hearing)      H/O prostatectomy  2015      S/P T&amp;A (status post tonsillectomy and adenoidectomy)  child    MEDICATIONS  (STANDING):  aminocaproic acid Tablet 500 milliGRAM(s) Oral <User Schedule>  fluticasone propionate 50 MICROgram(s)/spray Nasal Spray 1 Spray(s) Both Nostrils two times a day  lidocaine 2% Jelly 5 milliLiter(s) IntraUrethral once  lidocaine 2% Jelly 5 milliLiter(s) IntraUrethral once  lidocaine 2% Jelly 10 milliLiter(s) IntraUrethral once  loratadine 10 milliGRAM(s) Oral daily  metoprolol succinate ER 50 milliGRAM(s) Oral daily  multivitamin/minerals 1 Tablet(s) Oral daily  ondansetron Injectable 4 milliGRAM(s) IV Push once  Orgovyx (relugolix) 120mg tab 1 Tablet(s) 1 Tablet(s) Oral daily  polyethylene glycol 3350 17 Gram(s) Oral daily  urea Oral Powder 15 Gram(s) Oral daily    MEDICATIONS  (PRN):  acetaminophen     Tablet .. 650 milliGRAM(s) Oral every 6 hours PRN Temp greater or equal to 38C (100.4F), Mild Pain (1 - 3)  aluminum hydroxide/magnesium hydroxide/simethicone Suspension 30 milliLiter(s) Oral every 4 hours PRN Dyspepsia  melatonin 3 milliGRAM(s) Oral at bedtime PRN Insomnia  morphine  - Injectable 1 milliGRAM(s) IV Push every 12 hours PRN Severe Pain (7 - 10)  ondansetron Injectable 4 milliGRAM(s) IV Push every 8 hours PRN Nausea and/or Vomiting    Allergies    penicillin (Diarrhea; Pruritus; Hives)  penicillin V potassium (Rash)    Intolerances  codeine (Nausea)    Vital Signs Last 24 Hrs  T(C): 36.5 (28 Feb 2023 04:30), Max: 36.7 (27 Feb 2023 21:23)  T(F): 97.7 (28 Feb 2023 04:30), Max: 98.1 (27 Feb 2023 21:23)  HR: 108 (28 Feb 2023 04:30) (80 - 108)  BP: 124/76 (28 Feb 2023 04:30) (116/63 - 155/81)  BP(mean): --  RR: 18 (28 Feb 2023 04:30) (18 - 18)  SpO2: 92% (28 Feb 2023 04:30) (92% - 94%)    Parameters below as of 28 Feb 2023 04:30  Patient On (Oxygen Delivery Method): room air      I&O's Summary    27 Feb 2023 07:01  -  28 Feb 2023 07:00  --------------------------------------------------------  IN: 346 mL / OUT: 7700 mL / NET: -7354 mL          TELE: Not on telemetry   PHYSICAL EXAM:  Constitutional: NAD, awake and alert  HEENT: Moist Mucous Membranes, Anicteric  Pulmonary: Non-labored, breath sounds are clear bilaterally, No wheezing, rales or rhonchi  Cardiovascular: Regular, S1 and S2, + murmurs, No rubs, gallops or clicks  Gastrointestinal:  soft, nontender, nondistended   : + Gr   Lymph: No peripheral edema. No lymphadenopathy.   Skin: No visible rashes or ulcers.  Psych:  Mood & affect appropriate      LABS: All Labs Reviewed:                        8.1    18.71 )-----------( 321      ( 28 Feb 2023 05:34 )             24.4                         7.6    7.29  )-----------( 266      ( 27 Feb 2023 10:20 )             22.8                         8.6    7.48  )-----------( 258      ( 26 Feb 2023 07:05 )             26.6     28 Feb 2023 05:34    125    |  94     |  17     ----------------------------<  186    4.5     |  18     |  2.50   27 Feb 2023 10:20    127    |  96     |  9      ----------------------------<  109    3.8     |  23     |  0.69   26 Feb 2023 07:05    132    |  98     |  9      ----------------------------<  100    4.1     |  26     |  0.65     Ca    8.6        28 Feb 2023 05:34  Ca    8.0        27 Feb 2023 10:20  Ca    8.2        26 Feb 2023 07:05  Phos  5.1       28 Feb 2023 05:34  Mg     2.3       28 Feb 2023 05:34       PT/INR - ( 28 Feb 2023 05:34 )   PT: 13.3 sec;   INR: 1.14 ratio         PTT - ( 28 Feb 2023 05:34 )  PTT:26.9 secCreatine Kinase, Serum: 93 U/L (02-28-23 @ 05:34)  Troponin I, High Sensitivity Result: 9.0 ng/L (02-28-23 @ 05:34)    12 Lead ECG:   Ventricular Rate 81 BPM    Atrial Rate 81 BPM    P-R Interval 198 ms    QRS Duration 128 ms    Q-T Interval 380 ms    QTC Calculation(Bazett) 441 ms    P Axis 12 degrees    R Axis 76 degrees    T Axis 18 degrees    Diagnosis Line Normal sinus rhythm  Right bundle branch block  Abnormal ECG  Confirmed by eldon Heller ([id number]) on 2/20/2023 1:36:02 PM (02-20-23 @ 10:41)    Patient name: HERBIE KERR  YOB: 1950   Age: 70 (M)   MR#: [id number]  Study Date: 2/4/2021  [facility]  Study quality: Technically good  Referring Physician: Rich Quesada MD  Blood Pressure: 168/87 mmHg  Height: 178 cm  Weight: 91 kg  BSA: 2.1 m2  Heart Rate: 76 mmHg  ------------------------------------------------------------------------  PROCEDURE: Transthoracic echocardiogram with 2-D, M-Mode  and complete spectral and color flow Doppler.  INDICATION: Nonrheumatic aortic (valve) stenosis (I35.0)  ------------------------------------------------------------------------  MEASUREMENTS: (See below)  ------------------------------------------------------------------------  OBSERVATIONS:  Mitral Valve: There is mitral annular calcification with  calcification on the anterior mitral leaflet. Mild mitral  regurgitation.  Aortic Root: Normal aortic root size. (Ao: 3.4 cm at the  sinuses of Valsalva).  Aortic Valve: #26mm Evolut Pro+ THV. The valve is well  seated with normal function.  The peak/mean gradients=  13/7mmHg with a DVI= 0.63. Peak transaortic valve gradient  equals 13 mm Hg, mean transaortic valve gradient equals 7  mm Hg, aortic valve velocity time integral equals 35 cm,  estimated aortic valve area equals 2.3 sqcm. There is a  mild paravalvular regurgitation jet originating from about  2 O'clock TTE surgical view.  There is no intravalvular  regurgitation seen. Peak left ventricular outflow tract  gradient equals 4mm Hg, mean gradient is equal to 3 mm Hg,  LVOT velocity time integral equals 22 cm.  Left Atrium: Mildly dilated left atrium.  LA volume index =  39 cc/m2.  Left Ventricle: Normal left ventricular systolic function.  No segmental wall motion abnormalities.  The LVEF about  55-60%. Normal left ventricular internal dimensions and  wall thicknesses. Mild diastolic dysfunction (Stage I).  Right Heart: Normal right atrium.  RA area= 13cm2, RA volume= 32ml. Normal right ventricular  size and function.  There is a device wire in the right heart. Normal tricuspid  valve. Minimal tricuspid regurgitation. Normal pulmonic  valve. Minimal pulmonic regurgitation.  Pericardium/PleuraNormal pericardium with no pericardial  effusion.  Hemodynamic: The estimated right atrial pressure is normal.  ------------------------------------------------------------------------  CONCLUSIONS:  1. #26mm Evolut Pro+ THV.  The valve is well seated with  normal function.  The peak/mean gradients= 13/7mmHg with a  DVI= 0.63. There is a mild paravalvular regurgitation jet  originating from about 2 O'clock TTE surgical view.  There  is no intravalvular regurgitation seen.  *** Compared with echocardiogram of 12/10/2020, no  significant changes noted.  ------------------------------------------------------------------------  PROCEDURE DESCRIPTION: Transthoracic echocardiogram with  2-D, M-Mode and complete spectral and color flow Doppler.  ------------------------------------------------------------------------  ECHOCARDIOGRAPHIC EXAMINATION:  AORTIC ROOT:  Aortic Root (Leaflet): 3.4 cm  Aortic Root Index: 0.9  LEFT ATRIUM:  AP Dimensions: 3.6 cm  LA Volume Index: 39.00 cc/sqm  LA Volume: 81.3 cc  LEFT VENTRICLE:  LVIDd: 4.1 cm  LVIDs: 2.9 cm  IVS: 1.18  ILWT: 1.0 cm  RWT: 0.47  LV Mass: 155.0 gm  LV Mass Index: 74 gm/sqm  EF (Visual Estimate): 55-60%  HR and BP:  HR: 76 bpm  BP: 168/87  BSA: 2.09  ------------------------------------------------------------------------  HEMODYNAMICS:  RA Pressure Estimate: 8  LA Pressure Estimate: < 20  IVRT: 53 ms  ------------------------------------------------------------------------  COLOR FLOW and SPECIAL DOPPLER:  AORTIC VALVE:  AV Peak Velocity: 1.7 M/sec  AV Peak Gradient: 12 mm Hg  AV Mean Gradient: 7 mm Hg  Valve Area: 2.3 sqcm  LVOT:  LVOT Velocity: 1.0 M/sec  LVOT Diameter: 2.1 cm  LVOT Peak Gradient Rest: 4 mm Hg  LVOT Mean Gradient Rest: 3 mm Hg  ------------------------------------------------------------------------  DIASTOLIC FUNCTION:  DT:187 ms  E/A: 0.90  e' Septal: 7.0 cm/s  E/e' Septal: 14.2  e' Lateral: 8.0 cm/s  E/e' Lateral: 12.5  MV E wave: 1.0 m/s  MV A wave: 1.1 m/s  Septal a': 10.0 cm/s  Lateral a': 11.0 cm/s  ------------------------------------------------------------------------  Confirmed on  2/4/2021 - 13:25:59 by Caroline Rizzo M.D.  ------------------------------------------------------------------------ [facility] -- Reina Rodriguez Pannella, Patel, Savella, Goodger: Office # [phone]    Follow Up: VHD, ppm, hematuria    Subjective/Observations: Patient seen and examined. Patient awake, alert, resting in bed. No complaints of chest pain, dyspnea, palpitations or dizziness. No signs of orthopnea or PND. Rapid response was called at 5:10 am for witnessed syncopal episode while using the bedside commode followed by projectile vomit. Continuing with hematuria and CBI. S/p PRBC. Tolerating O2 via nasal cannula.     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      HTN (hypertension)      Aortic stenosis      Rectal bleeding  last hospitalization 10/6/20 2/2 radiation proctitis      [place] of hearing[place]      H/O prostatectomy  2015      S/P T&amp;A (status post tonsillectomy and adenoidectomy)  child    MEDICATIONS  (STANDING):  aminocaproic acid Tablet 500 milliGRAM(s) Oral <User Schedule>  fluticasone propionate 50 MICROgram(s)/spray Nasal Spray 1 Spray(s) Both Nostrils two times a day  lidocaine 2% Jelly 5 milliLiter(s) IntraUrethral once  lidocaine 2% Jelly 5 milliLiter(s) IntraUrethral once  lidocaine 2% Jelly 10 milliLiter(s) IntraUrethral once  loratadine 10 milliGRAM(s) Oral daily  metoprolol succinate ER 50 milliGRAM(s) Oral daily  multivitamin/minerals 1 Tablet(s) Oral daily  ondansetron Injectable 4 milliGRAM(s) IV Push once  Orgovyx (relugolix) 120mg tab 1 Tablet(s) 1 Tablet(s) Oral daily  polyethylene glycol 3350 17 Gram(s) Oral daily  urea Oral Powder 15 Gram(s) Oral daily    MEDICATIONS  (PRN):  acetaminophen     Tablet .. 650 milliGRAM(s) Oral every 6 hours PRN Temp greater or equal to 38C (100.4F), Mild Pain (1 - 3)  aluminum hydroxide/magnesium hydroxide/simethicone Suspension 30 milliLiter(s) Oral every 4 hours PRN Dyspepsia  melatonin 3 milliGRAM(s) Oral at bedtime PRN Insomnia  morphine  - Injectable 1 milliGRAM(s) IV Push every 12 hours PRN Severe Pain (7 - 10)  ondansetron Injectable 4 milliGRAM(s) IV Push every 8 hours PRN Nausea and/or Vomiting    Allergies    penicillin (Diarrhea; Pruritus; Hives)  penicillin V potassium (Rash)    Intolerances  codeine (Nausea)    Vital Signs Last 24 Hrs  T(C): 36.5 (28 Feb 2023 04:30), Max: 36.7 (27 Feb 2023 21:23)  T(F): 97.7 (28 Feb 2023 04:30), Max: 98.1 (27 Feb 2023 21:23)  HR: 108 (28 Feb 2023 04:30) (80 - 108)  BP: 124/76 (28 Feb 2023 04:30) (116/63 - 155/81)  BP(mean): --  RR: 18 (28 Feb 2023 04:30) (18 - 18)  SpO2: 92% (28 Feb 2023 04:30) (92% - 94%)    Parameters below as of 28 Feb 2023 04:30  Patient On (Oxygen Delivery Method): room air      I&O's Summary    27 Feb 2023 07:01  -  28 Feb 2023 07:00  --------------------------------------------------------  IN: 346 mL / OUT: 7700 mL / NET: -7354 mL          TELE:  90-100s   PHYSICAL EXAM:  Constitutional: NAD, awake and alert  HEENT: Moist Mucous Membranes, Anicteric  Pulmonary: Non-labored, breath sounds are clear bilaterally, No wheezing, rales or rhonchi  Cardiovascular: Regular, S1 and S2, + murmurs, No rubs, gallops or clicks  Gastrointestinal:  soft, nontender, nondistended   : + Gr   Lymph: No peripheral edema. No lymphadenopathy.   Skin: No visible rashes or ulcers.  Psych:  Mood & affect appropriate      LABS: All Labs Reviewed:                        8.1    18.71 )-----------( 321      ( 28 Feb 2023 05:34 )             24.4                         7.6    7.29  )-----------( 266      ( 27 Feb 2023 10:20 )             22.8                         8.6    7.48  )-----------( 258      ( 26 Feb 2023 07:05 )             26.6     28 Feb 2023 05:34    125    |  94     |  17     ----------------------------<  186    4.5     |  18     |  2.50   27 Feb 2023 10:20    127    |  96     |  9      ----------------------------<  109    3.8     |  23     |  0.69   26 Feb 2023 07:05    132    |  98     |  9      ----------------------------<  100    4.1     |  26     |  0.65     Ca    8.6        28 Feb 2023 05:34  Ca    8.0        27 Feb 2023 10:20  Ca    8.2        26 Feb 2023 07:05  Phos  5.1       28 Feb 2023 05:34  Mg     2.3       28 Feb 2023 05:34       PT/INR - ( 28 Feb 2023 05:34 )   PT: 13.3 sec;   INR: 1.14 ratio         PTT - ( 28 Feb 2023 05:34 )  PTT:26.9 secCreatine Kinase, Serum: 93 U/L (02-28-23 @ 05:34)  Troponin I, High Sensitivity Result: 9.0 ng/L (02-28-23 @ 05:34)    12 Lead ECG:   Ventricular Rate 81 BPM    Atrial Rate 81 BPM    P-R Interval 198 ms    QRS Duration 128 ms    Q-T Interval 380 ms    QTC Calculation(Bazett) 441 ms    P Axis 12 degrees    R Axis 76 degrees    T Axis 18 degrees    Diagnosis Line Normal sinus rhythm  Right bundle branch block  Abnormal ECG  Confirmed by eldon Heller ([id number]) on 2/20/2023 1:36:02 PM (02-20-23 @ 10:41)    Patient name: HERBIE KERR  YOB: 1950   Age: 70 (M)   MR#: [id number]  Study Date: 2/4/2021  [facility]  Study quality: Technically good  Referring Physician: Rich Quesada MD  Blood Pressure: 168/87 mmHg  Height: 178 cm  Weight: 91 kg  BSA: 2.1 m2  Heart Rate: 76 mmHg  ------------------------------------------------------------------------  PROCEDURE: Transthoracic echocardiogram with 2-D, M-Mode  and complete spectral and color flow Doppler.  INDICATION: Nonrheumatic aortic (valve) stenosis (I35.0)  ------------------------------------------------------------------------  MEASUREMENTS: (See below)  ------------------------------------------------------------------------  OBSERVATIONS:  Mitral Valve: There is mitral annular calcification with  calcification on the anterior mitral leaflet. Mild mitral  regurgitation.  Aortic Root: Normal aortic root size. (Ao: 3.4 cm at the  sinuses of Valsalva).  Aortic Valve: #26mm Evolut Pro+ THV. The valve is well  seated with normal function.  The peak/mean gradients=  13/7mmHg with a DVI= 0.63. Peak transaortic valve gradient  equals 13 mm Hg, mean transaortic valve gradient equals 7  mm Hg, aortic valve velocity time integral equals 35 cm,  estimated aortic valve area equals 2.3 sqcm. There is a  mild paravalvular regurgitation jet originating from about  2 O'clock TTE surgical view.  There is no intravalvular  regurgitation seen. Peak left ventricular outflow tract  gradient equals 4mm Hg, mean gradient is equal to 3 mm Hg,  LVOT velocity time integral equals 22 cm.  Left Atrium: Mildly dilated left atrium.  LA volume index =  39 cc/m2.  Left Ventricle: Normal left ventricular systolic function.  No segmental wall motion abnormalities.  The LVEF about  55-60%. Normal left ventricular internal dimensions and  wall thicknesses. Mild diastolic dysfunction (Stage I).  Right Heart: Normal right atrium.  RA area= 13cm2, RA volume= 32ml. Normal right ventricular  size and function.  There is a device wire in the right heart. Normal tricuspid  valve. Minimal tricuspid regurgitation. Normal pulmonic  valve. Minimal pulmonic regurgitation.  Pericardium/PleuraNormal pericardium with no pericardial  effusion.  Hemodynamic: The estimated right atrial pressure is normal.  ------------------------------------------------------------------------  CONCLUSIONS:  1. #26mm Evolut Pro+ THV.  The valve is well seated with  normal function.  The peak/mean gradients= 13/7mmHg with a  DVI= 0.63. There is a mild paravalvular regurgitation jet  originating from about 2 O'clock TTE surgical view.  There  is no intravalvular regurgitation seen.  *** Compared with echocardiogram of 12/10/2020, no  significant changes noted.  ------------------------------------------------------------------------  PROCEDURE DESCRIPTION: Transthoracic echocardiogram with  2-D, M-Mode and complete spectral and color flow Doppler.  ------------------------------------------------------------------------  ECHOCARDIOGRAPHIC EXAMINATION:  AORTIC ROOT:  Aortic Root (Leaflet): 3.4 cm  Aortic Root Index: 0.9  LEFT ATRIUM:  AP Dimensions: 3.6 cm  LA Volume Index: 39.00 cc/sqm  LA Volume: 81.3 cc  LEFT VENTRICLE:  LVIDd: 4.1 cm  LVIDs: 2.9 cm  IVS: 1.18  ILWT: 1.0 cm  RWT: 0.47  LV Mass: 155.0 gm  LV Mass Index: 74 gm/sqm  EF (Visual Estimate): 55-60%  HR and BP:  HR: 76 bpm  BP: 168/87  BSA: 2.09  ------------------------------------------------------------------------  HEMODYNAMICS:  RA Pressure Estimate: 8  LA Pressure Estimate: < 20  IVRT: 53 ms  ------------------------------------------------------------------------  COLOR FLOW and SPECIAL DOPPLER:  AORTIC VALVE:  AV Peak Velocity: 1.7 M/sec  AV Peak Gradient: 12 mm Hg  AV Mean Gradient: 7 mm Hg  Valve Area: 2.3 sqcm  LVOT:  LVOT Velocity: 1.0 M/sec  LVOT Diameter: 2.1 cm  LVOT Peak Gradient Rest: 4 mm Hg  LVOT Mean Gradient Rest: 3 mm Hg  ------------------------------------------------------------------------  DIASTOLIC FUNCTION:  DT:187 ms  E/A: 0.90  e' Septal: 7.0 cm/s  E/e' Septal: 14.2  e' Lateral: 8.0 cm/s  E/e' Lateral: 12.5  MV E wave: 1.0 m/s  MV A wave: 1.1 m/s  Septal a': 10.0 cm/s  Lateral a': 11.0 cm/s  ------------------------------------------------------------------------  Confirmed on  2/4/2021 - 13:25:59 by Caroline Rizzo M.D.  ------------------------------------------------------------------------

## 2023-03-01 ENCOUNTER — TRANSCRIPTION ENCOUNTER (OUTPATIENT)
Age: 73
End: 2023-03-01

## 2023-03-01 LAB
ALBUMIN SERPL ELPH-MCNC: 2.6 G/DL — LOW (ref 3.3–5)
ALP SERPL-CCNC: 76 U/L — SIGNIFICANT CHANGE UP (ref 40–120)
ALT FLD-CCNC: 16 U/L — SIGNIFICANT CHANGE UP (ref 12–78)
ANION GAP SERPL CALC-SCNC: 7 MMOL/L — SIGNIFICANT CHANGE UP (ref 5–17)
ANION GAP SERPL CALC-SCNC: 8 MMOL/L — SIGNIFICANT CHANGE UP (ref 5–17)
AST SERPL-CCNC: 20 U/L — SIGNIFICANT CHANGE UP (ref 15–37)
BASOPHILS # BLD AUTO: 0.02 K/UL — SIGNIFICANT CHANGE UP (ref 0–0.2)
BASOPHILS NFR BLD AUTO: 0.2 % — SIGNIFICANT CHANGE UP (ref 0–2)
BILIRUB SERPL-MCNC: 0.5 MG/DL — SIGNIFICANT CHANGE UP (ref 0.2–1.2)
BUN SERPL-MCNC: 26 MG/DL — HIGH (ref 7–23)
BUN SERPL-MCNC: 26 MG/DL — HIGH (ref 7–23)
CALCIUM SERPL-MCNC: 7.9 MG/DL — LOW (ref 8.5–10.1)
CALCIUM SERPL-MCNC: 8 MG/DL — LOW (ref 8.5–10.1)
CHLORIDE SERPL-SCNC: 97 MMOL/L — SIGNIFICANT CHANGE UP (ref 96–108)
CHLORIDE SERPL-SCNC: 99 MMOL/L — SIGNIFICANT CHANGE UP (ref 96–108)
CO2 SERPL-SCNC: 21 MMOL/L — LOW (ref 22–31)
CO2 SERPL-SCNC: 24 MMOL/L — SIGNIFICANT CHANGE UP (ref 22–31)
CREAT SERPL-MCNC: 3 MG/DL — HIGH (ref 0.5–1.3)
CREAT SERPL-MCNC: 3.2 MG/DL — HIGH (ref 0.5–1.3)
EGFR: 20 ML/MIN/1.73M2 — LOW
EGFR: 21 ML/MIN/1.73M2 — LOW
EOSINOPHIL # BLD AUTO: 0.04 K/UL — SIGNIFICANT CHANGE UP (ref 0–0.5)
EOSINOPHIL NFR BLD AUTO: 0.4 % — SIGNIFICANT CHANGE UP (ref 0–6)
GLUCOSE SERPL-MCNC: 103 MG/DL — HIGH (ref 70–99)
GLUCOSE SERPL-MCNC: 107 MG/DL — HIGH (ref 70–99)
HCT VFR BLD CALC: 23.8 % — LOW (ref 39–50)
HCT VFR BLD CALC: 23.9 % — LOW (ref 39–50)
HCT VFR BLD CALC: 24.1 % — LOW (ref 39–50)
HGB BLD-MCNC: 7.8 G/DL — LOW (ref 13–17)
HGB BLD-MCNC: 7.9 G/DL — LOW (ref 13–17)
HGB BLD-MCNC: 8 G/DL — LOW (ref 13–17)
IMM GRANULOCYTES NFR BLD AUTO: 0.6 % — SIGNIFICANT CHANGE UP (ref 0–0.9)
LYMPHOCYTES # BLD AUTO: 0.51 K/UL — LOW (ref 1–3.3)
LYMPHOCYTES # BLD AUTO: 5.3 % — LOW (ref 13–44)
MAGNESIUM SERPL-MCNC: 2.5 MG/DL — SIGNIFICANT CHANGE UP (ref 1.6–2.6)
MCHC RBC-ENTMCNC: 28.1 PG — SIGNIFICANT CHANGE UP (ref 27–34)
MCHC RBC-ENTMCNC: 28.5 PG — SIGNIFICANT CHANGE UP (ref 27–34)
MCHC RBC-ENTMCNC: 28.6 PG — SIGNIFICANT CHANGE UP (ref 27–34)
MCHC RBC-ENTMCNC: 32.6 GM/DL — SIGNIFICANT CHANGE UP (ref 32–36)
MCHC RBC-ENTMCNC: 33.2 GM/DL — SIGNIFICANT CHANGE UP (ref 32–36)
MCHC RBC-ENTMCNC: 33.2 GM/DL — SIGNIFICANT CHANGE UP (ref 32–36)
MCV RBC AUTO: 85.9 FL — SIGNIFICANT CHANGE UP (ref 80–100)
MCV RBC AUTO: 86 FL — SIGNIFICANT CHANGE UP (ref 80–100)
MCV RBC AUTO: 86.1 FL — SIGNIFICANT CHANGE UP (ref 80–100)
MONOCYTES # BLD AUTO: 0.79 K/UL — SIGNIFICANT CHANGE UP (ref 0–0.9)
MONOCYTES NFR BLD AUTO: 8.3 % — SIGNIFICANT CHANGE UP (ref 2–14)
NEUTROPHILS # BLD AUTO: 8.13 K/UL — HIGH (ref 1.8–7.4)
NEUTROPHILS NFR BLD AUTO: 85.2 % — HIGH (ref 43–77)
NRBC # BLD: 0 /100 WBCS — SIGNIFICANT CHANGE UP (ref 0–0)
PLATELET # BLD AUTO: 239 K/UL — SIGNIFICANT CHANGE UP (ref 150–400)
PLATELET # BLD AUTO: 245 K/UL — SIGNIFICANT CHANGE UP (ref 150–400)
PLATELET # BLD AUTO: 245 K/UL — SIGNIFICANT CHANGE UP (ref 150–400)
POTASSIUM SERPL-MCNC: 4.3 MMOL/L — SIGNIFICANT CHANGE UP (ref 3.5–5.3)
POTASSIUM SERPL-MCNC: 4.5 MMOL/L — SIGNIFICANT CHANGE UP (ref 3.5–5.3)
POTASSIUM SERPL-SCNC: 4.3 MMOL/L — SIGNIFICANT CHANGE UP (ref 3.5–5.3)
POTASSIUM SERPL-SCNC: 4.5 MMOL/L — SIGNIFICANT CHANGE UP (ref 3.5–5.3)
PROT SERPL-MCNC: 5.8 G/DL — LOW (ref 6–8.3)
RBC # BLD: 2.77 M/UL — LOW (ref 4.2–5.8)
RBC # BLD: 2.78 M/UL — LOW (ref 4.2–5.8)
RBC # BLD: 2.8 M/UL — LOW (ref 4.2–5.8)
RBC # FLD: 13.6 % — SIGNIFICANT CHANGE UP (ref 10.3–14.5)
RBC # FLD: 13.6 % — SIGNIFICANT CHANGE UP (ref 10.3–14.5)
RBC # FLD: 13.7 % — SIGNIFICANT CHANGE UP (ref 10.3–14.5)
SODIUM SERPL-SCNC: 128 MMOL/L — LOW (ref 135–145)
SODIUM SERPL-SCNC: 128 MMOL/L — LOW (ref 135–145)
WBC # BLD: 9.26 K/UL — SIGNIFICANT CHANGE UP (ref 3.8–10.5)
WBC # BLD: 9.55 K/UL — SIGNIFICANT CHANGE UP (ref 3.8–10.5)
WBC # BLD: 9.58 K/UL — SIGNIFICANT CHANGE UP (ref 3.8–10.5)
WBC # FLD AUTO: 9.26 K/UL — SIGNIFICANT CHANGE UP (ref 3.8–10.5)
WBC # FLD AUTO: 9.55 K/UL — SIGNIFICANT CHANGE UP (ref 3.8–10.5)
WBC # FLD AUTO: 9.58 K/UL — SIGNIFICANT CHANGE UP (ref 3.8–10.5)

## 2023-03-01 PROCEDURE — 99232 SBSQ HOSP IP/OBS MODERATE 35: CPT

## 2023-03-01 PROCEDURE — 99233 SBSQ HOSP IP/OBS HIGH 50: CPT

## 2023-03-01 RX ORDER — SODIUM CHLORIDE 9 MG/ML
1 INJECTION INTRAMUSCULAR; INTRAVENOUS; SUBCUTANEOUS ONCE
Refills: 0 | Status: COMPLETED | OUTPATIENT
Start: 2023-03-01 | End: 2023-03-01

## 2023-03-01 RX ADMIN — AMINOCAPROIC ACID 500 MILLIGRAM(S): 500 TABLET ORAL at 20:52

## 2023-03-01 RX ADMIN — SODIUM CHLORIDE 1 GRAM(S): 9 INJECTION INTRAMUSCULAR; INTRAVENOUS; SUBCUTANEOUS at 09:33

## 2023-03-01 RX ADMIN — Medication 650 MILLIGRAM(S): at 21:00

## 2023-03-01 RX ADMIN — Medication 1 SPRAY(S): at 05:46

## 2023-03-01 RX ADMIN — SODIUM CHLORIDE 1 GRAM(S): 9 INJECTION INTRAMUSCULAR; INTRAVENOUS; SUBCUTANEOUS at 15:42

## 2023-03-01 RX ADMIN — Medication 50 MILLIGRAM(S): at 05:46

## 2023-03-01 RX ADMIN — SODIUM CHLORIDE 1 GRAM(S): 9 INJECTION INTRAMUSCULAR; INTRAVENOUS; SUBCUTANEOUS at 21:00

## 2023-03-01 RX ADMIN — Medication 650 MILLIGRAM(S): at 15:41

## 2023-03-01 RX ADMIN — LORATADINE 10 MILLIGRAM(S): 10 TABLET ORAL at 12:04

## 2023-03-01 RX ADMIN — AMINOCAPROIC ACID 500 MILLIGRAM(S): 500 TABLET ORAL at 09:33

## 2023-03-01 RX ADMIN — SODIUM CHLORIDE 1 GRAM(S): 9 INJECTION INTRAMUSCULAR; INTRAVENOUS; SUBCUTANEOUS at 05:46

## 2023-03-01 RX ADMIN — Medication 650 MILLIGRAM(S): at 05:46

## 2023-03-01 RX ADMIN — Medication 1 TABLET(S): at 12:04

## 2023-03-01 NOTE — PROGRESS NOTE ADULT - ASSESSMENT
73 y/o M w/ PMHx of HTN,  AS s/p TAVR, ppm, prostate cancer s/p radical prostatectomy c/b radiation proctitis presents to ED for severe pain and urinary retention s/p cystoscopy. Admit for hematuria / urinary retention.      Problem/Plan - 1:  ·  Problem: Urinary retention with hematuria :   s/p RRP for Ca Prostate by Eusebio Martinez, XRT for recurrence, on Orgovyx  s/p negative cysto x2 by Dr. Luis at [facility] [place], last one 5 weeks ago  admitted for clot retention, hematuria likely due to radiation cystitis  bowers was discontinued, not being able to void and hematuria recur , bowers was placed back   called and discussed with Dr. Luis at [facility], ok for discharge even if the urine is light pink color.   Currently on CBI after recurrent hematuria.  Continue amicar 500mg PO Q8h  plan for cystoscopy today but rescheduled tentative for tomorrow  will transfuse today for anemia          Problem/Plan - 2:  ·  Problem: Prostate cancer.   ·  Plan: Hx of Prostate CA, now s/p radiation therapy and radical prostatectomy  - Continue home Orgovyx - brought in from home  - Patient to f/u with his urologist at [facility] [place] (Dr. Luis) outpatient for further management.     Problem/Plan - 3:  ·  Problem: Hypertension.   - BP stable  - Continue home Metoprolol ER 50mg PO daily w/ hold parameters.     Problem/Plan - 4:  ·  Problem: Need for prophylactic measure.   ·  Plan: SCDs b/l for dvt ppx; will hold off on A/C at this time 2/2 hematuria.     Problem/Plan - 5:   acute blood loss anemia:   s/p total 3 unit blood transfusion, will do 4th transfusion today , monitor H/H     HYPONATREMIA: likely multifactorial, on sodium  tab replacement ,sodium bicarb , regular diet instead of low salt diet.    hold off IVF now with blood transfusion     SANJUANA: renal consult appreciated , on IVF , obstructive uropathy

## 2023-03-01 NOTE — PROGRESS NOTE ADULT - ASSESSMENT
72 year old male with AV disease, s/p TAVR in 12/20, with post op SSS requiring PPM, here with hematuria and radiation hemorrhagic cystitis.     IN progress    VHD, SSS s/p PPM  - No sign of acute ischemia.   - S/p TAVR, no anginal complaints or volume overload  - He has minimal CAD based on cath in 2020.    - BP, HR stable and controlled 149/69   - Continue with BB    - + hematuria, CBI ongoing, urology following  - hgb up and down, transfuse per primary    - had been off antiplatelets for well over a year before this event, no need to resume nito in light of the ongoing issues with hematuria     - Has syncope 2/28 am, no with worsening creatinine. Trend for now.     - Monitor and replete lytes, keep K>4, Mg>2.  - Will continue to follow.  Karmen Martell FNP-C  Cardiology NP  [id number] [phone]

## 2023-03-01 NOTE — PROGRESS NOTE ADULT - SUBJECTIVE AND OBJECTIVE BOX
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      HTN (hypertension)      Aortic stenosis      Rectal bleeding  last hospitalization 10/6/20 22 radiation proctitis      [place] (hard of hearing)      H/O prostatectomy        S/P T&amp;A (status post tonsillectomy and adenoidectomy)  child          MEDICATIONS  (STANDING):  aminocaproic acid Tablet 500 milliGRAM(s) Oral <User Schedule>  fluticasone propionate 50 MICROgram(s)/spray Nasal Spray 1 Spray(s) Both Nostrils two times a day  lidocaine 2% Jelly 5 milliLiter(s) IntraUrethral once  lidocaine 2% Jelly 5 milliLiter(s) IntraUrethral once  lidocaine 2% Jelly 10 milliLiter(s) IntraUrethral once  loratadine 10 milliGRAM(s) Oral daily  metoprolol succinate ER 50 milliGRAM(s) Oral daily  multivitamin/minerals 1 Tablet(s) Oral daily  Orgovyx (relugolix) 120mg tab 1 Tablet(s) 1 Tablet(s) Oral daily  polyethylene glycol 3350 17 Gram(s) Oral daily  sodium bicarbonate 650 milliGRAM(s) Oral three times a day  sodium chloride 1 Gram(s) Oral three times a day  sodium chloride 1 Gram(s) Oral once  sodium chloride 0.9%. 1000 milliLiter(s) (50 mL/Hr) IV Continuous <Continuous>  sodium chloride 0.9%. 1000 milliLiter(s) (75 mL/Hr) IV Continuous <Continuous>    MEDICATIONS  (PRN):  acetaminophen     Tablet .. 650 milliGRAM(s) Oral every 6 hours PRN Temp greater or equal to 38C (100.4F), Mild Pain (1 - 3)  aluminum hydroxide/magnesium hydroxide/simethicone Suspension 30 milliLiter(s) Oral every 4 hours PRN Dyspepsia  melatonin 3 milliGRAM(s) Oral at bedtime PRN Insomnia  morphine  - Injectable 1 milliGRAM(s) IV Push every 6 hours PRN Severe Pain (7 - 10)  ondansetron Injectable 4 milliGRAM(s) IV Push every 8 hours PRN Nausea and/or Vomiting      Allergies    penicillin (Diarrhea; Pruritus; Hives)  penicillin V potassium (Rash)    Intolerances    codeine (Nausea)      Vital Signs Last 24 Hrs  T(C): 36.8 (01 Mar 2023 05:04), Max: 37.3 (2023 20:04)  T(F): 98.2 (01 Mar 2023 05:04), Max: 99.2 (2023 20:04)  HR: 93 (01 Mar 2023 05:04) (93 - 107)  BP: 149/69 (01 Mar 2023 05:04) (106/66 - 149/69)  BP(mean): --  RR: 18 (2023 20:04) (18 - 18)  SpO2: 93% (01 Mar 2023 05:04) (86% - 95%)    Parameters below as of 01 Mar 2023 05:04  Patient On (Oxygen Delivery Method): nasal cannula  O2 Flow (L/min): 2      I&O's Summary    2023 07:01  -  01 Mar 2023 07:00  --------------------------------------------------------  IN: 925 mL / OUT: 33756 mL / NET: -83322 mL          PHYSICAL EXAM:  TELE: SR  Constitutional: NAD, awake and alert, well-developed  HEENT: Moist Mucous Membranes, Anicteric  Pulmonary: Non-labored, breath sounds are clear bilaterally, No wheezing, crackles or rhonchi  Cardiovascular: Regular, S1 and S2 nl, murmur   Gastrointestinal: Bowel Sounds present, soft, nontender.   Lymph: No lymphadenopathy. No peripheral edema.  Skin: No visible rashes or ulcers.  Psych:  Mood & affect appropriate    LABS: All Labs Reviewed:                        7.8    9.55  )-----------( 245      ( 01 Mar 2023 06:18 )             23.9                         8.1    18.71 )-----------( 321      ( 2023 05:34 )             24.4                         7.6    7.29  )-----------( 266      ( 2023 10:20 )             22.8     01 Mar 2023 06:18    128    |  97     |  26     ----------------------------<  107    4.3     |  24     |  3.20   2023 20:30    126    |  95     |  21     ----------------------------<  118    4.4     |  22     |  2.20   2023 05:34    125    |  94     |  17     ----------------------------<  186    4.5     |  18     |  2.50     Ca    8.0        01 Mar 2023 06:18  Ca    8.1        2023 20:30  Ca    8.6        2023 05:34  Phos  5.1       2023 05:34  Mg     2.5       01 Mar 2023 06:18  Mg     2.3       2023 05:34    TPro  5.8    /  Alb  2.6    /  TBili  0.5    /  DBili  x      /  AST  20     /  ALT  16     /  AlkPhos  76     01 Mar 2023 06:18    PT/INR - ( 2023 05:34 )   PT: 13.3 sec;   INR: 1.14 ratio         PTT - ( 2023 05:34 )  PTT:26.9 sec  CARDIAC MARKERS ( 2023 05:34 )  x     / x     / 93 U/L / x     / 1.0 ng/mL         EC Lead ECG:   Ventricular Rate 115 BPM    Atrial Rate 115 BPM    P-R Interval 180 ms    QRS Duration 130 ms    Q-T Interval 338 ms    QTC Calculation(Bazett) 467 ms    P Axis -6 degrees    R Axis 66 degrees    T Axis 4 degrees    Diagnosis Line Sinus tachycardia  Right bundle branch block  T wave abnormality, consider inferior ischemia  Abnormal ECG  When compared with ECG of 2023 10:41,  T wave inversion more evident in Anterior leads  Confirmed by Anthony Huang MD ([id number]) on 2023 1:36:59 PM (23 @ 05:17)      Patient name: HERBIE KERR  YOB: 1950   Age: 70 (M)   MR#: [id number]  Study Date: 2021  [facility]  Study quality: Technically good  Referring Physician: Rich Quesada MD  Blood Pressure: 168/87 mmHg  Height: 178 cm  Weight: 91 kg  BSA: 2.1 m2  Heart Rate: 76 mmHg  ------------------------------------------------------------------------  PROCEDURE: Transthoracic echocardiogram with 2-D, M-Mode  and complete spectral and color flow Doppler.  INDICATION: Nonrheumatic aortic (valve) stenosis (I35.0)  ------------------------------------------------------------------------  MEASUREMENTS: (See below)  ------------------------------------------------------------------------  OBSERVATIONS:  Mitral Valve: There is mitral annular calcification with  calcification on the anterior mitral leaflet. Mild mitral  regurgitation.  Aortic Root: Normal aortic root size. (Ao: 3.4 cm at the  sinuses of Valsalva).  Aortic Valve: #26mm Evolut Pro+ THV. The valve is well  seated with normal function.  The peak/mean gradients=  13/7mmHg with a DVI= 0.63. Peak transaortic valve gradient  equals 13 mm Hg, mean transaortic valve gradient equals 7  mm Hg, aortic valve velocity time integral equals 35 cm,  estimated aortic valve area equals 2.3 sqcm. There is a  mild paravalvular regurgitation jet originating from about  2 O'clock TTE surgical view.  There is no intravalvular  regurgitation seen. Peak left ventricular outflow tract  gradient equals 4mm Hg, mean gradient is equal to 3 mm Hg,  LVOT velocity time integral equals 22 cm.  Left Atrium: Mildly dilated left atrium.  LA volume index =  39 cc/m2.  Left Ventricle: Normal left ventricular systolic function.  No segmental wall motion abnormalities.  The LVEF about  55-60%. Normal left ventricular internal dimensions and  wall thicknesses. Mild diastolic dysfunction (Stage I).  Right Heart: Normal right atrium.  RA area= 13cm2, RA volume= 32ml. Normal right ventricular  size and function.  There is a device wire in the right heart. Normal tricuspid  valve. Minimal tricuspid regurgitation. Normal pulmonic  valve. Minimal pulmonic regurgitation.  Pericardium/PleuraNormal pericardium with no pericardial  effusion.  Hemodynamic: The estimated right atrial pressure is normal.  ------------------------------------------------------------------------  CONCLUSIONS:  1. #26mm Evolut Pro+ THV.  The valve is well seated with  normal function.  The peak/mean gradients= 13/7mmHg with a  DVI= 0.63. There is a mild paravalvular regurgitation jet  originating from about 2 O'clock TTE surgical view.  There  is no intravalvular regurgitation seen.  *** Compared with echocardiogram of 12/10/2020, no  significant changes noted.  ------------------------------------------------------------------------  PROCEDURE DESCRIPTION: Transthoracic echocardiogram with  2-D, M-Mode and complete spectral and color flow Doppler.  ------------------------------------------------------------------------  ECHOCARDIOGRAPHIC EXAMINATION:  AORTIC ROOT:  Aortic Root (Leaflet): 3.4 cm  Aortic Root Index: 0.9  LEFT ATRIUM:  AP Dimensions: 3.6 cm  LA Volume Index: 39.00 cc/sqm  LA Volume: 81.3 cc  LEFT VENTRICLE:  LVIDd: 4.1 cm  LVIDs: 2.9 cm  IVS: 1.18  ILWT: 1.0 cm  RWT: 0.47  LV Mass: 155.0 gm  LV Mass Index: 74 gm/sqm  EF (Visual Estimate): 55-60%  HR and BP:  HR: 76 bpm  BP: 168/87  BSA: 2.09  ------------------------------------------------------------------------  HEMODYNAMICS:  RA Pressure Estimate: 8  LA Pressure Estimate: < 20  IVRT: 53 ms  ------------------------------------------------------------------------  COLOR FLOW and SPECIAL DOPPLER:  AORTIC VALVE:  AV Peak Velocity: 1.7 M/sec  AV Peak Gradient: 12 mm Hg  AV Mean Gradient: 7 mm Hg  Valve Area: 2.3 sqcm  LVOT:  LVOT Velocity: 1.0 M/sec  LVOT Diameter: 2.1 cm  LVOT Peak Gradient Rest: 4 mm Hg  LVOT Mean Gradient Rest: 3 mm Hg  ------------------------------------------------------------------------  DIASTOLIC FUNCTION:  DT:187 ms  E/A: 0.90  e' Septal: 7.0 cm/s  E/e' Septal: 14.2  e' Lateral: 8.0 cm/s  E/e' Lateral: 12.5  MV E wave: 1.0 m/s  MV A wave: 1.1 m/s  Septal a': 10.0 cm/s  Lateral a': 11.0 cm/s  ------------------------------------------------------------------------  Confirmed on  2021 - 13:25:59 by Caroline Rizzo M.D.  ------------------------------------------------------------------------      Radiology:

## 2023-03-01 NOTE — PROGRESS NOTE ADULT - SUBJECTIVE AND OBJECTIVE BOX
Patient is a 72y old  Male who presents with a chief complaint of Hematuria/Urinary Retention (27 Feb 2023 12:32)       HPI:  71 y/o M w/  PMHx of HTN, AS s/p TAVR, ppm, prostate cancer s/p radical prostatectomy c/b radiation proctitis presents to ED for severe pain and urinary retention s/p cystoscopy. Patient follows with urology at [facility]; was being seen for cystoscopy after experiencing hematuria. He has had hematuria before approximately one year ago; underwent cystoscopy without complications at that time. He underwent multiple rounds of radiation therapy with ultimate radical prostatectomy for prostate CA. He has been diagnosed with radiation proctitis. Patient feels that this his current presentation is likely 2/2  radiation cystitis. Patient reports pain is improved after bowers/CBI placed.   Patient developed hyponatremia prompting renal consult.  He has been having ongoing hematuria. On CBI.  Denies any N/V.  Has lightheadedness at times.      2/28/23: Had syncope overnight while walking to commode. Renal function much worsen today. C/o bladder pain. Bowers replaced. 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      HTN (hypertension)      Aortic stenosis      Rectal bleeding  last hospitalization 10/6/20 2/2 radiation proctitis      [place] (hard of hearing)      H/O prostatectomy  2015      S/P T&amp;A (status post tonsillectomy and adenoidectomy)  child           FAMILY HISTORY:  FH: cancer (Father, Mother)    NC    Social History:Non smoker    MEDICATIONS  (STANDING):  aminocaproic acid Tablet 500 milliGRAM(s) Oral <User Schedule>  fluticasone propionate 50 MICROgram(s)/spray Nasal Spray 1 Spray(s) Both Nostrils two times a day  lidocaine 2% Jelly 5 milliLiter(s) IntraUrethral once  lidocaine 2% Jelly 5 milliLiter(s) IntraUrethral once  lidocaine 2% Jelly 10 milliLiter(s) IntraUrethral once  loratadine 10 milliGRAM(s) Oral daily  metoprolol succinate ER 50 milliGRAM(s) Oral daily  multivitamin/minerals 1 Tablet(s) Oral daily  Orgovyx (relugolix) 120mg tab 1 Tablet(s) 1 Tablet(s) Oral daily  polyethylene glycol 3350 17 Gram(s) Oral daily  sodium bicarbonate 650 milliGRAM(s) Oral three times a day  sodium chloride 1 Gram(s) Oral three times a day    MEDICATIONS  (PRN):  acetaminophen     Tablet .. 650 milliGRAM(s) Oral every 6 hours PRN Temp greater or equal to 38C (100.4F), Mild Pain (1 - 3)  aluminum hydroxide/magnesium hydroxide/simethicone Suspension 30 milliLiter(s) Oral every 4 hours PRN Dyspepsia  melatonin 3 milliGRAM(s) Oral at bedtime PRN Insomnia  morphine  - Injectable 1 milliGRAM(s) IV Push every 6 hours PRN Severe Pain (7 - 10)  ondansetron Injectable 4 milliGRAM(s) IV Push every 8 hours PRN Nausea and/or Vomiting      Allergies    penicillin (Diarrhea; Pruritus; Hives)  penicillin V potassium (Rash)    Intolerances    codeine (Nausea)       REVIEW OF SYSTEMS:    Review of Systems:   Constitutional: Denies fatigue  HEENT: Denies headaches and dizziness  Respiratory: denies SOB, cough, or wheezing  Cardiovascular: denies CP, palpitations  Gastrointestinal: Denies nausea, denies vomiting, diarrhea, constipation, abdominal pain, or bloody stools  Genitourinary: neg  Skin: denies rashes or itching  Musculoskeletal: denies muscle aches, joint swelling  Neurologic: Denies generalized weakness, denies loss of sensation, numbness, or tingling      Vital Signs Last 24 Hrs  T(C): 36.8 (01 Mar 2023 05:04), Max: 37.3 (28 Feb 2023 20:04)  T(F): 98.2 (01 Mar 2023 05:04), Max: 99.2 (28 Feb 2023 20:04)  HR: 93 (01 Mar 2023 05:04) (93 - 107)  BP: 149/69 (01 Mar 2023 05:04) (106/66 - 149/69)  BP(mean): --  RR: 18 (28 Feb 2023 20:04) (18 - 18)  SpO2: 93% (01 Mar 2023 05:04) (86% - 95%)    Parameters below as of 01 Mar 2023 05:04  Patient On (Oxygen Delivery Method): nasal cannula  O2 Flow (L/min): 2      PHYSICAL EXAM:    GENERAL: NAD  HEAD:  Atraumatic, Normocephalic  EYES: EOMI, conjunctiva and sclera clear  ENMT: No Drainage from nares, No drainage from ears  NECK: Supple, neck  veins full  NERVOUS SYSTEM:  Awake and Alert  CHEST/LUNG: Clear to percussion bilaterally; No rales, rhonchi, wheezing, or rubs  HEART: Regular rate and rhythm; No murmurs, rubs, or gallops  ABDOMEN: Soft, Nontender, Nondistended; Bowel sounds present  EXTREMITIES:  No Edema  SKIN: No rashes No obvious ecchymosis  +Bowers      LABS:                                             8.0    9.58  )-----------( 239      ( 01 Mar 2023 08:55 )             24.1     03-01    128<L>  |  99  |  26<H>  ----------------------------<  103<H>  4.5   |  21<L>  |  3.00<H>    Ca    7.9<L>      01 Mar 2023 08:55  Phos  5.1     02-28  Mg     2.5     03-01    TPro  5.8<L>  /  Alb  2.6<L>  /  TBili  0.5  /  DBili  x   /  AST  20  /  ALT  16  /  AlkPhos  76  03-01    PT/INR - ( 28 Feb 2023 05:34 )   PT: 13.3 sec;   INR: 1.14 ratio         PTT - ( 28 Feb 2023 05:34 )  PTT:26.9 sec

## 2023-03-01 NOTE — PROGRESS NOTE ADULT - ASSESSMENT
SANJUANA  Hyponatremia  Metabolic acidosis  Hematuria  HTN  Anemia  Prostate Caner S/P Prostatectomy and radiation      -SANJUANA clinically behaving as obstructive uropathy but bladder scan showed no urine suggest ATN instead. Gr replaced. CBI per urology   -Is/p VF x NS 1 day  -Gr  -Continue oral bicarb  -Continue Sodium chloride tabs   -Hyponatremia likely multifactorial, decreased solute intake + increased solvent intake + component of SIADH  -Urine lytes will be unrevealing Patient on CBI  -Regular diet   -S/p PRBC  -Will not fluid restrict at this time, as he drinks to help augment urine output to help with hematuria, but maybe necessary   -Avoid NSAIDs for pain, can worsen hyponatremia  -Renal indices, acidosis, and hyponatremia improving; monitor for now      d/w primary SANJUANA  Hyponatremia  Metabolic acidosis  Hematuria  HTN  Anemia  Prostate Caner S/P Prostatectomy and radiation      -SANJUANA clinically behaving as obstructive uropathy but bladder scan showed no urine; urine indices suggest ATN instead. Gr replaced. CBI per urology   -Is/p VF x NS 1 day  -Gr  -Continue oral bicarb  -Continue Sodium chloride tabs   -Hyponatremia likely multifactorial, decreased solute intake + increased solvent intake + component of SIADH  -Urine lytes will be unrevealing Patient on CBI  -Regular diet   -S/p PRBC  -Will not fluid restrict at this time, as he drinks to help augment urine output to help with hematuria, but maybe necessary   -Avoid NSAIDs for pain, can worsen hyponatremia  -Renal indices, acidosis, and hyponatremia improving; monitor for now      d/w primary

## 2023-03-01 NOTE — PROGRESS NOTE ADULT - SUBJECTIVE AND OBJECTIVE BOX
[facility]  INFECTIOUS DISEASES   [address] [place] [address]  Tel: [phone]     Fax: [phone]  ========================================================  MD Elana Perry Kaushal, MD Cho, Michelle, MD Sunjit, Jaspal, MD  ========================================================    [id number]  HERBIE KERR     Follow up: Leukocytosis, admitted with Hematuria/Urinary Retention     Awake and alert, son at the bedside. No fever.   Leukocytosis back to normal. H/H holding.   Has bladder irrigation with hematuria. No abdominal pain.     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  HTN (hypertension)  Aortic stenosis  Rectal bleeding  last hospitalization 10/6/20 2/2 radiation proctitis  [place] (hard of hearing)  H/O prostatectomy  2015  S/P T&amp;A (status post tonsillectomy and adenoidectomy)  child    Social Hx: no smoking, ETOH or drugs     FAMILY HISTORY:  FH: cancer (Father, Mother)    Allergies  penicillin (Diarrhea; Pruritus; Hives)  penicillin V potassium (Rash)    Intolerances  codeine (Nausea)    Antibiotics:  MEDICATIONS  (STANDING):  aminocaproic acid Tablet 500 milliGRAM(s) Oral <User Schedule>  fluticasone propionate 50 MICROgram(s)/spray Nasal Spray 1 Spray(s) Both Nostrils two times a day  lidocaine 2% Jelly 5 milliLiter(s) IntraUrethral once  lidocaine 2% Jelly 5 milliLiter(s) IntraUrethral once  lidocaine 2% Jelly 10 milliLiter(s) IntraUrethral once  loratadine 10 milliGRAM(s) Oral daily  metoprolol succinate ER 50 milliGRAM(s) Oral daily  multivitamin/minerals 1 Tablet(s) Oral daily  Orgovyx (relugolix) 120mg tab 1 Tablet(s) 1 Tablet(s) Oral daily  polyethylene glycol 3350 17 Gram(s) Oral daily  sodium chloride 0.9%. 1000 milliLiter(s) (75 mL/Hr) IV Continuous <Continuous>  urea Oral Powder 15 Gram(s) Oral daily    MEDICATIONS  (PRN):  acetaminophen     Tablet .. 650 milliGRAM(s) Oral every 6 hours PRN Temp greater or equal to 38C (100.4F), Mild Pain (1 - 3)  aluminum hydroxide/magnesium hydroxide/simethicone Suspension 30 milliLiter(s) Oral every 4 hours PRN Dyspepsia  melatonin 3 milliGRAM(s) Oral at bedtime PRN Insomnia  morphine  - Injectable 1 milliGRAM(s) IV Push every 6 hours PRN Severe Pain (7 - 10)  ondansetron Injectable 4 milliGRAM(s) IV Push every 8 hours PRN Nausea and/or Vomiting    REVIEW OF SYSTEMS:  CONSTITUTIONAL:  No Fever or chills  HEENT:  No diplopia or blurred vision.  No sore throat or runny nose.  CARDIOVASCULAR:  No chest pain or SOB.  RESPIRATORY:  No cough, shortness of breath, PND or orthopnea.  GASTROINTESTINAL:  No nausea, vomiting or diarrhea.  GENITOURINARY:  No dysuria, frequency or urgency. + urine  MUSCULOSKELETAL:  no joint aches, no muscle pain  SKIN:  No change in skin, hair or nails.  NEUROLOGIC:  No paresthesias or weakness.  PSYCHIATRIC:  No disorder of thought or mood.  ENDOCRINE:  No heat or cold intolerance, polyuria or polydipsia.  HEMATOLOGICAL:  No easy bruising or bleeding.     Physical Exam:  Vital Signs Last 24 Hrs  T(C): 36.8 (01 Mar 2023 05:04), Max: 37.3 (28 Feb 2023 20:04)  T(F): 98.2 (01 Mar 2023 05:04), Max: 99.2 (28 Feb 2023 20:04)  HR: 93 (01 Mar 2023 05:04) (93 - 107)  BP: 149/69 (01 Mar 2023 05:04) (106/66 - 149/69)  BP(mean): --  RR: 18 (28 Feb 2023 20:04) (18 - 18)  SpO2: 93% (01 Mar 2023 05:04) (86% - 95%)  Parameters below as of 01 Mar 2023 05:04  Patient On (Oxygen Delivery Method): nasal cannula  O2 Flow (L/min): 2  GEN: NAD  HEENT: normocephalic and atraumatic. EOMI. PERRL.    NECK: Supple.  No lymphadenopathy   LUNGS: Clear to auscultation.  HEART: Irregular rate and rhythm   ABDOMEN: Soft, nontender, and nondistended.  Positive bowel sounds.    : No CVA tenderness, bowers with bloody urine  EXTREMITIES: +edema  NEUROLOGIC: grossly intact.  PSYCHIATRIC: Appropriate affect .  SKIN: No rash     Labs:                        8.0    9.58  )-----------( 239      ( 01 Mar 2023 08:55 )             24.1     03-01    128<L>  |  99  |  26<H>  ----------------------------<  103<H>  4.5   |  21<L>  |  3.00<H>    Ca    7.9<L>      01 Mar 2023 08:55  Phos  5.1     02-28  Mg     2.5     03-01    TPro  5.8<L>  /  Alb  2.6<L>  /  TBili  0.5  /  DBili  x   /  AST  20  /  ALT  16  /  AlkPhos  76  03-01    WBC Count: 9.58 K/uL (03-01-23 @ 08:55)  WBC Count: 9.55 K/uL (03-01-23 @ 06:18)  WBC Count: 18.71 K/uL (02-28-23 @ 05:34)  WBC Count: 7.29 K/uL (02-27-23 @ 10:20)  WBC Count: 7.48 K/uL (02-26-23 @ 07:05)  WBC Count: 5.64 K/uL (02-25-23 @ 06:20)    Creatinine, Serum: 3.00 mg/dL (03-01-23 @ 08:55)  Creatinine, Serum: 3.20 mg/dL (03-01-23 @ 06:18)  Creatinine, Serum: 2.20 mg/dL (02-28-23 @ 20:30)  Creatinine, Serum: 2.50 mg/dL (02-28-23 @ 05:34)  Creatinine, Serum: 0.69 mg/dL (02-27-23 @ 10:20)  Creatinine, Serum: 0.65 mg/dL (02-26-23 @ 07:05)  Creatinine, Serum: 0.62 mg/dL (02-25-23 @ 06:20     SARS-CoV-2: NotDetec (02-28-23 @ 09:55)  COVID-19 PCR: NotDetec (01-31-23 @ 19:00)    All imaging and other data have been reviewed.  < from: CT Abdomen and Pelvis No Cont (02.28.23 @ 06:17) >  IMPRESSION:  Mild bilateral hydronephrosis.  Distended urinary bladder with high attenuation intraluminal contents   suspected intravesicular hematoma.    Assessment and Plan:   73 y/o man with PMH of HTN, AS s/p TAVR and PPM,  prostate cancer s/p radical prostatectomy with radiation proctitis was admitted with severe pain and urinary retention s/p cystoscopy.  Patient follows with urology at [facility]; was being seen for cystoscopy after experiencing hematuria.  He underwent multiple rounds of radiation therapy with ultimate radical prostatectomy for prostate CA. He has been diagnosed with radiation proctitis and this looks like radiation cystitis with multiple clots and blood seen in bladder in CT.   Seen by urology and plan is cystoscopy to remove clots. Early this morning had RRT and was found with tachy cardia, low H/H, transfusion given and now WBC jumped to 18k so ID was called for possible SIRS.   No fever, no leukocytosis in admission.   UA positive for blood   Most likely transfusion and hematuria is causing his symptoms, no sign of infection at this time, I watch off antibiotics and follow labs and cultures.     Recommendations:  - Follow cultures NGTD   - WBC back to normal 18k-->9k  -  follow up noted, for possible cystoscopy today to remove clots   - Antibiotics perioperatively as per urology otherwise will watch off antibiotics     Will follow PRN.  Discussed with the pt and his Son at the bedside.     Nica Prasad MD  Division of Infectious Diseases   Please call ID service at [phone] with any question.      35 minutes spent on total encounter assessing patient, examination, chart review, counseling and coordinating care by the attending physician/nurse/care manager.

## 2023-03-01 NOTE — PROGRESS NOTE ADULT - ASSESSMENT
Rad cystitis w/ persistent TGH. cysto cancelled today 2n2 anemia, hyponatremia, azotemia. Pt getting PRBC's today      Resched c/clot evac, fulguration for 3/2/23  Cont bowers/cbi  Tx to H/H 10/30

## 2023-03-01 NOTE — PROGRESS NOTE ADULT - SUBJECTIVE AND OBJECTIVE BOX
INTERVAL HPI/OVERNIGHT EVENTS:  Clots evacuated overnight. Getting PRBC now.    MEDICATIONS  (STANDING):  aminocaproic acid Tablet 500 milliGRAM(s) Oral <User Schedule>  fluticasone propionate 50 MICROgram(s)/spray Nasal Spray 1 Spray(s) Both Nostrils two times a day  lidocaine 2% Jelly 5 milliLiter(s) IntraUrethral once  lidocaine 2% Jelly 5 milliLiter(s) IntraUrethral once  lidocaine 2% Jelly 10 milliLiter(s) IntraUrethral once  loratadine 10 milliGRAM(s) Oral daily  metoprolol succinate ER 50 milliGRAM(s) Oral daily  multivitamin/minerals 1 Tablet(s) Oral daily  Orgovyx (relugolix) 120mg tab 1 Tablet(s) 1 Tablet(s) Oral daily  polyethylene glycol 3350 17 Gram(s) Oral daily  sodium bicarbonate 650 milliGRAM(s) Oral three times a day  sodium chloride 1 Gram(s) Oral three times a day    MEDICATIONS  (PRN):  acetaminophen     Tablet .. 650 milliGRAM(s) Oral every 6 hours PRN Temp greater or equal to 38C (100.4F), Mild Pain (1 - 3)  aluminum hydroxide/magnesium hydroxide/simethicone Suspension 30 milliLiter(s) Oral every 4 hours PRN Dyspepsia  melatonin 3 milliGRAM(s) Oral at bedtime PRN Insomnia  morphine  - Injectable 1 milliGRAM(s) IV Push every 6 hours PRN Severe Pain (7 - 10)  ondansetron Injectable 4 milliGRAM(s) IV Push every 8 hours PRN Nausea and/or Vomiting        Vital Signs Last 24 Hrs  T(C): 36.6 (01 Mar 2023 13:02), Max: 37.3 (28 Feb 2023 20:04)  T(F): 97.9 (01 Mar 2023 13:02), Max: 99.2 (28 Feb 2023 20:04)  HR: 88 (01 Mar 2023 13:02) (88 - 107)  BP: 116/61 (01 Mar 2023 13:02) (116/61 - 149/69)  BP(mean): --  RR: 18 (01 Mar 2023 13:02) (18 - 18)  SpO2: 92% (01 Mar 2023 13:02) (86% - 93%)    Parameters below as of 01 Mar 2023 13:02  Patient On (Oxygen Delivery Method): nasal cannula        PHYSICAL EXAM:    ABDOMEN: benign  GENITALIA: bowers - light pink cbi    LABS:                        7.9    9.26  )-----------( 245      ( 01 Mar 2023 12:25 )             23.8     03-01    128<L>  |  99  |  26<H>  ----------------------------<  103<H>  4.5   |  21<L>  |  3.00<H>    Ca    7.9<L>      01 Mar 2023 08:55  Phos  5.1     02-28  Mg     2.5     03-01    TPro  5.8<L>  /  Alb  2.6<L>  /  TBili  0.5  /  DBili  x   /  AST  20  /  ALT  16  /  AlkPhos  76  03-01    PT/INR - ( 28 Feb 2023 05:34 )   PT: 13.3 sec;   INR: 1.14 ratio         PTT - ( 28 Feb 2023 05:34 )  PTT:26.9 sec    Urine culture:  02-28 @ 07:30 --   No growth to date.  Urine culture:  02-28 @ 07:00 --   No growth to date.    Blood Cultures:  02-28 @ 07:30   No growth to date.  --  --  02-28 @ 07:00   No growth to date.  --  --    RADIOLOGY & ADDITIONAL TESTS:

## 2023-03-01 NOTE — PROGRESS NOTE ADULT - SUBJECTIVE AND OBJECTIVE BOX
Patient is a 72y old  Male who presents with a chief complaint of Hematuria/Urinary Retention (01 Mar 2023 09:13)      Subjective:  INTERVAL HPI/OVERNIGHT EVENTS: Patient seen and examined at bedside.  Patient still has hematuria, feeling weak and not eating well   MEDICATIONS  (STANDING):  aminocaproic acid Tablet 500 milliGRAM(s) Oral <User Schedule>  fluticasone propionate 50 MICROgram(s)/spray Nasal Spray 1 Spray(s) Both Nostrils two times a day  lidocaine 2% Jelly 5 milliLiter(s) IntraUrethral once  lidocaine 2% Jelly 5 milliLiter(s) IntraUrethral once  lidocaine 2% Jelly 10 milliLiter(s) IntraUrethral once  loratadine 10 milliGRAM(s) Oral daily  metoprolol succinate ER 50 milliGRAM(s) Oral daily  multivitamin/minerals 1 Tablet(s) Oral daily  Orgovyx (relugolix) 120mg tab 1 Tablet(s) 1 Tablet(s) Oral daily  polyethylene glycol 3350 17 Gram(s) Oral daily  sodium bicarbonate 650 milliGRAM(s) Oral three times a day  sodium chloride 1 Gram(s) Oral three times a day    MEDICATIONS  (PRN):  acetaminophen     Tablet .. 650 milliGRAM(s) Oral every 6 hours PRN Temp greater or equal to 38C (100.4F), Mild Pain (1 - 3)  aluminum hydroxide/magnesium hydroxide/simethicone Suspension 30 milliLiter(s) Oral every 4 hours PRN Dyspepsia  melatonin 3 milliGRAM(s) Oral at bedtime PRN Insomnia  morphine  - Injectable 1 milliGRAM(s) IV Push every 6 hours PRN Severe Pain (7 - 10)  ondansetron Injectable 4 milliGRAM(s) IV Push every 8 hours PRN Nausea and/or Vomiting      Allergies    penicillin (Diarrhea; Pruritus; Hives)  penicillin V potassium (Rash)    Intolerances    codeine (Nausea)      REVIEW OF SYSTEMS:  CONSTITUTIONAL: No fever or chills  HEENT:  No headache, no sore throat  RESPIRATORY: No cough or shortness of breath  CARDIOVASCULAR: No chest pain or palpitations  GASTROINTESTINAL: No abd pain, nausea, vomiting, or diarrhea      Objective:  Vital Signs Last 24 Hrs  T(C): 36.8 (01 Mar 2023 05:04), Max: 37.3 (28 Feb 2023 20:04)  T(F): 98.2 (01 Mar 2023 05:04), Max: 99.2 (28 Feb 2023 20:04)  HR: 93 (01 Mar 2023 05:04) (93 - 107)  BP: 149/69 (01 Mar 2023 05:04) (106/66 - 149/69)  BP(mean): --  RR: 18 (28 Feb 2023 20:04) (18 - 18)  SpO2: 93% (01 Mar 2023 05:04) (86% - 95%)    Parameters below as of 01 Mar 2023 05:04  Patient On (Oxygen Delivery Method): nasal cannula  O2 Flow (L/min): 2      GENERAL: NAD, lying in bed   HEAD:  Normocephalic  EYES:  conjunctiva and sclera clear  ENT: Moist mucous membranes  NECK: Supple  CHEST/LUNG: Clear to auscultation bilaterally; No rales or rhonchi; no wheezing. Unlabored respirations  HEART: Regular rate and rhythm; S1S2+  ABDOMEN: Bowel sounds present; Soft, Nontender, Nondistended.   EXTREMITIES:  + distal Peripheral Pulses;  No cyanosis, or edema  NERVOUS SYSTEM:  Alert & Oriented X3;  No gross focal deficits   MSK: moves all extremities  SKIN: No rashes   +bowers with light pink/brown color     LABS:                        8.0    9.58  )-----------( 239      ( 01 Mar 2023 08:55 )             24.1     01 Mar 2023 08:55    128    |  99     |  26     ----------------------------<  103    4.5     |  21     |  3.00     Ca    7.9        01 Mar 2023 08:55  Mg     2.5       01 Mar 2023 06:18    TPro  5.8    /  Alb  2.6    /  TBili  0.5    /  DBili  x      /  AST  20     /  ALT  16     /  AlkPhos  76     01 Mar 2023 06:18    PT/INR - ( 28 Feb 2023 05:34 )   PT: 13.3 sec;   INR: 1.14 ratio         PTT - ( 28 Feb 2023 05:34 )  PTT:26.9 sec    CAPILLARY BLOOD GLUCOSE              RADIOLOGY & ADDITIONAL TESTS:    Personally reviewed.     Consultant(s) Notes Reviewed:  [x] YES  [ ] NO    Plan of care discussed with patient /family son breanne at bedside ; all questions answered

## 2023-03-02 ENCOUNTER — TRANSCRIPTION ENCOUNTER (OUTPATIENT)
Age: 73
End: 2023-03-02

## 2023-03-02 ENCOUNTER — APPOINTMENT (OUTPATIENT)
Dept: ORTHOPEDIC SURGERY | Facility: CLINIC | Age: 73
End: 2023-03-02

## 2023-03-02 LAB
ALBUMIN SERPL ELPH-MCNC: 2.3 G/DL — LOW (ref 3.3–5)
ALP SERPL-CCNC: 74 U/L — SIGNIFICANT CHANGE UP (ref 40–120)
ALT FLD-CCNC: 22 U/L — SIGNIFICANT CHANGE UP (ref 12–78)
ANION GAP SERPL CALC-SCNC: 4 MMOL/L — LOW (ref 5–17)
AST SERPL-CCNC: 34 U/L — SIGNIFICANT CHANGE UP (ref 15–37)
BILIRUB SERPL-MCNC: 0.6 MG/DL — SIGNIFICANT CHANGE UP (ref 0.2–1.2)
BUN SERPL-MCNC: 20 MG/DL — SIGNIFICANT CHANGE UP (ref 7–23)
CALCIUM SERPL-MCNC: 8.2 MG/DL — LOW (ref 8.5–10.1)
CHLORIDE SERPL-SCNC: 105 MMOL/L — SIGNIFICANT CHANGE UP (ref 96–108)
CO2 SERPL-SCNC: 25 MMOL/L — SIGNIFICANT CHANGE UP (ref 22–31)
CREAT SERPL-MCNC: 1.1 MG/DL — SIGNIFICANT CHANGE UP (ref 0.5–1.3)
EGFR: 70 ML/MIN/1.73M2 — SIGNIFICANT CHANGE UP
GLUCOSE SERPL-MCNC: 96 MG/DL — SIGNIFICANT CHANGE UP (ref 70–99)
HCT VFR BLD CALC: 29.9 % — LOW (ref 39–50)
HGB BLD-MCNC: 9.9 G/DL — LOW (ref 13–17)
MCHC RBC-ENTMCNC: 28.8 PG — SIGNIFICANT CHANGE UP (ref 27–34)
MCHC RBC-ENTMCNC: 33.1 GM/DL — SIGNIFICANT CHANGE UP (ref 32–36)
MCV RBC AUTO: 86.9 FL — SIGNIFICANT CHANGE UP (ref 80–100)
NOROVIRUS GI+II RNA STL QL NAA+NON-PROBE: SIGNIFICANT CHANGE UP
NOROVIRUS GI+II RNA STL QL NAA+NON-PROBE: SIGNIFICANT CHANGE UP
NRBC # BLD: 0 /100 WBCS — SIGNIFICANT CHANGE UP (ref 0–0)
PLATELET # BLD AUTO: 221 K/UL — SIGNIFICANT CHANGE UP (ref 150–400)
POTASSIUM SERPL-MCNC: 4.5 MMOL/L — SIGNIFICANT CHANGE UP (ref 3.5–5.3)
POTASSIUM SERPL-SCNC: 4.5 MMOL/L — SIGNIFICANT CHANGE UP (ref 3.5–5.3)
PROT SERPL-MCNC: 5.7 G/DL — LOW (ref 6–8.3)
RBC # BLD: 3.44 M/UL — LOW (ref 4.2–5.8)
RBC # FLD: 14.8 % — HIGH (ref 10.3–14.5)
SODIUM SERPL-SCNC: 134 MMOL/L — LOW (ref 135–145)
WBC # BLD: 8.94 K/UL — SIGNIFICANT CHANGE UP (ref 3.8–10.5)
WBC # FLD AUTO: 8.94 K/UL — SIGNIFICANT CHANGE UP (ref 3.8–10.5)

## 2023-03-02 PROCEDURE — 99232 SBSQ HOSP IP/OBS MODERATE 35: CPT

## 2023-03-02 PROCEDURE — 99233 SBSQ HOSP IP/OBS HIGH 50: CPT

## 2023-03-02 RX ORDER — METOCLOPRAMIDE HCL 10 MG
10 TABLET ORAL ONCE
Refills: 0 | Status: DISCONTINUED | OUTPATIENT
Start: 2023-03-02 | End: 2023-03-02

## 2023-03-02 RX ORDER — ACETAMINOPHEN 500 MG
650 TABLET ORAL EVERY 6 HOURS
Refills: 0 | Status: DISCONTINUED | OUTPATIENT
Start: 2023-03-02 | End: 2023-03-07

## 2023-03-02 RX ORDER — OXYBUTYNIN CHLORIDE 5 MG
5 TABLET ORAL DAILY
Refills: 0 | Status: DISCONTINUED | OUTPATIENT
Start: 2023-03-02 | End: 2023-03-02

## 2023-03-02 RX ORDER — PANTOPRAZOLE SODIUM 20 MG/1
40 TABLET, DELAYED RELEASE ORAL DAILY
Refills: 0 | Status: DISCONTINUED | OUTPATIENT
Start: 2023-03-02 | End: 2023-03-02

## 2023-03-02 RX ORDER — PANTOPRAZOLE SODIUM 20 MG/1
40 TABLET, DELAYED RELEASE ORAL DAILY
Refills: 0 | Status: DISCONTINUED | OUTPATIENT
Start: 2023-03-02 | End: 2023-03-07

## 2023-03-02 RX ORDER — METOPROLOL TARTRATE 50 MG
50 TABLET ORAL DAILY
Refills: 0 | Status: DISCONTINUED | OUTPATIENT
Start: 2023-03-02 | End: 2023-03-07

## 2023-03-02 RX ORDER — MULTIVIT-MIN/FERROUS GLUCONATE 9 MG/15 ML
15 LIQUID (ML) ORAL DAILY
Refills: 0 | Status: DISCONTINUED | OUTPATIENT
Start: 2023-03-02 | End: 2023-03-03

## 2023-03-02 RX ORDER — LORATADINE 10 MG/1
10 TABLET ORAL DAILY
Refills: 0 | Status: DISCONTINUED | OUTPATIENT
Start: 2023-03-02 | End: 2023-03-07

## 2023-03-02 RX ORDER — HYDROMORPHONE HYDROCHLORIDE 2 MG/ML
1 INJECTION INTRAMUSCULAR; INTRAVENOUS; SUBCUTANEOUS
Refills: 0 | Status: DISCONTINUED | OUTPATIENT
Start: 2023-03-02 | End: 2023-03-02

## 2023-03-02 RX ORDER — ONDANSETRON 8 MG/1
4 TABLET, FILM COATED ORAL ONCE
Refills: 0 | Status: DISCONTINUED | OUTPATIENT
Start: 2023-03-02 | End: 2023-03-02

## 2023-03-02 RX ORDER — POLYETHYLENE GLYCOL 3350 17 G/17G
17 POWDER, FOR SOLUTION ORAL DAILY
Refills: 0 | Status: DISCONTINUED | OUTPATIENT
Start: 2023-03-02 | End: 2023-03-07

## 2023-03-02 RX ORDER — PANTOPRAZOLE SODIUM 20 MG/1
40 TABLET, DELAYED RELEASE ORAL ONCE
Refills: 0 | Status: DISCONTINUED | OUTPATIENT
Start: 2023-03-02 | End: 2023-03-02

## 2023-03-02 RX ORDER — SODIUM CHLORIDE 9 MG/ML
1000 INJECTION, SOLUTION INTRAVENOUS
Refills: 0 | Status: DISCONTINUED | OUTPATIENT
Start: 2023-03-02 | End: 2023-03-02

## 2023-03-02 RX ORDER — OXYBUTYNIN CHLORIDE 5 MG
10 TABLET ORAL DAILY
Refills: 0 | Status: DISCONTINUED | OUTPATIENT
Start: 2023-03-02 | End: 2023-03-03

## 2023-03-02 RX ORDER — LANOLIN ALCOHOL/MO/W.PET/CERES
3 CREAM (GRAM) TOPICAL AT BEDTIME
Refills: 0 | Status: DISCONTINUED | OUTPATIENT
Start: 2023-03-02 | End: 2023-03-07

## 2023-03-02 RX ORDER — CIPROFLOXACIN LACTATE 400MG/40ML
400 VIAL (ML) INTRAVENOUS ONCE
Refills: 0 | Status: COMPLETED | OUTPATIENT
Start: 2023-03-02 | End: 2023-03-02

## 2023-03-02 RX ORDER — HYDROMORPHONE HYDROCHLORIDE 2 MG/ML
0.5 INJECTION INTRAMUSCULAR; INTRAVENOUS; SUBCUTANEOUS
Refills: 0 | Status: DISCONTINUED | OUTPATIENT
Start: 2023-03-02 | End: 2023-03-02

## 2023-03-02 RX ADMIN — SODIUM CHLORIDE 75 MILLILITER(S): 9 INJECTION, SOLUTION INTRAVENOUS at 15:14

## 2023-03-02 RX ADMIN — SODIUM CHLORIDE 60 MILLILITER(S): 9 INJECTION, SOLUTION INTRAVENOUS at 09:51

## 2023-03-02 RX ADMIN — Medication 50 MILLIGRAM(S): at 05:07

## 2023-03-02 RX ADMIN — LORATADINE 10 MILLIGRAM(S): 10 TABLET ORAL at 11:32

## 2023-03-02 RX ADMIN — SODIUM CHLORIDE 1 GRAM(S): 9 INJECTION INTRAMUSCULAR; INTRAVENOUS; SUBCUTANEOUS at 05:07

## 2023-03-02 RX ADMIN — Medication 1 TABLET(S): at 11:33

## 2023-03-02 RX ADMIN — AMINOCAPROIC ACID 500 MILLIGRAM(S): 500 TABLET ORAL at 09:51

## 2023-03-02 RX ADMIN — Medication 650 MILLIGRAM(S): at 05:06

## 2023-03-02 RX ADMIN — PANTOPRAZOLE SODIUM 40 MILLIGRAM(S): 20 TABLET, DELAYED RELEASE ORAL at 11:49

## 2023-03-02 NOTE — CHART NOTE - NSCHARTNOTEFT_GEN_A_CORE
Nutrition follow up ( chart reviewed, events noted) Brief hx :     Factors impacting intake: [ ] none [ ] nausea  [ ] vomiting [ ] diarrhea [ ] constipation  [ ]chewing problems [ ] swallowing issues  [ ] other:     Diet Prescription: Diet, NPO after Midnight:      NPO Start Date: 01-Mar-2023,   NPO Start Time: 23:59  Except Medications (03-01-23 @ 10:59)  Diet, Regular:   Supplement Feeding Modality:  Oral  Ensure Plus High Protein Cans or Servings Per Day:  1       Frequency:  Three Times a day (02-27-23 @ 16:56)    Intake:     Current Weight:   % Weight Change    Pertinent Medications: MEDICATIONS  (STANDING):  aminocaproic acid Tablet 500 milliGRAM(s) Oral <User Schedule>  fluticasone propionate 50 MICROgram(s)/spray Nasal Spray 1 Spray(s) Both Nostrils two times a day  lidocaine 2% Jelly 5 milliLiter(s) IntraUrethral once  lidocaine 2% Jelly 5 milliLiter(s) IntraUrethral once  lidocaine 2% Jelly 10 milliLiter(s) IntraUrethral once  loratadine 10 milliGRAM(s) Oral daily  metoprolol succinate ER 50 milliGRAM(s) Oral daily  multivitamin/minerals 1 Tablet(s) Oral daily  Orgovyx (relugolix) 120mg tab 1 Tablet(s) 1 Tablet(s) Oral daily  pantoprazole  Injectable 40 milliGRAM(s) IV Push daily  polyethylene glycol 3350 17 Gram(s) Oral daily  sodium bicarbonate 650 milliGRAM(s) Oral three times a day  sodium chloride 1 Gram(s) Oral three times a day    MEDICATIONS  (PRN):  acetaminophen     Tablet .. 650 milliGRAM(s) Oral every 6 hours PRN Temp greater or equal to 38C (100.4F), Mild Pain (1 - 3)  aluminum hydroxide/magnesium hydroxide/simethicone Suspension 30 milliLiter(s) Oral every 4 hours PRN Dyspepsia  melatonin 3 milliGRAM(s) Oral at bedtime PRN Insomnia  morphine  - Injectable 1 milliGRAM(s) IV Push every 6 hours PRN Severe Pain (7 - 10)  ondansetron Injectable 4 milliGRAM(s) IV Push every 8 hours PRN Nausea and/or Vomiting    Pertinent Labs: 03-02 Na134 mmol/L<L> Glu 96 mg/dL K+ 4.5 mmol/L Cr  1.10 mg/dL BUN 20 mg/dL 02-28 Phos 5.1 mg/dL<H> 03-02 Alb 2.3 g/dL<L>     CAPILLARY BLOOD GLUCOSE        Skin:     Estimated Needs:   [ ] no change since previous assessment  [ ] recalculated:     Previous Nutrition Diagnosis:   [ ] Inadequate Energy Intake [ ]Inadequate Oral Intake [ ] Excessive Energy Intake   [ ] Underweight [ ] Increased Nutrient Needs [ ] Overweight/Obesity   [ ] Altered GI Function [ ] Unintended Weight Loss [ ] Food & Nutrition Related Knowledge Deficit [ ] Malnutrition     Nutrition Diagnosis is [ ] ongoing  [ ] resolved [ ] not applicable     New Nutrition Diagnosis: [ ] not applicable       Interventions:   Recommend  [ ] Change Diet To:  [ ] Nutrition Supplement  [ ] Nutrition Support  [ ] Other:     Monitoring and Evaluation:   [ ] PO intake [ x ] Tolerance to diet prescription [ x ] weights [ x ] labs[ x ] follow up per protocol  [ ] other: Nutrition follow up ( chart reviewed, events noted) Brief hx : 71 y/o male adm with severe pain and urinary retention s/p cystoscopy; hematuria. PMH HTN, AS s/p TAVR, ppm, prostate ca s/p radical prostatectomy c/b radiation proctitis.   Pt visited at bedside this am. Pt's son present. Pt reports that his appetite is fair to good, tolerating meals, no complaints. Pt drinking Ensure. Offers no food preferences at this time. Last BM 2/26.       Factors impacting intake: [ ] none [ ] nausea  [ ] vomiting [ ] diarrhea [ ] constipation  [ ]chewing problems [ ] swallowing issues  [ ] other:     Diet Prescription: Diet, NPO after Midnight:      NPO Start Date: 01-Mar-2023,   NPO Start Time: 23:59  Except Medications (03-01-23 @ 10:59)  Diet, Regular:   Supplement Feeding Modality:  Oral  Ensure Plus High Protein Cans or Servings Per Day:  1       Frequency:  Three Times a day (02-27-23 @ 16:56)    Intake:     Current Weight:   % Weight Change    Pertinent Medications: MEDICATIONS  (STANDING):  aminocaproic acid Tablet 500 milliGRAM(s) Oral <User Schedule>  fluticasone propionate 50 MICROgram(s)/spray Nasal Spray 1 Spray(s) Both Nostrils two times a day  lidocaine 2% Jelly 5 milliLiter(s) IntraUrethral once  lidocaine 2% Jelly 5 milliLiter(s) IntraUrethral once  lidocaine 2% Jelly 10 milliLiter(s) IntraUrethral once  loratadine 10 milliGRAM(s) Oral daily  metoprolol succinate ER 50 milliGRAM(s) Oral daily  multivitamin/minerals 1 Tablet(s) Oral daily  Orgovyx (relugolix) 120mg tab 1 Tablet(s) 1 Tablet(s) Oral daily  pantoprazole  Injectable 40 milliGRAM(s) IV Push daily  polyethylene glycol 3350 17 Gram(s) Oral daily  sodium bicarbonate 650 milliGRAM(s) Oral three times a day  sodium chloride 1 Gram(s) Oral three times a day    MEDICATIONS  (PRN):  acetaminophen     Tablet .. 650 milliGRAM(s) Oral every 6 hours PRN Temp greater or equal to 38C (100.4F), Mild Pain (1 - 3)  aluminum hydroxide/magnesium hydroxide/simethicone Suspension 30 milliLiter(s) Oral every 4 hours PRN Dyspepsia  melatonin 3 milliGRAM(s) Oral at bedtime PRN Insomnia  morphine  - Injectable 1 milliGRAM(s) IV Push every 6 hours PRN Severe Pain (7 - 10)  ondansetron Injectable 4 milliGRAM(s) IV Push every 8 hours PRN Nausea and/or Vomiting    Pertinent Labs: 03-02 Na134 mmol/L<L> Glu 96 mg/dL K+ 4.5 mmol/L Cr  1.10 mg/dL BUN 20 mg/dL 02-28 Phos 5.1 mg/dL<H> 03-02 Alb 2.3 g/dL<L>     CAPILLARY BLOOD GLUCOSE        Skin:     Estimated Needs:   [ ] no change since previous assessment  [ ] recalculated:     Previous Nutrition Diagnosis:   [ ] Inadequate Energy Intake [ ]Inadequate Oral Intake [ ] Excessive Energy Intake   [ ] Underweight [ ] Increased Nutrient Needs [ ] Overweight/Obesity   [ ] Altered GI Function [ ] Unintended Weight Loss [ ] Food & Nutrition Related Knowledge Deficit [ ] Malnutrition     Nutrition Diagnosis is [ ] ongoing  [ ] resolved [ ] not applicable     New Nutrition Diagnosis: [ ] not applicable       Interventions:   Recommend  [ ] Change Diet To:  [ ] Nutrition Supplement  [ ] Nutrition Support  [ ] Other:     Monitoring and Evaluation:   [ ] PO intake [ x ] Tolerance to diet prescription [ x ] weights [ x ] labs[ x ] follow up per protocol  [ ] other: Nutrition follow up ( chart reviewed, events noted) Brief hx : 73 y/o male adm with severe pain and urinary retention s/p cystoscopy; hematuria. PMH HTN, AS s/p TAVR, ppm, prostate ca s/p radical prostatectomy c/b radiation proctitis.   Pt visited at bedside this am. Pt's son present. Pt reports that his appetite is fair to good, tolerating meals, no complaints. Pt drinking Ensure. Offers no food preferences at this time. Last BM 2/26.     73 y/o M w/ PMHx of HTN,  AS s/p TAVR, ppm, prostate cancer s/p radical prostatectomy c/b radiation proctitis presents to ED for severe pain and urinary retention s/p cystoscopy. Admit for hematuria / urinary retention.      Problem/Plan - 1:  ·  Problem: Urinary retention with hematuria :   s/p RRP for Ca Prostate by Eusebio Martinez, XRT for recurrence, on Orgovyx  s/p negative [place] x2 by Dr. Luis at [facility] [place], [place] one 5 weeks ago  admitted for clot retention, hematuria likely due to radiation cystitis  bowers was discontinued, not being able to void and hematuria recur , bowers was placed back   called and discussed with Dr. Luis at [facility], ok for discharge even if the urine is light pink color.   Currently on CBI after recurrent hematuria.  Continue amicar 500mg PO Q8h  plan for cystoscopy today but rescheduled tentative for tomorrow  will transfuse today for anemia          Problem/Plan - 2:  ·  Problem: Prostate cancer.   ·  Plan: Hx of Prostate CA, now s/p radiation therapy and radical prostatectomy  - Continue home Orgovyx - brought in from home  - Patient to f/u with his urologist at [facility] [place] (Dr. Luis) outpatient for further management.        Factors impacting intake: [ ] none [ ] nausea  [ ] vomiting [ ] diarrhea [ ] constipation  [ ]chewing problems [ ] swallowing issues  [ ] other:     Diet Prescription: Diet, NPO after Midnight:      NPO Start Date: 01-Mar-2023,   NPO Start Time: 23:59  Except Medications (03-01-23 @ 10:59)  Diet, Regular:   Supplement Feeding Modality:  Oral  Ensure Plus High Protein Cans or Servings Per Day:  1       Frequency:  Three Times a day (02-27-23 @ 16:56)    Intake:     Current Weight:   % Weight Change    Pertinent Medications: MEDICATIONS  (STANDING):  aminocaproic acid Tablet 500 milliGRAM(s) Oral <User Schedule>  fluticasone propionate 50 MICROgram(s)/spray Nasal Spray 1 Spray(s) Both Nostrils two times a day  lidocaine 2% Jelly 5 milliLiter(s) IntraUrethral once  lidocaine 2% Jelly 5 milliLiter(s) IntraUrethral once  lidocaine 2% Jelly 10 milliLiter(s) IntraUrethral once  loratadine 10 milliGRAM(s) Oral daily  metoprolol succinate ER 50 milliGRAM(s) Oral daily  multivitamin/minerals 1 Tablet(s) Oral daily  Orgovyx (relugolix) 120mg tab 1 Tablet(s) 1 Tablet(s) Oral daily  pantoprazole  Injectable 40 milliGRAM(s) IV Push daily  polyethylene glycol 3350 17 Gram(s) Oral daily  sodium bicarbonate 650 milliGRAM(s) Oral three times a day  sodium chloride 1 Gram(s) Oral three times a day    MEDICATIONS  (PRN):  acetaminophen     Tablet .. 650 milliGRAM(s) Oral every 6 hours PRN Temp greater or equal to 38C (100.4F), Mild Pain (1 - 3)  aluminum hydroxide/magnesium hydroxide/simethicone Suspension 30 milliLiter(s) Oral every 4 hours PRN Dyspepsia  melatonin 3 milliGRAM(s) Oral at bedtime PRN Insomnia  morphine  - Injectable 1 milliGRAM(s) IV Push every 6 hours PRN Severe Pain (7 - 10)  ondansetron Injectable 4 milliGRAM(s) IV Push every 8 hours PRN Nausea and/or Vomiting    Pertinent Labs: 03-02 Na134 mmol/L<L> Glu 96 mg/dL K+ 4.5 mmol/L Cr  1.10 mg/dL BUN 20 mg/dL 02-28 Phos 5.1 mg/dL<H> 03-02 Alb 2.3 g/dL<L>     CAPILLARY BLOOD GLUCOSE        Skin:     Estimated Needs:   [ ] no change since previous assessment  [ ] recalculated:     Previous Nutrition Diagnosis:   [ ] Inadequate Energy Intake [ ]Inadequate Oral Intake [ ] Excessive Energy Intake   [ ] Underweight [ ] Increased Nutrient Needs [ ] Overweight/Obesity   [ ] Altered GI Function [ ] Unintended Weight Loss [ ] Food & Nutrition Related Knowledge Deficit [ ] Malnutrition     Nutrition Diagnosis is [ ] ongoing  [ ] resolved [ ] not applicable     New Nutrition Diagnosis: [ ] not applicable       Interventions:   Recommend  [ ] Change Diet To:  [ ] Nutrition Supplement  [ ] Nutrition Support  [ ] Other:     Monitoring and Evaluation:   [ ] PO intake [ x ] Tolerance to diet prescription [ x ] weights [ x ] labs[ x ] follow up per protocol  [ ] other: Nutrition follow up ( chart reviewed, events noted) Brief hx : 71 y/o male adm with severe pain and urinary retention s/p cystoscopy; hematuria. PMH HTN, AS s/p TAVR, ppm, prostate ca s/p radical prostatectomy c/b radiation proctitis.   Pt visited at bedside this am. Pt's son present. Pt reports that his appetite is fair to good, tolerating meals, no complaints. Pt drinking Ensure. Offers no food preferences at this time. Last BM 2/26.     71 y/o M w/ PMHx of HTN,  AS s/p TAVR, ppm, prostate cancer s/p radical prostatectomy c/b radiation proctitis presents to ED for severe pain and urinary retention s/p cystoscopy. Admit for hematuria / urinary retention.      Problem/Plan - 1:  ·  Problem: Urinary retention with hematuria :   s/p RRP for Ca Prostate by Eusebio Martinez, XRT for recurrence, on Orgovyx  s/p negative cysto x2 by Dr. Luis at [facility] [place], last one 5 weeks ago  admitted for clot retention, hematuria likely due to radiation cystitis  bowers was discontinued, not being able to void and hematuria recur , bowers was placed back   called and discussed with Dr. Luis at [facility], ok for discharge even if the urine is light pink color.   Currently on CBI after recurrent hematuria.  Continue amicar 500mg PO Q8h  plan for cystoscopy today but rescheduled tentative for tomorrow  will transfuse today for anemia          Problem/Plan - 2:  ·  Problem: Prostate cancer.   ·  Plan: Hx of Prostate CA, now s/p radiation therapy and radical prostatectomy  - Continue home Orgovyx - brought in from home  - Patient to f/u with his urologist at [facility] [place] (Dr. Luis) outpatient for further management.    Rad cystitis w/ persistent TGH. cysto cancelled today 2n2 anemia, hyponatremia, azotemia. Pt getting PRBC's today            Factors impacting intake: [ ] none [ ] nausea  [ ] vomiting [ ] diarrhea [ ] constipation  [ ]chewing problems [ ] swallowing issues  [ ] other:     Diet Prescription: Diet, NPO after Midnight:      NPO Start Date: 01-Mar-2023,   NPO Start Time: 23:59  Except Medications (03-01-23 @ 10:59)  Diet, Regular:   Supplement Feeding Modality:  Oral  Ensure Plus High Protein Cans or Servings Per Day:  1       Frequency:  Three Times a day (02-27-23 @ 16:56)    Intake:     Current Weight:   % Weight Change    Pertinent Medications: MEDICATIONS  (STANDING):  aminocaproic acid Tablet 500 milliGRAM(s) Oral <User Schedule>  fluticasone propionate 50 MICROgram(s)/spray Nasal Spray 1 Spray(s) Both Nostrils two times a day  lidocaine 2% Jelly 5 milliLiter(s) IntraUrethral once  lidocaine 2% Jelly 5 milliLiter(s) IntraUrethral once  lidocaine 2% Jelly 10 milliLiter(s) IntraUrethral once  loratadine 10 milliGRAM(s) Oral daily  metoprolol succinate ER 50 milliGRAM(s) Oral daily  multivitamin/minerals 1 Tablet(s) Oral daily  Orgovyx (relugolix) 120mg tab 1 Tablet(s) 1 Tablet(s) Oral daily  pantoprazole  Injectable 40 milliGRAM(s) IV Push daily  polyethylene glycol 3350 17 Gram(s) Oral daily  sodium bicarbonate 650 milliGRAM(s) Oral three times a day  sodium chloride 1 Gram(s) Oral three times a day    MEDICATIONS  (PRN):  acetaminophen     Tablet .. 650 milliGRAM(s) Oral every 6 hours PRN Temp greater or equal to 38C (100.4F), Mild Pain (1 - 3)  aluminum hydroxide/magnesium hydroxide/simethicone Suspension 30 milliLiter(s) Oral every 4 hours PRN Dyspepsia  melatonin 3 milliGRAM(s) Oral at bedtime PRN Insomnia  morphine  - Injectable 1 milliGRAM(s) IV Push every 6 hours PRN Severe Pain (7 - 10)  ondansetron Injectable 4 milliGRAM(s) IV Push every 8 hours PRN Nausea and/or Vomiting    Pertinent Labs: 03-02 Na134 mmol/L<L> Glu 96 mg/dL K+ 4.5 mmol/L Cr  1.10 mg/dL BUN 20 mg/dL 02-28 Phos 5.1 mg/dL<H> 03-02 Alb 2.3 g/dL<L>     CAPILLARY BLOOD GLUCOSE        Skin:     Estimated Needs:   [ ] no change since previous assessment  [ ] recalculated:     Previous Nutrition Diagnosis:   [ ] Inadequate Energy Intake [ ]Inadequate Oral Intake [ ] Excessive Energy Intake   [ ] Underweight [ ] Increased Nutrient Needs [ ] Overweight/Obesity   [ ] Altered GI Function [ ] Unintended Weight Loss [ ] Food & Nutrition Related Knowledge Deficit [ ] Malnutrition     Nutrition Diagnosis is [ ] ongoing  [ ] resolved [ ] not applicable     New Nutrition Diagnosis: [ ] not applicable       Interventions:   Recommend  [ ] Change Diet To:  [ ] Nutrition Supplement  [ ] Nutrition Support  [ ] Other:     Monitoring and Evaluation:   [ ] PO intake [ x ] Tolerance to diet prescription [ x ] weights [ x ] labs[ x ] follow up per protocol  [ ] other: Nutrition follow up (chart reviewed, events noted) Brief hx : 71 y/o male adm with severe pain and urinary retention s/p cystoscopy; hematuria. PMH HTN, AS s/p TAVR, ppm, prostate ca s/p radical prostatectomy c/b radiation proctitis.     Pt not available at time of visit; in OR/PACU s/p cystoscopy with fulguration of hemorrhaging blood vessel and evacuation of clot. Noted lack of appetite with only fair po intake. 51-75% po intake documented on 3/1 per EMR. No report N/V. Last BM 2/28. Miralax rx. Encourage po intake/dietary fiber. Hx hyponatremia- improving; current Na 134. S/p NaCl tablet 3/1. Per Renal to continue regular diet and hold off on po FR at this time. Recommend continued assistance/encouragement with meals/oral nutritional supplements. Will remain available to obtain food preferences to maximize po intake.     Factors impacting intake: [ ] none [ ] nausea  [ ] vomiting [ ] diarrhea [ ] constipation  [ ]chewing problems [ ] swallowing issues  [x ] other: lack of appetite, pain    Diet Prescription: Diet, NPO after Midnight:      NPO Start Date: 01-Mar-2023,   NPO Start Time: 23:59  Except Medications (03-01-23 @ 10:59)  Diet, Regular:   Supplement Feeding Modality:  Oral  Ensure Plus High Protein Cans or Servings Per Day:  1       Frequency:  Three Times a day (02-27-23 @ 16:56)    Intake: Fair; 50-75% documented on 3/1    Current Weight: 197.3lbs(2/25), request current weight to assess.     Pertinent Medications: MEDICATIONS  (STANDING):  aminocaproic acid Tablet 500 milliGRAM(s) Oral <User Schedule>  fluticasone propionate 50 MICROgram(s)/spray Nasal Spray 1 Spray(s) Both Nostrils two times a day  lidocaine 2% Jelly 5 milliLiter(s) IntraUrethral once  lidocaine 2% Jelly 5 milliLiter(s) IntraUrethral once  lidocaine 2% Jelly 10 milliLiter(s) IntraUrethral once  loratadine 10 milliGRAM(s) Oral daily  metoprolol succinate ER 50 milliGRAM(s) Oral daily  multivitamin/minerals 1 Tablet(s) Oral daily  Orgovyx (relugolix) 120mg tab 1 Tablet(s) 1 Tablet(s) Oral daily  pantoprazole  Injectable 40 milliGRAM(s) IV Push daily  polyethylene glycol 3350 17 Gram(s) Oral daily  sodium bicarbonate 650 milliGRAM(s) Oral three times a day  sodium chloride 1 Gram(s) Oral three times a day    MEDICATIONS  (PRN):  acetaminophen     Tablet .. 650 milliGRAM(s) Oral every 6 hours PRN Temp greater or equal to 38C (100.4F), Mild Pain (1 - 3)  aluminum hydroxide/magnesium hydroxide/simethicone Suspension 30 milliLiter(s) Oral every 4 hours PRN Dyspepsia  melatonin 3 milliGRAM(s) Oral at bedtime PRN Insomnia  morphine  - Injectable 1 milliGRAM(s) IV Push every 6 hours PRN Severe Pain (7 - 10)  ondansetron Injectable 4 milliGRAM(s) IV Push every 8 hours PRN Nausea and/or Vomiting    Pertinent Labs: 03-02 Na134 mmol/L<L> Glu 96 mg/dL K+ 4.5 mmol/L Cr  1.10 mg/dL BUN 20 mg/dL 02-28 Phos 5.1 mg/dL<H> 03-02 Alb 2.3 g/dL<L>     CAPILLARY BLOOD GLUCOSE        Skin: intact. Darrin 18.     Estimated Needs:   [x ] no change since previous assessment  [ ] recalculated:     Previous Nutrition Diagnosis:   [x ] Inadequate Energy Intake [ ]Inadequate Oral Intake [ ] Excessive Energy Intake   [ ] Underweight [ ] Increased Nutrient Needs [ ] Overweight/Obesity   [ ] Altered GI Function [ ] Unintended Weight Loss [ ] Food & Nutrition Related Knowledge Deficit [x ] Malnutrition     Nutrition Diagnosis is [x ] ongoing  [ ] resolved [ ] not applicable     New Nutrition Diagnosis: [x ] not applicable       Interventions:   Recommend  [x ] Change Diet To: Continue Regular diet with Ensure plus high protein 8oz TID.  [ ] Nutrition Supplement  [ ] Nutrition Support  [ x] Other: Will follow for food preferences/meal alternatives to maximize po intake. Recommend MVI with minerals daily.     Monitoring and Evaluation:   [ x] PO intake [ x ] Tolerance to diet prescription [ x ] weights [ x ] labs[ x ] follow up per protocol  [ ] other: Nutrition follow up (chart reviewed, events noted) Brief hx : 71 y/o male adm with severe pain and urinary retention s/p cystoscopy; hematuria. PMH HTN, AS s/p TAVR, ppm, prostate ca s/p radical prostatectomy c/b radiation proctitis.     Pt not available at time of visit; in OR/PACU s/p cystoscopy with fulguration of hemorrhaging blood vessel and evacuation of clot. Noted lack of appetite with only fair po intake. 51-75% po intake documented on 3/1 per EMR. No report N/V. Last BM 2/28. Miralax rx. Encourage po intake/dietary fiber. Hx hyponatremia- improving; current Na 134. S/p NaCl tablet 3/1. Per Renal to continue regular diet and hold off on po FR at this time. Recommend continued assistance/encouragement with meals/oral nutritional supplements. Will remain available to obtain food preferences to maximize po intake.     Factors impacting intake: [ ] none [ ] nausea  [ ] vomiting [ ] diarrhea [ ] constipation  [ ]chewing problems [ ] swallowing issues  [x ] other: lack of appetite, pain    Diet Prescription: Diet, NPO after Midnight:      NPO Start Date: 01-Mar-2023,   NPO Start Time: 23:59  Except Medications (03-01-23 @ 10:59)  Diet, Regular:   Supplement Feeding Modality:  Oral  Ensure Plus High Protein Cans or Servings Per Day:  1       Frequency:  Three Times a day (02-27-23 @ 16:56)    Intake: Fair; 50-75% documented on 3/1    Current Weight: 197.3lbs(2/25), request current weight to assess.     Pertinent Medications: MEDICATIONS  (STANDING):  aminocaproic acid Tablet 500 milliGRAM(s) Oral <User Schedule>  fluticasone propionate 50 MICROgram(s)/spray Nasal Spray 1 Spray(s) Both Nostrils two times a day  lidocaine 2% Jelly 5 milliLiter(s) IntraUrethral once  lidocaine 2% Jelly 5 milliLiter(s) IntraUrethral once  lidocaine 2% Jelly 10 milliLiter(s) IntraUrethral once  loratadine 10 milliGRAM(s) Oral daily  metoprolol succinate ER 50 milliGRAM(s) Oral daily  multivitamin/minerals 1 Tablet(s) Oral daily  Orgovyx (relugolix) 120mg tab 1 Tablet(s) 1 Tablet(s) Oral daily  pantoprazole  Injectable 40 milliGRAM(s) IV Push daily  polyethylene glycol 3350 17 Gram(s) Oral daily  sodium bicarbonate 650 milliGRAM(s) Oral three times a day  sodium chloride 1 Gram(s) Oral three times a day    MEDICATIONS  (PRN):  acetaminophen     Tablet .. 650 milliGRAM(s) Oral every 6 hours PRN Temp greater or equal to 38C (100.4F), Mild Pain (1 - 3)  aluminum hydroxide/magnesium hydroxide/simethicone Suspension 30 milliLiter(s) Oral every 4 hours PRN Dyspepsia  melatonin 3 milliGRAM(s) Oral at bedtime PRN Insomnia  morphine  - Injectable 1 milliGRAM(s) IV Push every 6 hours PRN Severe Pain (7 - 10)  ondansetron Injectable 4 milliGRAM(s) IV Push every 8 hours PRN Nausea and/or Vomiting    Pertinent Labs: 03-02 Na134 mmol/L<L> Glu 96 mg/dL K+ 4.5 mmol/L Cr  1.10 mg/dL BUN 20 mg/dL 02-28 Phos 5.1 mg/dL<H> 03-02 Alb 2.3 g/dL<L>     CAPILLARY BLOOD GLUCOSE        Skin: intact. Darrin 18.     Estimated Needs:   [x ] no change since previous assessment  [ ] recalculated:     Previous Nutrition Diagnosis:   [x ] Inadequate Energy Intake [ ]Inadequate Oral Intake [ ] Excessive Energy Intake   [ ] Underweight [ ] Increased Nutrient Needs [ ] Overweight/Obesity   [ ] Altered GI Function [ ] Unintended Weight Loss [ ] Food & Nutrition Related Knowledge Deficit [x ] Malnutrition     Nutrition Diagnosis is [x ] ongoing  [ ] resolved [ ] not applicable     New Nutrition Diagnosis: [x ] not applicable       Interventions:   Recommend  [x ] Change Diet To: Continue Regular diet with Ensure plus high protein 8oz TID.  [ ] Nutrition Supplement  [ ] Nutrition Support  [ x] Other: Will follow for food preferences/meal alternatives to maximize po intake. Recommend MVI with minerals/fe daily (centrum liquid).     Monitoring and Evaluation:   [ x] PO intake [ x ] Tolerance to diet prescription [ x ] weights [ x ] labs[ x ] follow up per protocol  [ ] other:

## 2023-03-02 NOTE — PROGRESS NOTE ADULT - SUBJECTIVE AND OBJECTIVE BOX
NOT COMPLETE      Patient is a 72y old  Male who presents with a chief complaint of Hematuria/Urinary Retention (27 Feb 2023 12:32)       HPI:  71 y/o M w/  PMHx of HTN, AS s/p TAVR, ppm, prostate cancer s/p radical prostatectomy c/b radiation proctitis presents to ED for severe pain and urinary retention s/p cystoscopy. Patient follows with urology at [facility]; was being seen for cystoscopy after experiencing hematuria. He has had hematuria before approximately one year ago; underwent cystoscopy without complications at that time. He underwent multiple rounds of radiation therapy with ultimate radical prostatectomy for prostate CA. He has been diagnosed with radiation proctitis. Patient feels that this his current presentation is likely 2/2  radiation cystitis. Patient reports pain is improved after bowers/CBI placed.   Patient developed hyponatremia prompting renal consult.  He has been having ongoing hematuria. On CBI.  Denies any N/V.  Has lightheadedness at times.      2/28/23: Had syncope overnight while walking to commode. Renal function much worsen today. C/o bladder pain. Bowers replaced. 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      HTN (hypertension)      Aortic stenosis      Rectal bleeding  last hospitalization 10/6/20 2/2 radiation proctitis      [place] (hard of hearing)      H/O prostatectomy  2015      S/P T&amp;A (status post tonsillectomy and adenoidectomy)  child           FAMILY HISTORY:  FH: cancer (Father, Mother)    NC    Social History:Non smoker    MEDICATIONS  (STANDING):  aminocaproic acid Tablet 500 milliGRAM(s) Oral <User Schedule>  fluticasone propionate 50 MICROgram(s)/spray Nasal Spray 1 Spray(s) Both Nostrils two times a day  lidocaine 2% Jelly 5 milliLiter(s) IntraUrethral once  lidocaine 2% Jelly 5 milliLiter(s) IntraUrethral once  lidocaine 2% Jelly 10 milliLiter(s) IntraUrethral once  loratadine 10 milliGRAM(s) Oral daily  metoprolol succinate ER 50 milliGRAM(s) Oral daily  multivitamin/minerals 1 Tablet(s) Oral daily  Orgovyx (relugolix) 120mg tab 1 Tablet(s) 1 Tablet(s) Oral daily  polyethylene glycol 3350 17 Gram(s) Oral daily  sodium bicarbonate 650 milliGRAM(s) Oral three times a day  sodium chloride 1 Gram(s) Oral three times a day    MEDICATIONS  (PRN):  acetaminophen     Tablet .. 650 milliGRAM(s) Oral every 6 hours PRN Temp greater or equal to 38C (100.4F), Mild Pain (1 - 3)  aluminum hydroxide/magnesium hydroxide/simethicone Suspension 30 milliLiter(s) Oral every 4 hours PRN Dyspepsia  melatonin 3 milliGRAM(s) Oral at bedtime PRN Insomnia  morphine  - Injectable 1 milliGRAM(s) IV Push every 6 hours PRN Severe Pain (7 - 10)  ondansetron Injectable 4 milliGRAM(s) IV Push every 8 hours PRN Nausea and/or Vomiting      Allergies    penicillin (Diarrhea; Pruritus; Hives)  penicillin V potassium (Rash)    Intolerances    codeine (Nausea)       REVIEW OF SYSTEMS:    Review of Systems:   Constitutional: Denies fatigue  HEENT: Denies headaches and dizziness  Respiratory: denies SOB, cough, or wheezing  Cardiovascular: denies CP, palpitations  Gastrointestinal: Denies nausea, denies vomiting, diarrhea, constipation, abdominal pain, or bloody stools  Genitourinary: neg  Skin: denies rashes or itching  Musculoskeletal: denies muscle aches, joint swelling  Neurologic: Denies generalized weakness, denies loss of sensation, numbness, or tingling      ICU Vital Signs Last 24 Hrs  T(C): 36.6 (02 Mar 2023 05:02), Max: 37.5 (01 Mar 2023 22:20)  T(F): 97.9 (02 Mar 2023 05:02), Max: 99.5 (01 Mar 2023 22:20)  HR: 95 (02 Mar 2023 05:02) (88 - 95)  BP: 131/75 (02 Mar 2023 05:02) (116/61 - 151/76)  BP(mean): --  ABP: --  ABP(mean): --  RR: 18 (02 Mar 2023 05:02) (18 - 18)  SpO2: 95% (02 Mar 2023 05:02) (92% - 95%)    O2 Parameters below as of 02 Mar 2023 05:02  Patient On (Oxygen Delivery Method): room air            PHYSICAL EXAM:    GENERAL: NAD  HEAD:  Atraumatic, Normocephalic  EYES: EOMI, conjunctiva and sclera clear  ENMT: No Drainage from nares, No drainage from ears  NECK: Supple, neck  veins full  NERVOUS SYSTEM:  Awake and Alert  CHEST/LUNG: Clear to percussion bilaterally; No rales, rhonchi, wheezing, or rubs  HEART: Regular rate and rhythm; No murmurs, rubs, or gallops  ABDOMEN: Soft, Nontender, Nondistended; Bowel sounds present  EXTREMITIES:  No Edema  SKIN: No rashes No obvious ecchymosis  +Bowers      LABS:                                                        9.9    8.94  )-----------( 221      ( 02 Mar 2023 06:40 )             29.9     03-02    134<L>  |  105  |  20  ----------------------------<  96  4.5   |  25  |  1.10    Ca    8.2<L>      02 Mar 2023 06:40  Mg     2.5     03-01    TPro  5.7<L>  /  Alb  2.3<L>  /  TBili  0.6  /  DBili  x   /  AST  34  /  ALT  22  /  AlkPhos  74  03-02                     Patient is a 72y old  Male who presents with a chief complaint of Hematuria/Urinary Retention (27 Feb 2023 12:32)       HPI:  73 y/o M w/  PMHx of HTN, AS s/p TAVR, ppm, prostate cancer s/p radical prostatectomy c/b radiation proctitis presents to ED for severe pain and urinary retention s/p cystoscopy. Patient follows with urology at [facility]; was being seen for cystoscopy after experiencing hematuria. He has had hematuria before approximately one year ago; underwent cystoscopy without complications at that time. He underwent multiple rounds of radiation therapy with ultimate radical prostatectomy for prostate CA. He has been diagnosed with radiation proctitis. Patient feels that this his current presentation is likely 2/2  radiation cystitis. Patient reports pain is improved after bowers/CBI placed.   Patient developed hyponatremia prompting renal consult.  He has been having ongoing hematuria. On CBI.  Denies any N/V.  Has lightheadedness at times.      2/28/23: Had syncope overnight while walking to commode. Renal function much worsen today. C/o bladder pain. Bowers replaced. 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      HTN (hypertension)      Aortic stenosis      Rectal bleeding  last hospitalization 10/6/20 2/2 radiation proctitis      [place] (hard of hearing)      H/O prostatectomy  2015      S/P T&amp;A (status post tonsillectomy and adenoidectomy)  child           FAMILY HISTORY:  FH: cancer (Father, Mother)    NC    Social History:Non smoker    MEDICATIONS  (STANDING):  aminocaproic acid Tablet 500 milliGRAM(s) Oral <User Schedule>  fluticasone propionate 50 MICROgram(s)/spray Nasal Spray 1 Spray(s) Both Nostrils two times a day  lidocaine 2% Jelly 5 milliLiter(s) IntraUrethral once  lidocaine 2% Jelly 5 milliLiter(s) IntraUrethral once  lidocaine 2% Jelly 10 milliLiter(s) IntraUrethral once  loratadine 10 milliGRAM(s) Oral daily  metoprolol succinate ER 50 milliGRAM(s) Oral daily  multivitamin/minerals 1 Tablet(s) Oral daily  Orgovyx (relugolix) 120mg tab 1 Tablet(s) 1 Tablet(s) Oral daily  polyethylene glycol 3350 17 Gram(s) Oral daily  sodium bicarbonate 650 milliGRAM(s) Oral three times a day  sodium chloride 1 Gram(s) Oral three times a day    MEDICATIONS  (PRN):  acetaminophen     Tablet .. 650 milliGRAM(s) Oral every 6 hours PRN Temp greater or equal to 38C (100.4F), Mild Pain (1 - 3)  aluminum hydroxide/magnesium hydroxide/simethicone Suspension 30 milliLiter(s) Oral every 4 hours PRN Dyspepsia  melatonin 3 milliGRAM(s) Oral at bedtime PRN Insomnia  morphine  - Injectable 1 milliGRAM(s) IV Push every 6 hours PRN Severe Pain (7 - 10)  ondansetron Injectable 4 milliGRAM(s) IV Push every 8 hours PRN Nausea and/or Vomiting      Allergies    penicillin (Diarrhea; Pruritus; Hives)  penicillin V potassium (Rash)    Intolerances    codeine (Nausea)       REVIEW OF SYSTEMS:    Review of Systems:   Constitutional: Denies fatigue  HEENT: Denies headaches and dizziness  Respiratory: denies SOB, cough, or wheezing  Cardiovascular: denies CP, palpitations  Gastrointestinal: Denies nausea, denies vomiting, diarrhea, constipation, abdominal pain, or bloody stools  Genitourinary: neg  Skin: denies rashes or itching  Musculoskeletal: denies muscle aches, joint swelling  Neurologic: Denies generalized weakness, denies loss of sensation, numbness, or tingling      ICU Vital Signs Last 24 Hrs  T(C): 36.6 (02 Mar 2023 05:02), Max: 37.5 (01 Mar 2023 22:20)  T(F): 97.9 (02 Mar 2023 05:02), Max: 99.5 (01 Mar 2023 22:20)  HR: 95 (02 Mar 2023 05:02) (88 - 95)  BP: 131/75 (02 Mar 2023 05:02) (116/61 - 151/76)  BP(mean): --  ABP: --  ABP(mean): --  RR: 18 (02 Mar 2023 05:02) (18 - 18)  SpO2: 95% (02 Mar 2023 05:02) (92% - 95%)    O2 Parameters below as of 02 Mar 2023 05:02  Patient On (Oxygen Delivery Method): room air            PHYSICAL EXAM:    GENERAL: NAD  HEAD:  Atraumatic, Normocephalic  EYES: EOMI, conjunctiva and sclera clear  ENMT: No Drainage from nares, No drainage from ears  NECK: Supple, neck  veins full  NERVOUS SYSTEM:  Awake and Alert  CHEST/LUNG: Clear to percussion bilaterally; No rales, rhonchi, wheezing, or rubs  HEART: Regular rate and rhythm; No murmurs, rubs, or gallops  ABDOMEN: Soft, Nontender, Nondistended; Bowel sounds present  EXTREMITIES:  No Edema  SKIN: No rashes No obvious ecchymosis  +Bowers      LABS:                                                        9.9    8.94  )-----------( 221      ( 02 Mar 2023 06:40 )             29.9     03-02    134<L>  |  105  |  20  ----------------------------<  96  4.5   |  25  |  1.10    Ca    8.2<L>      02 Mar 2023 06:40  Mg     2.5     03-01    TPro  5.7<L>  /  Alb  2.3<L>  /  TBili  0.6  /  DBili  x   /  AST  34  /  ALT  22  /  AlkPhos  74  03-02

## 2023-03-02 NOTE — PRE-OP CHECKLIST - AICD PRESENT
pacemaker introgated 2/14/23/yes pacemaker interogatted 2/14/23/yes pacemaker interrogated 2/14/23/yes

## 2023-03-02 NOTE — PROGRESS NOTE ADULT - ASSESSMENT
72 year old male with AV disease, s/p TAVR in 12/20, with post op SSS requiring PPM, here with hematuria and radiation hemorrhagic cystitis.     AV disease, SSS s/p PPM  - No sign of acute ischemia.   - S/p TAVR, no anginal complaints or volume overload  - He has minimal CAD based on cath in 2020.    - BP, HR stable and controlled   - Continue with BB    - Had syncope 2/28 am, no further episodes, likely vagal vs medication induced.    - Cr improved, 3 >1.1   - + hematuria, CBI ongoing, urology following  -  monitor cbc,  transfuse per primary    - had been off antiplatelets for well over a year before this event, no need to resume nito in light of the ongoing issues with hematuria  - NPO for planned clot evac, fulguration for 3/2/23   - Pt has no active ischemia, decompensated heart failure, unstable arrythmia, or severe stenotic valvular disease. Patient is optimized from cardiovascular standpoint to proceed with planned procedure with routine hemodynamic monitoring.     - Has syncope 2/28 am, no further episodes, likely vagal vs medication induced.  - Cr improved, 3 >1.1     - Monitor and replete lytes, keep K>4, Mg>2.  - Will continue to follow.    Keshia Vaughan NP  Nurse Practitioner- Cardiology   Spectra #[phone]    - Monitor and replete lytes, keep K>4, Mg>2.  - Will continue to follow.    Keshia Vaughan NP  Nurse Practitioner- Cardiology   Spectra #[phone]   72 year old male with AV disease, s/p TAVR in 12/20, with post op SSS requiring PPM, here with hematuria and radiation hemorrhagic cystitis.     AV disease, SSS s/p PPM  - No sign of acute ischemia.   - S/p TAVR, no anginal complaints or volume overload  - He has minimal CAD based on cath in 2020.    - BP, HR stable and controlled   - Continue with BB    - Had syncope 2/28 am, no further episodes, likely vagal vs medication induced.    - Cr improved, 3 >1.1   - + hematuria, CBI ongoing, urology following  -  monitor cbc,  transfuse per primary    - had been off antiplatelets for well over a year before this event, no need to resume nito in light of the ongoing issues with hematuria  - NPO for planned clot evac, fulguration for 3/2/23  - BS PPM intterogated 12/14/22; mode  DDDR. Battery life approx 11.5 years left. V paced 4%, A paced 70%.    - Pt has no active ischemia, decompensated heart failure, unstable arrythmia, or severe stenotic valvular disease. Patient is optimized from cardiovascular standpoint to proceed with planned procedure with routine hemodynamic monitoring.     - Has syncope 2/28 am, no further episodes, likely vagal vs medication induced.  - Cr improved, 3 >1.1     - Monitor and replete lytes, keep K>4, Mg>2.  - Will continue to follow.    Keshia Vaughan NP  Nurse Practitioner- Cardiology   Spectra #[phone]    - Monitor and replete lytes, keep K>4, Mg>2.  - Will continue to follow.    Keshia Vaughan NP  Nurse Practitioner- Cardiology   Spectra #[phone]   72 year old male with AV disease, s/p TAVR in 12/20, with post op SSS requiring PPM, here with hematuria and radiation hemorrhagic cystitis.     AV disease, SSS s/p PPM  - No sign of acute ischemia.   - S/p TAVR, no anginal complaints or volume overload  - He has minimal CAD based on cath in 2020.    - BP, HR stable and controlled   - Continue with BB    - Had syncope 2/28 am, no further episodes, likely vagal vs medication induced.    - Cr improved, 3 >1.1   - + hematuria, CBI ongoing, urology following  -  monitor cbc,  transfuse per primary    - had been off antiplatelets for well over a year before this event, no need to resume nito in light of the ongoing issues with hematuria  - NPO for planned clot evac, fulguration for 3/2/23  - BS PPM intterogated 12/14/22; mode  DDDR. Battery life approx 11.5 years left. V paced 4%, A paced 70%.    - Pt has no active ischemia, decompensated heart failure, unstable arrythmia, or severe stenotic valvular disease. Patient is optimized from cardiovascular standpoint to proceed with planned procedure with routine hemodynamic monitoring.     - Has syncope 2/28 am, no further episodes, likely vagal vs medication induced.  - Cr improved, 3 >1.1     - Monitor and replete lytes, keep K>4, Mg>2.  - Will continue to follow.    Keshia Vaughan NP  Nurse Practitioner- Cardiology   Spectra #[phone]

## 2023-03-02 NOTE — BRIEF OPERATIVE NOTE - NSICDXBRIEFPROCEDURE_GEN_ALL_CORE_FT
PROCEDURES:  Continuous irrigation of bladder using 3-way Gr catheter 27-Feb-2023 23:12:31  Vera Reeves  Simple replacement of bladder catheter 27-Feb-2023 23:13:24  Vera Reeves  Cystoscopy with fulguration of hemorrhaging blood vessel and evacuation of clot 02-Mar-2023 14:46:36  Leobardo Shaffer  
no nausea/no vomiting/no abdominal pain/no melena/no hematochezia

## 2023-03-02 NOTE — PROGRESS NOTE ADULT - ASSESSMENT
SANJUANA  Hyponatremia  Metabolic acidosis  Hematuria  HTN  Anemia  Prostate Caner S/P Prostatectomy and radiation      -SANJUANA clinically behaving as obstructive uropathy but bladder scan showed no urine; urine indices suggest ATN instead. Gr replaced. CBI per urology   -Is/p VF x NS 1 day  -Gr  -Continue oral bicarb for additional day  -Continue Sodium chloride tabs   -Hyponatremia likely multifactorial, decreased solute intake + increased solvent intake + component of SIADH  -Urine lytes will be unrevealing Patient on CBI  -Regular diet   -S/p PRBC  -Will not fluid restrict at this time, as he drinks to help augment urine output to help with hematuria, but maybe necessary   -Avoid NSAIDs for pain, can worsen hyponatremia  -Renal indices, acidosis, and hyponatremia improving; monitor for now  -Urology following  -Sodium improving at appropriate rate  -Creatinine improved

## 2023-03-02 NOTE — PROGRESS NOTE ADULT - ASSESSMENT
71 y/o M w/ PMHx of HTN,  AS s/p TAVR, ppm, prostate cancer s/p radical prostatectomy c/b radiation proctitis presents to ED for severe pain and urinary retention s/p cystoscopy. Admit for hematuria / urinary retention.      Problem/Plan - 1:  ·  Problem: Urinary retention with hematuria :   s/p RRP for Ca Prostate by Eusebio Martinez, XRT for recurrence, on Orgovyx  s/p negative cysto x2 by Dr. Luis at [facility], last one 5 weeks ago  admitted for clot retention, hematuria likely due to radiation cystitis  bowers was discontinued, not being able to void and hematuria recur , bowers was placed back   called and discussed with Dr. Luis at [facility], ok for discharge even if the urine is light pink color.   Currently on CBI after recurrent hematuria.  Continue amicar 500mg PO  Hb improved after blood transfusion, hyponatremia improved, SANJUANA resolved   patient is medically optimized for cystoscopy today         Problem/Plan - 2:  ·  Problem: Prostate cancer.   ·  Plan: Hx of Prostate CA, now s/p radiation therapy and radical prostatectomy  - Continue home Orgovyx - brought in from home  - Patient to f/u with his urologist at [facility] (Dr. Luis) outpatient for further management.     Problem/Plan - 3:  ·  Problem: Hypertension.   - BP stable  - Continue home Metoprolol ER 50mg PO daily w/ hold parameters.     Problem/Plan - 4:  ·  Problem: Need for prophylactic measure.   ·  Plan: SCDs b/l for dvt ppx; will hold off on A/C at this time 2/2 hematuria.  add PPI for GI prophylaxis      Problem/Plan - 5:   acute blood loss anemia:   s/p total 5 unit blood transfusion, Hb 9.9 today    HYPONATREMIA: likely multifactorial, on sodium  tab replacement ,sodium bicarb , regular diet instead of low salt diet. improved      SANJUANA: resolved

## 2023-03-02 NOTE — PROGRESS NOTE ADULT - SUBJECTIVE AND OBJECTIVE BOX
[facility]  INFECTIOUS DISEASES   [address] [place] [address]  Tel: [phone]     Fax: [phone]  ========================================================  MD Elana Perry Kaushal, MD Cho, Michelle, MD Sunjit, Jaspal, MD  ========================================================    [id number]  HERBIE KERR     Follow up: Leukocytosis, admitted with Hematuria/Urinary Retention     Awake and alert, No fever.   Leukocytosis back to normal. Had 2 PRBC. Cystoscopy was canceled yesterday due to abnormal labs.   Has bladder irrigation with hematuria. No abdominal pain.     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 1000 milliLiter(s) (60 mL/Hr) IV Continuous <Continuous>  fluticasone propionate 50 MICROgram(s)/spray Nasal Spray 1 Spray(s) Both Nostrils two times a day  lidocaine 2% Jelly 5 milliLiter(s) IntraUrethral once  lidocaine 2% Jelly 5 milliLiter(s) IntraUrethral once  lidocaine 2% Jelly 10 milliLiter(s) IntraUrethral once  loratadine 10 milliGRAM(s) Oral daily  metoprolol succinate ER 50 milliGRAM(s) Oral daily  multivitamin/minerals 1 Tablet(s) Oral daily  Orgovyx (relugolix) 120mg tab 1 Tablet(s) 1 Tablet(s) Oral daily  pantoprazole  Injectable 40 milliGRAM(s) IV Push daily  polyethylene glycol 3350 17 Gram(s) Oral daily  sodium bicarbonate 650 milliGRAM(s) Oral three times a day  sodium chloride 1 Gram(s) Oral three times a day    REVIEW OF SYSTEMS:  CONSTITUTIONAL:  No Fever or chills  HEENT:  No diplopia or blurred vision.  No sore throat or runny nose.  CARDIOVASCULAR:  No chest pain or SOB.  RESPIRATORY:  No cough, shortness of breath, PND or orthopnea.  GASTROINTESTINAL:  No nausea, vomiting or diarrhea.  GENITOURINARY:  No dysuria, frequency or urgency. + urine  MUSCULOSKELETAL:  no joint aches, no muscle pain  SKIN:  No change in skin, hair or nails.  NEUROLOGIC:  No paresthesias or weakness.  PSYCHIATRIC:  No disorder of thought or mood.  ENDOCRINE:  No heat or cold intolerance, polyuria or polydipsia.  HEMATOLOGICAL:  No easy bruising or bleeding.     Physical Exam:  Vital Signs Last 24 Hrs  T(C): 36.6 (02 Mar 2023 05:02), Max: 37.5 (01 Mar 2023 22:20)  T(F): 97.9 (02 Mar 2023 05:02), Max: 99.5 (01 Mar 2023 22:20)  HR: 95 (02 Mar 2023 05:02) (88 - 95)  BP: 131/75 (02 Mar 2023 05:02) (116/61 - 151/76)  RR: 18 (02 Mar 2023 05:02) (18 - 18)  SpO2: 95% (02 Mar 2023 05:02) (92% - 95%)  Parameters below as of 02 Mar 2023 05:02  Patient On (Oxygen Delivery Method): room air  GEN: NAD  HEENT: normocephalic and atraumatic. EOMI. PERRL.    NECK: Supple.  No lymphadenopathy   LUNGS: Clear to auscultation.  HEART: Irregular rate and rhythm   ABDOMEN: Soft, nontender, and nondistended.  Positive bowel sounds.    : No CVA tenderness, bowers with bloody urine  EXTREMITIES: +edema  NEUROLOGIC: grossly intact.  PSYCHIATRIC: Appropriate affect .  SKIN: No rash     Labs:                        9.9    8.94  )-----------( 221      ( 02 Mar 2023 06:40 )             29.9     03-02    134<L>  |  105  |  20  ----------------------------<  96  4.5   |  25  |  1.10    Ca    8.2<L>      02 Mar 2023 06:40  Mg     2.5     03-01    TPro  5.7<L>  /  Alb  2.3<L>  /  TBili  0.6  /  DBili  x   /  AST  34  /  ALT  22  /  AlkPhos  74  03-02    Culture - Blood (collected 02-28-23 @ 07:30)  Source: .Blood Blood-Peripheral    Culture - Blood (collected 02-28-23 @ 07:00)  Source: .Blood Blood-Peripheral    WBC Count: 8.94 K/uL (03-02-23 @ 06:40)  WBC Count: 9.26 K/uL (03-01-23 @ 12:25)  WBC Count: 9.58 K/uL (03-01-23 @ 08:55)  WBC Count: 9.55 K/uL (03-01-23 @ 06:18)  WBC Count: 18.71 K/uL (02-28-23 @ 05:34)  WBC Count: 7.29 K/uL (02-27-23 @ 10:20)  WBC Count: 7.48 K/uL (02-26-23 @ 07:05)    Creatinine, Serum: 1.10 mg/dL (03-02-23 @ 06:40)  Creatinine, Serum: 3.00 mg/dL (03-01-23 @ 08:55)  Creatinine, Serum: 3.20 mg/dL (03-01-23 @ 06:18)  Creatinine, Serum: 2.20 mg/dL (02-28-23 @ 20:30)  Creatinine, Serum: 2.50 mg/dL (02-28-23 @ 05:34)  Creatinine, Serum: 0.69 mg/dL (02-27-23 @ 10:20)  Creatinine, Serum: 0.65 mg/dL (02-26-23 @ 07:05)     SARS-CoV-2: NotDetec (02-28-23 @ 09:55)  COVID-19 PCR: NotDetec (01-31-23 @ 19:00)    All imaging and other data have been reviewed.  < from: CT Abdomen and Pelvis No Cont (02.28.23 @ 06:17) >  IMPRESSION:  Mild bilateral hydronephrosis.  Distended urinary bladder with high attenuation intraluminal contents   suspected intravesicular hematoma.    Assessment and Plan:   73 y/o man with PMH of HTN, AS s/p TAVR and PPM,  prostate cancer s/p radical prostatectomy with radiation proctitis was admitted with severe pain and urinary retention s/p cystoscopy.  Patient follows with urology at [facility]; was being seen for cystoscopy after experiencing hematuria.  He underwent multiple rounds of radiation therapy with ultimate radical prostatectomy for prostate CA. He has been diagnosed with radiation proctitis and this looks like radiation cystitis with multiple clots and blood seen in bladder in CT.   Seen by urology and plan is cystoscopy to remove clots. Early this morning had RRT and was found with tachy cardia, low H/H, transfusion given and now WBC jumped to 18k so ID was called for possible SIRS.   No fever, no leukocytosis in admission.   UA positive for blood   Most likely transfusion and hematuria is causing his symptoms, no sign of infection at this time, I watch off antibiotics and follow labs and cultures.     Recommendations:  - Blood cultures NGTD   - WBC back to normal 18k-->8-9k  -  follow up noted, for possible cystoscopy today to remove clots   - Antibiotics perioperatively as per urology otherwise will watch off antibiotics     Will sign off please call with any question.     Nica Prasad MD  Division of Infectious Diseases   Please call ID service at [phone] with any question.      35 minutes spent on total encounter assessing patient, examination, chart review, counseling and coordinating care by the attending physician/nurse/care manager.

## 2023-03-02 NOTE — BRIEF OPERATIVE NOTE - NSICDXBRIEFPREOP_GEN_ALL_CORE_FT
PRE-OP DIAGNOSIS:  Radiation cystitis 02-Mar-2023 13:37:52  Leobardo Shaffer  Clot hematuria 02-Mar-2023 14:47:03  Leobardo Shaffer

## 2023-03-02 NOTE — PROGRESS NOTE ADULT - SUBJECTIVE AND OBJECTIVE BOX
[facility] -- Michael Fernandez,  Reina, Steve Luna Savella, [place]  Office # [phone]    Follow Up:  Valvular heart disease, hematuria     Subjective/Observations: Pt seen and examined in bed. +CBI with hematuria, s/p prbc 3/1.  No complaints of chest pain, dyspnea, or palpitations reported. No signs of orthopnea or PND.      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      HTN (hypertension)      Aortic stenosis      Rectal bleeding  last hospitalization 10/6/20 2/2 radiation proctitis      [place] (hard of hearing)      H/O prostatectomy  2015      S/P T&amp;A (status post tonsillectomy and adenoidectomy)  child        MEDICATIONS  (STANDING):  aminocaproic acid Tablet 500 milliGRAM(s) Oral <User Schedule>  fluticasone propionate 50 MICROgram(s)/spray Nasal Spray 1 Spray(s) Both Nostrils two times a day  lidocaine 2% Jelly 5 milliLiter(s) IntraUrethral once  lidocaine 2% Jelly 5 milliLiter(s) IntraUrethral once  lidocaine 2% Jelly 10 milliLiter(s) IntraUrethral once  loratadine 10 milliGRAM(s) Oral daily  metoprolol succinate ER 50 milliGRAM(s) Oral daily  multivitamin/minerals 1 Tablet(s) Oral daily  Orgovyx (relugolix) 120mg tab 1 Tablet(s) 1 Tablet(s) Oral daily  pantoprazole  Injectable 40 milliGRAM(s) IV Push daily  polyethylene glycol 3350 17 Gram(s) Oral daily  sodium bicarbonate 650 milliGRAM(s) Oral three times a day  sodium chloride 1 Gram(s) Oral three times a day    MEDICATIONS  (PRN):  acetaminophen     Tablet .. 650 milliGRAM(s) Oral every 6 hours PRN Temp greater or equal to 38C (100.4F), Mild Pain (1 - 3)  aluminum hydroxide/magnesium hydroxide/simethicone Suspension 30 milliLiter(s) Oral every 4 hours PRN Dyspepsia  melatonin 3 milliGRAM(s) Oral at bedtime PRN Insomnia  morphine  - Injectable 1 milliGRAM(s) IV Push every 6 hours PRN Severe Pain (7 - 10)  ondansetron Injectable 4 milliGRAM(s) IV Push every 8 hours PRN Nausea and/or Vomiting    Allergies    penicillin (Diarrhea; Pruritus; Hives)  penicillin V potassium (Rash)    Intolerances    codeine (Nausea)    Vital Signs Last 24 Hrs  T(C): 36.6 (02 Mar 2023 05:02), Max: 37.5 (01 Mar 2023 22:20)  T(F): 97.9 (02 Mar 2023 05:02), Max: 99.5 (01 Mar 2023 22:20)  HR: 95 (02 Mar 2023 05:02) (88 - 95)  BP: 131/75 (02 Mar 2023 05:02) (116/61 - 151/76)  BP(mean): --  RR: 18 (02 Mar 2023 05:02) (18 - 18)  SpO2: 95% (02 Mar 2023 05:02) (92% - 95%)    Parameters below as of 02 Mar 2023 05:02  Patient On (Oxygen Delivery Method): room air      I&O's Summary    01 Mar 2023 07:01  -  02 Mar 2023 07:00  --------------------------------------------------------  IN: 0 mL / OUT: 6000 mL / NET: -6000 mL        TELE: SR  PHYSICAL EXAM:  Constitutional: NAD, awake and alert, well-developed  HEENT: Moist Mucous Membranes, Anicteric  Pulmonary: Non-labored, breath sounds are clear bilaterally, No wheezing, crackles or rhonchi  Cardiovascular: Regular, S1 and S2 nl, +murmur   Gastrointestinal: Bowel Sounds present, soft, nontender.   : CBI with bowers + hematuria   Lymph: No lymphadenopathy. No peripheral edema.  Skin: No visible rashes or ulcers.  Psych:  Mood & affect appropriate    LABS: All Labs Reviewed:                        9.9    8.94  )-----------( 221      ( 02 Mar 2023 06:40 )             29.9                         7.9    9.26  )-----------( 245      ( 01 Mar 2023 12:25 )             23.8                         8.0    9.58  )-----------( 239      ( 01 Mar 2023 08:55 )             24.1     02 Mar 2023 06:40    134    |  105    |  20     ----------------------------<  96     4.5     |  25     |  1.10   01 Mar 2023 08:55    128    |  99     |  26     ----------------------------<  103    4.5     |  21     |  3.00   01 Mar 2023 06:18    128    |  97     |  26     ----------------------------<  107    4.3     |  24     |  3.20     Ca    8.2        02 Mar 2023 06:40  Ca    7.9        01 Mar 2023 08:55  Ca    8.0        01 Mar 2023 06:18  Phos  5.1       28 Feb 2023 05:34  Mg     2.5       01 Mar 2023 06:18  Mg     2.3       28 Feb 2023 05:34    TPro  5.7    /  Alb  2.3    /  TBili  0.6    /  DBili  x      /  AST  34     /  ALT  22     /  AlkPhos  74     02 Mar 2023 06:40  TPro  5.8    /  Alb  2.6    /  TBili  0.5    /  DBili  x      /  AST  20     /  ALT  16     /  AlkPhos  76     01 Mar 2023 06:18          12 Lead ECG:   Ventricular Rate 115 BPM    Atrial Rate 115 BPM    P-R Interval 180 ms    QRS Duration 130 ms    Q-T Interval 338 ms    QTC Calculation(Bazett) 467 ms    P Axis -6 degrees    R Axis 66 degrees    T Axis 4 degrees    Diagnosis Line Sinus tachycardia  Right bundle branch block  T wave abnormality, consider inferior ischemia  Abnormal ECG  When compared with ECG of 20-FEB-2023 10:41,  T wave inversion more evident in Anterior leads  Confirmed by Anthony Huang MD ([id number]) on 2/28/2023 1:36:59 PM (02-28-23 @ 05:17)      Patient name: HERBIE KERR  YOB: 1950   Age: 70 (M)   MR#: [id number]  Study Date: 2/4/2021  Location: O/PSonographer: Ramona Horn RDCS  Study quality: Technically good  Referring Physician: Rich Quesada MD  Blood Pressure: 168/87 mmHg  Height: 178 cm  Weight: 91 kg  BSA: 2.1 m2  Heart Rate: 76 mmHg  ------------------------------------------------------------------------  PROCEDURE: Transthoracic echocardiogram with 2-D, M-Mode  and complete spectral and color flow Doppler.  INDICATION: Nonrheumatic aortic (valve) stenosis (I35.0)  ------------------------------------------------------------------------  MEASUREMENTS: (See below)  ------------------------------------------------------------------------  OBSERVATIONS:  Mitral Valve: There is mitral annular calcification with  calcification on the anterior mitral leaflet. Mild mitral  regurgitation.  Aortic Root: Normal aortic root size. (Ao: 3.4 cm at the  sinuses of Valsalva).  Aortic Valve: #26mm Evolut Pro+ THV. The valve is well  seated with normal function.  The peak/mean gradients=  13/7mmHg with a DVI= 0.63. Peak transaortic valve gradient  equals 13 mm Hg, mean transaortic valve gradient equals 7  mm Hg, aortic valve velocity time integral equals 35 cm,  estimated aortic valve area equals 2.3 sqcm. There is a  mild paravalvular regurgitation jet originating from about  2 O'clock TTE surgical view.  There is no intravalvular  regurgitation seen. Peak left ventricular outflow tract  gradient equals 4mm Hg, mean gradient is equal to 3 mm Hg,  LVOT velocity time integral equals 22 cm.  Left Atrium: Mildly dilated left atrium.  LA volume index =  39 cc/m2.  Left Ventricle: Normal left ventricular systolic function.  No segmental wall motion abnormalities.  The LVEF about  55-60%. Normal left ventricular internal dimensions and  wall thicknesses. Mild diastolic dysfunction (Stage I).  Right Heart: Normal right atrium.  RA area= 13cm2, RA volume= 32ml. Normal right ventricular  size and function.  There is a device wire in the right heart. Normal tricuspid  valve. Minimal tricuspid regurgitation. Normal pulmonic  valve. Minimal pulmonic regurgitation.  Pericardium/PleuraNormal pericardium with no pericardial  effusion.  Hemodynamic: The estimated right atrial pressure is normal.  ------------------------------------------------------------------------  CONCLUSIONS:  1. #26mm Evolut Pro+ THV.  The valve is well seated with  normal function.  The peak/mean gradients= 13/7mmHg with a  DVI= 0.63. There is a mild paravalvular regurgitation jet  originating from about 2 O'clock TTE surgical view.  There  is no intravalvular regurgitation seen.  *** Compared with echocardiogram of 12/10/2020, no  significant changes noted.  ------------------------------------------------------------------------  PROCEDURE DESCRIPTION: Transthoracic echocardiogram with  2-D, M-Mode and complete spectral and color flow Doppler.  ------------------------------------------------------------------------  ECHOCARDIOGRAPHIC EXAMINATION:  AORTIC ROOT:  Aortic Root (Leaflet): 3.4 cm  Aortic Root Index: 0.9  LEFT ATRIUM:  AP Dimensions: 3.6 cm  LA Volume Index: 39.00 cc/sqm  LA Volume: 81.3 cc  LEFT VENTRICLE:  LVIDd: 4.1 cm  LVIDs: 2.9 cm  IVS: 1.18  ILWT: 1.0 cm  RWT: 0.47  LV Mass: 155.0 gm  LV Mass Index: 74 gm/sqm  EF (Visual Estimate): 55-60%  HR and BP:  HR: 76 bpm  BP: 168/87  BSA: 2.09  ------------------------------------------------------------------------  HEMODYNAMICS:  RA Pressure Estimate: 8  LA Pressure Estimate: < 20  IVRT: 53 ms  ------------------------------------------------------------------------  COLOR FLOW and SPECIAL DOPPLER:  AORTIC VALVE:  AV Peak Velocity: 1.7 M/sec  AV Peak Gradient: 12 mm Hg  AV Mean Gradient: 7 mm Hg  Valve Area: 2.3 sqcm  LVOT:  LVOT Velocity: 1.0 M/sec  LVOT Diameter: 2.1 cm  LVOT Peak Gradient Rest: 4 mm Hg  LVOT Mean Gradient Rest: 3 mm Hg  ------------------------------------------------------------------------  DIASTOLIC FUNCTION:  DT:187 ms  E/A: 0.90  e' Septal: 7.0 cm/s  E/e' Septal: 14.2  e' Lateral: 8.0 cm/s  E/e' Lateral: 12.5  MV E wave: 1.0 m/s  MV A wave: 1.1 m/s  Septal a': 10.0 cm/s  Lateral a': 11.0 cm/s  ------------------------------------------------------------------------  Confirmed on  2/4/2021 - 13:25:59 by Caroline Rizzo M.D.  ------------------------------------------------------------------------

## 2023-03-02 NOTE — PROGRESS NOTE ADULT - SUBJECTIVE AND OBJECTIVE BOX
Patient is a 72y old  Male who presents with a chief complaint of Hematuria/Urinary Retention (02 Mar 2023 09:04)      Subjective:  INTERVAL HPI/OVERNIGHT EVENTS: Patient seen and examined at bedside.  Patient c/o pelvic pressure   MEDICATIONS  (STANDING):  aminocaproic acid Tablet 500 milliGRAM(s) Oral <User Schedule>  fluticasone propionate 50 MICROgram(s)/spray Nasal Spray 1 Spray(s) Both Nostrils two times a day  lidocaine 2% Jelly 5 milliLiter(s) IntraUrethral once  lidocaine 2% Jelly 5 milliLiter(s) IntraUrethral once  lidocaine 2% Jelly 10 milliLiter(s) IntraUrethral once  loratadine 10 milliGRAM(s) Oral daily  metoprolol succinate ER 50 milliGRAM(s) Oral daily  multivitamin/minerals 1 Tablet(s) Oral daily  Orgovyx (relugolix) 120mg tab 1 Tablet(s) 1 Tablet(s) Oral daily  pantoprazole  Injectable 40 milliGRAM(s) IV Push daily  polyethylene glycol 3350 17 Gram(s) Oral daily  sodium bicarbonate 650 milliGRAM(s) Oral three times a day  sodium chloride 1 Gram(s) Oral three times a day    MEDICATIONS  (PRN):  acetaminophen     Tablet .. 650 milliGRAM(s) Oral every 6 hours PRN Temp greater or equal to 38C (100.4F), Mild Pain (1 - 3)  aluminum hydroxide/magnesium hydroxide/simethicone Suspension 30 milliLiter(s) Oral every 4 hours PRN Dyspepsia  melatonin 3 milliGRAM(s) Oral at bedtime PRN Insomnia  morphine  - Injectable 1 milliGRAM(s) IV Push every 6 hours PRN Severe Pain (7 - 10)  ondansetron Injectable 4 milliGRAM(s) IV Push every 8 hours PRN Nausea and/or Vomiting      Allergies    penicillin (Diarrhea; Pruritus; Hives)  penicillin V potassium (Rash)    Intolerances    codeine (Nausea)      REVIEW OF SYSTEMS:  CONSTITUTIONAL: No fever or chills  HEENT:  No headache, no sore throat  RESPIRATORY: No cough or shortness of breath  CARDIOVASCULAR: No chest pain or palpitations  GASTROINTESTINAL: No abd pain, nausea, vomiting, or diarrhea      Objective:  Vital Signs Last 24 Hrs  T(C): 36.6 (02 Mar 2023 05:02), Max: 37.5 (01 Mar 2023 22:20)  T(F): 97.9 (02 Mar 2023 05:02), Max: 99.5 (01 Mar 2023 22:20)  HR: 95 (02 Mar 2023 05:02) (88 - 95)  BP: 131/75 (02 Mar 2023 05:02) (116/61 - 151/76)  BP(mean): --  RR: 18 (02 Mar 2023 05:02) (18 - 18)  SpO2: 95% (02 Mar 2023 05:02) (92% - 95%)    Parameters below as of 02 Mar 2023 05:02  Patient On (Oxygen Delivery Method): room air        GENERAL: NAD, sitting in bed   HEAD:  Normocephalic  EYES:  conjunctiva and sclera clear  ENT: Moist mucous membranes  NECK: Supple  CHEST/LUNG: Clear to auscultation bilaterally; No rales or rhonchi; no wheezing. Unlabored respirations  HEART: Regular rate and rhythm; S1S2+  ABDOMEN: Bowel sounds present; Soft, Nontender, Nondistended.   EXTREMITIES:  + distal Peripheral Pulses;  No cyanosis, or edema  NERVOUS SYSTEM:  Alert & Oriented X3;  No gross focal deficits   MSK: moves all extremities  SKIN: No rashes     LABS:                        9.9    8.94  )-----------( 221      ( 02 Mar 2023 06:40 )             29.9     02 Mar 2023 06:40    134    |  105    |  20     ----------------------------<  96     4.5     |  25     |  1.10     Ca    8.2        02 Mar 2023 06:40    TPro  5.7    /  Alb  2.3    /  TBili  0.6    /  DBili  x      /  AST  34     /  ALT  22     /  AlkPhos  74     02 Mar 2023 06:40        CAPILLARY BLOOD GLUCOSE            Culture - Blood (collected 02-28-23 @ 07:30)  Source: .Blood Blood-Peripheral  Preliminary Report (03-01-23 @ 14:01):    No growth to date.    Culture - Blood (collected 02-28-23 @ 07:00)  Source: .Blood Blood-Peripheral  Preliminary Report (03-01-23 @ 14:01):    No growth to date.        RADIOLOGY & ADDITIONAL TESTS:    Personally reviewed.     Consultant(s) Notes Reviewed:  [x] YES  [ ] NO    Plan of care discussed with patient /family; all questions answered

## 2023-03-02 NOTE — BRIEF OPERATIVE NOTE - NSICDXBRIEFPOSTOP_GEN_ALL_CORE_FT
POST-OP DIAGNOSIS:  Radiation cystitis 02-Mar-2023 14:47:19  Leobardo Shaffer  Clot hematuria 02-Mar-2023 14:47:26  Leobardo Shaffer

## 2023-03-03 LAB
ANION GAP SERPL CALC-SCNC: 6 MMOL/L — SIGNIFICANT CHANGE UP (ref 5–17)
BUN SERPL-MCNC: 17 MG/DL — SIGNIFICANT CHANGE UP (ref 7–23)
CALCIUM SERPL-MCNC: 8.3 MG/DL — LOW (ref 8.5–10.1)
CHLORIDE SERPL-SCNC: 106 MMOL/L — SIGNIFICANT CHANGE UP (ref 96–108)
CO2 SERPL-SCNC: 26 MMOL/L — SIGNIFICANT CHANGE UP (ref 22–31)
CREAT SERPL-MCNC: 0.78 MG/DL — SIGNIFICANT CHANGE UP (ref 0.5–1.3)
EGFR: 94 ML/MIN/1.73M2 — SIGNIFICANT CHANGE UP
GLUCOSE SERPL-MCNC: 118 MG/DL — HIGH (ref 70–99)
HCT VFR BLD CALC: 29.1 % — LOW (ref 39–50)
HGB BLD-MCNC: 9.4 G/DL — LOW (ref 13–17)
MCHC RBC-ENTMCNC: 28.7 PG — SIGNIFICANT CHANGE UP (ref 27–34)
MCHC RBC-ENTMCNC: 32.3 GM/DL — SIGNIFICANT CHANGE UP (ref 32–36)
MCV RBC AUTO: 88.7 FL — SIGNIFICANT CHANGE UP (ref 80–100)
NRBC # BLD: 0 /100 WBCS — SIGNIFICANT CHANGE UP (ref 0–0)
PLATELET # BLD AUTO: 268 K/UL — SIGNIFICANT CHANGE UP (ref 150–400)
POTASSIUM SERPL-MCNC: 4.5 MMOL/L — SIGNIFICANT CHANGE UP (ref 3.5–5.3)
POTASSIUM SERPL-SCNC: 4.5 MMOL/L — SIGNIFICANT CHANGE UP (ref 3.5–5.3)
RBC # BLD: 3.28 M/UL — LOW (ref 4.2–5.8)
RBC # FLD: 14.6 % — HIGH (ref 10.3–14.5)
SODIUM SERPL-SCNC: 138 MMOL/L — SIGNIFICANT CHANGE UP (ref 135–145)
WBC # BLD: 9.72 K/UL — SIGNIFICANT CHANGE UP (ref 3.8–10.5)
WBC # FLD AUTO: 9.72 K/UL — SIGNIFICANT CHANGE UP (ref 3.8–10.5)

## 2023-03-03 PROCEDURE — 99233 SBSQ HOSP IP/OBS HIGH 50: CPT

## 2023-03-03 PROCEDURE — 99232 SBSQ HOSP IP/OBS MODERATE 35: CPT

## 2023-03-03 RX ORDER — FLUTICASONE PROPIONATE 50 MCG
1 SPRAY, SUSPENSION NASAL
Refills: 0 | Status: DISCONTINUED | OUTPATIENT
Start: 2023-03-03 | End: 2023-03-07

## 2023-03-03 RX ADMIN — LORATADINE 10 MILLIGRAM(S): 10 TABLET ORAL at 11:29

## 2023-03-03 RX ADMIN — Medication 50 MILLIGRAM(S): at 05:33

## 2023-03-03 RX ADMIN — PANTOPRAZOLE SODIUM 40 MILLIGRAM(S): 20 TABLET, DELAYED RELEASE ORAL at 11:30

## 2023-03-03 RX ADMIN — Medication 1 SPRAY(S): at 17:21

## 2023-03-03 NOTE — PROGRESS NOTE ADULT - SUBJECTIVE AND OBJECTIVE BOX
Patient is a 72y old  Male who presents with a chief complaint of Hematuria/Urinary Retention (03 Mar 2023 11:31)      Subjective:  INTERVAL HPI/OVERNIGHT EVENTS: Patient seen and examined at bedside.  Patient has no complaints at this time.   MEDICATIONS  (STANDING):  lidocaine 2% Jelly 10 milliLiter(s) IntraUrethral once  loratadine 10 milliGRAM(s) Oral daily  metoprolol succinate ER 50 milliGRAM(s) Oral daily  pantoprazole  Injectable 40 milliGRAM(s) IV Push daily  polyethylene glycol 3350 17 Gram(s) Oral daily    MEDICATIONS  (PRN):  acetaminophen     Tablet .. 650 milliGRAM(s) Oral every 6 hours PRN Temp greater or equal to 38C (100.4F), Mild Pain (1 - 3)  aluminum hydroxide/magnesium hydroxide/simethicone Suspension 30 milliLiter(s) Oral every 4 hours PRN Dyspepsia  melatonin 3 milliGRAM(s) Oral at bedtime PRN Insomnia      Allergies    penicillin (Diarrhea; Pruritus; Hives)  penicillin V potassium (Rash)    Intolerances    codeine (Nausea)      REVIEW OF SYSTEMS:  CONSTITUTIONAL: No fever or chills  HEENT:  No headache, no sore throat  RESPIRATORY: No cough or shortness of breath  CARDIOVASCULAR: No chest pain or palpitations  GASTROINTESTINAL: No abd pain, nausea, vomiting, or diarrhea      Objective:  Vital Signs Last 24 Hrs  T(C): 36.5 (03 Mar 2023 05:22), Max: 37.4 (02 Mar 2023 12:42)  T(F): 97.7 (03 Mar 2023 05:22), Max: 99.3 (02 Mar 2023 12:42)  HR: 88 (03 Mar 2023 05:22) (78 - 92)  BP: 130/70 (03 Mar 2023 05:22) (102/56 - 168/74)  BP(mean): --  RR: 17 (03 Mar 2023 05:22) (14 - 22)  SpO2: 96% (03 Mar 2023 05:22) (91% - 99%)    Parameters below as of 03 Mar 2023 05:22  Patient On (Oxygen Delivery Method): room air        GENERAL: NAD, lying in bed comfortably  HEAD:  Normocephalic  EYES:  conjunctiva and sclera clear  ENT: Moist mucous membranes  NECK: Supple  CHEST/LUNG: Clear to auscultation bilaterally; No rales or rhonchi; no wheezing. Unlabored respirations  HEART: Regular rate and rhythm; S1S2+  ABDOMEN: Bowel sounds present; Soft, Nontender, Nondistended. pressure sensation upon palpation to lower pelvic area    EXTREMITIES:  + distal Peripheral Pulses;  No cyanosis, or edema  NERVOUS SYSTEM:  Alert & Oriented X3;  No gross focal deficits   MSK: moves all extremities  SKIN: No rashes     LABS:                        9.4    9.72  )-----------( 268      ( 03 Mar 2023 06:16 )             29.1     03 Mar 2023 06:16    138    |  106    |  17     ----------------------------<  118    4.5     |  26     |  0.78     Ca    8.3        03 Mar 2023 06:16          CAPILLARY BLOOD GLUCOSE            Culture - Blood (collected 02-28-23 @ 07:30)  Source: .Blood Blood-Peripheral  Preliminary Report (03-01-23 @ 14:01):    No growth to date.    Culture - Blood (collected 02-28-23 @ 07:00)  Source: .Blood Blood-Peripheral  Preliminary Report (03-01-23 @ 14:01):    No growth to date.        RADIOLOGY & ADDITIONAL TESTS:    Personally reviewed.     Consultant(s) Notes Reviewed:  [x] YES  [ ] NO    Plan of care discussed with patient /family: Deandre Mcfadden at bedside; all questions answered

## 2023-03-03 NOTE — CASE MANAGEMENT PROGRESS NOTE - NSCMPROGRESSNOTE_GEN_ALL_CORE
Possible Weekend DC with or without bowers. If the patient has a bowers it is a new bowers patient and will needs [facility] for DC.  Patient has a hx of hematuria with blood clots. CM will continue to follow.

## 2023-03-03 NOTE — PROGRESS NOTE ADULT - SUBJECTIVE AND OBJECTIVE BOX
[facility]  INFECTIOUS DISEASES   [address] [place] [address]  Tel: [phone]     Fax: [phone]  ========================================================  MD Elana Perry Kaushal, MD Cho, Michelle, MD Sunjit, Jaspal, MD  ========================================================    [id number]  HERBIE KERR     Follow up: Leukocytosis, admitted with Hematuria/Urinary Retention     Awake and alert, No fever.   Leukocytosis back to normal. s/p Cystoscopy yesterday with organized clots removal and fulguration.   Has bladder irrigation with hematuria. No abdominal pain.     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 1000 milliLiter(s) (60 mL/Hr) IV Continuous <Continuous>  fluticasone propionate 50 MICROgram(s)/spray Nasal Spray 1 Spray(s) Both Nostrils two times a day  lidocaine 2% Jelly 5 milliLiter(s) IntraUrethral once  lidocaine 2% Jelly 5 milliLiter(s) IntraUrethral once  lidocaine 2% Jelly 10 milliLiter(s) IntraUrethral once  loratadine 10 milliGRAM(s) Oral daily  metoprolol succinate ER 50 milliGRAM(s) Oral daily  multivitamin/minerals 1 Tablet(s) Oral daily  Orgovyx (relugolix) 120mg tab 1 Tablet(s) 1 Tablet(s) Oral daily  pantoprazole  Injectable 40 milliGRAM(s) IV Push daily  polyethylene glycol 3350 17 Gram(s) Oral daily  sodium bicarbonate 650 milliGRAM(s) Oral three times a day  sodium chloride 1 Gram(s) Oral three times a day    REVIEW OF SYSTEMS:  CONSTITUTIONAL:  No Fever or chills  HEENT:  No diplopia or blurred vision.  No sore throat or runny nose.  CARDIOVASCULAR:  No chest pain or SOB.  RESPIRATORY:  No cough, shortness of breath, PND or orthopnea.  GASTROINTESTINAL:  No nausea, vomiting or diarrhea.  GENITOURINARY:  No dysuria, frequency or urgency. + urine  MUSCULOSKELETAL:  no joint aches, no muscle pain  SKIN:  No change in skin, hair or nails.  NEUROLOGIC:  No paresthesias or weakness.  PSYCHIATRIC:  No disorder of thought or mood.  ENDOCRINE:  No heat or cold intolerance, polyuria or polydipsia.  HEMATOLOGICAL:  No easy bruising or bleeding.     Physical Exam:  Vital Signs Last 24 Hrs  T(C): 36.5 (03 Mar 2023 05:22), Max: 37.4 (02 Mar 2023 12:42)  T(F): 97.7 (03 Mar 2023 05:22), Max: 99.3 (02 Mar 2023 12:42)  HR: 88 (03 Mar 2023 05:22) (78 - 92)  BP: 130/70 (03 Mar 2023 05:22) (102/56 - 168/74)  BP(mean): --  RR: 17 (03 Mar 2023 05:22) (14 - 22)  SpO2: 96% (03 Mar 2023 05:22) (91% - 99%)  Parameters below as of 03 Mar 2023 05:22  Patient On (Oxygen Delivery Method): room air  GEN: NAD  HEENT: normocephalic and atraumatic. EOMI. PERRL.    NECK: Supple.  No lymphadenopathy   LUNGS: Clear to auscultation.  HEART: Irregular rate and rhythm   ABDOMEN: Soft, nontender, and nondistended.  Positive bowel sounds.    : No CVA tenderness, bowers with bloody urine  EXTREMITIES: +edema  NEUROLOGIC: grossly intact.  PSYCHIATRIC: Appropriate affect .  SKIN: No rash     Labs:                        9.4    9.72  )-----------( 268      ( 03 Mar 2023 06:16 )             29.1     03-03    138  |  106  |  17  ----------------------------<  118<H>  4.5   |  26  |  0.78    Ca    8.3<L>      03 Mar 2023 06:16    TPro  5.7<L>  /  Alb  2.3<L>  /  TBili  0.6  /  DBili  x   /  AST  34  /  ALT  22  /  AlkPhos  74  03-02    Culture - Blood (collected 02-28-23 @ 07:30)  Source: .Blood Blood-Peripheral    Culture - Blood (collected 02-28-23 @ 07:00)  Source: .Blood Blood-Peripheral    WBC Count: 9.72 K/uL (03-03-23 @ 06:16)  WBC Count: 8.94 K/uL (03-02-23 @ 06:40)  WBC Count: 9.26 K/uL (03-01-23 @ 12:25)  WBC Count: 9.58 K/uL (03-01-23 @ 08:55)  WBC Count: 9.55 K/uL (03-01-23 @ 06:18)  WBC Count: 18.71 K/uL (02-28-23 @ 05:34)  WBC Count: 7.29 K/uL (02-27-23 @ 10:20)    Creatinine, Serum: 0.78 mg/dL (03-03-23 @ 06:16)  Creatinine, Serum: 1.10 mg/dL (03-02-23 @ 06:40)  Creatinine, Serum: 3.00 mg/dL (03-01-23 @ 08:55)  Creatinine, Serum: 3.20 mg/dL (03-01-23 @ 06:18)  Creatinine, Serum: 2.20 mg/dL (02-28-23 @ 20:30)  Creatinine, Serum: 2.50 mg/dL (02-28-23 @ 05:34)  Creatinine, Serum: 0.69 mg/dL (02-27-23 @ 10:20)     SARS-CoV-2: NotDetec (02-28-23 @ 09:55)    All imaging and other data have been reviewed.  < from: CT Abdomen and Pelvis No Cont (02.28.23 @ 06:17) >  IMPRESSION:  Mild bilateral hydronephrosis.  Distended urinary bladder with high attenuation intraluminal contents   suspected intravesicular hematoma.    Assessment and Plan:   71 y/o man with PMH of HTN, AS s/p TAVR and PPM,  prostate cancer s/p radical prostatectomy with radiation proctitis was admitted with severe pain and urinary retention s/p cystoscopy.  Patient follows with urology at [facility]; was being seen for cystoscopy after experiencing hematuria.  He underwent multiple rounds of radiation therapy with ultimate radical prostatectomy for prostate CA. He has been diagnosed with radiation proctitis and this looks like radiation cystitis with multiple clots and blood seen in bladder in CT.   Seen by urology and plan is cystoscopy to remove clots. Early this morning had RRT and was found with tachy cardia, low H/H, transfusion given and now WBC jumped to 18k so ID was called for possible SIRS.   No fever, no leukocytosis in admission.   UA positive for blood   Most likely transfusion and hematuria is causing his symptoms, no sign of infection at this time, I watch off antibiotics and follow labs and cultures.     Recommendations:  - Blood cultures NGTD   - WBC back to normal   -  follow up noted, s/p cystoscopy on 3/2 with clot removal and fulguration  - Will watch off antibiotics     Will sign off please call with any question.     Nica Prasad MD  Division of Infectious Diseases   Please call ID service at [phone] with any question.      35 minutes spent on total encounter assessing patient, examination, chart review, counseling and coordinating care by the attending physician/nurse/care manager.

## 2023-03-03 NOTE — PROGRESS NOTE ADULT - SUBJECTIVE AND OBJECTIVE BOX
[facility] -- Michael Fernandez,  Jeremy Huang Patel, Savella, Goodger  Office # [phone]    Follow Up:  VHD, hematuria     Subjective/Observations: seen and examined, awake, alert, denies chest pain, dyspnea, palpitations or dizziness, orthopnea and PND. Tolerating room air. 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 650 milliGRAM(s) Oral every 6 hours PRN Temp greater or equal to 38C (100.4F), Mild Pain (1 - 3)  aluminum hydroxide/magnesium hydroxide/simethicone Suspension 30 milliLiter(s) Oral every 4 hours PRN Dyspepsia  melatonin 3 milliGRAM(s) Oral at bedtime PRN Insomnia    Allergies    penicillin (Diarrhea; Pruritus; Hives)  penicillin V potassium (Rash)    Intolerances    codeine (Nausea)    Vital Signs Last 24 Hrs  T(C): 36.5 (03 Mar 2023 05:22), Max: 37.4 (02 Mar 2023 12:42)  T(F): 97.7 (03 Mar 2023 05:22), Max: 99.3 (02 Mar 2023 12:42)  HR: 88 (03 Mar 2023 05:22) (78 - 92)  BP: 130/70 (03 Mar 2023 05:22) (102/56 - 168/74)  BP(mean): --  RR: 17 (03 Mar 2023 05:22) (14 - 22)  SpO2: 96% (03 Mar 2023 05:22) (91% - 99%)    Parameters below as of 03 Mar 2023 05:22  Patient On (Oxygen Delivery Method): room air      I&O's Summary    02 Mar 2023 07:01  -  03 Mar 2023 07:00  --------------------------------------------------------  IN: 480 mL / OUT: 99428 mL / NET: -60781 mL    03 Mar 2023 07:01  -  03 Mar 2023 11:04  --------------------------------------------------------  IN: 0 mL / OUT: 3000 mL / NET: -3000 mL      Weight (kg): 90.7 (03-02 @ 12:43)  TELE: SR 70's   PHYSICAL EXAM:  Constitutional: NAD, awake and alert  HEENT: Moist Mucous Membranes, Anicteric  Pulmonary: Non-labored, breath sounds are clear bilaterally, No wheezing, rales or rhonchi  Cardiovascular: Regular, S1 and S2, + murmurs, rubs, gallops or clicks  Gastrointestinal: Bowel Sounds present, soft, nontender.   : cbi with three way bowers, + hematuria   Lymph: No peripheral edema. No lymphadenopathy.  Skin: No visible rashes or ulcers.  Psych:  Mood & affect appropriate  LABS: All Labs Reviewed:                        9.4    9.72  )-----------( 268      ( 03 Mar 2023 06:16 )             29.1                         9.9    8.94  )-----------( 221      ( 02 Mar 2023 06:40 )             29.9                         7.9    9.26  )-----------( 245      ( 01 Mar 2023 12:25 )             23.8     03 Mar 2023 06:16    138    |  106    |  17     ----------------------------<  118    4.5     |  26     |  0.78   02 Mar 2023 06:40    134    |  105    |  20     ----------------------------<  96     4.5     |  25     |  1.10   01 Mar 2023 08:55    128    |  99     |  26     ----------------------------<  103    4.5     |  21     |  3.00     Ca    8.3        03 Mar 2023 06:16  Ca    8.2        02 Mar 2023 06:40  Ca    7.9        01 Mar 2023 08:55  Mg     2.5       01 Mar 2023 06:18    TPro  5.7    /  Alb  2.3    /  TBili  0.6    /  DBili  x      /  AST  34     /  ALT  22     /  AlkPhos  74     02 Mar 2023 06:40  TPro  5.8    /  Alb  2.6    /  TBili  0.5    /  DBili  x      /  AST  20     /  ALT  16     /  AlkPhos  76     01 Mar 2023 06:18          12 Lead ECG:   Ventricular Rate 115 BPM    Atrial Rate 115 BPM    P-R Interval 180 ms    QRS Duration 130 ms    Q-T Interval 338 ms    QTC Calculation(Bazett) 467 ms    P Axis -6 degrees    R Axis 66 degrees    T Axis 4 degrees    Diagnosis Line Sinus tachycardia  Right bundle branch block  T wave abnormality, consider inferior ischemia  Abnormal ECG  When compared with ECG of 20-FEB-2023 10:41,  T wave inversion more evident in Anterior leads  Confirmed by Anthony Huang MD ([id number]) on 2/28/2023 1:36:59 PM (02-28-23 @ 05:17)      Patient name: HERBIE KERR  YOB: 1950   Age: 70 (M)   MR#: [id number]  Study Date: 2/4/2021  Location: O/PSonographer: Ramona Horn RDCS  Study quality: Technically good  Referring Physician: Rich Quesada MD  Blood Pressure: 168/87 mmHg  Height: 178 cm  Weight: 91 kg  BSA: 2.1 m2  Heart Rate: 76 mmHg  ------------------------------------------------------------------------  PROCEDURE: Transthoracic echocardiogram with 2-D, M-Mode  and complete spectral and color flow Doppler.  INDICATION: Nonrheumatic aortic (valve) stenosis (I35.0)  ------------------------------------------------------------------------  MEASUREMENTS: (See below)  ------------------------------------------------------------------------  OBSERVATIONS:  Mitral Valve: There is mitral annular calcification with  calcification on the anterior mitral leaflet. Mild mitral  regurgitation.  Aortic Root: Normal aortic root size. (Ao: 3.4 cm at the  sinuses of Valsalva).  Aortic Valve: #26mm Evolut Pro+ THV. The valve is well  seated with normal function.  The peak/mean gradients=  13/7mmHg with a DVI= 0.63. Peak transaortic valve gradient  equals 13 mm Hg, mean transaortic valve gradient equals 7  mm Hg, aortic valve velocity time integral equals 35 cm,  estimated aortic valve area equals 2.3 sqcm. There is a  mild paravalvular regurgitation jet originating from about  2 O'clock TTE surgical view.  There is no intravalvular  regurgitation seen. Peak left ventricular outflow tract  gradient equals 4mm Hg, mean gradient is equal to 3 mm Hg,  LVOT velocity time integral equals 22 cm.  Left Atrium: Mildly dilated left atrium.  LA volume index =  39 cc/m2.  Left Ventricle: Normal left ventricular systolic function.  No segmental wall motion abnormalities.  The LVEF about  55-60%. Normal left ventricular internal dimensions and  wall thicknesses. Mild diastolic dysfunction (Stage I).  Right Heart: Normal right atrium.  RA area= 13cm2, RA volume= 32ml. Normal right ventricular  size and function.  There is a device wire in the right heart. Normal tricuspid  valve. Minimal tricuspid regurgitation. Normal pulmonic  valve. Minimal pulmonic regurgitation.  Pericardium/PleuraNormal pericardium with no pericardial  effusion.  Hemodynamic: The estimated right atrial pressure is normal.  ------------------------------------------------------------------------  CONCLUSIONS:  1. #26mm Evolut Pro+ THV.  The valve is well seated with  normal function.  The peak/mean gradients= 13/7mmHg with a  DVI= 0.63. There is a mild paravalvular regurgitation jet  originating from about 2 O'clock TTE surgical view.  There  is no intravalvular regurgitation seen.  *** Compared with echocardiogram of 12/10/2020, no  significant changes noted.  ------------------------------------------------------------------------  PROCEDURE DESCRIPTION: Transthoracic echocardiogram with  2-D, M-Mode and complete spectral and color flow Doppler.  ------------------------------------------------------------------------  ECHOCARDIOGRAPHIC EXAMINATION:  AORTIC ROOT:  Aortic Root (Leaflet): 3.4 cm  Aortic Root Index: 0.9  LEFT ATRIUM:  AP Dimensions: 3.6 cm  LA Volume Index: 39.00 cc/sqm  LA Volume: 81.3 cc  LEFT VENTRICLE:  LVIDd: 4.1 cm  LVIDs: 2.9 cm  IVS: 1.18  ILWT: 1.0 cm  RWT: 0.47  LV Mass: 155.0 gm  LV Mass Index: 74 gm/sqm  EF (Visual Estimate): 55-60%  HR and BP:  HR: 76 bpm  BP: 168/87  BSA: 2.09  ------------------------------------------------------------------------  HEMODYNAMICS:  RA Pressure Estimate: 8  LA Pressure Estimate: < 20  IVRT: 53 ms  ------------------------------------------------------------------------  COLOR FLOW and SPECIAL DOPPLER:  AORTIC VALVE:  AV Peak Velocity: 1.7 M/sec  AV Peak Gradient: 12 mm Hg  AV Mean Gradient: 7 mm Hg  Valve Area: 2.3 sqcm  LVOT:  LVOT Velocity: 1.0 M/sec  LVOT Diameter: 2.1 cm  LVOT Peak Gradient Rest: 4 mm Hg  LVOT Mean Gradient Rest: 3 mm Hg  ------------------------------------------------------------------------  DIASTOLIC FUNCTION:  DT:187 ms  E/A: 0.90  e' Septal: 7.0 cm/s  E/e' Septal: 14.2  e' Lateral: 8.0 cm/s  E/e' Lateral: 12.5  MV E wave: 1.0 m/s  MV A wave: 1.1 m/s  Septal a': 10.0 cm/s  Lateral a': 11.0 cm/s  ------------------------------------------------------------------------  Confirmed on  2/4/2021 - 13:25:59 by Caroline Rizzo M.D.  ------------------------------------------------------------------------

## 2023-03-03 NOTE — PROGRESS NOTE ADULT - SUBJECTIVE AND OBJECTIVE BOX
Patient is a 72y old  Male who presents with a chief complaint of Hematuria/Urinary Retention (27 Feb 2023 12:32)       HPI:  71 y/o M w/  PMHx of HTN, AS s/p TAVR, ppm, prostate cancer s/p radical prostatectomy c/b radiation proctitis presents to ED for severe pain and urinary retention s/p cystoscopy. Patient follows with urology at [facility]; was being seen for cystoscopy after experiencing hematuria. He has had hematuria before approximately one year ago; underwent cystoscopy without complications at that time. He underwent multiple rounds of radiation therapy with ultimate radical prostatectomy for prostate CA. He has been diagnosed with radiation proctitis. Patient feels that this his current presentation is likely 2/2  radiation cystitis. Patient reports pain is improved after bowers/CBI placed.   Patient developed hyponatremia prompting renal consult.  He has been having ongoing hematuria. On CBI.  Denies any N/V.  Has lightheadedness at times.      2/28/23: Had syncope overnight while walking to commode. Renal function much worsen today. C/o bladder pain. Bowers replaced. 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      HTN (hypertension)      Aortic stenosis      Rectal bleeding  last hospitalization 10/6/20 2/2 radiation proctitis      [place] (hard of hearing)      H/O prostatectomy  2015      S/P T&amp;A (status post tonsillectomy and adenoidectomy)  child           FAMILY HISTORY:  FH: cancer (Father, Mother)    [place]    Social History:Non smoker    MEDICATIONS  (STANDING):  aminocaproic acid Tablet 500 milliGRAM(s) Oral <User Schedule>  fluticasone propionate 50 MICROgram(s)/spray Nasal Spray 1 Spray(s) Both Nostrils two times a day  lidocaine 2% Jelly 5 milliLiter(s) IntraUrethral once  lidocaine 2% Jelly 5 milliLiter(s) IntraUrethral once  lidocaine 2% Jelly 10 milliLiter(s) IntraUrethral once  loratadine 10 milliGRAM(s) Oral daily  metoprolol succinate ER 50 milliGRAM(s) Oral daily  multivitamin/minerals 1 Tablet(s) Oral daily  Orgovyx (relugolix) 120mg tab 1 Tablet(s) 1 Tablet(s) Oral daily  polyethylene glycol 3350 17 Gram(s) Oral daily  sodium bicarbonate 650 milliGRAM(s) Oral three times a day  sodium chloride 1 Gram(s) Oral three times a day    MEDICATIONS  (PRN):  acetaminophen     Tablet .. 650 milliGRAM(s) Oral every 6 hours PRN Temp greater or equal to 38C (100.4F), Mild Pain (1 - 3)  aluminum hydroxide/magnesium hydroxide/simethicone Suspension 30 milliLiter(s) Oral every 4 hours PRN Dyspepsia  melatonin 3 milliGRAM(s) Oral at bedtime PRN Insomnia  morphine  - Injectable 1 milliGRAM(s) IV Push every 6 hours PRN Severe Pain (7 - 10)  ondansetron Injectable 4 milliGRAM(s) IV Push every 8 hours PRN Nausea and/or Vomiting      Allergies    penicillin (Diarrhea; Pruritus; Hives)  penicillin V potassium (Rash)    Intolerances    codeine (Nausea)       REVIEW OF SYSTEMS:    Review of Systems:   Constitutional: Denies fatigue  HEENT: Denies headaches and dizziness  Respiratory: denies SOB, cough, or wheezing  Cardiovascular: denies CP, palpitations  Gastrointestinal: Denies nausea, denies vomiting, diarrhea, constipation, abdominal pain, or bloody stools  Genitourinary: neg  Skin: denies rashes or itching  Musculoskeletal: denies muscle aches, joint swelling  Neurologic: Denies generalized weakness, denies loss of sensation, numbness, or tingling      ICU Vital Signs Last 24 Hrs  T(C): 36.5 (03 Mar 2023 05:22), Max: 37.4 (02 Mar 2023 12:42)  T(F): 97.7 (03 Mar 2023 05:22), Max: 99.3 (02 Mar 2023 12:42)  HR: 88 (03 Mar 2023 05:22) (78 - 92)  BP: 130/70 (03 Mar 2023 05:22) (102/56 - 168/74)  BP(mean): --  ABP: --  ABP(mean): --  RR: 17 (03 Mar 2023 05:22) (14 - 22)  SpO2: 96% (03 Mar 2023 05:22) (91% - 99%)    O2 Parameters below as of 03 Mar 2023 05:22  Patient On (Oxygen Delivery Method): room air            PHYSICAL EXAM:    GENERAL: NAD  HEAD:  Atraumatic, Normocephalic  EYES: EOMI, conjunctiva and sclera clear  ENMT: No Drainage from nares, No drainage from ears  NECK: Supple, neck  veins full  NERVOUS SYSTEM:  Awake and Alert  CHEST/LUNG: Clear to percussion bilaterally; No rales, rhonchi, wheezing, or rubs  HEART: Regular rate and rhythm; No murmurs, rubs, or gallops  ABDOMEN: Soft, Nontender, Nondistended; Bowel sounds present  EXTREMITIES:  No Edema  SKIN: No rashes No obvious ecchymosis  +Bowers      LABS:                                                                   9.4    9.72  )-----------( 268      ( 03 Mar 2023 06:16 )             29.1     03-03    138  |  106  |  17  ----------------------------<  118<H>  4.5   |  26  |  0.78    Ca    8.3<L>      03 Mar 2023 06:16    TPro  5.7<L>  /  Alb  2.3<L>  /  TBili  0.6  /  DBili  x   /  AST  34  /  ALT  22  /  AlkPhos  74  03-02

## 2023-03-03 NOTE — PROGRESS NOTE ADULT - ASSESSMENT
72 year old male with AV disease, s/p TAVR in 12/20, with post op SSS requiring PPM, here with hematuria and radiation hemorrhagic cystitis.     AV disease, SSS s/p PPM  - s/p clot evacuation/ fulguration, tolerated well from CV POV  - + hematuria, CBI ongoing, urology following    - had been off antiplatelets for well over a year before this event, no need to resume nito in light of the ongoing issues with hematuria  - H/H stable, continue to trend transfuse per primary     - No sign of acute ischemia.   - s/p TAVR, no anginal complaints or volume overload  - He has minimal CAD based on cath in 2020.  - CallmyName PPM interrogated 12/14/22; mode  DDDR. Battery life approx 11.5 years left. V paced 4%, A paced 70%.     - BP labile 100's- 160's systolics  - Continue with BB, would uptitrate for better bp control  - Monitor and replete Lytes. Keep K > 4 and Mg > 2    - Had syncope 2/28 am, no further episodes, likely vagal vs medication induced, no further episodes noted     - Will continue to follow.    Jenny White, [facility]  Nurse Practitioner - Cardiology   Spectra #[phone]/ [phone]

## 2023-03-03 NOTE — PROGRESS NOTE ADULT - ASSESSMENT
73 y/o M w/ PMHx of HTN,  AS s/p TAVR, ppm, prostate cancer s/p radical prostatectomy c/b radiation proctitis presents to ED for severe pain and urinary retention s/p cystoscopy. Admit for hematuria / urinary retention.      Problem/Plan - 1:  ·  Problem: Urinary retention with hematuria :   s/p RRP for Ca Prostate by Eusebio Martinez, XRT for recurrence, on Orgovyx  s/p negative cysto x2 by Dr. Luis at [facility], last one 5 weeks ago  admitted for clot retention, hematuria likely due to radiation cystitis  bowers was discontinued, not being able to void and hematuria recur , bowers was placed back   called and discussed with Dr. Luis at [facility], ok for discharge even if the urine is light pink color.   Currently on CBI after recurrent hematuria.  Continue amicar 500mg PO  Hb improved after blood transfusion, hyponatremia improved, SANJUANA resolved   cystoscopy  with fulguration of hemorrhaging blood vessel and evacuation of clot on 3/2: CBI d/c, monitor urine out put, coloy: light pink at present          Problem/Plan - 2:  ·  Problem: Prostate cancer.   ·  Plan: Hx of Prostate CA, now s/p radiation therapy and radical prostatectomy  - Continue home Orgovyx - brought in from home  - Patient to f/u with his urologist at [facility] (Dr. Luis) outpatient for further management.     Problem/Plan - 3:  ·  Problem: Hypertension.   - BP stable  - Continue home Metoprolol ER 50mg PO daily w/ hold parameters.     Problem/Plan - 4:  ·  Problem: Need for prophylactic measure.   ·  Plan: SCDs b/l for dvt ppx; will hold off on A/C at this time 2/2 hematuria.  add PPI for GI prophylaxis      Problem/Plan - 5:   acute blood loss anemia:   s/p total 5 unit blood transfusion, Hb 9.4 today    HYPONATREMIA: likely multifactorial, on sodium  tab replacement ,sodium bicarb , regular diet instead of low salt diet. resolved, sodium supplement discontinued.       SANJUANA: resolved

## 2023-03-03 NOTE — PROGRESS NOTE ADULT - ASSESSMENT
SANJUANA  Hyponatremia  Metabolic acidosis  Hematuria  HTN  Anemia  Prostate Caner S/P Prostatectomy and radiation      -SANJUANA clinically behaving as obstructive uropathy but bladder scan showed no urine; urine indices suggest ATN instead. Gr replaced. CBI per urology   -Is/p VF x NS 1 day  -Gr  -Continue oral bicarb for additional day  -Continue Sodium chloride tabs   -Hyponatremia likely multifactorial, decreased solute intake + increased solvent intake + component of SIADH  -Urine lytes will be unrevealing Patient on CBI  -Regular diet   -S/p PRBC  -Will not fluid restrict at this time, as he drinks to help augment urine output to help with hematuria, but maybe necessary   -Avoid NSAIDs for pain, can worsen hyponatremia  -Renal indices, acidosis, and hyponatremia improving; monitor for now  -Urology following  -Sodium improving at appropriate rate, now in normal range  -Creatinine improved

## 2023-03-03 NOTE — PROGRESS NOTE ADULT - ASSESSMENT
Rad cystitis. Hematuria improved post c/clot evac/fulguration.      Cont bowers  d/c cbi; restart prn  d/c ditropan

## 2023-03-03 NOTE — PROGRESS NOTE ADULT - SUBJECTIVE AND OBJECTIVE BOX
INTERVAL HPI/OVERNIGHT EVENTS:  Feeling better. No pain.  No clots overnight    MEDICATIONS  (STANDING):  lidocaine 2% Jelly 10 milliLiter(s) IntraUrethral once  loratadine 10 milliGRAM(s) Oral daily  metoprolol succinate ER 50 milliGRAM(s) Oral daily  oxybutynin 10 milliGRAM(s) Oral daily  pantoprazole  Injectable 40 milliGRAM(s) IV Push daily  polyethylene glycol 3350 17 Gram(s) Oral daily    MEDICATIONS  (PRN):  acetaminophen     Tablet .. 650 milliGRAM(s) Oral every 6 hours PRN Temp greater or equal to 38C (100.4F), Mild Pain (1 - 3)  aluminum hydroxide/magnesium hydroxide/simethicone Suspension 30 milliLiter(s) Oral every 4 hours PRN Dyspepsia  melatonin 3 milliGRAM(s) Oral at bedtime PRN Insomnia        Vital Signs Last 24 Hrs  T(C): 36.5 (03 Mar 2023 05:22), Max: 37.4 (02 Mar 2023 12:42)  T(F): 97.7 (03 Mar 2023 05:22), Max: 99.3 (02 Mar 2023 12:42)  HR: 88 (03 Mar 2023 05:22) (78 - 92)  BP: 130/70 (03 Mar 2023 05:22) (102/56 - 168/74)  BP(mean): --  RR: 17 (03 Mar 2023 05:22) (14 - 22)  SpO2: 96% (03 Mar 2023 05:22) (91% - 99%)    Parameters below as of 03 Mar 2023 05:22  Patient On (Oxygen Delivery Method): room air        PHYSICAL EXAM:    ABDOMEN: benign  GENITALIA: bowers - clear cbi    LABS:                        9.9    8.94  )-----------( 221      ( 02 Mar 2023 06:40 )             29.9     03-02    134<L>  |  105  |  20  ----------------------------<  96  4.5   |  25  |  1.10    Ca    8.2<L>      02 Mar 2023 06:40    TPro  5.7<L>  /  Alb  2.3<L>  /  TBili  0.6  /  DBili  x   /  AST  34  /  ALT  22  /  AlkPhos  74  03-02        Urine culture:  02-28 @ 07:30 --   No growth to date.  Urine culture:  02-28 @ 07:00 --   No growth to date.    Blood Cultures:  02-28 @ 07:30   No growth to date.  --  --  02-28 @ 07:00   No growth to date.  --  --    RADIOLOGY & ADDITIONAL TESTS:

## 2023-03-04 LAB
ANION GAP SERPL CALC-SCNC: 2 MMOL/L — LOW (ref 5–17)
BUN SERPL-MCNC: 14 MG/DL — SIGNIFICANT CHANGE UP (ref 7–23)
CALCIUM SERPL-MCNC: 8.1 MG/DL — LOW (ref 8.5–10.1)
CHLORIDE SERPL-SCNC: 104 MMOL/L — SIGNIFICANT CHANGE UP (ref 96–108)
CO2 SERPL-SCNC: 31 MMOL/L — SIGNIFICANT CHANGE UP (ref 22–31)
CREAT SERPL-MCNC: 0.73 MG/DL — SIGNIFICANT CHANGE UP (ref 0.5–1.3)
EGFR: 95 ML/MIN/1.73M2 — SIGNIFICANT CHANGE UP
GLUCOSE SERPL-MCNC: 90 MG/DL — SIGNIFICANT CHANGE UP (ref 70–99)
HCT VFR BLD CALC: 29.6 % — LOW (ref 39–50)
HGB BLD-MCNC: 9.4 G/DL — LOW (ref 13–17)
MCHC RBC-ENTMCNC: 28.1 PG — SIGNIFICANT CHANGE UP (ref 27–34)
MCHC RBC-ENTMCNC: 31.8 GM/DL — LOW (ref 32–36)
MCV RBC AUTO: 88.6 FL — SIGNIFICANT CHANGE UP (ref 80–100)
NRBC # BLD: 0 /100 WBCS — SIGNIFICANT CHANGE UP (ref 0–0)
PLATELET # BLD AUTO: 322 K/UL — SIGNIFICANT CHANGE UP (ref 150–400)
POTASSIUM SERPL-MCNC: 4.1 MMOL/L — SIGNIFICANT CHANGE UP (ref 3.5–5.3)
POTASSIUM SERPL-SCNC: 4.1 MMOL/L — SIGNIFICANT CHANGE UP (ref 3.5–5.3)
RBC # BLD: 3.34 M/UL — LOW (ref 4.2–5.8)
RBC # FLD: 14.2 % — SIGNIFICANT CHANGE UP (ref 10.3–14.5)
SODIUM SERPL-SCNC: 137 MMOL/L — SIGNIFICANT CHANGE UP (ref 135–145)
WBC # BLD: 7.74 K/UL — SIGNIFICANT CHANGE UP (ref 3.8–10.5)
WBC # FLD AUTO: 7.74 K/UL — SIGNIFICANT CHANGE UP (ref 3.8–10.5)

## 2023-03-04 PROCEDURE — 99232 SBSQ HOSP IP/OBS MODERATE 35: CPT

## 2023-03-04 PROCEDURE — 99233 SBSQ HOSP IP/OBS HIGH 50: CPT

## 2023-03-04 RX ADMIN — Medication 50 MILLIGRAM(S): at 05:20

## 2023-03-04 RX ADMIN — LORATADINE 10 MILLIGRAM(S): 10 TABLET ORAL at 12:03

## 2023-03-04 RX ADMIN — PANTOPRAZOLE SODIUM 40 MILLIGRAM(S): 20 TABLET, DELAYED RELEASE ORAL at 11:59

## 2023-03-04 RX ADMIN — Medication 1 SPRAY(S): at 05:20

## 2023-03-04 RX ADMIN — Medication 1 SPRAY(S): at 17:18

## 2023-03-04 RX ADMIN — Medication 1 TABLET(S): at 12:02

## 2023-03-04 RX ADMIN — POLYETHYLENE GLYCOL 3350 17 GRAM(S): 17 POWDER, FOR SOLUTION ORAL at 11:58

## 2023-03-04 NOTE — PROGRESS NOTE ADULT - SUBJECTIVE AND OBJECTIVE BOX
Patient is a 72y old  Male who presents with a chief complaint of Hematuria/Urinary Retention (27 Feb 2023 12:32)       HPI:  71 y/o M w/  PMHx of HTN, AS s/p TAVR, ppm, prostate cancer s/p radical prostatectomy c/b radiation proctitis presents to ED for severe pain and urinary retention s/p cystoscopy. Patient follows with urology at [facility]; was being seen for cystoscopy after experiencing hematuria. He has had hematuria before approximately one year ago; underwent cystoscopy without complications at that time. He underwent multiple rounds of radiation therapy with ultimate radical prostatectomy for prostate CA. He has been diagnosed with radiation proctitis. Patient feels that this his current presentation is likely 2/2  radiation cystitis. Patient reports pain is improved after bowers/CBI placed.   Patient developed hyponatremia prompting renal consult.  He has been having ongoing hematuria. On CBI.  Denies any N/V.  Has lightheadedness at times.      2/28/23: Had syncope overnight while walking to commode. Renal function much worsen today. C/o bladder pain. Bowers replaced. 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      HTN (hypertension)      Aortic stenosis      Rectal bleeding  last hospitalization 10/6/20 2/2 radiation proctitis      [place] (hard of hearing)      H/O prostatectomy  2015      S/P T&amp;A (status post tonsillectomy and adenoidectomy)  child           FAMILY HISTORY:  FH: cancer (Father, Mother)    NC    Social History:Non smoker    MEDICATIONS  (STANDING):  aminocaproic acid Tablet 500 milliGRAM(s) Oral <User Schedule>  fluticasone propionate 50 MICROgram(s)/spray Nasal Spray 1 Spray(s) Both Nostrils two times a day  lidocaine 2% Jelly 5 milliLiter(s) IntraUrethral once  lidocaine 2% Jelly 5 milliLiter(s) IntraUrethral once  lidocaine 2% Jelly 10 milliLiter(s) IntraUrethral once  loratadine 10 milliGRAM(s) Oral daily  metoprolol succinate ER 50 milliGRAM(s) Oral daily  multivitamin/minerals 1 Tablet(s) Oral daily  Orgovyx (relugolix) 120mg tab 1 Tablet(s) 1 Tablet(s) Oral daily  polyethylene glycol 3350 17 Gram(s) Oral daily  sodium bicarbonate 650 milliGRAM(s) Oral three times a day  sodium chloride 1 Gram(s) Oral three times a day    MEDICATIONS  (PRN):  acetaminophen     Tablet .. 650 milliGRAM(s) Oral every 6 hours PRN Temp greater or equal to 38C (100.4F), Mild Pain (1 - 3)  aluminum hydroxide/magnesium hydroxide/simethicone Suspension 30 milliLiter(s) Oral every 4 hours PRN Dyspepsia  melatonin 3 milliGRAM(s) Oral at bedtime PRN Insomnia  morphine  - Injectable 1 milliGRAM(s) IV Push every 6 hours PRN Severe Pain (7 - 10)  ondansetron Injectable 4 milliGRAM(s) IV Push every 8 hours PRN Nausea and/or Vomiting      Allergies    penicillin (Diarrhea; Pruritus; Hives)  penicillin V potassium (Rash)    Intolerances    codeine (Nausea)       REVIEW OF SYSTEMS:    Review of Systems:   Constitutional: Denies fatigue  HEENT: Denies headaches and dizziness  Respiratory: denies SOB, cough, or wheezing  Cardiovascular: denies CP, palpitations  Gastrointestinal: Denies nausea, denies vomiting, diarrhea, constipation, abdominal pain, or bloody stools  Genitourinary: neg  Skin: denies rashes or itching  Musculoskeletal: denies muscle aches, joint swelling  Neurologic: Denies generalized weakness, denies loss of sensation, numbness, or tingling      ICU Vital Signs Last 24 Hrs  T(C): 36.5 (03 Mar 2023 05:22), Max: 37.4 (02 Mar 2023 12:42)  T(F): 97.7 (03 Mar 2023 05:22), Max: 99.3 (02 Mar 2023 12:42)  HR: 88 (03 Mar 2023 05:22) (78 - 92)  BP: 130/70 (03 Mar 2023 05:22) (102/56 - 168/74)  BP(mean): --  ABP: --  ABP(mean): --  RR: 17 (03 Mar 2023 05:22) (14 - 22)  SpO2: 96% (03 Mar 2023 05:22) (91% - 99%)    O2 Parameters below as of 03 Mar 2023 05:22  Patient On (Oxygen Delivery Method): room air            PHYSICAL EXAM:    GENERAL: NAD  HEAD:  Atraumatic, Normocephalic  EYES: EOMI, conjunctiva and sclera clear  ENMT: No Drainage from nares, No drainage from ears  NECK: Supple, neck  veins full  NERVOUS SYSTEM:  Awake and Alert  CHEST/LUNG: Clear to percussion bilaterally; No rales, rhonchi, wheezing, or rubs  HEART: Regular rate and rhythm; No murmurs, rubs, or gallops  ABDOMEN: Soft, Nontender, Nondistended; Bowel sounds present  EXTREMITIES:  No Edema  SKIN: No rashes No obvious ecchymosis  +Bowers      LABS:                        9.4    7.74  )-----------( 322      ( 04 Mar 2023 09:33 )             29.6     03-04    137  |  104  |  14  ----------------------------<  90  4.1   |  31  |  0.73    Ca    8.1<L>      04 Mar 2023 09:33

## 2023-03-04 NOTE — PROGRESS NOTE ADULT - ASSESSMENT
SANJUANA  Hyponatremia  Metabolic acidosis  Hematuria  HTN  Anemia  Prostate Caner S/P Prostatectomy and radiation      -SANJUANA clinically behaving as obstructive uropathy but bladder scan showed no urine; urine indices suggest ATN instead. Gr replaced. CBI per urology   -s/p VF x NS 1 day  -Gr  -Continue oral bicarb for additional day  -Continue Sodium chloride tabs   -Hyponatremia likely multifactorial, decreased solute intake + increased solvent intake + component of SIADH  -Urine lytes will be unrevealing Patient on CBI  -Regular diet   -S/p PRBC  -Will not fluid restrict at this time, as he drinks to help augment urine output to help with hematuria, but maybe necessary   -Avoid NSAIDs for pain, can worsen hyponatremia  -Renal indices, acidosis, and hyponatremia improving; monitor for now  -Urology following  -Sodium improving at appropriate rate, now in normal range  -Creatinine improved

## 2023-03-04 NOTE — PROGRESS NOTE ADULT - ASSESSMENT
72 year old male with AV disease, s/p TAVR in 12/20, with post op SSS requiring PPM, here with hematuria and radiation hemorrhagic cystitis.     VHD, SSS s/p PPM  - s/p clot evacuation/ fulguration, tolerated well from CV POV  - + hematuria, CBI ongoing, improving , urology following    - had been off antiplatelets for well over a year before this event, no need to resume nito in light of the ongoing issues with hematuria  - H/H stable, continue to trend transfuse per primary     - No sign of acute ischemia.   - s/p TAVR, no anginal complaints or volume overload  - He has minimal CAD based on cath in 2020.  - [employer] PPM interrogated 12/14/22; mode  DDDR. Battery life approx 11.5 years left. V paced 4%, A paced 70%.     - BP improving, HR stable, no events on tele x 48 hrs   - Continue BB, with hold parmeters   - Monitor and replete Lytes. Keep K > 4 and Mg > 2    - Had syncope 2/28 am, no further episodes, likely vagal vs medication induced, no further episodes noted     - Will continue to follow.    Jenny White, [facility]  Nurse Practitioner - Cardiology   Spectra #[phone]/ [phone]

## 2023-03-04 NOTE — PROGRESS NOTE ADULT - SUBJECTIVE AND OBJECTIVE BOX
Patient is a 72y old  Male who presents with a chief complaint of Hematuria/Urinary Retention (04 Mar 2023 10:35)      Subjective:  INTERVAL HPI/OVERNIGHT EVENTS: Patient seen and examined at bedside.  Patient has no complaints at this time.   MEDICATIONS  (STANDING):  fluticasone propionate 50 MICROgram(s)/spray Nasal Spray 1 Spray(s) Both Nostrils two times a day  lidocaine 2% Jelly 10 milliLiter(s) IntraUrethral once  loratadine 10 milliGRAM(s) Oral daily  metoprolol succinate ER 50 milliGRAM(s) Oral daily  multivitamin 1 Tablet(s) Oral daily  Orgovyx (relugolix) 120mg tab 1 Tablet(s),Orgovyx (relugolix) 120 mg tablet 1 Tablet(s) 1 Tablet(s) Oral every 24 hours  pantoprazole  Injectable 40 milliGRAM(s) IV Push daily  polyethylene glycol 3350 17 Gram(s) Oral daily    MEDICATIONS  (PRN):  acetaminophen     Tablet .. 650 milliGRAM(s) Oral every 6 hours PRN Temp greater or equal to 38C (100.4F), Mild Pain (1 - 3)  aluminum hydroxide/magnesium hydroxide/simethicone Suspension 30 milliLiter(s) Oral every 4 hours PRN Dyspepsia  melatonin 3 milliGRAM(s) Oral at bedtime PRN Insomnia      Allergies    penicillin (Diarrhea; Pruritus; Hives)  penicillin V potassium (Rash)    Intolerances    codeine (Nausea)      REVIEW OF SYSTEMS:  CONSTITUTIONAL: No fever or chills  HEENT:  No headache, no sore throat  RESPIRATORY: No cough or shortness of breath  CARDIOVASCULAR: No chest pain or palpitations  GASTROINTESTINAL: No abd pain, nausea, vomiting, or diarrhea      Objective:  Vital Signs Last 24 Hrs  T(C): 36.4 (04 Mar 2023 04:38), Max: 36.8 (03 Mar 2023 20:03)  T(F): 97.6 (04 Mar 2023 04:38), Max: 98.2 (03 Mar 2023 20:03)  HR: 73 (04 Mar 2023 04:38) (73 - 93)  BP: 147/71 (04 Mar 2023 06:05) (114/68 - 169/81)  BP(mean): --  RR: 18 (04 Mar 2023 04:38) (18 - 18)  SpO2: 95% (04 Mar 2023 04:38) (93% - 95%)    Parameters below as of 04 Mar 2023 04:38  Patient On (Oxygen Delivery Method): room air        GENERAL: NAD, lying in bed comfortably  HEAD:  Normocephalic  EYES:  conjunctiva and sclera clear  ENT: Moist mucous membranes  NECK: Supple  CHEST/LUNG: Clear to auscultation bilaterally; No rales or rhonchi; no wheezing. Unlabored respirations  HEART: Regular rate and rhythm; S1S2+  ABDOMEN: Bowel sounds present; Soft, Nontender, Nondistended.   EXTREMITIES:  + distal Peripheral Pulses;  No cyanosis, or edema  NERVOUS SYSTEM:  Alert & Oriented X3;  No gross focal deficits   MSK: moves all extremities  SKIN: No rashes   + bowers: current urine clear    LABS:                        9.4    7.74  )-----------( 322      ( 04 Mar 2023 09:33 )             29.6     04 Mar 2023 09:33    137    |  104    |  14     ----------------------------<  90     4.1     |  31     |  0.73     Ca    8.1        04 Mar 2023 09:33          CAPILLARY BLOOD GLUCOSE            Culture - Blood (collected 02-28-23 @ 07:30)  Source: .Blood Blood-Peripheral  Preliminary Report (03-01-23 @ 14:01):    No growth to date.    Culture - Blood (collected 02-28-23 @ 07:00)  Source: .Blood Blood-Peripheral  Preliminary Report (03-01-23 @ 14:01):    No growth to date.        RADIOLOGY & ADDITIONAL TESTS:    Personally reviewed.     Consultant(s) Notes Reviewed:  [x] YES  [ ] NO    Plan of care discussed with patient /family; all questions answered

## 2023-03-04 NOTE — PROGRESS NOTE ADULT - ASSESSMENT
Persistent hematuria secondary radiation cystitis,s/p cysto/clot evacuation, Amicar CBI, PO Amicar,  anemia worsened by hematuria     Continue CBI, Dr. Shaffer to decide clinical management and disposition of patient    Discussed with patient, questions addressed

## 2023-03-04 NOTE — PROGRESS NOTE ADULT - SUBJECTIVE AND OBJECTIVE BOX
[facility] -- Michael Fernandez,  Reina, Steve Luna [place], [place]  Office # [phone]    Follow Up:  VHD, hematuria    Subjective/Observations: seen and examined, asleep, easily awaken, alert, denies chest pain, dyspnea, palpitations or dizziness, orthopnea and PND. Tolerating room air. 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      HTN (hypertension)      Aortic stenosis      Rectal bleeding  last hospitalization 10/6/20 2/2 radiation proctitis      [place] (hard of hearing)      H/O prostatectomy  2015      S/P T&amp;A (status post tonsillectomy and adenoidectomy)  child        MEDICATIONS  (STANDING):  fluticasone propionate 50 MICROgram(s)/spray Nasal Spray 1 Spray(s) Both Nostrils two times a day  lidocaine 2% Jelly 10 milliLiter(s) IntraUrethral once  loratadine 10 milliGRAM(s) Oral daily  metoprolol succinate ER 50 milliGRAM(s) Oral daily  multivitamin 1 Tablet(s) Oral daily  Orgovyx (relugolix) 120mg tab 1 Tablet(s),Orgovyx (relugolix) 120 mg tablet 1 Tablet(s) 1 Tablet(s) Oral every 24 hours  pantoprazole  Injectable 40 milliGRAM(s) IV Push daily  polyethylene glycol 3350 17 Gram(s) Oral daily    MEDICATIONS  (PRN):  acetaminophen     Tablet .. 650 milliGRAM(s) Oral every 6 hours PRN Temp greater or equal to 38C (100.4F), Mild Pain (1 - 3)  aluminum hydroxide/magnesium hydroxide/simethicone Suspension 30 milliLiter(s) Oral every 4 hours PRN Dyspepsia  melatonin 3 milliGRAM(s) Oral at bedtime PRN Insomnia    Allergies    penicillin (Diarrhea; Pruritus; Hives)  penicillin V potassium (Rash)    Intolerances    codeine (Nausea)    Vital Signs Last 24 Hrs  T(C): 36.6 (04 Mar 2023 12:14), Max: 36.8 (03 Mar 2023 20:03)  T(F): 97.8 (04 Mar 2023 12:14), Max: 98.2 (03 Mar 2023 20:03)  HR: 90 (04 Mar 2023 12:14) (73 - 93)  BP: 115/64 (04 Mar 2023 12:14) (114/68 - 169/81)  BP(mean): --  RR: 20 (04 Mar 2023 12:14) (18 - 20)  SpO2: 93% (04 Mar 2023 12:14) (93% - 95%)    Parameters below as of 04 Mar 2023 12:14  Patient On (Oxygen Delivery Method): room air      I&O's Summary    03 Mar 2023 07:01  -  04 Mar 2023 07:00  --------------------------------------------------------  IN: 240 mL / OUT: 3000 mL / NET: -2760 mL        TELE: SR 70's   PHYSICAL EXAM:  Constitutional: NAD, awake and alert  HEENT: Moist Mucous Membranes, Anicteric  Pulmonary: Non-labored, breath sounds are clear bilaterally, No wheezing, rales or rhonchi  Cardiovascular: Regular, S1 and S2, +murmurs, rubs, gallops or clicks  Gastrointestinal: Bowel Sounds present, soft, nontender.   : cbi with three way bowers, clear o/p  Lymph: No peripheral edema. No lymphadenopathy.  Skin: No visible rashes or ulcers.  Psych:  Mood & affect appropriate  LABS: All Labs Reviewed:                        9.4    7.74  )-----------( 322      ( 04 Mar 2023 09:33 )             29.6                         9.4    9.72  )-----------( 268      ( 03 Mar 2023 06:16 )             29.1                         9.9    8.94  )-----------( 221      ( 02 Mar 2023 06:40 )             29.9     04 Mar 2023 09:33    137    |  104    |  14     ----------------------------<  90     4.1     |  31     |  0.73   03 Mar 2023 06:16    138    |  106    |  17     ----------------------------<  118    4.5     |  26     |  0.78   02 Mar 2023 06:40    134    |  105    |  20     ----------------------------<  96     4.5     |  25     |  1.10     Ca    8.1        04 Mar 2023 09:33  Ca    8.3        03 Mar 2023 06:16  Ca    8.2        02 Mar 2023 06:40    TPro  5.7    /  Alb  2.3    /  TBili  0.6    /  DBili  x      /  AST  34     /  ALT  22     /  AlkPhos  74     02 Mar 2023 06:40          12 Lead ECG:   Ventricular Rate 115 BPM    Atrial Rate 115 BPM    P-R Interval 180 ms    QRS Duration 130 ms    Q-T Interval 338 ms    QTC Calculation(Bazett) 467 ms    P Axis -6 degrees    R Axis 66 degrees    T Axis 4 degrees    Diagnosis Line Sinus tachycardia  Right bundle branch block  T wave abnormality, consider inferior ischemia  Abnormal ECG  When compared with ECG of 20-FEB-2023 10:41,  T wave inversion more evident in Anterior leads  Confirmed by Anthony Huang MD ([id number]) on 2/28/2023 1:36:59 PM (02-28-23 @ 05:17)      Patient name: HERBIE KERR  YOB: 1950   Age: 70 (M)   MR#: [id number]  Study Date: 2/4/2021  Location: O/PSonographer: Ramona Horn RDCS  Study quality: Technically good  Referring Physician: Rich Quesada MD  Blood Pressure: 168/87 mmHg  Height: 178 cm  Weight: 91 kg  BSA: 2.1 m2  Heart Rate: 76 mmHg  ------------------------------------------------------------------------  PROCEDURE: Transthoracic echocardiogram with 2-D, M-Mode  and complete spectral and color flow Doppler.  INDICATION: Nonrheumatic aortic (valve) stenosis (I35.0)  ------------------------------------------------------------------------  MEASUREMENTS: (See below)  ------------------------------------------------------------------------  OBSERVATIONS:  Mitral Valve: There is mitral annular calcification with  calcification on the anterior mitral leaflet. Mild mitral  regurgitation.  Aortic Root: Normal aortic root size. (Ao: 3.4 cm at the  sinuses of Valsalva).  Aortic Valve: #26mm Evolut Pro+ THV. The valve is well  seated with normal function.  The peak/mean gradients=  13/7mmHg with a DVI= 0.63. Peak transaortic valve gradient  equals 13 mm Hg, mean transaortic valve gradient equals 7  mm Hg, aortic valve velocity time integral equals 35 cm,  estimated aortic valve area equals 2.3 sqcm. There is a  mild paravalvular regurgitation jet originating from about  2 O'clock TTE surgical view.  There is no intravalvular  regurgitation seen. Peak left ventricular outflow tract  gradient equals 4mm Hg, mean gradient is equal to 3 mm Hg,  LVOT velocity time integral equals 22 cm.  Left Atrium: Mildly dilated left atrium.  LA volume index =  39 cc/m2.  Left Ventricle: Normal left ventricular systolic function.  No segmental wall motion abnormalities.  The LVEF about  55-60%. Normal left ventricular internal dimensions and  wall thicknesses. Mild diastolic dysfunction (Stage I).  Right Heart: Normal right atrium.  RA area= 13cm2, RA volume= 32ml. Normal right ventricular  size and function.  There is a device wire in the right heart. Normal tricuspid  valve. Minimal tricuspid regurgitation. Normal pulmonic  valve. Minimal pulmonic regurgitation.  Pericardium/PleuraNormal pericardium with no pericardial  effusion.  Hemodynamic: The estimated right atrial pressure is normal.  ------------------------------------------------------------------------  CONCLUSIONS:  1. #26mm Evolut Pro+ THV.  The valve is well seated with  normal function.  The peak/mean gradients= 13/7mmHg with a  DVI= 0.63. There is a mild paravalvular regurgitation jet  originating from about 2 O'clock TTE surgical view.  There  is no intravalvular regurgitation seen.  *** Compared with echocardiogram of 12/10/2020, no  significant changes noted.  ------------------------------------------------------------------------  PROCEDURE DESCRIPTION: Transthoracic echocardiogram with  2-D, M-Mode and complete spectral and color flow Doppler.  ------------------------------------------------------------------------  ECHOCARDIOGRAPHIC EXAMINATION:  AORTIC ROOT:  Aortic Root (Leaflet): 3.4 cm  Aortic Root Index: 0.9  LEFT ATRIUM:  AP Dimensions: 3.6 cm  LA Volume Index: 39.00 cc/sqm  LA Volume: 81.3 cc  LEFT VENTRICLE:  LVIDd: 4.1 cm  LVIDs: 2.9 cm  IVS: 1.18  ILWT: 1.0 cm  RWT: 0.47  LV Mass: 155.0 gm  LV Mass Index: 74 gm/sqm  EF (Visual Estimate): 55-60%  HR and BP:  HR: 76 bpm  BP: 168/87  BSA: 2.09  ------------------------------------------------------------------------  HEMODYNAMICS:  RA Pressure Estimate: 8  LA Pressure Estimate: < 20  IVRT: 53 ms  ------------------------------------------------------------------------  COLOR FLOW and SPECIAL DOPPLER:  AORTIC VALVE:  AV Peak Velocity: 1.7 M/sec  AV Peak Gradient: 12 mm Hg  AV Mean Gradient: 7 mm Hg  Valve Area: 2.3 sqcm  LVOT:  LVOT Velocity: 1.0 M/sec  LVOT Diameter: 2.1 cm  LVOT Peak Gradient Rest: 4 mm Hg  LVOT Mean Gradient Rest: 3 mm Hg  ------------------------------------------------------------------------  DIASTOLIC FUNCTION:  DT:187 ms  E/A: 0.90  e' Septal: 7.0 cm/s  E/e' Septal: 14.2  e' Lateral: 8.0 cm/s  E/e' Lateral: 12.5  MV E wave: 1.0 m/s  MV A wave: 1.1 m/s  Septal a': 10.0 cm/s  Lateral a': 11.0 cm/s  ------------------------------------------------------------------------  Confirmed on  2/4/2021 - 13:25:59 by Caroline Rizzo M.D.  ------------------------------------------------------------------------

## 2023-03-04 NOTE — PROGRESS NOTE ADULT - SUBJECTIVE AND OBJECTIVE BOX
Gu Progress Note:    Hemorrhagic cystitis secondary EBRT in management of Proste Ca, s/p cysti o clot reetntion. 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      HTN (hypertension)      Aortic stenosis      Rectal bleeding  last hospitalization 10/6/20 2/2 radiation proctitis      [place] (hard of hearing)      H/O prostatectomy  2015      S/P T&amp;A (status post tonsillectomy and adenoidectomy)  child            MEDICATIONS  (STANDING):  fluticasone propionate 50 MICROgram(s)/spray Nasal Spray 1 Spray(s) Both Nostrils two times a day  lidocaine 2% Jelly 10 milliLiter(s) IntraUrethral once  loratadine 10 milliGRAM(s) Oral daily  metoprolol succinate ER 50 milliGRAM(s) Oral daily  multivitamin 1 Tablet(s) Oral daily  Orgovyx (relugolix) 120mg tab 1 Tablet(s),Orgovyx (relugolix) 120 mg tablet 1 Tablet(s) 1 Tablet(s) Oral every 24 hours  pantoprazole  Injectable 40 milliGRAM(s) IV Push daily  polyethylene glycol 3350 17 Gram(s) Oral daily    MEDICATIONS  (PRN):  acetaminophen     Tablet .. 650 milliGRAM(s) Oral every 6 hours PRN Temp greater or equal to 38C (100.4F), Mild Pain (1 - 3)  aluminum hydroxide/magnesium hydroxide/simethicone Suspension 30 milliLiter(s) Oral every 4 hours PRN Dyspepsia  melatonin 3 milliGRAM(s) Oral at bedtime PRN Insomnia      Allergies    penicillin (Diarrhea; Pruritus; Hives)  penicillin V potassium (Rash)    Intolerances    codeine (Nausea)          FAMILY HISTORY:  FH: cancer (Father, Mother)        Vital Signs Last 24 Hrs  T(C): 36.6 (04 Mar 2023 12:14), Max: 36.8 (03 Mar 2023 20:03)  T(F): 97.8 (04 Mar 2023 12:14), Max: 98.2 (03 Mar 2023 20:03)  HR: 90 (04 Mar 2023 12:14) (73 - 93)  BP: 115/64 (04 Mar 2023 12:14) (114/68 - 169/81)  BP(mean): --  RR: 20 (04 Mar 2023 12:14) (18 - 20)  SpO2: 93% (04 Mar 2023 12:14) (93% - 95%)    Parameters below as of 04 Mar 2023 12:14  Patient On (Oxygen Delivery Method): room air        PHYSICAL EXAM:    Constitutional: NAD, well-developed    Asymptomatic, AO  Abd: BS+, soft, NT/ND, No CVAT  : Normal   phallus, patent  meatus, bilateral descended testes, no masses CBI  with pink  urine  Extremities: No peripheral edema    LABS:                        9.4    7.74  )-----------( 322      ( 04 Mar 2023 09:33 )             29.6     03-04    137  |  104  |  14  ----------------------------<  90  4.1   |  31  |  0.73    Ca    8.1<L>      04 Mar 2023 09:33          Urine Culture:   Hemoglobin: 9.4 g/dL (03-04 @ 09:33)  Hematocrit: 29.6 % (03-04 @ 09:33)  Hemoglobin: 9.4 g/dL (03-03 @ 06:16)  Hematocrit: 29.1 % (03-03 @ 06:16)      RADIOLOGY & ADDITIONAL STUDIES:

## 2023-03-04 NOTE — PROGRESS NOTE ADULT - ASSESSMENT
73 y/o M w/ PMHx of HTN,  AS s/p TAVR, ppm, prostate cancer s/p radical prostatectomy c/b radiation proctitis presents to ED for severe pain and urinary retention s/p cystoscopy. Admit for hematuria / urinary retention.      Problem/Plan - 1:  ·  Problem: Urinary retention with hematuria :   s/p RRP for Ca Prostate by Eusebio Martinez, XRT for recurrence, on Orgovyx  s/p negative cysto x2 by Dr. Luis at [facility], last one 5 weeks ago  admitted for clot retention, hematuria likely due to radiation cystitis  bowers was discontinued, not being able to void and hematuria recur , bowers was placed back   called and discussed with Dr. Luis at [facility], ok for discharge even if the urine is light pink color.   Currently on CBI after recurrent hematuria.  Continue amicar 500mg PO  Hb improved after blood transfusion, hyponatremia improved, SANJUANA resolved   cystoscopy  with fulguration of hemorrhaging blood vessel and evacuation of clot on 3/2  still on CBI , urine clear now.        Problem/Plan - 2:  ·  Problem: Prostate cancer.   ·  Plan: Hx of Prostate CA, now s/p radiation therapy and radical prostatectomy  - Continue home Orgovyx - brought in from home  - Patient to f/u with his urologist at [facility] (Dr. Luis) outpatient for further management.     Problem/Plan - 3:  ·  Problem: Hypertension.   - BP stable  - Continue home Metoprolol ER 50mg PO daily w/ hold parameters.     Problem/Plan - 4:  ·  Problem: Need for prophylactic measure.   ·  Plan: SCDs b/l for dvt ppx; will hold off on A/C at this time 2/2 hematuria.  add PPI for GI prophylaxis      Problem/Plan - 5:   acute blood loss anemia:   s/p total 5 unit blood transfusion, Hb 9.4 today    HYPONATREMIA: likely multifactorial, on sodium  tab replacement ,sodium bicarb , regular diet instead of low salt diet. resolved, sodium supplement discontinued.       SANJUANA: resolved

## 2023-03-05 LAB
CULTURE RESULTS: SIGNIFICANT CHANGE UP
CULTURE RESULTS: SIGNIFICANT CHANGE UP
SARS-COV-2 RNA SPEC QL NAA+PROBE: SIGNIFICANT CHANGE UP
SPECIMEN SOURCE: SIGNIFICANT CHANGE UP
SPECIMEN SOURCE: SIGNIFICANT CHANGE UP

## 2023-03-05 PROCEDURE — 99232 SBSQ HOSP IP/OBS MODERATE 35: CPT

## 2023-03-05 PROCEDURE — 99233 SBSQ HOSP IP/OBS HIGH 50: CPT

## 2023-03-05 RX ORDER — POLYETHYLENE GLYCOL 3350 17 G/17G
17 POWDER, FOR SOLUTION ORAL
Qty: 0 | Refills: 0 | DISCHARGE
Start: 2023-03-05

## 2023-03-05 RX ORDER — PANTOPRAZOLE SODIUM 20 MG/1
1 TABLET, DELAYED RELEASE ORAL
Qty: 10 | Refills: 0
Start: 2023-03-05

## 2023-03-05 RX ORDER — FLUTICASONE PROPIONATE 50 MCG
1 SPRAY, SUSPENSION NASAL
Qty: 0 | Refills: 0 | DISCHARGE
Start: 2023-03-05

## 2023-03-05 RX ORDER — ACETAMINOPHEN 500 MG
2 TABLET ORAL
Qty: 0 | Refills: 0 | DISCHARGE
Start: 2023-03-05

## 2023-03-05 RX ORDER — METOPROLOL TARTRATE 50 MG
1 TABLET ORAL
Qty: 0 | Refills: 0 | DISCHARGE

## 2023-03-05 RX ORDER — METOPROLOL TARTRATE 50 MG
1 TABLET ORAL
Qty: 0 | Refills: 0 | DISCHARGE
Start: 2023-03-05

## 2023-03-05 RX ADMIN — Medication 50 MILLIGRAM(S): at 05:49

## 2023-03-05 RX ADMIN — POLYETHYLENE GLYCOL 3350 17 GRAM(S): 17 POWDER, FOR SOLUTION ORAL at 11:35

## 2023-03-05 RX ADMIN — Medication 1 SPRAY(S): at 17:37

## 2023-03-05 RX ADMIN — LORATADINE 10 MILLIGRAM(S): 10 TABLET ORAL at 11:36

## 2023-03-05 RX ADMIN — Medication 1 TABLET(S): at 11:36

## 2023-03-05 RX ADMIN — PANTOPRAZOLE SODIUM 40 MILLIGRAM(S): 20 TABLET, DELAYED RELEASE ORAL at 11:36

## 2023-03-05 RX ADMIN — Medication 1 SPRAY(S): at 05:50

## 2023-03-05 NOTE — PROGRESS NOTE ADULT - SUBJECTIVE AND OBJECTIVE BOX
[facility] -- Michael Fernandez,  Reina, Steve Luna Sav[place], [place]  Office # [phone]    Follow Up:   VHD, hematuria    Subjective/Observations: seen and examined, asleep, easily awaken, alert, denies chest pain, dyspnea, palpitations or dizziness, orthopnea and PND. Tolerating room air. 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      HTN (hypertension)      Aortic stenosis      Rectal bleeding  last hospitalization 10/6/20 2/2 radiation proctitis      [place] (hard of hearing)      H/O prostatectomy  2015      S/P T&amp;A (status post tonsillectomy and adenoidectomy)  child        MEDICATIONS  (STANDING):  fluticasone propionate 50 MICROgram(s)/spray Nasal Spray 1 Spray(s) Both Nostrils two times a day  lidocaine 2% Jelly 10 milliLiter(s) IntraUrethral once  loratadine 10 milliGRAM(s) Oral daily  metoprolol succinate ER 50 milliGRAM(s) Oral daily  multivitamin 1 Tablet(s) Oral daily  Orgovyx (relugolix) 120mg tab 1 Tablet(s),Orgovyx (relugolix) 120 mg tablet 1 Tablet(s) 1 Tablet(s) Oral every 24 hours  pantoprazole  Injectable 40 milliGRAM(s) IV Push daily  polyethylene glycol 3350 17 Gram(s) Oral daily    MEDICATIONS  (PRN):  acetaminophen     Tablet .. 650 milliGRAM(s) Oral every 6 hours PRN Temp greater or equal to 38C (100.4F), Mild Pain (1 - 3)  aluminum hydroxide/magnesium hydroxide/simethicone Suspension 30 milliLiter(s) Oral every 4 hours PRN Dyspepsia  melatonin 3 milliGRAM(s) Oral at bedtime PRN Insomnia    Allergies    penicillin (Diarrhea; Pruritus; Hives)  penicillin V potassium (Rash)    Intolerances    codeine (Nausea)    Vital Signs Last 24 Hrs  T(C): 36.7 (05 Mar 2023 05:05), Max: 36.7 (04 Mar 2023 19:54)  T(F): 98.1 (05 Mar 2023 05:05), Max: 98.1 (05 Mar 2023 05:05)  HR: 81 (05 Mar 2023 05:05) (81 - 94)  BP: 151/75 (05 Mar 2023 05:05) (115/64 - 151/75)  BP(mean): --  RR: 18 (05 Mar 2023 05:05) (18 - 20)  SpO2: 96% (05 Mar 2023 05:05) (93% - 96%)    Parameters below as of 05 Mar 2023 05:05  Patient On (Oxygen Delivery Method): room air      I&O's Summary      TELE: SR 70's   PHYSICAL EXAM:  Constitutional: NAD, awake and alert  HEENT: Moist Mucous Membranes, Anicteric  Pulmonary: Non-labored, breath sounds are clear bilaterally, No wheezing, rales or rhonchi  Cardiovascular: Regular, S1 and S2, + murmurs, rubs, gallops or clicks  Gastrointestinal: Bowel Sounds present, soft, nontender.   : cbi with three way bowers, clear o/p  Lymph: No peripheral edema. No lymphadenopathy.  Skin: No visible rashes or ulcers.  Psych:  Mood & affect appropriate  LABS: All Labs Reviewed:                        9.4    7.74  )-----------( 322      ( 04 Mar 2023 09:33 )             29.6                         9.4    9.72  )-----------( 268      ( 03 Mar 2023 06:16 )             29.1     04 Mar 2023 09:33    137    |  104    |  14     ----------------------------<  90     4.1     |  31     |  0.73   03 Mar 2023 06:16    138    |  106    |  17     ----------------------------<  118    4.5     |  26     |  0.78     Ca    8.1        04 Mar 2023 09:33  Ca    8.3        03 Mar 2023 06:16            12 Lead ECG:   Ventricular Rate 115 BPM    Atrial Rate 115 BPM    P-R Interval 180 ms    QRS Duration 130 ms    Q-T Interval 338 ms    QTC Calculation(Bazett) 467 ms    P Axis -6 degrees    R Axis 66 degrees    T Axis 4 degrees    Diagnosis Line Sinus tachycardia  Right bundle branch block  T wave abnormality, consider inferior ischemia  Abnormal ECG  When compared with ECG of 20-FEB-2023 10:41,  T wave inversion more evident in Anterior leads  Confirmed by Anthony Huang MD ([id number]) on 2/28/2023 1:36:59 PM (02-28-23 @ 05:17)      Patient name: HERBIE KERR  YOB: 1950   Age: 70 (M)   MR#: [id number]  Study Date: 2/4/2021  [facility]  Study quality: Technically good  Referring Physician: Rich Quesada MD  Blood Pressure: 168/87 mmHg  Height: 178 cm  Weight: 91 kg  BSA: 2.1 m2  Heart Rate: 76 mmHg  ------------------------------------------------------------------------  PROCEDURE: Transthoracic echocardiogram with 2-D, M-Mode  and complete spectral and color flow Doppler.  INDICATION: Nonrheumatic aortic (valve) stenosis (I35.0)  ------------------------------------------------------------------------  MEASUREMENTS: (See below)  ------------------------------------------------------------------------  OBSERVATIONS:  Mitral Valve: There is mitral annular calcification with  calcification on the anterior mitral leaflet. Mild mitral  regurgitation.  Aortic Root: Normal aortic root size. (Ao: 3.4 cm at the  sinuses of Valsalva).  Aortic Valve: #26mm Evolut Pro+ THV. The valve is well  seated with normal function.  The peak/mean gradients=  13/7mmHg with a DVI= 0.63. Peak transaortic valve gradient  equals 13 mm Hg, mean transaortic valve gradient equals 7  mm Hg, aortic valve velocity time integral equals 35 cm,  estimated aortic valve area equals 2.3 sqcm. There is a  mild paravalvular regurgitation jet originating from about  2 O'clock TTE surgical view.  There is no intravalvular  regurgitation seen. Peak left ventricular outflow tract  gradient equals 4mm Hg, mean gradient is equal to 3 mm Hg,  LVOT velocity time integral equals 22 cm.  Left Atrium: Mildly dilated left atrium.  LA volume index =  39 cc/m2.  Left Ventricle: Normal left ventricular systolic function.  No segmental wall motion abnormalities.  The LVEF about  55-60%. Normal left ventricular internal dimensions and  wall thicknesses. Mild diastolic dysfunction (Stage I).  Right Heart: Normal right atrium.  RA area= 13cm2, RA volume= 32ml. Normal right ventricular  size and function.  There is a device wire in the right heart. Normal tricuspid  valve. Minimal tricuspid regurgitation. Normal pulmonic  valve. Minimal pulmonic regurgitation.  Pericardium/PleuraNormal pericardium with no pericardial  effusion.  Hemodynamic: The estimated right atrial pressure is normal.  ------------------------------------------------------------------------  CONCLUSIONS:  1. #26mm Evolut Pro+ THV.  The valve is well seated with  normal function.  The peak/mean gradients= 13/7mmHg with a  DVI= 0.63. There is a mild paravalvular regurgitation jet  originating from about 2 O'clock TTE surgical view.  There  is no intravalvular regurgitation seen.  *** Compared with echocardiogram of 12/10/2020, no  significant changes noted.  ------------------------------------------------------------------------  PROCEDURE DESCRIPTION: Transthoracic echocardiogram with  2-D, M-Mode and complete spectral and color flow Doppler.  ------------------------------------------------------------------------  ECHOCARDIOGRAPHIC EXAMINATION:  AORTIC ROOT:  Aortic Root (Leaflet): 3.4 cm  Aortic Root Index: 0.9  LEFT ATRIUM:  AP Dimensions: 3.6 cm  LA Volume Index: 39.00 cc/sqm  LA Volume: 81.3 cc  LEFT VENTRICLE:  LVIDd: 4.1 cm  LVIDs: 2.9 cm  IVS: 1.18  ILWT: 1.0 cm  RWT: 0.47  LV Mass: 155.0 gm  LV Mass Index: 74 gm/sqm  EF (Visual Estimate): 55-60%  HR and BP:  HR: 76 bpm  BP: 168/87  BSA: 2.09  ------------------------------------------------------------------------  HEMODYNAMICS:  RA Pressure Estimate: 8  LA Pressure Estimate: < 20  IVRT: 53 ms  ------------------------------------------------------------------------  COLOR FLOW and SPECIAL DOPPLER:  AORTIC VALVE:  AV Peak Velocity: 1.7 M/sec  AV Peak Gradient: 12 mm Hg  AV Mean Gradient: 7 mm Hg  Valve Area: 2.3 sqcm  LVOT:  LVOT Velocity: 1.0 M/sec  LVOT Diameter: 2.1 cm  LVOT Peak Gradient Rest: 4 mm Hg  LVOT Mean Gradient Rest: 3 mm Hg  ------------------------------------------------------------------------  DIASTOLIC FUNCTION:  DT:187 ms  E/A: 0.90  e' Septal: 7.0 cm/s  E/e' Septal: 14.2  e' Lateral: 8.0 cm/s  E/e' Lateral: 12.5  MV E wave: 1.0 m/s  MV A wave: 1.1 m/s  Septal a': 10.0 cm/s  Lateral a': 11.0 cm/s  ------------------------------------------------------------------------  Confirmed on  2/4/2021 - 13:25:59 by Caroline Rizzo M.D.  ------------------------------------------------------------------------

## 2023-03-05 NOTE — PROGRESS NOTE ADULT - ASSESSMENT
71 y/o M w/ PMHx of HTN,  AS s/p TAVR, ppm, prostate cancer s/p radical prostatectomy c/b radiation proctitis presents to ED for severe pain and urinary retention s/p cystoscopy. Admit for hematuria / urinary retention.      Problem/Plan - 1:  ·  Problem: Urinary retention with hematuria :   s/p RRP for Ca Prostate by Eusebio Martinez, XRT for recurrence, on Orgovyx  s/p negative cysto x2 by Dr. Luis at [facility], last one 5 weeks ago  admitted for clot retention, hematuria likely due to radiation cystitis  recurrent hematuria after CBI, CBI with amicar and amicar po   cystoscopy  with fulguration of hemorrhaging blood vessel and evacuation of clot on 3/2  CBI restarted last night after bowers irrigation/blood clot , now urine clear         Problem/Plan - 2:  ·  Problem: Prostate cancer.   ·  Plan: Hx of Prostate CA, now s/p radiation therapy and radical prostatectomy  - Continue home Orgovyx - brought in from home  - Patient to f/u with his urologist at [facility] (Dr. Luis) outpatient for further management.     Problem/Plan - 3:  ·  Problem: Hypertension.   - BP stable  - Continue home Metoprolol ER 50mg PO daily w/ hold parameters.     Problem/Plan - 4:  ·  Problem: Need for prophylactic measure.   ·  Plan: SCDs b/l for dvt ppx; will hold off on A/C at this time 2/2 hematuria.  add PPI for GI prophylaxis      Problem/Plan - 5:   acute blood loss anemia:   s/p total 5 unit blood transfusion, H/H stable, monitor H/H    HYPONATREMIA: Resolved,    likely multifactorial, on sodium  tab replacement ,sodium bicarb , regular diet instead of low salt diet. resolved, sodium supplement discontinued.       SANJUANA: resolved

## 2023-03-05 NOTE — PROGRESS NOTE ADULT - SUBJECTIVE AND OBJECTIVE BOX
Gu Progress Note:    Alert/awake - comfortable.  Required irrigation of clots during night , urine now pink on CBI - no clots.     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      HTN (hypertension)      Aortic stenosis      Rectal bleeding  last hospitalization 10/6/20 2/2 radiation proctitis      [place] (hard of hearing)      H/O prostatectomy  2015      S/P T&amp;A (status post tonsillectomy and adenoidectomy)  child            MEDICATIONS  (STANDING):  fluticasone propionate 50 MICROgram(s)/spray Nasal Spray 1 Spray(s) Both Nostrils two times a day  lidocaine 2% Jelly 10 milliLiter(s) IntraUrethral once  loratadine 10 milliGRAM(s) Oral daily  metoprolol succinate ER 50 milliGRAM(s) Oral daily  multivitamin 1 Tablet(s) Oral daily  Orgovyx (relugolix) 120mg tab 1 Tablet(s),Orgovyx (relugolix) 120 mg tablet 1 Tablet(s) 1 Tablet(s) Oral every 24 hours  pantoprazole  Injectable 40 milliGRAM(s) IV Push daily  polyethylene glycol 3350 17 Gram(s) Oral daily    MEDICATIONS  (PRN):  acetaminophen     Tablet .. 650 milliGRAM(s) Oral every 6 hours PRN Temp greater or equal to 38C (100.4F), Mild Pain (1 - 3)  aluminum hydroxide/magnesium hydroxide/simethicone Suspension 30 milliLiter(s) Oral every 4 hours PRN Dyspepsia  melatonin 3 milliGRAM(s) Oral at bedtime PRN Insomnia      Allergies    penicillin (Diarrhea; Pruritus; Hives)  penicillin V potassium (Rash)    Intolerances    codeine (Nausea)          FAMILY HISTORY:  FH: cancer (Father, Mother)        Vital Signs Last 24 Hrs  T(C): 36.9 (05 Mar 2023 11:53), Max: 36.9 (05 Mar 2023 11:53)  T(F): 98.4 (05 Mar 2023 11:53), Max: 98.4 (05 Mar 2023 11:53)  HR: 70 (05 Mar 2023 11:53) (70 - 94)  BP: 137/71 (05 Mar 2023 11:53) (137/71 - 151/75)  BP(mean): --  RR: 20 (05 Mar 2023 11:53) (18 - 20)  SpO2: 94% (05 Mar 2023 11:53) (94% - 96%)    Parameters below as of 05 Mar 2023 11:53  Patient On (Oxygen Delivery Method): room air        PHYSICAL EXAM:    Constitutional: NAD, well-developed    Asymptomatic, AO  Abd: BS+, soft, NT/ND, No CVAT  : Normal  phallus, patent  meatus, bilateral descended testes, no masses CBI with pink tinged drainage    Extremities: No peripheral edema    LABS:                        9.4    7.74  )-----------( 322      ( 04 Mar 2023 09:33 )             29.6     03-04    137  |  104  |  14  ----------------------------<  90  4.1   |  31  |  0.73    Ca    8.1<L>      04 Mar 2023 09:33          Urine Culture:   Hemoglobin: 9.4 g/dL (03-04 @ 09:33)  Hematocrit: 29.6 % (03-04 @ 09:33)      RADIOLOGY & ADDITIONAL STUDIES:

## 2023-03-05 NOTE — PROGRESS NOTE ADULT - ASSESSMENT
SANJUANA  Hyponatremia  Metabolic acidosis  Hematuria  HTN  Anemia  Prostate Caner S/P Prostatectomy and radiation      -SANJUANA clinically behaving as obstructive uropathy but bladder scan showed no urine; urine indices suggest ATN instead. Gr replaced. S/p CBI per urology   -s/p VF x NS 1 day  -Gr per urology   -Continue oral bicarb for additional day  -Off sodium chloride tabs   -Hyponatremia likely multifactorial, decreased solute intake + increased solvent intake + component of SIADH  -Urine lytes will be unrevealing Patient on CBI  -Regular diet   -S/p PRBC  -Will not fluid restrict at this time, as he drinks to help augment urine output to help with hematuria  -Avoid NSAIDs for pain, can worsen hyponatremia  -Renal indices, acidosis, and hyponatremia improving; monitor for now

## 2023-03-05 NOTE — PROGRESS NOTE ADULT - ASSESSMENT
Covering fro Dr. JONEL Shaffer    Intermittent gross hematuria secondary radiation cystitis, per Dr. Shaffer fro transfer     continue CBI and prn i manual irrigations

## 2023-03-05 NOTE — PROGRESS NOTE ADULT - SUBJECTIVE AND OBJECTIVE BOX
Patient is a 72y old  Male who presents with a chief complaint of Hematuria/Urinary Retention (05 Mar 2023 08:48)      Subjective:  INTERVAL HPI/OVERNIGHT EVENTS: Patient seen and examined at bedside.  Patient concern about the recurrent hematuria, as per nursing, bowers was irrigated last night, noticed blood clot    MEDICATIONS  (STANDING):  fluticasone propionate 50 MICROgram(s)/spray Nasal Spray 1 Spray(s) Both Nostrils two times a day  lidocaine 2% Jelly 10 milliLiter(s) IntraUrethral once  loratadine 10 milliGRAM(s) Oral daily  metoprolol succinate ER 50 milliGRAM(s) Oral daily  multivitamin 1 Tablet(s) Oral daily  Orgovyx (relugolix) 120mg tab 1 Tablet(s),Orgovyx (relugolix) 120 mg tablet 1 Tablet(s) 1 Tablet(s) Oral every 24 hours  pantoprazole  Injectable 40 milliGRAM(s) IV Push daily  polyethylene glycol 3350 17 Gram(s) Oral daily    MEDICATIONS  (PRN):  acetaminophen     Tablet .. 650 milliGRAM(s) Oral every 6 hours PRN Temp greater or equal to 38C (100.4F), Mild Pain (1 - 3)  aluminum hydroxide/magnesium hydroxide/simethicone Suspension 30 milliLiter(s) Oral every 4 hours PRN Dyspepsia  melatonin 3 milliGRAM(s) Oral at bedtime PRN Insomnia      Allergies    penicillin (Diarrhea; Pruritus; Hives)  penicillin V potassium (Rash)    Intolerances    codeine (Nausea)      REVIEW OF SYSTEMS:  CONSTITUTIONAL: No fever or chills  HEENT:  No headache, no sore throat  RESPIRATORY: No cough or shortness of breath  CARDIOVASCULAR: No chest pain or palpitations  GASTROINTESTINAL: No abd pain, nausea, vomiting, or diarrhea      Objective:  Vital Signs Last 24 Hrs  T(C): 36.7 (05 Mar 2023 05:05), Max: 36.7 (04 Mar 2023 19:54)  T(F): 98.1 (05 Mar 2023 05:05), Max: 98.1 (05 Mar 2023 05:05)  HR: 81 (05 Mar 2023 05:05) (81 - 94)  BP: 151/75 (05 Mar 2023 05:05) (115/64 - 151/75)  BP(mean): --  RR: 18 (05 Mar 2023 05:05) (18 - 20)  SpO2: 96% (05 Mar 2023 05:05) (93% - 96%)    Parameters below as of 05 Mar 2023 05:05  Patient On (Oxygen Delivery Method): room air        GENERAL: NAD, lying in bed comfortably  HEAD:  Normocephalic  EYES:  conjunctiva and sclera clear  ENT: Moist mucous membranes  NECK: Supple  CHEST/LUNG: Clear to auscultation bilaterally; No rales or rhonchi; no wheezing. Unlabored respirations  HEART: Regular rate and rhythm; S1S2+  ABDOMEN: Bowel sounds present; Soft, Nontender, Nondistended. + pressure at pelvic area upon palpation   EXTREMITIES:  + distal Peripheral Pulses;  No cyanosis, or edema  NERVOUS SYSTEM:  Alert & Oriented X3;  No gross focal deficits   MSK: moves all extremities  SKIN: No rashes     LABS:      Ca    8.1        04 Mar 2023 09:33          CAPILLARY BLOOD GLUCOSE            Culture - Blood (collected 02-28-23 @ 07:30)  Source: .Blood Blood-Peripheral  Preliminary Report (03-01-23 @ 14:01):    No growth to date.    Culture - Blood (collected 02-28-23 @ 07:00)  Source: .Blood Blood-Peripheral  Preliminary Report (03-01-23 @ 14:01):    No growth to date.        RADIOLOGY & ADDITIONAL TESTS:    Personally reviewed.     Consultant(s) Notes Reviewed:  [x] YES  [ ] NO    Plan of care discussed with patient /family; all questions answered

## 2023-03-05 NOTE — PROGRESS NOTE ADULT - SUBJECTIVE AND OBJECTIVE BOX
Patient is a 72y old  Male who presents with a chief complaint of Hematuria/Urinary Retention (27 Feb 2023 12:32)       HPI:  71 y/o M w/  PMHx of HTN, AS s/p TAVR, ppm, prostate cancer s/p radical prostatectomy c/b radiation proctitis presents to ED for severe pain and urinary retention s/p cystoscopy. Patient follows with urology at [facility]; was being seen for cystoscopy after experiencing hematuria. He has had hematuria before approximately one year ago; underwent cystoscopy without complications at that time. He underwent multiple rounds of radiation therapy with ultimate radical prostatectomy for prostate CA. He has been diagnosed with radiation proctitis. Patient feels that this his current presentation is likely 2/2  radiation cystitis. Patient reports pain is improved after bowers/CBI placed.   Patient developed hyponatremia prompting renal consult.  He has been having ongoing hematuria. On CBI.  Denies any N/V.  Has lightheadedness at times.      2/28/23: Had syncope overnight while walking to commode. Renal function much worsen today. C/o bladder pain. Bowers replaced. 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      HTN (hypertension)      Aortic stenosis      Rectal bleeding  last hospitalization 10/6/20 2/2 radiation proctitis      [place] (hard of hearing)      H/O prostatectomy  2015      S/P T&amp;A (status post tonsillectomy and adenoidectomy)  child           FAMILY HISTORY:  FH: cancer (Father, Mother)    NC    Social History:Non smoker    MEDICATIONS  (STANDING):  aminocaproic acid Tablet 500 milliGRAM(s) Oral <User Schedule>  fluticasone propionate 50 MICROgram(s)/spray Nasal Spray 1 Spray(s) Both Nostrils two times a day  lidocaine 2% Jelly 5 milliLiter(s) IntraUrethral once  lidocaine 2% Jelly 5 milliLiter(s) IntraUrethral once  lidocaine 2% Jelly 10 milliLiter(s) IntraUrethral once  loratadine 10 milliGRAM(s) Oral daily  metoprolol succinate ER 50 milliGRAM(s) Oral daily  multivitamin/minerals 1 Tablet(s) Oral daily  Orgovyx (relugolix) 120mg tab 1 Tablet(s) 1 Tablet(s) Oral daily  polyethylene glycol 3350 17 Gram(s) Oral daily  sodium bicarbonate 650 milliGRAM(s) Oral three times a day  sodium chloride 1 Gram(s) Oral three times a day    MEDICATIONS  (PRN):  acetaminophen     Tablet .. 650 milliGRAM(s) Oral every 6 hours PRN Temp greater or equal to 38C (100.4F), Mild Pain (1 - 3)  aluminum hydroxide/magnesium hydroxide/simethicone Suspension 30 milliLiter(s) Oral every 4 hours PRN Dyspepsia  melatonin 3 milliGRAM(s) Oral at bedtime PRN Insomnia  morphine  - Injectable 1 milliGRAM(s) IV Push every 6 hours PRN Severe Pain (7 - 10)  ondansetron Injectable 4 milliGRAM(s) IV Push every 8 hours PRN Nausea and/or Vomiting      Allergies    penicillin (Diarrhea; Pruritus; Hives)  penicillin V potassium (Rash)    Intolerances    codeine (Nausea)       REVIEW OF SYSTEMS:    Review of Systems:   Constitutional: Denies fatigue  HEENT: Denies headaches and dizziness  Respiratory: denies SOB, cough, or wheezing  Cardiovascular: denies CP, palpitations  Gastrointestinal: Denies nausea, denies vomiting, diarrhea, constipation, abdominal pain, or bloody stools  Genitourinary: neg  Skin: denies rashes or itching  Musculoskeletal: denies muscle aches, joint swelling  Neurologic: Denies generalized weakness, denies loss of sensation, numbness, or tingling      Vital Signs Last 24 Hrs  T(C): 36.7 (05 Mar 2023 05:05), Max: 36.7 (04 Mar 2023 19:54)  T(F): 98.1 (05 Mar 2023 05:05), Max: 98.1 (05 Mar 2023 05:05)  HR: 81 (05 Mar 2023 05:05) (81 - 94)  BP: 151/75 (05 Mar 2023 05:05) (115/64 - 151/75)  BP(mean): --  RR: 18 (05 Mar 2023 05:05) (18 - 20)  SpO2: 96% (05 Mar 2023 05:05) (93% - 96%)    Parameters below as of 05 Mar 2023 05:05  Patient On (Oxygen Delivery Method): room air          PHYSICAL EXAM:    GENERAL: NAD  HEAD:  Atraumatic, Normocephalic  EYES: EOMI, conjunctiva and sclera clear  ENMT: No Drainage from nares, No drainage from ears  NECK: Supple, neck  veins full  NERVOUS SYSTEM:  Awake and Alert  CHEST/LUNG: Clear to percussion bilaterally; No rales, rhonchi, wheezing, or rubs  HEART: Regular rate and rhythm; No murmurs, rubs, or gallops  ABDOMEN: Soft, Nontender, Nondistended; Bowel sounds present  EXTREMITIES:  No Edema  SKIN: No rashes No obvious ecchymosis  +Bowers      LABS:                        9.4    7.74  )-----------( 322      ( 04 Mar 2023 09:33 )             29.6     03-04    137  |  104  |  14  ----------------------------<  90  4.1   |  31  |  0.73    Ca    8.1<L>      04 Mar 2023 09:33

## 2023-03-05 NOTE — PROGRESS NOTE ADULT - ASSESSMENT
72 year old male with AV disease, s/p TAVR in 12/20, with post op SSS requiring PPM, here with hematuria and radiation hemorrhagic cystitis.     VHD, SSS s/p PPM  - s/p clot evacuation/ fulguration, tolerated well from CV POV  - + hematuria, CBI ongoing, improving , urology following    - had been off antiplatelets for well over a year before this event, no need to resume nito in light of the ongoing issues with hematuria  - H/H stable, continue to trend transfuse per primary     - No sign of acute ischemia.   - s/p TAVR, no anginal complaints or volume overload  - He has minimal CAD based on cath in 2020.  - [employer] PPM interrogated 12/14/22; mode  DDDR. Battery life approx 11.5 years left. V paced 4%, A paced 70%.     - BP improving, HR stable, no events on tele x 48 hrs, would dc tele monitoring   - Continue BB, with hold parameters   - Monitor and replete Lytes. Keep K > 4 and Mg > 2    - Had syncope 2/28 am, no further episodes, likely vagal vs medication induced, no further episodes noted     - Will continue to follow.    Jenny White, [facility]  Nurse Practitioner - Cardiology   Spectra #[phone]/ [phone]

## 2023-03-06 LAB
ANION GAP SERPL CALC-SCNC: 4 MMOL/L — LOW (ref 5–17)
BUN SERPL-MCNC: 13 MG/DL — SIGNIFICANT CHANGE UP (ref 7–23)
CALCIUM SERPL-MCNC: 8.7 MG/DL — SIGNIFICANT CHANGE UP (ref 8.5–10.1)
CHLORIDE SERPL-SCNC: 105 MMOL/L — SIGNIFICANT CHANGE UP (ref 96–108)
CO2 SERPL-SCNC: 26 MMOL/L — SIGNIFICANT CHANGE UP (ref 22–31)
CREAT SERPL-MCNC: 0.68 MG/DL — SIGNIFICANT CHANGE UP (ref 0.5–1.3)
EGFR: 98 ML/MIN/1.73M2 — SIGNIFICANT CHANGE UP
GLUCOSE SERPL-MCNC: 94 MG/DL — SIGNIFICANT CHANGE UP (ref 70–99)
HCT VFR BLD CALC: 31.8 % — LOW (ref 39–50)
HGB BLD-MCNC: 10.1 G/DL — LOW (ref 13–17)
MCHC RBC-ENTMCNC: 27.7 PG — SIGNIFICANT CHANGE UP (ref 27–34)
MCHC RBC-ENTMCNC: 31.8 GM/DL — LOW (ref 32–36)
MCV RBC AUTO: 87.1 FL — SIGNIFICANT CHANGE UP (ref 80–100)
NRBC # BLD: 0 /100 WBCS — SIGNIFICANT CHANGE UP (ref 0–0)
PLATELET # BLD AUTO: 382 K/UL — SIGNIFICANT CHANGE UP (ref 150–400)
POTASSIUM SERPL-MCNC: 4.5 MMOL/L — SIGNIFICANT CHANGE UP (ref 3.5–5.3)
POTASSIUM SERPL-SCNC: 4.5 MMOL/L — SIGNIFICANT CHANGE UP (ref 3.5–5.3)
RBC # BLD: 3.65 M/UL — LOW (ref 4.2–5.8)
RBC # FLD: 13.9 % — SIGNIFICANT CHANGE UP (ref 10.3–14.5)
SODIUM SERPL-SCNC: 135 MMOL/L — SIGNIFICANT CHANGE UP (ref 135–145)
WBC # BLD: 7.62 K/UL — SIGNIFICANT CHANGE UP (ref 3.8–10.5)
WBC # FLD AUTO: 7.62 K/UL — SIGNIFICANT CHANGE UP (ref 3.8–10.5)

## 2023-03-06 PROCEDURE — 99233 SBSQ HOSP IP/OBS HIGH 50: CPT

## 2023-03-06 PROCEDURE — 99232 SBSQ HOSP IP/OBS MODERATE 35: CPT

## 2023-03-06 RX ADMIN — POLYETHYLENE GLYCOL 3350 17 GRAM(S): 17 POWDER, FOR SOLUTION ORAL at 12:21

## 2023-03-06 RX ADMIN — Medication 50 MILLIGRAM(S): at 05:25

## 2023-03-06 RX ADMIN — Medication 1 SPRAY(S): at 17:37

## 2023-03-06 RX ADMIN — Medication 1 SPRAY(S): at 05:26

## 2023-03-06 RX ADMIN — Medication 1 TABLET(S): at 12:21

## 2023-03-06 RX ADMIN — LORATADINE 10 MILLIGRAM(S): 10 TABLET ORAL at 12:21

## 2023-03-06 RX ADMIN — PANTOPRAZOLE SODIUM 40 MILLIGRAM(S): 20 TABLET, DELAYED RELEASE ORAL at 12:20

## 2023-03-06 NOTE — CHART NOTE - NSCHARTNOTEFT_GEN_A_CORE
Assessment/Follow up: Pt A+Ox4 during visit. Continues on regular diet with ensure plus high protein TID. Tolerating meals with fair appetite/po intake of entree (50-75% per pt/EMR) and relatively good po intake ensure supplement. Consumes at least 2 and sometimes 3 per day; strawberry per pt preference. No report N/V. Soft Bm 3/6. Miralax rx. Additional food preferences/meal alternatives obtained to maximize po intake/overall meal satisfaction. Hyponatremia resolved. Awaiting transfer to [facility].     Factors impacting intake: [ ] none [ ] nausea  [ ] vomiting [ ] diarrhea [ ] constipation  [ ]chewing problems [ ] swallowing issues  [ x] other: decreased appetite, prostate cancer    Diet Presciption: Diet, Regular (03-02-23 @ 15:04)  Diet, Clear Liquid (03-02-23 @ 15:04)  Diet, Regular:   Supplement Feeding Modality:  Oral  Ensure Plus High Protein Cans or Servings Per Day:  1       Frequency:  Three Times a day (03-02-23 @ 15:04)    Intake: Fair; 50-75% past 3 days, relatively good intake ensure supplement    Current Weight: Weight (kg): 90.7 (03-02 @ 12:43)    Pertinent Medications: MEDICATIONS  (STANDING):  fluticasone propionate 50 MICROgram(s)/spray Nasal Spray 1 Spray(s) Both Nostrils two times a day  lidocaine 2% Jelly 10 milliLiter(s) IntraUrethral once  loratadine 10 milliGRAM(s) Oral daily  metoprolol succinate ER 50 milliGRAM(s) Oral daily  multivitamin 1 Tablet(s) Oral daily  Orgovyx (relugolix) 120mg tab 1 Tablet(s),Orgovyx (relugolix) 120 mg tablet 1 Tablet(s) 1 Tablet(s) Oral every 24 hours  pantoprazole  Injectable 40 milliGRAM(s) IV Push daily  polyethylene glycol 3350 17 Gram(s) Oral daily    MEDICATIONS  (PRN):  acetaminophen     Tablet .. 650 milliGRAM(s) Oral every 6 hours PRN Temp greater or equal to 38C (100.4F), Mild Pain (1 - 3)  aluminum hydroxide/magnesium hydroxide/simethicone Suspension 30 milliLiter(s) Oral every 4 hours PRN Dyspepsia  melatonin 3 milliGRAM(s) Oral at bedtime PRN Insomnia    Pertinent Labs: 03-06 Na135 mmol/L Glu 94 mg/dL K+ 4.5 mmol/L Cr  0.68 mg/dL BUN 13 mg/dL 02-28 Phos 5.1 mg/dL<H> 03-02 Alb 2.3 g/dL<L>     CAPILLARY BLOOD GLUCOSE        Skin: intact. Darrin 18. MVI rx.     Estimated Needs:   [x ] no change since previous assessment  [ ] recalculated:     Previous Nutrition Diagnosis:   [x ] Inadequate Energy Intake [ ]Inadequate Oral Intake [ ] Excessive Energy Intake   [ ] Underweight [ ] Increased Nutrient Needs [ ] Overweight/Obesity   [ ] Altered GI Function [ ] Unintended Weight Loss [ ] Food & Nutrition Related Knowledge Deficit [ x] Malnutrition     Nutrition Diagnosis is [ x] ongoing  [ ] resolved [ ] not applicable     New Nutrition Diagnosis: [x ] not applicable       Interventions:   Recommend  [ ] Change Diet To:  [ ] Nutrition Supplement  [ ] Nutrition Support  [x]Other: Continue regular diet with ensure plus high protein TID. Will continue to honor food preferences/meal alternatives.     Monitoring and Evaluation:   [ ] PO intake [ x ] Tolerance to diet prescription [ x ] weights [ x ] labs[ x ] follow up per protocol  [ ] other:

## 2023-03-06 NOTE — PROGRESS NOTE ADULT - SUBJECTIVE AND OBJECTIVE BOX
Patient is a 72y old  Male who presents with a chief complaint of Hematuria/Urinary Retention (27 Feb 2023 12:32)       HPI:  71 y/o M w/  PMHx of HTN, AS s/p TAVR, ppm, prostate cancer s/p radical prostatectomy c/b radiation proctitis presents to ED for severe pain and urinary retention s/p cystoscopy. Patient follows with urology at [facility]; was being seen for cystoscopy after experiencing hematuria. He has had hematuria before approximately one year ago; underwent cystoscopy without complications at that time. He underwent multiple rounds of radiation therapy with ultimate radical prostatectomy for prostate CA. He has been diagnosed with radiation proctitis. Patient feels that this his current presentation is likely 2/2  radiation cystitis. Patient reports pain is improved after bowers/CBI placed.   Patient developed hyponatremia prompting renal consult.  He has been having ongoing hematuria. On CBI.  Denies any N/V.  Has lightheadedness at times.      2/28/23: Had syncope overnight while walking to commode. Renal function much worsen today. C/o bladder pain. Bowers replaced. 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      HTN (hypertension)      Aortic stenosis      Rectal bleeding  last hospitalization 10/6/20 2/2 radiation proctitis      [place] (hard of hearing)      H/O prostatectomy  2015      S/P T&amp;A (status post tonsillectomy and adenoidectomy)  child           FAMILY HISTORY:  FH: cancer (Father, Mother)    NC    Social History:Non smoker    MEDICATIONS  (STANDING):  aminocaproic acid Tablet 500 milliGRAM(s) Oral <User Schedule>  fluticasone propionate 50 MICROgram(s)/spray Nasal Spray 1 Spray(s) Both Nostrils two times a day  lidocaine 2% Jelly 5 milliLiter(s) IntraUrethral once  lidocaine 2% Jelly 5 milliLiter(s) IntraUrethral once  lidocaine 2% Jelly 10 milliLiter(s) IntraUrethral once  loratadine 10 milliGRAM(s) Oral daily  metoprolol succinate ER 50 milliGRAM(s) Oral daily  multivitamin/minerals 1 Tablet(s) Oral daily  Orgovyx (relugolix) 120mg tab 1 Tablet(s) 1 Tablet(s) Oral daily  polyethylene glycol 3350 17 Gram(s) Oral daily  sodium bicarbonate 650 milliGRAM(s) Oral three times a day  sodium chloride 1 Gram(s) Oral three times a day    MEDICATIONS  (PRN):  acetaminophen     Tablet .. 650 milliGRAM(s) Oral every 6 hours PRN Temp greater or equal to 38C (100.4F), Mild Pain (1 - 3)  aluminum hydroxide/magnesium hydroxide/simethicone Suspension 30 milliLiter(s) Oral every 4 hours PRN Dyspepsia  melatonin 3 milliGRAM(s) Oral at bedtime PRN Insomnia  morphine  - Injectable 1 milliGRAM(s) IV Push every 6 hours PRN Severe Pain (7 - 10)  ondansetron Injectable 4 milliGRAM(s) IV Push every 8 hours PRN Nausea and/or Vomiting      Allergies    penicillin (Diarrhea; Pruritus; Hives)  penicillin V potassium (Rash)    Intolerances    codeine (Nausea)       REVIEW OF SYSTEMS:    Review of Systems:   Constitutional: Denies fatigue  HEENT: Denies headaches and dizziness  Respiratory: denies SOB, cough, or wheezing  Cardiovascular: denies CP, palpitations  Gastrointestinal: Denies nausea, denies vomiting, diarrhea, constipation, abdominal pain, or bloody stools  Genitourinary: neg  Skin: denies rashes or itching  Musculoskeletal: denies muscle aches, joint swelling  Neurologic: Denies generalized weakness, denies loss of sensation, numbness, or tingling      Vital Signs Last 24 Hrs  T(C): 36.7 (05 Mar 2023 05:05), Max: 36.7 (04 Mar 2023 19:54)  T(F): 98.1 (05 Mar 2023 05:05), Max: 98.1 (05 Mar 2023 05:05)  HR: 81 (05 Mar 2023 05:05) (81 - 94)  BP: 151/75 (05 Mar 2023 05:05) (115/64 - 151/75)  BP(mean): --  RR: 18 (05 Mar 2023 05:05) (18 - 20)  SpO2: 96% (05 Mar 2023 05:05) (93% - 96%)    Parameters below as of 05 Mar 2023 05:05  Patient On (Oxygen Delivery Method): room air          PHYSICAL EXAM:    GENERAL: NAD  HEAD:  Atraumatic, Normocephalic  EYES: EOMI, conjunctiva and sclera clear  ENMT: No Drainage from nares, No drainage from ears  NECK: Supple, neck  veins full  NERVOUS SYSTEM:  Awake and Alert  CHEST/LUNG: Clear to percussion bilaterally; No rales, rhonchi, wheezing, or rubs  HEART: Regular rate and rhythm; No murmurs, rubs, or gallops  ABDOMEN: Soft, Nontender, Nondistended; Bowel sounds present  EXTREMITIES:  No Edema  SKIN: No rashes No obvious ecchymosis  +Bowers      LABS:                        9.4    7.74  )-----------( 322      ( 04 Mar 2023 09:33 )             29.6     03-04    137  |  104  |  14  ----------------------------<  90  4.1   |  31  |  0.73    Ca    8.1<L>      04 Mar 2023 09:33

## 2023-03-06 NOTE — PROGRESS NOTE ADULT - ASSESSMENT
72 year old male with AV disease, s/p TAVR in 12/20, with post op SSS requiring PPM, here with hematuria and radiation hemorrhagic cystitis.     VHD, SSS s/p PPM  - S/p cystoscopy  with fulguration of hemorrhaging blood vessel and evacuation of clot on 3/2  - Recurrent hematuria after CBI stopped, patient is accepted to be transfered to [facility], awaiting for a bed.   - had been off antiplatelets for well over a year before this event, no need to resume nito in light of the ongoing issues with hematuria  - H/H stable, continue to trend transfuse per primary     - No sign of acute ischemia.   - s/p TAVR, no anginal complaints or volume overload  - He has minimal CAD based on cath in 2020.  - [employer] PPM interrogated 12/14/22; mode  DDDR. Battery life approx 11.5 years left. V paced 4%, A paced 70%.   - Continue BB, with hold parameters   - Had syncope 2/28 am, no further episodes, likely vagal vs medication induced, no further episodes noted     - Monitor and replete lytes, keep K>4, Mg>2.  - Will continue to follow.    Jess Mckeon, MS FNP, [facility]  Nurse Practitioner- Cardiology   Spectra #[phone]

## 2023-03-06 NOTE — PROGRESS NOTE ADULT - ASSESSMENT
71 y/o M w/ PMHx of HTN,  AS s/p TAVR, ppm, prostate cancer s/p radical prostatectomy c/b radiation proctitis presents to ED for severe pain and urinary retention s/p cystoscopy. Admit for hematuria / urinary retention.      Problem/Plan - 1:  ·  Problem: Urinary retention with hematuria :   s/p RRP for Ca Prostate by Eusebio Martinez, XRT for recurrence, on Orgovyx  [place] [facility] 5 [place] ago  admitted for clot retention, hematuria likely due to radiation cystitis  recurrent hematuria after CBI, CBI with amicar and amicar po   cystoscopy  with fulguration of hemorrhaging blood vessel and evacuation of clot on 3/2  recurrent hematuria after CBI stopped, called transfer center and spoke to urologist Dr Melgoza, and Dr Shaffer, patient is accepted to be transfered to [facility], awaiting for a bed.          Problem/Plan - 2:  ·  Problem: Prostate cancer.   ·  Plan: Hx of Prostate CA, now s/p radiation therapy and radical prostatectomy  - Continue home Orgovyx - brought in from home  - Patient to f/u with [place]rolog[place] [facility] [place]. Luis) outpatient for further management.     Problem/Plan - 3:  ·  Problem: Hypertension.   - BP stable  - Continue home Metoprolol ER 50mg PO daily w/ hold parameters.     Problem/Plan - 4:  ·  Problem: Need for prophylactic measure.   ·  Plan: SCDs b/l for dvt ppx; will hold off on A/C at this time 2/2 hematuria.  add PPI for GI prophylaxis      Problem/Plan - 5:   acute blood loss anemia:   s/p total 5 unit blood transfusion, H/H improved     HYPONATREMIA: Resolved,    likely multifactorial, on sodium  tab replacement ,sodium bicarb , regular diet instead of low salt diet. resolved, sodium supplement discontinued.       SANJUANA: resolved

## 2023-03-06 NOTE — PROGRESS NOTE ADULT - ASSESSMENT
SANJUANA  Hyponatremia  Metabolic acidosis  Hematuria  HTN  Anemia  Prostate Caner S/P Prostatectomy and radiation      -SANJUANA clinically behaving as obstructive uropathy but bladder scan showed no urine; urine indices suggest ATN instead. Gr replaced. S/p CBI per urology   -s/p VF x NS 1 day  -Gr per urology   -Continue oral bicarb for additional day  -Off sodium chloride tabs   -Hyponatremia likely multifactorial, decreased solute intake + increased solvent intake + component of SIADH  -Urine lytes will be unrevealing Patient on CBI  -Regular diet   -S/p PRBC  -Will not fluid restrict at this time, as he drinks to help augment urine output to help with hematuria  -Avoid NSAIDs for pain, can worsen hyponatremia  -Renal indices, acidosis, and hyponatremia improving; monitor for now    possible transfer to [place]   3/6/23-d/w family at bedside

## 2023-03-06 NOTE — PROGRESS NOTE ADULT - SUBJECTIVE AND OBJECTIVE BOX
Patient is a 72y old  Male who presents with a chief complaint of Hematuria/Urinary Retention (06 Mar 2023 08:57)      Subjective:  INTERVAL HPI/OVERNIGHT EVENTS: Patient seen and examined at bedside.  Patient has no complaints at this time.   MEDICATIONS  (STANDING):  fluticasone propionate 50 MICROgram(s)/spray Nasal Spray 1 Spray(s) Both Nostrils two times a day  lidocaine 2% Jelly 10 milliLiter(s) IntraUrethral once  loratadine 10 milliGRAM(s) Oral daily  metoprolol succinate ER 50 milliGRAM(s) Oral daily  multivitamin 1 Tablet(s) Oral daily  Orgovyx (relugolix) 120mg tab 1 Tablet(s),Orgovyx (relugolix) 120 mg tablet 1 Tablet(s) 1 Tablet(s) Oral every 24 hours  pantoprazole  Injectable 40 milliGRAM(s) IV Push daily  polyethylene glycol 3350 17 Gram(s) Oral daily    MEDICATIONS  (PRN):  acetaminophen     Tablet .. 650 milliGRAM(s) Oral every 6 hours PRN Temp greater or equal to 38C (100.4F), Mild Pain (1 - 3)  aluminum hydroxide/magnesium hydroxide/simethicone Suspension 30 milliLiter(s) Oral every 4 hours PRN Dyspepsia  melatonin 3 milliGRAM(s) Oral at bedtime PRN Insomnia      Allergies    penicillin (Diarrhea; Pruritus; Hives)  penicillin V potassium (Rash)    Intolerances    codeine (Nausea)      REVIEW OF SYSTEMS:  CONSTITUTIONAL: No fever or chills  HEENT:  No headache, no sore throat  RESPIRATORY: No cough or shortness of breath  CARDIOVASCULAR: No chest pain or palpitations  GASTROINTESTINAL: No abd pain, nausea, vomiting, or diarrhea      Objective:  Vital Signs Last 24 Hrs  T(C): 36.6 (06 Mar 2023 04:53), Max: 36.9 (05 Mar 2023 11:53)  T(F): 97.9 (06 Mar 2023 04:53), Max: 98.4 (05 Mar 2023 11:53)  HR: 76 (06 Mar 2023 04:53) (70 - 86)  BP: 126/73 (06 Mar 2023 04:53) (126/73 - 152/79)  BP(mean): --  RR: 18 (06 Mar 2023 04:53) (18 - 20)  SpO2: 94% (06 Mar 2023 04:53) (94% - 96%)    Parameters below as of 06 Mar 2023 04:53  Patient On (Oxygen Delivery Method): room air        GENERAL: NAD, lying in bed comfortably  HEAD:  Normocephalic  EYES:  conjunctiva and sclera clear  ENT: Moist mucous membranes  NECK: Supple  CHEST/LUNG: Clear to auscultation bilaterally; No rales or rhonchi; no wheezing. Unlabored respirations  HEART: Regular rate and rhythm; S1S2+  ABDOMEN: Bowel sounds present; Soft, Nontender, Nondistended.   EXTREMITIES:  + distal Peripheral Pulses;  No cyanosis, or edema  NERVOUS SYSTEM:  Alert & Oriented X3;  No gross focal deficits   MSK: moves all extremities  SKIN: No rashes   +bowers with CBI    LABS:                        10.1   7.62  )-----------( 382      ( 06 Mar 2023 06:30 )             31.8     06 Mar 2023 06:30    135    |  105    |  13     ----------------------------<  94     4.5     |  26     |  0.68     Ca    8.7        06 Mar 2023 06:30          CAPILLARY BLOOD GLUCOSE            Culture - Blood (collected 02-28-23 @ 07:30)  Source: .Blood Blood-Peripheral  Final Report (03-05-23 @ 14:00):    No Growth Final    Culture - Blood (collected 02-28-23 @ 07:00)  Source: .Blood Blood-Peripheral  Final Report (03-05-23 @ 14:00):    No Growth Final        RADIOLOGY & ADDITIONAL TESTS:    Personally reviewed.     Consultant(s) Notes Reviewed:  [x] YES  [ ] NO    Plan of care discussed with patient /family: Sons at bedside ; all questions answered

## 2023-03-06 NOTE — CHART NOTE - NSCHARTNOTESELECT_GEN_ALL_CORE
Event Note
Nutrition Services
Nutrition Services
Elevated BP/Event Note
Event Note
Nutrition Services
Rapid Response

## 2023-03-06 NOTE — PROGRESS NOTE ADULT - SUBJECTIVE AND OBJECTIVE BOX
[facility] -- Reina Rodriguez Pannella, Patel, Savella, Goodger: Office # [phone]    Follow Up:   VHD, hematuria    Subjective/Observations: Patient seen and examined. Patient awake, alert, resting in bed. No complaints of chest pain, dyspnea, palpitations or dizziness. No signs of orthopnea or PND. Tolerating room air. CBI ongoing. Family at bedside.     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      HTN (hypertension)      Aortic stenosis      Rectal bleeding  last hospitalization 10/6/20 2/2 radiation proctitis      [place] (hard of hearing)      H/O prostatectomy  2015      S/P T&amp;A (status post tonsillectomy and adenoidectomy)  child    MEDICATIONS  (STANDING):  fluticasone propionate 50 MICROgram(s)/spray Nasal Spray 1 Spray(s) Both Nostrils two times a day  lidocaine 2% Jelly 10 milliLiter(s) IntraUrethral once  loratadine 10 milliGRAM(s) Oral daily  metoprolol succinate ER 50 milliGRAM(s) Oral daily  multivitamin 1 Tablet(s) Oral daily  Orgovyx (relugolix) 120mg tab 1 Tablet(s),Orgovyx (relugolix) 120 mg tablet 1 Tablet(s) 1 Tablet(s) Oral every 24 hours  pantoprazole  Injectable 40 milliGRAM(s) IV Push daily  polyethylene glycol 3350 17 Gram(s) Oral daily    MEDICATIONS  (PRN):  acetaminophen     Tablet .. 650 milliGRAM(s) Oral every 6 hours PRN Temp greater or equal to 38C (100.4F), Mild Pain (1 - 3)  aluminum hydroxide/magnesium hydroxide/simethicone Suspension 30 milliLiter(s) Oral every 4 hours PRN Dyspepsia  melatonin 3 milliGRAM(s) Oral at bedtime PRN Insomnia    Allergies    penicillin (Diarrhea; Pruritus; Hives)  penicillin V potassium (Rash)    Intolerances    codeine (Nausea)    Vital Signs Last 24 Hrs  T(C): 36.6 (06 Mar 2023 04:53), Max: 36.9 (05 Mar 2023 11:53)  T(F): 97.9 (06 Mar 2023 04:53), Max: 98.4 (05 Mar 2023 11:53)  HR: 76 (06 Mar 2023 04:53) (70 - 86)  BP: 126/73 (06 Mar 2023 04:53) (126/73 - 152/79)  BP(mean): --  RR: 18 (06 Mar 2023 04:53) (18 - 20)  SpO2: 94% (06 Mar 2023 04:53) (94% - 96%)    Parameters below as of 06 Mar 2023 04:53  Patient On (Oxygen Delivery Method): room air      I&O's Summary        TELE: Not on telemetry   PHYSICAL EXAM:  Constitutional: NAD, awake and alert  HEENT: Moist Mucous Membranes, Anicteric  Pulmonary: Non-labored, breath sounds are clear bilaterally, No wheezing, rales or rhonchi  Cardiovascular: Regular, S1 and S2, + murmurs, No rubs, gallops or clicks  Gastrointestinal:  soft, nontender, nondistended   : + Gr   Lymph: No peripheral edema. No lymphadenopathy.   Skin: No visible rashes or ulcers.  Psych:  Mood & affect appropriate      LABS: All Labs Reviewed:                        10.1   7.62  )-----------( 382      ( 06 Mar 2023 06:30 )             31.8                         9.4    7.74  )-----------( 322      ( 04 Mar 2023 09:33 )             29.6     06 Mar 2023 06:30    135    |  105    |  13     ----------------------------<  94     4.5     |  26     |  0.68   04 Mar 2023 09:33    137    |  104    |  14     ----------------------------<  90     4.1     |  31     |  0.73     Ca    8.7        06 Mar 2023 06:30  Ca    8.1        04 Mar 2023 09:33       Creatine Kinase, Serum: 93 U/L (02-28-23 @ 05:34)  Troponin I, High Sensitivity Result: 9.0 ng/L (02-28-23 @ 05:34)    12 Lead ECG:   Ventricular Rate 115 BPM    Atrial Rate 115 BPM    P-R Interval 180 ms    QRS Duration 130 ms    Q-T Interval 338 ms    QTC Calculation(Bazett) 467 ms    P Axis -6 degrees    R Axis 66 degrees    T Axis 4 degrees    Diagnosis Line Sinus tachycardia  Right bundle branch block  T wave abnormality, consider inferior ischemia  Abnormal ECG  When compared with ECG of 20-FEB-2023 10:41,  T wave inversion more evident in Anterior leads  Confirmed by Anthony Huang MD ([id number]) on 2/28/2023 1:36:59 PM (02-28-23 @ 05:17)      Patient name: HERBIE KERR  YOB: 1950   Age: 70 (M)   MR#: [id number]  Study Date: 2/4/2021  [facility]  Study quality: Technically good  Referring Physician: Rich Quesada MD  Blood Pressure: 168/87 mmHg  Height: 178 cm  Weight: 91 kg  BSA: 2.1 m2  Heart Rate: 76 mmHg  ------------------------------------------------------------------------  PROCEDURE: Transthoracic echocardiogram with 2-D, M-Mode  and complete spectral and color flow Doppler.  INDICATION: Nonrheumatic aortic (valve) stenosis (I35.0)  ------------------------------------------------------------------------  MEASUREMENTS: (See below)  ------------------------------------------------------------------------  OBSERVATIONS:  Mitral Valve: There is mitral annular calcification with  calcification on the anterior mitral leaflet. Mild mitral  regurgitation.  Aortic Root: Normal aortic root size. (Ao: 3.4 cm at the  sinuses of Valsalva).  Aortic Valve: #26mm Evolut Pro+ THV. The valve is well  seated with normal function.  The peak/mean gradients=  13/7mmHg with a DVI= 0.63. Peak transaortic valve gradient  equals 13 mm Hg, mean transaortic valve gradient equals 7  mm Hg, aortic valve velocity time integral equals 35 cm,  estimated aortic valve area equals 2.3 sqcm. There is a  mild paravalvular regurgitation jet originating from about  2 O'clock TTE surgical view.  There is no intravalvular  regurgitation seen. Peak left ventricular outflow tract  gradient equals 4mm Hg, mean gradient is equal to 3 mm Hg,  LVOT velocity time integral equals 22 cm.  Left Atrium: Mildly dilated left atrium.  LA volume index =  39 cc/m2.  Left Ventricle: Normal left ventricular systolic function.  No segmental wall motion abnormalities.  The LVEF about  55-60%. Normal left ventricular internal dimensions and  wall thicknesses. Mild diastolic dysfunction (Stage I).  Right Heart: Normal right atrium.  RA area= 13cm2, RA volume= 32ml. Normal right ventricular  size and function.  There is a device wire in the right heart. Normal tricuspid  valve. Minimal tricuspid regurgitation. Normal pulmonic  valve. Minimal pulmonic regurgitation.  Pericardium/PleuraNormal pericardium with no pericardial  effusion.  Hemodynamic: The estimated right atrial pressure is normal.  ------------------------------------------------------------------------  CONCLUSIONS:  1. #26mm Evolut Pro+ THV.  The valve is well seated with  normal function.  The peak/mean gradients= 13/7mmHg with a  DVI= 0.63. There is a mild paravalvular regurgitation jet  originating from about 2 O'clock TTE surgical view.  There  is no intravalvular regurgitation seen.  *** Compared with echocardiogram of 12/10/2020, no  significant changes noted.  ------------------------------------------------------------------------  PROCEDURE DESCRIPTION: Transthoracic echocardiogram with  2-D, M-Mode and complete spectral and color flow Doppler.  ------------------------------------------------------------------------  ECHOCARDIOGRAPHIC EXAMINATION:  AORTIC ROOT:  Aortic Root (Leaflet): 3.4 cm  Aortic Root Index: 0.9  LEFT ATRIUM:  AP Dimensions: 3.6 cm  LA Volume Index: 39.00 cc/sqm  LA Volume: 81.3 cc  LEFT VENTRICLE:  LVIDd: 4.1 cm  LVIDs: 2.9 cm  IVS: 1.18  ILWT: 1.0 cm  RWT: 0.47  LV Mass: 155.0 gm  LV Mass Index: 74 gm/sqm  EF (Visual Estimate): 55-60%  HR and BP:  HR: 76 bpm  BP: 168/87  BSA: 2.09  ------------------------------------------------------------------------  HEMODYNAMICS:  RA Pressure Estimate: 8  LA Pressure Estimate: < 20  IVRT: 53 ms  ------------------------------------------------------------------------  COLOR FLOW and SPECIAL DOPPLER:  AORTIC VALVE:  AV Peak Velocity: 1.7 M/sec  AV Peak Gradient: 12 mm Hg  AV Mean Gradient: 7 mm Hg  Valve Area: 2.3 sqcm  LVOT:  LVOT Velocity: 1.0 M/sec  LVOT Diameter: 2.1 cm  LVOT Peak Gradient Rest: 4 mm Hg  LVOT Mean Gradient Rest: 3 mm Hg  ------------------------------------------------------------------------  DIASTOLIC FUNCTION:  DT:187 ms  E/A: 0.90  e' Septal: 7.0 cm/s  E/e' Septal: 14.2  e' Lateral: 8.0 cm/s  E/e' Lateral: 12.5  MV E wave: 1.0 m/s  MV A wave: 1.1 m/s  Septal a': 10.0 cm/s  Lateral a': 11.0 cm/s  ------------------------------------------------------------------------  Confirmed on  2/4/2021 - 13:25:59 by Caroline Rizzo M.D.  ------------------------------------------------------------------------

## 2023-03-07 ENCOUNTER — INPATIENT (INPATIENT)
Facility: HOSPITAL | Age: 73
LOS: 8 days | Discharge: HOME | DRG: 699 | End: 2023-03-16
Attending: UROLOGY | Admitting: UROLOGY
Payer: MEDICARE

## 2023-03-07 VITALS
SYSTOLIC BLOOD PRESSURE: 131 MMHG | TEMPERATURE: 98 F | OXYGEN SATURATION: 97 % | RESPIRATION RATE: 18 BRPM | HEART RATE: 99 BPM | DIASTOLIC BLOOD PRESSURE: 71 MMHG

## 2023-03-07 VITALS
TEMPERATURE: 98 F | HEART RATE: 76 BPM | SYSTOLIC BLOOD PRESSURE: 131 MMHG | RESPIRATION RATE: 18 BRPM | DIASTOLIC BLOOD PRESSURE: 63 MMHG | OXYGEN SATURATION: 93 %

## 2023-03-07 DIAGNOSIS — N30.41 IRRADIATION CYSTITIS WITH HEMATURIA: ICD-10-CM

## 2023-03-07 DIAGNOSIS — Z90.89 ACQUIRED ABSENCE OF OTHER ORGANS: Chronic | ICD-10-CM

## 2023-03-07 DIAGNOSIS — Z90.79 ACQUIRED ABSENCE OF OTHER GENITAL ORGAN(S): Chronic | ICD-10-CM

## 2023-03-07 PROCEDURE — 74178 CT ABD&PLV WO CNTR FLWD CNTR: CPT

## 2023-03-07 PROCEDURE — C8929: CPT

## 2023-03-07 PROCEDURE — 94640 AIRWAY INHALATION TREATMENT: CPT

## 2023-03-07 PROCEDURE — 93005 ELECTROCARDIOGRAM TRACING: CPT

## 2023-03-07 PROCEDURE — P9016: CPT

## 2023-03-07 PROCEDURE — 85018 HEMOGLOBIN: CPT

## 2023-03-07 PROCEDURE — 71045 X-RAY EXAM CHEST 1 VIEW: CPT

## 2023-03-07 PROCEDURE — 80053 COMPREHEN METABOLIC PANEL: CPT

## 2023-03-07 PROCEDURE — 84100 ASSAY OF PHOSPHORUS: CPT

## 2023-03-07 PROCEDURE — 85610 PROTHROMBIN TIME: CPT

## 2023-03-07 PROCEDURE — 82550 ASSAY OF CK (CPK): CPT

## 2023-03-07 PROCEDURE — 86900 BLOOD TYPING SEROLOGIC ABO: CPT

## 2023-03-07 PROCEDURE — 97116 GAIT TRAINING THERAPY: CPT

## 2023-03-07 PROCEDURE — 86850 RBC ANTIBODY SCREEN: CPT

## 2023-03-07 PROCEDURE — 97530 THERAPEUTIC ACTIVITIES: CPT

## 2023-03-07 PROCEDURE — 87040 BLOOD CULTURE FOR BACTERIA: CPT

## 2023-03-07 PROCEDURE — 70450 CT HEAD/BRAIN W/O DYE: CPT

## 2023-03-07 PROCEDURE — 83036 HEMOGLOBIN GLYCOSYLATED A1C: CPT

## 2023-03-07 PROCEDURE — 99222 1ST HOSP IP/OBS MODERATE 55: CPT

## 2023-03-07 PROCEDURE — 87635 SARS-COV-2 COVID-19 AMP PRB: CPT

## 2023-03-07 PROCEDURE — 36415 COLL VENOUS BLD VENIPUNCTURE: CPT

## 2023-03-07 PROCEDURE — 86803 HEPATITIS C AB TEST: CPT

## 2023-03-07 PROCEDURE — 85014 HEMATOCRIT: CPT

## 2023-03-07 PROCEDURE — 83735 ASSAY OF MAGNESIUM: CPT

## 2023-03-07 PROCEDURE — 99232 SBSQ HOSP IP/OBS MODERATE 35: CPT

## 2023-03-07 PROCEDURE — 86923 COMPATIBILITY TEST ELECTRIC: CPT

## 2023-03-07 PROCEDURE — 85730 THROMBOPLASTIN TIME PARTIAL: CPT

## 2023-03-07 PROCEDURE — 87798 DETECT AGENT NOS DNA AMP: CPT

## 2023-03-07 PROCEDURE — 85027 COMPLETE CBC AUTOMATED: CPT

## 2023-03-07 PROCEDURE — 84484 ASSAY OF TROPONIN QUANT: CPT

## 2023-03-07 PROCEDURE — 82962 GLUCOSE BLOOD TEST: CPT

## 2023-03-07 PROCEDURE — 87086 URINE CULTURE/COLONY COUNT: CPT

## 2023-03-07 PROCEDURE — 80048 BASIC METABOLIC PNL TOTAL CA: CPT

## 2023-03-07 PROCEDURE — 85025 COMPLETE CBC W/AUTO DIFF WBC: CPT

## 2023-03-07 PROCEDURE — 36430 TRANSFUSION BLD/BLD COMPNT: CPT

## 2023-03-07 PROCEDURE — 86901 BLOOD TYPING SEROLOGIC RH(D): CPT

## 2023-03-07 PROCEDURE — 99233 SBSQ HOSP IP/OBS HIGH 50: CPT

## 2023-03-07 PROCEDURE — 74176 CT ABD & PELVIS W/O CONTRAST: CPT

## 2023-03-07 PROCEDURE — 0225U NFCT DS DNA&RNA 21 SARSCOV2: CPT

## 2023-03-07 PROCEDURE — 72125 CT NECK SPINE W/O DYE: CPT

## 2023-03-07 PROCEDURE — 99285 EMERGENCY DEPT VISIT HI MDM: CPT

## 2023-03-07 PROCEDURE — 81001 URINALYSIS AUTO W/SCOPE: CPT

## 2023-03-07 PROCEDURE — 97161 PT EVAL LOW COMPLEX 20 MIN: CPT

## 2023-03-07 PROCEDURE — 82553 CREATINE MB FRACTION: CPT

## 2023-03-07 RX ORDER — SODIUM CHLORIDE 9 MG/ML
1000 INJECTION INTRAMUSCULAR; INTRAVENOUS; SUBCUTANEOUS
Refills: 0 | Status: DISCONTINUED | OUTPATIENT
Start: 2023-03-07 | End: 2023-03-10

## 2023-03-07 RX ORDER — POLYETHYLENE GLYCOL 3350 17 G/17G
17 POWDER, FOR SOLUTION ORAL DAILY
Refills: 0 | Status: DISCONTINUED | OUTPATIENT
Start: 2023-03-07 | End: 2023-03-16

## 2023-03-07 RX ORDER — LORATADINE 10 MG/1
10 TABLET ORAL DAILY
Refills: 0 | Status: DISCONTINUED | OUTPATIENT
Start: 2023-03-07 | End: 2023-03-16

## 2023-03-07 RX ORDER — METOPROLOL TARTRATE 50 MG
50 TABLET ORAL DAILY
Refills: 0 | Status: DISCONTINUED | OUTPATIENT
Start: 2023-03-07 | End: 2023-03-16

## 2023-03-07 RX ORDER — ONDANSETRON 8 MG/1
4 TABLET, FILM COATED ORAL EVERY 6 HOURS
Refills: 0 | Status: DISCONTINUED | OUTPATIENT
Start: 2023-03-07 | End: 2023-03-16

## 2023-03-07 RX ORDER — ACETAMINOPHEN 500 MG
650 TABLET ORAL EVERY 6 HOURS
Refills: 0 | Status: DISCONTINUED | OUTPATIENT
Start: 2023-03-07 | End: 2023-03-16

## 2023-03-07 RX ORDER — FLUTICASONE PROPIONATE 50 MCG
1 SPRAY, SUSPENSION NASAL
Refills: 0 | Status: DISCONTINUED | OUTPATIENT
Start: 2023-03-07 | End: 2023-03-16

## 2023-03-07 RX ORDER — SENNA PLUS 8.6 MG/1
2 TABLET ORAL AT BEDTIME
Refills: 0 | Status: DISCONTINUED | OUTPATIENT
Start: 2023-03-07 | End: 2023-03-16

## 2023-03-07 RX ADMIN — Medication 1 SPRAY(S): at 05:12

## 2023-03-07 RX ADMIN — POLYETHYLENE GLYCOL 3350 17 GRAM(S): 17 POWDER, FOR SOLUTION ORAL at 11:36

## 2023-03-07 RX ADMIN — Medication 1 SPRAY(S): at 17:13

## 2023-03-07 RX ADMIN — PANTOPRAZOLE SODIUM 40 MILLIGRAM(S): 20 TABLET, DELAYED RELEASE ORAL at 11:36

## 2023-03-07 RX ADMIN — LORATADINE 10 MILLIGRAM(S): 10 TABLET ORAL at 11:37

## 2023-03-07 RX ADMIN — Medication 1 TABLET(S): at 11:37

## 2023-03-07 RX ADMIN — Medication 50 MILLIGRAM(S): at 05:11

## 2023-03-07 NOTE — H&P ADULT - NSHPPHYSICALEXAM_GEN_ALL_CORE
Vital Signs Last 24 Hrs  T(C): 36.7 (07 Mar 2023 22:08), Max: 36.7 (07 Mar 2023 04:22)  T(F): 98.1 (07 Mar 2023 22:08), Max: 98.1 (07 Mar 2023 11:46)  HR: 99 (07 Mar 2023 22:08) (73 - 99)  BP: 131/71 (07 Mar 2023 22:08) (111/67 - 131/71)  RR: 18 (07 Mar 2023 22:08) (18 - 18)  SpO2: 97% (07 Mar 2023 22:08) (93% - 97%)    O2 Parameters below as of 07 Mar 2023 22:08  Patient On (Oxygen Delivery Method): room air    General: NAD, Lying in bed comfortably  Neuro: A+Ox3, no focal deficits  Resp: No respiratory distress or accessory muscle use. no supplemental O2  Abd: Soft, NT/ND, no rebound/guarding  Back: no cvat  : bowers secured on med gtt cbi with water clear output

## 2023-03-07 NOTE — H&P ADULT - HISTORY OF PRESENT ILLNESS
72M with PMHx of HTN, AS s/p TAVR, SSS s/p PPM, prostate ca s/p radical prostatectomy and salvage radiation c/b radiation proctitis and cystitis transferred from [place] to [facility] for further management of refractory gross hematuria. At [facility], pt was followed by urologist Dr. Shaffer. Pt underwent trial of Amicar CBI, PO Amicar and cystoscopy with clot evacuation and fulguration on 3/2, however gross hematuria persisted and pt remained on CBI. Throughout hospital course, gross hematuria contributing to acute blood loss anemia requiring in total 5U of PRBCs to be transfused over his hospital course. Pt seen and examined. States that for the past few days, CBI has been running more clear. Still with occasional bladder spasms. Hoping he will be able to get resolution of gross hematuria here at [facility]. denies f/c/n/v, cp, sob, abd pain or other acute complaints

## 2023-03-07 NOTE — PROGRESS NOTE ADULT - PROVIDER SPECIALTY LIST ADULT
Cardiology
Hospitalist
Nephrology
Urology
Cardiology
Hospitalist
Infectious Disease
Nephrology
Urology
Cardiology
Hospitalist
Infectious Disease
Infectious Disease
Nephrology
Nephrology
Urology
Cardiology
Hospitalist
Nephrology
Nephrology
Urology
Hospitalist
Hospitalist
Urology
Hospitalist

## 2023-03-07 NOTE — PROGRESS NOTE ADULT - NUTRITIONAL ASSESSMENT
This patient has been assessed with a concern for Malnutrition and has been determined to have a diagnosis/diagnoses of Moderate protein-calorie malnutrition.    This patient is being managed with:   Diet Regular-  Supplement Feeding Modality:  Oral  Ensure Plus High Protein Cans or Servings Per Day:  1       Frequency:  Three Times a day  Entered: Feb 27 2023  4:57PM    
This patient has been assessed with a concern for Malnutrition and has been determined to have a diagnosis/diagnoses of Moderate protein-calorie malnutrition.    This patient is being managed with:   Diet Regular-  Supplement Feeding Modality:  Oral  Ensure Plus High Protein Cans or Servings Per Day:  1       Frequency:  Three Times a day  Entered: Mar  2 2023  2:04PM    
This patient has been assessed with a concern for Malnutrition and has been determined to have a diagnosis/diagnoses of Moderate protein-calorie malnutrition.    This patient is being managed with:   Diet NPO after Midnight-     NPO Start Date: 01-Mar-2023   NPO Start Time: 23:59  Except Medications  Entered: Mar  1 2023 10:59AM    Diet Regular-  Supplement Feeding Modality:  Oral  Ensure Plus High Protein Cans or Servings Per Day:  1       Frequency:  Three Times a day  Entered: Feb 27 2023  4:57PM    
This patient has been assessed with a concern for Malnutrition and has been determined to have a diagnosis/diagnoses of Moderate protein-calorie malnutrition.    This patient is being managed with:   Diet Regular-  Supplement Feeding Modality:  Oral  Ensure Plus High Protein Cans or Servings Per Day:  1       Frequency:  Three Times a day  Entered: Mar  2 2023  2:04PM    
This patient has been assessed with a concern for Malnutrition and has been determined to have a diagnosis/diagnoses of Moderate protein-calorie malnutrition.    This patient is being managed with:   Diet Regular-  Supplement Feeding Modality:  Oral  Ensure Plus High Protein Cans or Servings Per Day:  1       Frequency:  Three Times a day  Entered: Mar  2 2023  2:04PM    
This patient has been assessed with a concern for Malnutrition and has been determined to have a diagnosis/diagnoses of Moderate protein-calorie malnutrition.    This patient is being managed with:   Diet NPO after Midnight-     NPO Start Date: 01-Mar-2023   NPO Start Time: 23:59  Except Medications  Entered: Mar  1 2023 10:59AM    Diet Regular-  Supplement Feeding Modality:  Oral  Ensure Plus High Protein Cans or Servings Per Day:  1       Frequency:  Three Times a day  Entered: Feb 27 2023  4:57PM    
This patient has been assessed with a concern for Malnutrition and has been determined to have a diagnosis/diagnoses of Moderate protein-calorie malnutrition.    This patient is being managed with:   Diet Regular-  Supplement Feeding Modality:  Oral  Ensure Plus High Protein Cans or Servings Per Day:  1       Frequency:  Three Times a day  Entered: Mar  2 2023  2:04PM

## 2023-03-07 NOTE — PROGRESS NOTE ADULT - ASSESSMENT
SANJUANA  Hyponatremia  Metabolic acidosis  Hematuria  HTN  Anemia  Prostate Caner S/P Prostatectomy and radiation      -SANJUANA clinically behaving as obstructive uropathy but bladder scan showed no urine; urine indices suggest ATN instead. Gr replaced. S/p CBI per urology   -s/p VF x NS 1 day  -Gr per urology   -S/p  PO bicarb; acidosis resolved  -Off sodium chloride tabs   -Hyponatremia likely multifactorial, decreased solute intake + increased solvent intake + component of SIADH; improved, last sodium 135; monitor for now  -Urine lytes will be unrevealing Patient on CBI  -Regular diet   -Stable renal function at baseline  -Repeat BMP  -S/p PRBC  -Will not fluid restrict at this time, as he drinks to help augment urine output to help with hematuria  -Avoid NSAIDs for pain, can worsen hyponatremia  -Renal indices, acidosis, and hyponatremia improving; monitor for now    possible transfer to [place]   3/6/23-d/w family at bedside

## 2023-03-07 NOTE — PROGRESS NOTE ADULT - SUBJECTIVE AND OBJECTIVE BOX
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      HTN (hypertension)      Aortic stenosis      Rectal bleeding  last hospitalization 10/6/20 2/2 radiation proctitis      [place] (hard of hearing)      H/O prostatectomy  2015      S/P T&amp;A (status post tonsillectomy and adenoidectomy)  child            MEDICATIONS  (STANDING):  fluticasone propionate 50 MICROgram(s)/spray Nasal Spray 1 Spray(s) Both Nostrils two times a day  lidocaine 2% Jelly 10 milliLiter(s) IntraUrethral once  loratadine 10 milliGRAM(s) Oral daily  metoprolol succinate ER 50 milliGRAM(s) Oral daily  multivitamin 1 Tablet(s) Oral daily  Orgovyx (relugolix) 120mg tab 1 Tablet(s),Orgovyx (relugolix) 120 mg tablet 1 Tablet(s) 1 Tablet(s) Oral every 24 hours  pantoprazole  Injectable 40 milliGRAM(s) IV Push daily  polyethylene glycol 3350 17 Gram(s) Oral daily    MEDICATIONS  (PRN):  acetaminophen     Tablet .. 650 milliGRAM(s) Oral every 6 hours PRN Temp greater or equal to 38C (100.4F), Mild Pain (1 - 3)  aluminum hydroxide/magnesium hydroxide/simethicone Suspension 30 milliLiter(s) Oral every 4 hours PRN Dyspepsia  melatonin 3 milliGRAM(s) Oral at bedtime PRN Insomnia      Allergies    penicillin (Diarrhea; Pruritus; Hives)  penicillin V potassium (Rash)    Intolerances    codeine (Nausea)          FAMILY HISTORY:  FH: cancer (Father, Mother)        Vital Signs Last 24 Hrs  T(C): 36.4 (07 Mar 2023 09:17), Max: 36.8 (06 Mar 2023 20:41)  T(F): 97.5 (07 Mar 2023 09:17), Max: 98.3 (06 Mar 2023 20:41)  HR: 88 (07 Mar 2023 09:17) (75 - 88)  BP: 111/67 (07 Mar 2023 09:17) (107/66 - 130/69)  BP(mean): --  RR: 18 (07 Mar 2023 09:17) (18 - 18)  SpO2: 97% (07 Mar 2023 09:17) (93% - 97%)    Parameters below as of 07 Mar 2023 09:17  Patient On (Oxygen Delivery Method): room air        PHYSICAL EXAM:    Covering fro Dr. Shaffer    persistent marie hematuria waxws and wanes, fast CBI and keshav urine    Constitutional: NAD, well-developed    Asymptomatic, AO  Abd: BS+, soft, NT/ND, No CVAT  : Normal  circumcised phallus, patent  meatus, bilateral descended testes, no masses CBI (Fast) with pink urine  Extremities: No peripheral edema    LABS:                        10.1   7.62  )-----------( 382      ( 06 Mar 2023 06:30 )             31.8     03-06    135  |  105  |  13  ----------------------------<  94  4.5   |  26  |  0.68    Ca    8.7      06 Mar 2023 06:30          Urine Culture:   Hemoglobin: 10.1 g/dL (03-06 @ 06:30)  Hematocrit: 31.8 % (03-06 @ 06:30)      RADIOLOGY & ADDITIONAL STUDIES:

## 2023-03-07 NOTE — PROGRESS NOTE ADULT - TIME BILLING
direct patient care including but not limited to reviewing chart, medications ,laboratory data, imaging reports, discussion of plan of care with consultants on the case, coordination of care with multidisciplinary team involved in the case and discussion of plan with patient.  Patient and family agreeable to plan of care and verbalized understanding the anticipated hospital course and treatment plan.

## 2023-03-07 NOTE — PROGRESS NOTE ADULT - SUBJECTIVE AND OBJECTIVE BOX
[facility] -- Reina Rodriguez Pannella, Patel, Savella, Goodger: Office # [phone]    Follow Up:   VHD, hematuria    Subjective/Observations: Patient seen and examined. Patient awake, alert, resting in bed. No complaints of chest pain, dyspnea, palpitations or dizziness. No signs of orthopnea or PND. Tolerating room air. CBI ongoing. Plan for transfer to [facility] when bed available.     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      HTN (hypertension)      Aortic stenosis      Rectal bleeding  last hospitalization 10/6/20 2/2 radiation proctitis      [place] (hard of hearing)      H/O prostatectomy  2015      S/P T&amp;A (status post tonsillectomy and adenoidectomy)  child    MEDICATIONS  (STANDING):  fluticasone propionate 50 MICROgram(s)/spray Nasal Spray 1 Spray(s) Both Nostrils two times a day  lidocaine 2% Jelly 10 milliLiter(s) IntraUrethral once  loratadine 10 milliGRAM(s) Oral daily  metoprolol succinate ER 50 milliGRAM(s) Oral daily  multivitamin 1 Tablet(s) Oral daily  Orgovyx (relugolix) 120mg tab 1 Tablet(s),Orgovyx (relugolix) 120 mg tablet 1 Tablet(s) 1 Tablet(s) Oral every 24 hours  pantoprazole  Injectable 40 milliGRAM(s) IV Push daily  polyethylene glycol 3350 17 Gram(s) Oral daily    MEDICATIONS  (PRN):  acetaminophen     Tablet .. 650 milliGRAM(s) Oral every 6 hours PRN Temp greater or equal to 38C (100.4F), Mild Pain (1 - 3)  aluminum hydroxide/magnesium hydroxide/simethicone Suspension 30 milliLiter(s) Oral every 4 hours PRN Dyspepsia  melatonin 3 milliGRAM(s) Oral at bedtime PRN Insomnia    Allergies    penicillin (Diarrhea; Pruritus; Hives)  penicillin V potassium (Rash)    Intolerances  codeine (Nausea)    Vital Signs Last 24 Hrs  T(C): 36.7 (07 Mar 2023 04:22), Max: 36.8 (06 Mar 2023 20:41)  T(F): 98 (07 Mar 2023 04:22), Max: 98.3 (06 Mar 2023 20:41)  HR: 75 (07 Mar 2023 04:22) (75 - 84)  BP: 129/70 (07 Mar 2023 04:22) (107/66 - 130/69)  BP(mean): --  RR: 18 (07 Mar 2023 04:22) (18 - 18)  SpO2: 95% (07 Mar 2023 04:22) (93% - 95%)    Parameters below as of 07 Mar 2023 04:22  Patient On (Oxygen Delivery Method): room air      I&O's Summary    06 Mar 2023 07:01  -  07 Mar 2023 07:00  --------------------------------------------------------  IN: 460 mL / OUT: 3500 mL / NET: -3040 mL        TELE: Not on telemetry   PHYSICAL EXAM:  Constitutional: NAD, awake and alert  HEENT: Moist Mucous Membranes, Anicteric  Pulmonary: Non-labored, breath sounds are clear bilaterally, No wheezing, rales or rhonchi  Cardiovascular: Regular, S1 and S2, + murmurs, No rubs, gallops or clicks  Gastrointestinal:  soft, nontender, nondistended   : + Gr with CBI  Lymph: No peripheral edema. No lymphadenopathy.   Skin: No visible rashes or ulcers.  Psych:  Mood & affect appropriate      LABS: All Labs Reviewed:                        10.1   7.62  )-----------( 382      ( 06 Mar 2023 06:30 )             31.8                         9.4    7.74  )-----------( 322      ( 04 Mar 2023 09:33 )             29.6     06 Mar 2023 06:30    135    |  105    |  13     ----------------------------<  94     4.5     |  26     |  0.68   04 Mar 2023 09:33    137    |  104    |  14     ----------------------------<  90     4.1     |  31     |  0.73     Ca    8.7        06 Mar 2023 06:30  Ca    8.1        04 Mar 2023 09:33       Creatine Kinase, Serum: 93 U/L (02-28-23 @ 05:34)  Troponin I, High Sensitivity Result: 9.0 ng/L (02-28-23 @ 05:34)    12 Lead ECG:   Ventricular Rate 115 BPM    Atrial Rate 115 BPM    P-R Interval 180 ms    QRS Duration 130 ms    Q-T Interval 338 ms    QTC Calculation(Bazett) 467 ms    P Axis -6 degrees    R Axis 66 degrees    T Axis 4 degrees    Diagnosis Line Sinus tachycardia  Right bundle branch block  T wave abnormality, consider inferior ischemia  Abnormal ECG  When compared with ECG of 20-FEB-2023 10:41,  T wave inversion more evident in Anterior leads  Confirmed by Anthony Huang MD ([id number]) on 2/28/2023 1:36:59 PM (02-28-23 @ 05:17)      Patient name: HERBIE KERR  YOB: 1950   Age: 70 (M)   MR#: [id number]  Study Date: 2/4/2021  [facility]  Study quality: Technically good  Referring Physician: Rich Quesada MD  Blood Pressure: 168/87 mmHg  Height: 178 cm  Weight: 91 kg  BSA: 2.1 m2  Heart Rate: 76 mmHg  ------------------------------------------------------------------------  PROCEDURE: Transthoracic echocardiogram with 2-D, M-Mode  and complete spectral and color flow Doppler.  INDICATION: Nonrheumatic aortic (valve) stenosis (I35.0)  ------------------------------------------------------------------------  MEASUREMENTS: (See below)  ------------------------------------------------------------------------  OBSERVATIONS:  Mitral Valve: There is mitral annular calcification with  calcification on the anterior mitral leaflet. Mild mitral  regurgitation.  Aortic Root: Normal aortic root size. (Ao: 3.4 cm at the  sinuses of Valsalva).  Aortic Valve: #26mm Evolut Pro+ THV. The valve is well  seated with normal function.  The peak/mean gradients=  13/7mmHg with a DVI= 0.63. Peak transaortic valve gradient  equals 13 mm Hg, mean transaortic valve gradient equals 7  mm Hg, aortic valve velocity time integral equals 35 cm,  estimated aortic valve area equals 2.3 sqcm. There is a  mild paravalvular regurgitation jet originating from about  2 O'clock TTE surgical view.  There is no intravalvular  regurgitation seen. Peak left ventricular outflow tract  gradient equals 4mm Hg, mean gradient is equal to 3 mm Hg,  LVOT velocity time integral equals 22 cm.  Left Atrium: Mildly dilated left atrium.  LA volume index =  39 cc/m2.  Left Ventricle: Normal left ventricular systolic function.  No segmental wall motion abnormalities.  The LVEF about  55-60%. Normal left ventricular internal dimensions and  wall thicknesses. Mild diastolic dysfunction (Stage I).  Right Heart: Normal right atrium.  RA area= 13cm2, RA volume= 32ml. Normal right ventricular  size and function.  There is a device wire in the right heart. Normal tricuspid  valve. Minimal tricuspid regurgitation. Normal pulmonic  valve. Minimal pulmonic regurgitation.  Pericardium/PleuraNormal pericardium with no pericardial  effusion.  Hemodynamic: The estimated right atrial pressure is normal.  ------------------------------------------------------------------------  CONCLUSIONS:  1. #26mm Evolut Pro+ THV.  The valve is well seated with  normal function.  The peak/mean gradients= 13/7mmHg with a  DVI= 0.63. There is a mild paravalvular regurgitation jet  originating from about 2 O'clock TTE surgical view.  There  is no intravalvular regurgitation seen.  *** Compared with echocardiogram of 12/10/2020, no  significant changes noted.  ------------------------------------------------------------------------  PROCEDURE DESCRIPTION: Transthoracic echocardiogram with  2-D, M-Mode and complete spectral and color flow Doppler.  ------------------------------------------------------------------------  ECHOCARDIOGRAPHIC EXAMINATION:  AORTIC ROOT:  Aortic Root (Leaflet): 3.4 cm  Aortic Root Index: 0.9  LEFT ATRIUM:  AP Dimensions: 3.6 cm  LA Volume Index: 39.00 cc/sqm  LA Volume: 81.3 cc  LEFT VENTRICLE:  LVIDd: 4.1 cm  LVIDs: 2.9 cm  IVS: 1.18  ILWT: 1.0 cm  RWT: 0.47  LV Mass: 155.0 gm  LV Mass Index: 74 gm/sqm  EF (Visual Estimate): 55-60%  HR and BP:  HR: 76 bpm  BP: 168/87  BSA: 2.09  ------------------------------------------------------------------------  HEMODYNAMICS:  RA Pressure Estimate: 8  LA Pressure Estimate: < 20  IVRT: 53 ms  ------------------------------------------------------------------------  COLOR FLOW and SPECIAL DOPPLER:  AORTIC VALVE:  AV Peak Velocity: 1.7 M/sec  AV Peak Gradient: 12 mm Hg  AV Mean Gradient: 7 mm Hg  Valve Area: 2.3 sqcm  LVOT:  LVOT Velocity: 1.0 M/sec  LVOT Diameter: 2.1 cm  LVOT Peak Gradient Rest: 4 mm Hg  LVOT Mean Gradient Rest: 3 mm Hg  ------------------------------------------------------------------------  DIASTOLIC FUNCTION:  DT:187 ms  E/A: 0.90  e' Septal: 7.0 cm/s  E/e' Septal: 14.2  e' Lateral: 8.0 cm/s  E/e' Lateral: 12.5  MV E wave: 1.0 m/s  MV A wave: 1.1 m/s  Septal a': 10.0 cm/s  Lateral a': 11.0 cm/s  ------------------------------------------------------------------------  Confirmed on  2/4/2021 - 13:25:59 by Caroline Rizzo M.D.  ------------------------------------------------------------------------

## 2023-03-07 NOTE — PROGRESS NOTE ADULT - NS ATTEND AMEND GEN_ALL_CORE FT
Stable CV POV.  Continue management as above.  To follow while admitted.
cont cbi. No signs of significant ischemia or volume overload. cont current care. Further cardiac workup will depend on clinical course.
no sign of acute ischemia or volume overload  on amicar, and urine appears clear
no sign of acute ischemia or volume overload  on amicar, still with hematuria  monitor qtc while on Orgovyx
s/p tavr  antiplatelets on hold  hematuria resolving.
s/p tavr  hematuria improved  mood improved  for eventual tx to 
s/p tavr  hematuria improved  mood improved  planning possible transfer to  for more access to more advanced urologic therapies
Appears compensated from HF POV. cont current care. Further cardiac workup will depend on clinical course.
No signs of significant ischemia or volume overload. cont current care. Further cardiac workup will depend on clinical course.
sss, ppm  s/p tavr  recurrent hematuria despite course of cbi, amicar, off antiplatelet meds  mgmt per gu
s/p tavr  antiplatelets on hold  hematuria resolving
I have personally seen and examined the patient in detail.  I have spoken to the provider regarding the assessment and plan of care.  I have made changes to the note accordingly.
no acute ischemia, minimal cad  s/p tavr  unremitting hematuria, with severe spasms,  planning for cysto, clot evacuation, fulguration 3/2  optimized from a cv perspective for planned procedure.
s/p tavr  antiplatelets on hold  hematuria resolving.
no acute ischemia, minimal cad  s/p tavr  unremitting hematuria, with severe spasms, worsening day be dday  planning for cysto, clot evacuation, fulguration 3/1  optimized from a cv perspective for planned procedure

## 2023-03-07 NOTE — H&P ADULT - ASSESSMENT
72M with PMHx HTN, AS s/p TAVR, SSS s/p ppm, prostate cancer s/p radical prostatectomy and radiation c/b radiation proctitis and cystitis transferred from [place] with refractory gross hematuria. Pt s/p cystoscopy/clot evacuation 3/2, Amicar CBI, PO Amicar.     admit to Dr Melgoza  continue cbi, wean as tolerate  bladder US in AM to assess for clot burden  plan for ALUM CBI following bladder US  admit labs  continue home meds

## 2023-03-07 NOTE — PROGRESS NOTE ADULT - SUBJECTIVE AND OBJECTIVE BOX
Patient is a 72y old  Male who presents with a chief complaint of Hematuria/Urinary Retention (27 Feb 2023 12:32)       HPI:  71 y/o M w/  PMHx of HTN, AS s/p TAVR, ppm, prostate cancer s/p radical prostatectomy c/b radiation proctitis presents to ED for severe pain and urinary retention s/p cystoscopy. Patient [facility] with [facility]rology [facility]; was being seen for cystoscopy after experiencing hematuria. He has had hematuria before approximately one year ago; underwent cystoscopy without complications at that time. He underwent multiple rounds of radiation therapy with ultimate radical prostatectomy for prostate CA. He has been diagnosed with radiation proctitis. Patient feels that this his current presentation is likely 2/2  radiation cystitis. Patient reports pain is improved after bowers/CBI placed.   Patient developed hyponatremia prompting renal consult.  He has been having ongoing hematuria. On CBI.  Denies any N/V.  Has lightheadedness at times.      2/28/23: Had syncope overnight while walking to commode. Renal function much worsen today. C/o bladder pain. Bowers replaced. 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      HTN (hypertension)      Aortic stenosis      Rectal bleeding  last hospitalization 10/6/20 2/2 radiation proctitis      [place] (hard of hearing)      H/O prostatectomy  2015      S/P T&amp;A (status post tonsillectomy and adenoidectomy)  child           FAMILY HISTORY:  FH: cancer (Father, Mother)    NC    Social History:Non smoker    MEDICATIONS  (STANDING):  fluticasone propionate 50 MICROgram(s)/spray Nasal Spray 1 Spray(s) Both Nostrils two times a day  lidocaine 2% Jelly 10 milliLiter(s) IntraUrethral once  loratadine 10 milliGRAM(s) Oral daily  metoprolol succinate ER 50 milliGRAM(s) Oral daily  multivitamin 1 Tablet(s) Oral daily  Orgovyx (relugolix) 120mg tab 1 Tablet(s),Orgovyx (relugolix) 120 mg tablet 1 Tablet(s) 1 Tablet(s) Oral every 24 hours  pantoprazole  Injectable 40 milliGRAM(s) IV Push daily  polyethylene glycol 3350 17 Gram(s) Oral daily    MEDICATIONS  (PRN):  acetaminophen     Tablet .. 650 milliGRAM(s) Oral every 6 hours PRN Temp greater or equal to 38C (100.4F), Mild Pain (1 - 3)  aluminum hydroxide/magnesium hydroxide/simethicone Suspension 30 milliLiter(s) Oral every 4 hours PRN Dyspepsia  melatonin 3 milliGRAM(s) Oral at bedtime PRN Insomnia      Allergies    penicillin (Diarrhea; Pruritus; Hives)  penicillin V potassium (Rash)    Intolerances    codeine (Nausea)       REVIEW OF SYSTEMS:    Constitutional: Denies fatigue  HEENT: Denies headaches and dizziness  Respiratory: denies SOB, cough, or wheezing  Cardiovascular: denies CP, palpitations  Gastrointestinal: Denies nausea, denies vomiting, diarrhea, constipation, abdominal pain, or bloody stools  Genitourinary: neg  Skin: denies rashes or itching  Musculoskeletal: denies muscle aches, joint swelling  Neurologic: Denies generalized weakness, denies loss of sensation, numbness, or tingling      Vital Signs Last 24 Hrs  T(C): 36.4 (07 Mar 2023 09:17), Max: 36.8 (06 Mar 2023 20:41)  T(F): 97.5 (07 Mar 2023 09:17), Max: 98.3 (06 Mar 2023 20:41)  HR: 88 (07 Mar 2023 09:17) (75 - 88)  BP: 111/67 (07 Mar 2023 09:17) (107/66 - 130/69)  BP(mean): --  RR: 18 (07 Mar 2023 09:17) (18 - 18)  SpO2: 97% (07 Mar 2023 09:17) (93% - 97%)    Parameters below as of 07 Mar 2023 09:17  Patient On (Oxygen Delivery Method): room air        PHYSICAL EXAM:    GENERAL: NAD  HEAD:  Atraumatic, Normocephalic  EYES: EOMI, conjunctiva and sclera clear  ENMT: No Drainage from nares, No drainage from ears  NECK: Supple, neck  veins full  NERVOUS SYSTEM:  Awake and Alert  CHEST/LUNG: Clear to percussion bilaterally; No rales, rhonchi, wheezing, or rubs  HEART: Regular rate and rhythm; No murmurs, rubs, or gallops  ABDOMEN: Soft, Nontender, Nondistended; Bowel sounds present  EXTREMITIES:  No Edema  SKIN: No rashes No obvious ecchymosis  +Bowers      LABS:                                           10.1   7.62  )-----------( 382      ( 06 Mar 2023 06:30 )             31.8     03-06    135  |  105  |  13  ----------------------------<  94  4.5   |  26  |  0.68    Ca    8.7      06 Mar 2023 06:30

## 2023-03-07 NOTE — PROGRESS NOTE ADULT - ASSESSMENT
72 year old male with AV disease, s/p TAVR in 12/20, with post op SSS requiring PPM, here with hematuria and radiation hemorrhagic cystitis.     VHD, SSS s/p PPM  - S/p cystoscopy  with fulguration of hemorrhaging blood vessel and evacuation of clot on 3/2  - Recurrent hematuria after CBI stopped, patient is accepted to be transferred to [facility] for advanced urological therapies, awaiting for a bed  - had been off antiplatelets for well over a year before this event, no need to resume nito in light of the ongoing issues with hematuria  - H/H stable, continue to trend transfuse per primary     - No sign of acute ischemia.   - s/p TAVR, no anginal complaints or volume overload  - He has minimal CAD based on cath in 2020.  - [employer] PPM interrogated 12/14/22; mode  DDDR. Battery life approx 11.5 years left. V paced 4%, A paced 70%.   - Continue BB, with hold parameters   - Had syncope 2/28 am, no further episodes, likely vagal vs medication induced, no further episodes noted     - Monitor and replete lytes, keep K>4, Mg>2.  - Will continue to follow.    Jess Mckeon, MS FNP, [facility]  Nurse Practitioner- Cardiology   Spectra #[phone]

## 2023-03-07 NOTE — H&P ADULT - NS ATTEND AMEND GEN_ALL_CORE FT
Radiation cystitis, gross hematuria transferred from [facility]  On CBI, urine color is clear  However previously when CBI stopped his urine would become bloody  Will obtain pelvic imaging to ensure no residual clots  Would plan for Silver nitrate instillation  D/w pt that if silver nitrate unsuccessful, can also try Alum and even formalin

## 2023-03-07 NOTE — PROGRESS NOTE ADULT - SUBJECTIVE AND OBJECTIVE BOX
Patient is a 72y old  Male who presents with a chief complaint of Hematuria/Urinary Retention (07 Mar 2023 08:13)      Subjective:  INTERVAL HPI/OVERNIGHT EVENTS: Patient seen and examined at bedside.  Patient c/o abd gas, no abd pain, had BM yesterday  MEDICATIONS  (STANDING):  fluticasone propionate 50 MICROgram(s)/spray Nasal Spray 1 Spray(s) Both Nostrils two times a day  lidocaine 2% Jelly 10 milliLiter(s) IntraUrethral once  loratadine 10 milliGRAM(s) Oral daily  metoprolol succinate ER 50 milliGRAM(s) Oral daily  multivitamin 1 Tablet(s) Oral daily  Orgovyx (relugolix) 120mg tab 1 Tablet(s),Orgovyx (relugolix) 120 mg tablet 1 Tablet(s) 1 Tablet(s) Oral every 24 hours  pantoprazole  Injectable 40 milliGRAM(s) IV Push daily  polyethylene glycol 3350 17 Gram(s) Oral daily    MEDICATIONS  (PRN):  acetaminophen     Tablet .. 650 milliGRAM(s) Oral every 6 hours PRN Temp greater or equal to 38C (100.4F), Mild Pain (1 - 3)  aluminum hydroxide/magnesium hydroxide/simethicone Suspension 30 milliLiter(s) Oral every 4 hours PRN Dyspepsia  melatonin 3 milliGRAM(s) Oral at bedtime PRN Insomnia      Allergies    penicillin (Diarrhea; Pruritus; Hives)  penicillin V potassium (Rash)    Intolerances    codeine (Nausea)      REVIEW OF SYSTEMS:  CONSTITUTIONAL: No fever or chills  HEENT:  No headache, no sore throat  RESPIRATORY: No cough or shortness of breath  CARDIOVASCULAR: No chest pain or palpitations  GASTROINTESTINAL: No abd pain, nausea, vomiting, or diarrhea      Objective:  Vital Signs Last 24 Hrs  T(C): 36.7 (07 Mar 2023 04:22), Max: 36.8 (06 Mar 2023 20:41)  T(F): 98 (07 Mar 2023 04:22), Max: 98.3 (06 Mar 2023 20:41)  HR: 75 (07 Mar 2023 04:22) (75 - 84)  BP: 129/70 (07 Mar 2023 04:22) (107/66 - 130/69)  BP(mean): --  RR: 18 (07 Mar 2023 04:22) (18 - 18)  SpO2: 95% (07 Mar 2023 04:22) (93% - 95%)    Parameters below as of 07 Mar 2023 04:22  Patient On (Oxygen Delivery Method): room air        GENERAL: NAD, sitting in bed  HEAD:  Normocephalic  EYES:  conjunctiva and sclera clear  ENT: Moist mucous membranes  NECK: Supple  CHEST/LUNG: Clear to auscultation bilaterally; No rales or rhonchi; no wheezing. Unlabored respirations  HEART: Regular rate and rhythm; S1S2+  ABDOMEN: Bowel sounds present; Soft, Nontender, Nondistended.   EXTREMITIES:  + distal Peripheral Pulses;  No cyanosis, or edema  NERVOUS SYSTEM:  Alert & Oriented X3;  No gross focal deficits   MSK: moves all extremities  SKIN: No rashes   + bowers: light pink in urine bag    LABS:      Ca    8.7        06 Mar 2023 06:30          CAPILLARY BLOOD GLUCOSE    Consultant(s) Notes Reviewed:  [x] YES  [ ] NO    Plan of care discussed with patient /family; all questions answered

## 2023-03-07 NOTE — PROGRESS NOTE ADULT - ASSESSMENT
Radiation cystitis, recurrent, clinical plan per Dr. Shaffer who has arranged  transfer of patient for further treatment to tertiary care

## 2023-03-07 NOTE — PROGRESS NOTE ADULT - ASSESSMENT
71 y/o M w/ PMHx of HTN,  AS s/p TAVR, ppm, prostate cancer s/p radical prostatectomy c/b radiation proctitis presents to ED for severe pain and urinary retention s/p cystoscopy. Admit for hematuria / urinary retention.      Problem/Plan - 1:  ·  Problem: Urinary retention with hematuria :   s/p RRP for Ca Prostate by Eusebio Martinez, XRT for recurrence, on Orgovyx  s/p [place]. Luis [place] [facility] last one 5 weeks ago  admitted for clot retention, hematuria likely due to radiation cystitis  recurrent hematuria after CBI, CBI with amicar and amicar po   cystoscopy  with fulguration of hemorrhaging blood vessel and evacuation of clot on 3/2  recurrent hematuria after CBI stopped, called transfer center and spoke to urologist Dr Melgoza, and Dr Shaffer, patient is accepted to be transfered to [facility], awaiting for a bed.  no bed available up to present as per transfer center, will explore bed availability in [facility]          Problem/Plan - 2:  ·  Problem: Prostate cancer.   ·  Plan: Hx of Prostate CA, now s/p radiation therapy and radical prostatectomy  - Continue home Orgovyx - brought in from home  - Patient to f/u with his [place]rologist [place] [facility] (Dr. Luis) outpatient for further management.     Problem/Plan - 3:  ·  Problem: Hypertension.   - BP stable  - Continue home Metoprolol ER 50mg PO daily w/ hold parameters.     Problem/Plan - 4:  ·  Problem: Need for prophylactic measure.   ·  Plan: SCDs b/l for dvt ppx; will hold off on A/C at this time 2/2 hematuria.  add PPI for GI prophylaxis      Problem/Plan - 5:   acute blood loss anemia:   s/p total 5 unit blood transfusion, H/H improved     HYPONATREMIA: Resolved,    likely multifactorial, on sodium  tab replacement ,sodium bicarb , regular diet instead of low salt diet. resolved, sodium supplement discontinued.       SANJUANA: resolved

## 2023-03-07 NOTE — H&P ADULT - NSICDXPASTMEDICALHX_GEN_ALL_CORE_FT
PAST MEDICAL HISTORY:  Aortic stenosis     [place] (hard of hearing)     HTN (hypertension)     Proctitis, radiation from prostate treatment    Prostate cancer RT 2018 and prostatectomy in 2015    Rectal bleeding last hospitalization 10/6/20 2/2 radiation proctitis

## 2023-03-07 NOTE — PROGRESS NOTE ADULT - REASON FOR ADMISSION
Hematuria/Urinary Retention

## 2023-03-08 LAB
HCT VFR BLD CALC: 34.5 % — LOW (ref 39–50)
HGB BLD-MCNC: 11.2 G/DL — LOW (ref 13–17)
MCHC RBC-ENTMCNC: 28.6 PG — SIGNIFICANT CHANGE UP (ref 27–34)
MCHC RBC-ENTMCNC: 32.5 GM/DL — SIGNIFICANT CHANGE UP (ref 32–36)
MCV RBC AUTO: 88 FL — SIGNIFICANT CHANGE UP (ref 80–100)
NRBC # BLD: 0 /100 WBCS — SIGNIFICANT CHANGE UP (ref 0–0)
PLATELET # BLD AUTO: 437 K/UL — HIGH (ref 150–400)
RBC # BLD: 3.92 M/UL — LOW (ref 4.2–5.8)
RBC # FLD: 13.9 % — SIGNIFICANT CHANGE UP (ref 10.3–14.5)
WBC # BLD: 10.61 K/UL — HIGH (ref 3.8–10.5)
WBC # FLD AUTO: 10.61 K/UL — HIGH (ref 3.8–10.5)

## 2023-03-08 PROCEDURE — 72192 CT PELVIS W/O DYE: CPT | Mod: 26

## 2023-03-08 RX ORDER — CEFAZOLIN SODIUM 1 G
1000 VIAL (EA) INJECTION ONCE
Refills: 0 | Status: COMPLETED | OUTPATIENT
Start: 2023-03-08 | End: 2023-03-08

## 2023-03-08 RX ORDER — CEFAZOLIN SODIUM 1 G
VIAL (EA) INJECTION
Refills: 0 | Status: DISCONTINUED | OUTPATIENT
Start: 2023-03-08 | End: 2023-03-13

## 2023-03-08 RX ORDER — HEPARIN SODIUM 5000 [USP'U]/ML
5000 INJECTION INTRAVENOUS; SUBCUTANEOUS EVERY 8 HOURS
Refills: 0 | Status: DISCONTINUED | OUTPATIENT
Start: 2023-03-08 | End: 2023-03-16

## 2023-03-08 RX ORDER — CEFAZOLIN SODIUM 1 G
1000 VIAL (EA) INJECTION EVERY 8 HOURS
Refills: 0 | Status: DISCONTINUED | OUTPATIENT
Start: 2023-03-08 | End: 2023-03-13

## 2023-03-08 RX ORDER — INFLUENZA VIRUS VACCINE 15; 15; 15; 15 UG/.5ML; UG/.5ML; UG/.5ML; UG/.5ML
0.7 SUSPENSION INTRAMUSCULAR ONCE
Refills: 0 | Status: DISCONTINUED | OUTPATIENT
Start: 2023-03-08 | End: 2023-03-16

## 2023-03-08 RX ADMIN — Medication 1 SPRAY(S): at 06:20

## 2023-03-08 RX ADMIN — Medication 50 MILLIGRAM(S): at 06:20

## 2023-03-08 RX ADMIN — LORATADINE 10 MILLIGRAM(S): 10 TABLET ORAL at 12:03

## 2023-03-08 RX ADMIN — Medication 100 MILLIGRAM(S): at 16:21

## 2023-03-08 RX ADMIN — Medication 1 SPRAY(S): at 16:27

## 2023-03-08 RX ADMIN — Medication 100 MILLIGRAM(S): at 21:28

## 2023-03-08 RX ADMIN — Medication 1 TABLET(S): at 12:03

## 2023-03-08 RX ADMIN — SODIUM CHLORIDE 50 MILLILITER(S): 9 INJECTION INTRAMUSCULAR; INTRAVENOUS; SUBCUTANEOUS at 01:18

## 2023-03-08 NOTE — PROVIDER CONTACT NOTE (OTHER) - ACTION/TREATMENT ORDERED:
no further interventions at this time. As per LIANET Rabago, no need to attempt blood draw again at this time, will follow up with day team regarding additional labs

## 2023-03-08 NOTE — CONSULT NOTE ADULT - SUBJECTIVE AND OBJECTIVE BOX
Reason for consult: prostate cancer    HPI:  72M with PMHx of HTN, AS s/p TAVR, SSS s/p PPM, prostate ca s/p radical prostatectomy and salvage radiation c/b radiation proctitis and cystitis transferred from [place] to [facility] for further management of refractory gross hematuria. At [facility], pt was followed by urologist Dr. Shaffer. Pt underwent trial of Amicar CBI, PO Amicar and cystoscopy with clot evacuation and fulguration on 3/2, however gross hematuria persisted and pt remained on CBI. Throughout hospital course, gross hematuria contributing to acute blood loss anemia requiring in total 5U of PRBCs to be transfused over his hospital course. Pt seen and examined. States that for the past few days, CBI has been running more clear. Still with occasional bladder spasms. Hoping he will be able to get resolution of gross hematuria here at [facility]. denies f/c/n/v, cp, sob, abd pain or other acute complaints (07 Mar 2023 23:09)    Hematology/Oncology consulted on this 72 year old male with prostate cancer with likely metastatic adenopathy, followed by Dr. Hutson at [facility]. He is currently on relugolix 120 mg oral tablet. Patient was transferred to [facility] for further treatment of hematuria.     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      HTN (hypertension)      Aortic stenosis      Rectal bleeding  last hospitalization 10/6/20 2/2 radiation proctitis      [place] (hard of hearing)      H/O prostatectomy  2015      S/P T&amp;A (status post tonsillectomy and adenoidectomy)  child          FAMILY HISTORY:  FH: cancer (Father, Mother)        Alochol: Denied  Smoking: Nonsmoker  Drug Use: Denied  Marital Status:         Allergies    penicillin (Diarrhea; Pruritus; Hives)  penicillin V potassium (Rash)    Intolerances    codeine (Nausea)      MEDICATIONS  (STANDING):  fluticasone propionate 50 MICROgram(s)/spray Nasal Spray 1 Spray(s) Both Nostrils two times a day  heparin   Injectable 5000 Unit(s) SubCutaneous every 8 hours  influenza  Vaccine (HIGH DOSE) 0.7 milliLiter(s) IntraMuscular once  loratadine 10 milliGRAM(s) Oral daily  metoprolol succinate ER 50 milliGRAM(s) Oral daily  multivitamin 1 Tablet(s) Oral daily  polyethylene glycol 3350 17 Gram(s) Oral daily  Relugolix 120mg tablet 1 Tablet(s)   Oral daily  sodium chloride 0.9%. 1000 milliLiter(s) (50 mL/Hr) IV Continuous <Continuous>    MEDICATIONS  (PRN):  acetaminophen     Tablet .. 650 milliGRAM(s) Oral every 6 hours PRN Temp greater or equal to 38C (100.4F), Mild Pain (1 - 3)  aluminum hydroxide/magnesium hydroxide/simethicone Suspension 30 milliLiter(s) Oral every 4 hours PRN Dyspepsia  ondansetron Injectable 4 milliGRAM(s) IV Push every 6 hours PRN Nausea and/or Vomiting  senna 2 Tablet(s) Oral at bedtime PRN Constipation      ROS  No fever, sweats, chills  No epistaxis, HA, sore throat  No CP, SOB, cough, sputum  No n/v/d, abd pain, melena, hematochezia  No edema  No rash  No anxiety  No back pain, joint pain  No bleeding, bruising  + hematuria    T(C): 36.5 (03-08-23 @ 09:03), Max: 36.8 (03-08-23 @ 01:03)  HR: 90 (03-08-23 @ 09:03) (76 - 99)  BP: 131/82 (03-08-23 @ 09:03) (131/63 - 137/76)  RR: 18 (03-08-23 @ 09:03) (18 - 18)  SpO2: 98% (03-08-23 @ 09:03) (93% - 98%)  Wt(kg): --    PE  NAD  Awake, alert  Anicteric, MMM  RRR  CTAB  Abd soft, NT, ND  + bowers  No c/c/e  No rash grossly  FROM                          11.2   10.61 )-----------( 437      ( 08 Mar 2023 01:11 )             34.5

## 2023-03-08 NOTE — PROGRESS NOTE ADULT - ASSESSMENT
72M with PMHx HTN, AS s/p TAVR, SSS s/p ppm, prostate cancer s/p radical prostatectomy and radiation c/b radiation proctitis and cystitis transferred from [place] with refractory gross hematuria. Pt s/p cystoscopy/clot evacuation 3/2, Amicar CBI, PO Amicar.     continue cbi, wean as tolerate  CT pelvis in AM to assess for clot burden  irrigate as needed  plan for ALUM CBI following CT   am labs  continue home meds

## 2023-03-08 NOTE — PATIENT PROFILE ADULT - FALL HARM RISK - HARM RISK INTERVENTIONS

## 2023-03-08 NOTE — PATIENT PROFILE ADULT - PATIENT'S GENDER IDENTITY
[FreeTextEntry1] : Patient is a 56y postmenopausal  with PMH CAD, DM, Diverticulitis with PMB s/p EMB here for a follow up visit and results review from EMB.  Patient reports that she did not begin having any vaginal bleeding until after discharge from the hospital.  States that after discharge she had several days of heavier vaginal bleeding, like a period, but otherwise has been spotting.   She reports that she is still spotting/staining today.  She has been wearing adult diapers for the spotting because that is what she has easily accessible, but states that she is just having light spotting/staining and does not actually require adult diaper for coverage.  Reports that she initially passed some small clots, but has not noticed any clots for several weeks.  Patient states she is sure the bleeding is from the vagina and not from her rectum.  Denies current abdominal or pelvic pain, fevers, chills, chest pain, shortness of breath, lightheadedness, dizziness.  \par \par EMB showed endocervical and lower uterine segment polyp and endocervical epithelium with reactive and metaplastic change.  Reviewed results with patient.  She has not received her hospital records, and I informed patient that her hospital records are not in the [employer] chart, so we are unable to review her previous TVUS from the hospital.  Informed patient that she will need repeat TVUS to further evaluate postmenopausal bleeding since we do not have hospital records.  Patient made aware that pending results of TVUS, she may require D&C hysteroscopy for further evaluation and/or treatment of the bleeding.  Patient aware she will need PCP clearance for OR given her medical history.  She states that she has a cardiologist appointment tomorrow, and will obtain medical clearance at that time.  Male

## 2023-03-08 NOTE — PROVIDER CONTACT NOTE (OTHER) - ASSESSMENT
Pt is AAOx4. Pt is a difficult stick for IV placement and blood draws as per patient. New IV placed by IV nurse. Not all stat labs were able to be obtained. Pt/INR was sent, however lab reports that sample had clotted. Unable to obtain BMP.

## 2023-03-08 NOTE — CONSULT NOTE ADULT - ASSESSMENT
Hematology/Oncology consulted on this 72 year old male with prostate cancer with likely metastatic adenopathy, followed by Dr. Hutson at [facility]. He is currently on relugolix 120 mg oral tablet. Patient was transferred to [facility] for further treatment of hematuria.     Hematuria  - Transferred to [facility] from [facility]  - s/p cystoscopy/clot evacuation 3/2  - Urology following  - Continue CBI  - CT pelvis ordered    Prostate Cancer  - Follows with Dr. Hutson at [facility]  - May continue relugolix 120 mg oral tablet while inpatient    Anemia  - Likely due to gross hematuria  - Checking iron studies, B12, folate  - Please transfuse PRBC for hgb < 7.0    Will continue to follow.    Antonio Paniagua PA-C  Hematology/Oncology  [facility]  [phone] (office)

## 2023-03-08 NOTE — PROGRESS NOTE ADULT - SUBJECTIVE AND OBJECTIVE BOX
UROLOGY PA PROGRESS NOTE:     Subjective:  no acute events overnight, transferred from [place] overnight on CBI for gross hematuria     Objective:  Vital signs  T(F): , Max: 98.3 (03-08-23 @ 01:03)  HR: 83 (03-08-23 @ 05:04)  BP: 137/76 (03-08-23 @ 05:04)  SpO2: 96% (03-08-23 @ 05:04)  Wt(kg): --    Output   CBI    Physical Exam:  Gen: NAD  Abd: soft, NT/ND  : +bowers draining clear on CBI     Labs:  03-08  10.61 / 34.5  /x                              11.2   10.61 )-----------( 437      ( 08 Mar 2023 01:11 )             34.5

## 2023-03-09 LAB
FERRITIN SERPL-MCNC: 37 NG/ML — SIGNIFICANT CHANGE UP (ref 30–400)
FOLATE SERPL-MCNC: 18.4 NG/ML — SIGNIFICANT CHANGE UP
HCT VFR BLD CALC: 31.9 % — LOW (ref 39–50)
HGB BLD-MCNC: 10.1 G/DL — LOW (ref 13–17)
IRON SATN MFR SERPL: 10 % — LOW (ref 16–55)
IRON SATN MFR SERPL: 36 UG/DL — LOW (ref 45–165)
LDH SERPL L TO P-CCNC: 176 U/L — SIGNIFICANT CHANGE UP (ref 50–242)
MCHC RBC-ENTMCNC: 28.1 PG — SIGNIFICANT CHANGE UP (ref 27–34)
MCHC RBC-ENTMCNC: 31.7 GM/DL — LOW (ref 32–36)
MCV RBC AUTO: 88.6 FL — SIGNIFICANT CHANGE UP (ref 80–100)
NRBC # BLD: 0 /100 WBCS — SIGNIFICANT CHANGE UP (ref 0–0)
PLATELET # BLD AUTO: 424 K/UL — HIGH (ref 150–400)
RBC # BLD: 3.6 M/UL — LOW (ref 4.2–5.8)
RBC # FLD: 13.8 % — SIGNIFICANT CHANGE UP (ref 10.3–14.5)
TIBC SERPL-MCNC: 377 UG/DL — SIGNIFICANT CHANGE UP (ref 220–430)
UIBC SERPL-MCNC: 341 UG/DL — SIGNIFICANT CHANGE UP (ref 110–370)
VIT B12 SERPL-MCNC: 677 PG/ML — SIGNIFICANT CHANGE UP (ref 232–1245)
WBC # BLD: 8.74 K/UL — SIGNIFICANT CHANGE UP (ref 3.8–10.5)
WBC # FLD AUTO: 8.74 K/UL — SIGNIFICANT CHANGE UP (ref 3.8–10.5)

## 2023-03-09 PROCEDURE — 99232 SBSQ HOSP IP/OBS MODERATE 35: CPT

## 2023-03-09 RX ORDER — ALUMINUM SULFATE
1 POWDER (GRAM) MISCELLANEOUS DAILY
Refills: 0 | Status: DISCONTINUED | OUTPATIENT
Start: 2023-03-09 | End: 2023-03-11

## 2023-03-09 RX ADMIN — SENNA PLUS 2 TABLET(S): 8.6 TABLET ORAL at 21:13

## 2023-03-09 RX ADMIN — Medication 1 APPLICATION(S): at 13:36

## 2023-03-09 RX ADMIN — Medication 50 MILLIGRAM(S): at 05:24

## 2023-03-09 RX ADMIN — Medication 100 MILLIGRAM(S): at 05:24

## 2023-03-09 RX ADMIN — LORATADINE 10 MILLIGRAM(S): 10 TABLET ORAL at 12:23

## 2023-03-09 RX ADMIN — Medication 1 SPRAY(S): at 05:24

## 2023-03-09 RX ADMIN — Medication 1 SPRAY(S): at 18:01

## 2023-03-09 RX ADMIN — POLYETHYLENE GLYCOL 3350 17 GRAM(S): 17 POWDER, FOR SOLUTION ORAL at 10:20

## 2023-03-09 RX ADMIN — Medication 100 MILLIGRAM(S): at 13:35

## 2023-03-09 RX ADMIN — Medication 1 TABLET(S): at 12:23

## 2023-03-09 NOTE — PROVIDER CONTACT NOTE (MEDICATION) - ASSESSMENT
Pt is AXOX4, VSS, ambulatory. Education provided on the benefits of subcutaneous heparin in DVT prophylaxis. Pt continues to refuse.

## 2023-03-09 NOTE — PROGRESS NOTE ADULT - SUBJECTIVE AND OBJECTIVE BOX
Patient is a 72y old  Male who presents with a chief complaint of transfer for gross hematuria (09 Mar 2023 06:59)    No events overnight.  Patient seen and examined this morning at bedside  Patient sitting in chair, offers no complaints    MEDICATIONS  (STANDING):  aluminum sulfate 1% Irrigation in sterile water 1 Application(s) Topical daily  ceFAZolin   IVPB      ceFAZolin   IVPB 1000 milliGRAM(s) IV Intermittent every 8 hours  fluticasone propionate 50 MICROgram(s)/spray Nasal Spray 1 Spray(s) Both Nostrils two times a day  heparin   Injectable 5000 Unit(s) SubCutaneous every 8 hours  influenza  Vaccine (HIGH DOSE) 0.7 milliLiter(s) IntraMuscular once  loratadine 10 milliGRAM(s) Oral daily  metoprolol succinate ER 50 milliGRAM(s) Oral daily  multivitamin 1 Tablet(s) Oral daily  polyethylene glycol 3350 17 Gram(s) Oral daily  Relugolix 120mg tablet 1 Tablet(s) 1 Tablet(s) Oral daily  sodium chloride 0.9%. 1000 milliLiter(s) (50 mL/Hr) IV Continuous <Continuous>    MEDICATIONS  (PRN):  acetaminophen     Tablet .. 650 milliGRAM(s) Oral every 6 hours PRN Temp greater or equal to 38C (100.4F), Mild Pain (1 - 3)  aluminum hydroxide/magnesium hydroxide/simethicone Suspension 30 milliLiter(s) Oral every 4 hours PRN Dyspepsia  ondansetron Injectable 4 milliGRAM(s) IV Push every 6 hours PRN Nausea and/or Vomiting  senna 2 Tablet(s) Oral at bedtime PRN Constipation      Vital Signs Last 24 Hrs  T(C): 36.2 (09 Mar 2023 10:38), Max: 36.8 (09 Mar 2023 05:20)  T(F): 97.2 (09 Mar 2023 10:38), Max: 98.2 (09 Mar 2023 05:20)  HR: 81 (09 Mar 2023 10:38) (81 - 86)  BP: 138/78 (09 Mar 2023 10:38) (116/79 - 138/78)  BP(mean): --  RR: 18 (09 Mar 2023 10:38) (18 - 18)  SpO2: 99% (09 Mar 2023 10:38) (96% - 99%)    Parameters below as of 09 Mar 2023 10:38  Patient On (Oxygen Delivery Method): room air        PE  NAD  Awake, alert  Anicteric, MMM  RRR  CTAB  Abd soft, NT, ND  No c/c/e  No rash grossly  FROM                          10.1   8.74  )-----------( 424      ( 09 Mar 2023 08:50 )             31.9         ACC: [id number] EXAM:  CT PELVIS ONLY   ORDERED BY: SIMA SHAH     PROCEDURE DATE:  03/08/2023          INTERPRETATION:  CLINICAL INFORMATION: Hematuria. Assess clot burden in   bladder.    COMPARISON: CT abdomen pelvis 2/20/2023.    CONTRAST/COMPLICATIONS:  IV Contrast: NONE  Oral Contrast: NONE  Complications: None reported at time of study completion    PROCEDURE:  CT of the Pelvis was performed.  Sagittal and coronal reformats were performed.    FINDINGS:  BLADDER: Collapsed with Gr catheter, limiting its evaluation. No   significant clot burden is seen.  REPRODUCTIVE ORGANS: Prostatectomy.  LYMPH NODES: No pelvic lymphadenopathy. Pelvic surgical clips.    VISUALIZED PORTIONS:  ABDOMINAL ORGANS: Within normal limits.  BOWEL: Sigmoid diverticulosis.  PERITONEUM: No ascites.  VESSELS: Atherosclerotic changes.  ABDOMINAL WALL: Within normal limits.  BONES: Degenerative changes.    IMPRESSION:  Bladder is collapsed with Gr catheter, limiting its evaluation. No   significant clot burden is seen.

## 2023-03-09 NOTE — PROGRESS NOTE ADULT - SUBJECTIVE AND OBJECTIVE BOX
Subjective  feeling well; has been ambulating; cbi clamped overnight and is water clear; pt reports some redness at midnight that did note require hand irrigation and self resolved   Objective    Vital signs  T(F): , Max: 98.2 (03-09-23 @ 05:20)  HR: 83 (03-09-23 @ 05:20)  BP: 132/71 (03-09-23 @ 05:20)  SpO2: 97% (03-09-23 @ 05:20)  Wt(kg): --    Output     03-07 @ 07:01  -  03-08 @ 07:00  --------------------------------------------------------  IN: 670 mL / OUT: 0 mL / NET: 670 mL    03-08 @ 07:01  -  03-09 @ 06:59  --------------------------------------------------------  IN: 580 mL / OUT: 0 mL / NET: 580 mL        Gen awake alert nad axox3  Abd flat soft ntnd   Back no cvat bl    nonpalp bladder urine water clear     Labs      03-08 @ 01:11    WBC 10.61 / Hct 34.5  / SCr --         Urine Cx: Culture - Blood (02.28.23 @ 07:30)    Specimen Source: .Blood Blood-Peripheral    Culture Results:   No Growth Final    Culture - Urine (01.31.23 @ 17:30)    Specimen Source: Clean Catch Clean Catch (Midstream)    Culture Results:   <10,000 CFU/mL Normal Urogenital K[place]      Imaging  < from: CT Pelvis No Cont (03.08.23 @ 18:18) >  INTERPRETATION:  This is a preliminary report. Please follow-up the final   report.    Collapsed bladder around Gr catheter with trace pericystic fat   stranding. Correlate with urinalysis for cystitis.    Prostatectomy.    Colonic diverticulosis without diverticulitis. Mild rectal wall   thickening. Correlate for proctitis.      < end of copied text >

## 2023-03-09 NOTE — PROGRESS NOTE ADULT - ASSESSMENT
Hematology/Oncology consulted on this 72 year old male with prostate cancer with likely metastatic adenopathy, followed by Dr. Hutson at [facility]. He is currently on relugolix 120 mg oral tablet. Patient was transferred to [facility] for further treatment of hematuria.     Hematuria  - Transferred to [facility] from [facility]  - s/p cystoscopy/clot evacuation 3/2  - Urology following  - CBI clamped   - CT w/ no significant clot burden    Prostate Cancer  - Follows with Dr. Hutson at [facility]  - May continue relugolix 120 mg oral tablet while inpatient    Anemia  - Likely due to gross hematuria  - Checking iron studies, B12, folate  - Please transfuse PRBC for hgb < 7.0    Will continue to follow.    Aida Kendrick NP  Hematology/ Oncology  [facility]  [phone] (office)  [phone] (alt office)  Evenings and weekends please call MD on call or office

## 2023-03-09 NOTE — PROGRESS NOTE ADULT - ASSESSMENT
72M with PMHx HTN, AS s/p TAVR, SSS s/p ppm, prostate cancer s/p radical prostatectomy and radiation c/b radiation proctitis and cystitis transferred from [place] with refractory gross hematuria. Pt s/p cystoscopy/clot evacuation 3/2, Amicar CBI, PO Amicar.     cbi clamped this am will assess color; as cbi currently clamped will hold off on alum cbi  awaiting official CT results but appears no clot burden in bladder   irrigate as needed  am labs  continue home meds

## 2023-03-10 LAB
ANION GAP SERPL CALC-SCNC: 12 MMOL/L — SIGNIFICANT CHANGE UP (ref 5–17)
BUN SERPL-MCNC: 14 MG/DL — SIGNIFICANT CHANGE UP (ref 7–23)
CALCIUM SERPL-MCNC: 9 MG/DL — SIGNIFICANT CHANGE UP (ref 8.4–10.5)
CHLORIDE SERPL-SCNC: 98 MMOL/L — SIGNIFICANT CHANGE UP (ref 96–108)
CO2 SERPL-SCNC: 22 MMOL/L — SIGNIFICANT CHANGE UP (ref 22–31)
CREAT SERPL-MCNC: 0.83 MG/DL — SIGNIFICANT CHANGE UP (ref 0.5–1.3)
EGFR: 92 ML/MIN/1.73M2 — SIGNIFICANT CHANGE UP
FERRITIN SERPL-MCNC: 33 NG/ML — SIGNIFICANT CHANGE UP (ref 30–400)
FOLATE SERPL-MCNC: >20 NG/ML — SIGNIFICANT CHANGE UP
GLUCOSE SERPL-MCNC: 137 MG/DL — HIGH (ref 70–99)
HCT VFR BLD CALC: 30.9 % — LOW (ref 39–50)
HGB BLD-MCNC: 9.9 G/DL — LOW (ref 13–17)
IRON SATN MFR SERPL: 40 UG/DL — LOW (ref 45–165)
MCHC RBC-ENTMCNC: 28 PG — SIGNIFICANT CHANGE UP (ref 27–34)
MCHC RBC-ENTMCNC: 32 GM/DL — SIGNIFICANT CHANGE UP (ref 32–36)
MCV RBC AUTO: 87.5 FL — SIGNIFICANT CHANGE UP (ref 80–100)
NRBC # BLD: 0 /100 WBCS — SIGNIFICANT CHANGE UP (ref 0–0)
PLATELET # BLD AUTO: 402 K/UL — HIGH (ref 150–400)
POTASSIUM SERPL-MCNC: 4 MMOL/L — SIGNIFICANT CHANGE UP (ref 3.5–5.3)
POTASSIUM SERPL-SCNC: 4 MMOL/L — SIGNIFICANT CHANGE UP (ref 3.5–5.3)
RBC # BLD: 3.53 M/UL — LOW (ref 4.2–5.8)
RBC # FLD: 13.8 % — SIGNIFICANT CHANGE UP (ref 10.3–14.5)
SODIUM SERPL-SCNC: 132 MMOL/L — LOW (ref 135–145)
VIT B12 SERPL-MCNC: 593 PG/ML — SIGNIFICANT CHANGE UP (ref 232–1245)
WBC # BLD: 7.98 K/UL — SIGNIFICANT CHANGE UP (ref 3.8–10.5)
WBC # FLD AUTO: 7.98 K/UL — SIGNIFICANT CHANGE UP (ref 3.8–10.5)

## 2023-03-10 PROCEDURE — 99232 SBSQ HOSP IP/OBS MODERATE 35: CPT

## 2023-03-10 PROCEDURE — 51700 IRRIGATION OF BLADDER: CPT

## 2023-03-10 RX ORDER — DIAZEPAM 5 MG
2 TABLET ORAL EVERY 8 HOURS
Refills: 0 | Status: DISCONTINUED | OUTPATIENT
Start: 2023-03-10 | End: 2023-03-12

## 2023-03-10 RX ORDER — OXYBUTYNIN CHLORIDE 5 MG
5 TABLET ORAL THREE TIMES A DAY
Refills: 0 | Status: DISCONTINUED | OUTPATIENT
Start: 2023-03-10 | End: 2023-03-16

## 2023-03-10 RX ADMIN — Medication 1 APPLICATION(S): at 12:34

## 2023-03-10 RX ADMIN — Medication 1 TABLET(S): at 11:05

## 2023-03-10 RX ADMIN — Medication 100 MILLIGRAM(S): at 21:10

## 2023-03-10 RX ADMIN — Medication 1 SPRAY(S): at 18:17

## 2023-03-10 RX ADMIN — Medication 1 SPRAY(S): at 05:47

## 2023-03-10 RX ADMIN — Medication 50 MILLIGRAM(S): at 05:47

## 2023-03-10 RX ADMIN — Medication 100 MILLIGRAM(S): at 13:20

## 2023-03-10 RX ADMIN — Medication 5 MILLIGRAM(S): at 21:10

## 2023-03-10 RX ADMIN — HEPARIN SODIUM 5000 UNIT(S): 5000 INJECTION INTRAVENOUS; SUBCUTANEOUS at 13:21

## 2023-03-10 RX ADMIN — LORATADINE 10 MILLIGRAM(S): 10 TABLET ORAL at 11:05

## 2023-03-10 RX ADMIN — POLYETHYLENE GLYCOL 3350 17 GRAM(S): 17 POWDER, FOR SOLUTION ORAL at 11:02

## 2023-03-10 RX ADMIN — Medication 5 MILLIGRAM(S): at 16:00

## 2023-03-10 NOTE — PROCEDURE NOTE - ADDITIONAL PROCEDURE DETAILS
Called to pts bedside as he felt bladder pressure. Pt seen and examined. Is afraid there is no outflow. Pt with Alum gtt and very slow. Using sterile water, bladder irrigated with no sig clot burden or retention. Urine color brown with sediments. Alum gtt cont.

## 2023-03-10 NOTE — PROGRESS NOTE ADULT - ASSESSMENT
Hematology/Oncology consulted on this 72 year old male with prostate cancer with likely metastatic adenopathy, followed by Dr. Hutson at [facility]. He is currently on relugolix 120 mg oral tablet. Patient was transferred to [facility] for further treatment of hematuria.     Hematuria  - Transferred to [facility] from [facility]  - s/p cystoscopy/clot evacuation 3/2  - Urology following  - now on aluminum CBI    - CT w/ no significant clot burden    Prostate Cancer  - Follows with Dr. Hutson at [facility]  - May continue relugolix 120 mg oral tablet while inpatient    Anemia  - Likely due to gross hematuria  - patient is iron deficient but not amenable to IV and/or PO iron at this time  - Please transfuse PRBC for hgb < 7.0  --trend CBC's to monitor    Will continue to follow.    Aida Kendrick NP  Hematology/ Oncology  [facility]  [phone] (office)  [phone] (alt office)  Evenings and weekends please call MD on call or office

## 2023-03-10 NOTE — PROGRESS NOTE ADULT - ASSESSMENT
72M with PMHx HTN, AS s/p TAVR, SSS s/p ppm, prostate cancer s/p radical prostatectomy and radiation c/b radiation proctitis and cystitis transferred from [place] with refractory gross hematuria. Pt s/p cystoscopy/clot evacuation 3/2, Amicar CBI, PO Amicar.   Now undergoing Alum CBI for 24 hours will complete this afternoon       irrigate as needed  am labs  continue home meds

## 2023-03-10 NOTE — PROGRESS NOTE ADULT - SUBJECTIVE AND OBJECTIVE BOX
Patient is a 72y old  Male who presents with a chief complaint of transfer for gross hematuria (10 Mar 2023 07:46)    Patient seen and examined this morning at bedside. Patient resting comfortably in recliner offers no complaints    MEDICATIONS  (STANDING):  aluminum sulfate 1% Irrigation in sterile water 1 Application(s) Topical daily  bisacodyl Suppository 10 milliGRAM(s) Rectal once  ceFAZolin   IVPB      ceFAZolin   IVPB 1000 milliGRAM(s) IV Intermittent every 8 hours  fluticasone propionate 50 MICROgram(s)/spray Nasal Spray 1 Spray(s) Both Nostrils two times a day  heparin   Injectable 5000 Unit(s) SubCutaneous every 8 hours  influenza  Vaccine (HIGH DOSE) 0.7 milliLiter(s) IntraMuscular once  loratadine 10 milliGRAM(s) Oral daily  metoprolol succinate ER 50 milliGRAM(s) Oral daily  multivitamin 1 Tablet(s) Oral daily  polyethylene glycol 3350 17 Gram(s) Oral daily  Relugolix 120mg tablet 1 Tablet(s) 1 Tablet(s) Oral daily  sodium chloride 0.9%. 1000 milliLiter(s) (50 mL/Hr) IV Continuous <Continuous>    MEDICATIONS  (PRN):  acetaminophen     Tablet .. 650 milliGRAM(s) Oral every 6 hours PRN Temp greater or equal to 38C (100.4F), Mild Pain (1 - 3)  aluminum hydroxide/magnesium hydroxide/simethicone Suspension 30 milliLiter(s) Oral every 4 hours PRN Dyspepsia  ondansetron Injectable 4 milliGRAM(s) IV Push every 6 hours PRN Nausea and/or Vomiting  senna 2 Tablet(s) Oral at bedtime PRN Constipation      ROS  No fever, sweats, chills  No epistaxis, HA, sore throat  No CP, SOB, cough, sputum  No n/v/d, abd pain, melena, hematochezia  No edema  No rash  No anxiety  No back pain, joint pain  No bleeding, bruising  No dysuria, hematuria    Vital Signs Last 24 Hrs  T(C): 36.4 (10 Mar 2023 10:29), Max: 36.8 (10 Mar 2023 05:27)  T(F): 97.6 (10 Mar 2023 10:29), Max: 98.3 (10 Mar 2023 05:27)  HR: 85 (10 Mar 2023 10:29) (73 - 88)  BP: 112/65 (10 Mar 2023 10:29) (112/65 - 157/77)  BP(mean): --  RR: 17 (10 Mar 2023 10:29) (17 - 18)  SpO2: 100% (10 Mar 2023 10:29) (96% - 100%)    Parameters below as of 10 Mar 2023 10:29  Patient On (Oxygen Delivery Method): room air        PE  NAD  Awake, alert  Anicteric, MMM  RRR  CTAB  Abd soft, NT, ND  +bowers  No c/c/e  No rash grossly                            9.9    7.98  )-----------( 402      ( 10 Mar 2023 08:40 )             30.9       03-10    132<L>  |  98  |  14  ----------------------------<  137<H>  4.0   |  22  |  0.83    Ca    9.0      10 Mar 2023 08:40

## 2023-03-10 NOTE — PROGRESS NOTE ADULT - SUBJECTIVE AND OBJECTIVE BOX
Subjective  feeling anxious this am but otherwise without events   Objective    Vital signs  T(F): , Max: 98.4 (03-09-23 @ 13:12)  HR: 75 (03-10-23 @ 05:27)  BP: 132/78 (03-10-23 @ 05:27)  SpO2: 96% (03-10-23 @ 05:27)  Wt(kg): --    Output     03-09 @ 07:01  -  03-10 @ 07:00  --------------------------------------------------------  IN: 1080 mL / OUT: 200 mL / NET: 880 mL        Gen awake alert nad axox3  Abd flat soft ntnd   Back no cvat bl    bowers with dilute brown with alum instillation     Labs                            10.1   8.74  )-----------( 424      ( 09 Mar 2023 08:50 )             31.9         Culture - Blood (02.28.23 @ 07:30)    Specimen Source: .Blood Blood-Peripheral    Culture Results:   No Growth Final    Culture - Blood (02.28.23 @ 07:00)    Specimen Source: .Blood Blood-Peripheral    Culture Results:   No Growth Final    Imaging  < from: CT Pelvis No Cont (03.08.23 @ 18:18) >  FINDINGS:  BLADDER: Collapsed with Bowers catheter, limiting its evaluation. No   significant clot burden is seen.  REPRODUCTIVE ORGANS: Prostatectomy.  LYMPH NODES: No pelvic lymphadenopathy. Pelvic surgical clips.    VISUALIZED PORTIONS:  ABDOMINAL ORGANS: Within normal limits.  BOWEL: Sigmoid diverticulosis.  PERITONEUM: No ascites.  VESSELS: Atherosclerotic changes.  ABDOMINAL WALL: Within normal limits.  BONES: Degenerative changes.    IMPRESSION:  Bladder is collapsed with Bowers catheter, limiting its evaluation. No   significant clot burden is seen.          < end of copied text >

## 2023-03-11 LAB
ANION GAP SERPL CALC-SCNC: 12 MMOL/L — SIGNIFICANT CHANGE UP (ref 5–17)
BUN SERPL-MCNC: 12 MG/DL — SIGNIFICANT CHANGE UP (ref 7–23)
CALCIUM SERPL-MCNC: 8.8 MG/DL — SIGNIFICANT CHANGE UP (ref 8.4–10.5)
CHLORIDE SERPL-SCNC: 100 MMOL/L — SIGNIFICANT CHANGE UP (ref 96–108)
CO2 SERPL-SCNC: 22 MMOL/L — SIGNIFICANT CHANGE UP (ref 22–31)
CREAT SERPL-MCNC: 0.74 MG/DL — SIGNIFICANT CHANGE UP (ref 0.5–1.3)
EGFR: 95 ML/MIN/1.73M2 — SIGNIFICANT CHANGE UP
GLUCOSE SERPL-MCNC: 141 MG/DL — HIGH (ref 70–99)
HCT VFR BLD CALC: 29.3 % — LOW (ref 39–50)
HGB BLD-MCNC: 9.5 G/DL — LOW (ref 13–17)
MCHC RBC-ENTMCNC: 28.4 PG — SIGNIFICANT CHANGE UP (ref 27–34)
MCHC RBC-ENTMCNC: 32.4 GM/DL — SIGNIFICANT CHANGE UP (ref 32–36)
MCV RBC AUTO: 87.5 FL — SIGNIFICANT CHANGE UP (ref 80–100)
NRBC # BLD: 0 /100 WBCS — SIGNIFICANT CHANGE UP (ref 0–0)
PLATELET # BLD AUTO: 343 K/UL — SIGNIFICANT CHANGE UP (ref 150–400)
POTASSIUM SERPL-MCNC: 3.8 MMOL/L — SIGNIFICANT CHANGE UP (ref 3.5–5.3)
POTASSIUM SERPL-SCNC: 3.8 MMOL/L — SIGNIFICANT CHANGE UP (ref 3.5–5.3)
RBC # BLD: 3.35 M/UL — LOW (ref 4.2–5.8)
RBC # FLD: 13.8 % — SIGNIFICANT CHANGE UP (ref 10.3–14.5)
SODIUM SERPL-SCNC: 134 MMOL/L — LOW (ref 135–145)
WBC # BLD: 7.18 K/UL — SIGNIFICANT CHANGE UP (ref 3.8–10.5)
WBC # FLD AUTO: 7.18 K/UL — SIGNIFICANT CHANGE UP (ref 3.8–10.5)

## 2023-03-11 PROCEDURE — 99233 SBSQ HOSP IP/OBS HIGH 50: CPT

## 2023-03-11 RX ORDER — ALUMINUM SULFATE
1 POWDER (GRAM) MISCELLANEOUS DAILY
Refills: 0 | Status: DISCONTINUED | OUTPATIENT
Start: 2023-03-11 | End: 2023-03-11

## 2023-03-11 RX ORDER — ALUMINUM SULFATE
1 POWDER (GRAM) MISCELLANEOUS DAILY
Refills: 0 | Status: COMPLETED | OUTPATIENT
Start: 2023-03-11 | End: 2023-03-12

## 2023-03-11 RX ADMIN — Medication 100 MILLIGRAM(S): at 13:42

## 2023-03-11 RX ADMIN — Medication 2 MILLIGRAM(S): at 22:07

## 2023-03-11 RX ADMIN — Medication 1 APPLICATION(S): at 14:59

## 2023-03-11 RX ADMIN — Medication 100 MILLIGRAM(S): at 05:49

## 2023-03-11 RX ADMIN — Medication 5 MILLIGRAM(S): at 15:38

## 2023-03-11 RX ADMIN — Medication 1 TABLET(S): at 12:32

## 2023-03-11 RX ADMIN — Medication 1 SPRAY(S): at 05:50

## 2023-03-11 RX ADMIN — LORATADINE 10 MILLIGRAM(S): 10 TABLET ORAL at 12:32

## 2023-03-11 RX ADMIN — Medication 50 MILLIGRAM(S): at 05:49

## 2023-03-11 RX ADMIN — Medication 5 MILLIGRAM(S): at 21:05

## 2023-03-11 RX ADMIN — Medication 100 MILLIGRAM(S): at 21:05

## 2023-03-11 RX ADMIN — Medication 1 SPRAY(S): at 17:13

## 2023-03-11 RX ADMIN — Medication 5 MILLIGRAM(S): at 05:49

## 2023-03-11 NOTE — PROGRESS NOTE ADULT - SUBJECTIVE AND OBJECTIVE BOX
UROLOGY DAILY PROGRESS NOTE:     Subjective: Patient seen and examined at bedside. No overnight events. s/p alum instillation yesterday       Objective:  Vital signs  T(F): , Max: 98.5 (03-10-23 @ 21:57)  HR: 80 (03-11-23 @ 05:04)  BP: 137/69 (03-11-23 @ 05:04)  SpO2: 95% (03-11-23 @ 05:04)  Wt(kg): --    I&O's Summary    10 Mar 2023 07:01  -  11 Mar 2023 07:00  --------------------------------------------------------  IN: 990 mL / OUT: 0 mL / NET: 990 mL        Gen: NAD  Pulm: No respiratory distress, no subcostal retractions  CV: RRR, no JVD  Abd: Soft, NT, ND  : CBI-     Labs:  03-10  7.98  / 30.9  /0.83   03-09  8.74  / 31.9  /x                              9.9    7.98  )-----------( 402      ( 10 Mar 2023 08:40 )             30.9     03-10    132<L>  |  98  |  14  ----------------------------<  137<H>  4.0   |  22  |  0.83    Ca    9.0      10 Mar 2023 08:40          Urine Cx:   UROLOGY DAILY PROGRESS NOTE:     Subjective: Patient seen and examined at bedside. No overnight events. s/p alum instillation yesterday       Objective:  Vital signs  T(F): , Max: 98.5 (03-10-23 @ 21:57)  HR: 80 (03-11-23 @ 05:04)  BP: 137/69 (03-11-23 @ 05:04)  SpO2: 95% (03-11-23 @ 05:04)  Wt(kg): --    I&O's Summary    10 Mar 2023 07:01  -  11 Mar 2023 07:00  --------------------------------------------------------  IN: 990 mL / OUT: 0 mL / NET: 990 mL        Gen: NAD  Pulm: No respiratory distress, no subcostal retractions  CV: RRR, no JVD  Abd: Soft, NT, ND  : CBI- off- urine red     Labs:  03-10  7.98  / 30.9  /0.83   03-09  8.74  / 31.9  /x                              9.9    7.98  )-----------( 402      ( 10 Mar 2023 08:40 )             30.9     03-10    132<L>  |  98  |  14  ----------------------------<  137<H>  4.0   |  22  |  0.83    Ca    9.0      10 Mar 2023 08:40          Urine Cx:

## 2023-03-11 NOTE — PROGRESS NOTE ADULT - ASSESSMENT
1. Metastatic Castrate Sensitive Prostate CA    -- On ADT  -- cont Relugolix  -- outpt f/u Dr Hutson at [facility]    2. Refractory Gross Hematuria    -- presumably sec to Radiation Cystitis  -- completed 24 hrs Alum CBI yesterday  -- s/p Cystoscopy and Clot Evacuation on 3/2  -- s/p Amicar CBI  -- Urology following    3. Anemia    -- sec to gross hematuria  -- patient is iron deficient but not amenable to IV and/or PO iron at this time  -- Transfuse prn  PRBC for hgb < 7.0      Jay Ortega MD

## 2023-03-11 NOTE — PROGRESS NOTE ADULT - ASSESSMENT
72M with PMHx HTN, AS s/p TAVR, SSS s/p ppm, prostate cancer s/p radical prostatectomy and radiation c/b radiation proctitis and cystitis transferred from [place] with refractory gross hematuria. Pt s/p cystoscopy/clot evacuation 3/2, Amicar CBI, PO Amicar.   s/p Alum instillation     Cont slow drip CBI  irrigate as needed  am labs  continue home meds  ? TOV if can wean off CBI  72M with PMHx HTN, AS s/p TAVR, SSS s/p ppm, prostate cancer s/p radical prostatectomy and radiation c/b radiation proctitis and cystitis transferred from [place] with refractory gross hematuria. Pt s/p cystoscopy/clot evacuation 3/2, Amicar CBI, PO Amicar.   s/p Alum instillation      72M with PMHx HTN, AS s/p TAVR, SSS s/p ppm, prostate cancer s/p radical prostatectomy and radiation c/b radiation proctitis and cystitis transferred from [place] with refractory gross hematuria. Pt s/p cystoscopy/clot evacuation 3/2, Amicar CBI, PO Amicar.   s/p Alum instillation     - will do another alum instillation today  - monitor color  - antispasmodics  - plan for TOV tomorrow after Alum + sterile water instillation

## 2023-03-11 NOTE — PROGRESS NOTE ADULT - SUBJECTIVE AND OBJECTIVE BOX
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 [facility] (hard of hearing)      H/O prostatectomy  2015      S/P T&amp;A (status post tonsillectomy and adenoidectomy)  child          ROS:  Negative except for:    MEDICATIONS  (STANDING):  aluminum sulfate 1% Irrigation in sterile water 1 Application(s) Topical daily  ceFAZolin   IVPB      ceFAZolin   IVPB 1000 milliGRAM(s) IV Intermittent every 8 hours  fluticasone propionate 50 MICROgram(s)/spray Nasal Spray 1 Spray(s) Both Nostrils two times a day  heparin   Injectable 5000 Unit(s) SubCutaneous every 8 hours  influenza  Vaccine (HIGH DOSE) 0.7 milliLiter(s) IntraMuscular once  loratadine 10 milliGRAM(s) Oral daily  metoprolol succinate ER 50 milliGRAM(s) Oral daily  multivitamin 1 Tablet(s) Oral daily  oxybutynin 5 milliGRAM(s) Oral three times a day  polyethylene glycol 3350 17 Gram(s) Oral daily  Relugolix 120mg tablet 1 Tablet(s) 1 Tablet(s) Oral daily    MEDICATIONS  (PRN):  acetaminophen     Tablet .. 650 milliGRAM(s) Oral every 6 hours PRN Temp greater or equal to 38C (100.4F), Mild Pain (1 - 3)  aluminum hydroxide/magnesium hydroxide/simethicone Suspension 30 milliLiter(s) Oral every 4 hours PRN Dyspepsia  diazepam    Tablet 2 milliGRAM(s) Oral every 8 hours PRN bladder spasms  ondansetron Injectable 4 milliGRAM(s) IV Push every 6 hours PRN Nausea and/or Vomiting  senna 2 Tablet(s) Oral at bedtime PRN Constipation      Allergies    penicillin (Diarrhea; Pruritus; Hives)  penicillin V potassium (Rash)    Intolerances    codeine (Nausea)      Vital Signs Last 24 Hrs  T(C): 36.6 (11 Mar 2023 05:04), Max: 36.9 (10 Mar 2023 21:57)  T(F): 97.9 (11 Mar 2023 05:04), Max: 98.5 (10 Mar 2023 21:57)  HR: 80 (11 Mar 2023 05:04) (53 - 85)  BP: 137/69 (11 Mar 2023 05:04) (112/65 - 137/69)  BP(mean): --  RR: 18 (11 Mar 2023 05:04) (16 - 18)  SpO2: 95% (11 Mar 2023 05:04) (95% - 100%)    Parameters below as of 11 Mar 2023 05:04  Patient On (Oxygen Delivery Method): room air        PHYSICAL EXAM  General: adult in NAD  HEENT: clear oropharynx, anicteric sclera, pink conjunctiva  Neck: supple  CV: normal S1/S2 with no murmur rubs or gallops  Lungs: positive air movement b/l ant lungs,clear to auscultation, no wheezes, no rales  Abdomen: soft non-tender non-distended, no hepatosplenomegaly  Ext: no clubbing cyanosis or edema  Neuro: alert and oriented X 4, no focal deficits  LABS:                          9.9    7.98  )-----------( 402      ( 10 Mar 2023 08:40 )             30.9     Serial CBC's  03-10 @ 08:40  Hct-30.9 / Hgb-9.9 / Plat-402 / RBC-3.53 / WBC-7.98          Serial CBC's  03-09 @ 08:50  Hct-31.9 / Hgb-10.1 / Plat-424 / RBC-3.60 / WBC-8.74            03-10    132<L>  |  98  |  14  ----------------------------<  137<H>  4.0   |  22  |  0.83    Ca    9.0      10 Mar 2023 08:40            Folate, Serum: >20.0 ng/mL (03-10 @ 08:40)  Ferritin, Serum: 33 ng/mL (03-10 @ 08:40)            RADIOLOGY & ADDITIONAL STUDIES:

## 2023-03-11 NOTE — PROVIDER CONTACT NOTE (MEDICATION) - ASSESSMENT
Pt A&Ox4. VSS. Patient educated and is aware that heparin is used for DVT ppx and verbalized understanding. Pt continues to refuse medication

## 2023-03-12 LAB
ANION GAP SERPL CALC-SCNC: 13 MMOL/L — SIGNIFICANT CHANGE UP (ref 5–17)
BLD GP AB SCN SERPL QL: NEGATIVE — SIGNIFICANT CHANGE UP
BUN SERPL-MCNC: 11 MG/DL — SIGNIFICANT CHANGE UP (ref 7–23)
CALCIUM SERPL-MCNC: 8.9 MG/DL — SIGNIFICANT CHANGE UP (ref 8.4–10.5)
CHLORIDE SERPL-SCNC: 99 MMOL/L — SIGNIFICANT CHANGE UP (ref 96–108)
CO2 SERPL-SCNC: 21 MMOL/L — LOW (ref 22–31)
CREAT SERPL-MCNC: 0.86 MG/DL — SIGNIFICANT CHANGE UP (ref 0.5–1.3)
EGFR: 91 ML/MIN/1.73M2 — SIGNIFICANT CHANGE UP
GLUCOSE SERPL-MCNC: 115 MG/DL — HIGH (ref 70–99)
HCT VFR BLD CALC: 28.8 % — LOW (ref 39–50)
HGB BLD-MCNC: 9.3 G/DL — LOW (ref 13–17)
MCHC RBC-ENTMCNC: 28.3 PG — SIGNIFICANT CHANGE UP (ref 27–34)
MCHC RBC-ENTMCNC: 32.3 GM/DL — SIGNIFICANT CHANGE UP (ref 32–36)
MCV RBC AUTO: 87.5 FL — SIGNIFICANT CHANGE UP (ref 80–100)
NRBC # BLD: 0 /100 WBCS — SIGNIFICANT CHANGE UP (ref 0–0)
PLATELET # BLD AUTO: 350 K/UL — SIGNIFICANT CHANGE UP (ref 150–400)
POTASSIUM SERPL-MCNC: 4.1 MMOL/L — SIGNIFICANT CHANGE UP (ref 3.5–5.3)
POTASSIUM SERPL-SCNC: 4.1 MMOL/L — SIGNIFICANT CHANGE UP (ref 3.5–5.3)
RBC # BLD: 3.29 M/UL — LOW (ref 4.2–5.8)
RBC # FLD: 13.8 % — SIGNIFICANT CHANGE UP (ref 10.3–14.5)
RH IG SCN BLD-IMP: POSITIVE — SIGNIFICANT CHANGE UP
SODIUM SERPL-SCNC: 133 MMOL/L — LOW (ref 135–145)
WBC # BLD: 10.44 K/UL — SIGNIFICANT CHANGE UP (ref 3.8–10.5)
WBC # FLD AUTO: 10.44 K/UL — SIGNIFICANT CHANGE UP (ref 3.8–10.5)

## 2023-03-12 PROCEDURE — 99232 SBSQ HOSP IP/OBS MODERATE 35: CPT

## 2023-03-12 RX ORDER — DIAZEPAM 5 MG
5 TABLET ORAL EVERY 6 HOURS
Refills: 0 | Status: DISCONTINUED | OUTPATIENT
Start: 2023-03-12 | End: 2023-03-16

## 2023-03-12 RX ADMIN — Medication 100 MILLIGRAM(S): at 21:12

## 2023-03-12 RX ADMIN — Medication 5 MILLIGRAM(S): at 06:02

## 2023-03-12 RX ADMIN — LORATADINE 10 MILLIGRAM(S): 10 TABLET ORAL at 12:54

## 2023-03-12 RX ADMIN — Medication 50 MILLIGRAM(S): at 06:03

## 2023-03-12 RX ADMIN — Medication 100 MILLIGRAM(S): at 06:03

## 2023-03-12 RX ADMIN — Medication 1 SPRAY(S): at 17:38

## 2023-03-12 RX ADMIN — Medication 100 MILLIGRAM(S): at 13:25

## 2023-03-12 RX ADMIN — Medication 5 MILLIGRAM(S): at 06:59

## 2023-03-12 RX ADMIN — Medication 1 SPRAY(S): at 06:03

## 2023-03-12 RX ADMIN — Medication 1 TABLET(S): at 12:55

## 2023-03-12 NOTE — PHYSICAL THERAPY INITIAL EVALUATION ADULT - PLANNED THERAPY INTERVENTIONS, PT EVAL
GOAL: Pt will perform 12 stairs with or without U HR as needed within 2-4weeks./balance training/gait training

## 2023-03-12 NOTE — PHYSICAL THERAPY INITIAL EVALUATION ADULT - PERTINENT HX OF CURRENT PROBLEM, REHAB EVAL
Pt is a 72M with PMHx HTN, AS s/p TAVR, SSS s/p ppm, prostate cancer s/p radical prostatectomy and radiation c/b radiation proctitis and cystitis transferred from [place] with refractory gross hematuria. Pt s/p cystoscopy/clot evacuation 3/2

## 2023-03-12 NOTE — PROGRESS NOTE ADULT - SUBJECTIVE AND OBJECTIVE BOX
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      HTN (hypertension)      Aortic stenosis      Rectal bleeding  last hospitalization 10/6/20 2/2 radiation proctitis      [place] (hard of hearing)      H/O prostatectomy  2015      S/P T&amp;A (status post tonsillectomy and adenoidectomy)  child          ROS:  Negative except for:    MEDICATIONS  (STANDING):  aluminum sulfate 1% Irrigation in sterile water 1 Application(s) Topical daily  ceFAZolin   IVPB      ceFAZolin   IVPB 1000 milliGRAM(s) IV Intermittent every 8 hours  fluticasone propionate 50 MICROgram(s)/spray Nasal Spray 1 Spray(s) Both Nostrils two times a day  heparin   Injectable 5000 Unit(s) SubCutaneous every 8 hours  influenza  Vaccine (HIGH DOSE) 0.7 milliLiter(s) IntraMuscular once  loratadine 10 milliGRAM(s) Oral daily  metoprolol succinate ER 50 milliGRAM(s) Oral daily  multivitamin 1 Tablet(s) Oral daily  oxybutynin 5 milliGRAM(s) Oral three times a day  polyethylene glycol 3350 17 Gram(s) Oral daily  Relugolix 120mg tablet 1 Tablet(s) 1 Tablet(s) Oral daily    MEDICATIONS  (PRN):  acetaminophen     Tablet .. 650 milliGRAM(s) Oral every 6 hours PRN Temp greater or equal to 38C (100.4F), Mild Pain (1 - 3)  aluminum hydroxide/magnesium hydroxide/simethicone Suspension 30 milliLiter(s) Oral every 4 hours PRN Dyspepsia  diazepam    Tablet 5 milliGRAM(s) Oral every 6 hours PRN bladder spasms  ondansetron Injectable 4 milliGRAM(s) IV Push every 6 hours PRN Nausea and/or Vomiting  senna 2 Tablet(s) Oral at bedtime PRN Constipation      Allergies    penicillin (Diarrhea; Pruritus; Hives)  penicillin V potassium (Rash)    Intolerances    codeine (Nausea)      Vital Signs Last 24 Hrs  T(C): 36.6 (12 Mar 2023 05:02), Max: 36.8 (12 Mar 2023 01:10)  T(F): 97.9 (12 Mar 2023 05:02), Max: 98.2 (12 Mar 2023 01:10)  HR: 82 (12 Mar 2023 05:02) (78 - 93)  BP: 124/70 (12 Mar 2023 05:02) (114/71 - 138/68)  BP(mean): --  RR: 18 (12 Mar 2023 05:02) (18 - 18)  SpO2: 95% (12 Mar 2023 05:02) (94% - 97%)    Parameters below as of 12 Mar 2023 05:02  Patient On (Oxygen Delivery Method): room air        PHYSICAL EXAM  NC/AT In NAD  Chest Clear Bilat  CVS S1,S2+ RRR  Abd Soft, NT/ND, + BS  Ext No edema      LABS:                          9.3    10.44 )-----------( 350      ( 12 Mar 2023 08:08 )             28.8     Serial CBC's  03-12 @ 08:08  Hct-28.8 / Hgb-9.3 / Plat-350 / RBC-3.29 / WBC-10.44          Serial CBC's  03-11 @ 08:49  Hct-29.3 / Hgb-9.5 / Plat-343 / RBC-3.35 / WBC-7.18            03-12    133<L>  |  99  |  11  ----------------------------<  115<H>  4.1   |  21<L>  |  0.86    Ca    8.9      12 Mar 2023 08:08            WBC Count: 10.44 K/uL (03-12 @ 08:08)  Hemoglobin: 9.3 g/dL (03-12 @ 08:08)            RADIOLOGY & ADDITIONAL STUDIES:

## 2023-03-12 NOTE — PHYSICAL THERAPY INITIAL EVALUATION ADULT - ADDITIONAL COMMENTS
As per pt, pt lives in a private house w/ spouse and no steps to enter w/ UHR. PTA pt was independent w/ all mobility and ADLs.

## 2023-03-12 NOTE — PROGRESS NOTE ADULT - ASSESSMENT
72M with PMHx HTN, AS s/p TAVR, SSS s/p ppm, prostate cancer s/p radical prostatectomy and radiation c/b radiation proctitis and cystitis transferred from [place] with refractory gross hematuria. Pt s/p cystoscopy/clot evacuation 3/2, Amicar CBI, PO Amicar.   on Alum instillation  - cont Alum  - change over to sterile water CBI  - TOV later today  - antispasmodics  - PT consult

## 2023-03-12 NOTE — PROGRESS NOTE ADULT - ASSESSMENT
1. Metastatic Castrate Sensitive Prostate CA    -- On ADT  -- cont Relugolix  -- [place] Hutson [place] [facility] [place]    2. Refractory Gross Hematuria    -- presumably sec to Radiation Cystitis  -- completed 24 hrs Alum CBI 3/10  -- s/p Cystoscopy and Clot Evacuation on 3/2  -- s/p Amicar CBI  -- Urology following    3. Anemia    -- H/H minimally lower, more or less stable last 72 hrs  -- sec to gross hematuria  -- patient is iron deficient but not amenable to IV and/or PO iron at this time  -- Transfuse prn  PRBC for hgb < 7.0      Jay Ortega MD

## 2023-03-12 NOTE — PROGRESS NOTE ADULT - SUBJECTIVE AND OBJECTIVE BOX
UROLOGY DAILY PROGRESS NOTE:     Subjective: Patient seen and examined at bedside. Pt c/o severe bladder spasms overnight       Objective:  Vital signs  T(F): , Max: 98.2 (03-12-23 @ 01:10)  HR: 82 (03-12-23 @ 05:02)  BP: 124/70 (03-12-23 @ 05:02)  SpO2: 95% (03-12-23 @ 05:02)  Wt(kg): --    I&O's Summary    11 Mar 2023 06:01  -  12 Mar 2023 07:00  --------------------------------------------------------  IN: 1200 mL / OUT: 100 mL / NET: 1100 mL        Gen: NAD  Pulm: No respiratory distress, no subcostal retractions  CV: RRR, no JVD  Abd: Soft, NT, ND  : Alum drip running, urine brownish clear     Labs:  03-12  10.44 / 28.8  /0.86   03-11  7.18  / 29.3  /0.74                           9.3    10.44 )-----------( 350      ( 12 Mar 2023 08:08 )             28.8     03-12    133<L>  |  99  |  11  ----------------------------<  115<H>  4.1   |  21<L>  |  0.86    Ca    8.9      12 Mar 2023 08:08          Urine Cx:

## 2023-03-13 ENCOUNTER — TRANSCRIPTION ENCOUNTER (OUTPATIENT)
Age: 73
End: 2023-03-13

## 2023-03-13 LAB
ANION GAP SERPL CALC-SCNC: 11 MMOL/L — SIGNIFICANT CHANGE UP (ref 5–17)
BUN SERPL-MCNC: 12 MG/DL — SIGNIFICANT CHANGE UP (ref 7–23)
CALCIUM SERPL-MCNC: 9 MG/DL — SIGNIFICANT CHANGE UP (ref 8.4–10.5)
CHLORIDE SERPL-SCNC: 100 MMOL/L — SIGNIFICANT CHANGE UP (ref 96–108)
CO2 SERPL-SCNC: 23 MMOL/L — SIGNIFICANT CHANGE UP (ref 22–31)
CREAT SERPL-MCNC: 0.81 MG/DL — SIGNIFICANT CHANGE UP (ref 0.5–1.3)
EGFR: 93 ML/MIN/1.73M2 — SIGNIFICANT CHANGE UP
GLUCOSE SERPL-MCNC: 110 MG/DL — HIGH (ref 70–99)
HCT VFR BLD CALC: 27.3 % — LOW (ref 39–50)
HGB BLD-MCNC: 8.5 G/DL — LOW (ref 13–17)
MCHC RBC-ENTMCNC: 27.6 PG — SIGNIFICANT CHANGE UP (ref 27–34)
MCHC RBC-ENTMCNC: 31.1 GM/DL — LOW (ref 32–36)
MCV RBC AUTO: 88.6 FL — SIGNIFICANT CHANGE UP (ref 80–100)
NRBC # BLD: 0 /100 WBCS — SIGNIFICANT CHANGE UP (ref 0–0)
PLATELET # BLD AUTO: 360 K/UL — SIGNIFICANT CHANGE UP (ref 150–400)
POTASSIUM SERPL-MCNC: 4.2 MMOL/L — SIGNIFICANT CHANGE UP (ref 3.5–5.3)
POTASSIUM SERPL-SCNC: 4.2 MMOL/L — SIGNIFICANT CHANGE UP (ref 3.5–5.3)
RBC # BLD: 3.08 M/UL — LOW (ref 4.2–5.8)
RBC # FLD: 14 % — SIGNIFICANT CHANGE UP (ref 10.3–14.5)
SODIUM SERPL-SCNC: 134 MMOL/L — LOW (ref 135–145)
WBC # BLD: 7.37 K/UL — SIGNIFICANT CHANGE UP (ref 3.8–10.5)
WBC # FLD AUTO: 7.37 K/UL — SIGNIFICANT CHANGE UP (ref 3.8–10.5)

## 2023-03-13 RX ADMIN — Medication 50 MILLIGRAM(S): at 05:27

## 2023-03-13 RX ADMIN — Medication 1 TABLET(S): at 11:00

## 2023-03-13 RX ADMIN — POLYETHYLENE GLYCOL 3350 17 GRAM(S): 17 POWDER, FOR SOLUTION ORAL at 11:00

## 2023-03-13 RX ADMIN — Medication 1 SPRAY(S): at 05:27

## 2023-03-13 RX ADMIN — LORATADINE 10 MILLIGRAM(S): 10 TABLET ORAL at 11:00

## 2023-03-13 RX ADMIN — Medication 1 SPRAY(S): at 17:35

## 2023-03-13 RX ADMIN — Medication 100 MILLIGRAM(S): at 05:27

## 2023-03-13 NOTE — DISCHARGE NOTE PROVIDER - NSDCMRMEDTOKEN_GEN_ALL_CORE_FT
acetaminophen 325 mg oral tablet: 2 tab(s) orally every 6 hours, As needed, Temp greater or equal to 38C (100.4F), Mild Pain (1 - 3)  acetaminophen 325 mg oral tablet: 2 tab(s) orally every 6 hours, As needed, Temp greater or equal to 38C (100.4F), Mild Pain (1 - 3)  aluminum hydroxide-magnesium hydroxide 200 mg-200 mg/5 mL oral suspension: 30 milliliter(s) orally every 4 hours, As needed, Dyspepsia  Claritin 10 mg oral tablet: 1 tab(s) orally once a day  Colace 50 mg oral capsule: 1 cap(s) orally once a day  fluticasone 50 mcg/inh nasal spray: 1 spray(s) nasal 2 times a day  metoprolol succinate 50 mg oral tablet, extended release: 1 tab(s) orally once a day  Multiple Vitamins oral tablet: 1 tab(s) orally once a day  Orgovyx 120 mg oral tablet: 1 tab(s) orally once a day  polyethylene glycol 3350 oral powder for reconstitution: 17 gram(s) orally once a day   acetaminophen 325 mg oral tablet: 2 tab(s) orally every 6 hours, As needed, Temp greater or equal to 38C (100.4F), Mild Pain (1 - 3)  acetaminophen 325 mg oral tablet: 2 tab(s) orally every 6 hours, As needed, Temp greater or equal to 38C (100.4F), Mild Pain (1 - 3)  aluminum hydroxide-magnesium hydroxide 200 mg-200 mg/5 mL oral suspension: 30 milliliter(s) orally every 4 hours, As needed, Dyspepsia  Claritin 10 mg oral tablet: 1 tab(s) orally once a day  Colace 50 mg oral capsule: 1 cap(s) orally once a day  fluticasone 50 mcg/inh nasal spray: 1 spray(s) nasal 2 times a day  Hyperbaric Oxygen Therapy:   metoprolol succinate 50 mg oral tablet, extended release: 1 tab(s) orally once a day  Multiple Vitamins oral tablet: 1 tab(s) orally once a day  Orgovyx 120 mg oral tablet: 1 tab(s) orally once a day  polyethylene glycol 3350 oral powder for reconstitution: 17 gram(s) orally once a day   acetaminophen 325 mg oral tablet: 2 tab(s) orally every 6 hours, As needed, Temp greater or equal to 38C (100.4F), Mild Pain (1 - 3)  aluminum hydroxide-magnesium hydroxide 200 mg-200 mg/5 mL oral suspension: 30 milliliter(s) orally every 4 hours, As needed, Dyspepsia  Claritin 10 mg oral tablet: 1 tab(s) orally once a day  Colace 50 mg oral capsule: 1 cap(s) orally once a day  fluticasone 50 mcg/inh nasal spray: 1 spray(s) nasal 2 times a day  Hyperbaric Oxygen Therapy:   metoprolol succinate 50 mg oral tablet, extended release: 1 tab(s) orally once a day  Multiple Vitamins oral tablet: 1 tab(s) orally once a day  Orgovyx 120 mg oral tablet: 1 tab(s) orally once a day  polyethylene glycol 3350 oral powder for reconstitution: 17 gram(s) orally once a day   acetaminophen 325 mg oral tablet: 2 tab(s) orally every 6 hours, As needed, Temp greater or equal to 38C (100.4F), Mild Pain (1 - 3)  aluminum hydroxide-magnesium hydroxide 200 mg-200 mg/5 mL oral suspension: 30 milliliter(s) orally every 4 hours, As needed, Dyspepsia  Claritin 10 mg oral tablet: 1 tab(s) orally once a day  Colace 50 mg oral capsule: 1 cap(s) orally once a day  fluticasone 50 mcg/inh nasal spray: 1 spray(s) nasal 2 times a day  Hyperbaric Oxygen Therapy:   metoprolol succinate 50 mg oral tablet, extended release: 1 tab(s) orally once a day  Multiple Vitamins oral tablet: 1 tab(s) orally once a day  Orgovyx 120 mg oral tablet: 1 tab(s) orally once a day  physical therapy: 1   polyethylene glycol 3350 oral powder for reconstitution: 17 gram(s) orally once a day

## 2023-03-13 NOTE — DISCHARGE NOTE PROVIDER - PROVIDER TOKENS
FREE:[LAST:[Oxygen],FIRST:[Hyperbaric],PHONE:[phone]:[(   )    -],FOLLOWUP:[1-3 days]] FREE:[LAST:[Thor],FIRST:[Dottie],PHONE:[phone]:[(   )    -],ADDRESS:[[address] [place] [address], [place] [address]],SCHEDULEDAPPT:[03/21/2023],SCHEDULEDAPPTTIME:[11:30 AM]] FREE:[LAST:[Thor],FIRST:[Dottie],PHONE:[phone]],FAX:[(   )    -],ADDRESS:[[address] [place] [address], [place] [address]],SCHEDULEDAPPT:[03/21/2023],SCHEDULEDAPPTTIME:[11:30 AM]],PROVIDER:[TOKEN:[[id number]],FOLLOWUP:[Routine]] PROVIDER:[TOKEN:[[id number]],FOLLOWUP:[Routine]],FREE:[LAST:[Thor],FIRST:[Dottie],PHONE:[phone]:[(   )    -],ADDRESS:[[address] [place] [address], [place] [address]],SCHEDULEDAPPT:[03/21/2023],SCHEDULEDAPPTTIME:[11:30 AM]],PROVIDER:[TOKEN:[[id number]]]

## 2023-03-13 NOTE — DISCHARGE NOTE PROVIDER - CARE PROVIDERS DIRECT ADDRESSES
,DirectAddress_Unknown ,DirectAddress_Unknown,DirectAddress_Unknown ,DirectAddress_Unknown,DirectAddress_Unknown,[email or web address]

## 2023-03-13 NOTE — DISCHARGE NOTE PROVIDER - NSDCFUSCHEDAPPT_GEN_ALL_CORE_FT
[facility]  CARDIOLOGY [address] [facility] [address]  Scheduled Appointment: 03/15/2023    Anthony Huang  [facility]  CARDIOLOGY [address] [facility] [address]  Scheduled Appointment: 04/24/2023    [facility]  Cardio Electro [address]  Scheduled Appointment: 06/02/2023     [facility]  CARDIOLOGY [address] [facility]  Scheduled Appointment: 03/15/2023    Dottie Mcrae  [facility]  WOUNDCARE [address]  Scheduled Appointment: 03/21/2023    Anthony Huang  [facility]  CARDIOLOGY [address] [facility]  Scheduled Appointment: 04/24/2023    [facility]  Cardio Electro [address]  Scheduled Appointment: 06/02/2023     Dottie Mcrae  [facility]  WOUNDCARE [address]  Scheduled Appointment: 03/21/2023    Anthony Huang  [facility]  CARDIOLOGY [address]  Scheduled Appointment: 04/24/2023    [facility]  Cardio Electro [address]  Scheduled Appointment: 06/02/2023     [facility]  HYPERBARIC  [facility]  Scheduled Appointment: 05/22/2023    [facility]  HYPERBARIC  [facility]  Scheduled Appointment: 05/23/2023    [facility]  HYPERBARIC  [facility]  Scheduled Appointment: 05/24/2023    [facility]  HYPERBARIC  [facility]  Scheduled Appointment: 05/25/2023    [facility]  HYPERBARIC  [facility]  Scheduled Appointment: 05/26/2023    [facility]  Cardio Electro [address]  Scheduled Appointment: 06/02/2023    Bogdan Hoang  [facility]  UROLOGY [address]  Scheduled Appointment: 06/15/2023    [facility]  CARDIOLOGY [address] [facility] [address]  Scheduled Appointment: 06/21/2023    Stanislav Claudio  [facility]  ONCORTHO [address]  Scheduled Appointment: 06/27/2023

## 2023-03-13 NOTE — PROGRESS NOTE ADULT - SUBJECTIVE AND OBJECTIVE BOX
Patient is a 72y old  Male who presents with a chief complaint of transfer for gross hematuria (13 Mar 2023 08:07)    Patient seen and examined this morning at bedside.  Patient appears comfortable. No reported complaints.    MEDICATIONS  (STANDING):  fluticasone propionate 50 MICROgram(s)/spray Nasal Spray 1 Spray(s) Both Nostrils two times a day  heparin   Injectable 5000 Unit(s) SubCutaneous every 8 hours  influenza  Vaccine (HIGH DOSE) 0.7 milliLiter(s) IntraMuscular once  loratadine 10 milliGRAM(s) Oral daily  metoprolol succinate ER 50 milliGRAM(s) Oral daily  multivitamin 1 Tablet(s) Oral daily  oxybutynin 5 milliGRAM(s) Oral three times a day  polyethylene glycol 3350 17 Gram(s) Oral daily  Relugolix 120mg tablet 1 Tablet(s) 1 Tablet(s) Oral daily    MEDICATIONS  (PRN):  acetaminophen     Tablet .. 650 milliGRAM(s) Oral every 6 hours PRN Temp greater or equal to 38C (100.4F), Mild Pain (1 - 3)  aluminum hydroxide/magnesium hydroxide/simethicone Suspension 30 milliLiter(s) Oral every 4 hours PRN Dyspepsia  diazepam    Tablet 5 milliGRAM(s) Oral every 6 hours PRN bladder spasms  ondansetron Injectable 4 milliGRAM(s) IV Push every 6 hours PRN Nausea and/or Vomiting  senna 2 Tablet(s) Oral at bedtime PRN Constipation    Vital Signs Last 24 Hrs  T(C): 37 (13 Mar 2023 14:16), Max: 37.1 (12 Mar 2023 17:22)  T(F): 98.6 (13 Mar 2023 14:16), Max: 98.7 (12 Mar 2023 17:22)  HR: 84 (13 Mar 2023 14:16) (78 - 88)  BP: 113/71 (13 Mar 2023 14:16) (109/67 - 129/78)  BP(mean): --  RR: 18 (13 Mar 2023 14:16) (18 - 18)  SpO2: 94% (13 Mar 2023 14:16) (93% - 99%)    Parameters below as of 13 Mar 2023 14:16  Patient On (Oxygen Delivery Method): room air        PE  NAD  Awake, alert  Anicteric, MMM  RRR  CTAB  Abd soft, NT, ND  No c/c/e  No rash grossly  FROM                          8.5    7.37  )-----------( 360      ( 13 Mar 2023 08:09 )             27.3       03-13    134<L>  |  100  |  12  ----------------------------<  110<H>  4.2   |  23  |  0.81    Ca    9.0      13 Mar 2023 08:09

## 2023-03-13 NOTE — PROGRESS NOTE ADULT - SUBJECTIVE AND OBJECTIVE BOX
The patient was seen and examined at bedside.  No acute events overnight and the patient is without acute complaints this AM.   The patient's bowers catheter was removed yesterday.  The patient has been voiding well but with dark brownish-red urine.    T(C): 37 (03-13-23 @ 05:13), Max: 37.1 (03-12-23 @ 17:22)  HR: 88 (03-13-23 @ 05:13) (79 - 88)  BP: 117/72 (03-13-23 @ 05:13) (110/66 - 136/78)  RR: 18 (03-13-23 @ 05:13) (18 - 18)  SpO2: 94% (03-13-23 @ 05:13) (93% - 99%)  Wt(kg): --    Physical Exam:    General: NAD, A+Ox3  Abdomen: soft, non-tender, non-distended      03-12 @ 07:01  -  03-13 @ 07:00  --------------------------------------------------------  IN: 1370 mL / OUT: 1800 mL / NET: -430 mL      void - 1300cc, dark brownish-red in the urinal

## 2023-03-13 NOTE — DISCHARGE NOTE PROVIDER - NSDCCPCAREPLAN_GEN_ALL_CORE_FT
PRINCIPAL DISCHARGE DIAGNOSIS  Diagnosis: Gross hematuria  Assessment and Plan of Treatment: [facility]  You have an appointment at 11:30AM on 3/21.  The details have been provided in these papers.  Notify our office, the [facility] at [phone] if you develop any progressive difficulty voiding or inability to void.      SECONDARY DISCHARGE DIAGNOSES  Diagnosis: HTN (hypertension)  Assessment and Plan of Treatment: Take your blood pressure medications daily and follow up routinely with your primary care doctor.      Diagnosis: History of aortic stenosis  Assessment and Plan of Treatment: Follow up routinely with your cardiologist and notify them if you develop any worsening shortness of breath than normal, especially with exertion.    Diagnosis: History of sick sinus syndrome  Assessment and Plan of Treatment: Follow up routinely with your electrophysiologist to have your pacemaker checked.    Diagnosis: Iron deficiency anemia  Assessment and Plan of Treatment: You were evaluated by hematology.  Follow up with them in office to check iron levels.

## 2023-03-13 NOTE — PROGRESS NOTE ADULT - ASSESSMENT
1. Metastatic Castrate Sensitive Prostate CA    -- On ADT  -- cont Relugolix  -- outpt f[place] Hutson [place] [facility] [place]    2. Refractory Gross Hematuria    -- presumably sec to Radiation Cystitis  -- completed 24 hrs Alum CBI 3/10  -- s/p Cystoscopy and Clot Evacuation on 3/2  -- s/p Amicar CBI, continues on PO Amicar  -- Urology following    3. Anemia    -- H/H minimally lower, more or less stable last 72 hrs  -- sec to gross hematuria  -- patient is iron deficient but not amenable to IV and/or PO iron at this time  -- Transfuse prn  PRBC for hgb < 7.0      will follow,    Aida Kendrick NP  Hematology/ Oncology  [facility]  [phone] (office)  [phone] (alt office)  Evenings and weekends please call MD on call or office

## 2023-03-13 NOTE — DISCHARGE NOTE PROVIDER - CARE PROVIDER_API CALL
Oxygen, Hyperbaric  Phone: [phone]  Fax: (   )    -  Follow Up Time: 1-3 days   Dottie Mcrae  [address] [place] [address], [address]  [place] [address]  Phone: [phone]  Fax: (   )    -  Scheduled Appointment: 03/21/2023 11:30 AM   Dottie Mcrae  [address] [place] [address], [address]  [place] [address]  Phone: [phone]  Fax: (   )    -  Scheduled Appointment: 03/21/2023 11:30 AM    Jarett Miller)  Hematology; Internal Medicine; Medical Oncology  [address] [place] [address]  Phone: [phone]  Fax: [phone]  Follow Up Time: Routine   Jarett Miller)  Hematology; Internal Medicine; Medical Oncology  [address] [place] [address]  Phone: [phone]  Fax: [phone]  Follow Up Time: Routine    Dottie Mcrae  [address] [place] [address], [place] [address]  Phone: [phone]  Fax: (   )    -  Scheduled Appointment: 03/21/2023 11:30 AM    Bogdan Hoang)  Urology  [address] [place] [address]  Phone: [phone]  Fax: [phone]  Follow Up Time:

## 2023-03-13 NOTE — DISCHARGE NOTE PROVIDER - HOSPITAL COURSE
This is a 72 year old male with PMHx of prostate cancer s/p radical prostatectomy and treated with radiation therapy, who was transferred from an OSH on 3/7/23 for hematuria management.  The patient This is a 72 year old male with PMHx of prostate cancer s/p radical prostatectomy and treated with radiation therapy, who was transferred from an OSH on 3/7/23 for hematuria management.    Course of events prior to transfer: At [facility], pt was followed by urologist Dr. Shaffer. Pt underwent trial of Amicar CBI, PO Amicar and cystoscopy with clot evacuation and fulguration on 3/2, however gross hematuria persisted and pt remained on CBI. Throughout the hospital course, gross hematuria contributed to acute blood loss anemia requiring in total 5U of PRBCs to be transfused.  The transfer occurred on 3/7 with pt on CBI.    He was seen by heme/onc - OK to continue relugolix, check iron studies, B12, folate.  Iron was low and they recommended IV iron sucrose.  CT scan did not show clot burden.  CBI was unable to be weaned so he was started on an alum CBI followed by sterile water CBI.  Physical therapy evaluated the patient and recommended outpatient PT.  Eventually, the patient's bladder was irrigated and his catheter was removed for TOV, which he passed.  The patient maintained low residuals and was voiding brownish-red urine.  He was given 1U PRBCs on 3/14 and an appointment was made for outpatient hyperbaric oxygen.       This is a 72 year old male with PMHx of prostate cancer s/p radical prostatectomy and treated with radiation therapy, who was transferred from an OSH on 3/7/23 for hematuria management.    Course of events prior to transfer: At [facility], pt was followed by urologist Dr. Shaffer. Pt underwent trial of Amicar CBI, PO Amicar and cystoscopy with clot evacuation and fulguration on 3/2, however gross hematuria persisted and pt remained on CBI. Throughout the hospital course, gross hematuria contributed to acute blood loss anemia requiring in total 5U of PRBCs to be transfused.  The transfer occurred on 3/7 with pt on CBI.    He was seen by heme/onc - OK to continue relugolix, check iron studies, B12, folate.  Iron was low and they recommended IV iron sucrose.  CT scan did not show clot burden.  CBI was unable to be weaned so he was started on an alum CBI followed by sterile water CBI.  Physical therapy evaluated the patient and recommended outpatient PT.  Eventually, the patient's bladder was irrigated and his catheter was removed for TOV, which he passed.  The patient maintained low residuals and was voiding brownish-red urine.  He was given 1U PRBCs on 3/14 and an appointment was made for outpatient hyperbaric oxygen. 3/16 urine remained light grace without clots.        This is a 72 year old male with PMHx of prostate cancer s/p radical prostatectomy and treated with radiation therapy, who was transferred from an OSH on 3/7/23 for hematuria management.    Course of events prior to transfer: At [facility], pt was followed by urologist Dr. Shaffer. Pt underwent trial of Amicar CBI, PO Amicar and cystoscopy with clot evacuation and fulguration on 3/2, however gross hematuria persisted and pt remained on CBI. Throughout the hospital course, gross hematuria contributed to acute blood loss anemia requiring in total 5U of PRBCs to be transfused.  The transfer occurred on 3/7 with pt on CBI.  pt. with hyponatremia, ordered IVF Sodium continuously and after 3 days Sodium level normalized  He was seen by heme/onc - OK to continue relugolix, check iron studies, B12, folate.  Iron was low and they recommended IV iron sucrose.  CT scan did not show clot burden.  CBI was unable to be weaned so he was started on an alum CBI followed by sterile water CBI.  Physical therapy evaluated the patient and recommended outpatient PT.  Eventually, the patient's bladder was irrigated and his catheter was removed for TOV, which he passed.  The patient maintained low residuals and was voiding brownish-red urine.  He was given 1U PRBCs on 3/14 and an appointment was made for outpatient hyperbaric oxygen. 3/16 urine remained light grace without clots.        This is a 72 year old male with PMHx of prostate cancer s/p radical prostatectomy and treated with radiation therapy, who was transferred from an OSH on 3/7/23 for hematuria management.    Course of events prior to transfer: At [facility], pt was followed by urologist Dr. Shaffer. Pt underwent trial of Amicar CBI, PO Amicar and cystoscopy with clot evacuation and fulguration on 3/2, however gross hematuria persisted and pt remained on CBI. Throughout the hospital course, gross hematuria contributed to acute on chronic blood loss anemia requiring in total 5U of PRBCs to be transfused.  The transfer occurred on 3/7 with pt on CBI.  pt. with hyponatremia, ordered IVF Sodium continuously and after 3 days Sodium level normalized  He was seen by heme/onc - OK to continue relugolix, check iron studies, B12, folate.  Iron was low and they recommended IV iron sucrose.  CT scan did not show clot burden.  CBI was unable to be weaned so he was started on an alum CBI followed by sterile water CBI.  Physical therapy evaluated the patient and recommended outpatient PT.  Eventually, the patient's bladder was irrigated and his catheter was removed for TOV, which he passed.  The patient maintained low residuals and was voiding brownish-red urine.  He was given 1U PRBCs on 3/14 and an appointment was made for outpatient hyperbaric oxygen. 3/16 urine remained light grace without clots.

## 2023-03-13 NOTE — PROGRESS NOTE ADULT - ASSESSMENT
72M with PMHx HTN, AS s/p TAVR, SSS s/p ppm, prostate cancer s/p radical prostatectomy and radiation c/b radiation proctitis and cystitis transferred from [place] with refractory gross hematuria. Pt s/p cystoscopy/clot evacuation 3/2, Amicar CBI, PO Amicar. s/p alum bladder irrigation    - AM labs  - rack urine sequentially and color check  - antispasmodics as needed  - outpatient rx for hyperbaric oxygen  - OOB/DVT prophylaxis

## 2023-03-14 LAB
ANION GAP SERPL CALC-SCNC: 10 MMOL/L — SIGNIFICANT CHANGE UP (ref 5–17)
BUN SERPL-MCNC: 11 MG/DL — SIGNIFICANT CHANGE UP (ref 7–23)
CALCIUM SERPL-MCNC: 8.9 MG/DL — SIGNIFICANT CHANGE UP (ref 8.4–10.5)
CHLORIDE SERPL-SCNC: 101 MMOL/L — SIGNIFICANT CHANGE UP (ref 96–108)
CO2 SERPL-SCNC: 25 MMOL/L — SIGNIFICANT CHANGE UP (ref 22–31)
CREAT SERPL-MCNC: 0.75 MG/DL — SIGNIFICANT CHANGE UP (ref 0.5–1.3)
EGFR: 95 ML/MIN/1.73M2 — SIGNIFICANT CHANGE UP
GLUCOSE SERPL-MCNC: 105 MG/DL — HIGH (ref 70–99)
HCT VFR BLD CALC: 26.6 % — LOW (ref 39–50)
HGB BLD-MCNC: 8.5 G/DL — LOW (ref 13–17)
MCHC RBC-ENTMCNC: 28 PG — SIGNIFICANT CHANGE UP (ref 27–34)
MCHC RBC-ENTMCNC: 32 GM/DL — SIGNIFICANT CHANGE UP (ref 32–36)
MCV RBC AUTO: 87.5 FL — SIGNIFICANT CHANGE UP (ref 80–100)
NRBC # BLD: 0 /100 WBCS — SIGNIFICANT CHANGE UP (ref 0–0)
PLATELET # BLD AUTO: 333 K/UL — SIGNIFICANT CHANGE UP (ref 150–400)
POTASSIUM SERPL-MCNC: 4.3 MMOL/L — SIGNIFICANT CHANGE UP (ref 3.5–5.3)
POTASSIUM SERPL-SCNC: 4.3 MMOL/L — SIGNIFICANT CHANGE UP (ref 3.5–5.3)
RBC # BLD: 3.04 M/UL — LOW (ref 4.2–5.8)
RBC # FLD: 13.8 % — SIGNIFICANT CHANGE UP (ref 10.3–14.5)
SODIUM SERPL-SCNC: 136 MMOL/L — SIGNIFICANT CHANGE UP (ref 135–145)
WBC # BLD: 7.29 K/UL — SIGNIFICANT CHANGE UP (ref 3.8–10.5)
WBC # FLD AUTO: 7.29 K/UL — SIGNIFICANT CHANGE UP (ref 3.8–10.5)

## 2023-03-14 RX ORDER — IRON SUCROSE 20 MG/ML
200 INJECTION, SOLUTION INTRAVENOUS EVERY 24 HOURS
Refills: 0 | Status: COMPLETED | OUTPATIENT
Start: 2023-03-14 | End: 2023-03-16

## 2023-03-14 RX ADMIN — IRON SUCROSE 110 MILLIGRAM(S): 20 INJECTION, SOLUTION INTRAVENOUS at 21:58

## 2023-03-14 RX ADMIN — LORATADINE 10 MILLIGRAM(S): 10 TABLET ORAL at 12:03

## 2023-03-14 RX ADMIN — Medication 1 SPRAY(S): at 17:59

## 2023-03-14 RX ADMIN — Medication 50 MILLIGRAM(S): at 05:18

## 2023-03-14 RX ADMIN — Medication 1 SPRAY(S): at 05:18

## 2023-03-14 RX ADMIN — Medication 1 TABLET(S): at 12:03

## 2023-03-14 RX ADMIN — POLYETHYLENE GLYCOL 3350 17 GRAM(S): 17 POWDER, FOR SOLUTION ORAL at 12:03

## 2023-03-14 NOTE — PROGRESS NOTE ADULT - ASSESSMENT
72M with PMHx HTN, AS s/p TAVR, SSS s/p ppm, prostate cancer s/p radical prostatectomy and radiation c/b radiation proctitis and cystitis transferred from [place] with refractory gross hematuria. Pt s/p cystoscopy/clot evacuation 3/2, Amicar CBI, PO Amicar. s/p alum bladder irrigation    - AM labs, trend H/H  - continue racking urine sequentially and color check  - antispasmodics as needed  - outpatient rx for hyperbaric oxygen in chart  - OOB/DVT prophylaxis

## 2023-03-14 NOTE — PROGRESS NOTE ADULT - SUBJECTIVE AND OBJECTIVE BOX
The patient was seen and examined at bedside.  No acute events overnight and the patient is without acute complaints this AM.  Overnight, the patient has been voiding dark brownish-red urine with a few large clots.    T(C): 36.8 (03-14-23 @ 05:00), Max: 37 (03-13-23 @ 14:16)  HR: 77 (03-14-23 @ 05:00) (73 - 84)  BP: 121/72 (03-14-23 @ 05:00) (109/67 - 144/82)  RR: 18 (03-14-23 @ 05:00) (18 - 18)  SpO2: 93% (03-14-23 @ 05:00) (93% - 97%)  Wt(kg): --    Physical Exam:    General: NAD, A+Ox3  Abdomen: soft, non-tender, non-distended        03-13 @ 07:01  -  03-14 @ 07:00  --------------------------------------------------------  IN: 1280 mL / OUT: 2250 mL / NET: -970 mL      Void - 1625cc, brownish-red as seen in racked urine

## 2023-03-14 NOTE — PROGRESS NOTE ADULT - ASSESSMENT
1. Metastatic Castrate Sensitive Prostate CA    -- On ADT  -- cont Relugolix  -- outpt f/u Dr Hutson at [facility] [place]    2. Refractory Gross Hematuria    -- presumably sec to Radiation Cystitis  -- completed 24 hrs Alum CBI 3/10  -- s/p Cystoscopy and Clot Evacuation on 3/2  -- s/p Amicar CBI, continues on PO Amicar  -- Urology following    3. Anemia    -- H/H minimally lower, more or less stable last 72 hrs  -- sec to gross hematuria  -- patient started on IV iron for LAINEY  -- Transfuse prn  PRBC for hgb < 7.0      will follow,    Aida Kendrick NP  Hematology/ Oncology  [facility]  [phone] (office)  [phone] (alt office)  Evenings and weekends please call MD on call or office

## 2023-03-14 NOTE — PROGRESS NOTE ADULT - SUBJECTIVE AND OBJECTIVE BOX
Patient is a 72y old  Male who presents with a chief complaint of transfer for gross hematuria (14 Mar 2023 07:19)    Patient seen and examined this morning at bedside.  Patient resting comfortably in bed, reports ongoing hematuria. Son at bedside.    MEDICATIONS  (STANDING):  fluticasone propionate 50 MICROgram(s)/spray Nasal Spray 1 Spray(s) Both Nostrils two times a day  heparin   Injectable 5000 Unit(s) SubCutaneous every 8 hours  influenza  Vaccine (HIGH DOSE) 0.7 milliLiter(s) IntraMuscular once  iron sucrose IVPB 200 milliGRAM(s) IV Intermittent every 24 hours  loratadine 10 milliGRAM(s) Oral daily  metoprolol succinate ER 50 milliGRAM(s) Oral daily  multivitamin 1 Tablet(s) Oral daily  oxybutynin 5 milliGRAM(s) Oral three times a day  polyethylene glycol 3350 17 Gram(s) Oral daily  Relugolix 120mg tablet 1 Tablet(s) 1 Tablet(s) Oral daily    MEDICATIONS  (PRN):  acetaminophen     Tablet .. 650 milliGRAM(s) Oral every 6 hours PRN Temp greater or equal to 38C (100.4F), Mild Pain (1 - 3)  aluminum hydroxide/magnesium hydroxide/simethicone Suspension 30 milliLiter(s) Oral every 4 hours PRN Dyspepsia  diazepam    Tablet 5 milliGRAM(s) Oral every 6 hours PRN bladder spasms  ondansetron Injectable 4 milliGRAM(s) IV Push every 6 hours PRN Nausea and/or Vomiting  senna 2 Tablet(s) Oral at bedtime PRN Constipation      Vital Signs Last 24 Hrs  T(C): 36.6 (14 Mar 2023 08:42), Max: 37 (13 Mar 2023 14:16)  T(F): 97.9 (14 Mar 2023 08:42), Max: 98.6 (13 Mar 2023 14:16)  HR: 85 (14 Mar 2023 08:42) (73 - 85)  BP: 108/62 (14 Mar 2023 08:42) (108/62 - 144/82)  BP(mean): --  RR: 18 (14 Mar 2023 08:42) (18 - 18)  SpO2: 94% (14 Mar 2023 08:42) (93% - 97%)    Parameters below as of 14 Mar 2023 08:42  Patient On (Oxygen Delivery Method): room air        PE  NAD  Awake, alert  Anicteric, MMM  RRR  CTAB  Abd soft, NT, ND  No c/c/e  No rash grossly  FROM                          8.5    7.29  )-----------( 333      ( 14 Mar 2023 07:50 )             26.6       03-14    136  |  101  |  11  ----------------------------<  105<H>  4.3   |  25  |  0.75    Ca    8.9      14 Mar 2023 07:50

## 2023-03-15 LAB
ANION GAP SERPL CALC-SCNC: 9 MMOL/L — SIGNIFICANT CHANGE UP (ref 5–17)
BLD GP AB SCN SERPL QL: NEGATIVE — SIGNIFICANT CHANGE UP
BUN SERPL-MCNC: 10 MG/DL — SIGNIFICANT CHANGE UP (ref 7–23)
CALCIUM SERPL-MCNC: 8.9 MG/DL — SIGNIFICANT CHANGE UP (ref 8.4–10.5)
CHLORIDE SERPL-SCNC: 101 MMOL/L — SIGNIFICANT CHANGE UP (ref 96–108)
CO2 SERPL-SCNC: 25 MMOL/L — SIGNIFICANT CHANGE UP (ref 22–31)
CREAT SERPL-MCNC: 0.72 MG/DL — SIGNIFICANT CHANGE UP (ref 0.5–1.3)
EGFR: 96 ML/MIN/1.73M2 — SIGNIFICANT CHANGE UP
GLUCOSE SERPL-MCNC: 102 MG/DL — HIGH (ref 70–99)
HCT VFR BLD CALC: 29.6 % — LOW (ref 39–50)
HGB BLD-MCNC: 9.6 G/DL — LOW (ref 13–17)
MCHC RBC-ENTMCNC: 27.7 PG — SIGNIFICANT CHANGE UP (ref 27–34)
MCHC RBC-ENTMCNC: 32.4 GM/DL — SIGNIFICANT CHANGE UP (ref 32–36)
MCV RBC AUTO: 85.5 FL — SIGNIFICANT CHANGE UP (ref 80–100)
NRBC # BLD: 0 /100 WBCS — SIGNIFICANT CHANGE UP (ref 0–0)
PLATELET # BLD AUTO: 304 K/UL — SIGNIFICANT CHANGE UP (ref 150–400)
POTASSIUM SERPL-MCNC: 4 MMOL/L — SIGNIFICANT CHANGE UP (ref 3.5–5.3)
POTASSIUM SERPL-SCNC: 4 MMOL/L — SIGNIFICANT CHANGE UP (ref 3.5–5.3)
RBC # BLD: 3.46 M/UL — LOW (ref 4.2–5.8)
RBC # FLD: 13.8 % — SIGNIFICANT CHANGE UP (ref 10.3–14.5)
RH IG SCN BLD-IMP: POSITIVE — SIGNIFICANT CHANGE UP
SODIUM SERPL-SCNC: 135 MMOL/L — SIGNIFICANT CHANGE UP (ref 135–145)
WBC # BLD: 6.99 K/UL — SIGNIFICANT CHANGE UP (ref 3.8–10.5)
WBC # FLD AUTO: 6.99 K/UL — SIGNIFICANT CHANGE UP (ref 3.8–10.5)

## 2023-03-15 RX ADMIN — Medication 1 SPRAY(S): at 06:36

## 2023-03-15 RX ADMIN — IRON SUCROSE 110 MILLIGRAM(S): 20 INJECTION, SOLUTION INTRAVENOUS at 17:14

## 2023-03-15 RX ADMIN — Medication 50 MILLIGRAM(S): at 06:35

## 2023-03-15 RX ADMIN — LORATADINE 10 MILLIGRAM(S): 10 TABLET ORAL at 11:59

## 2023-03-15 RX ADMIN — Medication 1 SPRAY(S): at 17:14

## 2023-03-15 RX ADMIN — Medication 1 TABLET(S): at 11:59

## 2023-03-15 NOTE — PROGRESS NOTE ADULT - ASSESSMENT
1. Metastatic Castrate Sensitive Prostate CA    -- On ADT  -- cont Relugolix  -- outpt f/[place] Dr Hutson at [facility] [place]    2. Refractory Gross Hematuria    -- presumably sec to Radiation Cystitis  -- completed 24 hrs Alum CBI 3/10  -- s/p Cystoscopy and Clot Evacuation on 3/2  -- s/p Amicar CBI, continues on PO Amicar  -- Urology following    3. Anemia    -- improving, Hgb 9.6 today  -- sec to gross hematuria  -- started on IV iron for LAINEY, 1/3 doses given  -- Transfuse prn  PRBC for hgb < 7.0      will follow,    Aida Kendrick NP  Hematology/ Oncology  [facility]  [phone] (office)  [phone] (alt office)  Evenings and weekends please call MD on call or office

## 2023-03-15 NOTE — PROGRESS NOTE ADULT - SUBJECTIVE AND OBJECTIVE BOX
UROLOGY PA PROGRESS NOTE:     Subjective:  no acute events overnight, PVR overnight 200. urine color improving, no clots with last void     Objective:  Vital signs  T(F): , Max: 98.7 (03-15-23 @ 06:00)  HR: 74 (03-15-23 @ 06:00)  BP: 127/77 (03-15-23 @ 06:00)  SpO2: 97% (03-15-23 @ 06:00)  Wt(kg): --    Output     03-14 @ 07:01  -  03-15 @ 07:00  --------------------------------------------------------  IN: 840 mL / OUT: 1625 mL / NET: -785 mL    void 1075cc    Physical Exam:  Gen: NAD  Abd: soft, NT/ND  : voiding grace urine no clots.    Labs:  03-14  7.29  / 26.6  /0.75   03-13  7.37  / 27.3  /0.81                           8.5    7.29  )-----------( 333      ( 14 Mar 2023 07:50 )             26.6     03-14    136  |  101  |  11  ----------------------------<  105<H>  4.3   |  25  |  0.75    Ca    8.9      14 Mar 2023 07:50

## 2023-03-15 NOTE — PROGRESS NOTE ADULT - ASSESSMENT
72M with PMHx HTN, AS s/p TAVR, SSS s/p ppm, prostate cancer s/p radical prostatectomy and radiation c/b radiation proctitis and cystitis transferred from [place] with refractory gross hematuria. Pt s/p cystoscopy/clot evacuation 3/2, Amicar CBI, PO Amicar. s/p alum bladder irrigation    - AM labs, trend H/H  - continue racking urine sequentially and color check  - antispasmodics as needed  - outpatient rx for hyperbaric oxygen in chart  - OOB/DVT prophylaxis  - d/c planning

## 2023-03-15 NOTE — PROGRESS NOTE ADULT - SUBJECTIVE AND OBJECTIVE BOX
Patient is a 72y old  Male who presents with a chief complaint of transfer for gross hematuria (15 Mar 2023 07:54)    Patient seen and examined at bedside. Patient resting comfortably, son at bedside. Reports ongoing hematuria.    MEDICATIONS  (STANDING):  fluticasone propionate 50 MICROgram(s)/spray Nasal Spray 1 Spray(s) Both Nostrils two times a day  heparin   Injectable 5000 Unit(s) SubCutaneous every 8 hours  influenza  Vaccine (HIGH DOSE) 0.7 milliLiter(s) IntraMuscular once  iron sucrose IVPB 200 milliGRAM(s) IV Intermittent every 24 hours  loratadine 10 milliGRAM(s) Oral daily  metoprolol succinate ER 50 milliGRAM(s) Oral daily  multivitamin 1 Tablet(s) Oral daily  oxybutynin 5 milliGRAM(s) Oral three times a day  polyethylene glycol 3350 17 Gram(s) Oral daily  Relugolix 120mg tablet 1 Tablet(s) 1 Tablet(s) Oral daily    MEDICATIONS  (PRN):  acetaminophen     Tablet .. 650 milliGRAM(s) Oral every 6 hours PRN Temp greater or equal to 38C (100.4F), Mild Pain (1 - 3)  aluminum hydroxide/magnesium hydroxide/simethicone Suspension 30 milliLiter(s) Oral every 4 hours PRN Dyspepsia  diazepam    Tablet 5 milliGRAM(s) Oral every 6 hours PRN bladder spasms  ondansetron Injectable 4 milliGRAM(s) IV Push every 6 hours PRN Nausea and/or Vomiting  senna 2 Tablet(s) Oral at bedtime PRN Constipation      Vital Signs Last 24 Hrs  T(C): 36.3 (15 Mar 2023 08:24), Max: 37.1 (15 Mar 2023 06:00)  T(F): 97.4 (15 Mar 2023 08:24), Max: 98.7 (15 Mar 2023 06:00)  HR: 82 (15 Mar 2023 08:24) (71 - 100)  BP: 135/78 (15 Mar 2023 08:24) (122/76 - 147/78)  BP(mean): --  RR: 18 (15 Mar 2023 08:24) (18 - 18)  SpO2: 93% (15 Mar 2023 08:24) (93% - 98%)    Parameters below as of 15 Mar 2023 08:24  Patient On (Oxygen Delivery Method): room air      PE  NAD  Awake, alert  Anicteric, MMM  RRR  CTAB  Abd soft, NT, ND  No c/c/e  No rash grossly  FROM                          9.6    6.99  )-----------( 304      ( 15 Mar 2023 07:58 )             29.6       03-15    135  |  101  |  10  ----------------------------<  102<H>  4.0   |  25  |  0.72    Ca    8.9      15 Mar 2023 07:58

## 2023-03-16 ENCOUNTER — APPOINTMENT (OUTPATIENT)
Dept: CARDIOLOGY | Facility: CLINIC | Age: 73
End: 2023-03-16
Payer: MEDICARE

## 2023-03-16 ENCOUNTER — TRANSCRIPTION ENCOUNTER (OUTPATIENT)
Age: 73
End: 2023-03-16

## 2023-03-16 ENCOUNTER — NON-APPOINTMENT (OUTPATIENT)
Age: 73
End: 2023-03-16

## 2023-03-16 VITALS
SYSTOLIC BLOOD PRESSURE: 138 MMHG | TEMPERATURE: 98 F | RESPIRATION RATE: 18 BRPM | OXYGEN SATURATION: 98 % | DIASTOLIC BLOOD PRESSURE: 86 MMHG | HEART RATE: 97 BPM

## 2023-03-16 LAB
ANION GAP SERPL CALC-SCNC: 10 MMOL/L — SIGNIFICANT CHANGE UP (ref 5–17)
BUN SERPL-MCNC: 9 MG/DL — SIGNIFICANT CHANGE UP (ref 7–23)
CALCIUM SERPL-MCNC: 9 MG/DL — SIGNIFICANT CHANGE UP (ref 8.4–10.5)
CHLORIDE SERPL-SCNC: 102 MMOL/L — SIGNIFICANT CHANGE UP (ref 96–108)
CO2 SERPL-SCNC: 24 MMOL/L — SIGNIFICANT CHANGE UP (ref 22–31)
CREAT SERPL-MCNC: 0.67 MG/DL — SIGNIFICANT CHANGE UP (ref 0.5–1.3)
EGFR: 98 ML/MIN/1.73M2 — SIGNIFICANT CHANGE UP
GLUCOSE SERPL-MCNC: 92 MG/DL — SIGNIFICANT CHANGE UP (ref 70–99)
HCT VFR BLD CALC: 31.3 % — LOW (ref 39–50)
HGB BLD-MCNC: 9.5 G/DL — LOW (ref 13–17)
MCHC RBC-ENTMCNC: 27.5 PG — SIGNIFICANT CHANGE UP (ref 27–34)
MCHC RBC-ENTMCNC: 30.4 GM/DL — LOW (ref 32–36)
MCV RBC AUTO: 90.7 FL — SIGNIFICANT CHANGE UP (ref 80–100)
NRBC # BLD: 0 /100 WBCS — SIGNIFICANT CHANGE UP (ref 0–0)
PLATELET # BLD AUTO: 309 K/UL — SIGNIFICANT CHANGE UP (ref 150–400)
POTASSIUM SERPL-MCNC: 4.1 MMOL/L — SIGNIFICANT CHANGE UP (ref 3.5–5.3)
POTASSIUM SERPL-SCNC: 4.1 MMOL/L — SIGNIFICANT CHANGE UP (ref 3.5–5.3)
RBC # BLD: 3.45 M/UL — LOW (ref 4.2–5.8)
RBC # FLD: 13.9 % — SIGNIFICANT CHANGE UP (ref 10.3–14.5)
SODIUM SERPL-SCNC: 136 MMOL/L — SIGNIFICANT CHANGE UP (ref 135–145)
WBC # BLD: 5.86 K/UL — SIGNIFICANT CHANGE UP (ref 3.8–10.5)
WBC # FLD AUTO: 5.86 K/UL — SIGNIFICANT CHANGE UP (ref 3.8–10.5)

## 2023-03-16 PROCEDURE — 80048 BASIC METABOLIC PNL TOTAL CA: CPT

## 2023-03-16 PROCEDURE — P9011: CPT

## 2023-03-16 PROCEDURE — 93296 REM INTERROG EVL PM/IDS: CPT

## 2023-03-16 PROCEDURE — 94640 AIRWAY INHALATION TREATMENT: CPT

## 2023-03-16 PROCEDURE — 86985 SPLIT BLOOD OR PRODUCTS: CPT

## 2023-03-16 PROCEDURE — 97110 THERAPEUTIC EXERCISES: CPT

## 2023-03-16 PROCEDURE — 82728 ASSAY OF FERRITIN: CPT

## 2023-03-16 PROCEDURE — 36430 TRANSFUSION BLD/BLD COMPNT: CPT

## 2023-03-16 PROCEDURE — 86850 RBC ANTIBODY SCREEN: CPT

## 2023-03-16 PROCEDURE — 85027 COMPLETE CBC AUTOMATED: CPT

## 2023-03-16 PROCEDURE — 82607 VITAMIN B-12: CPT

## 2023-03-16 PROCEDURE — 97116 GAIT TRAINING THERAPY: CPT

## 2023-03-16 PROCEDURE — 83615 LACTATE (LD) (LDH) ENZYME: CPT

## 2023-03-16 PROCEDURE — 99231 SBSQ HOSP IP/OBS SF/LOW 25: CPT

## 2023-03-16 PROCEDURE — 72192 CT PELVIS W/O DYE: CPT

## 2023-03-16 PROCEDURE — 86900 BLOOD TYPING SEROLOGIC ABO: CPT

## 2023-03-16 PROCEDURE — 83550 IRON BINDING TEST: CPT

## 2023-03-16 PROCEDURE — 82746 ASSAY OF FOLIC ACID SERUM: CPT

## 2023-03-16 PROCEDURE — 86923 COMPATIBILITY TEST ELECTRIC: CPT

## 2023-03-16 PROCEDURE — 83540 ASSAY OF IRON: CPT

## 2023-03-16 PROCEDURE — 86901 BLOOD TYPING SEROLOGIC RH(D): CPT

## 2023-03-16 PROCEDURE — 36415 COLL VENOUS BLD VENIPUNCTURE: CPT

## 2023-03-16 PROCEDURE — 97161 PT EVAL LOW COMPLEX 20 MIN: CPT

## 2023-03-16 PROCEDURE — 93294 REM INTERROG EVL PM/LDLS PM: CPT

## 2023-03-16 RX ADMIN — IRON SUCROSE 110 MILLIGRAM(S): 20 INJECTION, SOLUTION INTRAVENOUS at 15:51

## 2023-03-16 RX ADMIN — Medication 50 MILLIGRAM(S): at 05:40

## 2023-03-16 RX ADMIN — Medication 1 SPRAY(S): at 17:23

## 2023-03-16 RX ADMIN — Medication 1 SPRAY(S): at 05:39

## 2023-03-16 RX ADMIN — Medication 1 TABLET(S): at 12:58

## 2023-03-16 RX ADMIN — LORATADINE 10 MILLIGRAM(S): 10 TABLET ORAL at 12:58

## 2023-03-16 NOTE — PROGRESS NOTE ADULT - NS ATTEND OPT1 GEN_ALL_CORE
I independently performed the documented:
I independently performed the documented:
I attest my time as attending is greater than 50% of the total combined time spent on qualifying patient care activities by the PA/NP and attending.
I attest my time as attending is greater than 50% of the total combined time spent on qualifying patient care activities by the PA/NP and attending.
I independently performed the documented:

## 2023-03-16 NOTE — PROGRESS NOTE ADULT - PROVIDER SPECIALTY LIST ADULT
Heme/Onc
Urology
Heme/Onc
Urology
Urology
Heme/Onc
Urology

## 2023-03-16 NOTE — PROGRESS NOTE ADULT - NS ATTEND OPT1A GEN_ALL_CORE
History/Exam/Medical decision making
Medical decision making
History/Exam/Medical decision making
Medical decision making

## 2023-03-16 NOTE — PROGRESS NOTE ADULT - ASSESSMENT
1. Metastatic Castrate Sensitive Prostate CA  -- On ADT  -- cont Relugolix  -- outpt f/u Dr. Hutson at [facility] [place]    2. Refractory Gross Hematuria  -- presumably sec to Radiation Cystitis  -- completed 24 hrs Alum CBI 3/10  -- s/p Cystoscopy and Clot Evacuation on 3/2  -- s/p Amicar CBI and PO  -- Urology following    3. Anemia  -- Hgb stable today  -- sec to gross hematuria  -- Completed IV iron today  -- Transfuse prn  PRBC for hgb < 7.0    will follow,    Antonio Paniagua PA-C  Hematology/Oncology  [facility]  [phone] (office)

## 2023-03-16 NOTE — PROGRESS NOTE ADULT - REASON FOR ADMISSION
transfer for gross hematuria
Attending Attestation (For Attendings USE Only)...

## 2023-03-16 NOTE — PROGRESS NOTE ADULT - ASSESSMENT
72M with PMHx HTN, AS s/p TAVR, SSS s/p ppm, prostate cancer s/p radical prostatectomy and radiation c/b radiation proctitis and cystitis transferred from [place] with refractory gross hematuria. Pt s/p cystoscopy/clot evacuation 3/2, Amicar CBI, PO Amicar. s/p alum bladder irrigation    - AM labs, trend H/H  - continue racking urine sequentially and color check  - antispasmodics as needed  - outpatient rx for hyperbaric oxygen in chart  - OOB/DVT prophylaxis  - last iron infusion today (3/3)  - d/c planning

## 2023-03-16 NOTE — PROGRESS NOTE ADULT - SUBJECTIVE AND OBJECTIVE BOX
UROLOGY PA PROGRESS NOTE:     Subjective:  no acute events overnight, urine remains light grace in color. voiding comfortably.     Objective:  Vital signs  T(F): , Max: 99.4 (03-15-23 @ 17:47)  HR: 82 (03-16-23 @ 05:25)  BP: 135/80 (03-16-23 @ 05:25)  SpO2: 97% (03-16-23 @ 05:25)  Wt(kg): --    Output     03-15 @ 07:01  -  03-16 @ 07:00  --------------------------------------------------------  IN: 920 mL / OUT: 1700 mL / NET: -780 mL        Physical Exam:  Gen: NAD  Abd: soft, NT/ND  : voiding grace urine     Labs:  03-15  6.99  / 29.6  /0.72   03-14  7.29  / 26.6  /0.75                           9.6    6.99  )-----------( 304      ( 15 Mar 2023 07:58 )             29.6     03-15    135  |  101  |  10  ----------------------------<  102<H>  4.0   |  25  |  0.72    Ca    8.9      15 Mar 2023 07:58

## 2023-03-16 NOTE — PROGRESS NOTE ADULT - SUBJECTIVE AND OBJECTIVE BOX
Patient is a 72y old  Male who presents with a chief complaint of transfer for gross hematuria (16 Mar 2023 07:34)    Patient seen and examined at bedside. Still with hematuria. No new complaints.     MEDICATIONS  (STANDING):  fluticasone propionate 50 MICROgram(s)/spray Nasal Spray 1 Spray(s) Both Nostrils two times a day  heparin   Injectable 5000 Unit(s) SubCutaneous every 8 hours  influenza  Vaccine (HIGH DOSE) 0.7 milliLiter(s) IntraMuscular once  iron sucrose IVPB 200 milliGRAM(s) IV Intermittent every 24 hours  loratadine 10 milliGRAM(s) Oral daily  metoprolol succinate ER 50 milliGRAM(s) Oral daily  multivitamin 1 Tablet(s) Oral daily  oxybutynin 5 milliGRAM(s) Oral three times a day  polyethylene glycol 3350 17 Gram(s) Oral daily  Relugolix 120mg tablet 1 Tablet(s) 1 Tablet(s) Oral daily    MEDICATIONS  (PRN):  acetaminophen     Tablet .. 650 milliGRAM(s) Oral every 6 hours PRN Temp greater or equal to 38C (100.4F), Mild Pain (1 - 3)  aluminum hydroxide/magnesium hydroxide/simethicone Suspension 30 milliLiter(s) Oral every 4 hours PRN Dyspepsia  diazepam    Tablet 5 milliGRAM(s) Oral every 6 hours PRN bladder spasms  ondansetron Injectable 4 milliGRAM(s) IV Push every 6 hours PRN Nausea and/or Vomiting  senna 2 Tablet(s) Oral at bedtime PRN Constipation    Vital Signs Last 24 Hrs  T(C): 36.8 (16 Mar 2023 14:25), Max: 37.4 (15 Mar 2023 17:47)  T(F): 98.2 (16 Mar 2023 14:25), Max: 99.4 (15 Mar 2023 17:47)  HR: 88 (16 Mar 2023 14:25) (76 - 88)  BP: 131/75 (16 Mar 2023 14:25) (122/73 - 139/85)  BP(mean): --  RR: 18 (16 Mar 2023 14:25) (18 - 18)  SpO2: 99% (16 Mar 2023 14:25) (95% - 99%)    Parameters below as of 16 Mar 2023 14:25  Patient On (Oxygen Delivery Method): room air        PE  NAD  Awake, alert  Anicteric, MMM  No c/c/e  No rash grossly  FROM                          9.5    5.86  )-----------( 309      ( 16 Mar 2023 09:16 )             31.3       03-16    136  |  102  |  9   ----------------------------<  92  4.1   |  24  |  0.67    Ca    9.0      16 Mar 2023 09:16

## 2023-03-16 NOTE — PROGRESS NOTE ADULT - ATTENDING COMMENTS
Color improving with additional alum.  Bladder irrigation today did not show any significant residual clot burden and the catheter was removed for voiding trial.    Outpatient follow-up will be recommended for hyperbaric oxygen therapy to help reduce the risk of additional episodes of hematuria related to radiation cystitis.
Patient would likely benefit from outpatient hyperbaric oxygen therapy to help the hematuria resolve.  If h/h remains stable today, will plan for possible discharge.
agree with above  urine color improved  recommend outpatient hyperbaric oxygen treatments
plan for outpatient hyperbaric oxygen  will observe urine color to confirm no significant worsening of the hematuria
urine color improved  outpatient hyperbaric oxygen arranged  prepare for discharge pending h/h
OBSERVATION

## 2023-03-16 NOTE — PROGRESS NOTE ADULT - NS ATTEND AMEND GEN_ALL_CORE FT
71 y/o male with prostate cancer, admitted with hematuria. Urology following; planning for outpatient hyperbaric oxygen. Hemoglobin continues to drop; will need to monitor and transfuse as needed. Patient previously declined iron at this time.
Agree with above
Patient with castrate sensitive metastatic prostate cancer on ADT therapy with Relugolix, followed by Dr. Hutson at [facility] [place].   He is being managed by urology for persistent hematuria due to radiation cystitis.  s/p treatment with alum sulfate today.   His coags are normal, Hg is stable.  He does have iron deficiency, recommend IV iron (hold off oral iron due to constipation).  Patient would like to hold off for now.
Patient with castrate sensitive metastatic prostate cancer on ADT therapy with Relugolix, followed by Dr. Hutson at [facility] [place].   He is being managed by urology for persistent hematuria due to radiation cystitis.  Planned for treatment with alum sulfate today.   His coags are normal, Hg is stable.  Would check his iron studies to see if he can benefit from iron.
being managed for hematuria. urology following
Radiation cystitis, gross hematuria transferred from [facility]  On CBI, urine color is clear but when stopped becomes bloody  Initiate Alum sulfate today  CT pelvic without sig clots  If Alum unsuccessful, would consider silver nitrate and possibly formalin
Bleeding is improved, his Hg is stable post IV iron.  Plans for hyperbaric oxygen treatment outpatient.   Discharge planning for today.
Radiation hemorrhagic cystitis  On Alum CBI - x24 hr ending today  Will observe off with NS CBI and then wean off

## 2023-03-21 ENCOUNTER — APPOINTMENT (OUTPATIENT)
Dept: WOUND CARE | Facility: CLINIC | Age: 73
End: 2023-03-21
Payer: MEDICARE

## 2023-03-21 PROCEDURE — 99204 OFFICE O/P NEW MOD 45 MIN: CPT

## 2023-03-22 ENCOUNTER — OUTPATIENT (OUTPATIENT)
Dept: OUTPATIENT SERVICES | Facility: HOSPITAL | Age: 73
LOS: 1 days | Discharge: HOME | End: 2023-03-22
Payer: MEDICARE

## 2023-03-22 ENCOUNTER — APPOINTMENT (OUTPATIENT)
Dept: WOUND CARE | Facility: HOSPITAL | Age: 73
End: 2023-03-22
Payer: MEDICARE

## 2023-03-22 VITALS
RESPIRATION RATE: 18 BRPM | WEIGHT: 196 LBS | SYSTOLIC BLOOD PRESSURE: 137 MMHG | TEMPERATURE: 98.4 F | BODY MASS INDEX: 28.06 KG/M2 | HEART RATE: 82 BPM | HEIGHT: 70 IN | OXYGEN SATURATION: 97 % | DIASTOLIC BLOOD PRESSURE: 71 MMHG

## 2023-03-22 DIAGNOSIS — I44.2 ATRIOVENTRICULAR BLOCK, COMPLETE: ICD-10-CM

## 2023-03-22 DIAGNOSIS — Z95.0 PRESENCE OF CARDIAC PACEMAKER: ICD-10-CM

## 2023-03-22 DIAGNOSIS — Z79.899 OTHER LONG TERM (CURRENT) DRUG THERAPY: ICD-10-CM
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
## 2023-03-22 DIAGNOSIS — I10 ESSENTIAL (PRIMARY) HYPERTENSION: ICD-10-CM

## 2023-03-22 DIAGNOSIS — N30.40 IRRADIATION CYSTITIS WITHOUT HEMATURIA: ICD-10-CM

## 2023-03-22 DIAGNOSIS — Z95.3 PRESENCE OF XENOGENIC HEART VALVE: ICD-10-CM

## 2023-03-22 DIAGNOSIS — Z80.9 FAMILY HISTORY OF MALIGNANT NEOPLASM, UNSPECIFIED: ICD-10-CM

## 2023-03-22 DIAGNOSIS — I35.0 NONRHEUMATIC AORTIC (VALVE) STENOSIS: ICD-10-CM

## 2023-03-22 DIAGNOSIS — N30.41 IRRADIATION CYSTITIS WITH HEMATURIA: ICD-10-CM

## 2023-03-22 DIAGNOSIS — I45.10 UNSPECIFIED RIGHT BUNDLE-BRANCH BLOCK: ICD-10-CM

## 2023-03-22 DIAGNOSIS — Z88.5 ALLERGY STATUS TO NARCOTIC AGENT: ICD-10-CM

## 2023-03-22 DIAGNOSIS — Z88.0 ALLERGY STATUS TO PENICILLIN: ICD-10-CM

## 2023-03-22 DIAGNOSIS — Z85.46 PERSONAL HISTORY OF MALIGNANT NEOPLASM OF PROSTATE: ICD-10-CM

## 2023-03-22 DIAGNOSIS — Z98.890 OTHER SPECIFIED POSTPROCEDURAL STATES: ICD-10-CM

## 2023-03-22 DIAGNOSIS — Z78.9 OTHER SPECIFIED HEALTH STATUS: ICD-10-CM
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
## 2023-03-22 LAB
A1C WITH ESTIMATED AVERAGE GLUCOSE RESULT: 5.4 % — SIGNIFICANT CHANGE UP (ref 4–5.6)
CRP SERPL-MCNC: <3 MG/L — SIGNIFICANT CHANGE UP
ERYTHROCYTE [SEDIMENTATION RATE] IN BLOOD: 30 MM/HR — HIGH (ref 0–20)
ESTIMATED AVERAGE GLUCOSE: 108 MG/DL — SIGNIFICANT CHANGE UP (ref 68–114)
PREALB SERPL-MCNC: 19 MG/DL — LOW (ref 20–40)

## 2023-03-22 PROCEDURE — 85652 RBC SED RATE AUTOMATED: CPT

## 2023-03-22 PROCEDURE — 83036 HEMOGLOBIN GLYCOSYLATED A1C: CPT

## 2023-03-22 PROCEDURE — 86140 C-REACTIVE PROTEIN: CPT

## 2023-03-22 PROCEDURE — 36415 COLL VENOUS BLD VENIPUNCTURE: CPT

## 2023-03-22 PROCEDURE — G0463: CPT

## 2023-03-22 PROCEDURE — 71046 X-RAY EXAM CHEST 2 VIEWS: CPT | Mod: 26

## 2023-03-22 PROCEDURE — 71046 X-RAY EXAM CHEST 2 VIEWS: CPT

## 2023-03-22 PROCEDURE — 84134 ASSAY OF PREALBUMIN: CPT

## 2023-03-22 PROCEDURE — 99203 OFFICE O/P NEW LOW 30 MIN: CPT

## 2023-03-22 RX ORDER — CHLORHEXIDINE GLUCONATE 4 %
LIQUID (ML) TOPICAL
Refills: 0 | Status: ACTIVE | COMMUNITY

## 2023-03-22 RX ORDER — RELUGOLIX 120 MG/1
120 TABLET, FILM COATED ORAL
Refills: 0 | Status: ACTIVE | COMMUNITY

## 2023-03-22 RX ORDER — BISMUTH SUBSALICYLATE 525 MG/30ML
200-200-20 LIQUID ORAL
Refills: 0 | Status: ACTIVE | COMMUNITY

## 2023-03-22 RX ORDER — POLYETHYLENE GLYCOL 3350 17 G/17G
17 POWDER, FOR SOLUTION ORAL
Refills: 0 | Status: ACTIVE | COMMUNITY

## 2023-03-22 RX ORDER — FLUTICASONE PROPIONATE 50 UG/1
50 SPRAY, METERED NASAL
Refills: 0 | Status: ACTIVE | COMMUNITY

## 2023-03-22 RX ORDER — LEUPROLIDE ACETATE 11.25 MG
11.25 KIT INTRAMUSCULAR
Qty: 1 | Refills: 0 | Status: COMPLETED | COMMUNITY
Start: 2020-08-11 | End: 2023-03-22

## 2023-03-22 RX ORDER — ACETAMINOPHEN 650 MG/1
650 TABLET, EXTENDED RELEASE ORAL
Refills: 0 | Status: ACTIVE | COMMUNITY

## 2023-03-23 NOTE — PLAN
[FreeTextEntry1] : radiation cystitis with gross hematuria\par HBO 30 treatments @ 2.4 MARY ANN with repeat U/A\par CxR\par ENT for Hx of ear trauma\par return 1 week or sooner if CxR okay and cleared by ENT\par orientation for HBO given\par all questions answered\par 30 minutes for review,evaluation and treatment planning

## 2023-03-23 NOTE — HISTORY OF PRESENT ILLNESS
[FreeTextEntry1] : Pt went to [facility] on 1/31/23 passing clots in the urine. Sent to [facility] on 1/31/23 pt unable to urinate.\par PT Discharged on March 7 th from [facility] to [facility]. D/C on March 16th to home. MD Hoang referred pt to [facility] for Hyperbaric Treatments.  \par \par 3/22/23 patient is a 73 Y/O white male who had radiation for Prostate Ca in May of 2018 and has recently \par started to have gross hematuria and has had radiation proctitis with bleeding in the past. Patient also relates \par sound trauma to both eardrums with bleeding in the [place] and trouble clearing ears and painful during HBO treatments that he has had in the past.

## 2023-03-23 NOTE — REVIEW OF SYSTEMS
[Negative] : Heme/Lymph [FreeTextEntry5] : aortic valve stenosis, dyspnea on exertion [FreeTextEntry7] : radiation proctitis [FreeTextEntry8] : gross hematuria, radiation cystitis

## 2023-03-23 NOTE — PHYSICAL EXAM
[Normal Breath Sounds] : Normal breath sounds [Murmur] : murmur was appreciated  [Alert] : alert [Oriented to Person] : oriented to person [Oriented to Place] : oriented to place [Oriented to Time] : oriented to time [Calm] : calm [de-identified] : WD WN 71 Y/O white male in NAD [FreeTextEntry1] : No Open Wound Radiation Cystis   [de-identified] : No Product

## 2023-03-23 NOTE — ASSESSMENT
[FreeTextEntry1] : \par 72 yr m was evaluated for Hyperbaric treatment and is deemed a candidate for Hyperbaric oxygen therapy. PMHx of HTN, AS s/p TAVR, ppm, prostate cancer s/p radical \par prostatectomy c/b radiation proctitis  s/p pain and \par urinary retention,  s/p cystoscopy for hematuria about 5weeks ago by his \par urology at [facility]. He has had hematuria before approximately \par one year ago; underwent cystoscopy without complications \par s/p multiple rounds of radiation therapy (last one in 2018)with ultimate \par radical prostatectomy for prostate CA. He has been diagnosed with radiation \par proctitis. Patient's h&p was reviewed and patient was given an orientation of the suite and treatment. Patient was educated on the risks and benefits of the treatment. Patient read the consent and signed it prior to the initiation of any screening procedure. Patient had the opportunity to discuss any questions regarding the Hyperbaric oxygen therapy. Writer witnessed the signing of the consent and the patient was given a copy of the signed consent.\par \par Patient decided to get HBO therapy at [facility], because of the commute. \par Information given for [place] site for HBO evaluation\par Clear Chest Xray on file\par

## 2023-03-23 NOTE — ASSESSMENT
[Verbal] : Verbal [Written] : Written [Demo] : Demo [Patient] : Patient [Good - alert, interested, motivated] : Good - alert, interested, motivated [Verbalizes knowledge/Understanding] : Verbalizes knowledge/understanding [Signs and symptoms of infection] : sign and symptoms of infection [How and When to Call] : how and when to call [Stable] : stable [Other: ____] : [unfilled] [Ambulatory] : Ambulatory [Not Applicable - Long Term Care/Home Health Agency] : Long Term Care/Home Health Agency: Not Applicable [] : No [FreeTextEntry2] : Infection Prevention \par HBOT\par Chest Xray\par Lab work \par ENT Clearance  [FreeTextEntry3] : Initial Assessment [FreeTextEntry4] : Pt processed for HBOT. \par Completed: \par HBOT Consent\par Pre-procedure checklist. \par HBOT Auth (RADIATION CYSTITIS) \par 2.4 MARY ANN Order\par 2 V CXR\par \par TO BE COMPLETED \par Chamber time / start date\par ENT CLEARANCE\par \par \par Discussed sequence of treatment procedures and what to expect, e.g. pressure, temperature, noises, and wound care. \par Pt understanding of instructions and any return demonstrations. Provided patient with written education on hyperbaric oxygen therapy.

## 2023-03-23 NOTE — REASON FOR VISIT
[Consultation] : a consultation visit [Family Member] : family member [FreeTextEntry1] : Initial Assessment

## 2023-03-23 NOTE — HISTORY OF PRESENT ILLNESS
[FreeTextEntry1] : Mr. HERBIE KERR with hx of prostate cancer dx s/p radical prostectomy in 2015, 45 treatments of radiation completed in 2018,   presents to the office with evaluation for Hyperbaric oxygen therapy. Patient discharged from hospital march 16 2023 for gross hematuria, referred for hyperbaric therapy by dr. Bogdan Hoang urologist. \par  Metastatic Castrate Sensitive Prostate CA\par -- On ADT\par -- cont Relugolix\par -- outpt f/u Dr. Hutson at [facility]\par Spoke with Dr. Brandt, patient on Relugolix for suppression of PSI levels, but doesn’t have active cancer. Dr. 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 Bogdan Hoang\par   Anemia\par -- Hgb stable today\par -- sec to gross hematuria\par -- Completed IV iron therapy last Hemaglobin 9.6\par \par  PRE-OP DIAGNOSIS: \par Radiation cystitis 02-Mar-2023 13:37:52  Leobardo Shaffer \par Clot hematuria 02-Mar-2023 14:47:03  Leobardo Shaffer \par -  POST-OP DIAGNOSIS: \par Radiation cystitis 02-Mar-2023 14:47:19  Leobardo Shaffer \par Clot hematuria 02-Mar-2023 14:47:26  Leobardo Shaffer \par -  PROCEDURES: \par Continuous irrigation of bladder using 3-way Gr catheter 27-Feb-2023 \par 23:12:31  Vera Reeves \par Simple replacement of bladder catheter 27-Feb-2023 23:13:24  Vera Reeves \par Cystoscopy with fulguration of hemorrhaging blood vessel and evacuation of clot \par 02-Mar-2023 14:46:36  Leobardo Shaffer \par \par \par Operative Findings: \par - Operative Findings	large organized intramural clot, minimal bleeding at dome \par and bladder neck \par \par

## 2023-03-28 ENCOUNTER — APPOINTMENT (OUTPATIENT)
Dept: ORTHOPEDIC SURGERY | Facility: CLINIC | Age: 73
End: 2023-03-28
Payer: MEDICARE

## 2023-03-28 VITALS — BODY MASS INDEX: 28.06 KG/M2 | WEIGHT: 196 LBS | HEIGHT: 70 IN

## 2023-03-28 PROCEDURE — J3490M: CUSTOM

## 2023-03-28 PROCEDURE — 99213 OFFICE O/P EST LOW 20 MIN: CPT | Mod: 25

## 2023-03-28 PROCEDURE — 20611 DRAIN/INJ JOINT/BURSA W/US: CPT | Mod: 50

## 2023-03-28 NOTE — HISTORY OF PRESENT ILLNESS
[6] : 6 [Throbbing] : throbbing [Constant] : constant [Leisure] : leisure [Lying in bed] : lying in bed [Retired] : Work status: retired [de-identified] : 3/28/23:  Here for follow up.  His shoulders have been more painful recently.  He was also hospitalized for an issue related to the prostrate. \par \par 12/1/22: Here for follow up, he got about 2.5 months relief. \par \par 8/30/22: Here to f/u. Pain started to return approx 1 month ago.\par \par 5/17/22: Here for follow up.  \par \par 2/17/22: Here for follow up. He reports the injections are working but not lasting as long as he is used to.\par \par 11/2/21: Here for follow up, EMG review. His left shoulder is more painful.\par \par EMG b/l UE: R > L chronic C5, C6 radiculopathy, mild b/l CTS\par \par 8/3/21: Here for follow up. The left side is more painful today. He is complaining of some paresthesias in both arms today and some pain posteriorly.\par \par 5/4/21: Follow up bilateral shoulder. Injections last visit helped. He requests to repeat them today.\par \par 2/4/21: Here for follow up. Injections are still helping. He has some complications after his aortic valve procedure.\par \par 11/10/20: Here for follow up. He did get relief from the injections last visit. 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 During COVID his PT was cancelled. Waking him at night now. L is worse than the R. 3 months relief with prior injections.\par \par 1/28/20: Here for fu and repeat cortisone injection bilateral shoulders. PT helps - no new symptoms. 3 months relief with prior injections.\par \par 1/7/20: Here for follow up. he has been out of PT for the shoulders for 3 weeks. He has been dealing with a sinus infection.\par \par 10/8/19: Here for follow up. he did start PT. The shoulders feel achy.\par \par 7/9/19: Here for follow up. The shoulders have become more painful recently. He wants to consider PT next month. Right is more painful than the left.\par \par 4/2/19: Here for fu. Recurring R shoulder pain, was unable to do PT as planned. Now with L shoulder pain as well x 2-3 months. No injury recalled. Pain with motion. He is unable to lift things, uses caution carrying laundry. Concerned about loss of strength. Advil 400mg prn.\par \par 12/11/18: Here for follow up. He had the injection last visit. He recently had radiation for prostate cancer, just finished.\par \par 6/26/18: He returns for f/u right shoulder. He is having more pain in the shoulder.\par \par 4/24/18: He returns for f/u right shoulder. He continue to have some pain radiating into neck, overall feeling better. He has not been to PT recently.\par \par 1/23/18: Here for follow up. He has had some return of pain in the shoulder. He takes advil. He has been out of PT due to other issues but wants to start again soon.\par \par 12/12/17: Here for follow up. His shoulder feels good today. He has been in PT but recently stopped. He has only mild pain.\par \par 9/12/17: Here for follow up. He does notice some improvement with PT.\par \par 7/25/17: Here for follow up. Has been in PT weekly which is helping. 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 Pain persists intermittently with movement. Requesting injection.  Given subacromial injection.\par \par 6/6/17: Here for follow up. Has been in PT. Only significant pain with movement. Did get improvement with injection.\par \par 4/25/17: 67 y/o RHD male here for the r shoulder. Felt pain in the shoulder in 9/16 when carrying some groceries up the stairs. No specific trauma. Eventually got an MRI. He has been going to PT with some improvement. He also takes\par NSAIDs prn.\par \par MRI R shoulder:\par 1. Advanced rotator cuff tear with complete full-thickness discontinuity in a near complete full-thickness tear of infraspinatus is stable since the prior study. There is also persistent high-grade partial tearing of subscapularis and elongation of subscapularis tendon. No head is high riding. There is atrophy of supraspinatus and infraspinatus muscle bellies.\par 2. Chronic rupture and distal retraction long head of the biceps tendon\par 3. Mild degenerative tearing of the labrum.\par \par Prior MD Notes:\par 3/9/17: Patient is a 65 yo RHD male c/o right shoulder pain since Sept 2016 after he felt pain while carrying groceries. No n/t. Has been seeing Dr. Jennings who did Xrays, ordered MRI and started PT. PT giving some relief, but he would like to discuss his surgical options. Taking advil which gives some relief. No previous injuries or surgeries to right shoulder. Occupation: Veterans counselor for [employer] [] : no [FreeTextEntry1] : shoulders

## 2023-03-30 ENCOUNTER — APPOINTMENT (OUTPATIENT)
Dept: OTOLARYNGOLOGY | Facility: CLINIC | Age: 73
End: 2023-03-30
Payer: MEDICARE

## 2023-03-30 ENCOUNTER — NON-APPOINTMENT (OUTPATIENT)
Age: 73
End: 2023-03-30

## 2023-03-30 VITALS
BODY MASS INDEX: 28.06 KG/M2 | WEIGHT: 196 LBS | DIASTOLIC BLOOD PRESSURE: 76 MMHG | HEIGHT: 70 IN | HEART RATE: 72 BPM | SYSTOLIC BLOOD PRESSURE: 147 MMHG

## 2023-03-30 PROCEDURE — 99204 OFFICE O/P NEW MOD 45 MIN: CPT

## 2023-03-30 PROCEDURE — 92567 TYMPANOMETRY: CPT

## 2023-03-30 PROCEDURE — 92557 COMPREHENSIVE HEARING TEST: CPT

## 2023-03-30 NOTE — PHYSICAL EXAM
[Normal] : mucosa is normal [Midline] : trachea located in midline position [de-identified] : CI AS

## 2023-03-30 NOTE — ASSESSMENT
[FreeTextEntry1] : CI removed\par \par   mild to severe SNHL AU w left asymmetry 500-2000Hz w type Ad AU, ETD AU\par \par pt is cleared to start HBO and will re-evaluate for tube(s) if he's unable to tolerate it\par [facility]

## 2023-03-30 NOTE — REASON FOR VISIT
[Initial Consultation] : an initial consultation for [FreeTextEntry2] : Clearance for hyperbaric chamber

## 2023-03-30 NOTE — DATA REVIEWED
[de-identified] : type Ad tymps AU\par ETD AU\par mild to severe SNHL 250-8000 Hz AU\par *left asym .5-2kHz

## 2023-03-30 NOTE — REVIEW OF SYSTEMS
[Hearing Loss] : hearing loss [Lightheadedness] : lightheadedness [Negative] : Heme/Lymph [FreeTextEntry1] : muscle aches

## 2023-03-30 NOTE — HISTORY OF PRESENT ILLNESS
[de-identified] : 72 yr old male plans to start hyperbaric O2 for radiation prostatitis.  Had HBO in 2020, had otalgia, needed cardiac bypass surgery and AVR, never completed HBO at that time\[facility]\par -hx otitis, head trauma, FH\par +noise exp ( Kana Nam),\par

## 2023-04-04 ENCOUNTER — NON-APPOINTMENT (OUTPATIENT)
Age: 73
End: 2023-04-04

## 2023-04-05 ENCOUNTER — APPOINTMENT (OUTPATIENT)
Dept: HYPERBARIC MEDICINE | Facility: HOSPITAL | Age: 73
End: 2023-04-05

## 2023-04-06 ENCOUNTER — APPOINTMENT (OUTPATIENT)
Dept: HYPERBARIC MEDICINE | Facility: HOSPITAL | Age: 73
End: 2023-04-06

## 2023-04-07 ENCOUNTER — APPOINTMENT (OUTPATIENT)
Dept: HYPERBARIC MEDICINE | Facility: HOSPITAL | Age: 73
End: 2023-04-07

## 2023-04-10 ENCOUNTER — APPOINTMENT (OUTPATIENT)
Dept: HYPERBARIC MEDICINE | Facility: HOSPITAL | Age: 73
End: 2023-04-10

## 2023-04-11 ENCOUNTER — OUTPATIENT (OUTPATIENT)
Dept: OUTPATIENT SERVICES | Facility: HOSPITAL | Age: 73
LOS: 1 days | Discharge: HOME | End: 2023-04-11
Payer: MEDICARE

## 2023-04-11 ENCOUNTER — APPOINTMENT (OUTPATIENT)
Dept: HYPERBARIC MEDICINE | Facility: HOSPITAL | Age: 73
End: 2023-04-11
Payer: MEDICARE

## 2023-04-11 DIAGNOSIS — N30.41 IRRADIATION CYSTITIS WITH HEMATURIA: ICD-10-CM

## 2023-04-11 DIAGNOSIS — Z90.89 ACQUIRED ABSENCE OF OTHER ORGANS: Chronic | ICD-10-CM

## 2023-04-11 DIAGNOSIS — Z90.79 ACQUIRED ABSENCE OF OTHER GENITAL ORGAN(S): Chronic | ICD-10-CM

## 2023-04-11 PROCEDURE — 82962 GLUCOSE BLOOD TEST: CPT

## 2023-04-11 PROCEDURE — G0277: CPT

## 2023-04-11 PROCEDURE — 99183 HYPERBARIC OXYGEN THERAPY: CPT

## 2023-04-12 ENCOUNTER — NON-APPOINTMENT (OUTPATIENT)
Age: 73
End: 2023-04-12

## 2023-04-12 ENCOUNTER — APPOINTMENT (OUTPATIENT)
Dept: HYPERBARIC MEDICINE | Facility: HOSPITAL | Age: 73
End: 2023-04-12

## 2023-04-13 ENCOUNTER — APPOINTMENT (OUTPATIENT)
Dept: OTOLARYNGOLOGY | Facility: CLINIC | Age: 73
End: 2023-04-13
Payer: MEDICARE

## 2023-04-13 ENCOUNTER — APPOINTMENT (OUTPATIENT)
Dept: HYPERBARIC MEDICINE | Facility: HOSPITAL | Age: 73
End: 2023-04-13

## 2023-04-13 DIAGNOSIS — H74.8X1 OTHER SPECIFIED DISORDERS OF RIGHT MIDDLE EAR AND MASTOID: ICD-10-CM
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
## 2023-04-13 DIAGNOSIS — N30.41 IRRADIATION CYSTITIS WITH HEMATURIA: ICD-10-CM

## 2023-04-13 DIAGNOSIS — Z85.46 PERSONAL HISTORY OF MALIGNANT NEOPLASM OF PROSTATE: ICD-10-CM
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
## 2023-04-13 PROCEDURE — 92557 COMPREHENSIVE HEARING TEST: CPT

## 2023-04-13 PROCEDURE — 69433 CREATE EARDRUM OPENING: CPT | Mod: RT

## 2023-04-13 PROCEDURE — 92567 TYMPANOMETRY: CPT

## 2023-04-13 RX ORDER — CIPROFLOXACIN AND DEXAMETHASONE 3; 1 MG/ML; MG/ML
0.3-0.1 SUSPENSION/ DROPS AURICULAR (OTIC)
Qty: 1 | Refills: 2 | Status: ACTIVE | COMMUNITY
Start: 2023-04-13 | End: 1900-01-01

## 2023-04-13 NOTE — ASSESSMENT
[FreeTextEntry1] : M&T AD perfomred\par H2O precautions\par rx ciprodex\par \par Pt is medically cleared from an ENT perspective to resume hyperbaric oxygen treatment tomorrow, April 14.\par f/u 1-2 weeks

## 2023-04-14 ENCOUNTER — APPOINTMENT (OUTPATIENT)
Dept: HYPERBARIC MEDICINE | Facility: HOSPITAL | Age: 73
End: 2023-04-14
Payer: MEDICARE

## 2023-04-14 ENCOUNTER — OUTPATIENT (OUTPATIENT)
Dept: OUTPATIENT SERVICES | Facility: HOSPITAL | Age: 73
LOS: 1 days | Discharge: HOME | End: 2023-04-14
Payer: MEDICARE

## 2023-04-14 DIAGNOSIS — N30.41 IRRADIATION CYSTITIS WITH HEMATURIA: ICD-10-CM

## 2023-04-14 DIAGNOSIS — Z90.79 ACQUIRED ABSENCE OF OTHER GENITAL ORGAN(S): Chronic | ICD-10-CM

## 2023-04-14 PROCEDURE — 82962 GLUCOSE BLOOD TEST: CPT

## 2023-04-14 PROCEDURE — 99183 HYPERBARIC OXYGEN THERAPY: CPT

## 2023-04-14 PROCEDURE — G0277: CPT

## 2023-04-17 ENCOUNTER — OUTPATIENT (OUTPATIENT)
Dept: OUTPATIENT SERVICES | Facility: HOSPITAL | Age: 73
LOS: 1 days | Discharge: HOME | End: 2023-04-17
Payer: MEDICARE

## 2023-04-17 ENCOUNTER — APPOINTMENT (OUTPATIENT)
Dept: OTOLARYNGOLOGY | Facility: CLINIC | Age: 73
End: 2023-04-17

## 2023-04-17 ENCOUNTER — APPOINTMENT (OUTPATIENT)
Dept: HYPERBARIC MEDICINE | Facility: HOSPITAL | Age: 73
End: 2023-04-17
Payer: MEDICARE

## 2023-04-17 DIAGNOSIS — Z85.46 PERSONAL HISTORY OF MALIGNANT NEOPLASM OF PROSTATE: ICD-10-CM

## 2023-04-17 DIAGNOSIS — Z90.89 ACQUIRED ABSENCE OF OTHER ORGANS: Chronic | ICD-10-CM

## 2023-04-17 DIAGNOSIS — Z90.79 ACQUIRED ABSENCE OF OTHER GENITAL ORGAN(S): Chronic | ICD-10-CM
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
## 2023-04-17 DIAGNOSIS — N30.41 IRRADIATION CYSTITIS WITH HEMATURIA: ICD-10-CM
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
## 2023-04-17 PROCEDURE — G0277: CPT

## 2023-04-17 PROCEDURE — 82962 GLUCOSE BLOOD TEST: CPT

## 2023-04-17 PROCEDURE — 99183 HYPERBARIC OXYGEN THERAPY: CPT

## 2023-04-18 ENCOUNTER — APPOINTMENT (OUTPATIENT)
Dept: HYPERBARIC MEDICINE | Facility: HOSPITAL | Age: 73
End: 2023-04-18
Payer: MEDICARE

## 2023-04-18 ENCOUNTER — OUTPATIENT (OUTPATIENT)
Dept: OUTPATIENT SERVICES | Facility: HOSPITAL | Age: 73
LOS: 1 days | Discharge: HOME | End: 2023-04-18
Payer: MEDICARE

## 2023-04-18 DIAGNOSIS — N30.41 IRRADIATION CYSTITIS WITH HEMATURIA: ICD-10-CM

## 2023-04-18 DIAGNOSIS — Z90.89 ACQUIRED ABSENCE OF OTHER ORGANS: Chronic | ICD-10-CM

## 2023-04-18 PROCEDURE — 99183 HYPERBARIC OXYGEN THERAPY: CPT

## 2023-04-18 PROCEDURE — G0277: CPT

## 2023-04-18 PROCEDURE — 82962 GLUCOSE BLOOD TEST: CPT

## 2023-04-19 ENCOUNTER — APPOINTMENT (OUTPATIENT)
Dept: HYPERBARIC MEDICINE | Facility: HOSPITAL | Age: 73
End: 2023-04-19
Payer: MEDICARE

## 2023-04-19 ENCOUNTER — OUTPATIENT (OUTPATIENT)
Dept: OUTPATIENT SERVICES | Facility: HOSPITAL | Age: 73
LOS: 1 days | End: 2023-04-19
Payer: MEDICARE

## 2023-04-19 DIAGNOSIS — Z85.46 PERSONAL HISTORY OF MALIGNANT NEOPLASM OF PROSTATE: ICD-10-CM
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
## 2023-04-19 DIAGNOSIS — N30.41 IRRADIATION CYSTITIS WITH HEMATURIA: ICD-10-CM
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
## 2023-04-19 DIAGNOSIS — Z90.79 ACQUIRED ABSENCE OF OTHER GENITAL ORGAN(S): Chronic | ICD-10-CM

## 2023-04-19 DIAGNOSIS — Z90.89 ACQUIRED ABSENCE OF OTHER ORGANS: Chronic | ICD-10-CM

## 2023-04-19 PROCEDURE — 82962 GLUCOSE BLOOD TEST: CPT

## 2023-04-19 PROCEDURE — 99183 HYPERBARIC OXYGEN THERAPY: CPT

## 2023-04-20 ENCOUNTER — APPOINTMENT (OUTPATIENT)
Dept: HYPERBARIC MEDICINE | Facility: HOSPITAL | Age: 73
End: 2023-04-20
Payer: MEDICARE

## 2023-04-20 ENCOUNTER — OUTPATIENT (OUTPATIENT)
Dept: OUTPATIENT SERVICES | Facility: HOSPITAL | Age: 73
LOS: 1 days | Discharge: HOME | End: 2023-04-20
Payer: MEDICARE

## 2023-04-20 DIAGNOSIS — Z90.89 ACQUIRED ABSENCE OF OTHER ORGANS: Chronic | ICD-10-CM

## 2023-04-20 DIAGNOSIS — N30.41 IRRADIATION CYSTITIS WITH HEMATURIA: ICD-10-CM

## 2023-04-20 DIAGNOSIS — Z90.79 ACQUIRED ABSENCE OF OTHER GENITAL ORGAN(S): Chronic | ICD-10-CM

## 2023-04-20 PROCEDURE — 82962 GLUCOSE BLOOD TEST: CPT

## 2023-04-20 PROCEDURE — 99183 HYPERBARIC OXYGEN THERAPY: CPT

## 2023-04-20 PROCEDURE — G0277: CPT

## 2023-04-21 ENCOUNTER — APPOINTMENT (OUTPATIENT)
Dept: HYPERBARIC MEDICINE | Facility: HOSPITAL | Age: 73
End: 2023-04-21

## 2023-04-21 DIAGNOSIS — Z85.46 PERSONAL HISTORY OF MALIGNANT NEOPLASM OF PROSTATE: ICD-10-CM
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
## 2023-04-21 DIAGNOSIS — N30.41 IRRADIATION CYSTITIS WITH HEMATURIA: ICD-10-CM
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
## 2023-04-22 ENCOUNTER — NON-APPOINTMENT (OUTPATIENT)
Age: 73
End: 2023-04-22

## 2023-04-22 ENCOUNTER — OUTPATIENT (OUTPATIENT)
Dept: OUTPATIENT SERVICES | Facility: HOSPITAL | Age: 73
LOS: 1 days | Discharge: HOME | End: 2023-04-22
Payer: MEDICARE

## 2023-04-22 ENCOUNTER — APPOINTMENT (OUTPATIENT)
Dept: HYPERBARIC MEDICINE | Facility: HOSPITAL | Age: 73
End: 2023-04-22
Payer: MEDICARE

## 2023-04-22 VITALS
SYSTOLIC BLOOD PRESSURE: 170 MMHG | DIASTOLIC BLOOD PRESSURE: 74 MMHG | RESPIRATION RATE: 20 BRPM | OXYGEN SATURATION: 100 % | HEART RATE: 71 BPM | TEMPERATURE: 97.8 F

## 2023-04-22 VITALS
DIASTOLIC BLOOD PRESSURE: 76 MMHG | OXYGEN SATURATION: 95 % | TEMPERATURE: 97.5 F | SYSTOLIC BLOOD PRESSURE: 159 MMHG | HEART RATE: 75 BPM | RESPIRATION RATE: 20 BRPM
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
## 2023-04-22 DIAGNOSIS — Z90.89 ACQUIRED ABSENCE OF OTHER ORGANS: Chronic | ICD-10-CM

## 2023-04-22 DIAGNOSIS — Z85.46 PERSONAL HISTORY OF MALIGNANT NEOPLASM OF PROSTATE: ICD-10-CM

## 2023-04-22 DIAGNOSIS — Z90.79 ACQUIRED ABSENCE OF OTHER GENITAL ORGAN(S): Chronic | ICD-10-CM

## 2023-04-22 DIAGNOSIS — N30.41 IRRADIATION CYSTITIS WITH HEMATURIA: ICD-10-CM

## 2023-04-22 PROCEDURE — G0277: CPT

## 2023-04-22 PROCEDURE — 99183 HYPERBARIC OXYGEN THERAPY: CPT

## 2023-04-22 PROCEDURE — 82962 GLUCOSE BLOOD TEST: CPT

## 2023-04-24 ENCOUNTER — APPOINTMENT (OUTPATIENT)
Dept: HYPERBARIC MEDICINE | Facility: HOSPITAL | Age: 73
End: 2023-04-24
Payer: MEDICARE

## 2023-04-24 ENCOUNTER — OUTPATIENT (OUTPATIENT)
Dept: OUTPATIENT SERVICES | Facility: HOSPITAL | Age: 73
LOS: 1 days | Discharge: HOME | End: 2023-04-24
Payer: MEDICARE

## 2023-04-24 VITALS
DIASTOLIC BLOOD PRESSURE: 71 MMHG | OXYGEN SATURATION: 95 % | SYSTOLIC BLOOD PRESSURE: 153 MMHG | HEART RATE: 70 BPM | RESPIRATION RATE: 18 BRPM | TEMPERATURE: 97.9 F

## 2023-04-24 VITALS
HEART RATE: 74 BPM | DIASTOLIC BLOOD PRESSURE: 82 MMHG | RESPIRATION RATE: 16 BRPM | SYSTOLIC BLOOD PRESSURE: 172 MMHG | TEMPERATURE: 98 F | OXYGEN SATURATION: 96 %

## 2023-04-24 DIAGNOSIS — N30.41 IRRADIATION CYSTITIS WITH HEMATURIA: ICD-10-CM

## 2023-04-24 DIAGNOSIS — Z85.46 PERSONAL HISTORY OF MALIGNANT NEOPLASM OF PROSTATE: ICD-10-CM

## 2023-04-24 DIAGNOSIS — Z90.79 ACQUIRED ABSENCE OF OTHER GENITAL ORGAN(S): Chronic | ICD-10-CM
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
## 2023-04-24 DIAGNOSIS — Z90.89 ACQUIRED ABSENCE OF OTHER ORGANS: Chronic | ICD-10-CM
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
## 2023-04-24 PROCEDURE — G0277: CPT

## 2023-04-24 PROCEDURE — 99183 HYPERBARIC OXYGEN THERAPY: CPT

## 2023-04-24 PROCEDURE — 82962 GLUCOSE BLOOD TEST: CPT

## 2023-04-24 NOTE — ASSESSMENT
[No change from previous assessment] : No change from previous assessment [Patient prepared for dive] : Patient prepared for dive [Patient undergoing HBO treatment for __________] : Patient undergoing HBO treatment for [unfilled] [No chest pain, shortness of breath, or ear pain] :  No chest pain, shortness of breath, or ear pain  [Tolerating dive well] : Tolerating dive well [No] : No [Clinically Stable] : Clinically stable [0] : 0 out of 10

## 2023-04-25 ENCOUNTER — OUTPATIENT (OUTPATIENT)
Dept: OUTPATIENT SERVICES | Facility: HOSPITAL | Age: 73
LOS: 1 days | Discharge: HOME | End: 2023-04-25
Payer: MEDICARE

## 2023-04-25 ENCOUNTER — APPOINTMENT (OUTPATIENT)
Dept: HYPERBARIC MEDICINE | Facility: HOSPITAL | Age: 73
End: 2023-04-25
Payer: MEDICARE

## 2023-04-25 VITALS
OXYGEN SATURATION: 98 % | SYSTOLIC BLOOD PRESSURE: 150 MMHG | DIASTOLIC BLOOD PRESSURE: 70 MMHG | TEMPERATURE: 98.1 F | HEART RATE: 70 BPM | RESPIRATION RATE: 20 BRPM

## 2023-04-25 VITALS
RESPIRATION RATE: 18 BRPM | DIASTOLIC BLOOD PRESSURE: 90 MMHG | TEMPERATURE: 97.8 F | HEART RATE: 72 BPM | SYSTOLIC BLOOD PRESSURE: 176 MMHG | OXYGEN SATURATION: 100 %
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
## 2023-04-25 DIAGNOSIS — Z90.89 ACQUIRED ABSENCE OF OTHER ORGANS: Chronic | ICD-10-CM

## 2023-04-25 DIAGNOSIS — N30.41 IRRADIATION CYSTITIS WITH HEMATURIA: ICD-10-CM

## 2023-04-25 DIAGNOSIS — Z90.79 ACQUIRED ABSENCE OF OTHER GENITAL ORGAN(S): Chronic | ICD-10-CM

## 2023-04-25 DIAGNOSIS — Z85.46 PERSONAL HISTORY OF MALIGNANT NEOPLASM OF PROSTATE: ICD-10-CM
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
## 2023-04-25 PROCEDURE — G0277: CPT

## 2023-04-25 PROCEDURE — 82962 GLUCOSE BLOOD TEST: CPT

## 2023-04-25 PROCEDURE — 99183 HYPERBARIC OXYGEN THERAPY: CPT

## 2023-04-25 NOTE — ED ADULT NURSE NOTE - NSFALLRSKPASTHIST_ED_ALL_ED
no

## 2023-04-26 ENCOUNTER — OUTPATIENT (OUTPATIENT)
Dept: OUTPATIENT SERVICES | Facility: HOSPITAL | Age: 73
LOS: 1 days | Discharge: HOME | End: 2023-04-26
Payer: MEDICARE

## 2023-04-26 ENCOUNTER — APPOINTMENT (OUTPATIENT)
Dept: HYPERBARIC MEDICINE | Facility: HOSPITAL | Age: 73
End: 2023-04-26
Payer: MEDICARE

## 2023-04-26 VITALS
DIASTOLIC BLOOD PRESSURE: 73 MMHG | TEMPERATURE: 98 F | RESPIRATION RATE: 16 BRPM | SYSTOLIC BLOOD PRESSURE: 172 MMHG | OXYGEN SATURATION: 100 % | HEART RATE: 70 BPM

## 2023-04-26 VITALS
DIASTOLIC BLOOD PRESSURE: 87 MMHG | OXYGEN SATURATION: 97 % | RESPIRATION RATE: 18 BRPM | TEMPERATURE: 97.7 F | HEART RATE: 77 BPM | SYSTOLIC BLOOD PRESSURE: 135 MMHG

## 2023-04-26 DIAGNOSIS — Z90.79 ACQUIRED ABSENCE OF OTHER GENITAL ORGAN(S): Chronic | ICD-10-CM

## 2023-04-26 DIAGNOSIS — N30.41 IRRADIATION CYSTITIS WITH HEMATURIA: ICD-10-CM

## 2023-04-26 DIAGNOSIS — Z90.89 ACQUIRED ABSENCE OF OTHER ORGANS: Chronic | ICD-10-CM
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
## 2023-04-26 DIAGNOSIS — Z85.46 PERSONAL HISTORY OF MALIGNANT NEOPLASM OF PROSTATE: ICD-10-CM
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
## 2023-04-26 PROCEDURE — 99183 HYPERBARIC OXYGEN THERAPY: CPT

## 2023-04-26 PROCEDURE — G0277: CPT

## 2023-04-26 NOTE — PROCEDURE
[Patient demonstrated and verbalized proper technique for using air break mask] : Patient demonstrated and verbalized proper technique for using air break mask [Patient educated on the risks of SMOKING prior to HBOT with understanding] : Patient educated on the risks of SMOKING prior to HBOT with understanding [Patient educated on the risks of CONSUMING ALCOHOL prior to HBOT with understanding] : Patient educated on the risks of CONSUMING ALCOHOL prior to HBOT with understanding [100% Cotton] : 100% cotton [Empty all pockets] : empty all pockets [No hair oils, wigs, hairpieces, pins] : no hair oils, wigs, hairpieces, pins  [Pre tx medications] : pre tx medications  [No make-up, creams] : no make-up, creams  [No jewelry] : no jewelry  [No matches, cigarettes, lighters] : no matches, cigarettes, lighters  [Hearing aid removed] : hearing aid removed [Dentures removed] : dentures removed [Ground bracelet on pt's wrist] : ground bracelet on pt's wrist  [Contacts removed] : contacts removed  [Remove nail polish] : remove nail polish  [No reading material] : no reading material  [Bra, undergarments removed] : bra, undergarments removed  [No contraindicated dressings] : no contraindicated dressings [Ground Wire - VISUAL Verification - Intact/Free of Obstruction] : Ground Wire - VISUAL Verification - Intact/Free of Obstruction  [Ground Continuity - Verified < 1 ohm w/ Wrist Strap Chrissy] : Ground Continuity - Verified < 1 ohm w/ Wrist Strap Chrissy [Number: ___] : Number: [unfilled] [Diagnosis: ___] : Diagnosis: [unfilled] [Clear all fields] : clear all fields [____] : Post-Dive: Time - [unfilled] [___] : Post-Dive: Value - [unfilled] mg/dL [] : No [FreeTextEntry4] : 101 [FreeTextEntry6] : 3401 [FreeTextEntry8] : 9089 [de-identified] : 1037 [de-identified] : 1044 [de-identified] : 111 [de-identified] : 1119 [de-identified] : 6109 [de-identified] : 2321 [de-identified] : 119

## 2023-04-26 NOTE — ADDENDUM
[FreeTextEntry1] : PT A&OX3 AND AMBULATING UNASSISTED INTO HYPERBARIC SUITE \par PT ORDER VERIFIED PRIOR TO TREATMENT\par PT CONTRAINDICATION LIST AND PRE CHECK LIST VERIFIED AND SIGNED BY TECH PRIOR TO TREATMENT\par PT VITALS WERE WITHIN PARAMETERS FOR HBOT \par PT DESCENDED TO 2.4 MARY ANN VIA 9 MINUTE COMPRESSION PROFILE IN CHAMBER #[id number]\par PT RESTING AT TXP DEPTH WITH OBSERVED CHEST RISE BY HT SEATED CHAMBERSIDE\par \par Pt Care transferred to .\par \par Patient resting comfortably at RX depth. \par Patient began ascending to surface at a 9 minute interval w/o intervention or discomfort. \par Patient vitals reassessed post-treatment to reveal stable vitals for departure. \par Patient exited HBOT suite safely. \par \par \par \par

## 2023-04-27 ENCOUNTER — APPOINTMENT (OUTPATIENT)
Dept: OTOLARYNGOLOGY | Facility: CLINIC | Age: 73
End: 2023-04-27
Payer: MEDICARE

## 2023-04-27 ENCOUNTER — OUTPATIENT (OUTPATIENT)
Dept: OUTPATIENT SERVICES | Facility: HOSPITAL | Age: 73
LOS: 1 days | Discharge: HOME | End: 2023-04-27
Payer: MEDICARE

## 2023-04-27 ENCOUNTER — APPOINTMENT (OUTPATIENT)
Dept: HYPERBARIC MEDICINE | Facility: HOSPITAL | Age: 73
End: 2023-04-27
Payer: MEDICARE

## 2023-04-27 VITALS — DIASTOLIC BLOOD PRESSURE: 70 MMHG | SYSTOLIC BLOOD PRESSURE: 152 MMHG

## 2023-04-27 VITALS
DIASTOLIC BLOOD PRESSURE: 101 MMHG | SYSTOLIC BLOOD PRESSURE: 186 MMHG | RESPIRATION RATE: 16 BRPM | HEART RATE: 70 BPM | OXYGEN SATURATION: 100 % | TEMPERATURE: 97.8 F

## 2023-04-27 VITALS
WEIGHT: 195 LBS | HEART RATE: 72 BPM | SYSTOLIC BLOOD PRESSURE: 146 MMHG | DIASTOLIC BLOOD PRESSURE: 80 MMHG | BODY MASS INDEX: 27.92 KG/M2 | HEIGHT: 70 IN

## 2023-04-27 VITALS
RESPIRATION RATE: 16 BRPM | TEMPERATURE: 97.2 F | HEART RATE: 70 BPM | SYSTOLIC BLOOD PRESSURE: 175 MMHG | OXYGEN SATURATION: 97 % | DIASTOLIC BLOOD PRESSURE: 88 MMHG

## 2023-04-27 VITALS — SYSTOLIC BLOOD PRESSURE: 178 MMHG | DIASTOLIC BLOOD PRESSURE: 80 MMHG

## 2023-04-27 DIAGNOSIS — H65.22 CHRONIC SEROUS OTITIS MEDIA, LEFT EAR: ICD-10-CM

## 2023-04-27 DIAGNOSIS — Z90.89 ACQUIRED ABSENCE OF OTHER ORGANS: Chronic | ICD-10-CM

## 2023-04-27 DIAGNOSIS — H90.6 MIXED CONDUCTIVE AND SENSORINEURAL HEARING LOSS, BILATERAL: ICD-10-CM

## 2023-04-27 DIAGNOSIS — N30.41 IRRADIATION CYSTITIS WITH HEMATURIA: ICD-10-CM

## 2023-04-27 PROCEDURE — 82962 GLUCOSE BLOOD TEST: CPT

## 2023-04-27 PROCEDURE — 92567 TYMPANOMETRY: CPT

## 2023-04-27 PROCEDURE — G0277: CPT

## 2023-04-27 PROCEDURE — 99024 POSTOP FOLLOW-UP VISIT: CPT

## 2023-04-27 PROCEDURE — 92557 COMPREHENSIVE HEARING TEST: CPT

## 2023-04-27 PROCEDURE — 99183 HYPERBARIC OXYGEN THERAPY: CPT

## 2023-04-27 NOTE — PROCEDURE
[Outpatient] : Outpatient [Ambulatory] : Patient is ambulatory. [THIS CHAMBER HAS BEEN CLEANED / DISINFECTED] : This chamber has been cleaned / disinfected according to local and hospital policy and procedure prior to this treatment. [Patient demonstrated and verbalized proper technique for using air break mask] : Patient demonstrated and verbalized proper technique for using air break mask [Patient educated on the risks of SMOKING prior to HBOT with understanding] : Patient educated on the risks of SMOKING prior to HBOT with understanding [Patient educated on the risks of CONSUMING ALCOHOL prior to HBOT with understanding] : Patient educated on the risks of CONSUMING ALCOHOL prior to HBOT with understanding [100% Cotton] : 100% cotton [Empty all pockets] : empty all pockets [No hair oils, wigs, hairpieces, pins] : no hair oils, wigs, hairpieces, pins  [Pre tx medications] : pre tx medications  [No make-up, creams] : no make-up, creams  [No jewelry] : no jewelry  [No matches, cigarettes, lighters] : no matches, cigarettes, lighters  [Hearing aid removed] : hearing aid removed [Dentures removed] : dentures removed [Ground bracelet on pt's wrist] : ground bracelet on pt's wrist  [Contacts removed] : contacts removed  [Remove nail polish] : remove nail polish  [No reading material] : no reading material  [Bra, undergarments removed] : bra, undergarments removed  [No contraindicated dressings] : no contraindicated dressings [Ground Wire - VISUAL Verification - Intact/Free of Obstruction] : Ground Wire - VISUAL Verification - Intact/Free of Obstruction  [Ground Continuity - Verified < 1 ohm w/ Wrist Strap Chrissy] : Ground Continuity - Verified < 1 ohm w/ Wrist Strap Chrissy [Number: ___] : Number: [unfilled] [Diagnosis: ___] : Diagnosis: [unfilled] [____] : Post-Dive: Time - [unfilled] [___] : Post-Dive: Value - [unfilled] mg/dL [Clear all fields] : clear all fields [] : No [FreeTextEntry6] : 8352 [FreeTextEntry8] : 8265 [de-identified] : 9101 [de-identified] : 1997 [de-identified] : 2195 [de-identified] : 5476 [de-identified] : 5274 [de-identified] : 6816 [de-identified] : 119

## 2023-04-27 NOTE — DATA REVIEWED
[de-identified] : Tympanometry:\par AD= NP Large ECV of 6.6 indicates paten PET\par AS= Type C tymp\par Audiometry:\par AD= Mild conductive hearing loss up to 1kHz, sloping to a moderate to severe SNHL.\par AS= Essentially moderate to severe mixed hearing loss.\par *Note= Profound component at 8kHz, AU.\par

## 2023-04-27 NOTE — ADDENDUM
[FreeTextEntry1] : Patients Order Verified Prior to Treatment. \par Patients Legs and Back Elevated via Double Leg Wedge to Offload Patients Wound.\par \par Patient descended to 2.4 MARY ANN @ 2.2 PSI/MIN without incident in chamber #3.\par Patient resting comfortably @ depth, equal chest rise observed throughout treatment.\par \par PT NOTE HANDED OVER TO HT\par TECH OBSERVED PT FOR FACIAL TWITCHING AND CHEST RISE WHILE SEATED CHAMBERSIDE\par PT TOLERATED AIR BREAKS \par PT ASCENDED FROM TX DEPTH OF 2.4 MARY ANN VIA 9 MIN DECOMPRESSION PROFILE \par PT TOLERATED TREATMENT\par PT IS ORN AND HAS NO WOUND\par PT EXITED HYPERBARIC SUITE SAFELY ACCOMPANIED BY HT\par \par \par \par

## 2023-04-27 NOTE — ADDENDUM
[FreeTextEntry1] : Patient arrived ambulatory. Tx order verified. Patient descended to 2.4 MARY ANN over a normal 9 min compression profile without incident. Patient resting comfortably @ depth. Chest rise and fall observed throughout tx.\par \par PT HANDED OFF TO TO HT \par PT TOLERATED AIR BREAKS \par PT ASCENDED FROM TX DEPTH OF 2.4 MARY ANN VIA 9 MIN DECOMPRESSION PROFLIE CHAMBER #1\par PT TOLERATED TREATMENT\par PT OS ORN -- NO WOUND\par PT EXITED HYPERBARIC SUITE SAFELY ACCOMPANIED BY HT\par \par

## 2023-04-27 NOTE — HISTORY OF PRESENT ILLNESS
[de-identified] : 72 yr old male s/p M&T AD 4/13/2023, tolerating HBO since then.  Has some crackling in the ears, but no pain\par had otorrhea the day tube was placed, not since then\par using ciprodex

## 2023-04-27 NOTE — ASSESSMENT
[FreeTextEntry1] : d/c ciprodex\par \par   AD mild LF CHL then sloping to mod to severe MHL w no seal for tymp, AS mod to severe MHL w type C\par \par \par f/u after completion of HBO or sooner in pain occurs

## 2023-04-27 NOTE — PROCEDURE
[Outpatient] : Outpatient [Ambulatory] : Patient is ambulatory. [100% Cotton] : 100% cotton [Empty all pockets] : empty all pockets [No hair oils, wigs, hairpieces, pins] : no hair oils, wigs, hairpieces, pins  [Pre tx medications] : pre tx medications  [No make-up, creams] : no make-up, creams  [No jewelry] : no jewelry  [No matches, cigarettes, lighters] : no matches, cigarettes, lighters  [Hearing aid removed] : hearing aid removed [Dentures removed] : dentures removed [Ground bracelet on pt's wrist] : ground bracelet on pt's wrist  [Contacts removed] : contacts removed  [Remove nail polish] : remove nail polish  [No reading material] : no reading material  [Bra, undergarments removed] : bra, undergarments removed  [No contraindicated dressings] : no contraindicated dressings [Ground Wire - VISUAL Verification - Intact/Free of Obstruction] : Ground Wire - VISUAL Verification - Intact/Free of Obstruction  [Ground Continuity - Verified < 1 ohm w/ Wrist Strap Chrissy] : Ground Continuity - Verified < 1 ohm w/ Wrist Strap Chrissy [Patient demonstrated and verbalized proper technique for using air break mask] : Patient demonstrated and verbalized proper technique for using air break mask [Patient educated on the risks of SMOKING prior to HBOT with understanding] : Patient educated on the risks of SMOKING prior to HBOT with understanding [Patient educated on the risks of CONSUMING ALCOHOL prior to HBOT with understanding] : Patient educated on the risks of CONSUMING ALCOHOL prior to HBOT with understanding [THIS CHAMBER HAS BEEN CLEANED / DISINFECTED] : This chamber has been cleaned / disinfected according to local and hospital policy and procedure prior to this treatment. [____] : Post-Dive: Time - [unfilled] [___] : Post-Dive: Value - [unfilled] mg/dL [Clear all fields] : clear all fields [Number: ___] : Number: [unfilled] [Diagnosis: ___] : Diagnosis: [unfilled] [] : No [FreeTextEntry4] : 90 [FreeTextEntry6] : 0691 [FreeTextEntry8] : 0968 [de-identified] : 1059 [de-identified] : 1105 [de-identified] : 0401 [de-identified] : 6804 [de-identified] : 8396 [de-identified] : 2520 [de-identified] : 117

## 2023-04-28 ENCOUNTER — APPOINTMENT (OUTPATIENT)
Dept: HYPERBARIC MEDICINE | Facility: HOSPITAL | Age: 73
End: 2023-04-28

## 2023-04-28 DIAGNOSIS — N30.41 IRRADIATION CYSTITIS WITH HEMATURIA: ICD-10-CM
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
## 2023-04-28 DIAGNOSIS — Z85.46 PERSONAL HISTORY OF MALIGNANT NEOPLASM OF PROSTATE: ICD-10-CM
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
## 2023-04-28 NOTE — PROCEDURE
[Outpatient] : Outpatient [Ambulatory] : Patient is ambulatory. [THIS CHAMBER HAS BEEN CLEANED / DISINFECTED] : This chamber has been cleaned / disinfected according to local and hospital policy and procedure prior to this treatment. [Number: ___] : Number: [unfilled] [Diagnosis: ___] : Diagnosis: [unfilled] [Patient demonstrated and verbalized proper technique for using air break mask] : Patient demonstrated and verbalized proper technique for using air break mask [Patient educated on the risks of SMOKING prior to HBOT with understanding] : Patient educated on the risks of SMOKING prior to HBOT with understanding [Patient educated on the risks of CONSUMING ALCOHOL prior to HBOT with understanding] : Patient educated on the risks of CONSUMING ALCOHOL prior to HBOT with understanding [100% Cotton] : 100% cotton [Empty all pockets] : empty all pockets [No hair oils, wigs, hairpieces, pins] : no hair oils, wigs, hairpieces, pins  [Pre tx medications] : pre tx medications  [No make-up, creams] : no make-up, creams  [No jewelry] : no jewelry  [No matches, cigarettes, lighters] : no matches, cigarettes, lighters  [Hearing aid removed] : hearing aid removed [Dentures removed] : dentures removed [Ground bracelet on pt's wrist] : ground bracelet on pt's wrist  [Contacts removed] : contacts removed  [Remove nail polish] : remove nail polish  [No reading material] : no reading material  [Bra, undergarments removed] : bra, undergarments removed  [No contraindicated dressings] : no contraindicated dressings [Ground Wire - VISUAL Verification - Intact/Free of Obstruction] : Ground Wire - VISUAL Verification - Intact/Free of Obstruction  [Ground Continuity - Verified < 1 ohm w/ Wrist Strap Chrissy] : Ground Continuity - Verified < 1 ohm w/ Wrist Strap Chrisys [____] : Pre-Dive: Time - [unfilled] [___] : Pre-Dive: Value - [unfilled] mg/dL [Clear all fields] : clear all fields [] : No [FreeTextEntry4] : 101 [FreeTextEntry6] : 1017 [FreeTextEntry8] : 1027 [de-identified] : 8880 [de-identified] : 4074 [de-identified] : 1121 [de-identified] : 1036 [de-identified] : 1200 [de-identified] : 4078 [de-identified] : 119

## 2023-04-28 NOTE — ADDENDUM
[FreeTextEntry1] : Patient ambulated to HBOT suite safely.\par Patient is A&Ox3.\par Patient vitals assessed to reveal stable values for treatment.\par Patient dive orders verified prior to treatment.\par CHAMBER 4 RUN #1014\par Patient descended to 2.4 MARY ANN over a 9 minute interval with no discomfort or intervention.\par Patient resting comfortably at Rx depth with equal and adequate chest rise and fall noted throughout treatment.\par Patient observed and utilized 2 air breaks @ 5 mins each.\par Patient then ascended to surface without discomfort or intervention\par Patient vitals reassessed post-treatment to reveal an elevated BP. RN notified and advised to perform it manually.\par Upon reassessment, manual BP findings were stable for departure, \par Patient ambulated from the HBOT suite safely.\par \par

## 2023-05-01 ENCOUNTER — APPOINTMENT (OUTPATIENT)
Dept: HYPERBARIC MEDICINE | Facility: HOSPITAL | Age: 73
End: 2023-05-01
Payer: MEDICARE

## 2023-05-01 ENCOUNTER — OUTPATIENT (OUTPATIENT)
Dept: OUTPATIENT SERVICES | Facility: HOSPITAL | Age: 73
LOS: 1 days | Discharge: HOME | End: 2023-05-01
Payer: MEDICARE

## 2023-05-01 VITALS
OXYGEN SATURATION: 97 % | TEMPERATURE: 97.8 F | DIASTOLIC BLOOD PRESSURE: 74 MMHG | RESPIRATION RATE: 18 BRPM | HEART RATE: 70 BPM | SYSTOLIC BLOOD PRESSURE: 159 MMHG

## 2023-05-01 VITALS
SYSTOLIC BLOOD PRESSURE: 149 MMHG | RESPIRATION RATE: 18 BRPM | TEMPERATURE: 97.8 F | DIASTOLIC BLOOD PRESSURE: 77 MMHG | HEART RATE: 70 BPM | OXYGEN SATURATION: 97 %

## 2023-05-01 DIAGNOSIS — N30.41 IRRADIATION CYSTITIS WITH HEMATURIA: ICD-10-CM
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
## 2023-05-01 DIAGNOSIS — Z85.46 PERSONAL HISTORY OF MALIGNANT NEOPLASM OF PROSTATE: ICD-10-CM

## 2023-05-01 DIAGNOSIS — Z90.79 ACQUIRED ABSENCE OF OTHER GENITAL ORGAN(S): Chronic | ICD-10-CM
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
## 2023-05-01 DIAGNOSIS — Z90.89 ACQUIRED ABSENCE OF OTHER ORGANS: Chronic | ICD-10-CM

## 2023-05-01 LAB
APPEARANCE UR: CLEAR — SIGNIFICANT CHANGE UP
BACTERIA # UR AUTO: ABNORMAL
BILIRUB UR-MCNC: NEGATIVE — SIGNIFICANT CHANGE UP
COLOR SPEC: YELLOW — SIGNIFICANT CHANGE UP
DIFF PNL FLD: NEGATIVE — SIGNIFICANT CHANGE UP
EPI CELLS # UR: SIGNIFICANT CHANGE UP
GLUCOSE UR QL: NEGATIVE — SIGNIFICANT CHANGE UP
KETONES UR-MCNC: NEGATIVE — SIGNIFICANT CHANGE UP
LEUKOCYTE ESTERASE UR-ACNC: ABNORMAL
NITRITE UR-MCNC: NEGATIVE — SIGNIFICANT CHANGE UP
PH UR: 6.5 — SIGNIFICANT CHANGE UP (ref 5–8)
PROT UR-MCNC: NEGATIVE — SIGNIFICANT CHANGE UP
RBC CASTS # UR COMP ASSIST: SIGNIFICANT CHANGE UP /HPF (ref 0–4)
SP GR SPEC: 1.01 — SIGNIFICANT CHANGE UP (ref 1.01–1.02)
UROBILINOGEN FLD QL: NEGATIVE — SIGNIFICANT CHANGE UP
WBC UR QL: SIGNIFICANT CHANGE UP

## 2023-05-01 PROCEDURE — G0277: CPT

## 2023-05-01 PROCEDURE — 82962 GLUCOSE BLOOD TEST: CPT

## 2023-05-01 PROCEDURE — 99183 HYPERBARIC OXYGEN THERAPY: CPT

## 2023-05-01 PROCEDURE — 81001 URINALYSIS AUTO W/SCOPE: CPT

## 2023-05-01 NOTE — ADDENDUM
[FreeTextEntry1] : PT ARRIVED AMBULATORY A&OX4.\par ALL VITALS WITHIN PARAMETERS FOR HBOT.\par PT DESCENT TO RX TX DEPTH IN CHAMBER 3 WAS WITHOUT INCIDENT.\par PT RESTING AT DEPTH, CHEST RISE AND FALL OBSERVED.\par THE PT TOLERATED AIR BREAKS WELL.\par PT ASCENT WAS WITHOUT INCIDENT. HBOT TOLERATED WELL.

## 2023-05-01 NOTE — PROCEDURE
[] : No [FreeTextEntry4] : 100 MIN [FreeTextEntry6] : 10:14 [FreeTextEntry8] : 10:23 [de-identified] : 10:53 [de-identified] : 10:58 [de-identified] : 11:28 [de-identified] : 11:33 [de-identified] : 12:03 [de-identified] : 12:12 [de-identified] : 119 MIN

## 2023-05-02 ENCOUNTER — APPOINTMENT (OUTPATIENT)
Dept: UROLOGY | Facility: CLINIC | Age: 73
End: 2023-05-02

## 2023-05-02 ENCOUNTER — APPOINTMENT (OUTPATIENT)
Dept: HYPERBARIC MEDICINE | Facility: HOSPITAL | Age: 73
End: 2023-05-02

## 2023-05-02 ENCOUNTER — APPOINTMENT (OUTPATIENT)
Dept: UROLOGY | Facility: CLINIC | Age: 73
End: 2023-05-02
Payer: MEDICARE

## 2023-05-02 VITALS
HEIGHT: 70 IN | BODY MASS INDEX: 28.92 KG/M2 | HEART RATE: 96 BPM | DIASTOLIC BLOOD PRESSURE: 75 MMHG | WEIGHT: 202 LBS | TEMPERATURE: 97.2 F | RESPIRATION RATE: 16 BRPM | SYSTOLIC BLOOD PRESSURE: 182 MMHG

## 2023-05-02 PROCEDURE — 99215 OFFICE O/P EST HI 40 MIN: CPT | Mod: 25

## 2023-05-02 PROCEDURE — 51798 US URINE CAPACITY MEASURE: CPT

## 2023-05-02 NOTE — HISTORY OF PRESENT ILLNESS
[FreeTextEntry1] : Salvador Sy presents to the office today.  He is a 72-year-old man who is here today for an urgent visit for gross hematuria.  I had met him at [facility] in March of this year.  He had been transferred from [facility] after developing gross hematuria.  He had undergone cystoscopy with attempted clot removal and has been found to have radiation cystitis.  He was treated with Alum bladder irrigation for several days in the hospital and his hematuria did resolve and ultimately his catheter was removed.  He had received some blood transfusions during the course of these hematuria episodes.  He had called the office urgently this morning after feeling suprapubic pressure consistent with what he had felt previously when he was in urinary retention.  He was added to the office schedule today for urgent evaluation.\par \par Upon his initial visit here in the office, he was able to pass a few small blood clots and then he was able to relieve his bladder.  He felt relief of the pressure that he had felt at home and felt much improved.  He denies fevers or chills.  He has no lightheadedness or dizziness.  No nausea or vomiting.\par \par History is notable for prior radical prostatectomy around 2015.  He was subsequently treated with radiation as well but since developed metastatic disease and his PSA had gone up to around 6 ng/mL.  He is now treated with Orgovyx and his PSA had decreased significantly to around 0.6 by his report.  [place] medical onc[place] [facility] [place]

## 2023-05-02 NOTE — ASSESSMENT
[FreeTextEntry1] : The patient will continue to have oncologic follow-up with the medical oncologist given that he has been treated for metastatic prostate cancer.  He seems to have had an appropriate PSA response to the oral GnRH antagonist.\par \par I scanned his bladder today to confirm he was not in any significant retention and his bladder did not appear to be empty with 0 mL residual.  The urine sample collected in the office today was clear yellow without any visible blood.  This was reassuring to me that there was likely no significant clot burden within the bladder and I elected not to perform any catheterizations today given the lack of retention or clear evidence of a significant clot burden.\par \par He is currently undergoing hyperbaric oxygen therapy at [facility].  He has been recommended to do so by me in the hospital setting to help treat his radiation cystitis and proctitis.  He will continue to move through the course of treatments which are likely going to finish around the end of May or early June.  I will plan to see him again toward the end of this coming summer but if he does feel any additional issues of retention in the interim I will see him back at any time.  I also sent his urine for culture today to rule out the potential for infection.  I will be in touch with him with that result once available.

## 2023-05-02 NOTE — HISTORY OF PRESENT ILLNESS
[FreeTextEntry1] : Salvador Sy presents to the office today.  He is a 72-year-old man who is here today for an urgent visit for gross hematuria.  I had met him at [facility] in March of this year.  He had been transferred from [facility] after developing gross hematuria.  He had undergone cystoscopy with attempted clot removal and has been found to have radiation cystitis.  He was treated with Alum bladder irrigation for several days in the hospital and his hematuria did resolve and ultimately his catheter was removed.  He had received some blood transfusions during the course of these hematuria episodes.  He had called the office urgently this morning after feeling suprapubic pressure consistent with what he had felt previously when he was in urinary retention.  He was added to the office schedule today for urgent evaluation.\par \par Upon his initial visit here in the office, he was able to pass a few small blood clots and then he was able to relieve his bladder.  He felt relief of the pressure that he had felt at home and felt much improved.  He denies fevers or chills.  He has no lightheadedness or dizziness.  No nausea or vomiting.\par \par History is notable for prior radical prostatectomy around 2015.  He was subsequently treated with radiation as well but since developed metastatic disease and his PSA had gone up to around 6 ng/mL.  He is now treated with Orgovyx and his PSA had decreased significantly to around 0.6 by his report.  [place] medical [place] [facility] [place]

## 2023-05-02 NOTE — ASSESSMENT
[FreeTextEntry1] : The patient will continue to have oncologic follow-up with the medical oncologist given that he has been treated for metastatic prostate cancer.  He seems to have had an appropriate PSA response to the oral GnRH antagonist.\par \par I scanned his bladder today to confirm he was not in any significant retention and his bladder did not appear to be empty with 0 mL residual.  The urine sample collected in the office today was clear yellow without any visible blood.  This was reassuring to me that there was likely no significant clot burden within the bladder and I elected not to perform any catheterizations today given the lack of retention or clear evidence of a significant clot burden.\par \par He is currently undergoing hyperbaric oxygen therapy at [facility].  He has been recommended to do so by me in the hospital setting to help treat his radiation cystitis and proctitis.  He will continue to move through the course of treatments which are likely going to finish around the end of May or early June.  I will plan to see him again toward the end of this coming summer but if he does feel any additional issues of retention in the interim I will see him back at any time.  I also sent his urine for culture today to rule out the potential for infection.  I will be in touch with him with that result once available.

## 2023-05-03 ENCOUNTER — NON-APPOINTMENT (OUTPATIENT)
Age: 73
End: 2023-05-03

## 2023-05-03 ENCOUNTER — APPOINTMENT (OUTPATIENT)
Dept: HYPERBARIC MEDICINE | Facility: HOSPITAL | Age: 73
End: 2023-05-03

## 2023-05-04 ENCOUNTER — APPOINTMENT (OUTPATIENT)
Dept: WOUND CARE | Facility: HOSPITAL | Age: 73
End: 2023-05-04
Payer: MEDICARE

## 2023-05-04 ENCOUNTER — APPOINTMENT (OUTPATIENT)
Dept: HYPERBARIC MEDICINE | Facility: HOSPITAL | Age: 73
End: 2023-05-04
Payer: MEDICARE

## 2023-05-04 ENCOUNTER — APPOINTMENT (OUTPATIENT)
Dept: OTOLARYNGOLOGY | Facility: CLINIC | Age: 73
End: 2023-05-04
Payer: MEDICARE

## 2023-05-04 ENCOUNTER — OUTPATIENT (OUTPATIENT)
Dept: OUTPATIENT SERVICES | Facility: HOSPITAL | Age: 73
LOS: 1 days | Discharge: HOME | End: 2023-05-04
Payer: MEDICARE

## 2023-05-04 VITALS — DIASTOLIC BLOOD PRESSURE: 78 MMHG | SYSTOLIC BLOOD PRESSURE: 174 MMHG

## 2023-05-04 VITALS — SYSTOLIC BLOOD PRESSURE: 170 MMHG | DIASTOLIC BLOOD PRESSURE: 78 MMHG

## 2023-05-04 VITALS
RESPIRATION RATE: 18 BRPM | TEMPERATURE: 97.6 F | HEART RATE: 70 BPM | DIASTOLIC BLOOD PRESSURE: 77 MMHG | SYSTOLIC BLOOD PRESSURE: 184 MMHG | OXYGEN SATURATION: 96 %

## 2023-05-04 VITALS
WEIGHT: 202 LBS | HEART RATE: 69 BPM | HEIGHT: 70 IN | DIASTOLIC BLOOD PRESSURE: 77 MMHG | BODY MASS INDEX: 28.92 KG/M2 | SYSTOLIC BLOOD PRESSURE: 131 MMHG

## 2023-05-04 VITALS
SYSTOLIC BLOOD PRESSURE: 192 MMHG | RESPIRATION RATE: 18 BRPM | DIASTOLIC BLOOD PRESSURE: 84 MMHG | HEART RATE: 70 BPM | TEMPERATURE: 97.8 F | OXYGEN SATURATION: 100 %

## 2023-05-04 DIAGNOSIS — N30.41 IRRADIATION CYSTITIS WITH HEMATURIA: ICD-10-CM

## 2023-05-04 DIAGNOSIS — Z90.79 ACQUIRED ABSENCE OF OTHER GENITAL ORGAN(S): Chronic | ICD-10-CM

## 2023-05-04 DIAGNOSIS — H69.83 OTHER SPECIFIED DISORDERS OF EUSTACHIAN TUBE, BILATERAL: ICD-10-CM

## 2023-05-04 DIAGNOSIS — Z90.89 ACQUIRED ABSENCE OF OTHER ORGANS: Chronic | ICD-10-CM

## 2023-05-04 PROCEDURE — G0277: CPT

## 2023-05-04 PROCEDURE — 99214 OFFICE O/P EST MOD 30 MIN: CPT

## 2023-05-04 PROCEDURE — 92557 COMPREHENSIVE HEARING TEST: CPT

## 2023-05-04 PROCEDURE — 92567 TYMPANOMETRY: CPT

## 2023-05-04 PROCEDURE — 99183 HYPERBARIC OXYGEN THERAPY: CPT

## 2023-05-04 PROCEDURE — 82962 GLUCOSE BLOOD TEST: CPT

## 2023-05-04 PROCEDURE — 99213 OFFICE O/P EST LOW 20 MIN: CPT

## 2023-05-04 NOTE — HISTORY OF PRESENT ILLNESS
[FreeTextEntry1] : Pt went to [facility] on 1/31/23 passing clots in the urine. Sent to [facility] on 1/31/23 pt unable to urinate.\par PT Discharged on March 7 th from [facility] to [facility]. D/C on March 16th to home. MD Hoang referred pt to [facility] for Hyperbaric Treatments.  \par \par 3/22/23 patient is a 73 Y/O white male who had radiation for Prostate Ca in May of 2018 and has recently \par started to have gross hematuria and has had radiation proctitis with bleeding in the past. Patient also relates \par sound trauma to both eardrums with bleeding in the [place] and trouble clearing ears and painful during HBO treatments that he has had in the past.

## 2023-05-04 NOTE — PHYSICAL EXAM
[Normal Breath Sounds] : Normal breath sounds [Murmur] : murmur was appreciated  [Alert] : alert [Oriented to Person] : oriented to person [Oriented to Place] : oriented to place [Oriented to Time] : oriented to time [Calm] : calm [de-identified] : WD WN 71 Y/O white male in NAD [FreeTextEntry1] : Radiation Cystitis  [de-identified] : Treatment: HBOT

## 2023-05-04 NOTE — PLAN
[FreeTextEntry1] : radiation cystitis with gross hematuria\par patient has seen ENT several times for various ear problems but has been repeatedly cleared to dive\par Last U/A from 5/1/23 was WNL but following day had passed a clot\par will continue HBO\par repeat U/A 5/15/23\par all questions answered\par 20 minutes for review,evaluation and treatment planning

## 2023-05-04 NOTE — ASSESSMENT
[] : No [FreeTextEntry2] : Infection Prevention\par HBOT [FreeTextEntry4] : 13/30 HBOT completed to date.\par U/A reviewed by MD\par F/u daily for HBOT.\par Assessment in 2 weeks

## 2023-05-04 NOTE — ASSESSMENT
[No change from previous assessment] : No change from previous assessment [Patient prepared for dive] : Patient prepared for dive [Patient undergoing HBO treatment for __________] : Patient undergoing HBO treatment for [unfilled] [Patient descended without problem for 9 minutes] : Patient descended without problem for 9 minutes [No dizziness or thirst] :  No dizziness or thirst [No ear problems] : No ear problems [Vital signs stable] : Vital signs stable [Tolerating dive well] : Tolerating dive well [No Chest Pain, shortness of breath] : No Chest Pain, shortness of breath [Respiratory Rate Stable] : Respiratory Rate Stable [No chest pain, shortness of breath, or ear pain] :  No chest pain, shortness of breath, or ear pain  [Tolerated Ascent well] : Tolerated Ascent well [Vital Signs stable] : Vital Signs stable [A physician was present throughout the entire HBOT] : A physician was present throughout the entire HBOT [No] : No [Clinically Stable] : Clinically stable [Continue Treatment Plan] : Continue treatment plan [Verbal] : Verbal [Demo] : Demo [Patient] : Patient [Good - alert, interested, motivated] : Good - alert, interested, motivated [Verbalizes knowledge/Understanding] : Verbalizes knowledge/understanding [Signs and symptoms of infection] : sign and symptoms of infection [How and When to Call] : how and when to call [Hyperbaric Therapy] : hyperbaric therapy [Patient responsibility to plan of care] : patient responsibility to plan of care [] : Yes [Stable] : stable [Home] : Home [Ambulatory] : Ambulatory

## 2023-05-05 ENCOUNTER — OUTPATIENT (OUTPATIENT)
Dept: OUTPATIENT SERVICES | Facility: HOSPITAL | Age: 73
LOS: 1 days | Discharge: HOME | End: 2023-05-05
Payer: MEDICARE

## 2023-05-05 ENCOUNTER — NON-APPOINTMENT (OUTPATIENT)
Age: 73
End: 2023-05-05

## 2023-05-05 ENCOUNTER — APPOINTMENT (OUTPATIENT)
Dept: HYPERBARIC MEDICINE | Facility: HOSPITAL | Age: 73
End: 2023-05-05
Payer: MEDICARE

## 2023-05-05 VITALS
DIASTOLIC BLOOD PRESSURE: 76 MMHG | HEART RATE: 68 BPM | TEMPERATURE: 98.4 F | RESPIRATION RATE: 18 BRPM | SYSTOLIC BLOOD PRESSURE: 142 MMHG | OXYGEN SATURATION: 95 %

## 2023-05-05 VITALS
RESPIRATION RATE: 18 BRPM | TEMPERATURE: 98.4 F | HEART RATE: 70 BPM | SYSTOLIC BLOOD PRESSURE: 163 MMHG | DIASTOLIC BLOOD PRESSURE: 73 MMHG | OXYGEN SATURATION: 100 %

## 2023-05-05 DIAGNOSIS — Z80.9 FAMILY HISTORY OF MALIGNANT NEOPLASM, UNSPECIFIED: ICD-10-CM

## 2023-05-05 DIAGNOSIS — N30.41 IRRADIATION CYSTITIS WITH HEMATURIA: ICD-10-CM

## 2023-05-05 DIAGNOSIS — I44.2 ATRIOVENTRICULAR BLOCK, COMPLETE: ICD-10-CM
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
## 2023-05-05 DIAGNOSIS — I10 ESSENTIAL (PRIMARY) HYPERTENSION: ICD-10-CM

## 2023-05-05 DIAGNOSIS — Z85.46 PERSONAL HISTORY OF MALIGNANT NEOPLASM OF PROSTATE: ICD-10-CM

## 2023-05-05 DIAGNOSIS — Z90.79 ACQUIRED ABSENCE OF OTHER GENITAL ORGAN(S): Chronic | ICD-10-CM

## 2023-05-05 DIAGNOSIS — I45.10 UNSPECIFIED RIGHT BUNDLE-BRANCH BLOCK: ICD-10-CM

## 2023-05-05 DIAGNOSIS — I35.0 NONRHEUMATIC AORTIC (VALVE) STENOSIS: ICD-10-CM
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
## 2023-05-05 DIAGNOSIS — Z88.5 ALLERGY STATUS TO NARCOTIC AGENT: ICD-10-CM

## 2023-05-05 DIAGNOSIS — Z79.899 OTHER LONG TERM (CURRENT) DRUG THERAPY: ICD-10-CM

## 2023-05-05 DIAGNOSIS — Z95.0 PRESENCE OF CARDIAC PACEMAKER: ICD-10-CM

## 2023-05-05 DIAGNOSIS — Z95.3 PRESENCE OF XENOGENIC HEART VALVE: ICD-10-CM

## 2023-05-05 DIAGNOSIS — Z98.890 OTHER SPECIFIED POSTPROCEDURAL STATES: ICD-10-CM

## 2023-05-05 DIAGNOSIS — Z88.0 ALLERGY STATUS TO PENICILLIN: ICD-10-CM

## 2023-05-05 PROCEDURE — G0277: CPT

## 2023-05-05 PROCEDURE — 82962 GLUCOSE BLOOD TEST: CPT

## 2023-05-05 PROCEDURE — 99183 HYPERBARIC OXYGEN THERAPY: CPT

## 2023-05-05 NOTE — PROCEDURE
[Outpatient] : Outpatient [Ambulatory] : Patient is ambulatory. [THIS CHAMBER HAS BEEN CLEANED / DISINFECTED] : This chamber has been cleaned / disinfected according to local and hospital policy and procedure prior to this treatment. [Patient demonstrated and verbalized proper technique for using air break mask] : Patient demonstrated and verbalized proper technique for using air break mask [Patient educated on the risks of SMOKING prior to HBOT with understanding] : Patient educated on the risks of SMOKING prior to HBOT with understanding [Patient educated on the risks of CONSUMING ALCOHOL prior to HBOT with understanding] : Patient educated on the risks of CONSUMING ALCOHOL prior to HBOT with understanding [100% Cotton] : 100% cotton [Empty all pockets] : empty all pockets [No hair oils, wigs, hairpieces, pins] : no hair oils, wigs, hairpieces, pins  [Pre tx medications] : pre tx medications  [No make-up, creams] : no make-up, creams  [No jewelry] : no jewelry  [No matches, cigarettes, lighters] : no matches, cigarettes, lighters  [Hearing aid removed] : hearing aid removed [Dentures removed] : dentures removed [Ground bracelet on pt's wrist] : ground bracelet on pt's wrist  [Contacts removed] : contacts removed  [Remove nail polish] : remove nail polish  [No reading material] : no reading material  [Bra, undergarments removed] : bra, undergarments removed  [No contraindicated dressings] : no contraindicated dressings [Ground Wire - VISUAL Verification - Intact/Free of Obstruction] : Ground Wire - VISUAL Verification - Intact/Free of Obstruction  [Ground Continuity - Verified < 1 ohm w/ Wrist Strap Chrissy] : Ground Continuity - Verified < 1 ohm w/ Wrist Strap Chrissy [Number: ___] : Number: [unfilled] [Diagnosis: ___] : Diagnosis: [unfilled] [____] : Recheck: Time - [unfilled] [___] : Recheck: Value - [unfilled] mg/dL [Clear all fields] : clear all fields [] : No [FreeTextEntry4] : 101 [FreeTextEntry6] : 6509 [FreeTextEntry8] : 5087 [de-identified] : 9829 [de-identified] : 9190 [de-identified] : 8125 [de-identified] : 0954 [de-identified] : 9349 [de-identified] : 3092 [de-identified] : 119

## 2023-05-05 NOTE — ADDENDUM
[FreeTextEntry1] : Patient ambulaated into HBOT suite.\par Patient vitals assessed prior to treatment to reveal an elevated BP, retaken via manual BP cuff to reveal values suitable for treatment.\par Patient BGL taken to reveal hypoglycemic values for treatment, one intervention given (2 apple juices totalling 16G glucose) 15min interval given between retest. Retest revealed a 3 point increase in value, MD notified, MD advised to give one more juice (8G glucose) and to treat patient without retest.\par Patient dive order verified prior to treatment. \par CHAMBER 3 RUN # 5114\par Patient descended to 2.4 MARY ANN without discomfort or intervention required.\par Patient resting comfortably at Rx depth with equal and adequate shest rise and fall noted throughout treatment.\par Patient observed and utilized 2 air breaks lasting 5 mins each.\par Patient ascended to surface over a nine minute interval without discomfort or intervention required.\par Patient vitals assessed post-treatment to reveal an elevated BP, RN advised. Manual BP taken to reveal stable values for departure.\par Patient exited HBOT suite safely.\par \par

## 2023-05-06 ENCOUNTER — APPOINTMENT (OUTPATIENT)
Dept: HYPERBARIC MEDICINE | Facility: HOSPITAL | Age: 73
End: 2023-05-06
Payer: MEDICARE

## 2023-05-06 ENCOUNTER — OUTPATIENT (OUTPATIENT)
Dept: OUTPATIENT SERVICES | Facility: HOSPITAL | Age: 73
LOS: 1 days | Discharge: HOME | End: 2023-05-06
Payer: MEDICARE

## 2023-05-06 ENCOUNTER — NON-APPOINTMENT (OUTPATIENT)
Age: 73
End: 2023-05-06

## 2023-05-06 VITALS — DIASTOLIC BLOOD PRESSURE: 80 MMHG | SYSTOLIC BLOOD PRESSURE: 170 MMHG

## 2023-05-06 VITALS — DIASTOLIC BLOOD PRESSURE: 80 MMHG | SYSTOLIC BLOOD PRESSURE: 172 MMHG

## 2023-05-06 VITALS
OXYGEN SATURATION: 98 % | HEART RATE: 71 BPM | DIASTOLIC BLOOD PRESSURE: 78 MMHG | TEMPERATURE: 97.9 F | RESPIRATION RATE: 16 BRPM | SYSTOLIC BLOOD PRESSURE: 182 MMHG

## 2023-05-06 VITALS
RESPIRATION RATE: 16 BRPM | SYSTOLIC BLOOD PRESSURE: 156 MMHG | HEART RATE: 70 BPM | OXYGEN SATURATION: 95 % | DIASTOLIC BLOOD PRESSURE: 77 MMHG | TEMPERATURE: 97.9 F

## 2023-05-06 DIAGNOSIS — Z90.79 ACQUIRED ABSENCE OF OTHER GENITAL ORGAN(S): Chronic | ICD-10-CM

## 2023-05-06 DIAGNOSIS — A49.9 URINARY TRACT INFECTION, SITE NOT SPECIFIED: ICD-10-CM

## 2023-05-06 DIAGNOSIS — N30.41 IRRADIATION CYSTITIS WITH HEMATURIA: ICD-10-CM

## 2023-05-06 DIAGNOSIS — Z90.89 ACQUIRED ABSENCE OF OTHER ORGANS: Chronic | ICD-10-CM

## 2023-05-06 DIAGNOSIS — N39.0 URINARY TRACT INFECTION, SITE NOT SPECIFIED: ICD-10-CM

## 2023-05-06 LAB
APPEARANCE: ABNORMAL
BACTERIA UR CULT: ABNORMAL
BACTERIA: NEGATIVE /HPF
BILIRUBIN URINE: NEGATIVE
BLOOD URINE: ABNORMAL
CAST: 0 /LPF
COLOR: YELLOW
EPITHELIAL CELLS: 0 /HPF
GLUCOSE QUALITATIVE U: NEGATIVE MG/DL
KETONES URINE: NEGATIVE MG/DL
LEUKOCYTE ESTERASE URINE: NEGATIVE
MICROSCOPIC-UA: NORMAL
NITRITE URINE: NEGATIVE
PH URINE: 6
PROTEIN URINE: 30 MG/DL
RED BLOOD CELLS URINE: 69 /HPF
SPECIFIC GRAVITY URINE: 1.02
UROBILINOGEN URINE: 0.2 MG/DL
WHITE BLOOD CELLS URINE: 7 /HPF

## 2023-05-06 PROCEDURE — G0277: CPT

## 2023-05-06 PROCEDURE — 82962 GLUCOSE BLOOD TEST: CPT

## 2023-05-06 PROCEDURE — 99183 HYPERBARIC OXYGEN THERAPY: CPT

## 2023-05-06 NOTE — PHYSICAL EXAM
[Normal] : mucosa is normal [Midline] : trachea located in midline position [de-identified] : clear AS, TIPP AD

## 2023-05-06 NOTE — ASSESSMENT
[FreeTextEntry1] :   AD borderline WNL sloping to severe SNHL w no seal for tymp. AS ,mild sloping to severe SNHL w type A\par \par no evidence of fluid AS\par will resume HBO today and if he has any difficulty will return for M&T AS

## 2023-05-06 NOTE — HISTORY OF PRESENT ILLNESS
[de-identified] : 72 yr old male s/p M&T AD 4/13/2023, tolerating HBO since then.  Has some crackling in the ears, but no pain\par had otorrhea the day tube was placed, not since then\par \par c/o clog AS yesterday after blowing his nose,  didn't have HBO yesterday\par -otalgia, tinnitus\par

## 2023-05-06 NOTE — PROCEDURE
[Ambulatory] : Patient is ambulatory. [THIS CHAMBER HAS BEEN CLEANED / DISINFECTED] : This chamber has been cleaned / disinfected according to local and hospital policy and procedure prior to this treatment. [____] : Pre-Dive: Time - [unfilled] [___] : Pre-Dive: Value - [unfilled] mg/dL [Patient demonstrated and verbalized proper technique for using air break mask] : Patient demonstrated and verbalized proper technique for using air break mask [Patient educated on the risks of SMOKING prior to HBOT with understanding] : Patient educated on the risks of SMOKING prior to HBOT with understanding [Patient educated on the risks of CONSUMING ALCOHOL prior to HBOT with understanding] : Patient educated on the risks of CONSUMING ALCOHOL prior to HBOT with understanding [100% Cotton] : 100% cotton [Empty all pockets] : empty all pockets [No hair oils, wigs, hairpieces, pins] : no hair oils, wigs, hairpieces, pins  [Pre tx medications] : pre tx medications  [No make-up, creams] : no make-up, creams  [No jewelry] : no jewelry  [Hearing aid removed] : hearing aid removed [No matches, cigarettes, lighters] : no matches, cigarettes, lighters  [Dentures removed] : dentures removed [Ground bracelet on pt's wrist] : ground bracelet on pt's wrist  [Contacts removed] : contacts removed  [Remove nail polish] : remove nail polish  [No reading material] : no reading material  [Bra, undergarments removed] : bra, undergarments removed  [No contraindicated dressings] : no contraindicated dressings [Ground Wire - VISUAL Verification - Intact/Free of Obstruction] : Ground Wire - VISUAL Verification - Intact/Free of Obstruction  [Ground Continuity - Verified < 1 ohm w/ Wrist Strap Chrissy] : Ground Continuity - Verified < 1 ohm w/ Wrist Strap Chrissy [Number: ___] : Number: [unfilled] [Diagnosis: ___] : Diagnosis: [unfilled] [Outpatient] : Outpatient [Clear all fields] : clear all fields [] : No [FreeTextEnila6] : 428 [FreeTextEntry4] : 100 [FreeTextEntry8] : 8074 [de-identified] : 1038 [de-identified] : 1044 [de-identified] : 8237 [de-identified] : 1119 [de-identified] : 7583 [de-identified] : 9047 [de-identified] : 118

## 2023-05-06 NOTE — DATA REVIEWED
[de-identified] : - CNS FOR TYMP IN RIGHT, TYPE A IN LEFT\par RIGHT: BORDERLINE -500HZ SLOPING TO A SEVERE ESS SNHL\par LEFT: MILD SLOPING TO SEVERE ESS SNHL\par *SLIGHT ABGS NOTED AT 1KHZ AU - CONFIRMED W/ HEADPHONES\par LEFT ASYMM AT 500HZ, 1.5-2KHZ - C/W PREV AUDIOS
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
## 2023-05-07 DIAGNOSIS — Z85.46 PERSONAL HISTORY OF MALIGNANT NEOPLASM OF PROSTATE: ICD-10-CM

## 2023-05-07 DIAGNOSIS — N30.41 IRRADIATION CYSTITIS WITH HEMATURIA: ICD-10-CM

## 2023-05-08 ENCOUNTER — OUTPATIENT (OUTPATIENT)
Dept: OUTPATIENT SERVICES | Facility: HOSPITAL | Age: 73
LOS: 1 days | Discharge: HOME | End: 2023-05-08
Payer: MEDICARE

## 2023-05-08 ENCOUNTER — APPOINTMENT (OUTPATIENT)
Dept: HYPERBARIC MEDICINE | Facility: HOSPITAL | Age: 73
End: 2023-05-08
Payer: MEDICARE

## 2023-05-08 ENCOUNTER — NON-APPOINTMENT (OUTPATIENT)
Age: 73
End: 2023-05-08

## 2023-05-08 VITALS
SYSTOLIC BLOOD PRESSURE: 166 MMHG | TEMPERATURE: 98 F | RESPIRATION RATE: 16 BRPM | HEART RATE: 70 BPM | OXYGEN SATURATION: 100 % | DIASTOLIC BLOOD PRESSURE: 87 MMHG

## 2023-05-08 VITALS
HEART RATE: 71 BPM | RESPIRATION RATE: 16 BRPM | OXYGEN SATURATION: 98 % | DIASTOLIC BLOOD PRESSURE: 82 MMHG | TEMPERATURE: 97.6 F | SYSTOLIC BLOOD PRESSURE: 164 MMHG

## 2023-05-08 DIAGNOSIS — Z85.46 PERSONAL HISTORY OF MALIGNANT NEOPLASM OF PROSTATE: ICD-10-CM
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
## 2023-05-08 DIAGNOSIS — N30.41 IRRADIATION CYSTITIS WITH HEMATURIA: ICD-10-CM
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
## 2023-05-08 DIAGNOSIS — Z90.79 ACQUIRED ABSENCE OF OTHER GENITAL ORGAN(S): Chronic | ICD-10-CM

## 2023-05-08 PROCEDURE — 82962 GLUCOSE BLOOD TEST: CPT

## 2023-05-08 PROCEDURE — G0277: CPT

## 2023-05-08 PROCEDURE — 99183 HYPERBARIC OXYGEN THERAPY: CPT

## 2023-05-08 NOTE — PROCEDURE
[Outpatient] : Outpatient [Ambulatory] : Patient is ambulatory. [THIS CHAMBER HAS BEEN CLEANED / DISINFECTED] : This chamber has been cleaned / disinfected according to local and hospital policy and procedure prior to this treatment. [___] : Pre-Dive: Value - [unfilled] mg/dL [Patient demonstrated and verbalized proper technique for using air break mask] : Patient demonstrated and verbalized proper technique for using air break mask [Patient educated on the risks of SMOKING prior to HBOT with understanding] : Patient educated on the risks of SMOKING prior to HBOT with understanding [Patient educated on the risks of CONSUMING ALCOHOL prior to HBOT with understanding] : Patient educated on the risks of CONSUMING ALCOHOL prior to HBOT with understanding [100% Cotton] : 100% cotton [Empty all pockets] : empty all pockets [No hair oils, wigs, hairpieces, pins] : no hair oils, wigs, hairpieces, pins  [Pre tx medications] : pre tx medications  [No make-up, creams] : no make-up, creams  [No jewelry] : no jewelry  [No matches, cigarettes, lighters] : no matches, cigarettes, lighters  [Hearing aid removed] : hearing aid removed [Dentures removed] : dentures removed [Ground bracelet on pt's wrist] : ground bracelet on pt's wrist  [Contacts removed] : contacts removed  [Remove nail polish] : remove nail polish  [No reading material] : no reading material  [Bra, undergarments removed] : bra, undergarments removed  [No contraindicated dressings] : no contraindicated dressings [Ground Wire - VISUAL Verification - Intact/Free of Obstruction] : Ground Wire - VISUAL Verification - Intact/Free of Obstruction  [Ground Continuity - Verified < 1 ohm w/ Wrist Strap Chrissy] : Ground Continuity - Verified < 1 ohm w/ Wrist Strap Chrissy [Number: ___] : Number: [unfilled] [Diagnosis: ___] : Diagnosis: [unfilled] [____] : Post-Dive: Time - [unfilled] [Clear all fields] : clear all fields [] : No [FreeTextEntry4] : 100 MIN [FreeTextEntry6] : 09:25 [FreeTextEntry8] : 09:34 [de-identified] : 10:04 [de-identified] : 10:09 [de-identified] : 10:39 [de-identified] : 10:44 [de-identified] : 11:14 [de-identified] : 11:23 [de-identified] : 118 MIN

## 2023-05-08 NOTE — ADDENDUM
[FreeTextEntry1] : Patient ambulated into HBOT suite safely.\par Patient vitals assessed prior to treatment to reveal stable values for treatment.\par Patient dive order verified prior to descent.\par CHAMBER 4 RUN # 5142\par Patient descended to 2.4 MARY ANN over a nine minute compression without discomfort or intervention.\par Patient resting comfortably at Rx depth with equal and adequate chest rise and fall noted throughout.\par Patient observed and utilized 2 air breaks lasting 5 minutes each.\par Patient ascended to surface over a nine minute decompression without discomfort or intervention required.\par Patient vitals assessed post-treatment to reveal stable values for departure. \par Patient exited HBOT suite safely.\par

## 2023-05-08 NOTE — ASSESSMENT
[No change from previous assessment] : No change from previous assessment [Patient prepared for dive] : Patient prepared for dive [Patient undergoing HBO treatment for __________] : Patient undergoing HBO treatment for [unfilled] [Patient descended without problem for 9 minutes] : Patient descended without problem for 9 minutes [No dizziness or thirst] :  No dizziness or thirst [No ear problems] : No ear problems [Vital signs stable] : Vital signs stable [Tolerating dive well] : Tolerating dive well [No Chest Pain, shortness of breath] : No Chest Pain, shortness of breath [Respiratory Rate Stable] : Respiratory Rate Stable [No chest pain, shortness of breath, or ear pain] :  No chest pain, shortness of breath, or ear pain  [Tolerated Ascent well] : Tolerated Ascent well [A physician was present throughout the entire HBOT] : A physician was present throughout the entire HBOT [No] : No [Clinically Stable] : Clinically stable [Continue Treatment Plan] : Continue treatment plan [FreeTextEntry2] : none\par had elevated BP post treatment that improved

## 2023-05-08 NOTE — PROCEDURE
[Outpatient] : Outpatient [Ambulatory] : Patient is ambulatory. [THIS CHAMBER HAS BEEN CLEANED / DISINFECTED] : This chamber has been cleaned / disinfected according to local and hospital policy and procedure prior to this treatment. [____] : Pre-Dive: Time - [unfilled] [___] : Pre-Dive: Value - [unfilled] mg/dL [100% Cotton] : 100% cotton [Empty all pockets] : empty all pockets [No hair oils, wigs, hairpieces, pins] : no hair oils, wigs, hairpieces, pins  [Pre tx medications] : pre tx medications  [No make-up, creams] : no make-up, creams  [No jewelry] : no jewelry  [No matches, cigarettes, lighters] : no matches, cigarettes, lighters  [Hearing aid removed] : hearing aid removed [Dentures removed] : dentures removed [Ground bracelet on pt's wrist] : ground bracelet on pt's wrist  [Contacts removed] : contacts removed  [Remove nail polish] : remove nail polish  [No reading material] : no reading material  [Bra, undergarments removed] : bra, undergarments removed  [No contraindicated dressings] : no contraindicated dressings [Ground Wire - VISUAL Verification - Intact/Free of Obstruction] : Ground Wire - VISUAL Verification - Intact/Free of Obstruction  [Patient demonstrated and verbalized proper technique for using air break mask] : Patient demonstrated and verbalized proper technique for using air break mask [Patient educated on the risks of SMOKING prior to HBOT with understanding] : Patient educated on the risks of SMOKING prior to HBOT with understanding [Patient educated on the risks of CONSUMING ALCOHOL prior to HBOT with understanding] : Patient educated on the risks of CONSUMING ALCOHOL prior to HBOT with understanding [Number: ___] : Number: [unfilled] [Diagnosis: ___] : Diagnosis: [unfilled] [Ground Continuity - Verified < 1 ohm w/ Wrist Strap Chrissy] : Ground Continuity - Verified < 1 ohm w/ Wrist Strap Chrissy [Clear all fields] : clear all fields [] : No [FreeTextEntry4] : 101 [FreeTextEntry6] : 2264 [FreeTextEntry8] : 4094 [de-identified] : 1025 [de-identified] : 6118 [de-identified] : 1102 [de-identified] : 1100 [de-identified] : 3699 [de-identified] : 119 [de-identified] : 2233

## 2023-05-08 NOTE — ADDENDUM
[FreeTextEntry1] : Pt arrived to [facility] ambulatory\par CHT began preparation for HBO tx \par Pt observed with a HBO cleared depend on\par Pre screening checklist completed and negative by CHT\par Pt ordered verified pre HBOT\par Pt descended to tx depth 2.4 karrie @2.2 psi/min without incident in chamber #3.\par \par TRANSFER OF CARE TO [facility] FOR OBSERVATION\par PT TOLERATED AIR BREAKS WELL. \par PT ASCENT WAS WITHOUT INCIDENT. PT TOLERATED HBOT WELL.\par POST TX BP WAS ELEVATED WITH AUTOMATIC MACHINE. \par THE PT WAS PROVIDED ADDITIONAL TIME TO REST BEFORE RETEST, MANUAL BP REPORTED TO THE NURSE WHO RETESTED THE PTS BP IN THE DRESSING ROOM.\par THE PT WAS CLEARED TO LEAVE THE UNIT\par

## 2023-05-09 ENCOUNTER — APPOINTMENT (OUTPATIENT)
Dept: HYPERBARIC MEDICINE | Facility: HOSPITAL | Age: 73
End: 2023-05-09

## 2023-05-09 ENCOUNTER — APPOINTMENT (OUTPATIENT)
Dept: UROLOGY | Facility: CLINIC | Age: 73
End: 2023-05-09
Payer: MEDICARE

## 2023-05-09 PROCEDURE — 99214 OFFICE O/P EST MOD 30 MIN: CPT

## 2023-05-09 NOTE — HISTORY OF PRESENT ILLNESS
[FreeTextEntry1] : Salvador Sy returns to the office today.  He is 72 years old with history of radical prostatectomy as well as radiation therapy for management of prostate cancer.  He had been hospitalized recently with hemorrhagic cystitis.  He also has recently started on hyperbaric oxygen therapy.  His urine has now cleared and he has about 14 or 15 more treatments to go with the hyperbaric oxygen.  I had seen him last week in follow-up and checked his urine with a culture which showed the presence of Proteus vulgaris, 10-49,000 colonies.  I recommended treatment for this based on the fact that it may potentially increase his hematuria.  However, the bacteria was multidrug-resistant and there were no good oral options available for him.  I gave him a prescription for fosfomycin which she had been hesitant to use because he read one of the side effects was hematuria.  He is here today to discuss this as well as potential management of new onset urinary incontinence.  He says the incontinence has been present for the last several months.  He had undergone cystoscopy at an outside hospital when he was there with the hematuria before he was transferred to [place].

## 2023-05-09 NOTE — ADDENDUM
[FreeTextEntry1] : Patient arrived ambulating into HBOT suite A&Ox3.\par Patient vitals assessed and revealed stable values for treatment.\par Patient dive order verified prior to treatment.\par CHAMBER 3 RUN # [id number]\par Patient descended to 2.4 MARY ANN over a 9 minute compression with no discomfort or intervention.\par Patient resting comfortably at prescribed depth with equal and adquate chest rise and fall noted throughout treatment. \par \par Care transferred to [facility].\par \par Patient received 2 air breaks. Patient ascended form 2.4 MARY ANN over a normal 9 min decompression profile without incident. Patient tolerated treatment well.\par \par UA obtained post treatment and handed off to nurse for [place] entry.

## 2023-05-09 NOTE — ASSESSMENT
[FreeTextEntry1] : I suspect that the urinary incontinence may be related to the recent cystoscopy procedures which may have dilated the bladder neck resulting in the new onset incontinence.  We discussed how this may be the case after undergoing radiation therapy in the past.  For now I would recommend that he consider using pelvic floor physical therapy to manage this as this would be a for step towards resolution and while there are surgical options available to him, I would suggest the physical therapy be done first to try to minimize any leakage.\par \par He will plan to finish out the hyperbaric oxygen therapy and then resume the physical therapy.  This will be the plan in place for now.  I also encouraged him to use the fosfomycin as an untreated urinary tract infection may contribute to his urinary symptoms and hematuria.  He will let me know if he does not see improvement.\par \par I elected also to check a CBC today as he has had some intermittent lightheadedness which could be related to anemia post fairly significant gross hematuria.  His urine is now clear but if he does have any significant anemia it may warrant intervention.

## 2023-05-09 NOTE — PROCEDURE
[Outpatient] : Outpatient [Ambulatory] : Patient is ambulatory. [THIS CHAMBER HAS BEEN CLEANED / DISINFECTED] : This chamber has been cleaned / disinfected according to local and hospital policy and procedure prior to this treatment. [____] : Pre-Dive: Time - [unfilled] [___] : Pre-Dive: Value - [unfilled] mg/dL [Patient demonstrated and verbalized proper technique for using air break mask] : Patient demonstrated and verbalized proper technique for using air break mask [Patient educated on the risks of SMOKING prior to HBOT with understanding] : Patient educated on the risks of SMOKING prior to HBOT with understanding [Patient educated on the risks of CONSUMING ALCOHOL prior to HBOT with understanding] : Patient educated on the risks of CONSUMING ALCOHOL prior to HBOT with understanding [100% Cotton] : 100% cotton [Empty all pockets] : empty all pockets [No hair oils, wigs, hairpieces, pins] : no hair oils, wigs, hairpieces, pins  [Pre tx medications] : pre tx medications  [No make-up, creams] : no make-up, creams  [No jewelry] : no jewelry  [No matches, cigarettes, lighters] : no matches, cigarettes, lighters  [Hearing aid removed] : hearing aid removed [Dentures removed] : dentures removed [Ground bracelet on pt's wrist] : ground bracelet on pt's wrist  [Contacts removed] : contacts removed  [Remove nail polish] : remove nail polish  [No reading material] : no reading material  [Bra, undergarments removed] : bra, undergarments removed  [No contraindicated dressings] : no contraindicated dressings [Ground Wire - VISUAL Verification - Intact/Free of Obstruction] : Ground Wire - VISUAL Verification - Intact/Free of Obstruction  [Ground Continuity - Verified < 1 ohm w/ Wrist Strap Chrissy] : Ground Continuity - Verified < 1 ohm w/ Wrist Strap Chrissy [Number: ___] : Number: [unfilled] [Diagnosis: ___] : Diagnosis: [unfilled] [Clear all fields] : clear all fields [] : No [FreeTextEntry4] : 100 [FreeTextEntry6] : 1108 [FreeTextEntry8] : 1111 [de-identified] : 4538 [de-identified] : 9187 [de-identified] : 2070 [de-identified] : 9177 [de-identified] : 6994 [de-identified] : 9095 [de-identified] : 118 min

## 2023-05-10 ENCOUNTER — OUTPATIENT (OUTPATIENT)
Dept: OUTPATIENT SERVICES | Facility: HOSPITAL | Age: 73
LOS: 1 days | Discharge: HOME | End: 2023-05-10
Payer: MEDICARE

## 2023-05-10 ENCOUNTER — APPOINTMENT (OUTPATIENT)
Dept: HYPERBARIC MEDICINE | Facility: HOSPITAL | Age: 73
End: 2023-05-10
Payer: MEDICARE

## 2023-05-10 VITALS
DIASTOLIC BLOOD PRESSURE: 85 MMHG | TEMPERATURE: 98 F | HEART RATE: 70 BPM | OXYGEN SATURATION: 100 % | RESPIRATION RATE: 18 BRPM | SYSTOLIC BLOOD PRESSURE: 174 MMHG

## 2023-05-10 VITALS
SYSTOLIC BLOOD PRESSURE: 164 MMHG | HEART RATE: 73 BPM | RESPIRATION RATE: 18 BRPM | OXYGEN SATURATION: 95 % | TEMPERATURE: 97.7 F | DIASTOLIC BLOOD PRESSURE: 79 MMHG

## 2023-05-10 DIAGNOSIS — Z85.46 PERSONAL HISTORY OF MALIGNANT NEOPLASM OF PROSTATE: ICD-10-CM
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
## 2023-05-10 DIAGNOSIS — N30.41 IRRADIATION CYSTITIS WITH HEMATURIA: ICD-10-CM

## 2023-05-10 DIAGNOSIS — Z90.89 ACQUIRED ABSENCE OF OTHER ORGANS: Chronic | ICD-10-CM

## 2023-05-10 DIAGNOSIS — Z90.79 ACQUIRED ABSENCE OF OTHER GENITAL ORGAN(S): Chronic | ICD-10-CM
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
## 2023-05-10 PROCEDURE — 82962 GLUCOSE BLOOD TEST: CPT

## 2023-05-10 PROCEDURE — 99183 HYPERBARIC OXYGEN THERAPY: CPT

## 2023-05-10 PROCEDURE — G0277: CPT

## 2023-05-10 NOTE — ASSESSMENT
[Patient prepared for dive] : Patient prepared for dive [No dizziness or thirst] :  No dizziness or thirst [No ear problems] : No ear problems [Tolerating dive well] : Tolerating dive well [No Chest Pain, shortness of breath] : No Chest Pain, shortness of breath [No chest pain, shortness of breath, or ear pain] :  No chest pain, shortness of breath, or ear pain  [Tolerated Ascent well] : Tolerated Ascent well [Clinically Stable] : Clinically stable

## 2023-05-10 NOTE — ADDENDUM
[FreeTextEntry1] : PT A&OX3 AND AMBULATING UNASSISTED INTO HYPERBARIC SUITE \par PT ORDER VERIFIED PRIOR TO TREATMENT\par PT CONTRAINDICATION LIST AND PRE CHECK LIST VERIFIED AND SIGNED BY TECH PRIOR TO TREATMENT\par PTS BGL 95 NURSE INFORMED/MD NOTIFIED  AND PT WAS GIVE TWO CRANBERRY JUICES (16G SUGAR)\par PT BGL RECHECK AFTER 15 MIN WAS WITHIN LIMITS\par PT DESCENDED TO 2.4 MARY ANN VIA 9MIN COMPRESSION PROFILE IN CHAMBER #[id number]\par PT OBSERVED FOR FACIAL TWITCHING AND CHEST RISE BY  SEATED CHAMBERSIDE\par \par PT care transferred to  @ 1145\par  Patient ascended to surface without discomfort or intervention required.\par Patient vitals assessed post-treatment to reveal stable values for departure.\par Patient exited HBOT suite safely.\par \par

## 2023-05-10 NOTE — PROCEDURE
[Outpatient] : Outpatient [Ambulatory] : Patient is ambulatory. [THIS CHAMBER HAS BEEN CLEANED / DISINFECTED] : This chamber has been cleaned / disinfected according to local and hospital policy and procedure prior to this treatment. [Patient demonstrated and verbalized proper technique for using air break mask] : Patient demonstrated and verbalized proper technique for using air break mask [Patient educated on the risks of SMOKING prior to HBOT with understanding] : Patient educated on the risks of SMOKING prior to HBOT with understanding [Patient educated on the risks of CONSUMING ALCOHOL prior to HBOT with understanding] : Patient educated on the risks of CONSUMING ALCOHOL prior to HBOT with understanding [100% Cotton] : 100% cotton [Empty all pockets] : empty all pockets [No hair oils, wigs, hairpieces, pins] : no hair oils, wigs, hairpieces, pins  [Pre tx medications] : pre tx medications  [No make-up, creams] : no make-up, creams  [No jewelry] : no jewelry  [No matches, cigarettes, lighters] : no matches, cigarettes, lighters  [Hearing aid removed] : hearing aid removed [Dentures removed] : dentures removed [Ground bracelet on pt's wrist] : ground bracelet on pt's wrist  [Contacts removed] : contacts removed  [Remove nail polish] : remove nail polish  [No reading material] : no reading material  [Bra, undergarments removed] : bra, undergarments removed  [No contraindicated dressings] : no contraindicated dressings [Ground Wire - VISUAL Verification - Intact/Free of Obstruction] : Ground Wire - VISUAL Verification - Intact/Free of Obstruction  [Ground Continuity - Verified < 1 ohm w/ Wrist Strap Chrissy] : Ground Continuity - Verified < 1 ohm w/ Wrist Strap Chrissy [Number: ___] : Number: [unfilled] [Diagnosis: ___] : Diagnosis: [unfilled] [____] : Recheck: Time - [unfilled] [___] : Recheck: Value - [unfilled] mg/dL [Clear all fields] : clear all fields [I have examined and evaluated this patient today, including ears and lungs and have cleared the patient] : I have examined and evaluated this patient today, including ears and lungs and have cleared the patient to continue HBOT as prescribed.  [] : No [FreeTextEntry2] : CHAMBER 3 RUN #3101 [FreeTextEntry4] : 101 [FreeTextEntry6] : 8239 [FreeTextEntry8] : 1012 [de-identified] : 1048 [de-identified] : 4833 [de-identified] : 1126 [de-identified] : 1124 [de-identified] : 6330 [de-identified] : 4872 [de-identified] : 119

## 2023-05-11 ENCOUNTER — APPOINTMENT (OUTPATIENT)
Dept: HYPERBARIC MEDICINE | Facility: HOSPITAL | Age: 73
End: 2023-05-11
Payer: MEDICARE

## 2023-05-11 ENCOUNTER — OUTPATIENT (OUTPATIENT)
Dept: OUTPATIENT SERVICES | Facility: HOSPITAL | Age: 73
LOS: 1 days | Discharge: HOME | End: 2023-05-11
Payer: MEDICARE

## 2023-05-11 VITALS
TEMPERATURE: 98.4 F | HEART RATE: 70 BPM | OXYGEN SATURATION: 95 % | DIASTOLIC BLOOD PRESSURE: 77 MMHG | SYSTOLIC BLOOD PRESSURE: 161 MMHG | RESPIRATION RATE: 20 BRPM

## 2023-05-11 VITALS
TEMPERATURE: 98.1 F | HEART RATE: 72 BPM | SYSTOLIC BLOOD PRESSURE: 178 MMHG | RESPIRATION RATE: 20 BRPM | DIASTOLIC BLOOD PRESSURE: 89 MMHG | OXYGEN SATURATION: 99 %
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
## 2023-05-11 DIAGNOSIS — Z85.46 PERSONAL HISTORY OF MALIGNANT NEOPLASM OF PROSTATE: ICD-10-CM

## 2023-05-11 DIAGNOSIS — N30.41 IRRADIATION CYSTITIS WITH HEMATURIA: ICD-10-CM

## 2023-05-11 DIAGNOSIS — Z90.89 ACQUIRED ABSENCE OF OTHER ORGANS: Chronic | ICD-10-CM

## 2023-05-11 DIAGNOSIS — Z90.79 ACQUIRED ABSENCE OF OTHER GENITAL ORGAN(S): Chronic | ICD-10-CM

## 2023-05-11 LAB
BASOPHILS # BLD AUTO: 0.11 K/UL
BASOPHILS NFR BLD AUTO: 2.1 %
EOSINOPHIL # BLD AUTO: 0.19 K/UL
EOSINOPHIL NFR BLD AUTO: 3.5 %
HCT VFR BLD CALC: 37.4 %
HGB BLD-MCNC: 12.1 G/DL
IMM GRANULOCYTES NFR BLD AUTO: 0.4 %
LYMPHOCYTES # BLD AUTO: 1.31 K/UL
LYMPHOCYTES NFR BLD AUTO: 24.4 %
MAN DIFF?: NORMAL
MCHC RBC-ENTMCNC: 29.5 PG
MCHC RBC-ENTMCNC: 32.4 GM/DL
MCV RBC AUTO: 91.2 FL
MONOCYTES # BLD AUTO: 0.73 K/UL
MONOCYTES NFR BLD AUTO: 13.6 %
NEUTROPHILS # BLD AUTO: 3 K/UL
NEUTROPHILS NFR BLD AUTO: 56 %
PLATELET # BLD AUTO: 218 K/UL
RBC # BLD: 4.1 M/UL
RBC # FLD: 15.3 %
WBC # FLD AUTO: 5.36 K/UL

## 2023-05-11 PROCEDURE — 82962 GLUCOSE BLOOD TEST: CPT

## 2023-05-11 PROCEDURE — 99183 HYPERBARIC OXYGEN THERAPY: CPT

## 2023-05-11 PROCEDURE — G0277: CPT

## 2023-05-11 NOTE — ADDENDUM
[FreeTextEntry1] : Patients Order Verified Prior to Treatment. \par Patients Legs and Back Elevated via Double Leg Wedge to Offload Patients Wound.\par Patients Grounding Bracelet Tested for Safe Grounding Parameters Prior to Hyperbaric Treatment. Patients Grounding Bracelet Successfully Passed Safe Grounding Testing Prior to Hyperbaric Treatment.\par \par Patient descended to 2.4 MARY ANN @ 2.2 PSI/MIN without incident in chamber #4.\par Patient resting comfortably @ depth, equal chest rise observed throughout treatment.\par Patient tolerated both air breaks well.\par Patient ascended from 2.4 MARY ANN @ 2.2 PSI/MIN without incident in chamber #4.\par Patient tolerated treatment well.\par Patient Exited the Hyperbaric Suite Safely\par \par

## 2023-05-11 NOTE — PROCEDURE
[Outpatient] : Outpatient [Ambulatory] : Patient is ambulatory. [THIS CHAMBER HAS BEEN CLEANED / DISINFECTED] : This chamber has been cleaned / disinfected according to local and hospital policy and procedure prior to this treatment. [____] : Pre-Dive: Time - [unfilled] [___] : Pre-Dive: Value - [unfilled] mg/dL [Patient demonstrated and verbalized proper technique for using air break mask] : Patient demonstrated and verbalized proper technique for using air break mask [Patient educated on the risks of SMOKING prior to HBOT with understanding] : Patient educated on the risks of SMOKING prior to HBOT with understanding [Patient educated on the risks of CONSUMING ALCOHOL prior to HBOT with understanding] : Patient educated on the risks of CONSUMING ALCOHOL prior to HBOT with understanding [100% Cotton] : 100% cotton [Empty all pockets] : empty all pockets [No hair oils, wigs, hairpieces, pins] : no hair oils, wigs, hairpieces, pins  [Pre tx medications] : pre tx medications  [No make-up, creams] : no make-up, creams  [No jewelry] : no jewelry  [No matches, cigarettes, lighters] : no matches, cigarettes, lighters  [Hearing aid removed] : hearing aid removed [Dentures removed] : dentures removed [Ground bracelet on pt's wrist] : ground bracelet on pt's wrist  [Contacts removed] : contacts removed  [Remove nail polish] : remove nail polish  [No reading material] : no reading material  [Bra, undergarments removed] : bra, undergarments removed  [No contraindicated dressings] : no contraindicated dressings [Ground Wire - VISUAL Verification - Intact/Free of Obstruction] : Ground Wire - VISUAL Verification - Intact/Free of Obstruction  [Ground Continuity - Verified < 1 ohm w/ Wrist Strap Chrissy] : Ground Continuity - Verified < 1 ohm w/ Wrist Strap Chrissy [Number: ___] : Number: [unfilled] [Diagnosis: ___] : Diagnosis: [unfilled] [Clear all fields] : clear all fields [] : No [FreeTextEntry4] : 1000 [FreeTextEntry6] : 5002 [FreeTextEntry8] : 4131 [de-identified] : 7694 [de-identified] : 9959 [de-identified] : 1105 [de-identified] : 1104 [de-identified] : 2880 [de-identified] : 0399 [de-identified] : 119mins

## 2023-05-12 ENCOUNTER — APPOINTMENT (OUTPATIENT)
Dept: HYPERBARIC MEDICINE | Facility: HOSPITAL | Age: 73
End: 2023-05-12

## 2023-05-12 ENCOUNTER — NON-APPOINTMENT (OUTPATIENT)
Age: 73
End: 2023-05-12

## 2023-05-15 ENCOUNTER — OUTPATIENT (OUTPATIENT)
Dept: OUTPATIENT SERVICES | Facility: HOSPITAL | Age: 73
LOS: 1 days | Discharge: HOME | End: 2023-05-15
Payer: MEDICARE

## 2023-05-15 ENCOUNTER — APPOINTMENT (OUTPATIENT)
Dept: HYPERBARIC MEDICINE | Facility: HOSPITAL | Age: 73
End: 2023-05-15
Payer: MEDICARE

## 2023-05-15 VITALS
SYSTOLIC BLOOD PRESSURE: 172 MMHG | DIASTOLIC BLOOD PRESSURE: 89 MMHG | TEMPERATURE: 97.9 F | OXYGEN SATURATION: 100 % | HEART RATE: 70 BPM | RESPIRATION RATE: 16 BRPM

## 2023-05-15 VITALS
DIASTOLIC BLOOD PRESSURE: 69 MMHG | HEART RATE: 71 BPM | SYSTOLIC BLOOD PRESSURE: 164 MMHG | TEMPERATURE: 97.9 F | OXYGEN SATURATION: 95 % | RESPIRATION RATE: 20 BRPM

## 2023-05-15 DIAGNOSIS — N30.41 IRRADIATION CYSTITIS WITH HEMATURIA: ICD-10-CM

## 2023-05-15 DIAGNOSIS — Z90.79 ACQUIRED ABSENCE OF OTHER GENITAL ORGAN(S): Chronic | ICD-10-CM
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
## 2023-05-15 DIAGNOSIS — Z85.46 PERSONAL HISTORY OF MALIGNANT NEOPLASM OF PROSTATE: ICD-10-CM
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
## 2023-05-15 PROCEDURE — 99183 HYPERBARIC OXYGEN THERAPY: CPT

## 2023-05-15 PROCEDURE — G0277: CPT

## 2023-05-15 PROCEDURE — 82962 GLUCOSE BLOOD TEST: CPT

## 2023-05-15 NOTE — ADDENDUM
[FreeTextEntry1] : PT ARRIVED AMBULATORY A&OX4.\par ALL VITALS WITHIN PARAMETERS FOR HBOT.\par PT DESCENT TO THE PRESCRIBED TX DEPTH IN CHAMBER 2 WAS WITHOUT INCIDENT. \par PT RESTING AT DEPTH, CHEST RISE AND FALL OBSERVED.\par TRANSFER OF CARE TO HT\par PT TOLERATED AIR BREAKS \par PT ASCENDED FROM TX DEPTH OF 2.4 VIA 9 MIN DECOMPRESSION PROFILE\par PT TOLERATED TREATMENT\par PT EXITED HYPERBARIC SUITE SAFELY ACCOMPANIED BY HT\par \par

## 2023-05-15 NOTE — PROCEDURE
[Outpatient] : Outpatient [Ambulatory] : Patient is ambulatory. [THIS CHAMBER HAS BEEN CLEANED / DISINFECTED] : This chamber has been cleaned / disinfected according to local and hospital policy and procedure prior to this treatment. [Patient demonstrated and verbalized proper technique for using air break mask] : Patient demonstrated and verbalized proper technique for using air break mask [Patient educated on the risks of SMOKING prior to HBOT with understanding] : Patient educated on the risks of SMOKING prior to HBOT with understanding [Patient educated on the risks of CONSUMING ALCOHOL prior to HBOT with understanding] : Patient educated on the risks of CONSUMING ALCOHOL prior to HBOT with understanding [100% Cotton] : 100% cotton [Empty all pockets] : empty all pockets [No hair oils, wigs, hairpieces, pins] : no hair oils, wigs, hairpieces, pins  [Pre tx medications] : pre tx medications  [No make-up, creams] : no make-up, creams  [No jewelry] : no jewelry  [No matches, cigarettes, lighters] : no matches, cigarettes, lighters  [Hearing aid removed] : hearing aid removed [Dentures removed] : dentures removed [Ground bracelet on pt's wrist] : ground bracelet on pt's wrist  [Contacts removed] : contacts removed  [Remove nail polish] : remove nail polish  [No reading material] : no reading material  [Bra, undergarments removed] : bra, undergarments removed  [No contraindicated dressings] : no contraindicated dressings [Ground Wire - VISUAL Verification - Intact/Free of Obstruction] : Ground Wire - VISUAL Verification - Intact/Free of Obstruction  [Ground Continuity - Verified < 1 ohm w/ Wrist Strap Chrissy] : Ground Continuity - Verified < 1 ohm w/ Wrist Strap Chrissy [Diagnosis: ___] : Diagnosis: [unfilled] [____] : Post-Dive: Time - [unfilled] [___] : Post-Dive: Value - [unfilled] mg/dL [Clear all fields] : clear all fields [Number: ___] : Number: [unfilled] [] : No [FreeTextEntry4] : 100 [FreeTextEntry6] : 9:54 [FreeTextEntry8] : 1008 [de-identified] : 1038 [de-identified] : 1038 [de-identified] : 1107 [de-identified] : 7134 [de-identified] : 1600 [de-identified] : 9868 [de-identified] : 118

## 2023-05-16 ENCOUNTER — OUTPATIENT (OUTPATIENT)
Dept: OUTPATIENT SERVICES | Facility: HOSPITAL | Age: 73
LOS: 1 days | Discharge: HOME | End: 2023-05-16
Payer: MEDICARE

## 2023-05-16 ENCOUNTER — APPOINTMENT (OUTPATIENT)
Dept: HYPERBARIC MEDICINE | Facility: HOSPITAL | Age: 73
End: 2023-05-16
Payer: MEDICARE

## 2023-05-16 VITALS
TEMPERATURE: 97.9 F | SYSTOLIC BLOOD PRESSURE: 175 MMHG | HEART RATE: 72 BPM | RESPIRATION RATE: 16 BRPM | DIASTOLIC BLOOD PRESSURE: 79 MMHG | OXYGEN SATURATION: 96 %

## 2023-05-16 VITALS
OXYGEN SATURATION: 95 % | DIASTOLIC BLOOD PRESSURE: 81 MMHG | SYSTOLIC BLOOD PRESSURE: 173 MMHG | HEART RATE: 76 BPM | TEMPERATURE: 98.1 F | RESPIRATION RATE: 16 BRPM

## 2023-05-16 DIAGNOSIS — N30.41 IRRADIATION CYSTITIS WITH HEMATURIA: ICD-10-CM

## 2023-05-16 DIAGNOSIS — Z85.46 PERSONAL HISTORY OF MALIGNANT NEOPLASM OF PROSTATE: ICD-10-CM
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
## 2023-05-16 DIAGNOSIS — Z90.79 ACQUIRED ABSENCE OF OTHER GENITAL ORGAN(S): Chronic | ICD-10-CM

## 2023-05-16 DIAGNOSIS — Z90.89 ACQUIRED ABSENCE OF OTHER ORGANS: Chronic | ICD-10-CM

## 2023-05-16 PROCEDURE — 99183 HYPERBARIC OXYGEN THERAPY: CPT

## 2023-05-16 PROCEDURE — G0277: CPT

## 2023-05-16 PROCEDURE — 82962 GLUCOSE BLOOD TEST: CPT

## 2023-05-16 NOTE — PROCEDURE
[Outpatient] : Outpatient [Ambulatory] : Patient is ambulatory. [THIS CHAMBER HAS BEEN CLEANED / DISINFECTED] : This chamber has been cleaned / disinfected according to local and hospital policy and procedure prior to this treatment. [Patient demonstrated and verbalized proper technique for using air break mask] : Patient demonstrated and verbalized proper technique for using air break mask [Patient educated on the risks of SMOKING prior to HBOT with understanding] : Patient educated on the risks of SMOKING prior to HBOT with understanding [Patient educated on the risks of CONSUMING ALCOHOL prior to HBOT with understanding] : Patient educated on the risks of CONSUMING ALCOHOL prior to HBOT with understanding [100% Cotton] : 100% cotton [Empty all pockets] : empty all pockets [No hair oils, wigs, hairpieces, pins] : no hair oils, wigs, hairpieces, pins  [Pre tx medications] : pre tx medications  [No make-up, creams] : no make-up, creams  [No jewelry] : no jewelry  No [No matches, cigarettes, lighters] : no matches, cigarettes, lighters  [Hearing aid removed] : hearing aid removed [Dentures removed] : dentures removed [Contacts removed] : contacts removed  [Remove nail polish] : remove nail polish  [No reading material] : no reading material  [Bra, undergarments removed] : bra, undergarments removed  [No contraindicated dressings] : no contraindicated dressings [____] : Pre-Dive: Time - [unfilled] [___] : Pre-Dive: Value - [unfilled] mg/dL [Ground bracelet on pt's wrist] : ground bracelet on pt's wrist  [Ground Wire - VISUAL Verification - Intact/Free of Obstruction] : Ground Wire - VISUAL Verification - Intact/Free of Obstruction  [Ground Continuity - Verified < 1 ohm w/ Wrist Strap Chrissy] : Ground Continuity - Verified < 1 ohm w/ Wrist Strap Chrissy [Number: ___] : Number: [unfilled] [Diagnosis: ___] : Diagnosis: [unfilled] [Clear all fields] : clear all fields [] : No [FreeTextEntry4] : 100 [FreeTextEntry6] : 8177 [FreeTextEntry8] : 1007 [de-identified] : 1037 [de-identified] : 104 [de-identified] : 1110 [de-identified] : 1118 [de-identified] : 5144 [de-identified] : 5491 [de-identified] : 118 min

## 2023-05-16 NOTE — ADDENDUM
[FreeTextEntry1] : Patient arrived ambulatory. Tx order verified. Patient descended to 2.4 MARY ANN over a normal 9 min compression profile without incident. Patient resting comfortably @ depth. Chest rise and fall observed throughout tx. Patient received 2 air breaks. Patient ascended from 2.4 MARY ANN over a normal 9 min decompression profile without incident. Patient tolerated treatment well.

## 2023-05-17 ENCOUNTER — OUTPATIENT (OUTPATIENT)
Dept: OUTPATIENT SERVICES | Facility: HOSPITAL | Age: 73
LOS: 1 days | Discharge: HOME | End: 2023-05-17
Payer: MEDICARE

## 2023-05-17 ENCOUNTER — APPOINTMENT (OUTPATIENT)
Dept: HYPERBARIC MEDICINE | Facility: HOSPITAL | Age: 73
End: 2023-05-17
Payer: MEDICARE

## 2023-05-17 VITALS
DIASTOLIC BLOOD PRESSURE: 71 MMHG | RESPIRATION RATE: 20 BRPM | TEMPERATURE: 97.9 F | HEART RATE: 90 BPM | SYSTOLIC BLOOD PRESSURE: 165 MMHG | OXYGEN SATURATION: 98 %

## 2023-05-17 VITALS
HEART RATE: 70 BPM | OXYGEN SATURATION: 98 % | DIASTOLIC BLOOD PRESSURE: 91 MMHG | TEMPERATURE: 98.1 F | SYSTOLIC BLOOD PRESSURE: 173 MMHG | RESPIRATION RATE: 18 BRPM

## 2023-05-17 DIAGNOSIS — N30.41 IRRADIATION CYSTITIS WITH HEMATURIA: ICD-10-CM

## 2023-05-17 DIAGNOSIS — Z90.79 ACQUIRED ABSENCE OF OTHER GENITAL ORGAN(S): Chronic | ICD-10-CM
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
## 2023-05-17 DIAGNOSIS — Z85.46 PERSONAL HISTORY OF MALIGNANT NEOPLASM OF PROSTATE: ICD-10-CM

## 2023-05-17 DIAGNOSIS — Z90.89 ACQUIRED ABSENCE OF OTHER ORGANS: Chronic | ICD-10-CM
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
## 2023-05-17 PROCEDURE — 82962 GLUCOSE BLOOD TEST: CPT

## 2023-05-17 PROCEDURE — G0277: CPT

## 2023-05-17 PROCEDURE — 99183 HYPERBARIC OXYGEN THERAPY: CPT

## 2023-05-18 ENCOUNTER — OUTPATIENT (OUTPATIENT)
Dept: OUTPATIENT SERVICES | Facility: HOSPITAL | Age: 73
LOS: 1 days | Discharge: HOME | End: 2023-05-18
Payer: MEDICARE

## 2023-05-18 ENCOUNTER — APPOINTMENT (OUTPATIENT)
Dept: HYPERBARIC MEDICINE | Facility: HOSPITAL | Age: 73
End: 2023-05-18
Payer: MEDICARE

## 2023-05-18 VITALS
DIASTOLIC BLOOD PRESSURE: 76 MMHG | TEMPERATURE: 97.9 F | OXYGEN SATURATION: 98 % | HEART RATE: 70 BPM | SYSTOLIC BLOOD PRESSURE: 191 MMHG | RESPIRATION RATE: 18 BRPM

## 2023-05-18 VITALS
SYSTOLIC BLOOD PRESSURE: 170 MMHG | DIASTOLIC BLOOD PRESSURE: 79 MMHG | HEART RATE: 73 BPM | TEMPERATURE: 98.2 F | RESPIRATION RATE: 18 BRPM | OXYGEN SATURATION: 95 %

## 2023-05-18 VITALS — DIASTOLIC BLOOD PRESSURE: 76 MMHG | SYSTOLIC BLOOD PRESSURE: 184 MMHG

## 2023-05-18 DIAGNOSIS — N30.41 IRRADIATION CYSTITIS WITH HEMATURIA: ICD-10-CM

## 2023-05-18 DIAGNOSIS — Z90.79 ACQUIRED ABSENCE OF OTHER GENITAL ORGAN(S): Chronic | ICD-10-CM

## 2023-05-18 DIAGNOSIS — Z85.46 PERSONAL HISTORY OF MALIGNANT NEOPLASM OF PROSTATE: ICD-10-CM
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
## 2023-05-18 DIAGNOSIS — Z90.89 ACQUIRED ABSENCE OF OTHER ORGANS: Chronic | ICD-10-CM
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
## 2023-05-18 PROCEDURE — 99183 HYPERBARIC OXYGEN THERAPY: CPT

## 2023-05-18 PROCEDURE — 82962 GLUCOSE BLOOD TEST: CPT

## 2023-05-18 PROCEDURE — G0277: CPT

## 2023-05-18 NOTE — ADDENDUM
[FreeTextEntry1] : PT ARRIVED AMBULATORY A&OX4.\par ALL VITALS WITHIN PARAMETERS FOR HBOT.\par PT DESCENT TO RX TX DEPTH IN CHAMBER 2 WAS WITHOUT INCIDENT.\par TRANSFER OF CARE TO .\par Care transferred to T.\par \par Patient ascended from 2.4 MARY ANN over a normal 9 min decompression profile without incident. Patient tolerated treatment well.

## 2023-05-18 NOTE — PROCEDURE
[Outpatient] : Outpatient [Ambulatory] : Patient is ambulatory. [THIS CHAMBER HAS BEEN CLEANED / DISINFECTED] : This chamber has been cleaned / disinfected according to local and hospital policy and procedure prior to this treatment. [Patient demonstrated and verbalized proper technique for using air break mask] : Patient demonstrated and verbalized proper technique for using air break mask [Patient educated on the risks of SMOKING prior to HBOT with understanding] : Patient educated on the risks of SMOKING prior to HBOT with understanding [Patient educated on the risks of CONSUMING ALCOHOL prior to HBOT with understanding] : Patient educated on the risks of CONSUMING ALCOHOL prior to HBOT with understanding [Number: ___] : Number: [unfilled] [Diagnosis: ___] : Diagnosis: [unfilled] [____] : Pre-Dive: Time - [unfilled] [___] : Pre-Dive: Value - [unfilled] mg/dL [100% Cotton] : 100% cotton [Empty all pockets] : empty all pockets [No hair oils, wigs, hairpieces, pins] : no hair oils, wigs, hairpieces, pins  [Pre tx medications] : pre tx medications  [No make-up, creams] : no make-up, creams  [No jewelry] : no jewelry  [No matches, cigarettes, lighters] : no matches, cigarettes, lighters  [Hearing aid removed] : hearing aid removed [Dentures removed] : dentures removed [Ground bracelet on pt's wrist] : ground bracelet on pt's wrist  [Contacts removed] : contacts removed  [Remove nail polish] : remove nail polish  [No reading material] : no reading material  [Bra, undergarments removed] : bra, undergarments removed  [No contraindicated dressings] : no contraindicated dressings [Ground Wire - VISUAL Verification - Intact/Free of Obstruction] : Ground Wire - VISUAL Verification - Intact/Free of Obstruction  [Ground Continuity - Verified < 1 ohm w/ Wrist Strap Chrissy] : Ground Continuity - Verified < 1 ohm w/ Wrist Strap Chrissy [Clear all fields] : clear all fields [] : No [FreeTextEntry4] : 100 min [FreeTextEntry6] : 9:52 [FreeTextEntry8] : 1002 [de-identified] : 1033 [de-identified] : 1033 [de-identified] : 110 [de-identified] : 1111 [de-identified] : 6641 [de-identified] : 3009 [de-identified] : 118 min

## 2023-05-19 ENCOUNTER — APPOINTMENT (OUTPATIENT)
Dept: HYPERBARIC MEDICINE | Facility: HOSPITAL | Age: 73
End: 2023-05-19
Payer: MEDICARE

## 2023-05-19 ENCOUNTER — OUTPATIENT (OUTPATIENT)
Dept: OUTPATIENT SERVICES | Facility: HOSPITAL | Age: 73
LOS: 1 days | Discharge: HOME | End: 2023-05-19
Payer: MEDICARE

## 2023-05-19 ENCOUNTER — NON-APPOINTMENT (OUTPATIENT)
Age: 73
End: 2023-05-19

## 2023-05-19 ENCOUNTER — APPOINTMENT (OUTPATIENT)
Dept: CARDIOLOGY | Facility: CLINIC | Age: 73
End: 2023-05-19
Payer: MEDICARE

## 2023-05-19 VITALS — SYSTOLIC BLOOD PRESSURE: 172 MMHG | DIASTOLIC BLOOD PRESSURE: 86 MMHG

## 2023-05-19 VITALS
HEIGHT: 70 IN | BODY MASS INDEX: 29.63 KG/M2 | WEIGHT: 207 LBS | SYSTOLIC BLOOD PRESSURE: 170 MMHG | OXYGEN SATURATION: 96 % | DIASTOLIC BLOOD PRESSURE: 74 MMHG | HEART RATE: 74 BPM

## 2023-05-19 VITALS
HEART RATE: 70 BPM | TEMPERATURE: 98.2 F | OXYGEN SATURATION: 100 % | RESPIRATION RATE: 18 BRPM | DIASTOLIC BLOOD PRESSURE: 95 MMHG | SYSTOLIC BLOOD PRESSURE: 174 MMHG

## 2023-05-19 VITALS — DIASTOLIC BLOOD PRESSURE: 60 MMHG | SYSTOLIC BLOOD PRESSURE: 134 MMHG

## 2023-05-19 VITALS — DIASTOLIC BLOOD PRESSURE: 60 MMHG | SYSTOLIC BLOOD PRESSURE: 120 MMHG

## 2023-05-19 VITALS
SYSTOLIC BLOOD PRESSURE: 149 MMHG | TEMPERATURE: 98 F | RESPIRATION RATE: 18 BRPM | HEART RATE: 70 BPM | DIASTOLIC BLOOD PRESSURE: 80 MMHG | OXYGEN SATURATION: 95 %
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
## 2023-05-19 DIAGNOSIS — Z90.79 ACQUIRED ABSENCE OF OTHER GENITAL ORGAN(S): Chronic | ICD-10-CM

## 2023-05-19 DIAGNOSIS — N30.41 IRRADIATION CYSTITIS WITH HEMATURIA: ICD-10-CM

## 2023-05-19 DIAGNOSIS — Z85.46 PERSONAL HISTORY OF MALIGNANT NEOPLASM OF PROSTATE: ICD-10-CM
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
## 2023-05-19 PROCEDURE — 99183 HYPERBARIC OXYGEN THERAPY: CPT

## 2023-05-19 PROCEDURE — 99214 OFFICE O/P EST MOD 30 MIN: CPT | Mod: 25

## 2023-05-19 PROCEDURE — G0277: CPT

## 2023-05-19 PROCEDURE — 82962 GLUCOSE BLOOD TEST: CPT

## 2023-05-19 PROCEDURE — 93000 ELECTROCARDIOGRAM COMPLETE: CPT

## 2023-05-19 NOTE — REASON FOR VISIT
[Aortic Stenosis] : aortic stenosis [Dyspnea] : dyspnea [Structural Heart and Valve Disease] : structural heart and valve disease [Hypertension] : hypertension

## 2023-05-22 ENCOUNTER — APPOINTMENT (OUTPATIENT)
Dept: HYPERBARIC MEDICINE | Facility: HOSPITAL | Age: 73
End: 2023-05-22
Payer: MEDICARE

## 2023-05-22 ENCOUNTER — OUTPATIENT (OUTPATIENT)
Dept: OUTPATIENT SERVICES | Facility: HOSPITAL | Age: 73
LOS: 1 days | Discharge: HOME | End: 2023-05-22
Payer: MEDICARE

## 2023-05-22 VITALS
RESPIRATION RATE: 18 BRPM | SYSTOLIC BLOOD PRESSURE: 163 MMHG | HEART RATE: 72 BPM | OXYGEN SATURATION: 96 % | DIASTOLIC BLOOD PRESSURE: 82 MMHG | TEMPERATURE: 97.6 F

## 2023-05-22 VITALS
TEMPERATURE: 97.6 F | SYSTOLIC BLOOD PRESSURE: 176 MMHG | DIASTOLIC BLOOD PRESSURE: 82 MMHG | HEART RATE: 72 BPM | RESPIRATION RATE: 18 BRPM | OXYGEN SATURATION: 99 %

## 2023-05-22 VITALS — SYSTOLIC BLOOD PRESSURE: 158 MMHG | DIASTOLIC BLOOD PRESSURE: 72 MMHG

## 2023-05-22 DIAGNOSIS — Z90.79 ACQUIRED ABSENCE OF OTHER GENITAL ORGAN(S): Chronic | ICD-10-CM

## 2023-05-22 DIAGNOSIS — Z85.46 PERSONAL HISTORY OF MALIGNANT NEOPLASM OF PROSTATE: ICD-10-CM

## 2023-05-22 DIAGNOSIS — Z90.89 ACQUIRED ABSENCE OF OTHER ORGANS: Chronic | ICD-10-CM
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
## 2023-05-22 DIAGNOSIS — N30.41 IRRADIATION CYSTITIS WITH HEMATURIA: ICD-10-CM

## 2023-05-22 PROCEDURE — 82962 GLUCOSE BLOOD TEST: CPT

## 2023-05-22 PROCEDURE — 99183 HYPERBARIC OXYGEN THERAPY: CPT

## 2023-05-22 PROCEDURE — G0277: CPT

## 2023-05-22 NOTE — ADDENDUM
[FreeTextEntry1] : PT A&OX3 AND AMBULATING UNASSISTED INTO HYPERBARIC SUITE \par PT ORDER VERIFIED PRIOR TO TREATMENT\par PT CONTRAINDICATION LIST AND PRE CHECK LIST VERIFIED AND SIGNED BY TECH PRIOR TO TREATMENT\par PT BGL WAS 99 -- MD NOTIFIED AND CLEARED PT FOR TREATMENT\par PT WOUND DRESSING IS DRY AND INTACT \par PT DESCENDED TO 2.4 MARY ANN IN CHAMBER #[id number]\par PT OBSERVED FOR FACIAL TWITCHING AND CHEST RISE BY  SEATED CHAMBERSIDE\par \par CARE TRANSFERED TO  @ 1006\par \par Patient resting comfortably at Rx depth with equal and adequate chest rise and fall noted throughout.\par Patient observed first air break, lasting 5 minutes, tolerated well.\par Patient resting comfortably at Rx depth with equal and adequate chest rise and fall noted throughout.\par Patient observed second air break, lasting 5 minutes, tolerated well.\par Patient ascended to surface over a nine minute decompression with no discomfort or intervention required.\par Patient vitals assessed post-treatment to reveal stable values for departure.\par Patient exited HBOT suite safely.\par NOTHING FURTHER TO REPORT.\par  \par \par

## 2023-05-22 NOTE — REVIEW OF SYSTEMS
[Dizziness] : dizziness [Negative] : Integumentary [SOB] : no shortness of breath [Dyspnea on exertion] : not dyspnea during exertion [Chest Discomfort] : no chest discomfort [Lower Ext Edema] : no extremity edema [Leg Claudication] : no intermittent leg claudication [Palpitations] : no palpitations [Orthopnea] : no orthopnea [PND] : no PND [Syncope] : no syncope [Cough] : no cough [Wheezing] : no wheezing [Nausea] : no nausea [Abdominal Pain] : no abdominal pain [Vomiting] : no vomiting [Change in Appetite] : no change in appetite [Change In The Stool] : no change in stool [Constipation] : no constipation [Blood in stool] : no blood in stoo

## 2023-05-22 NOTE — PHYSICAL EXAM
[General Appearance - Well Developed] : well developed [Normal Appearance] : normal appearance [Well Groomed] : well groomed [General Appearance - Well Nourished] : well nourished [No Deformities] : no deformities [General Appearance - In No Acute Distress] : no acute distress [Eyelids - No Xanthelasma] : the eyelids demonstrated no xanthelasmas [Normal Oral Mucosa] : normal oral mucosa [No Oral Pallor] : no oral pallor [No Oral Cyanosis] : no oral cyanosis [Normal Jugular Venous A Waves Present] : normal jugular venous A waves present [Normal Jugular Venous V Waves Present] : normal jugular venous V waves present [No Jugular Venous Reynolds A Waves] : no jugular venous reynolds A waves [Respiration, Rhythm And Depth] : normal respiratory rhythm and effort [Exaggerated Use Of Accessory Muscles For Inspiration] : no accessory muscle use [Auscultation Breath Sounds / Voice Sounds] : lungs were clear to auscultation bilaterally [Abdomen Soft] : soft [Abdomen Tenderness] : non-tender [Abdomen Mass (___ Cm)] : no abdominal mass palpated [Abnormal Walk] : normal gait [Gait - Sufficient For Exercise Testing] : the gait was sufficient for exercise testing [Nail Clubbing] : no clubbing of the fingernails [Cyanosis, Localized] : no localized cyanosis [Petechial Hemorrhages (___cm)] : no petechial hemorrhages [Skin Color & Pigmentation] : normal skin color and pigmentation [] : no rash [No Venous Stasis] : no venous stasis [Skin Lesions] : no skin lesions [No Skin Ulcers] : no skin ulcer [No Xanthoma] : no  xanthoma was observed [Oriented To Time, Place, And Person] : oriented to person, place, and time [Affect] : the affect was normal [Mood] : the mood was normal [No Anxiety] : not feeling anxious [5th Left ICS - MCL] : palpated at the 5th LICS in the midclavicular line [Normal] : normal [No Precordial Heave] : no precordial heave was noted [Normal Rate] : normal [Heart Rate ___] : [unfilled] bpm [A2 Diminished] : had a diminished A2 [II] : a grade 2 [Crescendo-Decrescendo] : crescendo-decrescendo [Medium] : medium pitched [Blowing] : blowing [Late] : late [2+] : left 2+ [Well Developed] : well developed [Well Nourished] : well nourished [No Acute Distress] : no acute distress [Normal Conjunctiva] : normal conjunctiva [Normal Venous Pressure] : normal venous pressure [No Carotid Bruit] : no carotid bruit [Normal S1, S2] : normal S1, S2 [No Rub] : no rub [No Gallop] : no gallop [Rhythm Regular] : regular [Normal S1] : normal S1 [Normal S2] : normal S2 [No Murmur] : no murmurs heard [No Pitting Edema] : no pitting edema present [No Abnormalities] : the abdominal aorta was not enlarged and no bruit was heard [Clear Lung Fields] : clear lung fields [Good Air Entry] : good air entry [No Respiratory Distress] : no respiratory distress  [Soft] : abdomen soft [Non Tender] : non-tender [No Masses/organomegaly] : no masses/organomegaly [Normal Bowel Sounds] : normal bowel sounds [Normal Gait] : normal gait [No Edema] : no edema [No Cyanosis] : no cyanosis [No Clubbing] : no clubbing [No Varicosities] : no varicosities [No Rash] : no rash [No Skin Lesions] : no skin lesions [Moves all extremities] : moves all extremities [No Focal Deficits] : no focal deficits [Normal Speech] : normal speech [Alert and Oriented] : alert and oriented [Normal memory] : normal memory [I] : a grade 1 [Apical Thrill] : no thrill palpable at the apex [S3] : no S3 [S4] : no S4 [Click] : no click [Pericardial Rub] : no pericardial rub [Right Carotid Bruit] : no bruit heard over the right carotid [Left Carotid Bruit] : no bruit heard over the left carotid [Right Femoral Bruit] : no bruit heard over the right femoral artery [Left Femoral Bruit] : no bruit heard over the left femoral artery [Bruit] : no bruit heard [Rt] : no varicose veins of the right leg [Lt] : no varicose veins of the left leg

## 2023-05-22 NOTE — HISTORY OF PRESENT ILLNESS
[FreeTextEntry1] : Salvador presented for a followup evaluation.  He was last seen in the office in January 2023.\par He is now 72 years old with AV disease s/p TAVR in 12/2020.He developed SSS post TAVR and a PPM was placed. He has minimal CAD by cath from 9/3/2020.\par Past medical history is remarkable for hypertension, obesity, prostate cancer in 2015 status post radical prostatectomy, radiation complicated by radiation proctitis, and now hormonal therapy\par He was seen in 4/2022 and reported dyspnea and lightheadedness.  He was told to follow with neurology for the dizziness.  His dyspnea was felt to be functional.  No clear explanation was discovered for his dizziness.\par \par I saw him in the office in September 2022, at which time he was being planned for bilateral cataract surgery.  I did not feel that additional testing was required prior to that procedure.\par \par He presents to the office today feeling well overall.  He reports no symptoms of angina or heart failure.\par He was hospitalized earlier in the year, from January -March with persistent Gross hematuria/clots s/p cystocopy. The is said to have been bladder irritation 2/2 to his history of radiation therapy for prior prostate Cancer.\par He now undergoes Hyperbaric therapy, goal is 30 2 hour sessions.  He has about 2 more weeks left.\par He does now also suffer from incontinence.\par \par \par From a symptoms perspective, he does report a degree of dyspnea walking stairs or for extended periods but this is unchanged.  \par He has an ongoing issue with episodic dizziness, though episodes are a bit less frequent that previous.  He describes dizziness that comes on either with exertion or at rest (random).  \par He has not checked his heart rate or blood pressure when this happens.  He does still follow with neurology, Dr Jesús Salmeron for this issue.\par \par \par His blood pressure has been labile, specifically elevated after his hyperbaric therapies, but commonly very well controlled.\par \par \par

## 2023-05-22 NOTE — PROCEDURE
[Outpatient] : Outpatient [Ambulatory] : Patient is ambulatory. [THIS CHAMBER HAS BEEN CLEANED / DISINFECTED] : This chamber has been cleaned / disinfected according to local and hospital policy and procedure prior to this treatment. [____] : Pre-Dive: Time - [unfilled] [___] : Pre-Dive: Value - [unfilled] mg/dL [Patient demonstrated and verbalized proper technique for using air break mask] : Patient demonstrated and verbalized proper technique for using air break mask [Patient educated on the risks of SMOKING prior to HBOT with understanding] : Patient educated on the risks of SMOKING prior to HBOT with understanding [Patient educated on the risks of CONSUMING ALCOHOL prior to HBOT with understanding] : Patient educated on the risks of CONSUMING ALCOHOL prior to HBOT with understanding [100% Cotton] : 100% cotton [Empty all pockets] : empty all pockets [No hair oils, wigs, hairpieces, pins] : no hair oils, wigs, hairpieces, pins  [Pre tx medications] : pre tx medications  [No make-up, creams] : no make-up, creams  [No jewelry] : no jewelry  [No matches, cigarettes, lighters] : no matches, cigarettes, lighters  [Hearing aid removed] : hearing aid removed [Dentures removed] : dentures removed [Ground bracelet on pt's wrist] : ground bracelet on pt's wrist  [Contacts removed] : contacts removed  [Remove nail polish] : remove nail polish  [No reading material] : no reading material  [Bra, undergarments removed] : bra, undergarments removed  [No contraindicated dressings] : no contraindicated dressings [Ground Wire - VISUAL Verification - Intact/Free of Obstruction] : Ground Wire - VISUAL Verification - Intact/Free of Obstruction  [Ground Continuity - Verified < 1 ohm w/ Wrist Strap Chrissy] : Ground Continuity - Verified < 1 ohm w/ Wrist Strap Chrissy [Number: ___] : Number: [unfilled] [Diagnosis: ___] : Diagnosis: [unfilled] [Clear all fields] : clear all fields [] : No [FreeTextEntry4] : 100 [FreeTextEntry6] : 7308 [FreeTextEntry8] : 1002 [de-identified] : 0162 [de-identified] : 1031 [de-identified] : 1102 [de-identified] : 1111 [de-identified] : 2612 [de-identified] : 5281 [de-identified] : 118

## 2023-05-22 NOTE — ADDENDUM
[FreeTextEntry1] : long complicated with hematuria, requiring cbi\par now undergoing hyperbaric therapy for this\par has developed incontinence unfortunately, doing kegel exercises\par having random dizzy spells, less frequent since January visit\par ppm checked in march

## 2023-05-22 NOTE — DISCUSSION/SUMMARY
[EKG obtained to assist in diagnosis and management of assessed problem(s)] : EKG obtained to assist in diagnosis and management of assessed problem(s) [FreeTextEntry1] : Salvador's symptoms have improved since his TAVR procedure.  He is also now status post [employer] dual permanent pacemaker implantation for (SSS) a 3.1-second pause.   \par   I reviewed his echocardiogram from September 15, 2022.  This revealed an ejection fraction of 62%, with mild mitral regurgitation.  His transcatheter aortic valve was functioning well, with mild paravalvular aortic regurgitation.\par Pre TAVR angiogram-2020, demonstrated no flow limiting coronary lesions.\par \par He arrives in no acute distress.  He is euvolemic on exam.. ECG illustrates sinus rhythm, RBBB.  His blood pressure was initially elevated but normalized by the conclusion of the visit.  He is not orthostatic.\par \par Mild diastolic murmur appreciated on physical exam, mild paravavular regurgitation noted on most recent echo.  Will repeat echocardiogram in 6 months time to re assess\par \par Most recent device check from March 2023 demonstrates normal function. Battery longevity of 12.5yrs. AT/AF burden 0%.\par From a CV perspective, he is stable.  Without signs of angina, HF or unstable arrhythmia.\par He will continue metoprolol 50mg daily.\par \par No need for any further testing at this time.  \par If all is well, he will see me in 6 months.\par

## 2023-05-22 NOTE — CARDIOLOGY SUMMARY
[Normal] : normal LA size [None] : no mitral regurgitation [___] : [unfilled] [de-identified] : sinus rhythm \par RBBB [de-identified] : September 2022\par LVEF 62%\par Transcatheter aortic valve-peak gradient 19/mean\par Mild paravalvular regurgitation\par Stage I diastolic\par Mild MR [de-identified] : PPM remote\par [employer]\par Battery longevity 12.5 years\par Atrial paced 68%\par AT AF burden 0% [de-identified] : September 2020\par No flow-limiting lesions

## 2023-05-23 ENCOUNTER — APPOINTMENT (OUTPATIENT)
Dept: HYPERBARIC MEDICINE | Facility: HOSPITAL | Age: 73
End: 2023-05-23

## 2023-05-23 NOTE — PROCEDURE
[Outpatient] : Outpatient [Ambulatory] : Patient is ambulatory. [THIS CHAMBER HAS BEEN CLEANED / DISINFECTED] : This chamber has been cleaned / disinfected according to local and hospital policy and procedure prior to this treatment. [____] : Pre-Dive: Time - [unfilled] [___] : Pre-Dive: Value - [unfilled] mg/dL [Patient demonstrated and verbalized proper technique for using air break mask] : Patient demonstrated and verbalized proper technique for using air break mask [Patient educated on the risks of SMOKING prior to HBOT with understanding] : Patient educated on the risks of SMOKING prior to HBOT with understanding [Patient educated on the risks of CONSUMING ALCOHOL prior to HBOT with understanding] : Patient educated on the risks of CONSUMING ALCOHOL prior to HBOT with understanding [100% Cotton] : 100% cotton [Empty all pockets] : empty all pockets [No hair oils, wigs, hairpieces, pins] : no hair oils, wigs, hairpieces, pins  [Pre tx medications] : pre tx medications  [No make-up, creams] : no make-up, creams  [No jewelry] : no jewelry  [No matches, cigarettes, lighters] : no matches, cigarettes, lighters  [Hearing aid removed] : hearing aid removed [Dentures removed] : dentures removed [Ground bracelet on pt's wrist] : ground bracelet on pt's wrist  [Contacts removed] : contacts removed  [Remove nail polish] : remove nail polish  [No reading material] : no reading material  [Bra, undergarments removed] : bra, undergarments removed  [No contraindicated dressings] : no contraindicated dressings [Ground Wire - VISUAL Verification - Intact/Free of Obstruction] : Ground Wire - VISUAL Verification - Intact/Free of Obstruction  [Ground Continuity - Verified < 1 ohm w/ Wrist Strap Chrissy] : Ground Continuity - Verified < 1 ohm w/ Wrist Strap Chrissy [Clear all fields] : clear all fields [Number: ___] : Number: [unfilled] [Diagnosis: ___] : Diagnosis: [unfilled] [] : No [FreeTextEntry4] : 100 [FreeTextEntry6] : 7059 [FreeTextEntry8] : 1004 [de-identified] : 7250 [de-identified] : 1045 [de-identified] : 2642 [de-identified] : 8657 [de-identified] : 2735 [de-identified] : 9993 [de-identified] : 118 min

## 2023-05-23 NOTE — PROCEDURE
[Outpatient] : Outpatient [Ambulatory] : Patient is ambulatory. [THIS CHAMBER HAS BEEN CLEANED / DISINFECTED] : This chamber has been cleaned / disinfected according to local and hospital policy and procedure prior to this treatment. [____] : Pre-Dive: Time - [unfilled] [___] : Pre-Dive: Value - [unfilled] mg/dL [Patient demonstrated and verbalized proper technique for using air break mask] : Patient demonstrated and verbalized proper technique for using air break mask [Patient educated on the risks of SMOKING prior to HBOT with understanding] : Patient educated on the risks of SMOKING prior to HBOT with understanding [Patient educated on the risks of CONSUMING ALCOHOL prior to HBOT with understanding] : Patient educated on the risks of CONSUMING ALCOHOL prior to HBOT with understanding [100% Cotton] : 100% cotton [Empty all pockets] : empty all pockets [No hair oils, wigs, hairpieces, pins] : no hair oils, wigs, hairpieces, pins  [Pre tx medications] : pre tx medications  [No make-up, creams] : no make-up, creams  [No jewelry] : no jewelry  [No matches, cigarettes, lighters] : no matches, cigarettes, lighters  [Hearing aid removed] : hearing aid removed [Dentures removed] : dentures removed [Contacts removed] : contacts removed  [Remove nail polish] : remove nail polish  [No reading material] : no reading material  [Bra, undergarments removed] : bra, undergarments removed  [No contraindicated dressings] : no contraindicated dressings [Number: ___] : Number: [unfilled] [Diagnosis: ___] : Diagnosis: [unfilled] [Clear all fields] : clear all fields [Ground bracelet on pt's wrist] : ground bracelet on pt's wrist  [Ground Wire - VISUAL Verification - Intact/Free of Obstruction] : Ground Wire - VISUAL Verification - Intact/Free of Obstruction  [Ground Continuity - Verified < 1 ohm w/ Wrist Strap Chrissy] : Ground Continuity - Verified < 1 ohm w/ Wrist Strap Chrissy [] : No [FreeTextEntry4] : 100 [FreeTextEntry6] : 0967 [FreeTextEntry8] : 2308 [de-identified] : 1020 [de-identified] : 1035 [de-identified] : 1102 [de-identified] : 1100 [de-identified] : 2049 [de-identified] : 0747 [de-identified] : 118 min

## 2023-05-23 NOTE — ADDENDUM
[FreeTextEntry1] : Patient arrived ambulatory. Tx order verified. Patient descended to 2.4 MARY ANN over a normal 9 min compression profile without incident. Patient resting comfortably @ depth. Chest rise and fall observed throughout tx. Patient received 2 air breaks. Patient ascended from 2.4 MARY ANN over a normal 9 min decompression profile without incident. Patient tolerated treatment well.\par \par Post tx Blood Pressure high: 191/76. RN notified. Patient retested. 2nd readin/76. Patient retested. 3rd readin/75. Rn notified.

## 2023-05-24 ENCOUNTER — OUTPATIENT (OUTPATIENT)
Dept: OUTPATIENT SERVICES | Facility: HOSPITAL | Age: 73
LOS: 1 days | Discharge: HOME | End: 2023-05-24
Payer: MEDICARE

## 2023-05-24 ENCOUNTER — APPOINTMENT (OUTPATIENT)
Dept: HYPERBARIC MEDICINE | Facility: HOSPITAL | Age: 73
End: 2023-05-24
Payer: MEDICARE

## 2023-05-24 VITALS
RESPIRATION RATE: 18 BRPM | HEART RATE: 72 BPM | DIASTOLIC BLOOD PRESSURE: 91 MMHG | OXYGEN SATURATION: 99 % | TEMPERATURE: 98.4 F | SYSTOLIC BLOOD PRESSURE: 187 MMHG

## 2023-05-24 VITALS — DIASTOLIC BLOOD PRESSURE: 90 MMHG | SYSTOLIC BLOOD PRESSURE: 177 MMHG

## 2023-05-24 VITALS
HEART RATE: 79 BPM | TEMPERATURE: 98.2 F | DIASTOLIC BLOOD PRESSURE: 79 MMHG | RESPIRATION RATE: 18 BRPM | SYSTOLIC BLOOD PRESSURE: 164 MMHG | OXYGEN SATURATION: 95 %

## 2023-05-24 DIAGNOSIS — N30.41 IRRADIATION CYSTITIS WITH HEMATURIA: ICD-10-CM
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
## 2023-05-24 DIAGNOSIS — Z85.46 PERSONAL HISTORY OF MALIGNANT NEOPLASM OF PROSTATE: ICD-10-CM
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
## 2023-05-24 DIAGNOSIS — Z90.79 ACQUIRED ABSENCE OF OTHER GENITAL ORGAN(S): Chronic | ICD-10-CM

## 2023-05-24 LAB
APPEARANCE UR: CLEAR — SIGNIFICANT CHANGE UP
BACTERIA # UR AUTO: ABNORMAL
BILIRUB UR-MCNC: NEGATIVE — SIGNIFICANT CHANGE UP
COD CRY URNS QL: ABNORMAL
COLOR SPEC: YELLOW — SIGNIFICANT CHANGE UP
COMMENT - URINE: SIGNIFICANT CHANGE UP
DIFF PNL FLD: ABNORMAL
EPI CELLS # UR: SIGNIFICANT CHANGE UP
GLUCOSE UR QL: NEGATIVE — SIGNIFICANT CHANGE UP
KETONES UR-MCNC: NEGATIVE — SIGNIFICANT CHANGE UP
LEUKOCYTE ESTERASE UR-ACNC: ABNORMAL
NITRITE UR-MCNC: NEGATIVE — SIGNIFICANT CHANGE UP
PH UR: 6 — SIGNIFICANT CHANGE UP (ref 5–8)
PROT UR-MCNC: 30 MG/DL
RBC CASTS # UR COMP ASSIST: ABNORMAL /HPF (ref 0–4)
SP GR SPEC: 1.02 — SIGNIFICANT CHANGE UP (ref 1.01–1.02)
UROBILINOGEN FLD QL: NEGATIVE — SIGNIFICANT CHANGE UP
WBC UR QL: SIGNIFICANT CHANGE UP

## 2023-05-24 PROCEDURE — 82962 GLUCOSE BLOOD TEST: CPT

## 2023-05-24 PROCEDURE — 99183 HYPERBARIC OXYGEN THERAPY: CPT

## 2023-05-24 PROCEDURE — 81001 URINALYSIS AUTO W/SCOPE: CPT

## 2023-05-24 PROCEDURE — G0277: CPT

## 2023-05-25 ENCOUNTER — APPOINTMENT (OUTPATIENT)
Dept: HYPERBARIC MEDICINE | Facility: HOSPITAL | Age: 73
End: 2023-05-25
Payer: MEDICARE

## 2023-05-25 ENCOUNTER — OUTPATIENT (OUTPATIENT)
Dept: OUTPATIENT SERVICES | Facility: HOSPITAL | Age: 73
LOS: 1 days | Discharge: HOME | End: 2023-05-25
Payer: MEDICARE

## 2023-05-25 VITALS
HEART RATE: 70 BPM | SYSTOLIC BLOOD PRESSURE: 196 MMHG | DIASTOLIC BLOOD PRESSURE: 83 MMHG | TEMPERATURE: 98.1 F | OXYGEN SATURATION: 99 % | RESPIRATION RATE: 18 BRPM

## 2023-05-25 VITALS
RESPIRATION RATE: 18 BRPM | TEMPERATURE: 97.7 F | DIASTOLIC BLOOD PRESSURE: 87 MMHG | HEART RATE: 77 BPM | SYSTOLIC BLOOD PRESSURE: 180 MMHG | OXYGEN SATURATION: 95 %

## 2023-05-25 VITALS — SYSTOLIC BLOOD PRESSURE: 166 MMHG | DIASTOLIC BLOOD PRESSURE: 84 MMHG

## 2023-05-25 VITALS — SYSTOLIC BLOOD PRESSURE: 144 MMHG | DIASTOLIC BLOOD PRESSURE: 72 MMHG

## 2023-05-25 DIAGNOSIS — Z85.46 PERSONAL HISTORY OF MALIGNANT NEOPLASM OF PROSTATE: ICD-10-CM

## 2023-05-25 DIAGNOSIS — Z90.79 ACQUIRED ABSENCE OF OTHER GENITAL ORGAN(S): Chronic | ICD-10-CM

## 2023-05-25 DIAGNOSIS — Z90.89 ACQUIRED ABSENCE OF OTHER ORGANS: Chronic | ICD-10-CM
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
## 2023-05-25 DIAGNOSIS — N30.41 IRRADIATION CYSTITIS WITH HEMATURIA: ICD-10-CM
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
## 2023-05-25 PROCEDURE — G0277: CPT

## 2023-05-25 PROCEDURE — 99183 HYPERBARIC OXYGEN THERAPY: CPT

## 2023-05-25 PROCEDURE — 82962 GLUCOSE BLOOD TEST: CPT

## 2023-05-25 NOTE — PROCEDURE
[Outpatient] : Outpatient [Ambulatory] : Patient is ambulatory. [THIS CHAMBER HAS BEEN CLEANED / DISINFECTED] : This chamber has been cleaned / disinfected according to local and hospital policy and procedure prior to this treatment. [Patient demonstrated and verbalized proper technique for using air break mask] : Patient demonstrated and verbalized proper technique for using air break mask [Patient educated on the risks of SMOKING prior to HBOT with understanding] : Patient educated on the risks of SMOKING prior to HBOT with understanding [Patient educated on the risks of CONSUMING ALCOHOL prior to HBOT with understanding] : Patient educated on the risks of CONSUMING ALCOHOL prior to HBOT with understanding [100% Cotton] : 100% cotton [Empty all pockets] : empty all pockets [No hair oils, wigs, hairpieces, pins] : no hair oils, wigs, hairpieces, pins  [Pre tx medications] : pre tx medications  [No make-up, creams] : no make-up, creams  [No jewelry] : no jewelry  [No matches, cigarettes, lighters] : no matches, cigarettes, lighters  [Hearing aid removed] : hearing aid removed [Dentures removed] : dentures removed [Ground bracelet on pt's wrist] : ground bracelet on pt's wrist  [Contacts removed] : contacts removed  [Remove nail polish] : remove nail polish  [No reading material] : no reading material  [Bra, undergarments removed] : bra, undergarments removed  [No contraindicated dressings] : no contraindicated dressings [Ground Wire - VISUAL Verification - Intact/Free of Obstruction] : Ground Wire - VISUAL Verification - Intact/Free of Obstruction  [Ground Continuity - Verified < 1 ohm w/ Wrist Strap Chrissy] : Ground Continuity - Verified < 1 ohm w/ Wrist Strap Chrissy [____] : Pre-Dive: Time - [unfilled] [___] : Pre-Dive: Value - [unfilled] mg/dL [Clear all fields] : clear all fields [Number: ___] : Number: [unfilled] [Diagnosis: ___] : Diagnosis: [unfilled] [] : No [FreeTextEntry2] : Chamber 3- 1465 [FreeTextEntry4] : 100 [FreeTextEntry6] : 7487 [FreeTextEntry8] : 1002 [de-identified] : 1037 [de-identified] : 1034 [de-identified] : 1107 [de-identified] : 1111 [de-identified] : 2334 [de-identified] : 3506 [de-identified] : 118 min

## 2023-05-25 NOTE — PHYSICAL EXAM
[de-identified] : U/A WITH MICROSCOPY OBTAINED AND SENT TO LAB [FreeTextEntry1] : Radiation proctitis  [de-identified] : Treatment: HBOT.

## 2023-05-25 NOTE — ADDENDUM
[FreeTextEntry1] : Patient arrived ambulatory. Tx order verified. Patient descended to 2.4 MARY ANN over a normal 9 min compression profile without incident. Patient resting comfortably @ depth. Chest rise and fall observed throughout tx. Patient received 2 air breaks. Patient ascended from 2.4 MARY ANN over a normal 9 min decompression profile without incident. Patient tolerated treatment well.\par \par Urine sample obtained post treatment for analysis.

## 2023-05-25 NOTE — ASSESSMENT
[Stable] : stable [Other: ____] : [unfilled] [Ambulatory] : Ambulatory [Verbal] : Verbal [Demo] : Demo [Patient] : Patient [Good - alert, interested, motivated] : Good - alert, interested, motivated [Verbalizes knowledge/Understanding] : Verbalizes knowledge/understanding [Signs and symptoms of infection] : sign and symptoms of infection [How and When to Call] : how and when to call [Labs and Tests] : labs and tests [Hyperbaric Therapy] : hyperbaric therapy [Patient responsibility to plan of care] : patient responsibility to plan of care [No change from previous assessment] : No change from previous assessment [Patient prepared for dive] : Patient prepared for dive [Patient undergoing HBO treatment for __________] : Patient undergoing HBO treatment for [unfilled] [Patient descended without problem for 9 minutes] : Patient descended without problem for 9 minutes [No dizziness or thirst] :  No dizziness or thirst [No ear problems] : No ear problems [Vital signs stable] : Vital signs stable [Tolerating dive well] : Tolerating dive well [No Chest Pain, shortness of breath] : No Chest Pain, shortness of breath [Respiratory Rate Stable] : Respiratory Rate Stable [No chest pain, shortness of breath, or ear pain] :  No chest pain, shortness of breath, or ear pain  [Tolerated Ascent well] : Tolerated Ascent well [Vital Signs stable] : Vital Signs stable [A physician was present throughout the entire HBOT] : A physician was present throughout the entire HBOT [No] : No [Clinically Stable] : Clinically stable [Continue Treatment Plan] : Continue treatment plan [FreeTextEntry3] : pt seen before HBO tx today. Pt has completed 24 out of 30 tx so far. Seen by his urologist about 2 wks ago and a CBC done then showed Hgb of 12+, but still with hematuria. Dr. Hoang commented that urine appeared clear and was planning to allow pt to complete the 30 tx and have pt go for PT for his urinary incontinence which he believes is due to his cystoscopy. P: repeat urinalysis today. [] : No [FreeTextEntry2] : Infection Prevention\par HBOT [FreeTextEntry4] : Pt seen by Dr. Bogdan Hoang on 5/9/23, plan: to finish 30 HBOT then resume Physical Therapy for Urinary Incontinence. \par 24/30 HBOT completed to date. NO ADDITIONAL TREATMENTS ORDERED.\par PT TO D/C HBOT @ 30/30 HBOT\par U/A WITH MICROSCOPY OBTAINED AND SENT TO LAB\par F/U Daily for HBOT. Assessment in 2 weeks.

## 2023-05-25 NOTE — PROCEDURE
[Outpatient] : Outpatient [Ambulatory] : Patient is ambulatory. [THIS CHAMBER HAS BEEN CLEANED / DISINFECTED] : This chamber has been cleaned / disinfected according to local and hospital policy and procedure prior to this treatment. [Patient demonstrated and verbalized proper technique for using air break mask] : Patient demonstrated and verbalized proper technique for using air break mask [Patient educated on the risks of SMOKING prior to HBOT with understanding] : Patient educated on the risks of SMOKING prior to HBOT with understanding [Patient educated on the risks of CONSUMING ALCOHOL prior to HBOT with understanding] : Patient educated on the risks of CONSUMING ALCOHOL prior to HBOT with understanding [100% Cotton] : 100% cotton [Empty all pockets] : empty all pockets [No hair oils, wigs, hairpieces, pins] : no hair oils, wigs, hairpieces, pins  [Pre tx medications] : pre tx medications  [No make-up, creams] : no make-up, creams  [No jewelry] : no jewelry  [No matches, cigarettes, lighters] : no matches, cigarettes, lighters  [Hearing aid removed] : hearing aid removed [Dentures removed] : dentures removed [Ground bracelet on pt's wrist] : ground bracelet on pt's wrist  [Contacts removed] : contacts removed  [Remove nail polish] : remove nail polish  [No reading material] : no reading material  [Bra, undergarments removed] : bra, undergarments removed  [No contraindicated dressings] : no contraindicated dressings [Ground Wire - VISUAL Verification - Intact/Free of Obstruction] : Ground Wire - VISUAL Verification - Intact/Free of Obstruction  [Ground Continuity - Verified < 1 ohm w/ Wrist Strap Chrissy] : Ground Continuity - Verified < 1 ohm w/ Wrist Strap Chrissy [____] : Pre-Dive: Time - [unfilled] [___] : Pre-Dive: Value - [unfilled] mg/dL [Clear all fields] : clear all fields [Number: ___] : Number: [unfilled] [Diagnosis: ___] : Diagnosis: [unfilled] [] : No [FreeTextEntry2] : Chamber 0- 7720 [FreeTextEntry4] : 100 [FreeTextEntry6] : 7533 [FreeTextEntry8] : 1002 [de-identified] : 1032 [de-identified] : 1034 [de-identified] : 1101 [de-identified] : 1111 [de-identified] : 4845 [de-identified] : 9356 [de-identified] : 118 min

## 2023-05-26 ENCOUNTER — OUTPATIENT (OUTPATIENT)
Dept: OUTPATIENT SERVICES | Facility: HOSPITAL | Age: 73
LOS: 1 days | Discharge: HOME | End: 2023-05-26
Payer: MEDICARE

## 2023-05-26 ENCOUNTER — APPOINTMENT (OUTPATIENT)
Dept: HYPERBARIC MEDICINE | Facility: HOSPITAL | Age: 73
End: 2023-05-26
Payer: MEDICARE

## 2023-05-26 VITALS
SYSTOLIC BLOOD PRESSURE: 155 MMHG | OXYGEN SATURATION: 95 % | RESPIRATION RATE: 20 BRPM | TEMPERATURE: 97.7 F | DIASTOLIC BLOOD PRESSURE: 73 MMHG | HEART RATE: 71 BPM

## 2023-05-26 VITALS
SYSTOLIC BLOOD PRESSURE: 171 MMHG | OXYGEN SATURATION: 97 % | TEMPERATURE: 98 F | RESPIRATION RATE: 16 BRPM | HEART RATE: 72 BPM | DIASTOLIC BLOOD PRESSURE: 80 MMHG

## 2023-05-26 DIAGNOSIS — N30.41 IRRADIATION CYSTITIS WITH HEMATURIA: ICD-10-CM

## 2023-05-26 DIAGNOSIS — Z90.79 ACQUIRED ABSENCE OF OTHER GENITAL ORGAN(S): Chronic | ICD-10-CM

## 2023-05-26 PROCEDURE — 99183 HYPERBARIC OXYGEN THERAPY: CPT

## 2023-05-26 PROCEDURE — G0277: CPT

## 2023-05-26 PROCEDURE — 82962 GLUCOSE BLOOD TEST: CPT

## 2023-05-26 NOTE — PROCEDURE
[100% Cotton] : 100% cotton [Empty all pockets] : empty all pockets [No hair oils, wigs, hairpieces, pins] : no hair oils, wigs, hairpieces, pins  [Pre tx medications] : pre tx medications  [No make-up, creams] : no make-up, creams  [No jewelry] : no jewelry  [No matches, cigarettes, lighters] : no matches, cigarettes, lighters  [Hearing aid removed] : hearing aid removed [Dentures removed] : dentures removed [Ground bracelet on pt's wrist] : ground bracelet on pt's wrist  [Contacts removed] : contacts removed  [Remove nail polish] : remove nail polish  [No reading material] : no reading material  [Bra, undergarments removed] : bra, undergarments removed  [No contraindicated dressings] : no contraindicated dressings [Ground Wire - VISUAL Verification - Intact/Free of Obstruction] : Ground Wire - VISUAL Verification - Intact/Free of Obstruction  [Ground Continuity - Verified < 1 ohm w/ Wrist Strap Chrissy] : Ground Continuity - Verified < 1 ohm w/ Wrist Strap Chrissy [Outpatient] : Outpatient [Ambulatory] : Patient is ambulatory. [THIS CHAMBER HAS BEEN CLEANED / DISINFECTED] : This chamber has been cleaned / disinfected according to local and hospital policy and procedure prior to this treatment. [____] : Recheck: Time - [unfilled] [___] : Recheck: Value - [unfilled] mg/dL [Patient demonstrated and verbalized proper technique for using air break mask] : Patient demonstrated and verbalized proper technique for using air break mask [Patient educated on the risks of SMOKING prior to HBOT with understanding] : Patient educated on the risks of SMOKING prior to HBOT with understanding [Patient educated on the risks of CONSUMING ALCOHOL prior to HBOT with understanding] : Patient educated on the risks of CONSUMING ALCOHOL prior to HBOT with understanding [Number: ___] : Number: [unfilled] [Diagnosis: ___] : Diagnosis: [unfilled] [Clear all fields] : clear all fields [] : No [FreeTextEntry2] : Chamber 4 RUN # 0207 [FreeTextEntry4] : 100 [FreeTextEntry6] : 4535 [FreeTextEntry8] : 9931 [de-identified] : 8946 [de-identified] : 2663 [de-identified] : 1127 [de-identified] : 0036 [de-identified] : 1207 [de-identified] : 2661 [de-identified] : 118

## 2023-05-26 NOTE — ADDENDUM
[FreeTextEntry1] : Patient arrived to HBOT suite safely.\par Patient vitals assessed prior to descent to reveal an elevated BP and low BGL. RN notified, interventions given. retest values viable for treatment.\par Patient dive orders verified prior to descent.\par CHAMBER #4 RUN #5192\par Patient descended to 2.4 MARY ANN over a nine minute compression with no discomfort or intervention required.\par Patient resting comfortably at Rx depth with equal and adequate chest rise and fall noted throughout.\par Patient observed first air break, lasting 5 minutes, tolerated well.\par Patient resting comfortably at Rx depth with equal and adequate chest rise and fall noted throughout.\par Patient observed second air break, lasting 5 minutes, tolerated well.\par Patient resting comfortably at Rx depth with equal and adequate chest rise and fall noted throughout.\par Patient ascended to surface over a nine minute decompression with no discomfort or intervention required.\par Patient vitals assessed post-treatment to reveal an elevated BP, retest 10 minutes later revealed viable values for departure..\par Patient exited HBOT suite safely.\par NOTHING FURTHER TO REPORT.\par

## 2023-05-27 ENCOUNTER — NON-APPOINTMENT (OUTPATIENT)
Age: 73
End: 2023-05-27

## 2023-05-27 ENCOUNTER — APPOINTMENT (OUTPATIENT)
Dept: HYPERBARIC MEDICINE | Facility: HOSPITAL | Age: 73
End: 2023-05-27

## 2023-05-27 DIAGNOSIS — Z85.46 PERSONAL HISTORY OF MALIGNANT NEOPLASM OF PROSTATE: ICD-10-CM
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
## 2023-05-27 DIAGNOSIS — N30.41 IRRADIATION CYSTITIS WITH HEMATURIA: ICD-10-CM

## 2023-05-30 ENCOUNTER — APPOINTMENT (OUTPATIENT)
Dept: HYPERBARIC MEDICINE | Facility: HOSPITAL | Age: 73
End: 2023-05-30

## 2023-05-31 ENCOUNTER — APPOINTMENT (OUTPATIENT)
Dept: HYPERBARIC MEDICINE | Facility: HOSPITAL | Age: 73
End: 2023-05-31
Payer: MEDICARE

## 2023-05-31 ENCOUNTER — OUTPATIENT (OUTPATIENT)
Dept: OUTPATIENT SERVICES | Facility: HOSPITAL | Age: 73
LOS: 1 days | Discharge: HOME | End: 2023-05-31
Payer: MEDICARE

## 2023-05-31 VITALS
DIASTOLIC BLOOD PRESSURE: 80 MMHG | RESPIRATION RATE: 16 BRPM | HEART RATE: 70 BPM | OXYGEN SATURATION: 100 % | SYSTOLIC BLOOD PRESSURE: 177 MMHG | TEMPERATURE: 98 F

## 2023-05-31 VITALS
OXYGEN SATURATION: 95 % | HEART RATE: 86 BPM | RESPIRATION RATE: 20 BRPM | DIASTOLIC BLOOD PRESSURE: 79 MMHG | SYSTOLIC BLOOD PRESSURE: 174 MMHG | TEMPERATURE: 97.7 F

## 2023-05-31 VITALS — DIASTOLIC BLOOD PRESSURE: 86 MMHG | SYSTOLIC BLOOD PRESSURE: 168 MMHG

## 2023-05-31 DIAGNOSIS — N30.41 IRRADIATION CYSTITIS WITH HEMATURIA: ICD-10-CM

## 2023-05-31 DIAGNOSIS — Z90.89 ACQUIRED ABSENCE OF OTHER ORGANS: Chronic | ICD-10-CM

## 2023-05-31 PROCEDURE — 82962 GLUCOSE BLOOD TEST: CPT

## 2023-05-31 PROCEDURE — 99183 HYPERBARIC OXYGEN THERAPY: CPT

## 2023-05-31 PROCEDURE — G0277: CPT

## 2023-05-31 NOTE — PROCEDURE
[Outpatient] : Outpatient [Ambulatory] : Patient is ambulatory. [THIS CHAMBER HAS BEEN CLEANED / DISINFECTED] : This chamber has been cleaned / disinfected according to local and hospital policy and procedure prior to this treatment. [Patient demonstrated and verbalized proper technique for using air break mask] : Patient demonstrated and verbalized proper technique for using air break mask [Patient educated on the risks of SMOKING prior to HBOT with understanding] : Patient educated on the risks of SMOKING prior to HBOT with understanding [Patient educated on the risks of CONSUMING ALCOHOL prior to HBOT with understanding] : Patient educated on the risks of CONSUMING ALCOHOL prior to HBOT with understanding [100% Cotton] : 100% cotton [Empty all pockets] : empty all pockets [No hair oils, wigs, hairpieces, pins] : no hair oils, wigs, hairpieces, pins  [Pre tx medications] : pre tx medications  [No make-up, creams] : no make-up, creams  [No jewelry] : no jewelry  [No matches, cigarettes, lighters] : no matches, cigarettes, lighters  [Hearing aid removed] : hearing aid removed [Dentures removed] : dentures removed [Ground bracelet on pt's wrist] : ground bracelet on pt's wrist  [Contacts removed] : contacts removed  [Remove nail polish] : remove nail polish  [No reading material] : no reading material  [Bra, undergarments removed] : bra, undergarments removed  [No contraindicated dressings] : no contraindicated dressings [Ground Wire - VISUAL Verification - Intact/Free of Obstruction] : Ground Wire - VISUAL Verification - Intact/Free of Obstruction  [Ground Continuity - Verified < 1 ohm w/ Wrist Strap Chrissy] : Ground Continuity - Verified < 1 ohm w/ Wrist Strap Chrissy [Number: ___] : Number: [unfilled] [Diagnosis: ___] : Diagnosis: [unfilled] [____] : Post-Dive: Time - [unfilled] [Clear all fields] : clear all fields [___] : Post-Dive: Value - [unfilled] mg/dL [] : No [FreeTextEntry4] : 100 [FreeTextEntry6] : 9:49 [FreeTextEntry8] : 9:58 [de-identified] : 102 [de-identified] : 1030 [de-identified] : 1104 [de-identified] : 1104 [de-identified] : 2421 [de-identified] : 0269 [de-identified] : 118

## 2023-05-31 NOTE — ADDENDUM
[FreeTextEntry1] : Pt arrived ambulatory A&Ox4.\par All vital within parameters for hbot.\par Pt descent to the Rx tx depth in chamber 1 was without incident.\par Pt resting at depth, chest rise and fall observed.\par Care transferred to HT.\par PT TOLERATED AIR BREAKS \par PT ASCENDED FROM TX DEPTH OF 2.4 MARY ANN \par PT TOLERATED TREATMENT\par PT EXITED HYPERBARIC SUITE SAFELY ACCOMPANIED BY HT\par \par

## 2023-06-01 ENCOUNTER — APPOINTMENT (OUTPATIENT)
Dept: HYPERBARIC MEDICINE | Facility: HOSPITAL | Age: 73
End: 2023-06-01

## 2023-06-02 ENCOUNTER — NON-APPOINTMENT (OUTPATIENT)
Age: 73
End: 2023-06-02

## 2023-06-02 ENCOUNTER — APPOINTMENT (OUTPATIENT)
Dept: ELECTROPHYSIOLOGY | Facility: CLINIC | Age: 73
End: 2023-06-02

## 2023-06-02 ENCOUNTER — APPOINTMENT (OUTPATIENT)
Dept: HYPERBARIC MEDICINE | Facility: HOSPITAL | Age: 73
End: 2023-06-02

## 2023-06-05 ENCOUNTER — OUTPATIENT (OUTPATIENT)
Dept: OUTPATIENT SERVICES | Facility: HOSPITAL | Age: 73
LOS: 1 days | Discharge: HOME | End: 2023-06-05
Payer: MEDICARE

## 2023-06-05 ENCOUNTER — APPOINTMENT (OUTPATIENT)
Dept: HYPERBARIC MEDICINE | Facility: HOSPITAL | Age: 73
End: 2023-06-05
Payer: MEDICARE

## 2023-06-05 ENCOUNTER — NON-APPOINTMENT (OUTPATIENT)
Age: 73
End: 2023-06-05

## 2023-06-05 VITALS
RESPIRATION RATE: 16 BRPM | DIASTOLIC BLOOD PRESSURE: 90 MMHG | TEMPERATURE: 97.9 F | SYSTOLIC BLOOD PRESSURE: 182 MMHG | OXYGEN SATURATION: 98 % | HEART RATE: 72 BPM

## 2023-06-05 VITALS
HEART RATE: 83 BPM | DIASTOLIC BLOOD PRESSURE: 76 MMHG | SYSTOLIC BLOOD PRESSURE: 169 MMHG | TEMPERATURE: 98.3 F | OXYGEN SATURATION: 96 % | RESPIRATION RATE: 16 BRPM

## 2023-06-05 VITALS — DIASTOLIC BLOOD PRESSURE: 80 MMHG | SYSTOLIC BLOOD PRESSURE: 142 MMHG

## 2023-06-05 DIAGNOSIS — Z90.89 ACQUIRED ABSENCE OF OTHER ORGANS: Chronic | ICD-10-CM
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
## 2023-06-05 DIAGNOSIS — N30.41 IRRADIATION CYSTITIS WITH HEMATURIA: ICD-10-CM
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
## 2023-06-05 DIAGNOSIS — Z90.79 ACQUIRED ABSENCE OF OTHER GENITAL ORGAN(S): Chronic | ICD-10-CM

## 2023-06-05 DIAGNOSIS — Z85.46 PERSONAL HISTORY OF MALIGNANT NEOPLASM OF PROSTATE: ICD-10-CM

## 2023-06-05 LAB
APPEARANCE UR: CLEAR — SIGNIFICANT CHANGE UP
BILIRUB UR-MCNC: NEGATIVE — SIGNIFICANT CHANGE UP
COLOR SPEC: YELLOW — SIGNIFICANT CHANGE UP
DIFF PNL FLD: NEGATIVE — SIGNIFICANT CHANGE UP
GLUCOSE UR QL: NEGATIVE — SIGNIFICANT CHANGE UP
KETONES UR-MCNC: NEGATIVE — SIGNIFICANT CHANGE UP
LEUKOCYTE ESTERASE UR-ACNC: NEGATIVE — SIGNIFICANT CHANGE UP
NITRITE UR-MCNC: NEGATIVE — SIGNIFICANT CHANGE UP
PH UR: 6.5 — SIGNIFICANT CHANGE UP (ref 5–8)
PROT UR-MCNC: NEGATIVE — SIGNIFICANT CHANGE UP
SP GR SPEC: 1.02 — SIGNIFICANT CHANGE UP (ref 1–1.03)
UROBILINOGEN FLD QL: 0.2 — SIGNIFICANT CHANGE UP (ref 0.2–1)

## 2023-06-05 PROCEDURE — 82962 GLUCOSE BLOOD TEST: CPT

## 2023-06-05 PROCEDURE — 81003 URINALYSIS AUTO W/O SCOPE: CPT

## 2023-06-05 PROCEDURE — G0277: CPT

## 2023-06-05 PROCEDURE — 99183 HYPERBARIC OXYGEN THERAPY: CPT

## 2023-06-06 ENCOUNTER — OUTPATIENT (OUTPATIENT)
Dept: OUTPATIENT SERVICES | Facility: HOSPITAL | Age: 73
LOS: 1 days | Discharge: HOME | End: 2023-06-06
Payer: MEDICARE

## 2023-06-06 ENCOUNTER — APPOINTMENT (OUTPATIENT)
Dept: HYPERBARIC MEDICINE | Facility: HOSPITAL | Age: 73
End: 2023-06-06
Payer: MEDICARE

## 2023-06-06 VITALS
SYSTOLIC BLOOD PRESSURE: 182 MMHG | DIASTOLIC BLOOD PRESSURE: 96 MMHG | TEMPERATURE: 97.7 F | RESPIRATION RATE: 16 BRPM | OXYGEN SATURATION: 100 % | HEART RATE: 70 BPM

## 2023-06-06 VITALS
RESPIRATION RATE: 16 BRPM | HEART RATE: 70 BPM | OXYGEN SATURATION: 98 % | DIASTOLIC BLOOD PRESSURE: 82 MMHG | SYSTOLIC BLOOD PRESSURE: 142 MMHG | TEMPERATURE: 97.7 F

## 2023-06-06 VITALS — DIASTOLIC BLOOD PRESSURE: 84 MMHG | SYSTOLIC BLOOD PRESSURE: 162 MMHG

## 2023-06-06 DIAGNOSIS — Z85.46 PERSONAL HISTORY OF MALIGNANT NEOPLASM OF PROSTATE: ICD-10-CM

## 2023-06-06 DIAGNOSIS — Z90.89 ACQUIRED ABSENCE OF OTHER ORGANS: Chronic | ICD-10-CM
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
## 2023-06-06 DIAGNOSIS — N30.41 IRRADIATION CYSTITIS WITH HEMATURIA: ICD-10-CM
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
## 2023-06-06 DIAGNOSIS — Z90.79 ACQUIRED ABSENCE OF OTHER GENITAL ORGAN(S): Chronic | ICD-10-CM

## 2023-06-06 PROCEDURE — G0277: CPT

## 2023-06-06 PROCEDURE — 99183 HYPERBARIC OXYGEN THERAPY: CPT

## 2023-06-06 PROCEDURE — 82962 GLUCOSE BLOOD TEST: CPT

## 2023-06-06 NOTE — PROCEDURE
[Outpatient] : Outpatient [Ambulatory] : Patient is ambulatory. [THIS CHAMBER HAS BEEN CLEANED / DISINFECTED] : This chamber has been cleaned / disinfected according to local and hospital policy and procedure prior to this treatment. [Patient demonstrated and verbalized proper technique for using air break mask] : Patient demonstrated and verbalized proper technique for using air break mask [Patient educated on the risks of SMOKING prior to HBOT with understanding] : Patient educated on the risks of SMOKING prior to HBOT with understanding [Patient educated on the risks of CONSUMING ALCOHOL prior to HBOT with understanding] : Patient educated on the risks of CONSUMING ALCOHOL prior to HBOT with understanding [100% Cotton] : 100% cotton [Empty all pockets] : empty all pockets [No hair oils, wigs, hairpieces, pins] : no hair oils, wigs, hairpieces, pins  [Pre tx medications] : pre tx medications  [No make-up, creams] : no make-up, creams  [No jewelry] : no jewelry  [No matches, cigarettes, lighters] : no matches, cigarettes, lighters  [Hearing aid removed] : hearing aid removed [Dentures removed] : dentures removed [Ground bracelet on pt's wrist] : ground bracelet on pt's wrist  [Contacts removed] : contacts removed  [Remove nail polish] : remove nail polish  [No reading material] : no reading material  [Bra, undergarments removed] : bra, undergarments removed  [No contraindicated dressings] : no contraindicated dressings [Ground Wire - VISUAL Verification - Intact/Free of Obstruction] : Ground Wire - VISUAL Verification - Intact/Free of Obstruction  [Ground Continuity - Verified < 1 ohm w/ Wrist Strap Chrissy] : Ground Continuity - Verified < 1 ohm w/ Wrist Strap Chrissy [Number: ___] : Number: [unfilled] [Diagnosis: ___] : Diagnosis: [unfilled] [____] : Post-Dive: Time - [unfilled] [___] : Post-Dive: Value - [unfilled] mg/dL [Clear all fields] : clear all fields [] : No [FreeTextEntry4] : 100 [FreeTextEntry6] : 739 [FreeTextEntry8] : 006 [de-identified] : 151 [de-identified] : 950 [de-identified] : 091 [de-identified] : 435 [de-identified] : 990 [de-identified] : 1005 [de-identified] : 118

## 2023-06-06 NOTE — ADDENDUM
[FreeTextEntry1] : PT A&OX3 AND AMBULATING UNASSISTED INTO HYPERBARIC SUITE \par PT ORDER VERIFIED PRIOR TO TREATMENT\par PT CONTRAINDICATION LIST AND PRE CHECK LIST VERIFIED AND SIGNED BY TECH PRIOR TO TREATMENT\par PT VITALS WERE WITHIN PARAMETERS FOR HBOT \par PT DESCENDED TO 2.4 MARY ANN IN CHAMBER #[id number]\par PT OBSERVED FOR FACIAL TWITCHING AND CHEST RISE BY HT SEATED CHAMBERSIDE\par PT TOLERATED AIR BREAKS \par PT ASCENDED FROM TX DEPTH OF 2.4 MARY ANN\par PT TOLERATED TREATMENT\par PT BP ELEVATED POST TX -- NURSE INFORMED\par PT BP RECHECK WAS WITH LIMITS TO EXIT HBO SUITE\par PT EXITED HYPERBARIC SUITE SAFELY ACCOMPANIED BY HT\par \par \par \par

## 2023-06-07 ENCOUNTER — APPOINTMENT (OUTPATIENT)
Dept: HYPERBARIC MEDICINE | Facility: HOSPITAL | Age: 73
End: 2023-06-07

## 2023-06-07 NOTE — ADDENDUM
[FreeTextEntry1] : Patient arrived ambulatory. Tx order verified. Patient descended to 2.4 MARY ANN over a normal 9 min compression profile without incident. Patient resting comfortably @ depth. Chest rise observed throughout tx. Patient received 2 air breaks. Patient ascended from 2.4 MARY ANN over a normal 9 min decompression profile. Patient tolerated treatment well.\par \par Patient post tx blood pressure high. Pt retested. Pt's blood pressure within limits for patient to leave center.

## 2023-06-07 NOTE — PROCEDURE
[Outpatient] : Outpatient [Ambulatory] : Patient is ambulatory. [THIS CHAMBER HAS BEEN CLEANED / DISINFECTED] : This chamber has been cleaned / disinfected according to local and hospital policy and procedure prior to this treatment. [____] : Pre-Dive: Time - [unfilled] [___] : Pre-Dive: Value - [unfilled] mg/dL [Patient demonstrated and verbalized proper technique for using air break mask] : Patient demonstrated and verbalized proper technique for using air break mask [Patient educated on the risks of SMOKING prior to HBOT with understanding] : Patient educated on the risks of SMOKING prior to HBOT with understanding [Patient educated on the risks of CONSUMING ALCOHOL prior to HBOT with understanding] : Patient educated on the risks of CONSUMING ALCOHOL prior to HBOT with understanding [100% Cotton] : 100% cotton [Empty all pockets] : empty all pockets [No hair oils, wigs, hairpieces, pins] : no hair oils, wigs, hairpieces, pins  [Pre tx medications] : pre tx medications  [No make-up, creams] : no make-up, creams  [No jewelry] : no jewelry  [No matches, cigarettes, lighters] : no matches, cigarettes, lighters  [Hearing aid removed] : hearing aid removed [Dentures removed] : dentures removed [Ground bracelet on pt's wrist] : ground bracelet on pt's wrist  [Contacts removed] : contacts removed  [Remove nail polish] : remove nail polish  [No reading material] : no reading material  [Bra, undergarments removed] : bra, undergarments removed  [No contraindicated dressings] : no contraindicated dressings [Ground Wire - VISUAL Verification - Intact/Free of Obstruction] : Ground Wire - VISUAL Verification - Intact/Free of Obstruction  [Ground Continuity - Verified < 1 ohm w/ Wrist Strap Chrissy] : Ground Continuity - Verified < 1 ohm w/ Wrist Strap Chrissy [Number: ___] : Number: [unfilled] [Diagnosis: ___] : Diagnosis: [unfilled] [Clear all fields] : clear all fields [] : No [FreeTextEntry2] : Chamber 2- 5208 [FreeTextEntry4] : 100 [FreeTextEntry6] : 6777 [FreeTextEntry8] : 5890 [de-identified] : 1020 [de-identified] : 1036 [de-identified] : 1108 [de-identified] : 1104 [de-identified] : 3060 [de-identified] : 7303 [de-identified] : 118 min

## 2023-06-08 ENCOUNTER — APPOINTMENT (OUTPATIENT)
Dept: HYPERBARIC MEDICINE | Facility: HOSPITAL | Age: 73
End: 2023-06-08

## 2023-06-08 NOTE — PROCEDURE
[Outpatient] : Outpatient [Ambulatory] : Patient is ambulatory. [THIS CHAMBER HAS BEEN CLEANED / DISINFECTED] : This chamber has been cleaned / disinfected according to local and hospital policy and procedure prior to this treatment. [Patient demonstrated and verbalized proper technique for using air break mask] : Patient demonstrated and verbalized proper technique for using air break mask [Patient educated on the risks of SMOKING prior to HBOT with understanding] : Patient educated on the risks of SMOKING prior to HBOT with understanding [Patient educated on the risks of CONSUMING ALCOHOL prior to HBOT with understanding] : Patient educated on the risks of CONSUMING ALCOHOL prior to HBOT with understanding [100% Cotton] : 100% cotton [Empty all pockets] : empty all pockets [No hair oils, wigs, hairpieces, pins] : no hair oils, wigs, hairpieces, pins  [Pre tx medications] : pre tx medications  [No make-up, creams] : no make-up, creams  [No jewelry] : no jewelry  [No matches, cigarettes, lighters] : no matches, cigarettes, lighters  [Hearing aid removed] : hearing aid removed [Dentures removed] : dentures removed [Ground bracelet on pt's wrist] : ground bracelet on pt's wrist  [Contacts removed] : contacts removed  [Remove nail polish] : remove nail polish  [No reading material] : no reading material  [Bra, undergarments removed] : bra, undergarments removed  [No contraindicated dressings] : no contraindicated dressings [Ground Wire - VISUAL Verification - Intact/Free of Obstruction] : Ground Wire - VISUAL Verification - Intact/Free of Obstruction  [Ground Continuity - Verified < 1 ohm w/ Wrist Strap Chrissy] : Ground Continuity - Verified < 1 ohm w/ Wrist Strap Chrissy [Diagnosis: ___] : Diagnosis: [unfilled] [Number: ___] : Number: [unfilled] [____] : Post-Dive: Time - [unfilled] [___] : Post-Dive: Value - [unfilled] mg/dL [Clear all fields] : clear all fields [] : No [FreeTextEntry4] : 100 [FreeTextEntry6] : 9:12 [FreeTextEntry8] : 845 [de-identified] : 170 [de-identified] : 792 [de-identified] : 1022 [de-identified] : 1032 [de-identified] : 1109 [de-identified] : 1385 [de-identified] : 118

## 2023-06-08 NOTE — ADDENDUM
[FreeTextEntry1] : PT ARRIVED AMBULATORY A&OX4.\par ALL VITALS WITHIN PARAMETERS FOR HBOT.\par PT DESCENT TO RX TX DEPTH IN CHAMBER 3 WAS WITHOUT INCIDENT.\par PT RESTING AT DEPTH, CHEST RISE AND FALL OBSERVED\par PT TOLERATED AIR BREAKS \par PT ASCENDED FROM TX DEPTH OF 2.4 MARY ANN \par PT TOLERATED TREATMENT\par PT EXITED HYPERBARIC SUITE SAFELY ACCOMPANIED BY HT\par \par

## 2023-06-08 NOTE — ASSESSMENT
[Patient descended without problem for 9 minutes] : Patient descended without problem for 9 minutes [No change from previous assessment] : No change from previous assessment [Patient prepared for dive] : Patient prepared for dive [Patient undergoing HBO treatment for __________] : Patient undergoing HBO treatment for [unfilled] [No dizziness or thirst] :  No dizziness or thirst [No ear problems] : No ear problems [Vital signs stable] : Vital signs stable [Tolerating dive well] : Tolerating dive well [No Chest Pain, shortness of breath] : No Chest Pain, shortness of breath [Respiratory Rate Stable] : Respiratory Rate Stable [No chest pain, shortness of breath, or ear pain] :  No chest pain, shortness of breath, or ear pain  [Tolerated Ascent well] : Tolerated Ascent well [Vital Signs stable] : Vital Signs stable [A physician was present throughout the entire HBOT] : A physician was present throughout the entire HBOT [No] : No [Clinically Stable] : Clinically stable [Continue Treatment Plan] : Continue treatment plan [FreeTextEntry2] : none

## 2023-06-09 ENCOUNTER — APPOINTMENT (OUTPATIENT)
Dept: HYPERBARIC MEDICINE | Facility: HOSPITAL | Age: 73
End: 2023-06-09
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
## 2023-06-09 DIAGNOSIS — Z85.46 PERSONAL HISTORY OF MALIGNANT NEOPLASM OF PROSTATE: ICD-10-CM

## 2023-06-09 DIAGNOSIS — N30.41 IRRADIATION CYSTITIS WITH HEMATURIA: ICD-10-CM
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
## 2023-06-14 NOTE — H&P PST ADULT - RESPIRATORY RATE (BREATHS/MIN)
Cosentyx Pregnancy And Lactation Text: This medication is Pregnancy Category B and is considered safe during pregnancy. It is unknown if this medication is excreted in breast milk. 16

## 2023-06-15 ENCOUNTER — APPOINTMENT (OUTPATIENT)
Dept: UROLOGY | Facility: CLINIC | Age: 73
End: 2023-06-15
Payer: MEDICARE

## 2023-06-15 DIAGNOSIS — N47.1 PHIMOSIS: ICD-10-CM

## 2023-06-15 PROCEDURE — 51798 US URINE CAPACITY MEASURE: CPT

## 2023-06-15 PROCEDURE — 99214 OFFICE O/P EST MOD 30 MIN: CPT | Mod: 25

## 2023-06-15 RX ORDER — FOSFOMYCIN TROMETHAMINE 3 G/1
3 POWDER ORAL DAILY
Qty: 3 | Refills: 0 | Status: DISCONTINUED | COMMUNITY
Start: 2023-05-06 | End: 2023-06-15

## 2023-06-16 ENCOUNTER — LABORATORY RESULT (OUTPATIENT)
Age: 73
End: 2023-06-16

## 2023-06-17 LAB
APPEARANCE: CLEAR
BACTERIA UR CULT: NORMAL
BACTERIA: NEGATIVE /HPF
BILIRUBIN URINE: NEGATIVE
BLOOD URINE: NEGATIVE
COLOR: YELLOW
GLUCOSE QUALITATIVE U: NEGATIVE
HYALINE CASTS: NORMAL
KETONES URINE: NEGATIVE
LEUKOCYTE ESTERASE URINE: NEGATIVE
MICROSCOPIC-UA: NORMAL
NITRITE URINE: NEGATIVE
PH URINE: 6
PROTEIN URINE: ABNORMAL
RED BLOOD CELLS URINE: 0 /HPF
SPECIFIC GRAVITY URINE: >=1.03
SQUAMOUS EPITHELIAL CELLS: 2
UROBILINOGEN URINE: NORMAL
WHITE BLOOD CELLS URINE: 1 /HPF

## 2023-06-17 NOTE — PHYSICAL EXAM
[General Appearance - Well Developed] : well developed [General Appearance - Well Nourished] : well nourished [Normal Appearance] : normal appearance [Well Groomed] : well groomed [General Appearance - In No Acute Distress] : no acute distress [Abdomen Soft] : soft [Abdomen Tenderness] : non-tender [Urethral Meatus] : meatus normal [Costovertebral Angle Tenderness] : no ~M costovertebral angle tenderness [Urinary Bladder Findings] : the bladder was normal on palpation [Scrotum] : the scrotum was normal [Testes Mass (___cm)] : there were no testicular masses [Edema] : no peripheral edema [] : no respiratory distress [Respiration, Rhythm And Depth] : normal respiratory rhythm and effort [Exaggerated Use Of Accessory Muscles For Inspiration] : no accessory muscle use [Affect] : the affect was normal [Oriented To Time, Place, And Person] : oriented to person, place, and time [Mood] : the mood was normal [Not Anxious] : not anxious [Normal Station and Gait] : the gait and station were normal for the patient's age [No Focal Deficits] : no focal deficits [No Palpable Adenopathy] : no palpable adenopathy

## 2023-06-17 NOTE — HISTORY OF PRESENT ILLNESS
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  He has undergone treatment for prostate cancer with multimodal therapy including radiation therapy preceded by radical prostatectomy.  He has been hospitalized earlier this year with hemorrhagic cystitis and after bladder irrigations and aluminum hydroxide irrigation, his hematuria did improve and he has now gone through about 30 treatments with hyperbaric oxygen therapy.  He has had significant improvements with this treatment regimen although did notice some recent hematuria which has gotten him quite concerned.  He is currently not experiencing any hematuria.  He did recently have a urine culture showing Proteus vulgaris which was a multidrug-resistant bacteria.  I treated him with fosfomycin for several days.  He is also experiencing some very slight dysuria at this time.  No fevers or chills.  No suprapubic pain or discomfort.  The patient does have incontinence at baseline.\par \par For his prostate cancer treatment, he remains on Orgovyx.

## 2023-06-17 NOTE — ASSESSMENT
[FreeTextEntry1] : Follow-up urine culture will be checked today to confirm that the multidrug-resistant Proteus vulgaris bacteria has been adequately treated.  This may contribute to his current symptoms of dysuria and the hematuria that was recently observed.  It is also possible that his symptoms are simply related to the continued sequelae of radiation therapy with the radiation cystitis.  Hematuria is not significant at this time as his urine is fairly clear today but the patient is quite concerned about a significant recurrence of the hematuria.  For now I do not think he needs any catheterization, irrigation, or admission to the hospital but he does understand to come to the hospital should he have a significant episode of hematuria as he may require bladder irrigation.\par \par A residual bladder scan was checked today because the patient was also having some sensations of incomplete emptying.  It shows he is perfectly empty with 0 mL residual.  I suspect he is likely experiencing some sensory urgency.

## 2023-06-21 ENCOUNTER — NON-APPOINTMENT (OUTPATIENT)
Age: 73
End: 2023-06-21

## 2023-06-29 ENCOUNTER — APPOINTMENT (OUTPATIENT)
Dept: ORTHOPEDIC SURGERY | Facility: CLINIC | Age: 73
End: 2023-06-29
Payer: MEDICARE

## 2023-06-29 VITALS — BODY MASS INDEX: 29.63 KG/M2 | WEIGHT: 207 LBS | HEIGHT: 70 IN

## 2023-06-29 VITALS — WEIGHT: 207 LBS | HEIGHT: 70 IN | BODY MASS INDEX: 29.63 KG/M2

## 2023-06-29 PROCEDURE — 73030 X-RAY EXAM OF SHOULDER: CPT | Mod: 50

## 2023-06-29 PROCEDURE — J3490M: CUSTOM

## 2023-06-29 PROCEDURE — 99214 OFFICE O/P EST MOD 30 MIN: CPT | Mod: 25

## 2023-06-29 PROCEDURE — 20611 DRAIN/INJ JOINT/BURSA W/US: CPT | Mod: 50

## 2023-06-29 NOTE — HISTORY OF PRESENT ILLNESS
[de-identified] : 6/29/23:   Here for follow up, he reports continued pain, the injections are wearing off more quickly\par \par 3/28/23:  Here for follow up.  His shoulders have been more painful recently.  He was also hospitalized for an issue related to the prostrate. \par \par 12/1/22: Here for follow up, he got about 2.5 months relief. \par \par 8/30/22: Here to f/u. Pain started to return approx 1 month ago.\par \par 5/17/22: Here for follow up.  \par \par 2/17/22: Here for follow up. He reports the injections are working but not lasting as long as he is used to.\par \par 11/2/21: Here for follow up, EMG review. His left shoulder is more painful.\par \par EMG b/l UE: R > L chronic C5, C6 radiculopathy, mild b/l CTS\par \par 8/3/21: Here for follow up. The left side is more painful today. He is complaining of some paresthesias in both arms today and some pain posteriorly.\par \par 5/4/21: Follow up bilateral shoulder. Injections last visit helped. He requests to repeat them today.\par \par 2/4/21: Here for follow up. Injections are still helping. He has some complications after his aortic valve procedure.\par \par 11/10/20: Here for follow up. He did get relief from the injections last visit. 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 During COVID his PT was cancelled. Waking him at night now. L is worse than the R. 3 months relief with prior injections.\par \par 1/28/20: Here for fu and repeat cortisone injection bilateral shoulders. PT helps - no new symptoms. 3 months relief with prior injections.\par \par 1/7/20: Here for follow up. he has been out of PT for the shoulders for 3 weeks. He has been dealing with a sinus infection.\par \par 10/8/19: Here for follow up. he did start PT. The shoulders feel achy.\par \par 7/9/19: Here for follow up. The shoulders have become more painful recently. He wants to consider PT next month. Right is more painful than the left.\par \par 4/2/19: Here for fu. Recurring R shoulder pain, was unable to do PT as planned. Now with L shoulder pain as well x 2-3 months. No injury recalled. Pain with motion. He is unable to lift things, uses caution carrying laundry. Concerned about loss of strength. Advil 400mg prn.\par \par 12/11/18: Here for follow up. He had the injection last visit. He recently had radiation for prostate cancer, just finished.\par \par 6/26/18: He returns for f/u right shoulder. He is having more pain in the shoulder.\par \par 4/24/18: He returns for f/u right shoulder. He continue to have some pain radiating into neck, overall feeling better. He has not been to PT recently.\par \par 1/23/18: Here for follow up. He has had some return of pain in the shoulder. He takes advil. He has been out of PT due to other issues but wants to start again soon.\par \par 12/12/17: Here for follow up. His shoulder feels good today. He has been in PT but recently stopped. He has only mild pain.\par \par 9/12/17: Here for follow up. He does notice some improvement with PT.\par \par 7/25/17: Here for follow up. Has been in PT weekly which is helping. 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 Pain persists intermittently with movement. Requesting injection.  Given subacromial injection.\par \par 6/6/17: Here for follow up. Has been in PT. Only significant pain with movement. Did get improvement with injection.\par \par 4/25/17: 67 y/o RHD male here for the r shoulder. Felt pain in the shoulder in 9/16 when carrying some groceries up the stairs. No specific trauma. Eventually got an MRI. He has been going to PT with some improvement. He also takes\par NSAIDs prn.\par \par MRI R shoulder:\par 1. Advanced rotator cuff tear with complete full-thickness discontinuity in a near complete full-thickness tear of infraspinatus is stable since the prior study. There is also persistent high-grade partial tearing of subscapularis and elongation of subscapularis tendon. No head is high riding. There is atrophy of supraspinatus and infraspinatus muscle bellies.\par 2. Chronic rupture and distal retraction long head of the biceps tendon\par 3. Mild degenerative tearing of the labrum.\par \par Prior MD Notes:\par 3/9/17: Patient is a 67 yo RHD male c/o right shoulder pain since Sept 2016 after he felt pain while carrying groceries. No n/t. Has been seeing Dr. Jennings who did Xrays, ordered MRI and started PT. PT giving some relief, but he would like to discuss his surgical options. Taking advil which gives some relief. No previous injuries or surgeries to right shoulder. 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

## 2023-07-18 ENCOUNTER — APPOINTMENT (OUTPATIENT)
Dept: OTOLARYNGOLOGY | Facility: CLINIC | Age: 73
End: 2023-07-18
Payer: MEDICARE

## 2023-07-18 VITALS
BODY MASS INDEX: 29.35 KG/M2 | HEART RATE: 73 BPM | DIASTOLIC BLOOD PRESSURE: 82 MMHG | SYSTOLIC BLOOD PRESSURE: 137 MMHG | WEIGHT: 205 LBS | HEIGHT: 70 IN

## 2023-07-18 DIAGNOSIS — H90.3 SENSORINEURAL HEARING LOSS, BILATERAL: ICD-10-CM

## 2023-07-18 PROCEDURE — 92567 TYMPANOMETRY: CPT

## 2023-07-18 PROCEDURE — 99213 OFFICE O/P EST LOW 20 MIN: CPT

## 2023-07-18 PROCEDURE — 92557 COMPREHENSIVE HEARING TEST: CPT

## 2023-07-18 NOTE — DATA REVIEWED
[de-identified] :  TYPE A TYMPS AU\par RIGHT: BORDERLINE WNL/MILD SLOPING TO SEVERE SNHL\par LEFT: MILD SLOPING TO SEVERE/PROFOUND SNHL\par *HEARING STABLE RE: 5/4/23 AUDIO

## 2023-07-18 NOTE — HISTORY OF PRESENT ILLNESS
[de-identified] : 72 yr old male s/p M&T AD 4/13/2023 in order to tolerate HBO which completed in June.\par no otorrhea\[facility]\par feels like hearing has worsened

## 2023-07-18 NOTE — ASSESSMENT
[FreeTextEntry1] : tube AD extruding\par \par   AD borderline WNL/mild sloping too severe SNHL w type a, AS mild sloping to severe/profound SNHL w type A.  Stable as compared to 23\par f/u 4-6 months

## 2023-07-18 NOTE — PHYSICAL EXAM
[Normal] : mucosa is normal [Midline] : trachea located in midline position [de-identified] : clear AS, tube extruding AD

## 2023-07-26 ENCOUNTER — APPOINTMENT (OUTPATIENT)
Dept: ORTHOPEDIC SURGERY | Facility: CLINIC | Age: 73
End: 2023-07-26
Payer: MEDICARE

## 2023-07-26 VITALS — HEIGHT: 70 IN | WEIGHT: 205 LBS | BODY MASS INDEX: 29.35 KG/M2

## 2023-07-26 PROCEDURE — 99214 OFFICE O/P EST MOD 30 MIN: CPT

## 2023-07-26 PROCEDURE — 72050 X-RAY EXAM NECK SPINE 4/5VWS: CPT

## 2023-07-26 NOTE — ASSESSMENT
[FreeTextEntry1] : 72 M with neck pain radiating to bilateral shoulders\par PT\par FU 6 weeks \par No NSAIDS due to issues with rectal bleeding

## 2023-07-26 NOTE — PHYSICAL EXAM
[Flexion] : flexion [Extension] : extension [Rotation to left] : rotation to left [Rotation to right] : rotation to right [] : negative facet loading

## 2023-07-26 NOTE — HISTORY OF PRESENT ILLNESS
[Neck] : neck [5] : 5 [Sharp] : sharp [Constant] : constant [de-identified] : 7/26/23: 71yo RHD male presenting with complaints of neck pain and bilateral trapezius/shoulder pain which has been going on for some time 2021 denies acute trauma/injury. Denies n/t. Patient denies change in dexterity or fine motor skills. Was seen by Dr. Claudio for shoulder pain, receiving CSI to B shoulders, PT with some relief.\par Taking tylenol arthritis prn with some relief. \par \par EMG in 2021 b/l UE: R > L chronic C5, C6 radiculopathy, mild b/l CTS\par \par PmHx: AC disease s/p TAVR developed SSS post TAVR and PPM placed in 2020\par Minimal CAD, MAGAÑA, HTN, 3rd degree AV block, RBBB\par Hx of Prostate Ca s/p prostatectomy 2015, RT 2018, currently on Orgovyx\par \par X-ray Ap/Lateral/Flexion/Extension of cervical spine were viewed and interpreted.  Severe degenerative changes through cervical spine.  MIld scoliosis.  Rigid kyphosis\par  [] : no

## 2023-08-15 ENCOUNTER — APPOINTMENT (OUTPATIENT)
Dept: UROLOGY | Facility: CLINIC | Age: 73
End: 2023-08-15
Payer: MEDICARE

## 2023-08-15 VITALS
DIASTOLIC BLOOD PRESSURE: 74 MMHG | BODY MASS INDEX: 29.2 KG/M2 | TEMPERATURE: 98 F | HEIGHT: 70 IN | OXYGEN SATURATION: 97 % | SYSTOLIC BLOOD PRESSURE: 132 MMHG | HEART RATE: 80 BPM | WEIGHT: 204 LBS

## 2023-08-15 DIAGNOSIS — N39.0 URINARY TRACT INFECTION, SITE NOT SPECIFIED: ICD-10-CM

## 2023-08-15 PROCEDURE — 99215 OFFICE O/P EST HI 40 MIN: CPT

## 2023-08-15 RX ORDER — CLOTRIMAZOLE AND BETAMETHASONE DIPROPIONATE 10; .5 MG/G; MG/G
1-0.05 CREAM TOPICAL TWICE DAILY
Qty: 1 | Refills: 3 | Status: ACTIVE | COMMUNITY
Start: 2023-08-15 | End: 1900-01-01

## 2023-08-15 NOTE — LETTER BODY
[Dear  ___] : Dear  [unfilled], [Courtesy Letter:] : I had the pleasure of seeing your patient, [unfilled], in my office today. [Please see my note below.] : Please see my note below. [FreeTextEntry2] : Hesham Duque, DO [address] [place], [address] [FreeTextEntry3] : Sincerely,    Bogdan Hoang MD, FACS Chief of Urology, [facility]  of Urology  [facility] [address] [place] [address] P: [phone] F: [phone] [email or web address]

## 2023-08-15 NOTE — ASSESSMENT
[FreeTextEntry1] : This patient has had multiple sequelae of his prostate cancer treatment including hemorrhagic radiation cystitis which has now largely resolved post hyperbaric oxygen therapy.  He is doing much better in that regard and his urine today is clear yellow.  He is not experiencing any dysuria.  He was told about a potential urinary tract infection recently.  He does not appear to be having current symptoms of urinary tract infection and I would not recommend he have any antibiotics at this time.  He is likely either colonized in the bladder with asymptomatic bacteriuria or it is also possible he may be showing positive cultures due to his retracted penis and difficulty in collecting a clean-catch urine.  The patient has candidal balanitis based on his physical examination today showing a retracted phallus with overlying skin showing evidence of cracking although no active bleeding.  We discussed how moisture under the skin can predispose to fungal infection and tightening of the foreskin resulting in cracking with retraction.  Using topical hydrocortisone and ketoconazole may be helpful.  I prescribed him topical Lotrisone today to make it easier for him to apply such treatment.  We discussed how the ongoing leakage makes it quite difficult to maintain dryness in the area and that he may want to consider getting treated with either an artificial urinary sphincter or urethral sling to minimize the leakage as I believe will be challenging to resolve the balanitis without this.  We also discussed considering circumcision.  The patient states he is circumcised but does have the physical appearance of an uncircumcised phallus, likely due to retraction of the penis.  Circumcision could be an option but it may be challenging that he will an incision in the area with ongoing incontinence and a retracted penis.  The patient will continue to receive medical oncology follow-up outside of this institution.  I recommended that he see me in about 3 to 6 months I also referred him to my colleague, Dr. Andrew Melgoza, to consider options of urethral sling or artificial urinary sphincter.

## 2023-08-15 NOTE — HISTORY OF PRESENT ILLNESS
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  He has undergone treatment for prostate cancer with multimodal therapy including radiation therapy preceded by radical prostatectomy.  He has been hospitalized earlier this year with hemorrhagic cystitis and after bladder irrigations and aluminum hydroxide irrigation, his hematuria did improve and he has now gone through about 30 treatments with hyperbaric oxygen therapy.    He has had significant improvements with this treatment regimen although did notice some recent hematuria which has gotten him quite concerned. He reports he passed clots twice, once in May and June. He saw pinkish urine about 4-5x since his last visit in June. Denies any dysuria. He had a urine culture in June showing greater than 3 organisms, probable collection contamination. His PCP wanted to prescribe Nitrofurantoin recently for a UC showed enterococcus, but he wanted to wait to come here first.  He remains on androgen deprivation therapy, Orgovyx, which he receives from Dr. Anthony Monzon ([facility]), medical oncology). Recently start pelvic floor physical therapy, is currently wearing pads.  He has been experiencing urinary incontinence for quite some time, related to his prior prostate cancer treatment.  He has had some difficulty retracting his foreskin recently as retraction has become painful.  He is also noticed some redness on the tip of the penis.  He has been prescribed ketoconazole and hydrocortisone cream to try to treat this.

## 2023-08-21 ENCOUNTER — OFFICE (OUTPATIENT)
Dept: URBAN - METROPOLITAN AREA CLINIC 109 | Facility: CLINIC | Age: 73
Setting detail: OPHTHALMOLOGY
End: 2023-08-21
Payer: MEDICARE

## 2023-08-21 DIAGNOSIS — H35.371: ICD-10-CM

## 2023-08-21 LAB
APPEARANCE: CLEAR
BACTERIA UR CULT: NORMAL
BACTERIA: NEGATIVE /HPF
BILIRUBIN URINE: NEGATIVE
BLOOD URINE: NEGATIVE
CAST: 0 /LPF
COLOR: YELLOW
EPITHELIAL CELLS: 0 /HPF
GLUCOSE QUALITATIVE U: NEGATIVE MG/DL
KETONES URINE: NEGATIVE MG/DL
LEUKOCYTE ESTERASE URINE: NEGATIVE
MICROSCOPIC-UA: NORMAL
NITRITE URINE: NEGATIVE
PH URINE: 5.5
PROTEIN URINE: NEGATIVE MG/DL
RED BLOOD CELLS URINE: 0 /HPF
SPECIFIC GRAVITY URINE: 1.01
UROBILINOGEN URINE: 0.2 MG/DL
WHITE BLOOD CELLS URINE: 0 /HPF

## 2023-08-21 PROCEDURE — 92014 COMPRE OPH EXAM EST PT 1/>: CPT | Performed by: OPHTHALMOLOGY

## 2023-08-21 PROCEDURE — 92134 CPTRZ OPH DX IMG PST SGM RTA: CPT | Performed by: OPHTHALMOLOGY

## 2023-08-21 ASSESSMENT — AXIALLENGTH_DERIVED
OS_AL: 22.54
OS_AL: 22.9915
OD_AL: 23.2911
OD_AL: 22.205
OS_AL: 22.02
OS_AL: 21.64
OD_AL: 22.5556
OD_AL: 21.6991

## 2023-08-21 ASSESSMENT — REFRACTION_MANIFEST
OD_AXIS: 100
OS_AXIS: 077
OS_SPHERE: +1.50
OD_ADD: +2.50
OD_SPHERE: +1.50
OS_CYLINDER: -0.75
OD_CYLINDER: -0.50
OD_SPHERE: +2.50
OS_ADD: +2.50
OS_SPHERE: +3.00
OD_AXIS: 100
OS_AXIS: 80
OD_AXIS: 095
OD_VA1: 20/NI
OD_CYLINDER: -0.50
OS_CYLINDER: -0.75
OS_AXIS: 78
OS_SPHERE: +4.25
OS_CYLINDER: -1.00
OD_SPHERE: +4.00
OD_CYLINDER: -0.50

## 2023-08-21 ASSESSMENT — SPHEQUIV_DERIVED
OD_SPHEQUIV: 2.25
OD_SPHEQUIV: -0.75
OS_SPHEQUIV: 1.125
OS_SPHEQUIV: -0.125
OD_SPHEQUIV: 3.75
OD_SPHEQUIV: 1.25
OS_SPHEQUIV: 2.625
OS_SPHEQUIV: 3.75

## 2023-08-21 ASSESSMENT — REFRACTION_CURRENTRX
OD_AXIS: 95
OS_CYLINDER: -1.00
OS_OVR_VA: 20/
OD_CYLINDER: -0.50
OD_OVR_VA: 20/
OS_AXIS: 77
OS_SPHERE: +4.25
OD_SPHERE: +4.00

## 2023-08-21 ASSESSMENT — KERATOMETRY
OD_AXISANGLE_DEGREES: 131
OD_K1POWER_DIOPTERS: 45.00
OS_K2POWER_DIOPTERS: 45.50
OD_K2POWER_DIOPTERS: 45.25
OS_K1POWER_DIOPTERS: 45.12
OS_AXISANGLE_DEGREES: 22

## 2023-08-21 ASSESSMENT — REFRACTION_AUTOREFRACTION
OD_CYLINDER: -0.50
OS_AXIS: 95
OS_SPHERE: +0.50
OD_AXIS: 101
OD_SPHERE: -0.50
OS_CYLINDER: -1.25

## 2023-08-21 ASSESSMENT — VISUAL ACUITY
OD_BCVA: 20/30-
OS_BCVA: 20/25+3

## 2023-08-21 ASSESSMENT — CONFRONTATIONAL VISUAL FIELD TEST (CVF)
OD_FINDINGS: FULL
OS_FINDINGS: FULL

## 2023-09-03 NOTE — DISCHARGE NOTE NURSING/CASE MANAGEMENT/SOCIAL WORK - PATIENT PORTAL LINK FT
You can access the FollowMyHealth Patient Portal offered by [facility] by registering at the following website: [email or web address]. By joining [employer]’s FollowMyHealth portal, you will also be able to view your health information using other applications (apps) compatible with our system.
English

## 2023-09-20 ENCOUNTER — APPOINTMENT (OUTPATIENT)
Dept: ORTHOPEDIC SURGERY | Facility: CLINIC | Age: 73
End: 2023-09-20
Payer: MEDICARE

## 2023-09-20 VITALS — WEIGHT: 204 LBS | BODY MASS INDEX: 29.2 KG/M2 | HEIGHT: 70 IN

## 2023-09-20 PROCEDURE — 99213 OFFICE O/P EST LOW 20 MIN: CPT

## 2023-09-21 ENCOUNTER — APPOINTMENT (OUTPATIENT)
Dept: ORTHOPEDIC SURGERY | Facility: CLINIC | Age: 73
End: 2023-09-21
Payer: MEDICARE

## 2023-09-21 VITALS — BODY MASS INDEX: 29.2 KG/M2 | WEIGHT: 204 LBS | HEIGHT: 70 IN

## 2023-09-21 PROCEDURE — 99213 OFFICE O/P EST LOW 20 MIN: CPT | Mod: 25

## 2023-09-21 PROCEDURE — 20611 DRAIN/INJ JOINT/BURSA W/US: CPT | Mod: 50

## 2023-09-21 PROCEDURE — J3490M: CUSTOM

## 2023-10-18 ENCOUNTER — APPOINTMENT (OUTPATIENT)
Dept: CARDIOLOGY | Facility: CLINIC | Age: 73
End: 2023-10-18
Payer: MEDICARE

## 2023-10-18 PROCEDURE — 93280 PM DEVICE PROGR EVAL DUAL: CPT

## 2023-11-01 ENCOUNTER — APPOINTMENT (OUTPATIENT)
Dept: CARDIOLOGY | Facility: CLINIC | Age: 73
End: 2023-11-01
Payer: MEDICARE

## 2023-11-01 PROCEDURE — 93880 EXTRACRANIAL BILAT STUDY: CPT

## 2023-11-01 PROCEDURE — 93306 TTE W/DOPPLER COMPLETE: CPT

## 2023-11-08 ENCOUNTER — APPOINTMENT (OUTPATIENT)
Dept: CARDIOLOGY | Facility: CLINIC | Age: 73
End: 2023-11-08
Payer: MEDICARE

## 2023-11-08 ENCOUNTER — NON-APPOINTMENT (OUTPATIENT)
Age: 73
End: 2023-11-08

## 2023-11-08 VITALS
DIASTOLIC BLOOD PRESSURE: 80 MMHG | BODY MASS INDEX: 30.21 KG/M2 | SYSTOLIC BLOOD PRESSURE: 134 MMHG | WEIGHT: 211 LBS | OXYGEN SATURATION: 95 % | HEIGHT: 70 IN | HEART RATE: 80 BPM

## 2023-11-08 DIAGNOSIS — R06.09 OTHER FORMS OF DYSPNEA: ICD-10-CM

## 2023-11-08 DIAGNOSIS — I35.0 NONRHEUMATIC AORTIC (VALVE) STENOSIS: ICD-10-CM

## 2023-11-08 DIAGNOSIS — R42 DIZZINESS AND GIDDINESS: ICD-10-CM

## 2023-11-08 PROCEDURE — 99214 OFFICE O/P EST MOD 30 MIN: CPT | Mod: 25

## 2023-11-08 PROCEDURE — 93000 ELECTROCARDIOGRAM COMPLETE: CPT

## 2023-11-08 RX ORDER — ENZALUTAMIDE 80 MG/1
TABLET ORAL
Refills: 0 | Status: ACTIVE | COMMUNITY

## 2023-11-08 RX ORDER — HYALURONATE SODIUM 30 MG/2 ML
30 SYRINGE (ML) INTRAARTICULAR
Qty: 8 | Refills: 0 | Status: COMPLETED | COMMUNITY
Start: 2023-09-22 | End: 2023-11-08

## 2023-11-13 ENCOUNTER — APPOINTMENT (OUTPATIENT)
Dept: OTOLARYNGOLOGY | Facility: CLINIC | Age: 73
End: 2023-11-13
Payer: MEDICARE

## 2023-11-13 VITALS
SYSTOLIC BLOOD PRESSURE: 161 MMHG | DIASTOLIC BLOOD PRESSURE: 82 MMHG | HEART RATE: 66 BPM | WEIGHT: 207 LBS | BODY MASS INDEX: 29.63 KG/M2 | HEIGHT: 70 IN

## 2023-11-13 DIAGNOSIS — H69.91 UNSPECIFIED EUSTACHIAN TUBE DISORDER, RIGHT EAR: ICD-10-CM

## 2023-11-13 DIAGNOSIS — H90.3 SENSORINEURAL HEARING LOSS, BILATERAL: ICD-10-CM

## 2023-11-13 PROCEDURE — 99213 OFFICE O/P EST LOW 20 MIN: CPT

## 2023-11-14 ENCOUNTER — APPOINTMENT (OUTPATIENT)
Dept: UROLOGY | Facility: CLINIC | Age: 73
End: 2023-11-14
Payer: MEDICARE

## 2023-11-14 VITALS
HEIGHT: 70 IN | BODY MASS INDEX: 29.78 KG/M2 | DIASTOLIC BLOOD PRESSURE: 74 MMHG | OXYGEN SATURATION: 94 % | HEART RATE: 88 BPM | SYSTOLIC BLOOD PRESSURE: 120 MMHG | WEIGHT: 208 LBS

## 2023-11-14 DIAGNOSIS — N39.3 STRESS INCONTINENCE (FEMALE) (MALE): ICD-10-CM

## 2023-11-14 DIAGNOSIS — Z87.898 PERSONAL HISTORY OF OTHER SPECIFIED CONDITIONS: ICD-10-CM

## 2023-11-14 PROCEDURE — 99214 OFFICE O/P EST MOD 30 MIN: CPT

## 2023-11-15 ENCOUNTER — APPOINTMENT (OUTPATIENT)
Dept: ORTHOPEDIC SURGERY | Facility: CLINIC | Age: 73
End: 2023-11-15
Payer: MEDICARE

## 2023-11-15 DIAGNOSIS — M50.90 CERVICAL DISC DISORDER, UNSPECIFIED, UNSPECIFIED CERVICAL REGION: ICD-10-CM

## 2023-11-15 DIAGNOSIS — M62.838 OTHER MUSCLE SPASM: ICD-10-CM

## 2023-11-15 PROCEDURE — 99213 OFFICE O/P EST LOW 20 MIN: CPT

## 2023-11-20 PROBLEM — N39.3 MALE STRESS INCONTINENCE: Status: ACTIVE | Noted: 2023-05-09

## 2023-11-20 PROBLEM — Z87.898 HISTORY OF GROSS HEMATURIA: Status: RESOLVED | Noted: 2023-03-21 | Resolved: 2023-11-20

## 2023-12-06 NOTE — H&P PST ADULT - ASSESSMENT
hand grasp, leg strength strong and equal bilaterally
CAPRINI SCORE [CLOT updated 18]    AGE RELATED RISK FACTORS                                                       MOBILITY RELATED FACTORS  [ ] Age 41-60 years                                            (1 Point)                    [ ] Bed rest                                                        (1 Point)  [ ] Age: 61-74 years                                           (2 Points)                  [ ] Plaster cast                                                   (2 Points)  [ ] Age= 75 years                                              (3 Points)                    [ ] Bed bound for more than 72 hours                 (2 Points)    DISEASE RELATED RISK FACTORS                                               GENDER SPECIFIC FACTORS  [ ] Edema in the lower extremities                       (1 Point)              [ ] Pregnancy                                                     (1 Point)  [ ] Varicose veins                                               (1 Point)                     [ ] Post-partum < 6 weeks                                   (1 Point)             [ ] BMI > 25 Kg/m2                                            (1 Point)                     [ ] Hormonal therapy  or oral contraception          (1 Point)                 [ ] Sepsis (in the previous month)                        (1 Point)               [ ] History of pregnancy complications                 (1 point)  [ ] Pneumonia or serious lung disease                                               [ ] Unexplained or recurrent                     (1 Point)           (in the previous month)                               (1 Point)  [ ] Abnormal pulmonary function test                     (1 Point)                 SURGERY RELATED RISK FACTORS  [ ] Acute myocardial infarction                              (1 Point)               [ ]  Section                                             (1 Point)  [ ] Congestive heart failure (in the previous month)  (1 Point)      [ ] Minor surgery                                                  (1 Point)   [ ] Inflammatory bowel disease                             (1 Point)               [ ] Arthroscopic surgery                                        (2 Points)  [ ] Central venous access                                      (2 Points)                [ ] General surgery lasting more than 45 minutes (2 points)  [ ] Present or previous malignancy                     (2 Points)                [ ] Elective arthroplasty                                         (5 points)    [ ] Stroke (in the previous month)                          (5 Points)                                                                                                                                                           HEMATOLOGY RELATED FACTORS                                                 TRAUMA RELATED RISK FACTORS  [ ] Prior episodes of VTE                                     (3 Points)                [ ] Fracture of the hip, pelvis, or leg                       (5 Points)  [ ] Positive family history for VTE                         (3 Points)             [ ] Acute spinal cord injury (in the previous month)  (5 Points)  [ ] Prothrombin 59477 A                                     (3 Points)               [ ] Paralysis  (less than 1 month)                             (5 Points)  [ ] Factor V Leiden                                             (3 Points)                  [ ] Multiple Trauma within 1 month                        (5 Points)  [ ] Lupus anticoagulants                                     (3 Points)                                                           [ ] Anticardiolipin antibodies                               (3 Points)                                                       [ ] High homocysteine in the blood                      (3 Points)                                             [ ] Other congenital or acquired thrombophilia      (3 Points)                                                [ ] Heparin induced thrombocytopenia                  (3 Points)                                     Total Score [    6      ]

## 2023-12-12 ENCOUNTER — APPOINTMENT (OUTPATIENT)
Dept: ORTHOPEDIC SURGERY | Facility: CLINIC | Age: 73
End: 2023-12-12
Payer: MEDICARE

## 2023-12-12 VITALS — WEIGHT: 208 LBS | BODY MASS INDEX: 29.78 KG/M2 | HEIGHT: 70 IN

## 2023-12-12 PROCEDURE — 20611 DRAIN/INJ JOINT/BURSA W/US: CPT | Mod: 50

## 2023-12-12 PROCEDURE — 99213 OFFICE O/P EST LOW 20 MIN: CPT | Mod: 25

## 2023-12-19 ENCOUNTER — APPOINTMENT (OUTPATIENT)
Dept: ORTHOPEDIC SURGERY | Facility: CLINIC | Age: 73
End: 2023-12-19
Payer: MEDICARE

## 2023-12-19 VITALS — WEIGHT: 208 LBS | BODY MASS INDEX: 29.78 KG/M2 | HEIGHT: 70 IN

## 2023-12-19 PROCEDURE — 20611 DRAIN/INJ JOINT/BURSA W/US: CPT | Mod: 50

## 2023-12-19 NOTE — HISTORY OF PRESENT ILLNESS
[de-identified] : 12/19/23: Here for bilateral shoulder orthovisc #2.  12/12/2023: Here for follow. He has been going to PT with some relief, but was recently discharged. Did have relief with CSI at last visit. Still notes limited ROM.  9/21/23: Here for follow up.  He is in PT with improvement.  He feels improvement in pain, and some increased motion.  Right is worse than left.   6/29/23:   Here for follow up, he reports continued pain, the injections are wearing off more quickly  3/28/23:  Here for follow up.  His shoulders have been more painful recently.  He was also hospitalized for an issue related to the prostrate.   12/1/22: Here for follow up, he got about 2.5 months relief.   8/30/22: Here to f/u. Pain started to return approx 1 month ago.  5/17/22: Here for follow up.    2/17/22: Here for follow up. He reports the injections are working but not lasting as long as he is used to.  11/2/21: Here for follow up, EMG review. His left shoulder is more painful.  EMG b/l UE: R > L chronic C5, C6 radiculopathy, mild b/l CTS  8/3/21: Here for follow up. The left side is more painful today. He is complaining of some paresthesias in both arms today and some pain posteriorly.  5/4/21: Follow up bilateral shoulder. Injections last visit helped. He requests to repeat them today.  2/4/21: Here for follow up. Injections are still helping. He has some complications after his aortic valve procedure.  11/10/20: Here for follow up. He did get relief from the injections last visit. He is having an aortic valve procedure.  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 Pt had moderate pain relief with previous CSI's this past May and is hoping for repeat injections.  5/19/20: Here for fu and repeat cortisone injection bilateral shoulders. During COVID his PT was cancelled. Waking him at night now. L is worse than the R. 3 months relief with prior injections.  1/28/20: Here for fu and repeat cortisone injection bilateral shoulders. PT helps - no new symptoms. 3 months relief with prior injections.  1/7/20: Here for follow up. he has been out of PT for the shoulders for 3 weeks. He has been dealing with a sinus infection.  10/8/19: Here for follow up. he did start PT. The shoulders feel achy.  7/9/19: Here for follow up. The shoulders have become more painful recently. He wants to consider PT next month. Right is more painful than the left.  4/2/19: Here for fu. Recurring R shoulder pain, was unable to do PT as planned. Now with L shoulder pain as well x 2-3 months. No injury recalled. Pain with motion. He is unable to lift things, uses caution carrying laundry. Concerned about loss of strength. Advil 400mg prn.  12/11/18: Here for follow up. He had the injection last visit. He recently had radiation for prostate cancer, just finished.  6/26/18: He returns for f/u right shoulder. He is having more pain in the shoulder.  4/24/18: He returns for f/u right shoulder. He continue to have some pain radiating into neck, overall feeling better. He has not been to PT recently.  1/23/18: Here for follow up. He has had some return of pain in the shoulder. He takes advil. He has been out of PT due to other issues but wants to start again soon.  12/12/17: Here for follow up. His shoulder feels good today. He has been in PT but recently stopped. He has only mild pain.  9/12/17: Here for follow up. He does notice some improvement with PT.  7/25/17: Here for follow up. Has been in PT weekly which is helping. 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 Pain persists intermittently with movement. Requesting injection.  Given subacromial injection.  6/6/17: Here for follow up. Has been in PT. Only significant pain with movement. Did get improvement with injection.  4/25/17: 65 y/o RHD male here for the r shoulder. Felt pain in the shoulder in 9/16 when carrying some groceries up the stairs. No specific trauma. Eventually got an MRI. He has been going to PT with some improvement. He also takes NSAIDs prn.  MRI R shoulder: 1. Advanced rotator cuff tear with complete full-thickness discontinuity in a near complete full-thickness tear of infraspinatus is stable since the prior study. There is also persistent high-grade partial tearing of subscapularis and elongation of subscapularis tendon. No head is high riding. There is atrophy of supraspinatus and infraspinatus muscle bellies. 2. Chronic rupture and distal retraction long head of the biceps tendon 3. Mild degenerative tearing of the labrum.  Prior MD Notes: 3/9/17: Patient is a 65 yo RHD male c/o right shoulder pain since Sept 2016 after he felt pain while carrying groceries. No n/t. Has been seeing Dr. Jennings who did Xrays, ordered MRI and started PT. PT giving some relief, but he would like to discuss his surgical options. Taking advil which gives some relief. No previous injuries or surgeries to right shoulder. 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

## 2023-12-19 NOTE — ASSESSMENT
[FreeTextEntry1] : Complete tear of R supra infra with retraction and atrophy, mild DJD. Now also with L GH DJD. EMG shows: R > L chronic C5, C6 radiculopathy, mild b/l CTS Repeat R SA and L SA injection given 9/21/23. He completed PT with some relief. Discussed Orthovisc injections. Bilateral shoulder Orthovisc #2 tolerated well. RTO 1 week to continue series.  Procedure Note: Viscosupplementation Injection: X-ray evidence of Osteoarthritis on this or prior visit and Patient has tried OTC's including aspirin, Ibuprofen, Aleve etc or prescription NSAIDs, and/or exercises at home and/ or physical therapy without satisfactory response.   An injection of Orthovisc 2ml was injected into the bilateral shoulders. The risks, benefits, and alternatives to Viscosupplementation injection were explained in full to the patient. Risks outlined include but are not limited to infection, sepsis, bleeding, scarring, skin discoloration, temporary increase in pain, syncopal episode, failure to resolve symptoms, allergic reaction, and symptom recurrence. Signs and symptoms of infection reviewed and patient advised to call immediately for redness, fevers, and/or chills. Patient understood the risks. All questions were answered. After discussion of options, patient requested Viscosupplementation. Oral informed consent was obtained and sterile prep of the injection site was performed using alcohol. Sterile technique was utilized for the procedure including the preparation of the solutions used for the injection. Ethyl chloride spray was used topically.  Sterile technique used. Patient tolerated procedure well. Post Procedure Instructions: Patient was advised to call if redness, pain, or fever occur and apply ice for 15 min. out of every hour for the next 12-24 hours as tolerated. patient was advised to rest the joint(s) for 2 days.  Ultrasound Guidance was used for the following reasons: for Glenohumeral injection.  Ultrasound guided injection was performed of the shoulder, visualization of the needle and placement of injection was performed without complication.

## 2023-12-19 NOTE — PHYSICAL EXAM
[Bilateral] : shoulder bilaterally [4 ___] : forward flexion 4[unfilled]/5 [4___] : external rotation 4[unfilled]/5 [] : no erythema [FreeTextEntry9] : FE: R 110, L 140  ER: R 40, L 40

## 2023-12-20 ENCOUNTER — APPOINTMENT (OUTPATIENT)
Dept: CARDIOLOGY | Facility: CLINIC | Age: 73
End: 2023-12-20
Payer: MEDICARE

## 2023-12-20 ENCOUNTER — NON-APPOINTMENT (OUTPATIENT)
Age: 73
End: 2023-12-20

## 2023-12-20 PROCEDURE — 93296 REM INTERROG EVL PM/IDS: CPT

## 2023-12-20 PROCEDURE — 93294 REM INTERROG EVL PM/LDLS PM: CPT

## 2024-01-02 ENCOUNTER — APPOINTMENT (OUTPATIENT)
Dept: ORTHOPEDIC SURGERY | Facility: CLINIC | Age: 74
End: 2024-01-02
Payer: MEDICARE

## 2024-01-02 PROCEDURE — 20611 DRAIN/INJ JOINT/BURSA W/US: CPT | Mod: 50

## 2024-01-02 NOTE — ASSESSMENT
[FreeTextEntry1] : Complete tear of R supra infra with retraction and atrophy, mild DJD. Now also with L GH DJD. EMG shows: R > L chronic C5, C6 radiculopathy, mild b/l CTS Repeat R SA and L SA injection given 9/21/23. He completed PT with some relief. Discussed Orthovisc injections. Bilateral shoulder Orthovisc #3 tolerated well. RTO 1 week to continue series.  Procedure Note: Viscosupplementation Injection: X-ray evidence of Osteoarthritis on this or prior visit and Patient has tried OTC's including aspirin, Ibuprofen, Aleve etc or prescription NSAIDs, and/or exercises at home and/ or physical therapy without satisfactory response.   An injection of Orthovisc 2ml was injected into the bilateral shoulders. The risks, benefits, and alternatives to Viscosupplementation injection were explained in full to the patient. Risks outlined include but are not limited to infection, sepsis, bleeding, scarring, skin discoloration, temporary increase in pain, syncopal episode, failure to resolve symptoms, allergic reaction, and symptom recurrence. Signs and symptoms of infection reviewed and patient advised to call immediately for redness, fevers, and/or chills. Patient understood the risks. All questions were answered. After discussion of options, patient requested Viscosupplementation. Oral informed consent was obtained and sterile prep of the injection site was performed using alcohol. Sterile technique was utilized for the procedure including the preparation of the solutions used for the injection. Ethyl chloride spray was used topically.  Sterile technique used. Patient tolerated procedure well. Post Procedure Instructions: Patient was advised to call if redness, pain, or fever occur and apply ice for 15 min. out of every hour for the next 12-24 hours as tolerated. patient was advised to rest the joint(s) for 2 days.  Ultrasound Guidance was used for the following reasons: for Glenohumeral injection.  Ultrasound guided injection was performed of the shoulder, visualization of the needle and placement of injection was performed without complication.

## 2024-01-02 NOTE — HISTORY OF PRESENT ILLNESS
[de-identified] : 01/02/24: Here for B/L shoulders orthovisc #3.   12/19/23: Here for bilateral shoulder orthovisc #2.  12/12/2023: Here for follow. He has been going to PT with some relief, but was recently discharged. Did have relief with CSI at last visit. Still notes limited ROM.  9/21/23: Here for follow up.  He is in PT with improvement.  He feels improvement in pain, and some increased motion.  Right is worse than left.   6/29/23:   Here for follow up, he reports continued pain, the injections are wearing off more quickly  3/28/23:  Here for follow up.  His shoulders have been more painful recently.  He was also hospitalized for an issue related to the prostrate.   12/1/22: Here for follow up, he got about 2.5 months relief.   8/30/22: Here to f/u. Pain started to return approx 1 month ago.  5/17/22: Here for follow up.    2/17/22: Here for follow up. He reports the injections are working but not lasting as long as he is used to.  11/2/21: Here for follow up, EMG review. His left shoulder is more painful.  EMG b/l UE: R > L chronic C5, C6 radiculopathy, mild b/l CTS  8/3/21: Here for follow up. The left side is more painful today. He is complaining of some paresthesias in both arms today and some pain posteriorly.  5/4/21: Follow up bilateral shoulder. Injections last visit helped. He requests to repeat them today.  2/4/21: Here for follow up. Injections are still helping. He has some complications after his aortic valve procedure.  11/10/20: Here for follow up. He did get relief from the injections last visit. He is having an aortic valve procedure.  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 Pt had moderate pain relief with previous CSI's this past May and is hoping for repeat injections.  5/19/20: Here for fu and repeat cortisone injection bilateral shoulders. During COVID his PT was cancelled. Waking him at night now. L is worse than the R. 3 months relief with prior injections.  1/28/20: Here for fu and repeat cortisone injection bilateral shoulders. PT helps - no new symptoms. 3 months relief with prior injections.  1/7/20: Here for follow up. he has been out of PT for the shoulders for 3 weeks. He has been dealing with a sinus infection.  10/8/19: Here for follow up. he did start PT. The shoulders feel achy.  7/9/19: Here for follow up. The shoulders have become more painful recently. He wants to consider PT next month. Right is more painful than the left.  4/2/19: Here for fu. Recurring R shoulder pain, was unable to do PT as planned. Now with L shoulder pain as well x 2-3 months. No injury recalled. Pain with motion. He is unable to lift things, uses caution carrying laundry. Concerned about loss of strength. Advil 400mg prn.  12/11/18: Here for follow up. He had the injection last visit. He recently had radiation for prostate cancer, just finished.  6/26/18: He returns for f/u right shoulder. He is having more pain in the shoulder.  4/24/18: He returns for f/u right shoulder. He continue to have some pain radiating into neck, overall feeling better. He has not been to PT recently.  1/23/18: Here for follow up. He has had some return of pain in the shoulder. He takes advil. He has been out of PT due to other issues but wants to start again soon.  12/12/17: Here for follow up. His shoulder feels good today. He has been in PT but recently stopped. He has only mild pain.  9/12/17: Here for follow up. He does notice some improvement with PT.  7/25/17: Here for follow up. Has been in PT weekly which is helping. 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 Pain persists intermittently with movement. Requesting injection.  Given subacromial injection.  6/6/17: Here for follow up. Has been in PT. Only significant pain with movement. Did get improvement with injection.  4/25/17: 65 y/o RHD male here for the r shoulder. Felt pain in the shoulder in 9/16 when carrying some groceries up the stairs. No specific trauma. Eventually got an MRI. He has been going to PT with some improvement. He also takes NSAIDs prn.  MRI R shoulder: 1. Advanced rotator cuff tear with complete full-thickness discontinuity in a near complete full-thickness tear of infraspinatus is stable since the prior study. There is also persistent high-grade partial tearing of subscapularis and elongation of subscapularis tendon. No head is high riding. There is atrophy of supraspinatus and infraspinatus muscle bellies. 2. Chronic rupture and distal retraction long head of the biceps tendon 3. Mild degenerative tearing of the labrum.  Prior MD Notes: 3/9/17: Patient is a 67 yo RHD male c/o right shoulder pain since Sept 2016 after he felt pain while carrying groceries. No n/t. Has been seeing Dr. Jennings who did Xrays, ordered MRI and started PT. PT giving some relief, but he would like to discuss his surgical options. Taking advil which gives some relief. No previous injuries or surgeries to right shoulder. 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

## 2024-01-09 ENCOUNTER — APPOINTMENT (OUTPATIENT)
Dept: ORTHOPEDIC SURGERY | Facility: CLINIC | Age: 74
End: 2024-01-09
Payer: MEDICARE

## 2024-01-09 PROCEDURE — 20611 DRAIN/INJ JOINT/BURSA W/US: CPT | Mod: 50

## 2024-02-20 ENCOUNTER — APPOINTMENT (OUTPATIENT)
Dept: ORTHOPEDIC SURGERY | Facility: CLINIC | Age: 74
End: 2024-02-20
Payer: MEDICARE

## 2024-02-20 VITALS — WEIGHT: 208 LBS | HEIGHT: 70 IN | BODY MASS INDEX: 29.78 KG/M2

## 2024-02-20 PROCEDURE — 99213 OFFICE O/P EST LOW 20 MIN: CPT | Mod: 25

## 2024-02-20 PROCEDURE — J3490M: CUSTOM

## 2024-02-20 PROCEDURE — 20611 DRAIN/INJ JOINT/BURSA W/US: CPT | Mod: 50

## 2024-02-20 NOTE — ASSESSMENT
[FreeTextEntry1] : Complete tear of R supra infra with retraction and atrophy, mild DJD. Now also with L GH DJD. EMG shows: R > L chronic C5, C6 radiculopathy, mild b/l CTS He completed PT with some relief. Bilateral shoulder Orthovisc with minimal relief- I do not recommend any more visco injections.   Repeat R SA and L SA injections today.  RTO 6 weeks  Procedure Note: Large Joint Injection was performed because of pain and inflammation, failure of conservative treatment.   Medications: Depo-Medrol: 1 cc, 80 mg. Lidocaine: 2 cc, 1%.  Marcaine: 2 cc, .25%.   Medication was injected in the right and left subacromial spaces. Patient has tried OTC's including aspirin, Ibuprofen, Aleve etc or prescription NSAIDs, and/or exercises at home and/ or physical therapy without satisfactory response. The risks, benefits, and alternatives to cortisone injection were explained in full to the patient. Risks outlined include but are not limited to infection, sepsis, bleeding, scarring, skin discoloration, temporary increase in pain, syncopal episode, failure to resolve symptoms, allergic reaction, symptom recurrence, and elevation of blood sugar in diabetics. Patient understood the risks. All questions were answered. After discussion of options, patient requested an injection. Oral informed consent was obtained and sterile prep of the injection site was performed using alcohol. Sterile technique was utilized for the procedure including the preparation of the solutions used for the injection. Ethyl chloride spray was used topically.  Sterile technique used. Patient tolerated procedure well. Post Procedure Instructions: Patient was advised to call if redness, pain, or fever occur and apply ice for 15 min. out of every hour for the next 12-24 hours as tolerated. patient was advised to rest the joint(s) for 2 days.  Ultrasound Guidance was used for the following reasons: for prior failure or difficult injection and to visualize tearing and inflammation. Ultrasound guided injection was performed of the shoulder, visualization of the needle and placement of injection was performed without complication.

## 2024-02-20 NOTE — PHYSICAL EXAM
[Bilateral] : shoulder bilaterally [4 ___] : forward flexion 4[unfilled]/5 [4___] : external rotation 4[unfilled]/5 [] : no erythema [FreeTextEntry9] : FE: R 90, L 140  ER: R 0, L 40

## 2024-02-20 NOTE — HISTORY OF PRESENT ILLNESS
[de-identified] : 2/20/24: Here to f/u on bilateral shoulders.  He got minimal relief on the R side, but about 10-15% on the left side.    01/09/2024: Here for b/l shoulder orthovisc #4.  01/02/24: Here for B/L shoulders orthovisc #3.   12/19/23: Here for bilateral shoulder orthovisc #2.  12/12/2023: Here for follow. He has been going to PT with some relief, but was recently discharged. Did have relief with CSI at last visit. Still notes limited ROM.  9/21/23: Here for follow up.  He is in PT with improvement.  He feels improvement in pain, and some increased motion.  Right is worse than left.   6/29/23:   Here for follow up, he reports continued pain, the injections are wearing off more quickly  3/28/23:  Here for follow up.  His shoulders have been more painful recently.  He was also hospitalized for an issue related to the prostrate.   12/1/22: Here for follow up, he got about 2.5 months relief.   8/30/22: Here to f/u. Pain started to return approx 1 month ago.  5/17/22: Here for follow up.    2/17/22: Here for follow up. He reports the injections are working but not lasting as long as he is used to.  11/2/21: Here for follow up, EMG review. His left shoulder is more painful.  EMG b/l UE: R > L chronic C5, C6 radiculopathy, mild b/l CTS  8/3/21: Here for follow up. The left side is more painful today. He is complaining of some paresthesias in both arms today and some pain posteriorly.  5/4/21: Follow up bilateral shoulder. Injections last visit helped. He requests to repeat them today.  2/4/21: Here for follow up. Injections are still helping. He has some complications after his aortic valve procedure.  11/10/20: Here for follow up. He did get relief from the injections last visit. He is having an aortic valve procedure.  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 Pt had moderate pain relief with previous CSI's this past May and is hoping for repeat injections.  5/19/20: Here for fu and repeat cortisone injection bilateral shoulders. During COVID his PT was cancelled. Waking him at night now. L is worse than the R. 3 months relief with prior injections.  1/28/20: Here for fu and repeat cortisone injection bilateral shoulders. PT helps - no new symptoms. 3 months relief with prior injections.  1/7/20: Here for follow up. he has been out of PT for the shoulders for 3 weeks. He has been dealing with a sinus infection.  10/8/19: Here for follow up. he did start PT. The shoulders feel achy.  7/9/19: Here for follow up. The shoulders have become more painful recently. He wants to consider PT next month. Right is more painful than the left.  4/2/19: Here for fu. Recurring R shoulder pain, was unable to do PT as planned. Now with L shoulder pain as well x 2-3 months. No injury recalled. Pain with motion. He is unable to lift things, uses caution carrying laundry. Concerned about loss of strength. Advil 400mg prn.  12/11/18: Here for follow up. He had the injection last visit. He recently had radiation for prostate cancer, just finished.  6/26/18: He returns for f/u right shoulder. He is having more pain in the shoulder.  4/24/18: He returns for f/u right shoulder. He continue to have some pain radiating into neck, overall feeling better. He has not been to PT recently.  1/23/18: Here for follow up. He has had some return of pain in the shoulder. He takes advil. He has been out of PT due to other issues but wants to start again soon.  12/12/17: Here for follow up. His shoulder feels good today. He has been in PT but recently stopped. He has only mild pain.  9/12/17: Here for follow up. He does notice some improvement with PT.  7/25/17: Here for follow up. Has been in PT weekly which is helping. 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 Pain persists intermittently with movement. Requesting injection.  Given subacromial injection.  6/6/17: Here for follow up. Has been in PT. Only significant pain with movement. Did get improvement with injection.  4/25/17: 65 y/o RHD male here for the r shoulder. Felt pain in the shoulder in 9/16 when carrying some groceries up the stairs. No specific trauma. Eventually got an MRI. He has been going to PT with some improvement. He also takes NSAIDs prn.  MRI R shoulder: 1. Advanced rotator cuff tear with complete full-thickness discontinuity in a near complete full-thickness tear of infraspinatus is stable since the prior study. There is also persistent high-grade partial tearing of subscapularis and elongation of subscapularis tendon. No head is high riding. There is atrophy of supraspinatus and infraspinatus muscle bellies. 2. Chronic rupture and distal retraction long head of the biceps tendon 3. Mild degenerative tearing of the labrum.  Prior MD Notes: 3/9/17: Patient is a 65 yo RHD male c/o right shoulder pain since Sept 2016 after he felt pain while carrying groceries. No n/t. Has been seeing Dr. Jennings who did Xrays, ordered MRI and started PT. PT giving some relief, but he would like to discuss his surgical options. Taking advil which gives some relief. No previous injuries or surgeries to right shoulder. 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

## 2024-02-27 DIAGNOSIS — I10 ESSENTIAL (PRIMARY) HYPERTENSION: ICD-10-CM

## 2024-03-07 ENCOUNTER — APPOINTMENT (OUTPATIENT)
Dept: CARDIOLOGY | Facility: CLINIC | Age: 74
End: 2024-03-07
Payer: MEDICARE

## 2024-03-07 PROCEDURE — 93790 AMBL BP MNTR W/SW I&R: CPT

## 2024-03-19 ENCOUNTER — NON-APPOINTMENT (OUTPATIENT)
Age: 74
End: 2024-03-19

## 2024-03-20 ENCOUNTER — APPOINTMENT (OUTPATIENT)
Dept: CARDIOLOGY | Facility: CLINIC | Age: 74
End: 2024-03-20
Payer: MEDICARE

## 2024-03-20 PROCEDURE — 93294 REM INTERROG EVL PM/LDLS PM: CPT

## 2024-03-20 PROCEDURE — 93296 REM INTERROG EVL PM/IDS: CPT

## 2024-03-27 ENCOUNTER — NON-APPOINTMENT (OUTPATIENT)
Age: 74
End: 2024-03-27

## 2024-03-27 PROBLEM — Z85.46 H/O PROSTATE CANCER: Status: ACTIVE | Noted: 2023-05-15

## 2024-03-27 NOTE — PHYSICAL EXAM
[Normal Appearance] : normal appearance [Well Groomed] : well groomed [Edema] : no peripheral edema [General Appearance - In No Acute Distress] : no acute distress [Respiration, Rhythm And Depth] : normal respiratory rhythm and effort [Exaggerated Use Of Accessory Muscles For Inspiration] : no accessory muscle use [Abdomen Soft] : soft [Urinary Bladder Findings] : the bladder was normal on palpation [Costovertebral Angle Tenderness] : no ~M costovertebral angle tenderness [Abdomen Tenderness] : non-tender [Normal Station and Gait] : the gait and station were normal for the patient's age [] : no rash [No Focal Deficits] : no focal deficits [Oriented To Time, Place, And Person] : oriented to person, place, and time [Affect] : the affect was normal [Mood] : the mood was normal [No Palpable Adenopathy] : no palpable adenopathy

## 2024-03-28 ENCOUNTER — APPOINTMENT (OUTPATIENT)
Dept: UROLOGY | Facility: CLINIC | Age: 74
End: 2024-03-28
Payer: MEDICARE

## 2024-03-28 VITALS
WEIGHT: 199 LBS | BODY MASS INDEX: 28.49 KG/M2 | DIASTOLIC BLOOD PRESSURE: 82 MMHG | HEIGHT: 70 IN | OXYGEN SATURATION: 96 % | HEART RATE: 81 BPM | SYSTOLIC BLOOD PRESSURE: 132 MMHG

## 2024-03-28 DIAGNOSIS — B37.42 CANDIDAL BALANITIS: ICD-10-CM

## 2024-03-28 DIAGNOSIS — Z85.46 PERSONAL HISTORY OF MALIGNANT NEOPLASM OF PROSTATE: ICD-10-CM

## 2024-03-28 PROCEDURE — 99214 OFFICE O/P EST MOD 30 MIN: CPT

## 2024-03-28 NOTE — HISTORY OF PRESENT ILLNESS
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  He has undergone treatment for prostate cancer with multimodal therapy including radiation therapy preceded by radical prostatectomy.  He has been hospitalized last year with hemorrhagic cystitis and after bladder irrigations and aluminum hydroxide irrigation.  He also had required multiple blood transfusions to support anemia related to the hemorrhagic cystitis.  His hematuria did improve, and he has now gone through about 30 treatments with hyperbaric oxygen therapy but he called the office this week with complaints of gross hematuria and blood clots. He was able to pass some clots and has not seen any in two days. Denies any dysuria or discomfort while urinating.   He completed pelvic floor physical therapy, for urinary incontinence. He did see improvement but the therapy has since ended and he is experiencing incontinence. He is wearing about 5 pads/diapers a day. He was recommended to see Dr. Melgoza to discuss surgical treatments for urinary incontinence but has not yet made an appointment.   He was prescribed Lotrisone for balanitis. He is unable to retract the foreskin, he reports soreness.   He is on Orgovyx and Xtandi [place] [facility] [place] He has been dealing with hypertension and feeling foggy, he has followed up with Dr. Monzon and his cardiologist.

## 2024-03-28 NOTE — ASSESSMENT
[FreeTextEntry1] : It is unclear at this time what exactly triggered the recurrence of gross hematuria, possibly doing something strenuous or straining with a bowel movement or simply related to the issues of radiation cystitis.  At this time, the hematuria has resolved and was relatively short-lived.  I do not think he needs to repeat hyperbaric oxygen therapy treatment yet at this point but that could be a consideration should he develop worsening and persistent gross hematuria.  I again encouraged him to see Dr. Melgoza to discuss the potential surgical treatments for urinary incontinence which is a significant quality-of-life issue for him.  He may also discuss circumcision with him as he has had difficulty with recurrent candidal balanitis.  He will continue his systemic therapy for his metastatic prostate cancer and [place] [facility] [place] with his medical oncologist.  I have encouraged him to let me know should he develop any additional issues with hematuria.  He will use stool softeners as needed to minimize risk of constipation and provoking hematuria.

## 2024-04-01 ENCOUNTER — EMERGENCY (EMERGENCY)
Facility: HOSPITAL | Age: 74
LOS: 1 days | Discharge: HOME | End: 2024-04-01
Attending: EMERGENCY MEDICINE | Admitting: EMERGENCY MEDICINE
Payer: MEDICARE

## 2024-04-01 ENCOUNTER — NON-APPOINTMENT (OUTPATIENT)
Age: 74
End: 2024-04-01

## 2024-04-01 VITALS
WEIGHT: 201.94 LBS | RESPIRATION RATE: 16 BRPM | SYSTOLIC BLOOD PRESSURE: 173 MMHG | DIASTOLIC BLOOD PRESSURE: 82 MMHG | HEIGHT: 70 IN | TEMPERATURE: 97 F | OXYGEN SATURATION: 95 % | HEART RATE: 94 BPM

## 2024-04-01 VITALS
DIASTOLIC BLOOD PRESSURE: 76 MMHG | RESPIRATION RATE: 16 BRPM | OXYGEN SATURATION: 99 % | TEMPERATURE: 98 F | HEART RATE: 84 BPM | SYSTOLIC BLOOD PRESSURE: 128 MMHG

## 2024-04-01 DIAGNOSIS — Z90.79 ACQUIRED ABSENCE OF OTHER GENITAL ORGAN(S): Chronic | ICD-10-CM

## 2024-04-01 DIAGNOSIS — Z90.89 ACQUIRED ABSENCE OF OTHER ORGANS: Chronic | ICD-10-CM

## 2024-04-01 LAB
ALBUMIN SERPL ELPH-MCNC: 3.8 G/DL — SIGNIFICANT CHANGE UP (ref 3.3–5)
ALP SERPL-CCNC: 88 U/L — SIGNIFICANT CHANGE UP (ref 40–120)
ALT FLD-CCNC: 11 U/L — SIGNIFICANT CHANGE UP (ref 4–41)
ANION GAP SERPL CALC-SCNC: 13 MMOL/L — SIGNIFICANT CHANGE UP (ref 7–14)
APPEARANCE UR: ABNORMAL
AST SERPL-CCNC: 17 U/L — SIGNIFICANT CHANGE UP (ref 4–40)
BACTERIA # UR AUTO: NEGATIVE /HPF — SIGNIFICANT CHANGE UP
BASOPHILS # BLD AUTO: 0.07 K/UL — SIGNIFICANT CHANGE UP (ref 0–0.2)
BASOPHILS NFR BLD AUTO: 1 % — SIGNIFICANT CHANGE UP (ref 0–2)
BILIRUB SERPL-MCNC: 0.5 MG/DL — SIGNIFICANT CHANGE UP (ref 0.2–1.2)
BILIRUB UR-MCNC: ABNORMAL
BUN SERPL-MCNC: 21 MG/DL — SIGNIFICANT CHANGE UP (ref 7–23)
CALCIUM SERPL-MCNC: 9.4 MG/DL — SIGNIFICANT CHANGE UP (ref 8.4–10.5)
CAST: 1 /LPF — SIGNIFICANT CHANGE UP (ref 0–4)
CHLORIDE SERPL-SCNC: 102 MMOL/L — SIGNIFICANT CHANGE UP (ref 98–107)
CO2 SERPL-SCNC: 22 MMOL/L — SIGNIFICANT CHANGE UP (ref 22–31)
COLOR SPEC: ABNORMAL
CREAT SERPL-MCNC: 0.99 MG/DL — SIGNIFICANT CHANGE UP (ref 0.5–1.3)
DIFF PNL FLD: ABNORMAL
EGFR: 79 ML/MIN/1.73M2 — SIGNIFICANT CHANGE UP
EOSINOPHIL # BLD AUTO: 0.26 K/UL — SIGNIFICANT CHANGE UP (ref 0–0.5)
EOSINOPHIL NFR BLD AUTO: 3.6 % — SIGNIFICANT CHANGE UP (ref 0–6)
GLUCOSE SERPL-MCNC: 105 MG/DL — HIGH (ref 70–99)
GLUCOSE UR QL: NEGATIVE MG/DL — SIGNIFICANT CHANGE UP
HCT VFR BLD CALC: 37.6 % — LOW (ref 39–50)
HGB BLD-MCNC: 13.2 G/DL — SIGNIFICANT CHANGE UP (ref 13–17)
IANC: 4.37 K/UL — SIGNIFICANT CHANGE UP (ref 1.8–7.4)
IMM GRANULOCYTES NFR BLD AUTO: 0.3 % — SIGNIFICANT CHANGE UP (ref 0–0.9)
KETONES UR-MCNC: ABNORMAL MG/DL
LEUKOCYTE ESTERASE UR-ACNC: ABNORMAL
LYMPHOCYTES # BLD AUTO: 1.56 K/UL — SIGNIFICANT CHANGE UP (ref 1–3.3)
LYMPHOCYTES # BLD AUTO: 21.5 % — SIGNIFICANT CHANGE UP (ref 13–44)
MCHC RBC-ENTMCNC: 31.7 PG — SIGNIFICANT CHANGE UP (ref 27–34)
MCHC RBC-ENTMCNC: 35.1 GM/DL — SIGNIFICANT CHANGE UP (ref 32–36)
MCV RBC AUTO: 90.4 FL — SIGNIFICANT CHANGE UP (ref 80–100)
MONOCYTES # BLD AUTO: 0.96 K/UL — HIGH (ref 0–0.9)
MONOCYTES NFR BLD AUTO: 13.3 % — SIGNIFICANT CHANGE UP (ref 2–14)
NEUTROPHILS # BLD AUTO: 4.37 K/UL — SIGNIFICANT CHANGE UP (ref 1.8–7.4)
NEUTROPHILS NFR BLD AUTO: 60.3 % — SIGNIFICANT CHANGE UP (ref 43–77)
NITRITE UR-MCNC: POSITIVE
NRBC # BLD: 0 /100 WBCS — SIGNIFICANT CHANGE UP (ref 0–0)
NRBC # FLD: 0 K/UL — SIGNIFICANT CHANGE UP (ref 0–0)
PH UR: 5.5 — SIGNIFICANT CHANGE UP (ref 5–8)
PLATELET # BLD AUTO: 201 K/UL — SIGNIFICANT CHANGE UP (ref 150–400)
POTASSIUM SERPL-MCNC: 3.7 MMOL/L — SIGNIFICANT CHANGE UP (ref 3.5–5.3)
POTASSIUM SERPL-SCNC: 3.7 MMOL/L — SIGNIFICANT CHANGE UP (ref 3.5–5.3)
PROT SERPL-MCNC: 6.6 G/DL — SIGNIFICANT CHANGE UP (ref 6–8.3)
PROT UR-MCNC: 100 MG/DL
RBC # BLD: 4.16 M/UL — LOW (ref 4.2–5.8)
RBC # FLD: 12.4 % — SIGNIFICANT CHANGE UP (ref 10.3–14.5)
RBC CASTS # UR COMP ASSIST: >50 /HPF — SIGNIFICANT CHANGE UP (ref 0–4)
SODIUM SERPL-SCNC: 137 MMOL/L — SIGNIFICANT CHANGE UP (ref 135–145)
SP GR SPEC: 1.03 — SIGNIFICANT CHANGE UP (ref 1–1.03)
SQUAMOUS # UR AUTO: 8 /HPF — HIGH (ref 0–5)
UROBILINOGEN FLD QL: 0.2 MG/DL — SIGNIFICANT CHANGE UP (ref 0.2–1)
WBC # BLD: 7.24 K/UL — SIGNIFICANT CHANGE UP (ref 3.8–10.5)
WBC # FLD AUTO: 7.24 K/UL — SIGNIFICANT CHANGE UP (ref 3.8–10.5)
WBC UR QL: 6 /HPF — HIGH (ref 0–5)

## 2024-04-01 PROCEDURE — 99285 EMERGENCY DEPT VISIT HI MDM: CPT

## 2024-04-01 RX ORDER — CEFDINIR 250 MG/5ML
1 POWDER, FOR SUSPENSION ORAL
Qty: 20 | Refills: 0
Start: 2024-04-01 | End: 2024-04-10

## 2024-04-01 RX ORDER — ACETAMINOPHEN 500 MG
975 TABLET ORAL ONCE
Refills: 0 | Status: COMPLETED | OUTPATIENT
Start: 2024-04-01 | End: 2024-04-01

## 2024-04-01 RX ORDER — LIDOCAINE HCL 20 MG/ML
5 VIAL (ML) INJECTION ONCE
Refills: 0 | Status: COMPLETED | OUTPATIENT
Start: 2024-04-01 | End: 2024-04-01

## 2024-04-01 RX ORDER — CEFTRIAXONE 500 MG/1
1000 INJECTION, POWDER, FOR SOLUTION INTRAMUSCULAR; INTRAVENOUS ONCE
Refills: 0 | Status: COMPLETED | OUTPATIENT
Start: 2024-04-01 | End: 2024-04-01

## 2024-04-01 RX ORDER — ACETAMINOPHEN 500 MG
1000 TABLET ORAL ONCE
Refills: 0 | Status: COMPLETED | OUTPATIENT
Start: 2024-04-01 | End: 2024-04-01

## 2024-04-01 RX ADMIN — CEFTRIAXONE 100 MILLIGRAM(S): 500 INJECTION, POWDER, FOR SOLUTION INTRAMUSCULAR; INTRAVENOUS at 03:01

## 2024-04-01 RX ADMIN — Medication 5 MILLILITER(S): at 02:18

## 2024-04-01 RX ADMIN — Medication 975 MILLIGRAM(S): at 03:35

## 2024-04-01 NOTE — ED PROVIDER NOTE - CLINICAL SUMMARY MEDICAL DECISION MAKING FREE TEXT BOX
Pt is a 72 YO M with PMH HTN, Prostate CA (s/p radical prostatectomy s/p, radiation), SSS (s/p PPM) who presented to ED with urinary retention x ~ 9 hours. Pt is a 74 YO M with PMH HTN, Prostate CA (s/p radical prostatectomy s/p, radiation), SSS (s/p PPM) who presented to ED with urinary retention x ~ 9 hours. Plan for bowers, urology eval, labs and reassessment

## 2024-04-01 NOTE — ED PROVIDER NOTE - PROGRESS NOTE DETAILS
Discussed with urology Discussed with urology  urology placed bowers at bedside, no passage of clots noted, urine dark yellow  Cr wnl, no leukocytosis   UA+ for infection, pt given IV Rocephin in ED, will d/c with bowers, cefdinir and close follow up with his urologist dr baez

## 2024-04-01 NOTE — ED ADULT NURSE NOTE - NSICDXPASTMEDICALHX_GEN_ALL_CORE_FT
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

## 2024-04-01 NOTE — ED PROVIDER NOTE - ATTENDING APP SHARED VISIT CONTRIBUTION OF CARE
HPI: 72 YO M with PMH HTN, Prostate CA (s/p radical prostatectomy s/p, radiation), SSS (s/p PPM) who presented to ED with urinary retention. Pt reports he normally wears diapers and goes through over 5 diapers per day. Pt reports he has not made a wet diaper since approximately 5pm this evening but did notice a blood stain. Pt reports feels consistent with when he had a large clot last year which required irrigation. Pt otherwise denies fevers, chills, chest pain, palpitations, shortness of breath.    EXAM: Uncomfortable appearing tenderness to suprapubic without any gross abnormalities no hernias.  No rebound no guarding.   with phimosis.  No bleeding    MDM: 73-year-old male with history of prostate CA with multiple surgeries that is brought in by son tonight for inability to urinate–no wet diapers x 7 to 8 hours.  This has happened before and required intervention.  At this time will consult urology to place Gr as patient might need CBI.  Will obtain urine urine culture and basic labs to check for creatinine and reassess.  Patient is refusing pain medications as long as his pain is relieved by Gr placement.  Will reassess after urology workup and treatment.

## 2024-04-01 NOTE — ED ADULT NURSE NOTE - OBJECTIVE STATEMENT
Pt arrives to room 17 A&Ox3 and ambulatory at baseline. PH of HTN and prostate ca. Pt comes to ED c/o urinary retention x 8 hours. Pt endorsing pain in the suprapubic pain. Denies headache, dizziness, chest pain, SOB, fevers, chills, nausea, vomiting and diarrhea at this time. Respirations even and unlabored on room air. 20 gauge placed to L hand, labs drawn and sent by SHABNAM Mazariegos. Pt medicated per EMAR. Bowers placed by urology at bedside. Pt noted to have dark brown/red output. UA/UC sent per MD orders. Pt endorsing relief with bowers placement. Respirations even and unlabored on room air. Plan of care ongoing, comfort measures provided and safety measures maintained. Awaiting results.

## 2024-04-01 NOTE — ED ADULT TRIAGE NOTE - CHIEF COMPLAINT QUOTE
c/o urinary retention x 8 hours associated with hematuria & passing of clots. endorses baseline urinary incontinence, diaper has been dry. denies chest pain, SOB, fevers, chills, blood thinner use. past medical Hx of prostate CA on oral chemo, HTN, B/L hard of hearing. appears uncomfortable

## 2024-04-01 NOTE — ED PROVIDER NOTE - NSICDXPASTMEDICALHX_GEN_ALL_CORE_FT
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

## 2024-04-01 NOTE — ED PROVIDER NOTE - PATIENT PORTAL LINK FT
You can access the FollowMyHealth Patient Portal offered by [facility] by registering at the following website: [email or web address]. By joining [facility]’s FollowMyHealth portal, you will also be able to view your health information using other applications (apps) compatible with our system.

## 2024-04-01 NOTE — CONSULT NOTE ADULT - SUBJECTIVE AND OBJECTIVE BOX
HPI  72 YO M with PMH HTN, Prostate CA (s/p radical prostatectomy s/p, radiation), SSS (s/p PPM) who presented to ED with urinary retention. Pt reports he normally wears diapers and goes through over 5 diapers per day. Pt reports he has not made a wet diaper since approximately 5pm this evening but did notice a blood stain. Pt reports feels consistent with when he had a large clot last year which required irrigation. Pt otherwise denies fevers, chills, chest pain, palpitations, shortness of breath.    Urology consulted for urinary retention. Patient reports passing blood clot earlier today, but has not voided since about 5 pm. He reports suprapubic discomfort and pain. Denies lightheadedness, fatigue, dysuria, flank pain, fevers, chills, incomplete emptying, straining to urinate. Has not been able to reduce foreskin for the past 2 weeks. He recently saw Dr. Hoang in the office, his urologist, for blood clots he has noticed in his urine recently.      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      HTN (hypertension)      Aortic stenosis      Rectal bleeding  last hospitalization 10/6/20 2/2 radiation proctitis      [place] (hard of hearing)      H/O prostatectomy  2015      S/P T&A (status post tonsillectomy and adenoidectomy)  child          MEDICATIONS  (STANDING):  cefTRIAXone   IVPB 1000 milliGRAM(s) IV Intermittent once    MEDICATIONS  (PRN):      FAMILY HISTORY:  FH: cancer (Father, Mother)        Allergies    penicillin V potassium (Rash)  penicillin (Diarrhea; Pruritus; Hives)    Intolerances    codeine (Nausea)      SOCIAL HISTORY:    REVIEW OF SYSTEMS:   Otherwise negative as stated in HPI    Physical Exam  Vital signs  T(C): 36.3 (04-01-24 @ 01:01), Max: 36.3 (04-01-24 @ 01:01)  HR: 94 (04-01-24 @ 01:01)  BP: 173/82 (04-01-24 @ 01:01)  SpO2: 95% (04-01-24 @ 01:01)  Wt(kg): --    Output      Gen: NAD  Pulm: No respiratory distress  Abd: Soft, nontender, nondistended  : Circumcised penis w/ phimosis        LABS:      04-01 @ 02:05    WBC 7.24  / Hct 37.6  / SCr --                   Urine Cx: Pending

## 2024-04-01 NOTE — ED PROVIDER NOTE - NSFOLLOWUPINSTRUCTIONS_ED_ALL_ED_FT
You were seen in the emergency room for urinary retention  Your urine came back with an infection, you were prescribed antibiotics   Take them twice a day for 10 days with food   Keep your bowers in until you follow up with your urologist dr. baez  return to ER if any worsening symptoms, fevers, chills, not passing urine, pain or any other concerns

## 2024-04-01 NOTE — ED ADULT TRIAGE NOTE - AS PAIN REST
8 (severe pain) Consent (Near Eyelid Margin)/Introductory Paragraph: The rationale for Mohs was explained to the patient and consent was obtained. The risks, benefits and alternatives to therapy were discussed in detail. Specifically, the risks of ectropion or eyelid deformity, infection, scarring, bleeding, prolonged wound healing, incomplete removal, allergy to anesthesia, nerve injury and recurrence were addressed. Prior to the procedure, the treatment site was clearly identified and confirmed by the patient. All components of Universal Protocol/PAUSE Rule completed.

## 2024-04-01 NOTE — CONSULT NOTE ADULT - ASSESSMENT
73 year old male with history of prostate cancer s/p prostatectomy and radiation c/b radiation cystitis requiring previous admission for CBI and alum presenting with urinary retention.    - Labs reviewed - WBC 7.24, Cr 0.99, UA w/ nitrite positive urine  - Gr catheter placed with return of clear, dark grace urine  - Catheter irrigated without return of blood clot  - Recommend CTX in ED and 3d Bactrim on discharge  - Follow up urine culture  - Patient can be discharged with Gr catheter, counseled on avoiding constipation  - Follow up with Dr. Hoang for trial of void    [facility]  [address] [place] [phone]    Case discussed with Dr. Becerra

## 2024-04-01 NOTE — ED PROVIDER NOTE - OBJECTIVE STATEMENT
Pt is a 72 YO M with PMH HTN, Prostate CA (s/p radical prostatectomy s/p, radiation), SSS (s/p PPM) who presented to ED with urinary retention. Pt reports he normally wears diapers and goes through over 5 diapers per day. Pt reports he has not made a wet diaper since approximately 5pm this evening but did notice a blood stain. Pt reports feels consistent with when he had a large clot last year which required irrigation. Pt otherwise denies fevers, chills, chest pain, palpitations, shortness of breath.  Pt follows with Urology Dr. Hoang.

## 2024-04-01 NOTE — ED ADULT NURSE REASSESSMENT NOTE - NS ED NURSE REASSESS COMMENT FT1
Upon reassessment pt endorsing pain to suprapubic region. LIANET Dillon notified. Pt medicated per EMAR. Leg bag placed at this time. 300 mL of dark brown output noted. VS as noted in flow sheet. No acute distress noted. Respirations even and unlabored on room air. Denies headache, dizziness, chest pain and SOB at this time. Pt IV discontinued at this time.

## 2024-04-02 LAB
CULTURE RESULTS: NO GROWTH — SIGNIFICANT CHANGE UP
SPECIMEN SOURCE: SIGNIFICANT CHANGE UP

## 2024-04-03 ENCOUNTER — EMERGENCY (EMERGENCY)
Facility: HOSPITAL | Age: 74
LOS: 1 days | Discharge: HOME | End: 2024-04-03
Attending: STUDENT IN AN ORGANIZED HEALTH CARE EDUCATION/TRAINING PROGRAM | Admitting: STUDENT IN AN ORGANIZED HEALTH CARE EDUCATION/TRAINING PROGRAM
Payer: MEDICARE

## 2024-04-03 ENCOUNTER — APPOINTMENT (OUTPATIENT)
Dept: UROLOGY | Facility: CLINIC | Age: 74
End: 2024-04-03
Payer: MEDICARE

## 2024-04-03 ENCOUNTER — NON-APPOINTMENT (OUTPATIENT)
Age: 74
End: 2024-04-03

## 2024-04-03 VITALS
HEART RATE: 103 BPM | HEIGHT: 70 IN | OXYGEN SATURATION: 97 % | TEMPERATURE: 98 F | DIASTOLIC BLOOD PRESSURE: 78 MMHG | RESPIRATION RATE: 20 BRPM | SYSTOLIC BLOOD PRESSURE: 118 MMHG

## 2024-04-03 VITALS
RESPIRATION RATE: 18 BRPM | DIASTOLIC BLOOD PRESSURE: 66 MMHG | OXYGEN SATURATION: 98 % | HEART RATE: 84 BPM | TEMPERATURE: 99 F | SYSTOLIC BLOOD PRESSURE: 125 MMHG

## 2024-04-03 VITALS — HEART RATE: 80 BPM | SYSTOLIC BLOOD PRESSURE: 170 MMHG | DIASTOLIC BLOOD PRESSURE: 84 MMHG

## 2024-04-03 DIAGNOSIS — N32.89 OTHER SPECIFIED DISORDERS OF BLADDER: ICD-10-CM

## 2024-04-03 DIAGNOSIS — Z90.79 ACQUIRED ABSENCE OF OTHER GENITAL ORGAN(S): Chronic | ICD-10-CM

## 2024-04-03 DIAGNOSIS — Z90.89 ACQUIRED ABSENCE OF OTHER ORGANS: Chronic | ICD-10-CM

## 2024-04-03 LAB
ANION GAP SERPL CALC-SCNC: 14 MMOL/L — SIGNIFICANT CHANGE UP (ref 7–14)
APPEARANCE UR: CLEAR — SIGNIFICANT CHANGE UP
BACTERIA # UR AUTO: NEGATIVE /HPF — SIGNIFICANT CHANGE UP
BASOPHILS # BLD AUTO: 0.04 K/UL — SIGNIFICANT CHANGE UP (ref 0–0.2)
BASOPHILS NFR BLD AUTO: 0.6 % — SIGNIFICANT CHANGE UP (ref 0–2)
BILIRUB UR-MCNC: NEGATIVE — SIGNIFICANT CHANGE UP
BUN SERPL-MCNC: 20 MG/DL — SIGNIFICANT CHANGE UP (ref 7–23)
CALCIUM SERPL-MCNC: 9 MG/DL — SIGNIFICANT CHANGE UP (ref 8.4–10.5)
CAST: 4 /LPF — SIGNIFICANT CHANGE UP (ref 0–4)
CHLORIDE SERPL-SCNC: 103 MMOL/L — SIGNIFICANT CHANGE UP (ref 98–107)
CO2 SERPL-SCNC: 21 MMOL/L — LOW (ref 22–31)
COLOR SPEC: YELLOW — SIGNIFICANT CHANGE UP
CREAT SERPL-MCNC: 0.82 MG/DL — SIGNIFICANT CHANGE UP (ref 0.5–1.3)
DIFF PNL FLD: ABNORMAL
EGFR: 92 ML/MIN/1.73M2 — SIGNIFICANT CHANGE UP
EOSINOPHIL # BLD AUTO: 0.25 K/UL — SIGNIFICANT CHANGE UP (ref 0–0.5)
EOSINOPHIL NFR BLD AUTO: 3.9 % — SIGNIFICANT CHANGE UP (ref 0–6)
GLUCOSE SERPL-MCNC: 112 MG/DL — HIGH (ref 70–99)
GLUCOSE UR QL: NEGATIVE MG/DL — SIGNIFICANT CHANGE UP
HCT VFR BLD CALC: 37.3 % — LOW (ref 39–50)
HGB BLD-MCNC: 12.5 G/DL — LOW (ref 13–17)
IANC: 4.14 K/UL — SIGNIFICANT CHANGE UP (ref 1.8–7.4)
IMM GRANULOCYTES NFR BLD AUTO: 0.3 % — SIGNIFICANT CHANGE UP (ref 0–0.9)
KETONES UR-MCNC: ABNORMAL MG/DL
LEUKOCYTE ESTERASE UR-ACNC: ABNORMAL
LYMPHOCYTES # BLD AUTO: 1.11 K/UL — SIGNIFICANT CHANGE UP (ref 1–3.3)
LYMPHOCYTES # BLD AUTO: 17.1 % — SIGNIFICANT CHANGE UP (ref 13–44)
MCHC RBC-ENTMCNC: 30.4 PG — SIGNIFICANT CHANGE UP (ref 27–34)
MCHC RBC-ENTMCNC: 33.5 GM/DL — SIGNIFICANT CHANGE UP (ref 32–36)
MCV RBC AUTO: 90.8 FL — SIGNIFICANT CHANGE UP (ref 80–100)
MONOCYTES # BLD AUTO: 0.93 K/UL — HIGH (ref 0–0.9)
MONOCYTES NFR BLD AUTO: 14.3 % — HIGH (ref 2–14)
NEUTROPHILS # BLD AUTO: 4.14 K/UL — SIGNIFICANT CHANGE UP (ref 1.8–7.4)
NEUTROPHILS NFR BLD AUTO: 63.8 % — SIGNIFICANT CHANGE UP (ref 43–77)
NITRITE UR-MCNC: NEGATIVE — SIGNIFICANT CHANGE UP
NRBC # BLD: 0 /100 WBCS — SIGNIFICANT CHANGE UP (ref 0–0)
NRBC # FLD: 0 K/UL — SIGNIFICANT CHANGE UP (ref 0–0)
PH UR: 5.5 — SIGNIFICANT CHANGE UP (ref 5–8)
PLATELET # BLD AUTO: 189 K/UL — SIGNIFICANT CHANGE UP (ref 150–400)
POTASSIUM SERPL-MCNC: 3.3 MMOL/L — LOW (ref 3.5–5.3)
POTASSIUM SERPL-SCNC: 3.3 MMOL/L — LOW (ref 3.5–5.3)
PROT UR-MCNC: 100 MG/DL
RBC # BLD: 4.11 M/UL — LOW (ref 4.2–5.8)
RBC # FLD: 12.5 % — SIGNIFICANT CHANGE UP (ref 10.3–14.5)
RBC CASTS # UR COMP ASSIST: 16 /HPF — HIGH (ref 0–4)
SODIUM SERPL-SCNC: 138 MMOL/L — SIGNIFICANT CHANGE UP (ref 135–145)
SP GR SPEC: 1.02 — SIGNIFICANT CHANGE UP (ref 1–1.03)
SQUAMOUS # UR AUTO: 1 /HPF — SIGNIFICANT CHANGE UP (ref 0–5)
UROBILINOGEN FLD QL: 0.2 MG/DL — SIGNIFICANT CHANGE UP (ref 0.2–1)
WBC # BLD: 6.49 K/UL — SIGNIFICANT CHANGE UP (ref 3.8–10.5)
WBC # FLD AUTO: 6.49 K/UL — SIGNIFICANT CHANGE UP (ref 3.8–10.5)
WBC UR QL: 16 /HPF — HIGH (ref 0–5)

## 2024-04-03 PROCEDURE — G2211 COMPLEX E/M VISIT ADD ON: CPT

## 2024-04-03 PROCEDURE — 99214 OFFICE O/P EST MOD 30 MIN: CPT

## 2024-04-03 PROCEDURE — 51702 INSERT TEMP BLADDER CATH: CPT

## 2024-04-03 PROCEDURE — 99284 EMERGENCY DEPT VISIT MOD MDM: CPT

## 2024-04-03 RX ORDER — POTASSIUM CHLORIDE 20 MEQ
20 PACKET (EA) ORAL ONCE
Refills: 0 | Status: COMPLETED | OUTPATIENT
Start: 2024-04-03 | End: 2024-04-03

## 2024-04-03 RX ORDER — POTASSIUM CHLORIDE 20 MEQ
40 PACKET (EA) ORAL ONCE
Refills: 0 | Status: COMPLETED | OUTPATIENT
Start: 2024-04-03 | End: 2024-04-03

## 2024-04-03 RX ADMIN — Medication 40 MILLIEQUIVALENT(S): at 21:13

## 2024-04-03 RX ADMIN — Medication 20 MILLIEQUIVALENT(S): at 21:13

## 2024-04-03 NOTE — PROCEDURE NOTE - ADDITIONAL PROCEDURE DETAILS
Called by ER for placement of catheter.  Patient is s/p Radical Prostatectomy 2015 and radiation 2019.  Developed retention over the past couple of weeks requiring bowers placement, occasional hematuria with passage of clots.  Bowers removed earlier in the day, now unable to void with pain.  22F catheter placed, return of yellow urine.    -Patient to keep current catheter in place.  -Follow-up with Dr. Hoang, [phone]  -Urology, [id number]

## 2024-04-03 NOTE — ED ADULT NURSE NOTE - OBJECTIVE STATEMENT
Pt c/o pain to groin . Pt unable to void. Pt last reported voiding  1230. Pt c/o pressure to groin. Pt denies fever  or chills . No c/o flank pain or discomfort.

## 2024-04-03 NOTE — HISTORY OF PRESENT ILLNESS
[FreeTextEntry1] : HERBIE KERR returns to the office today. He is a 73 year-old man who went to [facility] ED for clot retention 3 days ago. Prior to going to the ER he passed clots at home and then was unable to urinate and was experiencing abdominal discomfort. He has a history of radiation cystitis and gross hematuria. A bowers catheter was placed. He was given one dose of IV abx and sent home with Cefdinir, he reports he took one dose and had diarrhea so stopped. UC negative.   Reports that he has seen clots in the bowers bag and also feels discomfort and pressure at times.  He did pass a clot around the catheter a day or 2 ago.  He is otherwise feeling well without any abdominal pain or discomfort.  He has had some periodic discomfort in the pelvic area, possibly bladder spasms.

## 2024-04-03 NOTE — PHYSICAL EXAM
[Normal Appearance] : normal appearance [Well Groomed] : well groomed [General Appearance - In No Acute Distress] : no acute distress [Edema] : no peripheral edema [Exaggerated Use Of Accessory Muscles For Inspiration] : no accessory muscle use [Respiration, Rhythm And Depth] : normal respiratory rhythm and effort [Abdomen Soft] : soft [Abdomen Tenderness] : non-tender [Costovertebral Angle Tenderness] : no ~M costovertebral angle tenderness [Normal Station and Gait] : the gait and station were normal for the patient's age [Urinary Bladder Findings] : the bladder was normal on palpation [] : no rash [No Focal Deficits] : no focal deficits [Affect] : the affect was normal [Oriented To Time, Place, And Person] : oriented to person, place, and time [No Palpable Adenopathy] : no palpable adenopathy [Mood] : the mood was normal

## 2024-04-03 NOTE — ED PROVIDER NOTE - PHYSICAL EXAMINATION
VITALS: reviewed  GEN: uncomfortable appearing, A & O x 4  HEAD/EYES: NCAT, EOMI, anicteric sclerae,   ENT: mucus membranes moist, oropharynx WNL, trachea midline,  RESP: lungs CTA with equal breath sounds bilaterally, chest wall nontender and atraumatic  CV: heart with reg rhythm S1, S2, distal pulses intact and symmetric bilaterally  ABDOMEN: normoactive bowel sounds, soft, nondistended, suprapubic ttp, no palpable masses  : no CVAT  MSK: extremities atraumatic and nontender, no edema, no asymmetry.  SKIN: warm, dry, no rash, no bruising, no cyanosis. color appropriate for ethnicity  NEURO: alert, mentating appropriately, no facial asymmetry.   PSYCH: Affect appropriate

## 2024-04-03 NOTE — ED ADULT TRIAGE NOTE - CHIEF COMPLAINT QUOTE
pt c/o urinary rentention, had a bowers placed then removed today and has not been able to urinate since.

## 2024-04-03 NOTE — ED ADULT NURSE REASSESSMENT NOTE - NS ED NURSE REASSESS COMMENT FT1
Received report from day RN. pt remains at baseline mental status, RR even unlabored completing full sentences. pt resting in stretcher comfortably at this time, no new complaints offered. stretcher lowest position siderails up safety measures in place. Family at bedside, pending results. No signs of acute distress noted. Call bell within reach. VS Stable.

## 2024-04-03 NOTE — ED PROVIDER NOTE - PROGRESS NOTE DETAILS
Cesar ALTAMIRANO: Pt is stable and ready for discharge. Strict return precautions given. All questions answered. Received signout from previous ED team. Gr catheter in place and draining urine. UA improved from last time, will have pt f/u w/ urologist for abx management.

## 2024-04-03 NOTE — ED PROVIDER NOTE - CLINICAL SUMMARY MEDICAL DECISION MAKING FREE TEXT BOX
72 yo M hx prostate cancer s/p radical prostatectomy and radiation therapy, SSS s/p PPM, presenting with complaints of urinary retention and discomfort s/p having bowers removed in office today. Pt presented to the ED 2 days ago with similar symptoms and had bowers placed at that time and was discharged on antibiotics. Denies fevers/chills. Appreciate urology c/s for bowers placement. Will obtain bmp to assess Cr and Ua. Likely dc home with bowers and recs for follow up urology office.

## 2024-04-03 NOTE — ASSESSMENT
[FreeTextEntry1] : This patient is incontinent at baseline and I think that the source of his retention was likely a blood clot that occurred, obstructing the bladder outlet leading to the retention.  His culture was negative and I do not think he needs to take any further antibiotics.  The catheter was removed today in the office.  He was able to void in the office without difficulty but did pass an additional small clot.  The urine was otherwise clear and the clot appeared to be related to an old blood clot not something fresh given its dark brown color.  We discussed methods of minimizing risk of recurrent hematuria including avoiding straining or constipation and minimizing any significant strenuous activities.  He will also hydrate.  He communicates his understanding of this plan.  I do not think he needs to undergo cystoscopic evaluation at this time given his prior evaluation with cystoscopy and findings of radiation cystitis.

## 2024-04-03 NOTE — ED PROVIDER NOTE - NSFOLLOWUPINSTRUCTIONS_ED_ALL_ED_FT
Follow up with urology. Continue taking antibiotics as prescribed.    Acute Urinary Retention, Male    Acute urinary retention is when a person cannot pee (urinate) at all, or can only pee a little. This can come on all of a sudden. If it is not treated, it can lead to kidney problems or other serious problems.    What are the causes?  A problem with the tube that drains the bladder (urethra).  Problems with the nerves in the bladder.  Tumors.  Certain medicines.  An infection.  Having trouble pooping (constipation).  What increases the risk?  Older men are more at risk because their prostate gland may become larger as they age. Other conditions also can increase risk. These include:  Diseases, such as multiple sclerosis.  Injury to the spinal cord.  Diabetes.  A condition that affects the way the brain works, such as dementia.  Holding back urine due to trauma or because you do not want to use the bathroom.  What are the signs or symptoms?  Trouble peeing.  Pain in the lower belly.  How is this treated?  Treatment for this condition may include:  Medicines.  Placing a thin, germ-free tube (catheter) into the bladder to drain pee out of the body.  Therapy to treat mental health conditions.  Treatment for conditions that may cause this.  If needed, you may be treated in the hospital for kidney problems or to manage other problems.    Follow these instructions at home:  Medicines    Take over-the-counter and prescription medicines only as told by your doctor. Ask your doctor what medicines you should stay away from.  If you were given an antibiotic medicine, take it as told by your doctor. Do not stop taking it, even if you start to feel better.  General instructions    Do not smoke or use any products that contain nicotine or tobacco. If you need help quitting, ask your doctor.  Drink enough fluid to keep your pee pale yellow.  If you were sent home with a tube that drains the bladder, take care of it as told by your doctor.  Watch for changes in your symptoms. Tell your doctor about them.  If told, keep track of changes in your blood pressure at home. Tell your doctor about them.  Keep all follow-up visits.  Contact a doctor if:  You have spasms in your bladder that you cannot stop.  You leak pee when you have spasms.  Get help right away if:  You have chills or a fever.  You have blood in your pee.  You have a tube that drains pee from the bladder and these things happen:  The tube stops draining pee.  The tube falls out.  Summary  Acute urinary retention is when you cannot pee at all or you pee too little.  If this condition is not treated, it can lead to kidney problems or other serious problems.  If you were sent home with a tube (catheter) that drains the bladder, take care of it as told by your doctor.  Watch for changes in your symptoms. Tell your doctor about them.  This information is not intended to replace advice given to you by your health care provider. Make sure you discuss any questions you have with your health care provider. Follow up with urology this week. Continue taking antibiotics as prescribed.    Acute Urinary Retention, Male    Acute urinary retention is when a person cannot pee (urinate) at all, or can only pee a little. This can come on all of a sudden. If it is not treated, it can lead to kidney problems or other serious problems.    What are the causes?  A problem with the tube that drains the bladder (urethra).  Problems with the nerves in the bladder.  Tumors.  Certain medicines.  An infection.  Having trouble pooping (constipation).  What increases the risk?  Older men are more at risk because their prostate gland may become larger as they age. Other conditions also can increase risk. These include:  Diseases, such as multiple sclerosis.  Injury to the spinal cord.  Diabetes.  A condition that affects the way the brain works, such as dementia.  Holding back urine due to trauma or because you do not want to use the bathroom.  What are the signs or symptoms?  Trouble peeing.  Pain in the lower belly.  How is this treated?  Treatment for this condition may include:  Medicines.  Placing a thin, germ-free tube (catheter) into the bladder to drain pee out of the body.  Therapy to treat mental health conditions.  Treatment for conditions that may cause this.  If needed, you may be treated in the hospital for kidney problems or to manage other problems.    Follow these instructions at home:  Medicines    Take over-the-counter and prescription medicines only as told by your doctor. Ask your doctor what medicines you should stay away from.  If you were given an antibiotic medicine, take it as told by your doctor. Do not stop taking it, even if you start to feel better.  General instructions    Do not smoke or use any products that contain nicotine or tobacco. If you need help quitting, ask your doctor.  Drink enough fluid to keep your pee pale yellow.  If you were sent home with a tube that drains the bladder, take care of it as told by your doctor.  Watch for changes in your symptoms. Tell your doctor about them.  If told, keep track of changes in your blood pressure at home. Tell your doctor about them.  Keep all follow-up visits.  Contact a doctor if:  You have spasms in your bladder that you cannot stop.  You leak pee when you have spasms.  Get help right away if:  You have chills or a fever.  You have blood in your pee.  You have a tube that drains pee from the bladder and these things happen:  The tube stops draining pee.  The tube falls out.  Summary  Acute urinary retention is when you cannot pee at all or you pee too little.  If this condition is not treated, it can lead to kidney problems or other serious problems.  If you were sent home with a tube (catheter) that drains the bladder, take care of it as told by your doctor.  Watch for changes in your symptoms. Tell your doctor about them.  This information is not intended to replace advice given to you by your health care provider. Make sure you discuss any questions you have with your health care provider.

## 2024-04-03 NOTE — ED PROVIDER NOTE - PATIENT PORTAL LINK FT
You can access the FollowMyHealth Patient Portal offered by [facility] by registering at the following website: [email or web address]. By joining [facility]’s FollowMyHealth portal, you will also be able to view your health information using other applications (apps) compatible with our system.

## 2024-04-04 PROBLEM — N32.89 BLADDER SPASMS: Status: ACTIVE | Noted: 2024-04-04

## 2024-04-04 LAB
CULTURE RESULTS: NO GROWTH — SIGNIFICANT CHANGE UP
SPECIMEN SOURCE: SIGNIFICANT CHANGE UP

## 2024-04-04 RX ORDER — OXYBUTYNIN CHLORIDE 10 MG/1
10 TABLET, EXTENDED RELEASE ORAL DAILY
Qty: 30 | Refills: 0 | Status: ACTIVE | COMMUNITY
Start: 2024-04-04 | End: 1900-01-01

## 2024-04-08 ENCOUNTER — EMERGENCY (EMERGENCY)
Facility: HOSPITAL | Age: 74
LOS: 1 days | Discharge: HOME | End: 2024-04-08
Attending: EMERGENCY MEDICINE | Admitting: EMERGENCY MEDICINE
Payer: MEDICARE

## 2024-04-08 VITALS
HEIGHT: 70 IN | HEART RATE: 98 BPM | SYSTOLIC BLOOD PRESSURE: 174 MMHG | RESPIRATION RATE: 20 BRPM | OXYGEN SATURATION: 99 % | DIASTOLIC BLOOD PRESSURE: 84 MMHG | TEMPERATURE: 98 F

## 2024-04-08 VITALS
SYSTOLIC BLOOD PRESSURE: 156 MMHG | HEART RATE: 91 BPM | RESPIRATION RATE: 18 BRPM | OXYGEN SATURATION: 98 % | DIASTOLIC BLOOD PRESSURE: 79 MMHG | TEMPERATURE: 98 F

## 2024-04-08 DIAGNOSIS — Z90.89 ACQUIRED ABSENCE OF OTHER ORGANS: Chronic | ICD-10-CM

## 2024-04-08 DIAGNOSIS — Z90.79 ACQUIRED ABSENCE OF OTHER GENITAL ORGAN(S): Chronic | ICD-10-CM

## 2024-04-08 LAB
ANION GAP SERPL CALC-SCNC: 14 MMOL/L — SIGNIFICANT CHANGE UP (ref 7–14)
BASOPHILS # BLD AUTO: 0.07 K/UL — SIGNIFICANT CHANGE UP (ref 0–0.2)
BASOPHILS NFR BLD AUTO: 0.7 % — SIGNIFICANT CHANGE UP (ref 0–2)
BUN SERPL-MCNC: 10 MG/DL — SIGNIFICANT CHANGE UP (ref 7–23)
CALCIUM SERPL-MCNC: 9.2 MG/DL — SIGNIFICANT CHANGE UP (ref 8.4–10.5)
CHLORIDE SERPL-SCNC: 100 MMOL/L — SIGNIFICANT CHANGE UP (ref 98–107)
CO2 SERPL-SCNC: 21 MMOL/L — LOW (ref 22–31)
CREAT SERPL-MCNC: 0.72 MG/DL — SIGNIFICANT CHANGE UP (ref 0.5–1.3)
EGFR: 95 ML/MIN/1.73M2 — SIGNIFICANT CHANGE UP
EOSINOPHIL # BLD AUTO: 0.27 K/UL — SIGNIFICANT CHANGE UP (ref 0–0.5)
EOSINOPHIL NFR BLD AUTO: 2.9 % — SIGNIFICANT CHANGE UP (ref 0–6)
GLUCOSE SERPL-MCNC: 96 MG/DL — SIGNIFICANT CHANGE UP (ref 70–99)
HCT VFR BLD CALC: 36.4 % — LOW (ref 39–50)
HGB BLD-MCNC: 12.2 G/DL — LOW (ref 13–17)
IANC: 6.36 K/UL — SIGNIFICANT CHANGE UP (ref 1.8–7.4)
IMM GRANULOCYTES NFR BLD AUTO: 0.3 % — SIGNIFICANT CHANGE UP (ref 0–0.9)
LYMPHOCYTES # BLD AUTO: 1.6 K/UL — SIGNIFICANT CHANGE UP (ref 1–3.3)
LYMPHOCYTES # BLD AUTO: 17.1 % — SIGNIFICANT CHANGE UP (ref 13–44)
MCHC RBC-ENTMCNC: 30.6 PG — SIGNIFICANT CHANGE UP (ref 27–34)
MCHC RBC-ENTMCNC: 33.5 GM/DL — SIGNIFICANT CHANGE UP (ref 32–36)
MCV RBC AUTO: 91.2 FL — SIGNIFICANT CHANGE UP (ref 80–100)
MONOCYTES # BLD AUTO: 1.03 K/UL — HIGH (ref 0–0.9)
MONOCYTES NFR BLD AUTO: 11 % — SIGNIFICANT CHANGE UP (ref 2–14)
NEUTROPHILS # BLD AUTO: 6.36 K/UL — SIGNIFICANT CHANGE UP (ref 1.8–7.4)
NEUTROPHILS NFR BLD AUTO: 68 % — SIGNIFICANT CHANGE UP (ref 43–77)
NRBC # BLD: 0 /100 WBCS — SIGNIFICANT CHANGE UP (ref 0–0)
NRBC # FLD: 0 K/UL — SIGNIFICANT CHANGE UP (ref 0–0)
PLATELET # BLD AUTO: 211 K/UL — SIGNIFICANT CHANGE UP (ref 150–400)
POTASSIUM SERPL-MCNC: 3.4 MMOL/L — LOW (ref 3.5–5.3)
POTASSIUM SERPL-SCNC: 3.4 MMOL/L — LOW (ref 3.5–5.3)
RBC # BLD: 3.99 M/UL — LOW (ref 4.2–5.8)
RBC # FLD: 12.2 % — SIGNIFICANT CHANGE UP (ref 10.3–14.5)
SODIUM SERPL-SCNC: 135 MMOL/L — SIGNIFICANT CHANGE UP (ref 135–145)
WBC # BLD: 9.36 K/UL — SIGNIFICANT CHANGE UP (ref 3.8–10.5)
WBC # FLD AUTO: 9.36 K/UL — SIGNIFICANT CHANGE UP (ref 3.8–10.5)

## 2024-04-08 PROCEDURE — 99284 EMERGENCY DEPT VISIT MOD MDM: CPT

## 2024-04-08 NOTE — ED ADULT NURSE NOTE - NSFALLRISKASMT_ED_ALL_ED_DT
- Na 145  resolved  - Patient encouraged to drink more water.      #DISPOSITION  - Patient will likely de discharged to [facility] on Monday 7/3  - Will need 6 weeks for IV abx, PICC in place. 08-Apr-2024 19:49

## 2024-04-08 NOTE — CONSULT NOTE ADULT - ASSESSMENT
Urology Consult    74 Y/O M w/ PMH of Prostate CA s/p Radiation Therapy (2018) and Prostatectomy, HTN, Aortic Stenosis and Sick sinus syndrome s/p pacemaker presents to ER for urinary retention. Seen last week for same complaint on 4/3 Gr placed by urology team discharged following negative work up. Presents today for same complaint. States around noon experienced difficulty w/ urination. Associated abdominal pain. Notes a dark colored urine starting today. Last emptied bag prior to onset of symptoms. Not on AC. Denies fever, chills, nausea/vomiting, dizziness, headache, chest pain, shortness of breath. During exam writer able to drain 250 CC of dark grace colored urine to drainage bag.     No acute urological intervention  Bag draining appropriately  Monitor color  Trend H/H   Urology Consult    74 Y/O M w/ PMH of Prostate CA s/p Radiation Therapy (2018) and Prostatectomy, HTN, Aortic Stenosis and Sick sinus syndrome s/p pacemaker presents to ER for urinary retention. Seen last week for same complaint on 4/3 Bowers placed by urology team discharged following negative work up. Presents today for same complaint. States around noon experienced difficulty w/ urination. Associated abdominal pain. Notes a dark colored urine starting today. Last emptied bag prior to onset of symptoms. Not on AC. Denies fever, chills, nausea/vomiting, dizziness, headache, chest pain, shortness of breath. During exam writer able to drain 250 CC of dark grace colored urine to drainage bag.   Patient was irrigated again without any clots. H/H stable     -No acute urological intervention  -Bag draining appropriately  -Stay Hydrated  -Home with bowers to leg bag  -F/U as scheduled with Dr Hoang tomorrow [phone]  Case discussed with Dr Triana/Dr Valenzuela

## 2024-04-08 NOTE — ED ADULT NURSE NOTE - NS_SISCREENINGSR_GEN_ALL_ED
Contacted Jayna from [facility] regarding patient. Jayna stated she would be here within the hour.  
Extensive amount of resource material given to pt as well as number for [facility] RN.  
Patient able to ambulate to the bathroom without assistance.  
Patient back from CT.  
Patient back from CT.   
Patient back from [facility].  
Patient to CT.  
Patient to CT.   
Patient to [facility].  
Patient unable to answer past medical history.  Waiting for patients friend to return.   
Pt sleeping, friends at bedside.  
Negative

## 2024-04-08 NOTE — ED PROVIDER NOTE - NSFOLLOWUPINSTRUCTIONS_ED_ALL_ED_FT
You were seen in the emergency department for Gr catheter not draining . We have evaluated you and determined that you do not require further hospital interventions.    During your stay you had the following relevant results:   Gr irrigated and urine now draining.   Please follow up with your Urologist Dr. Hoang as scheduled tomorrow.     If you start to experience worsening symptoms such as fevers, chills, abdominal pain, large amount of blood, Gr not draining again, please return to the emergency department for further evaluation.

## 2024-04-08 NOTE — ED PROVIDER NOTE - OBJECTIVE STATEMENT
Patient is a 73-year-old male past medical history prostate cancer (status post radical prostatectomy and radiation), sick sinus syndrome (status post pacemaker), hypertension presenting to the ED with urinary retention.  Of note patient had a Gr placed on 0 04/1/24 and replaced on 04/03/2024 and follows with urologist Dr. Hoang who he last saw approximately 1 week ago and has a follow up appointment scheduled for tomorrow. States at 2:30 PM today and noted some blood in his urine and has not drained anything through the Gr since that time. States he is having mild suprapubic discomfort denies fevers, chills, rigors. Patient is a 73-year-old male past medical history prostate cancer (status post radical prostatectomy and radiation), sick sinus syndrome (status post pacemaker), hypertension presenting to the ED with urinary retention.  Of note patient had a Gr placed on 0 24 and replaced on 2024 and follows with urologist Dr. Hoang who he last saw approximately 1 week ago and has a follow up appointment scheduled for tomorrow. States at 2:30 PM today and noted some blood in his urine and has not drained anything through the Gr since that time. States he is having mild suprapubic discomfort denies fevers, chills, rigors.    Attendin-year-old male presents with inability to pass urine through his Gr.  He thinks there is a clot that stuck in the tube itself and the Gr is not draining urine.  He has no nausea or vomiting.  No fevers or chills.  He has an appointment tomorrow with his urologist in the morning.

## 2024-04-08 NOTE — ED ADULT TRIAGE NOTE - CHIEF COMPLAINT QUOTE
Patient presents to ED with complaints of urinary retention. Patient reports last full stream of urine was about 230pm, blood noted in urine. Patient presents to ED with bowers placed on 4/3/24. Patient appears uncomfortable during assessment, patient pacing back and forth in triage. Patient able to speak in clear sentences, respirations equal and unlabored on room air. PMHx: prostate cancer not on any current chemo or radiation, HTN, [place]olai

## 2024-04-08 NOTE — CONSULT NOTE ADULT - SUBJECTIVE AND OBJECTIVE BOX
Urology Consult    74 Y/O M w/ PMH of Prostate CA s/p Radiation Therapy (2018) and Prostatectomy, HTN, Aortic Stenosis and Sick sinus syndrome s/p pacemaker presents to ER for urinary retention. Seen last week for same complaint on 4/3 Gr placed by urology team discharged following negative work up. Presents today for same complaint. States around noon experienced difficulty w/ urination. Associated abdominal pain. Notes a dark colored urine starting today. Last emptied bag prior to onset of symptoms. Not on AC. Denies fever, chills, nausea/vomiting, dizziness, headache, chest pain, shortness of breath. During exam writer able to drain 250 CC of dark grace colored urine to drainage bag.       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  HTN (hypertension)  Aortic stenosis  Rectal bleeding  last hospitalization 10/6/20 2/2 radiation proctitis  [place] (hard of hearing)  H/O prostatectomy 2015  S/P T&A (status post tonsillectomy and adenoidectomy) child    FAMILY HISTORY:  FH: cancer (Father, Mother)    SOCIAL HISTORY:   Tobacco hx:    MEDICATIONS  (STANDING):    MEDICATIONS  (PRN):    Allergies    penicillin V potassium (Rash)  penicillin (Diarrhea; Pruritus; Hives)    Intolerances    codeine (Nausea)    REVIEW OF SYSTEMS: Pertinent positives and negatives as stated in HPI, otherwise negative    Vital signs  T(C): 36.7 (04-08-24 @ 19:52), Max: 36.7 (04-08-24 @ 19:52)  HR: 92 (04-08-24 @ 19:52)  BP: 143/77 (04-08-24 @ 19:52)  SpO2: 100% (04-08-24 @ 19:52)  Wt(kg): --    Output    Vital signs reviewed.   CONSTITUTIONAL: Well-appearing; well-nourished; in no apparent distress. Non-toxic appearing.   HEAD: Normocephalic, atraumatic.  EYES: Normal conjunctiva and no sclera injection noted  ENT: Normal nose; no rhinorrhea.  CARD: Normal S1, S2  RESP: Normal chest excursion with respiration; breath sounds clear and equal bilaterally.  ABD/GI: soft, non-distended; non-tender  EXT/MS: moves all extremities; distal pulses are normal, no pedal edema.  SKIN: Normal for age and race; warm; dry; good turgor; no apparent lesions or exudate noted.  NEURO: Awake, alert, oriented x 3.  PSYCH: Normal mood; appropriate affect.    LABS:      Urine Cx:   Blood Cx:    RADIOLOGY:

## 2024-04-08 NOTE — ED ADULT NURSE NOTE - CHIEF COMPLAINT QUOTE
Patient presents to ED with complaints of urinary retention. Patient reports last full stream of urine was about 230pm, blood noted in urine. Patient presents to ED with bowers placed on 4/3/24. Patient appears uncomfortable during assessment, patient pacing back and forth in triage. Patient able to speak in clear sentences, respirations equal and unlabored on room air. PMHx: prostate cancer not on any current chemo or radiation, HTN, [place]

## 2024-04-08 NOTE — ED PROVIDER NOTE - CLINICAL SUMMARY MEDICAL DECISION MAKING FREE TEXT BOX
Patient is a 73-year-old male past medical history prostate cancer (status post radical prostatectomy and radiation), sick sinus syndrome (status post pacemaker), hypertension presenting to the ED with urinary retention.  Of note patient had a Gr placed on 0 04/1/24 and replaced on 04/03/2024 and follows with urologist Dr. Hoang who he last saw approximately 1 week ago and has a follow up appointment scheduled for tomorrow. States at 2:30 PM today and noted some blood in his urine and has not drained anything through the Gr since that time. States he is having mild suprapubic discomfort denies fevers, chills, rigors. Recently finished abx for UTI.   VSS, afebrile  On exam patient uncomfortable appearing with Gr in place.   DDx- Gr catheter not draining likely iso clot as patient has had history of the same. Will attempt irrigation, Urology consult as patient follows with Dr. Hoang.

## 2024-04-08 NOTE — ED PROVIDER NOTE - PATIENT PORTAL LINK FT
You can access the FollowMyHealth Patient Portal offered by [facility] by registering at the following website: [email or web address]. By joining [employer]’s FollowMyHealth portal, you will also be able to view your health information using other applications (apps) compatible with our system.

## 2024-04-08 NOTE — ED PROVIDER NOTE - PROGRESS NOTE DETAILS
Vlad Jalloh, DO (PGY-1) Gr irrigated and now draining. Will observe patient to ensure continued urine output and have him follow up with his urologist as scheduled tomorrow. Vlad Jalloh,  (PGY-1) Urology stating patient safe for DC.

## 2024-04-08 NOTE — ED ADULT NURSE NOTE - BIRTH SEX
Reviewed and I assume patient was offered urgent care visit as well.  It sounds like patient is already doing multiple therapies including medication-Flexeril, pain management evaluation-with Dr. Calderón, and physical therapy and she has not had any improvement.  I believe Dr. Calderón also ordered a MRI of the cervical spine on November 13, 2023 and I do not think patient did the MRI because I could not find results. If the physical therapist thinks there is a tear then she will need to see orthopedic specialist.  Referral placed.  In the meantime I can give her a good dose of steroids. I also recommend follow up with Dr. Calderón as well.  Patient also has allergy to morphine so Norco and Tramadol can not be given. Patient also has allergy to Celebrex and therefore Ketoralac can not be given.   Male

## 2024-04-08 NOTE — ED PROVIDER NOTE - NSICDXPASTMEDICALHX_GEN_ALL_CORE_FT
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

## 2024-04-08 NOTE — ED ADULT NURSE NOTE - OBJECTIVE STATEMENT
Patient received into my care this is a 72 y/o male complaining of urinary retention since this afternoon. Alert and oriented x 4. Past medical history of hematuria and urinary retention, HTN, pacemaker and valve replacement. Denies chest pain, no sob, appears uncomfortable, seen by ER resident. Family at bedside.

## 2024-04-08 NOTE — ED ADULT NURSE NOTE - NSFALLUNIVINTERV_ED_ALL_ED
Bed/Stretcher in lowest position, wheels locked, appropriate side rails in place/Call bell, personal items and telephone in reach/Instruct patient to call for assistance before getting out of bed/chair/stretcher/Non-slip footwear applied when patient is off stretcher/[place] to call system/Physically safe environment - no spills, clutter or unnecessary equipment/Purposeful proactive rounding/Room/bathroom lighting operational, light cord in reach

## 2024-04-08 NOTE — ED PROVIDER NOTE - PHYSICAL EXAMINATION
GENERAL: Uncomfortable appearing  HEENT:  Atraumatic  CHEST/LUNG: Chest rise equal bilaterally, cta no w/r/r  HEART: Regular rate and rhythm  ABDOMEN: Soft, Nontender, Nondistended  EXTREMITIES:  Extremities warm  PSYCH: A&Ox3  SKIN: No obvious rashes or lesions  : Gr catheter with leg bag in place. Leg bag is empty.   MSK: No cervical spine TTP, able to range neck to the left and right/ No midline spinal TTP/ No swelling, redness, pain or discharge from   NEUROLOGY: strength and sensation intact in all extremities. CN 2 - 12 intact. Finger to nose test intact. No pronator drift. Ambulatory without difficulty.

## 2024-04-09 ENCOUNTER — OUTPATIENT (OUTPATIENT)
Dept: OUTPATIENT SERVICES | Facility: HOSPITAL | Age: 74
LOS: 1 days | End: 2024-04-09
Payer: MEDICARE

## 2024-04-09 ENCOUNTER — APPOINTMENT (OUTPATIENT)
Dept: UROLOGY | Facility: CLINIC | Age: 74
End: 2024-04-09
Payer: MEDICARE

## 2024-04-09 ENCOUNTER — NON-APPOINTMENT (OUTPATIENT)
Age: 74
End: 2024-04-09

## 2024-04-09 VITALS
RESPIRATION RATE: 16 BRPM | HEART RATE: 97 BPM | OXYGEN SATURATION: 99 % | SYSTOLIC BLOOD PRESSURE: 160 MMHG | DIASTOLIC BLOOD PRESSURE: 75 MMHG

## 2024-04-09 DIAGNOSIS — Z90.89 ACQUIRED ABSENCE OF OTHER ORGANS: Chronic | ICD-10-CM

## 2024-04-09 DIAGNOSIS — R35.0 FREQUENCY OF MICTURITION: ICD-10-CM

## 2024-04-09 DIAGNOSIS — Z90.79 ACQUIRED ABSENCE OF OTHER GENITAL ORGAN(S): Chronic | ICD-10-CM

## 2024-04-09 PROCEDURE — 99214 OFFICE O/P EST MOD 30 MIN: CPT | Mod: 25

## 2024-04-09 PROCEDURE — 51700 IRRIGATION OF BLADDER: CPT

## 2024-04-09 PROCEDURE — 51798 US URINE CAPACITY MEASURE: CPT

## 2024-04-09 NOTE — ASSESSMENT
[FreeTextEntry1] : I remove the pre-existing catheter and performed irrigation of the bladder with a Ward catheter with removal of multiple clots.  Once that was completed the color was clear.  The color of the clot send the urine prior to irrigation appear to be consistent with old bloody material and no fresh bleeding.  He was left without the catheter and asked to hydrate which he did but was unable to void and started to become uncomfortable with about 120 cc seen in the bladder with bladder scan.  I replaced the catheter, 18 [place] coude and left up to straight drainage.  He tolerated that well.  I think there may be some element of edema here associated with the recent bleeding and retention and multiple catheterizations that have impeded his urination today.  He is typically incontinent at baseline and does not have any known stricture disease and there was no difficulty in passing the catheter.  I will have him return to the office next week for cystoscopy to assess the bladder and give him a repeat voiding trial.  As he may need additional hyperbaric oxygen therapy, the cystoscopy will be important to help rule out any potential malignant features within the bladder.

## 2024-04-09 NOTE — HISTORY OF PRESENT ILLNESS
[FreeTextEntry1] : Salvador Sy returns to the office today.  He is 73 years old with history of prostate cancer, now metastatic.  History includes both radical prostatectomy and radiation therapy.  He has had intermittent issues with gross hematuria and previously undergone hyperbaric oxygen treatment.  He had been doing well until relatively recently when he had developed recurrent hematuria and urinary retention.  I had seen him in the office last week and he was initially successful and voiding post voiding trial and catheter removal but developed recurrent retention and hematuria and now has a catheter back in place.  The catheter had become clogged last night and he went to the ER for irrigation and it did start to drain.  He is now here for follow-up.  The patient feels somewhat uncomfortable but did have some clots passed and is now feeling a bit better.  He denies any fevers or chills.  No nausea or vomiting.  He has no abdominal complaints otherwise and denies constipation.

## 2024-04-10 DIAGNOSIS — R31.0 GROSS HEMATURIA: ICD-10-CM

## 2024-04-10 DIAGNOSIS — R33.8 OTHER RETENTION OF URINE: ICD-10-CM

## 2024-04-10 DIAGNOSIS — N30.40 IRRADIATION CYSTITIS WITHOUT HEMATURIA: ICD-10-CM

## 2024-04-13 ENCOUNTER — EMERGENCY (EMERGENCY)
Facility: HOSPITAL | Age: 74
LOS: 1 days | Discharge: HOME | End: 2024-04-13
Attending: STUDENT IN AN ORGANIZED HEALTH CARE EDUCATION/TRAINING PROGRAM | Admitting: STUDENT IN AN ORGANIZED HEALTH CARE EDUCATION/TRAINING PROGRAM
Payer: MEDICARE

## 2024-04-13 VITALS
TEMPERATURE: 98 F | OXYGEN SATURATION: 99 % | HEART RATE: 104 BPM | SYSTOLIC BLOOD PRESSURE: 166 MMHG | RESPIRATION RATE: 19 BRPM | DIASTOLIC BLOOD PRESSURE: 87 MMHG | HEIGHT: 70 IN

## 2024-04-13 VITALS
SYSTOLIC BLOOD PRESSURE: 154 MMHG | TEMPERATURE: 98 F | OXYGEN SATURATION: 97 % | DIASTOLIC BLOOD PRESSURE: 81 MMHG | HEART RATE: 75 BPM | RESPIRATION RATE: 16 BRPM

## 2024-04-13 DIAGNOSIS — Z90.79 ACQUIRED ABSENCE OF OTHER GENITAL ORGAN(S): Chronic | ICD-10-CM

## 2024-04-13 DIAGNOSIS — Z90.89 ACQUIRED ABSENCE OF OTHER ORGANS: Chronic | ICD-10-CM

## 2024-04-13 PROCEDURE — 99283 EMERGENCY DEPT VISIT LOW MDM: CPT

## 2024-04-13 NOTE — ED PROVIDER NOTE - CLINICAL SUMMARY MEDICAL DECISION MAKING FREE TEXT BOX
Kraig Arriola, DO PGY-3: 73-year-old male past medical history of prostate CA status post radical prostatectomy and radiation, hypertension, PPM for sick sinus syndrome presents the ED complaining of hematuria and decreased Gr output.  Patient's had multiple ED visits in the last week for similar issue.  Patient had clear urine up until earlier today he noticed clots and then decreased output.  Denies fever, nausea, vomiting, chest pain, shortness of breath, diarrhea, abdominal pain. Patient with dark red blood in Gr bag, POCUS showing urine in bladder, no obvious clots, no abdominal tenderness, hemodynamically stable.  Plan to flush Gr and observe.  No indication for need for CBC to evaluate for hemoglobin as patient does not have significant bleeding and is not on AC.

## 2024-04-13 NOTE — ED PROVIDER NOTE - PROGRESS NOTE DETAILS
Kraig Arriola, DO PGY-3: Nurse successfully flushed large clots out of the Gr, patient's Gr draining fruit punch colored urine, repeat ultrasound with a collapsed bladder around the Gr balloon.  Will continue to monitor patient's Gr output.  No indication for as needed for hemoglobin check at this time. Kraig Arrioal, DO PGY-3: Patient's Gr continuing to drain fruit punch colored translucent urine, no clots.  Repeat ultrasound showing collapsed bladder around Gr balloon.  Patient well-appearing and feels better.  Patient has appointment to see urology this week.  Will discharge patient.

## 2024-04-13 NOTE — ED ADULT NURSE NOTE - CHIEF COMPLAINT QUOTE
Pt arrives ambulatory to triage, states that his bowers catheter hasn't had any output for last few hours. Noted blood in urine earlier this AM before output stopped. PMHx: HTN, prostate cancer, prostatectomy, AS, [place].

## 2024-04-13 NOTE — ED PROVIDER NOTE - NSICDXPASTMEDICALHX_GEN_ALL_CORE_FT
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

## 2024-04-13 NOTE — ED PROVIDER NOTE - ATTENDING CONTRIBUTION TO CARE
74 yo M hx prostate cancer s/p radical prostatectomy and radiation therapy, HTN, SSS s/p PPM, presenting for evaluation of hematuria/clots from bowers catheter. Denies trauma. No abdominal pain, flank/back pain, fevers/chills.     VITALS: reviewed  GEN: NAD, A & O x 4  HEAD/EYES: NCAT, EOMI, anicteric sclerae,   ENT: mucus membranes moist, oropharynx WNL, trachea midline,  RESP: lungs CTA with equal breath sounds bilaterally, chest wall nontender and atraumatic  CV: heart with reg rhythm S1, S2, distal pulses intact and symmetric bilaterally  ABDOMEN: normoactive bowel sounds, soft, nondistended, nontender, no palpable masses  : no CVAT  MSK: extremities atraumatic and nontender, no edema, no asymmetry.  SKIN: warm, dry, no rash, no bruising, no cyanosis. color appropriate for ethnicity  NEURO: alert, mentating appropriately, no facial asymmetry.   PSYCH: Affect appropriate    Bowers bag with gross hematuria. Will flush bowers and reassess. Likely dc home with recs for urology follow up.

## 2024-04-13 NOTE — ED PROVIDER NOTE - NSFOLLOWUPINSTRUCTIONS_ED_ALL_ED_FT
1.  Please follow-up with your urologist Dr. Hoang this week as scheduled.  2.  Please keep yourself well-hydrated.  3.  Please return to the ER if develop worsening pain, fever, blood clots in your Gr bag, decreased output in the Gr bag or anything of concern.

## 2024-04-13 NOTE — ED PROVIDER NOTE - PATIENT PORTAL LINK FT
You can access the FollowMyHealth Patient Portal offered by [facility] by registering at the following website: [email or web address]. By joining [employer]’s FollowMyHealth portal, you will also be able to view your health information using other applications (apps) compatible with our system.

## 2024-04-13 NOTE — ED PROVIDER NOTE - PHYSICAL EXAMINATION
GEN: Patient awake alert NAD.   HEENT: normocephalic, atraumatic, moist MM  CARDIAC: RRR, S1, S2, no murmur.   PULM: CTA B/L no wheeze, rhonchi, rales.   ABD: soft NT, ND, no rebound no guarding  MSK: Moving all extremities, no edema.    NEURO: A&Ox3, no focal neurological deficits  SKIN: warm, dry, no rash.

## 2024-04-13 NOTE — ED ADULT TRIAGE NOTE - CHIEF COMPLAINT QUOTE
Pt arrives ambulatory to triage, states that his bowers catheter hasn't had any output for last few hours. Noted blood in urine earlier this AM before output stopped. PMHx: HTN, prostate cancer, prostatectomy, AS, [place].

## 2024-04-13 NOTE — ED ADULT NURSE NOTE - NSICDXPASTMEDICALHX_GEN_ALL_CORE_FT
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

## 2024-04-13 NOTE — ED PROVIDER NOTE - OBJECTIVE STATEMENT
73-year-old male past medical history of prostate CA status post radical prostatectomy and radiation, hypertension, PPM for sick sinus syndrome presents the ED complaining of hematuria and decreased Gr output.  Patient's had multiple ED visits in the last week for similar issue.  Patient had clear urine up until earlier today he noticed clots and then decreased output.  Denies fever, nausea, vomiting, chest pain, shortness of breath, diarrhea, abdominal pain.

## 2024-04-15 ENCOUNTER — NON-APPOINTMENT (OUTPATIENT)
Age: 74
End: 2024-04-15

## 2024-04-16 ENCOUNTER — OUTPATIENT (OUTPATIENT)
Dept: OUTPATIENT SERVICES | Facility: HOSPITAL | Age: 74
LOS: 1 days | End: 2024-04-16
Payer: MEDICARE

## 2024-04-16 ENCOUNTER — APPOINTMENT (OUTPATIENT)
Dept: UROLOGY | Facility: CLINIC | Age: 74
End: 2024-04-16
Payer: MEDICARE

## 2024-04-16 VITALS
WEIGHT: 200 LBS | SYSTOLIC BLOOD PRESSURE: 145 MMHG | HEART RATE: 81 BPM | BODY MASS INDEX: 28.7 KG/M2 | DIASTOLIC BLOOD PRESSURE: 77 MMHG

## 2024-04-16 DIAGNOSIS — C61 MALIGNANT NEOPLASM OF PROSTATE: ICD-10-CM

## 2024-04-16 DIAGNOSIS — Z90.79 ACQUIRED ABSENCE OF OTHER GENITAL ORGAN(S): Chronic | ICD-10-CM

## 2024-04-16 DIAGNOSIS — R35.0 FREQUENCY OF MICTURITION: ICD-10-CM

## 2024-04-16 DIAGNOSIS — K62.7 RADIATION PROCTITIS: ICD-10-CM

## 2024-04-16 DIAGNOSIS — Z90.89 ACQUIRED ABSENCE OF OTHER ORGANS: Chronic | ICD-10-CM

## 2024-04-16 PROCEDURE — 52000 CYSTOURETHROSCOPY: CPT

## 2024-04-16 PROCEDURE — 99215 OFFICE O/P EST HI 40 MIN: CPT | Mod: 25

## 2024-04-20 PROBLEM — K62.7 RADIATION PROCTITIS: Status: ACTIVE | Noted: 2020-08-11

## 2024-04-20 PROBLEM — C61: Status: ACTIVE | Noted: 2023-05-02

## 2024-04-20 NOTE — LETTER BODY
[Dear  ___] : Dear  [unfilled], [Courtesy Letter:] : I had the pleasure of seeing your patient, [unfilled], in my office today. [Please see my note below.] : Please see my note below. [FreeTextEntry2] : Hesham Duque,  [address] [place] [id number] [FreeTextEntry3] : Sincerely,      Bogdan Hoang MD, FACS Director of Urology Services, [facility] Chief of Urology, [facility]  of Urology   [facility], [place] [id number] P: [phone] F: [phone] [facility]

## 2024-04-20 NOTE — HISTORY OF PRESENT ILLNESS
[FreeTextEntry1] : Salvador Sy returns to the office today.  He is 73 years old and has metastatic prostate cancer.  He has undergone prior treatment with both radical prostatectomy and radiation therapy to treat what at the time was believed to be clinically localized disease.  I had met him in the hospital setting for the first time after he had undergone these prior treatments and he had developed very significant hematuria requiring transfusion.  Ultimately, the hematuria was cleared with bladder irrigation including alum and he subsequently was treated with hyperbaric oxygen which made significant progress in helping to reduce his gross hematuria and he had been doing well for some time.  However recently he has developed recurrent issues of gross hematuria and also urinary retention.  The urinary retention was initially thought to be related to blood clots causing outlet obstruction as he is incontinent of urine at baseline.  However, despite recent clearance of the blood clots, he has continued to experience some issues of retention and has failed multiple voiding trials even when no clots were evident.  I have recommended a cystoscopy to reassess for any evidence of other causes of outlet obstruction that might be contributing to the retention and also to ensure that there were no areas that could be treated with any surface cautery of the bladder contributing to the hematuria.  He is here today for these purposes.

## 2024-04-20 NOTE — ASSESSMENT
[FreeTextEntry1] : I performed a cystoscopy today.  It shows some necrotic mucosal surface within the proximal urethra and at the bladder neck.  Inspection of the bladder did not show any evidence of tumor disease but there was clear evidence of telangiectasia with thin mucosa but no active bleeding surrounding the bladder outlet near the bladder neck.  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  There were no clots today visualized.  I left him with some fluid in the bladder and instructed him to drink after the procedure.  Unfortunately he was unable to void for several hours and became uncomfortable.  The catheter was subsequently replaced with drainage of clear yellow urine and relief of the pressure.  The cause of his continued retention at this point does not appear to be related to clots or outlet obstruction.  He may have developed some issues related to detrusor contractility from the recent episodes of retention.  Further time with a catheter in place could potentially be helpful to regain some of his bladder contractility although I think this would likely be a slow process.  I offered him the option of a suprapubic catheter as an alternative method of decompressing the bladder and also allowing him to intermittently make attempts to void on his own at home and continuously reassess the potential for bladder return of function.  He says he will think about this and let me know what he decides but for now he will have the indwelling Gr catheter to drain the urine.  If he does develop any additional issues of recurrent hematuria, I may refer him back to hyperbaric oxygen therapy given the findings today on cystoscopy.  His prostate cancer treatment is being let at this time by his medical oncologist and he will continue to follow-up for this care.

## 2024-04-21 ENCOUNTER — EMERGENCY (EMERGENCY)
Facility: HOSPITAL | Age: 74
LOS: 1 days | Discharge: HOME | End: 2024-04-21
Attending: EMERGENCY MEDICINE | Admitting: EMERGENCY MEDICINE
Payer: MEDICARE

## 2024-04-21 ENCOUNTER — EMERGENCY (EMERGENCY)
Facility: HOSPITAL | Age: 74
LOS: 1 days | Discharge: HOME | End: 2024-04-21
Attending: STUDENT IN AN ORGANIZED HEALTH CARE EDUCATION/TRAINING PROGRAM | Admitting: STUDENT IN AN ORGANIZED HEALTH CARE EDUCATION/TRAINING PROGRAM
Payer: MEDICARE

## 2024-04-21 VITALS
DIASTOLIC BLOOD PRESSURE: 82 MMHG | RESPIRATION RATE: 19 BRPM | HEART RATE: 79 BPM | SYSTOLIC BLOOD PRESSURE: 161 MMHG | TEMPERATURE: 98 F | OXYGEN SATURATION: 98 %

## 2024-04-21 VITALS
SYSTOLIC BLOOD PRESSURE: 138 MMHG | DIASTOLIC BLOOD PRESSURE: 118 MMHG | OXYGEN SATURATION: 97 % | TEMPERATURE: 98 F | HEART RATE: 98 BPM | RESPIRATION RATE: 16 BRPM | HEIGHT: 70 IN | WEIGHT: 199.96 LBS

## 2024-04-21 VITALS
HEART RATE: 92 BPM | DIASTOLIC BLOOD PRESSURE: 80 MMHG | TEMPERATURE: 98 F | HEIGHT: 70 IN | SYSTOLIC BLOOD PRESSURE: 178 MMHG | RESPIRATION RATE: 16 BRPM | OXYGEN SATURATION: 99 %

## 2024-04-21 DIAGNOSIS — Z90.89 ACQUIRED ABSENCE OF OTHER ORGANS: Chronic | ICD-10-CM

## 2024-04-21 DIAGNOSIS — Z90.79 ACQUIRED ABSENCE OF OTHER GENITAL ORGAN(S): Chronic | ICD-10-CM

## 2024-04-21 LAB
ALBUMIN SERPL ELPH-MCNC: 3.7 G/DL — SIGNIFICANT CHANGE UP (ref 3.3–5)
ALP SERPL-CCNC: 93 U/L — SIGNIFICANT CHANGE UP (ref 40–120)
ALT FLD-CCNC: 8 U/L — SIGNIFICANT CHANGE UP (ref 4–41)
ANION GAP SERPL CALC-SCNC: 14 MMOL/L — SIGNIFICANT CHANGE UP (ref 7–14)
APPEARANCE UR: ABNORMAL
AST SERPL-CCNC: 16 U/L — SIGNIFICANT CHANGE UP (ref 4–40)
BACTERIA # UR AUTO: ABNORMAL /HPF
BASOPHILS # BLD AUTO: 0.09 K/UL — SIGNIFICANT CHANGE UP (ref 0–0.2)
BASOPHILS NFR BLD AUTO: 1 % — SIGNIFICANT CHANGE UP (ref 0–2)
BILIRUB SERPL-MCNC: 0.2 MG/DL — SIGNIFICANT CHANGE UP (ref 0.2–1.2)
BILIRUB UR-MCNC: ABNORMAL
BUN SERPL-MCNC: 21 MG/DL — SIGNIFICANT CHANGE UP (ref 7–23)
CALCIUM SERPL-MCNC: 8.8 MG/DL — SIGNIFICANT CHANGE UP (ref 8.4–10.5)
CAST: 4 /LPF — SIGNIFICANT CHANGE UP (ref 0–4)
CHLORIDE SERPL-SCNC: 104 MMOL/L — SIGNIFICANT CHANGE UP (ref 98–107)
CO2 SERPL-SCNC: 20 MMOL/L — LOW (ref 22–31)
COLOR SPEC: ABNORMAL
CREAT SERPL-MCNC: 0.78 MG/DL — SIGNIFICANT CHANGE UP (ref 0.5–1.3)
DIFF PNL FLD: ABNORMAL
EGFR: 93 ML/MIN/1.73M2 — SIGNIFICANT CHANGE UP
EOSINOPHIL # BLD AUTO: 0.32 K/UL — SIGNIFICANT CHANGE UP (ref 0–0.5)
EOSINOPHIL NFR BLD AUTO: 3.5 % — SIGNIFICANT CHANGE UP (ref 0–6)
GLUCOSE SERPL-MCNC: 101 MG/DL — HIGH (ref 70–99)
GLUCOSE UR QL: NEGATIVE MG/DL — SIGNIFICANT CHANGE UP
HCT VFR BLD CALC: 34.1 % — LOW (ref 39–50)
HGB BLD-MCNC: 11.3 G/DL — LOW (ref 13–17)
IANC: 6.23 K/UL — SIGNIFICANT CHANGE UP (ref 1.8–7.4)
IMM GRANULOCYTES NFR BLD AUTO: 0.3 % — SIGNIFICANT CHANGE UP (ref 0–0.9)
KETONES UR-MCNC: NEGATIVE MG/DL — SIGNIFICANT CHANGE UP
LEUKOCYTE ESTERASE UR-ACNC: ABNORMAL
LYMPHOCYTES # BLD AUTO: 1.27 K/UL — SIGNIFICANT CHANGE UP (ref 1–3.3)
LYMPHOCYTES # BLD AUTO: 14 % — SIGNIFICANT CHANGE UP (ref 13–44)
MCHC RBC-ENTMCNC: 31 PG — SIGNIFICANT CHANGE UP (ref 27–34)
MCHC RBC-ENTMCNC: 33.1 GM/DL — SIGNIFICANT CHANGE UP (ref 32–36)
MCV RBC AUTO: 93.4 FL — SIGNIFICANT CHANGE UP (ref 80–100)
MONOCYTES # BLD AUTO: 1.13 K/UL — HIGH (ref 0–0.9)
MONOCYTES NFR BLD AUTO: 12.5 % — SIGNIFICANT CHANGE UP (ref 2–14)
NEUTROPHILS # BLD AUTO: 6.23 K/UL — SIGNIFICANT CHANGE UP (ref 1.8–7.4)
NEUTROPHILS NFR BLD AUTO: 68.7 % — SIGNIFICANT CHANGE UP (ref 43–77)
NITRITE UR-MCNC: POSITIVE
NRBC # BLD: 0 /100 WBCS — SIGNIFICANT CHANGE UP (ref 0–0)
NRBC # FLD: 0 K/UL — SIGNIFICANT CHANGE UP (ref 0–0)
PH UR: 5 — SIGNIFICANT CHANGE UP (ref 5–8)
PLATELET # BLD AUTO: 234 K/UL — SIGNIFICANT CHANGE UP (ref 150–400)
POTASSIUM SERPL-MCNC: 4.3 MMOL/L — SIGNIFICANT CHANGE UP (ref 3.5–5.3)
POTASSIUM SERPL-SCNC: 4.3 MMOL/L — SIGNIFICANT CHANGE UP (ref 3.5–5.3)
PROT SERPL-MCNC: 6.4 G/DL — SIGNIFICANT CHANGE UP (ref 6–8.3)
PROT UR-MCNC: 30 MG/DL
RBC # BLD: 3.65 M/UL — LOW (ref 4.2–5.8)
RBC # FLD: 12.3 % — SIGNIFICANT CHANGE UP (ref 10.3–14.5)
RBC CASTS # UR COMP ASSIST: 6962 /HPF — HIGH (ref 0–4)
REVIEW: SIGNIFICANT CHANGE UP
SODIUM SERPL-SCNC: 138 MMOL/L — SIGNIFICANT CHANGE UP (ref 135–145)
SP GR SPEC: 1.02 — SIGNIFICANT CHANGE UP (ref 1–1.03)
SQUAMOUS # UR AUTO: 1 /HPF — SIGNIFICANT CHANGE UP (ref 0–5)
UROBILINOGEN FLD QL: 0.2 MG/DL — SIGNIFICANT CHANGE UP (ref 0.2–1)
WBC # BLD: 9.07 K/UL — SIGNIFICANT CHANGE UP (ref 3.8–10.5)
WBC # FLD AUTO: 9.07 K/UL — SIGNIFICANT CHANGE UP (ref 3.8–10.5)
WBC UR QL: 82 /HPF — HIGH (ref 0–5)

## 2024-04-21 PROCEDURE — 99284 EMERGENCY DEPT VISIT MOD MDM: CPT

## 2024-04-21 PROCEDURE — 99285 EMERGENCY DEPT VISIT HI MDM: CPT

## 2024-04-21 RX ORDER — CEFPODOXIME PROXETIL 100 MG
1 TABLET ORAL
Qty: 14 | Refills: 0
Start: 2024-04-21 | End: 2024-04-27

## 2024-04-21 RX ORDER — CEFPODOXIME PROXETIL 100 MG
100 TABLET ORAL EVERY 12 HOURS
Refills: 0 | Status: DISCONTINUED | OUTPATIENT
Start: 2024-04-21 | End: 2024-04-25

## 2024-04-21 NOTE — ED PROVIDER NOTE - NSFOLLOWUPINSTRUCTIONS_ED_ALL_ED_FT
You were evaluated in the emergency department for an issue with your Gr catheter.  Your Gr was irrigated and flushed and is functioning at this time.  Please follow-up with your urologist. Your lab results were discussed with you and are attached.

## 2024-04-21 NOTE — ED PROVIDER NOTE - PROGRESS NOTE DETAILS
Bobo Quintero MD PGY-2: Nurse successfully irrigated bowers which is draining blood tinged urine. Will check h/h. Plan for DC. Bobo Quintero MD PGY-2: Hgb stable, will DC. Bowers functioning. Bedside POCUS shows bowers in bladder no retention.

## 2024-04-21 NOTE — ED PROVIDER NOTE - PATIENT PORTAL LINK FT
You can access the FollowMyHealth Patient Portal offered by [facility] by registering at the following website: [email or web address]. By joining [employer]’s FollowMyHealth portal, you will also be able to view your health information using other applications (apps) compatible with our system.

## 2024-04-21 NOTE — ED PROVIDER NOTE - OBJECTIVE STATEMENT
73-year-old male with PMH prostate cancer status post radiation with Gr presents with Gr not draining since 11:30 PM.  Patient was seen in the emergency department for similar symptoms in the past and was resolved with flushing his Gr catheter and irrigating.  Patient saw his urologist Dr. Hoang on Tuesday and had a cystoscopy.  Urologist had patient perform a trial of void that failed and then placed the Gr catheter again on Tuesday. came to ED this morning w suprapubic pain and difficulty draining into bag, had catheter flushed and was discharged. patient now returning to ed w increased suprapubic pain, less output and clots. patient not on any anticoagulation. denies fevers, chills, n/v, diarrhea.

## 2024-04-21 NOTE — ED ADULT NURSE NOTE - NSFALLUNIVINTERV_ED_ALL_ED
Bed/Stretcher in lowest position, wheels locked, appropriate side rails in place/Call bell, personal items and telephone in reach/Instruct patient to call for assistance before getting out of bed/chair/stretcher/Non-slip footwear applied when patient is off stretcher/[place] to call system/Physically safe environment - no spills, clutter or unnecessary equipment/Purposeful proactive rounding/Room/bathroom lighting operational, light cord in reach

## 2024-04-21 NOTE — ED PROVIDER NOTE - ATTENDING CONTRIBUTION TO CARE
Agree with resident note  73-year-old male with past medical history of prostate cancer status post radiation presents with Gr malfunction.  Patient states Gr has not been draining since 11:30 PM.  Patient had recent cystoscopy by Dr. Hoang and failed his trial of void.  States in the past his Gr catheter has resumed working after being flushed.  Physical exam  Prior to me seeing patient Gr was flushed and now draining  Patient is well-appearing in no distress  Vital signs stable  Clear to auscultation bilaterally  S1-S2 no murmurs rubs or gallops  Abdomen soft nontender nondistended   Gr bag with hematuria and small clots, mixed with urine  Impression  Explained to patient did not change Gr, high risk for retention again given multiple clots, patient has follow-up with urologist and would like to be discharged

## 2024-04-21 NOTE — ED PROVIDER NOTE - NSFOLLOWUPINSTRUCTIONS_ED_ALL_ED_FT
Please follow up with your Urologist this week to monitor your progress   Complete full course of Cefpodoxime as prescribed  Return to ED if you experience recurrent symptoms, difficulty urinating, abdominal discomfort, blood in urine, fevers, or anything else concerning to you.

## 2024-04-21 NOTE — ED PROVIDER NOTE - ATTENDING CONTRIBUTION TO CARE
73M h/o prostate cancer s/p radiation with bowers present after cystoscopy performed last week p/w suprapubic discomfort and decreased urinary output. Seen in ED for similar last night/this AM with symptom resolution after flushing bowers. Also notes urine was less red-tinged last night at time of discharge earlier this morning compared to now. POCUS performed shwoing bowers balloon in place but notable for blood in bladder. Bowers flushed with return of clots. Willl check UA/UC and have Urology assess for possible CBI

## 2024-04-21 NOTE — ED ADULT NURSE NOTE - CHIEF COMPLAINT QUOTE
c/o urinary retention x 3 hours associated with hematuria, passing of clots & dysuria. bowers in place. seen in ED for similar complaints multiple times this month. denies chest pain, SOB, nausea, vomiting, fevers/chills, diarrhea. past medical Hx prostate CA on oral chemo, HTN, B/L [place]. unable to sit still. appears very uncomfortable. Charge RN made aware.

## 2024-04-21 NOTE — ED ADULT TRIAGE NOTE - CHIEF COMPLAINT QUOTE
c/o urinary retention x 3 hours associated with hematuria, passing of clots & dysuria. bowers in place. seen in ED for similar complaints multiple times this month. denies chest pain, SOB, nausea, vomiting, fevers/chills, diarrhea. past medical Hx prostate CA on oral chemo, HTN, B/L [place]. unable to sit still. appears very uncomfortable. Charge RN made aware.

## 2024-04-21 NOTE — ED PROVIDER NOTE - PHYSICAL EXAMINATION
GENERAL: well appearing in no acute distress, non-toxic appearing  HEAD: normocephalic, atraumatic  CARDIAC: regular rate and rhythm, normal S1S2, no appreciable murmurs, 2+ pulses in UE/LE b/l  PULM: normal breath sounds, clear to ascultation bilaterally, no rales, rhonchi, wheezing  GI: abdomen nondistended, soft, nontender, no guarding, rebound tenderness  : no CVA tenderness b/l, +suprapubic tenderness  SKIN: well-perfused, extremities warm, no visible rashes  PSYCH: appropriate mood and affect

## 2024-04-21 NOTE — ED ADULT NURSE NOTE - OBJECTIVE STATEMENT
Pt A+Ox4. Pt states he had a catheter placed 1 week ago by Urologist.  last night he was seen here for urinary retention.  Gr was irrigated and pt discharged home.  Pt states the catheter was working until this afternoon when he noted dark urine output and a decreased volume.  Catheter was irrigated again and many clots were noted.  Pt lungs clear, equal and unlabored, abdomen soft, skin intact.  Granddaughter at bedside.

## 2024-04-21 NOTE — ED PROVIDER NOTE - PROGRESS NOTE DETAILS
Pt seen but urology. Gr upsized and now easily draining clear urine. UA grossly positive with culture sent. Will dc with Cefpodoxime course. Will follow up with his urologist this week.

## 2024-04-21 NOTE — ED PROVIDER NOTE - CLINICAL SUMMARY MEDICAL DECISION MAKING FREE TEXT BOX
73 y hx prostate cancer s/p radiation, here for less output in bowers s/p cystoscopy and bowers placement tuesday, pt not retaining on bedside US, but clots in bag, not on any ac, no fevers,chills, well appearing, non tender abdomen on exam, will consult urology for CBI

## 2024-04-21 NOTE — ED PROVIDER NOTE - CLINICAL SUMMARY MEDICAL DECISION MAKING FREE TEXT BOX
Patient presents with Gr catheter issue.  Exam shows no abdominal tenderness.  Gr was irrigated and flushed with small clots that came out and blood-tinged urine.  Gr now draining.  Bedside POCUS shows Gr balloon in the bladder and no retention.  Will check basics to evaluate H&H and plan to DC with urology follow-up.

## 2024-04-21 NOTE — ED ADULT NURSE NOTE - NSFALLUNIVINTERV_ED_ALL_ED
Bed/Stretcher in lowest position, wheels locked, appropriate side rails in place/Call bell, personal items and telephone in reach/Instruct patient to call for assistance before getting out of bed/chair/stretcher/Non-slip footwear applied when patient is off stretcher/[place] to call system/Physically safe environment - no spills, clutter or unnecessary equipment/Purposeful proactive rounding/Room/bathroom lighting operational, light cord in reach

## 2024-04-21 NOTE — ED PROVIDER NOTE - NSICDXPASTMEDICALHX_GEN_ALL_CORE_FT
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

## 2024-04-21 NOTE — ED PROVIDER NOTE - OBJECTIVE STATEMENT
73-year-old male with PMH prostate cancer status post radiation with Gr presents with Gr not draining since 11:30 PM.  Patient was seen in the emergency department for similar symptoms in the past and was resolved with flushing his Gr catheter and irrigating.  Patient saw his urologist Dr. Hoang on Tuesday and had a cystoscopy.  Urologist had patient perform a trial of void that failed and then placed the Gr catheter again on Tuesday.  Patient denies fevers, back pain, chills, chest pain, shortness of breath.  Denies lightheadedness.  Endorses some urgency symptoms.

## 2024-04-21 NOTE — ED PROVIDER NOTE - PHYSICAL EXAMINATION
Vital Signs Stable  Gen: well appearing, NAD  HEENT: no conjunctivitis  Cardiac: regular rate rhythm, normal S1S2  Chest: CTA BL, no wheeze or crackles  Abdomen: normal BS, soft, NT  : bowers in place with hematuria in bag and small clots seen after irrigation, no CVA tenderness b/l  Extremity: no gross deformity, good perfusion  Skin: no rash  Neuro: grossly normal

## 2024-04-21 NOTE — ED PROVIDER NOTE - PATIENT PORTAL LINK FT
You can access the FollowMyHealth Patient Portal offered by [facility] by registering at the following website: [email or web address]. By joining [employer]’s FollowMyHealth portal, you will also be able to view your health information using other applications (apps) compatible with our system.

## 2024-04-21 NOTE — ED ADULT NURSE NOTE - OBJECTIVE STATEMENT
Pt received on stretcher c/o pelvic pain due to urinary retention, as per pt's son pt's obwers catheter is usually irrigated to remove clots.

## 2024-04-21 NOTE — ED ADULT TRIAGE NOTE - CHIEF COMPLAINT QUOTE
c/o urinary retention, says was here last night at 0300 am for same problem. states has bowers catheter in place. appears uncomfortable. past medical history of prostate Ca, HTN

## 2024-04-22 ENCOUNTER — APPOINTMENT (OUTPATIENT)
Dept: UROLOGY | Facility: CLINIC | Age: 74
End: 2024-04-22
Payer: MEDICARE

## 2024-04-22 ENCOUNTER — OUTPATIENT (OUTPATIENT)
Dept: OUTPATIENT SERVICES | Facility: HOSPITAL | Age: 74
LOS: 1 days | End: 2024-04-22
Payer: MEDICARE

## 2024-04-22 ENCOUNTER — NON-APPOINTMENT (OUTPATIENT)
Age: 74
End: 2024-04-22

## 2024-04-22 ENCOUNTER — INPATIENT (INPATIENT)
Facility: HOSPITAL | Age: 74
LOS: 2 days | Discharge: HOME HEALTH | End: 2024-04-25
Attending: UROLOGY | Admitting: UROLOGY
Payer: MEDICARE

## 2024-04-22 VITALS
WEIGHT: 199.96 LBS | SYSTOLIC BLOOD PRESSURE: 147 MMHG | HEART RATE: 88 BPM | OXYGEN SATURATION: 94 % | TEMPERATURE: 98 F | RESPIRATION RATE: 16 BRPM | HEIGHT: 70 IN | DIASTOLIC BLOOD PRESSURE: 78 MMHG

## 2024-04-22 DIAGNOSIS — Z90.89 ACQUIRED ABSENCE OF OTHER ORGANS: Chronic | ICD-10-CM

## 2024-04-22 DIAGNOSIS — R35.0 FREQUENCY OF MICTURITION: ICD-10-CM

## 2024-04-22 DIAGNOSIS — Z90.79 ACQUIRED ABSENCE OF OTHER GENITAL ORGAN(S): Chronic | ICD-10-CM

## 2024-04-22 DIAGNOSIS — T83.9XXA UNSPECIFIED COMPLICATION OF GENITOURINARY PROSTHETIC DEVICE, IMPLANT AND GRAFT, INITIAL ENCOUNTER: ICD-10-CM

## 2024-04-22 DIAGNOSIS — C61 MALIGNANT NEOPLASM OF PROSTATE: ICD-10-CM

## 2024-04-22 DIAGNOSIS — R33.8 OTHER RETENTION OF URINE: ICD-10-CM

## 2024-04-22 DIAGNOSIS — R31.0 GROSS HEMATURIA: ICD-10-CM

## 2024-04-22 LAB
ALBUMIN SERPL ELPH-MCNC: 3.9 G/DL — SIGNIFICANT CHANGE UP (ref 3.3–5)
ALP SERPL-CCNC: 98 U/L — SIGNIFICANT CHANGE UP (ref 40–120)
ALT FLD-CCNC: 7 U/L — SIGNIFICANT CHANGE UP (ref 4–41)
ANION GAP SERPL CALC-SCNC: 13 MMOL/L — SIGNIFICANT CHANGE UP (ref 7–14)
APPEARANCE UR: ABNORMAL
APTT BLD: 33.1 SEC — SIGNIFICANT CHANGE UP (ref 24.5–35.6)
AST SERPL-CCNC: 14 U/L — SIGNIFICANT CHANGE UP (ref 4–40)
BACTERIA # UR AUTO: ABNORMAL /HPF
BASOPHILS # BLD AUTO: 0.08 K/UL — SIGNIFICANT CHANGE UP (ref 0–0.2)
BASOPHILS NFR BLD AUTO: 0.9 % — SIGNIFICANT CHANGE UP (ref 0–2)
BILIRUB SERPL-MCNC: 0.6 MG/DL — SIGNIFICANT CHANGE UP (ref 0.2–1.2)
BILIRUB UR-MCNC: ABNORMAL
BUN SERPL-MCNC: 15 MG/DL — SIGNIFICANT CHANGE UP (ref 7–23)
CALCIUM SERPL-MCNC: 9.4 MG/DL — SIGNIFICANT CHANGE UP (ref 8.4–10.5)
CHLORIDE SERPL-SCNC: 99 MMOL/L — SIGNIFICANT CHANGE UP (ref 98–107)
CO2 SERPL-SCNC: 22 MMOL/L — SIGNIFICANT CHANGE UP (ref 22–31)
COLOR SPEC: ABNORMAL
CREAT SERPL-MCNC: 0.75 MG/DL — SIGNIFICANT CHANGE UP (ref 0.5–1.3)
DIFF PNL FLD: ABNORMAL
EGFR: 94 ML/MIN/1.73M2 — SIGNIFICANT CHANGE UP
EOSINOPHIL # BLD AUTO: 0.24 K/UL — SIGNIFICANT CHANGE UP (ref 0–0.5)
EOSINOPHIL NFR BLD AUTO: 2.7 % — SIGNIFICANT CHANGE UP (ref 0–6)
GLUCOSE SERPL-MCNC: 89 MG/DL — SIGNIFICANT CHANGE UP (ref 70–99)
GLUCOSE UR QL: NEGATIVE MG/DL — SIGNIFICANT CHANGE UP
HCT VFR BLD CALC: 35.1 % — LOW (ref 39–50)
HGB BLD-MCNC: 11.6 G/DL — LOW (ref 13–17)
IANC: 5.78 K/UL — SIGNIFICANT CHANGE UP (ref 1.8–7.4)
IMM GRANULOCYTES NFR BLD AUTO: 0.3 % — SIGNIFICANT CHANGE UP (ref 0–0.9)
INR BLD: 1.05 RATIO — SIGNIFICANT CHANGE UP (ref 0.85–1.18)
KETONES UR-MCNC: NEGATIVE MG/DL — SIGNIFICANT CHANGE UP
LEUKOCYTE ESTERASE UR-ACNC: ABNORMAL
LYMPHOCYTES # BLD AUTO: 1.7 K/UL — SIGNIFICANT CHANGE UP (ref 1–3.3)
LYMPHOCYTES # BLD AUTO: 19.1 % — SIGNIFICANT CHANGE UP (ref 13–44)
MCHC RBC-ENTMCNC: 30.2 PG — SIGNIFICANT CHANGE UP (ref 27–34)
MCHC RBC-ENTMCNC: 33 GM/DL — SIGNIFICANT CHANGE UP (ref 32–36)
MCV RBC AUTO: 91.4 FL — SIGNIFICANT CHANGE UP (ref 80–100)
MONOCYTES # BLD AUTO: 1.05 K/UL — HIGH (ref 0–0.9)
MONOCYTES NFR BLD AUTO: 11.8 % — SIGNIFICANT CHANGE UP (ref 2–14)
NEUTROPHILS # BLD AUTO: 5.78 K/UL — SIGNIFICANT CHANGE UP (ref 1.8–7.4)
NEUTROPHILS NFR BLD AUTO: 65.2 % — SIGNIFICANT CHANGE UP (ref 43–77)
NITRITE UR-MCNC: POSITIVE
NRBC # BLD: 0 /100 WBCS — SIGNIFICANT CHANGE UP (ref 0–0)
NRBC # FLD: 0 K/UL — SIGNIFICANT CHANGE UP (ref 0–0)
PH UR: 5.5 — SIGNIFICANT CHANGE UP (ref 5–8)
PLATELET # BLD AUTO: 242 K/UL — SIGNIFICANT CHANGE UP (ref 150–400)
POTASSIUM SERPL-MCNC: 3.9 MMOL/L — SIGNIFICANT CHANGE UP (ref 3.5–5.3)
POTASSIUM SERPL-SCNC: 3.9 MMOL/L — SIGNIFICANT CHANGE UP (ref 3.5–5.3)
PROT SERPL-MCNC: 7 G/DL — SIGNIFICANT CHANGE UP (ref 6–8.3)
PROT UR-MCNC: 300 MG/DL
PROTHROM AB SERPL-ACNC: 11.9 SEC — SIGNIFICANT CHANGE UP (ref 9.5–13)
RBC # BLD: 3.84 M/UL — LOW (ref 4.2–5.8)
RBC # FLD: 12.5 % — SIGNIFICANT CHANGE UP (ref 10.3–14.5)
RBC CASTS # UR COMP ASSIST: >50 /HPF — SIGNIFICANT CHANGE UP (ref 0–4)
SODIUM SERPL-SCNC: 134 MMOL/L — LOW (ref 135–145)
SP GR SPEC: 1.02 — SIGNIFICANT CHANGE UP (ref 1–1.03)
UROBILINOGEN FLD QL: 0.2 MG/DL — SIGNIFICANT CHANGE UP (ref 0.2–1)
WBC # BLD: 8.88 K/UL — SIGNIFICANT CHANGE UP (ref 3.8–10.5)
WBC # FLD AUTO: 8.88 K/UL — SIGNIFICANT CHANGE UP (ref 3.8–10.5)
WBC UR QL: SIGNIFICANT CHANGE UP /HPF (ref 0–5)

## 2024-04-22 PROCEDURE — 99285 EMERGENCY DEPT VISIT HI MDM: CPT

## 2024-04-22 PROCEDURE — 51700 IRRIGATION OF BLADDER: CPT

## 2024-04-22 PROCEDURE — 71045 X-RAY EXAM CHEST 1 VIEW: CPT | Mod: 26

## 2024-04-22 RX ORDER — FLUTICASONE PROPIONATE 50 MCG
1 SPRAY, SUSPENSION NASAL
Refills: 0 | Status: DISCONTINUED | OUTPATIENT
Start: 2024-04-22 | End: 2024-04-25

## 2024-04-22 RX ORDER — HEPARIN SODIUM 5000 [USP'U]/ML
5000 INJECTION INTRAVENOUS; SUBCUTANEOUS EVERY 8 HOURS
Refills: 0 | Status: DISCONTINUED | OUTPATIENT
Start: 2024-04-22 | End: 2024-04-25

## 2024-04-22 RX ORDER — POLYETHYLENE GLYCOL 3350 17 G/17G
17 POWDER, FOR SOLUTION ORAL DAILY
Refills: 0 | Status: DISCONTINUED | OUTPATIENT
Start: 2024-04-22 | End: 2024-04-25

## 2024-04-22 RX ORDER — OXYBUTYNIN CHLORIDE 5 MG
5 TABLET ORAL EVERY 8 HOURS
Refills: 0 | Status: DISCONTINUED | OUTPATIENT
Start: 2024-04-22 | End: 2024-04-25

## 2024-04-22 RX ORDER — LORATADINE 10 MG/1
10 TABLET ORAL DAILY
Refills: 0 | Status: DISCONTINUED | OUTPATIENT
Start: 2024-04-22 | End: 2024-04-25

## 2024-04-22 RX ORDER — ACETAMINOPHEN 500 MG
975 TABLET ORAL EVERY 6 HOURS
Refills: 0 | Status: DISCONTINUED | OUTPATIENT
Start: 2024-04-22 | End: 2024-04-25

## 2024-04-22 RX ORDER — CEFTRIAXONE 500 MG/1
1000 INJECTION, POWDER, FOR SOLUTION INTRAMUSCULAR; INTRAVENOUS EVERY 24 HOURS
Refills: 0 | Status: DISCONTINUED | OUTPATIENT
Start: 2024-04-23 | End: 2024-04-23

## 2024-04-22 RX ORDER — INFLUENZA VIRUS VACCINE 15; 15; 15; 15 UG/.5ML; UG/.5ML; UG/.5ML; UG/.5ML
0.7 SUSPENSION INTRAMUSCULAR ONCE
Refills: 0 | Status: COMPLETED | OUTPATIENT
Start: 2024-04-22 | End: 2024-04-22

## 2024-04-22 RX ORDER — METOPROLOL TARTRATE 50 MG
50 TABLET ORAL DAILY
Refills: 0 | Status: DISCONTINUED | OUTPATIENT
Start: 2024-04-22 | End: 2024-04-25

## 2024-04-22 RX ORDER — CEFTRIAXONE 500 MG/1
1000 INJECTION, POWDER, FOR SOLUTION INTRAMUSCULAR; INTRAVENOUS ONCE
Refills: 0 | Status: COMPLETED | OUTPATIENT
Start: 2024-04-22 | End: 2024-04-22

## 2024-04-22 RX ADMIN — CEFTRIAXONE 100 MILLIGRAM(S): 500 INJECTION, POWDER, FOR SOLUTION INTRAMUSCULAR; INTRAVENOUS at 19:58

## 2024-04-22 RX ADMIN — Medication 975 MILLIGRAM(S): at 20:41

## 2024-04-22 NOTE — ED ADULT NURSE NOTE - AS PAIN REST
4 (moderate pain) Topical Sulfur Applications Counseling: Topical Sulfur Counseling: Patient counseled that this medication may cause skin irritation or allergic reactions.  In the event of skin irritation, the patient was advised to reduce the amount of the drug applied or use it less frequently.   The patient verbalized understanding of the proper use and possible adverse effects of topical sulfur application.  All of the patient's questions and concerns were addressed.

## 2024-04-22 NOTE — PATIENT PROFILE ADULT - FALL HARM RISK - HARM RISK INTERVENTIONS

## 2024-04-22 NOTE — HISTORY OF PRESENT ILLNESS
[FreeTextEntry1] : Salvador Sy returns to the office today.  He is 73 years old with history of prostate cancer, now metastatic.  History includes both radical prostatectomy and radiation therapy.  He has had intermittent issues with gross hematuria and previously undergone hyperbaric oxygen treatment. He has been having ongoing issues with gross hematuria and urinary retention with his bowers catheter. Last week he was recommended to undergo a SPT placement. He went to the ER twice yesterday for no urine output and required irrigation. He presents today with no urinary output and abdominal pain.

## 2024-04-22 NOTE — H&P ADULT - ASSESSMENT
Mr. Sy is 74yo M with hx HTN, aortic stenosis, sick sinus syndrome s/p pacemaker and prostate cancer s/p radiation treatment (2018) and radiation cystitis s/p hyperbaric oxygen therapy who presents to ED multiple times for non-draining bowers due to hematuria/ clot retention. Cystoscopy performed in office 4/16 shows multiple telangectasias in bladder with no active bleeding, but did appear friable,Today patient presents for same issue of nondraining bowers despite being irrigated to clear and upsized in the ED yesterday. Patient denies fevers/chills/nausea/vomiting at home. At the bedside, patient was 6-eye'ed to crystal clear after just 1 bottle of irrigant and ~20cc old clot was flushed out, 22fr 3-way catheter was placed and CBI was started on very slow drip.     Plan:  - Admit to urology service  - Book and consent for "Cystoscopy, clot evacuation, fulguration, possible suprapubic catheter placement" on Wednesday 4/24 with Dr. Hoang  - Obtain medical optimization for procedure  - Continue slow CBI drip  - F/u AM labs  - F/u Ucx  - Abx: CTX  - Pain control PRN (tylenol, oxybutinin)  - Diet: regular    Discussed with attending Dr. Hoang

## 2024-04-22 NOTE — ED PROVIDER NOTE - OBJECTIVE STATEMENT
Patient is a 73-year-old male with past medical history of prostate cancer status postradiation who presents emergency department complaining of dark red blood in the Gr.  Patient was seen in the emergency department yesterday for clots and clogged Gr.  Patient was evaluated by Dr. Barton who recommended patient come into the emergency department to be admitted to the hospital patient states that he has had multiple episodes of passage of clots and blood in his Gr since discharge.  Patient denies any abdominal pain at this time.  Denies any fevers, chills, shortness of breath.

## 2024-04-22 NOTE — ED ADULT NURSE REASSESSMENT NOTE - NS ED NURSE REASSESS COMMENT FT1
Received report from day RN. A&Ox4, ambulatory at baseline. Resting comfortably, offers no complaints. 20g IV in place, no redness or swelling noted, flushes well. mild pain endorsed, tylenol given, VS as noted. Care ongoing. Safety measures in place

## 2024-04-22 NOTE — H&P ADULT - NSICDXPASTMEDICALHX_GEN_ALL_CORE_FT
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

## 2024-04-22 NOTE — ED ADULT NURSE NOTE - NSFALLUNIVINTERV_ED_ALL_ED
Bed/Stretcher in lowest position, wheels locked, appropriate side rails in place/Call bell, personal items and telephone in reach/Instruct patient to call for assistance before getting out of bed/chair/stretcher/Non-slip footwear applied when patient is off stretcher/[place] to call system/Physically safe environment - no spills, clutter or unnecessary equipment/Purposeful proactive rounding/Room/bathroom lighting operational, light cord in reach

## 2024-04-22 NOTE — ED ADULT TRIAGE NOTE - CHIEF COMPLAINT QUOTE
Pt was sent by urologist for admission for urinary retention.  Pt was seen here 2x yesterday for clots in urine and clogged bowers.  Hx:HTN, prostrate ca.

## 2024-04-22 NOTE — ED PROVIDER NOTE - NSICDXPASTMEDICALHX_GEN_ALL_CORE_FT
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

## 2024-04-22 NOTE — H&P ADULT - HISTORY OF PRESENT ILLNESS
Mr. Sy is 74yo M with hx HTN, aortic stenosis, sick sinus syndrome s/p pacemaker and prostate cancer s/p radiation treatment (2018) and radiation cystitis s/p hyperbaric oxygen therapy who presents to ED multiple times for non-draining bowers due to hematuria/ clot retention. Today patient presents for same issue of nondraining bowers despite being irrigated and upsized in the ED yesterday. Patient denies fevers/chills/nausea/vomiting at home.    At the bedside, patient was 6-eye'ed to crystal clear after just 1 bottle of irrigant and ~20cc old clot was flushed out, 22fr 3-way catheter was placed and CBI was started on very slow drip.  Mr. Sy is 72yo M with hx HTN, aortic stenosis, sick sinus syndrome s/p pacemaker and prostate cancer s/p prostatectomy (2015) and radiation (2018) and radiation cystitis s/p hyperbaric oxygen therapy who presents to ED multiple times for non-draining bowers due to hematuria/ clot retention. Today patient presents for same issue of nondraining bowers despite being irrigated and upsized in the ED yesterday. Patient denies fevers/chills/nausea/vomiting at home.    At the bedside, patient was 6-eye'ed to crystal clear after just 1 bottle of irrigant and ~20cc old clot was flushed out, 22fr 3-way catheter was placed and CBI was started on very slow drip.

## 2024-04-22 NOTE — ASSESSMENT
[FreeTextEntry1] : I instilled 500cc of sterile water while irrigating and expelled brown blood clots. The color became clear.  Consulted with Dr. Hoang, patient was sent to the ER to be admitted. Plan for cysto with fulguration and SPT placement on Wednesday.   Patient and son in agreement with the plan.

## 2024-04-22 NOTE — ED PROVIDER NOTE - PROGRESS NOTE DETAILS
Urology has been paged and is aware patient is here. Will come see patient. Urology w pt, requesting pt be admitted. aware labs/ cxr pending. they will follow up results

## 2024-04-22 NOTE — ED ADULT NURSE NOTE - NSICDXPASTMEDICALHX_GEN_ALL_CORE_FT
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

## 2024-04-22 NOTE — ED PROVIDER NOTE - ATTENDING CONTRIBUTION TO CARE
Attending Statement: I have personally seen and examined this patient. I have fully participated in the care of this patient. I have reviewed all pertinent clinical information, including history physical exam, plan and the Resident's note and agree except as noted  73-year-old male with history of prostate cancer s/p radiation therapy, history of urinary retention with a new Gr placed a day ago April 21 presents for admission by urology.  Complaining of hematuria, states the Gr is draining but it is bloody with clots.  States he spoke to his urologist and advised to come into the ER for evaluation.  Urology aware of the patient and family at bedside.  Patient not complaining of fever nausea vomiting or abdominal pain.  States the Gr is draining is just bloody.  No blood thinners.  Vitals noted well-appearing male sitting up in bed no distress.  Abdomen soft nontender.  Gr in place with a leg bag has gross hematuria with no clots appreciated.  Plan labs, urology wanted preop labs and IV antibiotics will admit for or tomorrow.

## 2024-04-22 NOTE — H&P ADULT - NSHPPHYSICALEXAM_GEN_ALL_CORE
Gen: in mild discomfort, AOx3  Abd: soft, nontender, nondistended, some discomfort in suprapubic area  : 22fr 3-way catheter secured on very slow CBI drip crystal clear T(C): 36.9 (04-22-24 @ 20:51), Max: 36.9 (04-22-24 @ 20:51)  HR: 77 (04-22-24 @ 20:51) (75 - 90)  BP: 146/67 (04-22-24 @ 20:51) (106/50 - 148/76)  RR: 18 (04-22-24 @ 20:51) (16 - 18)  SpO2: 100% (04-22-24 @ 20:51) (94% - 100%)    CONSTITUTIONAL: Mild discomfort  EYES: PERRLA and symmetric, EOMI, No conjunctival or scleral injection, non-icteric  HEENT: neck symmetric and without tracheal deviation   RESP: No respiratory distress, no use of accessory muscles  CV: RRR  GI: Soft, NT, ND, no rebound, no guarding; some discomfort in suprapubic area  : 22fr 3-way catheter secured on very slow CBI drip crystal clear  SKIN: No rashes or ulcers noted; no subcutaneous nodules or induration palpable  PSYCH: Appropriate insight/judgment; A+O x 3, mood and affect appropriate

## 2024-04-22 NOTE — ED PROVIDER NOTE - CLINICAL SUMMARY MEDICAL DECISION MAKING FREE TEXT BOX
Patient presents emergency department complaining of urinary retention and Gr malfunction.  Patient is hemodynamically stable afebrile presentation.  Patient physical exam unremarkable this time.  Patient has visible dark red blood in the Gr bag.  Given patient presentation and history of urology will be patient will come and see the patient.  Lab work and imaging will be obtained to evaluate for acute pathologies.  Patient likely to be admitted to the hospital.

## 2024-04-23 ENCOUNTER — RESULT REVIEW (OUTPATIENT)
Age: 74
End: 2024-04-23

## 2024-04-23 ENCOUNTER — TRANSCRIPTION ENCOUNTER (OUTPATIENT)
Age: 74
End: 2024-04-23

## 2024-04-23 DIAGNOSIS — I10 ESSENTIAL (PRIMARY) HYPERTENSION: ICD-10-CM

## 2024-04-23 DIAGNOSIS — J30.2 OTHER SEASONAL ALLERGIC RHINITIS: ICD-10-CM

## 2024-04-23 DIAGNOSIS — D62 ACUTE POSTHEMORRHAGIC ANEMIA: ICD-10-CM

## 2024-04-23 DIAGNOSIS — C61 MALIGNANT NEOPLASM OF PROSTATE: ICD-10-CM

## 2024-04-23 DIAGNOSIS — N39.0 URINARY TRACT INFECTION, SITE NOT SPECIFIED: ICD-10-CM

## 2024-04-23 DIAGNOSIS — I35.0 NONRHEUMATIC AORTIC (VALVE) STENOSIS: ICD-10-CM

## 2024-04-23 LAB
ALBUMIN SERPL ELPH-MCNC: 3.6 G/DL — SIGNIFICANT CHANGE UP (ref 3.3–5)
ALP SERPL-CCNC: 83 U/L — SIGNIFICANT CHANGE UP (ref 40–120)
ALT FLD-CCNC: 8 U/L — SIGNIFICANT CHANGE UP (ref 4–41)
ANION GAP SERPL CALC-SCNC: 12 MMOL/L — SIGNIFICANT CHANGE UP (ref 7–14)
AST SERPL-CCNC: 11 U/L — SIGNIFICANT CHANGE UP (ref 4–40)
BASOPHILS # BLD AUTO: 0.08 K/UL — SIGNIFICANT CHANGE UP (ref 0–0.2)
BASOPHILS NFR BLD AUTO: 1.4 % — SIGNIFICANT CHANGE UP (ref 0–2)
BILIRUB SERPL-MCNC: 0.4 MG/DL — SIGNIFICANT CHANGE UP (ref 0.2–1.2)
BLD GP AB SCN SERPL QL: NEGATIVE — SIGNIFICANT CHANGE UP
BUN SERPL-MCNC: 12 MG/DL — SIGNIFICANT CHANGE UP (ref 7–23)
CALCIUM SERPL-MCNC: 8.8 MG/DL — SIGNIFICANT CHANGE UP (ref 8.4–10.5)
CHLORIDE SERPL-SCNC: 102 MMOL/L — SIGNIFICANT CHANGE UP (ref 98–107)
CO2 SERPL-SCNC: 22 MMOL/L — SIGNIFICANT CHANGE UP (ref 22–31)
CREAT SERPL-MCNC: 0.75 MG/DL — SIGNIFICANT CHANGE UP (ref 0.5–1.3)
CULTURE RESULTS: NO GROWTH — SIGNIFICANT CHANGE UP
EGFR: 94 ML/MIN/1.73M2 — SIGNIFICANT CHANGE UP
EOSINOPHIL # BLD AUTO: 0.47 K/UL — SIGNIFICANT CHANGE UP (ref 0–0.5)
EOSINOPHIL NFR BLD AUTO: 7.9 % — HIGH (ref 0–6)
GLUCOSE SERPL-MCNC: 96 MG/DL — SIGNIFICANT CHANGE UP (ref 70–99)
HCT VFR BLD CALC: 31.1 % — LOW (ref 39–50)
HGB BLD-MCNC: 10.9 G/DL — LOW (ref 13–17)
IANC: 3.25 K/UL — SIGNIFICANT CHANGE UP (ref 1.8–7.4)
IMM GRANULOCYTES NFR BLD AUTO: 0.3 % — SIGNIFICANT CHANGE UP (ref 0–0.9)
LYMPHOCYTES # BLD AUTO: 1.26 K/UL — SIGNIFICANT CHANGE UP (ref 1–3.3)
LYMPHOCYTES # BLD AUTO: 21.3 % — SIGNIFICANT CHANGE UP (ref 13–44)
MCHC RBC-ENTMCNC: 31.9 PG — SIGNIFICANT CHANGE UP (ref 27–34)
MCHC RBC-ENTMCNC: 35 GM/DL — SIGNIFICANT CHANGE UP (ref 32–36)
MCV RBC AUTO: 90.9 FL — SIGNIFICANT CHANGE UP (ref 80–100)
MONOCYTES # BLD AUTO: 0.84 K/UL — SIGNIFICANT CHANGE UP (ref 0–0.9)
MONOCYTES NFR BLD AUTO: 14.2 % — HIGH (ref 2–14)
NEUTROPHILS # BLD AUTO: 3.25 K/UL — SIGNIFICANT CHANGE UP (ref 1.8–7.4)
NEUTROPHILS NFR BLD AUTO: 54.9 % — SIGNIFICANT CHANGE UP (ref 43–77)
NRBC # BLD: 0 /100 WBCS — SIGNIFICANT CHANGE UP (ref 0–0)
NRBC # FLD: 0 K/UL — SIGNIFICANT CHANGE UP (ref 0–0)
PLATELET # BLD AUTO: 220 K/UL — SIGNIFICANT CHANGE UP (ref 150–400)
POTASSIUM SERPL-MCNC: 3.6 MMOL/L — SIGNIFICANT CHANGE UP (ref 3.5–5.3)
POTASSIUM SERPL-SCNC: 3.6 MMOL/L — SIGNIFICANT CHANGE UP (ref 3.5–5.3)
PROT SERPL-MCNC: 6.1 G/DL — SIGNIFICANT CHANGE UP (ref 6–8.3)
RBC # BLD: 3.42 M/UL — LOW (ref 4.2–5.8)
RBC # FLD: 12.2 % — SIGNIFICANT CHANGE UP (ref 10.3–14.5)
RH IG SCN BLD-IMP: POSITIVE — SIGNIFICANT CHANGE UP
SODIUM SERPL-SCNC: 136 MMOL/L — SIGNIFICANT CHANGE UP (ref 135–145)
SPECIMEN SOURCE: SIGNIFICANT CHANGE UP
WBC # BLD: 5.92 K/UL — SIGNIFICANT CHANGE UP (ref 3.8–10.5)
WBC # FLD AUTO: 5.92 K/UL — SIGNIFICANT CHANGE UP (ref 3.8–10.5)

## 2024-04-23 PROCEDURE — 99223 1ST HOSP IP/OBS HIGH 75: CPT

## 2024-04-23 PROCEDURE — 99222 1ST HOSP IP/OBS MODERATE 55: CPT | Mod: GC

## 2024-04-23 PROCEDURE — 99222 1ST HOSP IP/OBS MODERATE 55: CPT

## 2024-04-23 PROCEDURE — 93306 TTE W/DOPPLER COMPLETE: CPT | Mod: 26

## 2024-04-23 RX ORDER — CIPROFLOXACIN LACTATE 400MG/40ML
400 VIAL (ML) INTRAVENOUS ONCE
Refills: 0 | Status: COMPLETED | OUTPATIENT
Start: 2024-04-23 | End: 2024-04-23

## 2024-04-23 RX ORDER — SODIUM CHLORIDE 9 MG/ML
1000 INJECTION, SOLUTION INTRAVENOUS
Refills: 0 | Status: DISCONTINUED | OUTPATIENT
Start: 2024-04-23 | End: 2024-04-24

## 2024-04-23 RX ORDER — LANOLIN ALCOHOL/MO/W.PET/CERES
3 CREAM (GRAM) TOPICAL ONCE
Refills: 0 | Status: COMPLETED | OUTPATIENT
Start: 2024-04-23 | End: 2024-04-23

## 2024-04-23 RX ORDER — VANCOMYCIN HCL 1 G
1250 VIAL (EA) INTRAVENOUS EVERY 12 HOURS
Refills: 0 | Status: DISCONTINUED | OUTPATIENT
Start: 2024-04-23 | End: 2024-04-25

## 2024-04-23 RX ORDER — CIPROFLOXACIN LACTATE 400MG/40ML
VIAL (ML) INTRAVENOUS
Refills: 0 | Status: DISCONTINUED | OUTPATIENT
Start: 2024-04-23 | End: 2024-04-23

## 2024-04-23 RX ADMIN — HEPARIN SODIUM 5000 UNIT(S): 5000 INJECTION INTRAVENOUS; SUBCUTANEOUS at 07:00

## 2024-04-23 RX ADMIN — Medication 3 MILLIGRAM(S): at 22:57

## 2024-04-23 RX ADMIN — LORATADINE 10 MILLIGRAM(S): 10 TABLET ORAL at 15:21

## 2024-04-23 RX ADMIN — Medication 50 MILLIGRAM(S): at 06:53

## 2024-04-23 RX ADMIN — Medication 1 SPRAY(S): at 06:56

## 2024-04-23 RX ADMIN — Medication 166.67 MILLIGRAM(S): at 22:58

## 2024-04-23 RX ADMIN — Medication 200 MILLIGRAM(S): at 15:42

## 2024-04-23 NOTE — CONSULT NOTE ADULT - PROBLEM SELECTOR RECOMMENDATION 3
with hematuria presumed d/t radiation cystitis from prostate cancer therapy  management per Urology - plan for cysto, clot evac, fulguration, possible SPT  - CBI/bowers  - orgovyx held for now

## 2024-04-23 NOTE — PROGRESS NOTE ADULT - ASSESSMENT
74yo M with hx HTN, aortic stenosis, sick sinus syndrome s/p pacemaker and prostate cancer s/p prostatectomy (2015) and radiation (2018) and radiation cystitis s/p hyperbaric oxygen therapy who presents to ED multiple times for non-draining bowers due to hematuria/ clot retention. Pt returned 4/22/2024  for same issue of nondraining bowers despite being irrigated and upsized in the ED yesterday. Patient denies fevers/chills/nausea/vomiting at home. At the bedside, patient was 6-eye'ed to crystal clear after just 1 bottle of irrigant and ~20cc old clot was flushed out, 22fr 3-way catheter was placed and CBI was started on very slow drip.     4/23: CBI crystal clear, H&H stable    Plan:  -AM labs reviewed  -continue CBI, monitor color  -f/u Ucx  -continue CTX  -f/u medicine  -preop for OR tomorrow, consent signed  -NPO/IVF after MN  -DVT prophy, IS, OOB, ambulate

## 2024-04-23 NOTE — CONSULT NOTE ADULT - ATTENDING COMMENTS
The patient was seen and examined with the Cardiology Consultation Teaching Service.    He is a 73-year-old man with aortic stenosis, TAVR, pacemaker implantation, and prostate cancer with subsequent prostatectomy and radiation treatment who presents with a non-draining Gr catheter, now planned for cystoscopy with clot evacuation.    No chest pain  No dyspnea, orthopnea or PND  No palpitations or dizziness    No chest pain or dyspnea with walking a flight of stairs or several blocks.    PMH/PSH:  Aortic stenosis with TAVR, 2020    Pacemaker implantation following TAVR  Hypertension  Prostate cancer with prostatectomy, 2015    Radiation treatment     Radiation cystitis    Hyperbaric oxygen therapy    Comfortable-appearing man in no acute distress  Alert and oriented  Afebrile  Vital signs stable  JVP is not elevated  Clear lungs  Soft systolic murmur at the base  Extremities are warm and perfused  No peripheral edema     Normocytic anemia Hb 10.9  GFR 94    No ECG noted in chart  CXR demonstrates clear lungs    Echocardiography in 2021 demonstrated TAVR with normal function, mild paravalvular regurgitation, mild MR, mildly dilated LA, normal LV function    Impression and Recommendations:   73-year-old man with aortic stenosis, TAVR, pacemaker implantation, and prostate cancer with subsequent prostatectomy and radiation treatment who presents with a non-draining Gr catheter, now planned for cystoscopy with clot evacuation. We are asked to provide perioperative cardiovascular risk stratification and optimization.    There are no absolute contraindications to the planned procedure. There is no evidence of ongoing myocardial ischemia, decompensated heart failure, or unstable cardiac arrhythmia.     The patient's Revised Cardiac Risk Index estimates a 6% 30-day risk of death, MI or cardiac arrest. The patient's Bynum Perioperative Risk is 0.1% for 30-day risk of myocardial infarction or cardiac arrest. I suspect his true risk is more represented by the Bynum Score.     These risk estimates should be incorporated into a shared decision making conversation between the patient or their surrogate and the performing practitioner regarding the risks, benefits and alternatives to the procedure and whether to proceed. It is reasonable to obtain echocardiography to assess the status of his prior paravalvular leak, but the patient has no evidence of heart failure on examination and can proceed. ECG should be obtained to have a preoperative baseline study.    Jim Massey MD [facility] FACP  Cardiology  [phone] The patient was seen and examined with the Cardiology Consultation Teaching Service.    He is a 73-year-old man with aortic stenosis, TAVR, pacemaker implantation, and prostate cancer with subsequent prostatectomy and radiation treatment who presents with a non-draining Gr catheter, now planned for cystoscopy with clot evacuation.    No chest pain  No dyspnea, orthopnea or PND  No palpitations or dizziness    No chest pain or dyspnea with walking a flight of stairs or several blocks.    PMH/PSH:  Aortic stenosis with TAVR, 2020    Pacemaker implantation following TAVR  Hypertension  Prostate cancer with prostatectomy, 2015    Radiation treatment     Radiation cystitis    Hyperbaric oxygen therapy    Comfortable-appearing man in no acute distress  Alert and oriented  Afebrile  Vital signs stable  JVP is not elevated  Clear lungs  Soft systolic murmur at the base  Extremities are warm and perfused  No peripheral edema     Normocytic anemia Hb 10.9  GFR 94    No ECG noted in chart  CXR demonstrates clear lungs    Echocardiography in 2021 demonstrated TAVR with normal function, mild paravalvular regurgitation, mild MR, mildly dilated LA, normal LV function    Impression and Recommendations:   73-year-old man with aortic stenosis, TAVR, pacemaker implantation, and prostate cancer with subsequent prostatectomy and radiation treatment who presents with a non-draining Gr catheter, now planned for cystoscopy with clot evacuation. We are asked to provide perioperative cardiovascular risk stratification and optimization.    There are no absolute contraindications to the planned procedure. There is no evidence of ongoing myocardial ischemia, decompensated heart failure, or unstable cardiac arrhythmia.     The patient's Revised Cardiac Risk Index estimates a 6% 30-day risk of death, MI or cardiac arrest. The patient's Bynum Perioperative Risk is 0.1% for 30-day risk of myocardial infarction or cardiac arrest. I suspect his true risk is more represented by the Bynum Score.     These risk estimates should be incorporated into a shared decision making conversation between the patient or their surrogate and the performing practitioner regarding the risks, benefits and alternatives to the procedure and whether to proceed.     It is reasonable to obtain echocardiography to assess the status of his prior paravalvular leak, but the patient has no evidence of heart failure on examination and can proceed. ECG should be obtained to have a preoperative baseline study. No adjustment is needed to the patient's pacemaker settings given the location of his surgery.    Jim Massey MD [facility] FAC  Cardiology  [phone]

## 2024-04-23 NOTE — PROVIDER CONTACT NOTE (OTHER) - ASSESSMENT
Pt a&Ox4, skin color consistent with ethnicity, no hives noted, Pt denies throat itching, no tongue swelling noted. no signs of adverse reaction observed by RN

## 2024-04-23 NOTE — PROVIDER CONTACT NOTE (OTHER) - SITUATION
Pt receiving IV cipro, c/o pruritus on left arm in which IV is infusing. IV site appears normal, no ecchymosis, no swelling, flushed with 0.9% NS without incidence

## 2024-04-23 NOTE — PROGRESS NOTE ADULT - SUBJECTIVE AND OBJECTIVE BOX
Overnight events:  None, CBI remained clear on slow drip    Subjective:  Pt offers no complaints    Objective:    Vital signs  T(C): , Max: 36.9 (04-22-24 @ 20:51)  HR: 70 (04-23-24 @ 06:23)  BP: 145/64 (04-23-24 @ 06:23)  SpO2: 96% (04-23-24 @ 06:23)  Wt(kg): --    Output   CBI clear    Gen: NAD  Abd: soft, nontender  : robinson secured    Labs                        10.9   5.92  )-----------( 220      ( 23 Apr 2024 06:07 )             31.1     23 Apr 2024 06:07    136    |  102    |  12     ----------------------------<  96     3.6     |  22     |  0.75     Ca    8.8        23 Apr 2024 06:07

## 2024-04-23 NOTE — PROVIDER CONTACT NOTE (OTHER) - BACKGROUND
Pt admitted 4/22 for unspec. prosthetic device, Remains with bowers, currently on CBI. Pt previously c/o cipro burning while infusing in IV, RN slowed rate, pt still complaining. Resident Bobo aware.

## 2024-04-23 NOTE — PROVIDER CONTACT NOTE (OTHER) - ACTION/TREATMENT ORDERED:
RN turned off IV pump due to pt discomfort. Urology ACP Bobo at bedside. Monitoring ongoing, safety maintained. IV pump remains off.

## 2024-04-23 NOTE — CONSULT NOTE ADULT - SUBJECTIVE AND OBJECTIVE BOX
Patient is a 73y old  Male who presents with a chief complaint of hematuria, clot retention 2/2 radiation cystitis (23 Apr 2024 08:38)    HPI: 73M HTN, aortic stenosis s/p TAVR 2020, s/p pacemaker and prostate cancer s/p prostatectomy (2015), radiation (2018) complicated with radiation, proctitis, radiation cystitis s/p hyperbaric oxygen therapy 2023, who recently presented to ED multiple times for non-draining bowers due to hematuria/ clot retention. Returned 4/22 for same issue of nondraining bowers despite being irrigated and upsized in the ED day prior. Patient denies fevers/chills/nausea/vomiting at home. At the bedside, patient was 6-eye'ed to crystal clear after just 1 bottle of irrigant and ~20cc old clot was flushed out, 22fr 3-way catheter was placed and CBI was started on very slow drip.    Allergies  penicillin V potassium (Rash)  penicillin (Diarrhea; Pruritus; Hives)    Intolerances  codeine (Nausea)    HOME MEDICATIONS:   metoprolol succ 50qd  Orgovyz 120 qd  oxybutynin PRN  Claritin, Flonase PRN  Colace, Miralax PRN    MEDICATIONS  (STANDING):  ciprofloxacin   IVPB      dextrose 5% + sodium chloride 0.45%. 1000 milliLiter(s) (75 mL/Hr) IV Continuous <Continuous>  fluticasone propionate 50 MICROgram(s)/spray Nasal Spray 1 Spray(s) Both Nostrils two times a day  heparin   Injectable 5000 Unit(s) SubCutaneous every 8 hours  influenza  Vaccine (HIGH DOSE) 0.7 milliLiter(s) IntraMuscular once  loratadine 10 milliGRAM(s) Oral daily  metoprolol succinate ER 50 milliGRAM(s) Oral daily  polyethylene glycol 3350 17 Gram(s) Oral daily    MEDICATIONS  (PRN):  acetaminophen     Tablet .. 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  HTN (hypertension)  Aortic stenosis  Rectal bleeding last hospitalization 10/6/20 2/2 radiation proctitis  [place] (hard of hearing)  H/O prostatectomy 2015  S/P T&A (status post tonsillectomy and adenoidectomy) child    SOCIAL HISTORY:  , 4 children  retired [employer], [employer]  no tob, rare alcohol    FAMILY HISTORY:  FH: cancer (Father, Mother)  [ ] No pertinent family history in first degree relatives     REVIEW OF SYSTEMS:  CONSTITUTIONAL: No fever, weight loss, or fatigue  EYES: No eye pain, visual disturbances, or discharge  ENMT:  No difficulty hearing, tinnitus, vertigo; No sinus or throat pain  NECK: No pain or stiffness  BREASTS: No pain, masses, or nipple discharge  RESPIRATORY: No cough, wheezing, chills or hemoptysis; No shortness of breath  CARDIOVASCULAR: No chest pain, palpitations, dizziness, or leg swelling  GASTROINTESTINAL: No abdominal or epigastric pain. No nausea, vomiting, or hematemesis; No diarrhea or constipation. No melena or hematochezia.  GENITOURINARY: per HPI  NEUROLOGICAL: No headaches, memory loss, loss of strength, numbness, or tremors  SKIN: No itching, burning, rashes, or lesions   LYMPH NODES: No enlarged glands  ENDOCRINE: No heat or cold intolerance; No hair loss  MUSCULOSKELETAL: No muscle or back pain  PSYCHIATRIC: No depression, anxiety, mood swings, or difficulty sleeping  HEME/LYMPH: No easy bruising, or bleeding gums  ALLERGY AND IMMUNOLOGIC: No hives or eczema  [  ] All other ROS negative  [  ] Unable to obtain due to poor mental status    Vital Signs Last 24 Hrs  T(C): 36.7 (23 Apr 2024 10:00), Max: 36.9 (22 Apr 2024 20:51)  T(F): 98 (23 Apr 2024 10:00), Max: 98.5 (22 Apr 2024 20:51)  HR: 70 (23 Apr 2024 10:00) (70 - 79)  BP: 137/61 (23 Apr 2024 10:00) (106/50 - 148/76)  BP(mean): --  RR: 18 (23 Apr 2024 10:00) (16 - 18)  SpO2: 95% (23 Apr 2024 10:00) (95% - 100%)    Parameters below as of 23 Apr 2024 10:00  Patient On (Oxygen Delivery Method): room air    PHYSICAL EXAM:  GENERAL: NAD, sitting up on couch, speaks softly  HEAD:  Atraumatic, Normocephalic  EYES: EOMI, PERRLA, conjunctiva and sclera clear  ENMT: Moist mucous membranes  NECK: Supple, No JVD  RESPIRATORY: Clear to auscultation bilaterally; No rales, rhonchi, wheezing, or rubs  CARDIOVASCULAR: Regular rate and rhythm; No murmurs, rubs, or gallops  GASTROINTESTINAL: Soft, Nontender, Nondistended; Bowel sounds present  GENITOURINARY: +bowers/CBI, urine clear  EXTREMITIES:  2+ Peripheral Pulses, No clubbing, cyanosis, or edema  NERVOUS SYSTEM:  Alert & Oriented X3; Moving all 4 extremities; No gross sensory deficits  HEME/LYMPH: No lymphadenopathy noted  SKIN: No rashes or lesions  PSYCH: calm, appropriate    LABS:                        10.9   5.92  )-----------( 220      ( 23 Apr 2024 06:07 )             31.1     04-23    136  |  102  |  12  ----------------------------<  96  3.6   |  22  |  0.75    Ca    8.8      23 Apr 2024 06:07    TPro  6.1  /  Alb  3.6  /  TBili  0.4  /  DBili  x   /  AST  11  /  ALT  8   /  AlkPhos  83  04-23    PT/INR - ( 22 Apr 2024 19:20 )   PT: 11.9 sec;   INR: 1.05 ratio    PTT - ( 22 Apr 2024 19:20 )  PTT:33.1 sec    RADIOLOGY & ADDITIONAL STUDIES:  echo 2021 - 1. #26mm Evolut Pro+ THV.  The valve is well seated with  normal function.  The peak/mean gradients= 13/7mmHg with a  DVI= 0.63. There is a mild paravalvular regurgitation jet  originating from about 2 O'clock TTE surgical view.  There  is no intravalvular regurgitation seen.  *** Compared with echocardiogram of 12/10/2020, no  significant changes noted.    EKG:   Personally Reviewed:  [ ] YES     Imaging:   Personally Reviewed:  [ ] YES               Consultant(s) notes reviewed:    Care Discussed with Consultant(s)/Other Providers: Urology re overall care

## 2024-04-23 NOTE — PROVIDER CONTACT NOTE (OTHER) - RECOMMENDATIONS
Pt received half of cipro antibiotics. Previously c/o burning after rate slowed, cipro infusing with NS 0.9%. Pt reported relief from burning but now c/o pruritus. Urology ACP paged and made aware

## 2024-04-23 NOTE — CONSULT NOTE ADULT - ASSESSMENT
Mr. Sy is 74yo M with hx HTN, aortic stenosis (s/p TAVR in 2020), sick sinus syndrome s/p pacemaker and prostate cancer s/p radiation treatment (2018) and radiation cystitis s/p hyperbaric oxygen therapy who presents to ED multiple times for non-draining bowers due to hematuria/ clot retention. Cystoscopy performed in office 4/16 shows multiple telangectasias in bladder with no active bleeding, but did appear friable. Today patient presents for same issue of nondraining bowers despite being irrigated to clear and upsized in the ED yesterday.   Cardiology was consulted for pre-op risk assessment in the setting of cardiac history. Patient to undergo cystoscopy with clot evacuation.     #Pre-op risk assessment  Cardiovascular Risk Stratification - RCRI = 0  Functional Status:  _ METS (Description of maximal physical acitivity)/Unable to Assess.   Patient currently does not show any clinical evidence of decompensated heart failure, cardiac arrythmias, or active ischemia.     Overall this patient is as 0.4% risk (for cardiac death, nonfatal myocardial infarction, and nonfatal cardiac arrest perioperatively for this low risk procedure (cystoscopy with clot evacuation). According to ACC/AHA 2014 Perioperative guidelines, no further cardiac testing is recommended. May proceed with planned procedure after shared-decision making between the patient or surrogate decision maker and procedure performing physician with regarding the risk, benefits, and alternatives to the procedure.   -Please obtain repeat TTE to assess valve  -Continue metoprolol succinate 50 mg    Josie Pandya MD  Cardiology fellow           Mr. Sy is 74yo M with hx HTN, aortic stenosis (s/p TAVR in 2020), sick sinus syndrome s/p pacemaker and prostate cancer s/p radiation treatment (2018) and radiation cystitis s/p hyperbaric oxygen therapy who presents to ED multiple times for non-draining bowers due to hematuria/ clot retention. Cystoscopy performed in office 4/16 shows multiple telangectasias in bladder with no active bleeding, but did appear friable. Today patient presents for same issue of nondraining bowers despite being irrigated to clear and upsized in the ED yesterday.   Cardiology was consulted for pre-op risk assessment in the setting of cardiac history. Patient to undergo cystoscopy with clot evacuation.     #Pre-op risk assessment  Cardiovascular Risk Stratification - RCRI = 0  Functional Status:  4 METS (able to go up and down stairs)  Patient currently does not show any clinical evidence of decompensated heart failure, cardiac arrythmias, or active ischemia.     Overall this patient is as 0.4% risk (for cardiac death, nonfatal myocardial infarction, and nonfatal cardiac arrest perioperatively for this low risk procedure (cystoscopy with clot evacuation). According to ACC/AHA 2014 Perioperative guidelines, no further cardiac testing is recommended. May proceed with planned procedure after shared-decision making between the patient or surrogate decision maker and procedure performing physician with regarding the risk, benefits, and alternatives to the procedure.   -Please obtain repeat TTE to assess valve  -Continue metoprolol succinate 50 mg    Josie Pandya MD  Cardiology fellow

## 2024-04-23 NOTE — CONSULT NOTE ADULT - ASSESSMENT
73M HTN, aortic stenosis s/p TAVR 2020, s/p pacemaker and prostate cancer s/p prostatectomy (2015), radiation (2018) complicated with radiation, proctitis, radiation cystitis a/w non-draining bowers due to hematuria/clot retention.

## 2024-04-23 NOTE — CONSULT NOTE ADULT - SUBJECTIVE AND OBJECTIVE BOX
Patient seen and evaluated at bedside    Chief Complaint:    HPI:  Mr. Kerr is 74yo M with hx HTN, aortic stenosis s/p TAVR in 2020, sick sinus syndrome s/p pacemaker and prostate cancer s/p prostatectomy (2015) and radiation (2018) and radiation cystitis s/p hyperbaric oxygen therapy who presents to ED multiple times for non-draining bowers due to hematuria/ clot retention. Today patient presents for same issue of nondraining bowers despite being irrigated and upsized in the ED yesterday. Patient denies fevers/chills/nausea/vomiting at home.  At the bedside, patient was 6-eye'ed to crystal clear after just 1 bottle of irrigant and ~20cc old clot was flushed out, 22fr 3-way catheter was placed and CBI was started on very slow drip.    Patient will be taken to the OR for cystoscopy and clot evacuation. Cardiology was asked for pr-op risk assessment in light of patient's cardiac history.       PMHx:   Prostate cancer    Proctitis, radiation    HTN (hypertension)    Aortic stenosis    Rectal bleeding    [place] (hard of hearing)        PSHx:   H/O prostatectomy    S/P T&A (status post tonsillectomy and adenoidectomy)        Allergies:  penicillin V potassium (Rash)  codeine (Nausea)  penicillin (Diarrhea; Pruritus; Hives)      Home Meds:  · 	cefpodoxime 100 mg oral tablet: 1 tab(s) orally 2 times a day MDD: 2  · 	cefdinir 300 mg oral capsule: 1 cap(s) orally 2 times a day  · 	physical therapy: 1   · 	Hyperbaric Oxygen Therapy:   · 	Multiple Vitamins oral tablet: 1 tab(s) orally once a day  · 	fluticasone 50 mcg/inh nasal spray: 1 spray(s) nasal 2 times a day  · 	polyethylene glycol 3350 oral powder for reconstitution: 17 gram(s) orally once a day  · 	metoprolol succinate 50 mg oral tablet, extended release: 1 tab(s) orally once a day  · 	aluminum hydroxide-magnesium hydroxide 200 mg-200 mg/5 mL oral suspension: 30 milliliter(s) orally every 4 hours, As needed, Dyspepsia  · 	acetaminophen 325 mg oral tablet: 2 tab(s) orally every 6 hours, As needed, Temp greater or equal to 38C (100.4F), Mild Pain (1 - 3)  · 	Colace 50 mg oral capsule: 1 cap(s) orally once a day  · 	Claritin 10 mg oral tablet: 1 tab(s) orally once a day  · 	Orgovyx 120 mg oral tablet: 1 tab(s) orally once a day      Current Medications:   acetaminophen     Tablet .. 975 milliGRAM(s) Oral every 6 hours PRN  aluminum hydroxide/magnesium hydroxide/simethicone Suspension 30 milliLiter(s) Oral every 4 hours PRN  cefTRIAXone   IVPB 1000 milliGRAM(s) IV Intermittent every 24 hours  dextrose 5% + sodium chloride 0.45%. 1000 milliLiter(s) IV Continuous <Continuous>  fluticasone propionate 50 MICROgram(s)/spray Nasal Spray 1 Spray(s) Both Nostrils two times a day  heparin   Injectable 5000 Unit(s) SubCutaneous every 8 hours  influenza  Vaccine (HIGH DOSE) 0.7 milliLiter(s) IntraMuscular once  loratadine 10 milliGRAM(s) Oral daily  metoprolol succinate ER 50 milliGRAM(s) Oral daily  oxybutynin 5 milliGRAM(s) Oral every 8 hours PRN  polyethylene glycol 3350 17 Gram(s) Oral daily      FAMILY HISTORY:  FH: cancer (Father, Mother)        Social History:      REVIEW OF SYSTEMS:  Constitutional:     [ ] negative [ ] fevers [ ] chills [ ] weight loss [ ] weight gain  HEENT:                  [ ] negative [ ] dry eyes [ ] eye irritation [ ] postnasal drip [ ] nasal congestion  CV:                         [ ] negative  [ ] chest pain [ ] orthopnea [ ] palpitations [ ] murmur  Resp:                     [ ] negative [ ] cough [ ] shortness of breath [ ] dyspnea [ ] wheezing [ ] sputum [ ]hemoptysis  GI:                          [ ] negative [ ] nausea [ ] vomiting [ ] diarrhea [ ] constipation [ ] abd pain [ ] dysphagia   :                        [ ] negative [ ] dysuria [ ] nocturia [ ] hematuria [ ] increased urinary frequency  Musculoskeletal: [ ] negative [ ] back pain [ ] myalgias [ ] arthralgias [ ] fracture  Skin:                       [ ] negative [ ] rash [ ] itch  Neurological:        [ ] negative [ ] headache [ ] dizziness [ ] syncope [ ] weakness [ ] numbness  Psychiatric:           [ ] negative [ ] anxiety [ ] depression  Endocrine:            [ ] negative [ ] diabetes [ ] thyroid problem  Heme/Lymph:      [ ] negative [ ] anemia [ ] bleeding problem  Allergic/Immune: [ ] negative [ ] itchy eyes [ ] nasal discharge [ ] hives [ ] angioedema    [ ] All other systems negative  [ ] Unable to assess ROS due to      Physical Exam:  T(F): 97.8 (04-23), Max: 98.5 (04-22)  HR: 70 (04-23) (70 - 90)  BP: 145/64 (04-23) (106/50 - 148/76)  RR: 18 (04-23)  SpO2: 96% (04-23)  GENERAL: No acute distress, well-developed  HEAD:  Atraumatic, Normocephalic  ENT: EOMI, PERRLA, conjunctiva and sclera clear, Neck supple, No JVD, moist mucosa  CHEST/LUNG: Clear to auscultation bilaterally; No wheeze, equal breath sounds bilaterally   BACK: No spinal tenderness  HEART: Regular rate and rhythm; No murmurs, rubs, or gallops  ABDOMEN: Soft, Nontender, Nondistended; Bowel sounds present  EXTREMITIES:  No clubbing, cyanosis, or edema  PSYCH: Nl behavior, nl affect  NEUROLOGY: AAOx3, non-focal, cranial nerves intact  SKIN: Normal color, No rashes or lesions  LINES:    Cardiovascular Diagnostic Testing:    ECG: Personally reviewed:    Echo: Personally reviewed:      Patient name: HERBIE KERR  YOB: 1950   Age: 70 (M)   MR#: [id number]  Study Date: 2/4/2021  Location: O/PSonographer: Ramona HornFAHAD  Study quality: Technically good  Referring Physician: Rich Quesada MD  Blood Pressure: 168/87 mmHg  Height: 178 cm  Weight: 91 kg  BSA: 2.1 m2  Heart Rate: 76 mmHg  ------------------------------------------------------------------------  PROCEDURE: Transthoracic echocardiogram with 2-D, M-Mode  and complete spectral and color flow Doppler.  INDICATION: Nonrheumatic aortic (valve) stenosis (I35.0)  ------------------------------------------------------------------------  MEASUREMENTS: (See below)  ------------------------------------------------------------------------  OBSERVATIONS:  Mitral Valve: There is mitral annular calcification with  calcification on the anterior mitral leaflet. Mild mitral  regurgitation.  Aortic Root: Normal aortic root size. (Ao: 3.4 cm at the  sinuses of Valsalva).  Aortic Valve: #26mm Evolut Pro+ THV. The valve is well  seated with normal function.  The peak/mean gradients=  13/7mmHg with a DVI= 0.63. Peak transaortic valve gradient  equals 13 mm Hg, mean transaortic valve gradient equals 7  mm Hg, aortic valve velocity time integral equals 35 cm,  estimated aortic valve area equals 2.3 sqcm. There is a  mild paravalvular regurgitation jet originating from about  2 O'clock TTE surgical view.  There is no intravalvular  regurgitation seen. Peak left ventricular outflow tract  gradient equals 4mm Hg, mean gradient is equal to 3 mm Hg,  LVOT velocity time integral equals 22 cm.  Left Atrium: Mildly dilated left atrium.  LA volume index =  39 cc/m2.  Left Ventricle: Normal left ventricular systolic function.  No segmental wall motion abnormalities.  The LVEF about  55-60%. Normal left ventricular internal dimensions and  wall thicknesses. Mild diastolic dysfunction (Stage I).  Right Heart: Normal right atrium.  RA area= 13cm2, RA volume= 32ml. Normal right ventricular  size and function.  There is a device wire in the right heart. Normal tricuspid  valve. Minimal tricuspid regurgitation. Normal pulmonic  valve. Minimal pulmonic regurgitation.  Pericardium/PleuraNormal pericardium with no pericardial  effusion.  Hemodynamic: The estimated right atrial pressure is normal.  ------------------------------------------------------------------------  CONCLUSIONS:  1. #26mm Evolut Pro+ THV.  The valve is well seated with  normal function.  The peak/mean gradients= 13/7mmHg with a  DVI= 0.63. There is a mild paravalvular regurgitation jet  originating from about 2 O'clock TTE surgical view.  There  is no intravalvular regurgitation seen.  *** Compared with echocardiogram of 12/10/2020, no  significant changes noted.      Imaging:    CXR: Personally reviewed    ACC: [id number] EXAM: XR CHEST AP OR PA 1V ORDERED BY: GEOVANNI MURRAY    PROCEDURE DATE: 04/22/2024      INTERPRETATION: EXAMINATION: XR CHEST    CLINICAL INDICATION: presurg    TECHNIQUE: Single frontal, portable view of the chest was obtained.    COMPARISON: Chest x-ray 3/22/2023.    FINDINGS:  Pacemaker overlying the left chest wall with its leads terminating in right atrium and right ventricle.  The heart size is normal.  The lungs are clear.  No pleural effusion.  No pneumothorax.  No acute bony abnormality.    IMPRESSION:  Clear lungs.    --- End of Report ---    Labs: Personally reviewed                        10.9   5.92  )-----------( 220      ( 23 Apr 2024 06:07 )             31.1     04-23    136  |  102  |  12  ----------------------------<  96  3.6   |  22  |  0.75    Ca    8.8      23 Apr 2024 06:07    TPro  6.1  /  Alb  3.6  /  TBili  0.4  /  DBili  x   /  AST  11  /  ALT  8   /  AlkPhos  83  04-23    PT/INR - ( 22 Apr 2024 19:20 )   PT: 11.9 sec;   INR: 1.05 ratio         PTT - ( 22 Apr 2024 19:20 )  PTT:33.1 sec                penicillin V potassium (Rash)  codeine (Nausea)  penicillin (Diarrhea; Pruritus; Hives)    acetaminophen     Tablet .. 975 milliGRAM(s) Oral every 6 hours PRN  aluminum hydroxide/magnesium hydroxide/simethicone Suspension 30 milliLiter(s) Oral every 4 hours PRN  cefTRIAXone   IVPB 1000 milliGRAM(s) IV Intermittent every 24 hours  dextrose 5% + sodium chloride 0.45%. 1000 milliLiter(s) IV Continuous <Continuous>  fluticasone propionate 50 MICROgram(s)/spray Nasal Spray 1 Spray(s) Both Nostrils two times a day  heparin   Injectable 5000 Unit(s) SubCutaneous every 8 hours  influenza  Vaccine (HIGH DOSE) 0.7 milliLiter(s) IntraMuscular once  loratadine 10 milliGRAM(s) Oral daily  metoprolol succinate ER 50 milliGRAM(s) Oral daily  oxybutynin 5 milliGRAM(s) Oral every 8 hours PRN  polyethylene glycol 3350 17 Gram(s) Oral daily    T(F): 97.8 (04-23), Max: 98.5 (04-22)  HR: 70 (04-23) (70 - 90)  BP: 145/64 (04-23) (106/50 - 148/76)  RR: 18 (04-23)  SpO2: 96% (04-23) Patient seen and evaluated at bedside    Chief Complaint:    HPI:  Mr. Kerr is 72yo M with hx HTN, aortic stenosis s/p TAVR in 2020, sick sinus syndrome s/p pacemaker and prostate cancer s/p prostatectomy (2015) and radiation (2018) and radiation cystitis s/p hyperbaric oxygen therapy who presents to ED multiple times for non-draining bowers due to hematuria/ clot retention. Today patient presents for same issue of nondraining bowers despite being irrigated and upsized in the ED yesterday. Patient denies fevers/chills/nausea/vomiting at home.  At the bedside, patient was 6-eye'ed to crystal clear after just 1 bottle of irrigant and ~20cc old clot was flushed out, 22fr 3-way catheter was placed and CBI was started on very slow drip.    Patient will be taken to the OR for cystoscopy and clot evacuation. Cardiology was asked for pr-op risk assessment in light of patient's cardiac history.     Patient is able to walk a few blocks without any chest pain or shortness of brath. IS able to go up and down a flight of stairs.  HAs not had any LE edema, orthopnea or MAGAÑA.       PMHx:   Prostate cancer    Proctitis, radiation    HTN (hypertension)    Aortic stenosis    Rectal bleeding    [place] (hard of hearing)        PSHx:   H/O prostatectomy    S/P T&A (status post tonsillectomy and adenoidectomy)        Allergies:  penicillin V potassium (Rash)  codeine (Nausea)  penicillin (Diarrhea; Pruritus; Hives)      Home Meds:  · 	cefpodoxime 100 mg oral tablet: 1 tab(s) orally 2 times a day MDD: 2  · 	cefdinir 300 mg oral capsule: 1 cap(s) orally 2 times a day  · 	physical therapy: 1   · 	Hyperbaric Oxygen Therapy:   · 	Multiple Vitamins oral tablet: 1 tab(s) orally once a day  · 	fluticasone 50 mcg/inh nasal spray: 1 spray(s) nasal 2 times a day  · 	polyethylene glycol 3350 oral powder for reconstitution: 17 gram(s) orally once a day  · 	metoprolol succinate 50 mg oral tablet, extended release: 1 tab(s) orally once a day  · 	aluminum hydroxide-magnesium hydroxide 200 mg-200 mg/5 mL oral suspension: 30 milliliter(s) orally every 4 hours, As needed, Dyspepsia  · 	acetaminophen 325 mg oral tablet: 2 tab(s) orally every 6 hours, As needed, Temp greater or equal to 38C (100.4F), Mild Pain (1 - 3)  · 	Colace 50 mg oral capsule: 1 cap(s) orally once a day  · 	Claritin 10 mg oral tablet: 1 tab(s) orally once a day  · 	Orgovyx 120 mg oral tablet: 1 tab(s) orally once a day      Current Medications:   acetaminophen     Tablet .. 975 milliGRAM(s) Oral every 6 hours PRN  aluminum hydroxide/magnesium hydroxide/simethicone Suspension 30 milliLiter(s) Oral every 4 hours PRN  cefTRIAXone   IVPB 1000 milliGRAM(s) IV Intermittent every 24 hours  dextrose 5% + sodium chloride 0.45%. 1000 milliLiter(s) IV Continuous <Continuous>  fluticasone propionate 50 MICROgram(s)/spray Nasal Spray 1 Spray(s) Both Nostrils two times a day  heparin   Injectable 5000 Unit(s) SubCutaneous every 8 hours  influenza  Vaccine (HIGH DOSE) 0.7 milliLiter(s) IntraMuscular once  loratadine 10 milliGRAM(s) Oral daily  metoprolol succinate ER 50 milliGRAM(s) Oral daily  oxybutynin 5 milliGRAM(s) Oral every 8 hours PRN  polyethylene glycol 3350 17 Gram(s) Oral daily      FAMILY HISTORY:  FH: cancer (Father, Mother)        Social History:      REVIEW OF SYSTEMS:  Constitutional:     [ ] negative [ ] fevers [ ] chills [ ] weight loss [ ] weight gain  HEENT:                  [ ] negative [ ] dry eyes [ ] eye irritation [ ] postnasal drip [ ] nasal congestion  CV:                         [ ] negative  [ ] chest pain [ ] orthopnea [ ] palpitations [ ] murmur  Resp:                     [ ] negative [ ] cough [ ] shortness of breath [ ] dyspnea [ ] wheezing [ ] sputum [ ]hemoptysis  GI:                          [ ] negative [ ] nausea [ ] vomiting [ ] diarrhea [ ] constipation [ ] abd pain [ ] dysphagia   :                        [ ] negative [ ] dysuria [ ] nocturia [ ] hematuria [ ] increased urinary frequency  Musculoskeletal: [ ] negative [ ] back pain [ ] myalgias [ ] arthralgias [ ] fracture  Skin:                       [ ] negative [ ] rash [ ] itch  Neurological:        [ ] negative [ ] headache [ ] dizziness [ ] syncope [ ] weakness [ ] numbness  Psychiatric:           [ ] negative [ ] anxiety [ ] depression  Endocrine:            [ ] negative [ ] diabetes [ ] thyroid problem  Heme/Lymph:      [ ] negative [ ] anemia [ ] bleeding problem  Allergic/Immune: [ ] negative [ ] itchy eyes [ ] nasal discharge [ ] hives [ ] angioedema    [ ] All other systems negative  [ ] Unable to assess ROS due to      Physical Exam:  T(F): 97.8 (04-23), Max: 98.5 (04-22)  HR: 70 (04-23) (70 - 90)  BP: 145/64 (04-23) (106/50 - 148/76)  RR: 18 (04-23)  SpO2: 96% (04-23)  GENERAL: No acute distress, well-developed  HEAD:  Atraumatic, Normocephalic  ENT: EOMI, PERRLA, conjunctiva and sclera clear, Neck supple, No JVD, moist mucosa  CHEST/LUNG: Clear to auscultation bilaterally; No wheeze, equal breath sounds bilaterally   BACK: No spinal tenderness  HEART: Regular rate and rhythm; No murmurs, rubs, or gallops  ABDOMEN: Soft, Nontender, Nondistended; Bowel sounds present  EXTREMITIES:  No clubbing, cyanosis, or edema      Cardiovascular Diagnostic Testing:    ECG: Personally reviewed:     Echo: Personally reviewed:    Patient name: HERBIE KERR  YOB: 1950   Age: 70 (M)   MR#: [id number]  Study Date: 2/4/2021  Location: O/PSonographer: Ramona HornFAHAD  Study quality: Technically good  Referring Physician: Rich Quesada MD  Blood Pressure: 168/87 mmHg  Height: 178 cm  Weight: 91 kg  BSA: 2.1 m2  Heart Rate: 76 mmHg  ------------------------------------------------------------------------  PROCEDURE: Transthoracic echocardiogram with 2-D, M-Mode  and complete spectral and color flow Doppler.  INDICATION: Nonrheumatic aortic (valve) stenosis (I35.0)  ------------------------------------------------------------------------  MEASUREMENTS: (See below)  ------------------------------------------------------------------------  OBSERVATIONS:  Mitral Valve: There is mitral annular calcification with  calcification on the anterior mitral leaflet. Mild mitral  regurgitation.  Aortic Root: Normal aortic root size. (Ao: 3.4 cm at the  sinuses of Valsalva).  Aortic Valve: #26mm Evolut Pro+ THV. The valve is well  seated with normal function.  The peak/mean gradients=  13/7mmHg with a DVI= 0.63. Peak transaortic valve gradient  equals 13 mm Hg, mean transaortic valve gradient equals 7  mm Hg, aortic valve velocity time integral equals 35 cm,  estimated aortic valve area equals 2.3 sqcm. There is a  mild paravalvular regurgitation jet originating from about  2 O'clock TTE surgical view.  There is no intravalvular  regurgitation seen. Peak left ventricular outflow tract  gradient equals 4mm Hg, mean gradient is equal to 3 mm Hg,  LVOT velocity time integral equals 22 cm.  Left Atrium: Mildly dilated left atrium.  LA volume index =  39 cc/m2.  Left Ventricle: Normal left ventricular systolic function.  No segmental wall motion abnormalities.  The LVEF about  55-60%. Normal left ventricular internal dimensions and  wall thicknesses. Mild diastolic dysfunction (Stage I).  Right Heart: Normal right atrium.  RA area= 13cm2, RA volume= 32ml. Normal right ventricular  size and function.  There is a device wire in the right heart. Normal tricuspid  valve. Minimal tricuspid regurgitation. Normal pulmonic  valve. Minimal pulmonic regurgitation.  Pericardium/PleuraNormal pericardium with no pericardial  effusion.  Hemodynamic: The estimated right atrial pressure is normal.  ------------------------------------------------------------------------  CONCLUSIONS:  1. #26mm Evolut Pro+ THV.  The valve is well seated with  normal function.  The peak/mean gradients= 13/7mmHg with a  DVI= 0.63. There is a mild paravalvular regurgitation jet  originating from about 2 O'clock TTE surgical view.  There  is no intravalvular regurgitation seen.  *** Compared with echocardiogram of 12/10/2020, no  significant changes noted.      Imaging:    CXR: Personally reviewed    ACC: [id number] EXAM: XR CHEST AP OR PA 1V ORDERED BY: GEOVANNI MURRAY    PROCEDURE DATE: 04/22/2024      INTERPRETATION: EXAMINATION: XR CHEST    CLINICAL INDICATION: presurg    TECHNIQUE: Single frontal, portable view of the chest was obtained.    COMPARISON: Chest x-ray 3/22/2023.    FINDINGS:  Pacemaker overlying the left chest wall with its leads terminating in right atrium and right ventricle.  The heart size is normal.  The lungs are clear.  No pleural effusion.  No pneumothorax.  No acute bony abnormality.    IMPRESSION:  Clear lungs.    --- End of Report ---    Labs: Personally reviewed                        10.9   5.92  )-----------( 220      ( 23 Apr 2024 06:07 )             31.1     04-23    136  |  102  |  12  ----------------------------<  96  3.6   |  22  |  0.75    Ca    8.8      23 Apr 2024 06:07    TPro  6.1  /  Alb  3.6  /  TBili  0.4  /  DBili  x   /  AST  11  /  ALT  8   /  AlkPhos  83  04-23    PT/INR - ( 22 Apr 2024 19:20 )   PT: 11.9 sec;   INR: 1.05 ratio         PTT - ( 22 Apr 2024 19:20 )  PTT:33.1 sec                penicillin V potassium (Rash)  codeine (Nausea)  penicillin (Diarrhea; Pruritus; Hives)    acetaminophen     Tablet .. 975 milliGRAM(s) Oral every 6 hours PRN  aluminum hydroxide/magnesium hydroxide/simethicone Suspension 30 milliLiter(s) Oral every 4 hours PRN  cefTRIAXone   IVPB 1000 milliGRAM(s) IV Intermittent every 24 hours  dextrose 5% + sodium chloride 0.45%. 1000 milliLiter(s) IV Continuous <Continuous>  fluticasone propionate 50 MICROgram(s)/spray Nasal Spray 1 Spray(s) Both Nostrils two times a day  heparin   Injectable 5000 Unit(s) SubCutaneous every 8 hours  influenza  Vaccine (HIGH DOSE) 0.7 milliLiter(s) IntraMuscular once  loratadine 10 milliGRAM(s) Oral daily  metoprolol succinate ER 50 milliGRAM(s) Oral daily  oxybutynin 5 milliGRAM(s) Oral every 8 hours PRN  polyethylene glycol 3350 17 Gram(s) Oral daily    T(F): 97.8 (04-23), Max: 98.5 (04-22)  HR: 70 (04-23) (70 - 90)  BP: 145/64 (04-23) (106/50 - 148/76)  RR: 18 (04-23)  SpO2: 96% (04-23)

## 2024-04-23 NOTE — CONSULT NOTE ADULT - PROBLEM SELECTOR RECOMMENDATION 9
in the setting of recurrent hematuria, Hb13.2 --> 10.9  urine cleared on CBI  serial CBC  transfuse as needed

## 2024-04-23 NOTE — CHART NOTE - NSCHARTNOTEFT_GEN_A_CORE
Consult received for "MST Score = />2." Upon chart review, and Pt denies weight loss, does not currently meet criteria for Protein Calorie Malnutrition risk per MST. RD remains available for assessment per protocol or as needed.

## 2024-04-24 ENCOUNTER — APPOINTMENT (OUTPATIENT)
Dept: UROLOGY | Facility: HOSPITAL | Age: 74
End: 2024-04-24

## 2024-04-24 DIAGNOSIS — R31.0 GROSS HEMATURIA: ICD-10-CM

## 2024-04-24 DIAGNOSIS — R33.8 OTHER RETENTION OF URINE: ICD-10-CM

## 2024-04-24 LAB
-  AMPICILLIN: SIGNIFICANT CHANGE UP
-  CIPROFLOXACIN: SIGNIFICANT CHANGE UP
-  LEVOFLOXACIN: SIGNIFICANT CHANGE UP
-  NITROFURANTOIN: SIGNIFICANT CHANGE UP
-  TETRACYCLINE: SIGNIFICANT CHANGE UP
-  VANCOMYCIN: SIGNIFICANT CHANGE UP
CULTURE RESULTS: ABNORMAL
METHOD TYPE: SIGNIFICANT CHANGE UP
ORGANISM # SPEC MICROSCOPIC CNT: ABNORMAL
ORGANISM # SPEC MICROSCOPIC CNT: ABNORMAL
SPECIMEN SOURCE: SIGNIFICANT CHANGE UP

## 2024-04-24 PROCEDURE — 51102 DRAIN BL W/CATH INSERTION: CPT

## 2024-04-24 PROCEDURE — 52214 CYSTOSCOPY AND TREATMENT: CPT

## 2024-04-24 PROCEDURE — 99232 SBSQ HOSP IP/OBS MODERATE 35: CPT

## 2024-04-24 DEVICE — CATH INTRODUCER LAWERENCE SUPRA-FOLEY 16FR: Type: IMPLANTABLE DEVICE | Status: FUNCTIONAL

## 2024-04-24 RX ORDER — LANOLIN ALCOHOL/MO/W.PET/CERES
3 CREAM (GRAM) TOPICAL AT BEDTIME
Refills: 0 | Status: DISCONTINUED | OUTPATIENT
Start: 2024-04-24 | End: 2024-04-25

## 2024-04-24 RX ORDER — HYDROCORTISONE 1 %
1 OINTMENT (GRAM) TOPICAL DAILY
Refills: 0 | Status: DISCONTINUED | OUTPATIENT
Start: 2024-04-24 | End: 2024-04-25

## 2024-04-24 RX ORDER — LIDOCAINE 4 G/100G
1 CREAM TOPICAL EVERY 4 HOURS
Refills: 0 | Status: DISCONTINUED | OUTPATIENT
Start: 2024-04-24 | End: 2024-04-25

## 2024-04-24 RX ORDER — FENTANYL CITRATE 50 UG/ML
25 INJECTION INTRAVENOUS
Refills: 0 | Status: DISCONTINUED | OUTPATIENT
Start: 2024-04-24 | End: 2024-04-24

## 2024-04-24 RX ORDER — FENTANYL CITRATE 50 UG/ML
50 INJECTION INTRAVENOUS
Refills: 0 | Status: DISCONTINUED | OUTPATIENT
Start: 2024-04-24 | End: 2024-04-24

## 2024-04-24 RX ORDER — SODIUM CHLORIDE 9 MG/ML
1000 INJECTION, SOLUTION INTRAVENOUS
Refills: 0 | Status: DISCONTINUED | OUTPATIENT
Start: 2024-04-24 | End: 2024-04-25

## 2024-04-24 RX ORDER — CEFTRIAXONE 500 MG/1
1000 INJECTION, POWDER, FOR SOLUTION INTRAMUSCULAR; INTRAVENOUS ONCE
Refills: 0 | Status: COMPLETED | OUTPATIENT
Start: 2024-04-24 | End: 2024-04-24

## 2024-04-24 RX ORDER — CEFTRIAXONE 500 MG/1
INJECTION, POWDER, FOR SOLUTION INTRAMUSCULAR; INTRAVENOUS
Refills: 0 | Status: DISCONTINUED | OUTPATIENT
Start: 2024-04-24 | End: 2024-04-25

## 2024-04-24 RX ORDER — OXYCODONE HYDROCHLORIDE 5 MG/1
5 TABLET ORAL ONCE
Refills: 0 | Status: DISCONTINUED | OUTPATIENT
Start: 2024-04-24 | End: 2024-04-24

## 2024-04-24 RX ORDER — CEFTRIAXONE 500 MG/1
1000 INJECTION, POWDER, FOR SOLUTION INTRAMUSCULAR; INTRAVENOUS EVERY 24 HOURS
Refills: 0 | Status: DISCONTINUED | OUTPATIENT
Start: 2024-04-25 | End: 2024-04-25

## 2024-04-24 RX ADMIN — FENTANYL CITRATE 50 MICROGRAM(S): 50 INJECTION INTRAVENOUS at 09:30

## 2024-04-24 RX ADMIN — OXYCODONE HYDROCHLORIDE 5 MILLIGRAM(S): 5 TABLET ORAL at 10:45

## 2024-04-24 RX ADMIN — Medication 1 SPRAY(S): at 06:19

## 2024-04-24 RX ADMIN — Medication 166.67 MILLIGRAM(S): at 09:00

## 2024-04-24 RX ADMIN — FENTANYL CITRATE 25 MICROGRAM(S): 50 INJECTION INTRAVENOUS at 09:35

## 2024-04-24 RX ADMIN — LORATADINE 10 MILLIGRAM(S): 10 TABLET ORAL at 13:07

## 2024-04-24 RX ADMIN — Medication 975 MILLIGRAM(S): at 16:36

## 2024-04-24 RX ADMIN — CEFTRIAXONE 1000 MILLIGRAM(S): 500 INJECTION, POWDER, FOR SOLUTION INTRAMUSCULAR; INTRAVENOUS at 10:22

## 2024-04-24 RX ADMIN — Medication 50 MILLIGRAM(S): at 06:20

## 2024-04-24 RX ADMIN — FENTANYL CITRATE 50 MICROGRAM(S): 50 INJECTION INTRAVENOUS at 09:15

## 2024-04-24 RX ADMIN — Medication 5 MILLIGRAM(S): at 09:57

## 2024-04-24 RX ADMIN — SODIUM CHLORIDE 75 MILLILITER(S): 9 INJECTION, SOLUTION INTRAVENOUS at 00:37

## 2024-04-24 RX ADMIN — FENTANYL CITRATE 25 MICROGRAM(S): 50 INJECTION INTRAVENOUS at 09:45

## 2024-04-24 RX ADMIN — Medication 975 MILLIGRAM(S): at 17:15

## 2024-04-24 RX ADMIN — OXYCODONE HYDROCHLORIDE 5 MILLIGRAM(S): 5 TABLET ORAL at 10:15

## 2024-04-24 RX ADMIN — SODIUM CHLORIDE 125 MILLILITER(S): 9 INJECTION, SOLUTION INTRAVENOUS at 10:22

## 2024-04-24 NOTE — PROGRESS NOTE ADULT - SUBJECTIVE AND OBJECTIVE BOX
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  ADDITIONAL REVIEW OF SYSTEMS:    MEDICATIONS  (STANDING):  dextrose 5% + sodium chloride 0.45%. 1000 milliLiter(s) (75 mL/Hr) IV Continuous <Continuous>  fluticasone propionate 50 MICROgram(s)/spray Nasal Spray 1 Spray(s) Both Nostrils two times a day  heparin   Injectable 5000 Unit(s) SubCutaneous every 8 hours  influenza  Vaccine (HIGH DOSE) 0.7 milliLiter(s) IntraMuscular once  loratadine 10 milliGRAM(s) Oral daily  metoprolol succinate ER 50 milliGRAM(s) Oral daily  polyethylene glycol 3350 17 Gram(s) Oral daily  vancomycin  IVPB 1250 milliGRAM(s) IV Intermittent every 12 hours    MEDICATIONS  (PRN):  acetaminophen     Tablet .. 975 milliGRAM(s) Oral every 6 hours PRN Temp greater or equal to 38C (100.4F), Mild Pain (1 - 3)  aluminum hydroxide/magnesium hydroxide/simethicone Suspension 30 milliLiter(s) Oral every 4 hours PRN Dyspepsia  oxybutynin 5 milliGRAM(s) Oral every 8 hours PRN Bladder spasms    PHYSICAL EXAM:  Vital Signs Last 24 Hrs  T(C): 37 (24 Apr 2024 06:59), Max: 37 (24 Apr 2024 01:20)  T(F): 98.6 (24 Apr 2024 06:59), Max: 98.6 (24 Apr 2024 01:20)  HR: 71 (24 Apr 2024 06:59) (70 - 72)  BP: 153/74 (24 Apr 2024 06:59) (129/65 - 167/60)  BP(mean): --  RR: 16 (24 Apr 2024 06:59) (16 - 18)  SpO2: 97% (24 Apr 2024 06:59) (94% - 98%)    Parameters below as of 24 Apr 2024 05:32  Patient On (Oxygen Delivery Method): room air    GENERAL: NAD, sitting up on couch, speaks softly  RESPIRATORY: Clear to auscultation bilaterally; No rales, rhonchi, wheezing, or rubs  CARDIOVASCULAR: Regular rate and rhythm; No murmurs, rubs, or gallops  GASTROINTESTINAL: Soft, Nontender, Nondistended; Bowel sounds present  GENITOURINARY: +bowers/CBI, urine clear  EXTREMITIES:  2+ Peripheral Pulses, No clubbing, cyanosis, or edema  NERVOUS SYSTEM:  Alert & Oriented X3; Moving all 4 extremities; No gross sensory deficits  PSYCH: calm, appropriate    LABS:                        10.9   5.92  )-----------( 220      ( 23 Apr 2024 06:07 )             31.1     04-23    136  |  102  |  12  ----------------------------<  96  3.6   |  22  |  0.75    Ca    8.8      23 Apr 2024 06:07    TPro  6.1  /  Alb  3.6  /  TBili  0.4  /  DBili  x   /  AST  11  /  ALT  8   /  AlkPhos  83  04-23    PT/INR - ( 22 Apr 2024 19:20 )   PT: 11.9 sec;   INR: 1.05 ratio    PTT - ( 22 Apr 2024 19:20 )  PTT:33.1 sec    Culture - Urine (collected 22 Apr 2024 19:20)  Source: Clean Catch Clean Catch (Midstream)  Final Report (23 Apr 2024 21:52):    No growth    Culture - Urine (collected 21 Apr 2024 19:28)  Source: Catheterized Catheterized  Preliminary Report (23 Apr 2024 22:54):    >100,000 CFU/ml Enterococcus faecalis    RADIOLOGY & ADDITIONAL TESTS:  Results Reviewed:   Imaging Personally Reviewed:  Electrocardiogram Personally Reviewed:    COORDINATION OF CARE:  Care Discussed with Consultants/Other Providers [Y/N]: Urology re overall care  Prior or Outpatient Records Reviewed [Y/N]:   [facility] Division of Hospital Medicine  Lizzy Stratton MD  Pager (M-F, 8A-5P):  In-house pager [phone]; Long-range pager [phone]  Other Times:  Please page Hospitalist in Charge -  In-house pager [phone]    Patient is a 73y old  Male who presents with a chief complaint of hematuria, 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  Seen this afternoon. Resting in bed, back on floor, CBI on. RN reports urine lighter pink than was. Pt reports discomfort from catheter better. No chest pain, SOB. Understands has SP tube now.  ADDITIONAL REVIEW OF SYSTEMS:    MEDICATIONS  (STANDING):  dextrose 5% + sodium chloride 0.45%. 1000 milliLiter(s) (75 mL/Hr) IV Continuous <Continuous>  fluticasone propionate 50 MICROgram(s)/spray Nasal Spray 1 Spray(s) Both Nostrils two times a day  heparin   Injectable 5000 Unit(s) SubCutaneous every 8 hours  influenza  Vaccine (HIGH DOSE) 0.7 milliLiter(s) IntraMuscular once  loratadine 10 milliGRAM(s) Oral daily  metoprolol succinate ER 50 milliGRAM(s) Oral daily  polyethylene glycol 3350 17 Gram(s) Oral daily  vancomycin  IVPB 1250 milliGRAM(s) IV Intermittent every 12 hours    MEDICATIONS  (PRN):  acetaminophen     Tablet .. 975 milliGRAM(s) Oral every 6 hours PRN Temp greater or equal to 38C (100.4F), Mild Pain (1 - 3)  aluminum hydroxide/magnesium hydroxide/simethicone Suspension 30 milliLiter(s) Oral every 4 hours PRN Dyspepsia  oxybutynin 5 milliGRAM(s) Oral every 8 hours PRN Bladder spasms    PHYSICAL EXAM:  Vital Signs Last 24 Hrs  T(C): 37 (24 Apr 2024 06:59), Max: 37 (24 Apr 2024 01:20)  T(F): 98.6 (24 Apr 2024 06:59), Max: 98.6 (24 Apr 2024 01:20)  HR: 71 (24 Apr 2024 06:59) (70 - 72)  BP: 153/74 (24 Apr 2024 06:59) (129/65 - 167/60)  BP(mean): --  RR: 16 (24 Apr 2024 06:59) (16 - 18)  SpO2: 97% (24 Apr 2024 06:59) (94% - 98%)    Parameters below as of 24 Apr 2024 05:32  Patient On (Oxygen Delivery Method): room air    GENERAL: NAD, sitting up on couch, speaks softly  RESPIRATORY: Clear to auscultation bilaterally; No rales, rhonchi, wheezing, or rubs  CARDIOVASCULAR: Regular rate and rhythm; No murmurs, rubs, or gallops  GASTROINTESTINAL: Soft, Nontender, Nondistended; Bowel sounds present, CBI to SPT  GENITOURINARY: +bowers/ urine light pink  EXTREMITIES:  2+ Peripheral Pulses, No clubbing, cyanosis, or edema  NERVOUS SYSTEM:  Alert & Oriented X3; Moving all 4 extremities; No gross sensory deficits  PSYCH: calm, appropriate    LABS:                        10.9   5.92  )-----------( 220      ( 23 Apr 2024 06:07 )             31.1     04-23    136  |  102  |  12  ----------------------------<  96  3.6   |  22  |  0.75    Ca    8.8      23 Apr 2024 06:07    TPro  6.1  /  Alb  3.6  /  TBili  0.4  /  DBili  x   /  AST  11  /  ALT  8   /  AlkPhos  83  04-23    PT/INR - ( 22 Apr 2024 19:20 )   PT: 11.9 sec;   INR: 1.05 ratio    PTT - ( 22 Apr 2024 19:20 )  PTT:33.1 sec    Culture - Urine (collected 22 Apr 2024 19:20)  Source: Clean Catch Clean Catch (Midstream)  Final Report (23 Apr 2024 21:52):    No growth    Culture - Urine (collected 21 Apr 2024 19:28)  Source: Catheterized Catheterized  Preliminary Report (23 Apr 2024 22:54):    >100,000 CFU/ml Enterococcus faecalis    RADIOLOGY & ADDITIONAL TESTS:  Results Reviewed:   Imaging Personally Reviewed:  Electrocardiogram Personally Reviewed:    COORDINATION OF CARE:  Care Discussed with Consultants/Other Providers [Y/N]: Urology re overall care  Prior or Outpatient Records Reviewed [Y/N]:

## 2024-04-24 NOTE — BRIEF OPERATIVE NOTE - NSICDXBRIEFPROCEDURE_GEN_ALL_CORE_FT
PROCEDURES:  Cystoscopy with fulguration of bladder, with evacuation of clots if indicated 24-Apr-2024 09:10:04  Beau Rivers  Suprapubic tube placement 24-Apr-2024 09:10:12  Beau Rivers

## 2024-04-24 NOTE — PROGRESS NOTE ADULT - PROBLEM SELECTOR PLAN 1
CBI  Analgesia Antiemetics  DVT prophylaxis  Incentive spirometry  Reg Diet / OOB  Clamp CBI at midnight

## 2024-04-24 NOTE — PROGRESS NOTE ADULT - SUBJECTIVE AND OBJECTIVE BOX
Overnight events:  None, CBI remained clear on slow drip    Subjective:  Pt offers no complaints    Objective:    Vital signs  T(C): , Max: 36.9 (04-22-24 @ 20:51)  HR: 70 (04-23-24 @ 06:23)  BP: 145/64 (04-23-24 @ 06:23)  SpO2: 96% (04-23-24 @ 06:23)  Wt(kg): --    Output   CBI clear    Gen: NAD  Abd: soft, nontender  : robinson secured    Labs                        10.9   5.92  )-----------( 220      ( 23 Apr 2024 06:07 )             31.1     23 Apr 2024 06:07    136    |  102    |  12     ----------------------------<  96     3.6     |  22     |  0.75     Ca    8.8        23 Apr 2024 06:07         Overnight events:  None, CBI remained clear on slow drip but had to be flushed for 20cc clot, patient agreeable to abx switch to vanco    Subjective:  Pt was already in transport to OR during AM rounds    Objective:    Vital signs  T(C): , Max: 36.9 (04-22-24 @ 20:51)  HR: 70 (04-23-24 @ 06:23)  BP: 145/64 (04-23-24 @ 06:23)  SpO2: 96% (04-23-24 @ 06:23)  Wt(kg): --    Output   CBI clear    Gen: NAD  Abd: soft, nontender  : robinson secured    Labs                        10.9   5.92  )-----------( 220      ( 23 Apr 2024 06:07 )             31.1     23 Apr 2024 06:07    136    |  102    |  12     ----------------------------<  96     3.6     |  22     |  0.75     Ca    8.8        23 Apr 2024 06:07

## 2024-04-24 NOTE — PROGRESS NOTE ADULT - ASSESSMENT
74yo M with hx HTN, aortic stenosis, sick sinus syndrome s/p pacemaker and prostate cancer s/p prostatectomy (2015) and radiation (2018) and radiation cystitis s/p hyperbaric oxygen therapy who presents to ED multiple times for non-draining bowers due to hematuria/ clot retention. Pt returned 4/22/2024  for same issue of nondraining bowers despite being irrigated and upsized in the ED yesterday. Patient denies fevers/chills/nausea/vomiting at home. At the bedside, patient was 6-eye'ed to crystal clear after just 1 bottle of irrigant and ~20cc old clot was flushed out, 22fr 3-way catheter was placed and CBI was started on very slow drip.     4/23: CBI crystal clear, H&H stable  4/24: POD0: Cysto, clot evac, fulguration, poss SPT placement    Plan:  -AM labs reviewed  -continue CBI, monitor color  -f/u Ucx  -continue CTX  -f/u medicine  -preop for OR tomorrow, consent signed  -NPO/IVF after MN  -DVT prophy, IS, OOB, ambulate

## 2024-04-24 NOTE — PROGRESS NOTE ADULT - PROBLEM SELECTOR PLAN 1
in the setting of recurrent hematuria, Hb13.2 --> 10.9  urine cleared on CBI  serial CBC  transfuse as needed.

## 2024-04-24 NOTE — PROGRESS NOTE ADULT - PROBLEM SELECTOR PLAN 3
with hematuria presumed d/t radiation cystitis from prostate cancer therapy  management per Urology - plan for cysto, clot evac, fulguration, possible SPT  - CBI/bowers  - orgovyx held for now. with hematuria presumed d/t radiation cystitis from prostate cancer therapy  management per Urology - 4/24 underwent Cysto, clot evacuation, Fulguration, SPT placement --> CBI to SPT  - orgovyx held for now.

## 2024-04-24 NOTE — PROGRESS NOTE ADULT - SUBJECTIVE AND OBJECTIVE BOX
Note    Post op Check    s/p : Cysto, clot evacuation, Fulguration, SPT placement    Pt seen / examined without complaints pain controlled    Vital Signs Last 24 Hrs  T(C): 36.2 (24 Apr 2024 11:19), Max: 37 (24 Apr 2024 01:20)  T(F): 97.2 (24 Apr 2024 11:19), Max: 98.6 (24 Apr 2024 01:20)  HR: 70 (24 Apr 2024 11:19) (70 - 75)  BP: 148/69 (24 Apr 2024 11:19) (125/63 - 167/60)  BP(mean): 88 (24 Apr 2024 10:30) (76 - 96)  RR: 17 (24 Apr 2024 11:19) (12 - 18)  SpO2: 95% (24 Apr 2024 11:19) (94% - 100%)    Parameters below as of 24 Apr 2024 11:19  Patient On (Oxygen Delivery Method): room air        I&O's Summary    23 Apr 2024 07:01  -  24 Apr 2024 07:00  --------------------------------------------------------  IN: 0 mL / OUT: 800 mL / NET: -800 mL    24 Apr 2024 07:01  -  24 Apr 2024 14:23  --------------------------------------------------------  IN: 100 mL / OUT: 0 mL / NET: 100 mL    EBL min    PHYSICAL EXAM:       Constitutional: awake alert NAD    Respiratory: No resp distress    Cardiovascular: RR    Gastrointestinal: soft, SPT in place with CBI flowing in; dressing CDI;     Genitourinary: Gr in place draining well, light clear color    Extremities: + venodynes                                        10.9   5.92  )-----------( 220      ( 23 Apr 2024 06:07 )             31.1       04-23    136  |  102  |  12  ----------------------------<  96  3.6   |  22  |  0.75    Ca    8.8      23 Apr 2024 06:07    TPro  6.1  /  Alb  3.6  /  TBili  0.4  /  DBili  x   /  AST  11  /  ALT  8   /  AlkPhos  83  04-23

## 2024-04-24 NOTE — PROGRESS NOTE ADULT - ASSESSMENT
73M HTN, aortic stenosis s/p TAVR 2020, s/p pacemaker and prostate cancer s/p prostatectomy (2015), radiation (2018) complicated with radiation, proctitis, radiation cystitis a/w non-draining bowers due to hematuria/clot retention. 73M HTN, aortic stenosis s/p TAVR 2020, s/p pacemaker and prostate cancer s/p prostatectomy (2015), radiation (2018) complicated with radiation, proctitis, radiation cystitis a/w non-draining bowers due to hematuria/clot retention.  4/24 underwent Cysto, clot evacuation, Fulguration, SPT placement

## 2024-04-24 NOTE — BRIEF OPERATIVE NOTE - OPERATION/FINDINGS
Cystoscopy, Clot Evacuation, Fulguration and SPT placement. 16Fr SPT in place and 16fr urethral catheter. CBI running through SPT out of urethral bowers

## 2024-04-25 ENCOUNTER — TRANSCRIPTION ENCOUNTER (OUTPATIENT)
Age: 74
End: 2024-04-25

## 2024-04-25 VITALS
TEMPERATURE: 99 F | OXYGEN SATURATION: 99 % | RESPIRATION RATE: 17 BRPM | HEART RATE: 81 BPM | DIASTOLIC BLOOD PRESSURE: 66 MMHG | SYSTOLIC BLOOD PRESSURE: 121 MMHG

## 2024-04-25 LAB
ANION GAP SERPL CALC-SCNC: 11 MMOL/L — SIGNIFICANT CHANGE UP (ref 7–14)
BUN SERPL-MCNC: 10 MG/DL — SIGNIFICANT CHANGE UP (ref 7–23)
CALCIUM SERPL-MCNC: 8.7 MG/DL — SIGNIFICANT CHANGE UP (ref 8.4–10.5)
CHLORIDE SERPL-SCNC: 104 MMOL/L — SIGNIFICANT CHANGE UP (ref 98–107)
CO2 SERPL-SCNC: 22 MMOL/L — SIGNIFICANT CHANGE UP (ref 22–31)
CREAT SERPL-MCNC: 0.72 MG/DL — SIGNIFICANT CHANGE UP (ref 0.5–1.3)
EGFR: 95 ML/MIN/1.73M2 — SIGNIFICANT CHANGE UP
GLUCOSE SERPL-MCNC: 94 MG/DL — SIGNIFICANT CHANGE UP (ref 70–99)
HCT VFR BLD CALC: 32.6 % — LOW (ref 39–50)
HGB BLD-MCNC: 11.2 G/DL — LOW (ref 13–17)
MAGNESIUM SERPL-MCNC: 2.3 MG/DL — SIGNIFICANT CHANGE UP (ref 1.6–2.6)
MCHC RBC-ENTMCNC: 31.7 PG — SIGNIFICANT CHANGE UP (ref 27–34)
MCHC RBC-ENTMCNC: 34.4 GM/DL — SIGNIFICANT CHANGE UP (ref 32–36)
MCV RBC AUTO: 92.4 FL — SIGNIFICANT CHANGE UP (ref 80–100)
NRBC # BLD: 0 /100 WBCS — SIGNIFICANT CHANGE UP (ref 0–0)
NRBC # FLD: 0 K/UL — SIGNIFICANT CHANGE UP (ref 0–0)
PHOSPHATE SERPL-MCNC: 4.1 MG/DL — SIGNIFICANT CHANGE UP (ref 2.5–4.5)
PLATELET # BLD AUTO: 239 K/UL — SIGNIFICANT CHANGE UP (ref 150–400)
POTASSIUM SERPL-MCNC: 4 MMOL/L — SIGNIFICANT CHANGE UP (ref 3.5–5.3)
POTASSIUM SERPL-SCNC: 4 MMOL/L — SIGNIFICANT CHANGE UP (ref 3.5–5.3)
RBC # BLD: 3.53 M/UL — LOW (ref 4.2–5.8)
RBC # FLD: 12 % — SIGNIFICANT CHANGE UP (ref 10.3–14.5)
SODIUM SERPL-SCNC: 137 MMOL/L — SIGNIFICANT CHANGE UP (ref 135–145)
WBC # BLD: 7.45 K/UL — SIGNIFICANT CHANGE UP (ref 3.8–10.5)
WBC # FLD AUTO: 7.45 K/UL — SIGNIFICANT CHANGE UP (ref 3.8–10.5)

## 2024-04-25 PROCEDURE — 99232 SBSQ HOSP IP/OBS MODERATE 35: CPT

## 2024-04-25 RX ORDER — SODIUM CHLORIDE 9 MG/ML
1000 INJECTION, SOLUTION INTRAVENOUS
Refills: 0 | Status: DISCONTINUED | OUTPATIENT
Start: 2024-04-25 | End: 2024-04-25

## 2024-04-25 RX ADMIN — Medication 975 MILLIGRAM(S): at 00:27

## 2024-04-25 RX ADMIN — Medication 50 MILLIGRAM(S): at 06:36

## 2024-04-25 RX ADMIN — Medication 166.67 MILLIGRAM(S): at 00:25

## 2024-04-25 RX ADMIN — Medication 975 MILLIGRAM(S): at 01:27

## 2024-04-25 RX ADMIN — Medication 3 MILLIGRAM(S): at 00:27

## 2024-04-25 RX ADMIN — CEFTRIAXONE 100 MILLIGRAM(S): 500 INJECTION, POWDER, FOR SOLUTION INTRAMUSCULAR; INTRAVENOUS at 09:39

## 2024-04-25 RX ADMIN — Medication 1 SPRAY(S): at 06:36

## 2024-04-25 RX ADMIN — Medication 975 MILLIGRAM(S): at 09:43

## 2024-04-25 RX ADMIN — Medication 975 MILLIGRAM(S): at 10:22

## 2024-04-25 RX ADMIN — POLYETHYLENE GLYCOL 3350 17 GRAM(S): 17 POWDER, FOR SOLUTION ORAL at 13:29

## 2024-04-25 RX ADMIN — LORATADINE 10 MILLIGRAM(S): 10 TABLET ORAL at 13:30

## 2024-04-25 RX ADMIN — HEPARIN SODIUM 5000 UNIT(S): 5000 INJECTION INTRAVENOUS; SUBCUTANEOUS at 13:30

## 2024-04-25 NOTE — PROGRESS NOTE ADULT - PROBLEM SELECTOR PLAN 1
in the setting of recurrent hematuria, Hb13.2 --> 10.9  urine cleared on CBI  serial CBC  transfuse as needed. in the setting of recurrent hematuria, Hb13.2 --> 10.9  urine cleared on CBI  CBC stabilized

## 2024-04-25 NOTE — DISCHARGE NOTE PROVIDER - PROVIDER TOKENS
PROVIDER:[TOKEN:[[id number]]],PROVIDER:[TOKEN:[[id number]]],PROVIDER:[TOKEN:[[id number]]]

## 2024-04-25 NOTE — PROGRESS NOTE ADULT - SUBJECTIVE AND OBJECTIVE BOX
ANESTHESIA POSTOP CHECK    73y Male POSTOP DAY 1     Vital Signs Last 24 Hrs  T(C): 36.9 (25 Apr 2024 09:04), Max: 36.9 (25 Apr 2024 09:04)  T(F): 98.5 (25 Apr 2024 09:04), Max: 98.5 (25 Apr 2024 09:04)  HR: 77 (25 Apr 2024 09:04) (70 - 77)  BP: 125/61 (25 Apr 2024 09:04) (125/61 - 154/69)  BP(mean): --  RR: 18 (25 Apr 2024 09:04) (17 - 18)  SpO2: 97% (25 Apr 2024 09:04) (95% - 97%)    Parameters below as of 25 Apr 2024 09:04  Patient On (Oxygen Delivery Method): room air      I&O's Summary    24 Apr 2024 07:01  -  25 Apr 2024 07:00  --------------------------------------------------------  IN: 100 mL / OUT: 2200 mL / NET: -2100 mL    25 Apr 2024 07:01  -  25 Apr 2024 11:12  --------------------------------------------------------  IN: 0 mL / OUT: 51 mL / NET: -51 mL        [X ] NO APPARENT ANESTHESIA COMPLICATIONS      Comments:

## 2024-04-25 NOTE — DISCHARGE NOTE PROVIDER - CARE PROVIDERS DIRECT ADDRESSES
[email or web address]

## 2024-04-25 NOTE — PROGRESS NOTE ADULT - ASSESSMENT
72yo M with hx HTN, aortic stenosis, sick sinus syndrome s/p pacemaker and prostate cancer s/p prostatectomy (2015) and radiation (2018) and radiation cystitis s/p hyperbaric oxygen therapy who presents to ED multiple times for non-draining bowers due to hematuria/ clot retention. Pt returned 4/22/2024  for same issue of nondraining bowers despite being irrigated and upsized in the ED yesterday. Patient denies fevers/chills/nausea/vomiting at home. At the bedside, patient was 6-eye'ed to crystal clear after just 1 bottle of irrigant and ~20cc old clot was flushed out, 22fr 3-way catheter was placed and CBI was started on very slow drip.     4/23: CBI crystal clear, H&H stable  4/24: underwent cysto, clot evac, fulguration, SPT placement, placed on CBI  4/25: CBI remained clear and was clamped at MN, bowers removed this AM, vanco d/c, Ucx neg    Plan:  -AM CBC reviewed, f/u BMP  -d/c bowers  -f/u Ucx  -continue CTX  -f/u medicine  -home care referral  -DVT prophy, IS, OOB, ambulate  -d/c home later today, abx plan TBD 72yo M with hx HTN, aortic stenosis, sick sinus syndrome s/p pacemaker and prostate cancer s/p prostatectomy (2015) and radiation (2018) and radiation cystitis s/p hyperbaric oxygen therapy who presents to ED multiple times for non-draining bowers due to hematuria/ clot retention. Pt returned 4/22/2024  for same issue of nondraining bowers despite being irrigated and upsized in the ED yesterday. Patient denies fevers/chills/nausea/vomiting at home. At the bedside, patient was 6-eye'ed to crystal clear after just 1 bottle of irrigant and ~20cc old clot was flushed out, 22fr 3-way catheter was placed and CBI was started on very slow drip.     4/23: CBI crystal clear, H&H stable  4/24: underwent cysto, clot evac, fulguration, SPT placement, placed on CBI  4/25: CBI remained clear and was clamped at MN, bowers removed this AM, vanco d/c, Ucx neg    Plan:  -AM CBC reviewed, f/u BMP  -d/c bowers  -f/u Ucx  -continue CTX  -f/u medicine  -home care referral  -DVT prophy, IS, OOB, ambulate  -d/c home later today

## 2024-04-25 NOTE — DISCHARGE NOTE PROVIDER - CARE PROVIDER_API CALL
Andrew Melgoza  Urology  [address] [place] [id number]  Phone: [phone]  Fax: [phone]  Follow Up Time:     Bogdan Hoang)  Urology  [address], [address]  [place] [address]  Phone: [phone]  Fax: [phone]  Follow Up Time:     Anthony Huang  Interventional Cardiology  [address] [place] [address]  Phone: [phone]  Fax: [phone]  Follow Up Time:

## 2024-04-25 NOTE — PROGRESS NOTE ADULT - PROBLEM SELECTOR PLAN 2
appreciate cardiology input, no absolute contraindication to procedure  f/u echo, ECG. appreciate cardiology input, no absolute contraindication to procedure  paced on rhythm strip  echo noted  clinically stable

## 2024-04-25 NOTE — DISCHARGE NOTE NURSING/CASE MANAGEMENT/SOCIAL WORK - NSSCTYPOFSERV_GEN_ALL_CORE
A Nurse is anticipated to visit you the day after hospital discharge; the above home care agency will contact you to arrange the time of initial visit.

## 2024-04-25 NOTE — DISCHARGE NOTE NURSING/CASE MANAGEMENT/SOCIAL WORK - PATIENT PORTAL LINK FT
You can access the FollowMyHealth Patient Portal offered by [facility] by registering at the following website: [email or web address]. By joining [employer]’s FollowMyHealth portal, you will also be able to view your health information using other applications (apps) compatible with our system.

## 2024-04-25 NOTE — PROGRESS NOTE ADULT - SUBJECTIVE AND OBJECTIVE BOX
Overnight events:      Subjective:  CBI remained clear and was clamped    Objective:  Pt offers no complaints    Vital signs  T(C): , Max: 36.9 (04-24-24 @ 09:00)  HR: 73 (04-25-24 @ 06:36)  BP: 154/69 (04-25-24 @ 06:36)  SpO2: 96% (04-25-24 @ 06:36)  Wt(kg): --    Output   Robinson: yellow  04-24 @ 07:01  -  04-25 @ 07:00  --------------------------------------------------------  IN: 100 mL / OUT: 2200 mL / NET: -2100 mL        Gen: NAD  Abd: SPT dressing c/d/i, abd soft, nontender  : robinson removed    Labs                        11.2   7.45  )-----------( 239      ( 25 Apr 2024 05:55 )             32.6

## 2024-04-25 NOTE — DISCHARGE NOTE PROVIDER - HOSPITAL COURSE
74yo M with hx HTN, aortic stenosis, sick sinus syndrome s/p pacemaker and prostate cancer s/p prostatectomy (2015) and radiation (2018) and radiation cystitis s/p hyperbaric oxygen therapy who presents to ED multiple times for non-draining bowers due to hematuria/ clot retention. Pt returned 4/22/2024  for same issue of nondraining bowers despite being irrigated and upsized in the ED yesterday. Patient denies fevers/chills/nausea/vomiting at home. At the bedside, patient was 6-eye'ed to crystal clear after just 1 bottle of irrigant and ~20cc old clot was flushed out, 22fr 3-way catheter was placed and CBI was started on very slow drip.     4/23: CBI crystal clear, H&H stable  4/24: underwent cysto, clot evac, fulguration, SPT placement, placed on CBI  4/25: CBI remained clear and was clamped at MN, bowers removed this AM, vanco d/c, Ucx neg.  Pt d/c with SPT to gravity drainage to f/u with Dr. Hoang. 74yo M with hx HTN, aortic stenosis, sick sinus syndrome s/p pacemaker and prostate cancer s/p prostatectomy (2015) and radiation (2018) and radiation cystitis s/p hyperbaric oxygen therapy who presents to ED multiple times for non-draining bowers due to hematuria/ clot retention. Pt returned 4/22/2024  for same issue of nondraining bowers despite being irrigated and upsized in the ED yesterday. Patient denies fevers/chills/nausea/vomiting at home. At the bedside, patient was 6-eye'ed to crystal clear after just 1 bottle of irrigant and ~20cc old clot was flushed out, 22fr 3-way catheter was placed and CBI was started on very slow drip.     4/23: CBI crystal clear, H&H stable, cardiology consult obtained, medically optimized, Echo essentially normal  4/24: underwent cysto, clot evac, fulguration, SPT placement, placed on CBI  4/25: CBI remained clear and was clamped at MN, bowers removed this AM, vanco d/c, Ucx neg.  Pt d/c with SPT to gravity drainage to f/u with Dr. Hoang.

## 2024-04-25 NOTE — PROGRESS NOTE ADULT - ASSESSMENT
73M HTN, aortic stenosis s/p TAVR 2020, s/p pacemaker and prostate cancer s/p prostatectomy (2015), radiation (2018) complicated with radiation, proctitis, radiation cystitis a/w non-draining bowers due to hematuria/clot retention.  4/24 underwent Cysto, clot evacuation, Fulguration, SPT placement

## 2024-04-25 NOTE — PROGRESS NOTE ADULT - TIME BILLING
Preparing to see the patient including review of tests and other providers' notes, confirming history with patient/family member, performing medical examination and evaluation, counseling and educating the patient/family/caregiver, ordering medications, tests and procedures, communicating with other health care professionals, documenting clinical information in the EMR, independently interpreting results and communicating results to the patient/family/caregiver, care coordination
Preparing to see the patient including review of tests and other providers' notes, confirming history with patient/family member, performing medical examination and evaluation, counseling and educating the patient/family/caregiver, ordering medications, tests and procedures, communicating with other health care professionals, documenting clinical information in the EMR, independently interpreting results and communicating results to the patient/family/caregiver, care coordination

## 2024-04-25 NOTE — PROGRESS NOTE ADULT - SUBJECTIVE AND OBJECTIVE BOX
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     ADDITIONAL REVIEW OF SYSTEMS:    MEDICATIONS  (STANDING):  cefTRIAXone   IVPB      cefTRIAXone   IVPB 1000 milliGRAM(s) IV Intermittent every 24 hours  fluticasone propionate 50 MICROgram(s)/spray Nasal Spray 1 Spray(s) Both Nostrils two times a day  heparin   Injectable 5000 Unit(s) SubCutaneous every 8 hours  hydrocortisone 1% Cream 1 Application(s) Topical daily  influenza  Vaccine (HIGH DOSE) 0.7 milliLiter(s) IntraMuscular once  loratadine 10 milliGRAM(s) Oral daily  melatonin 3 milliGRAM(s) Oral at bedtime  metoprolol succinate ER 50 milliGRAM(s) Oral daily  polyethylene glycol 3350 17 Gram(s) Oral daily    MEDICATIONS  (PRN):  acetaminophen     Tablet .. 975 milliGRAM(s) Oral every 6 hours PRN Temp greater or equal to 38C (100.4F), Mild Pain (1 - 3)  aluminum hydroxide/magnesium hydroxide/simethicone Suspension 30 milliLiter(s) Oral every 4 hours PRN Dyspepsia  lidocaine 5% Ointment 1 Application(s) Topical every 4 hours PRN bowers pain  oxybutynin 5 milliGRAM(s) Oral every 8 hours PRN Bladder spasms    PHYSICAL EXAM:  Vital Signs Last 24 Hrs  T(C): 36.3 (25 Apr 2024 06:36), Max: 36.9 (24 Apr 2024 09:00)  T(F): 97.4 (25 Apr 2024 06:36), Max: 98.4 (24 Apr 2024 09:00)  HR: 73 (25 Apr 2024 06:36) (70 - 77)  BP: 154/69 (25 Apr 2024 06:36) (125/63 - 154/69)  BP(mean): 88 (24 Apr 2024 10:30) (76 - 96)  RR: 17 (25 Apr 2024 06:36) (12 - 18)  SpO2: 96% (25 Apr 2024 06:36) (95% - 100%)    Parameters below as of 25 Apr 2024 06:36  Patient On (Oxygen Delivery Method): room air    GENERAL: NAD, resting in bed  RESPIRATORY: Clear to auscultation bilaterally; No rales, rhonchi, wheezing, or rubs  CARDIOVASCULAR: Regular rate and rhythm; No murmurs, rubs, or gallops  GASTROINTESTINAL: Soft, Nontender, Nondistended; Bowel sounds present, +SPT  GENITOURINARY: +bowers/ urine light pink  EXTREMITIES:  2+ Peripheral Pulses, No clubbing, cyanosis, or edema  NERVOUS SYSTEM:  Alert & Oriented X3; Moving all 4 extremities; No gross sensory deficits  PSYCH: calm, appropriate    LABS:                        11.2   7.45  )-----------( 239      ( 25 Apr 2024 05:55 )             32.6     04-25    137  |  104  |  10  ----------------------------<  94  4.0   |  22  |  0.72    Ca    8.7      25 Apr 2024 05:55  Phos  4.1     04-25  Mg     2.30     04-25    RADIOLOGY & ADDITIONAL TESTS:  Results Reviewed:   Imaging Personally Reviewed:  Electrocardiogram Personally Reviewed:    COORDINATION OF CARE:  Care Discussed with Consultants/Other Providers [Y/N]: Urology re overall care   Prior or Outpatient Records Reviewed [Y/N]:   [facility] Division of Hospital Medicine  Lizzy Stratton MD  Pager (M-F, 8A-5P):  In-house pager [phone]; Long-range pager [phone]  Other Times:  Please page Hospitalist in Charge -  In-house pager [phone]    Patient is a 73y old  Male who presents with a chief complaint of hematuria, 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   Resting in bed. RN notes leakage of urine from penis. Pt reports that he has that chronically although bit more now "spurting out". Uses Depends at home. No chest pain, SOB, nausea.   ADDITIONAL REVIEW OF SYSTEMS:    MEDICATIONS  (STANDING):  cefTRIAXone   IVPB      cefTRIAXone   IVPB 1000 milliGRAM(s) IV Intermittent every 24 hours  fluticasone propionate 50 MICROgram(s)/spray Nasal Spray 1 Spray(s) Both Nostrils two times a day  heparin   Injectable 5000 Unit(s) SubCutaneous every 8 hours  hydrocortisone 1% Cream 1 Application(s) Topical daily  influenza  Vaccine (HIGH DOSE) 0.7 milliLiter(s) IntraMuscular once  loratadine 10 milliGRAM(s) Oral daily  melatonin 3 milliGRAM(s) Oral at bedtime  metoprolol succinate ER 50 milliGRAM(s) Oral daily  polyethylene glycol 3350 17 Gram(s) Oral daily    MEDICATIONS  (PRN):  acetaminophen     Tablet .. 975 milliGRAM(s) Oral every 6 hours PRN Temp greater or equal to 38C (100.4F), Mild Pain (1 - 3)  aluminum hydroxide/magnesium hydroxide/simethicone Suspension 30 milliLiter(s) Oral every 4 hours PRN Dyspepsia  lidocaine 5% Ointment 1 Application(s) Topical every 4 hours PRN bowers pain  oxybutynin 5 milliGRAM(s) Oral every 8 hours PRN Bladder spasms    PHYSICAL EXAM:  Vital Signs Last 24 Hrs  T(C): 36.3 (25 Apr 2024 06:36), Max: 36.9 (24 Apr 2024 09:00)  T(F): 97.4 (25 Apr 2024 06:36), Max: 98.4 (24 Apr 2024 09:00)  HR: 73 (25 Apr 2024 06:36) (70 - 77)  BP: 154/69 (25 Apr 2024 06:36) (125/63 - 154/69)  BP(mean): 88 (24 Apr 2024 10:30) (76 - 96)  RR: 17 (25 Apr 2024 06:36) (12 - 18)  SpO2: 96% (25 Apr 2024 06:36) (95% - 100%)    Parameters below as of 25 Apr 2024 06:36  Patient On (Oxygen Delivery Method): room air    GENERAL: NAD, resting in bed  RESPIRATORY: Clear to auscultation bilaterally; No rales, rhonchi, wheezing, or rubs  CARDIOVASCULAR: Regular rate and rhythm; No murmurs, rubs, or gallops  GASTROINTESTINAL: Soft, Nontender, Nondistended; Bowel sounds present  GENITOURINARY: bowers removed, +SPT to bag  EXTREMITIES:  2+ Peripheral Pulses, No clubbing, cyanosis, or edema  NERVOUS SYSTEM:  Alert & Oriented X3; Moving all 4 extremities; No gross sensory deficits  PSYCH: calm, appropriate    LABS:                        11.2   7.45  )-----------( 239      ( 25 Apr 2024 05:55 )             32.6     04-25    137  |  104  |  10  ----------------------------<  94  4.0   |  22  |  0.72    Ca    8.7      25 Apr 2024 05:55  Phos  4.1     04-25  Mg     2.30     04-25    RADIOLOGY & ADDITIONAL TESTS:  < from: TTE W or WO Ultrasound Enhancing Agent (04.23.24 @ 14:11) >   1. Left ventricular cavity is small. Left ventricular wall thickness is normal. Left ventricular systolic function is normal with an ejection fraction of 69 % by Kohli's method of disks. There are no regional wall motion abnormalities seen.   2. Normal left ventricular diastolic function, with normal filling pressure.   3. Normal right ventricular cavity size, with normal wall thickness, and normal systolic function.   4. A Transcatheter deployed (TAVR) valve replacement is present in the aortic position The prosthetic valve is well seated with normal function. No intravalvular regurgitation.   5. Normal left and right atrial size.   6. No pericardial effusion seen.   7. Technically difficult image quality.    Results Reviewed:   Imaging Personally Reviewed:  Electrocardiogram Personally Reviewed:    COORDINATION OF CARE:  Care Discussed with Consultants/Other Providers [Y/N]: Urology re overall care   Prior or Outpatient Records Reviewed [Y/N]:

## 2024-04-25 NOTE — PROGRESS NOTE ADULT - PROBLEM SELECTOR PLAN 4
on cipro  f/u Ucx sens.... cipro--> CTX  Ent willie, repeat neg cipro--> CTX  Ent willie, repeat neg  abx per urology

## 2024-04-25 NOTE — PROGRESS NOTE ADULT - REASON FOR ADMISSION
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

## 2024-04-25 NOTE — DISCHARGE NOTE PROVIDER - NSDCMRMEDTOKEN_GEN_ALL_CORE_FT
acetaminophen 325 mg oral tablet: 3 tab(s) orally every 6 hours as needed for pain  aluminum hydroxide-magnesium hydroxide 200 mg-200 mg/5 mL oral suspension: 30 milliliter(s) orally every 4 hours, As needed, Dyspepsia  Claritin 10 mg oral tablet: 1 tab(s) orally once a day  Colace 50 mg oral capsule: 1 cap(s) orally once a day  fluticasone 50 mcg/inh nasal spray: 1 spray(s) nasal 2 times a day  metoprolol succinate 50 mg oral tablet, extended release: 1 tab(s) orally once a day  Multiple Vitamins oral tablet: 1 tab(s) orally once a day  Orgovyx 120 mg oral tablet: 1 tab(s) orally once a day  polyethylene glycol 3350 oral powder for reconstitution: 17 gram(s) orally once a day

## 2024-04-25 NOTE — DISCHARGE NOTE NURSING/CASE MANAGEMENT/SOCIAL WORK - NSDCPNINST_GEN_ALL_CORE
Notify Dr Hoang if you experience any increase in pain not relieved with medication, any dark red blood or clots in urine, any persistent nausea vomiting, if you are unable to urinate or if you develop a fever >100.5.  Drink plenty of fluids.  No heavy lifting or straining.  Use over the counter stool softeners to assist with constipation.   Notify Dr Hoang if you experience any increase in pain not relieved with medication, any dark red blood or clots in urine, any persistent nausea vomiting, or if you develop a fever >100.5.  Drink plenty of fluids.  No heavy lifting or straining.  Suprapubic catheter to gravity drainage,  empty catheter as instructed, remember good hand washing techniques.   Use over the counter stool softeners to assist with constipation.

## 2024-04-25 NOTE — PROGRESS NOTE ADULT - PROBLEM SELECTOR PLAN 3
with hematuria presumed d/t radiation cystitis from prostate cancer therapy  management per Urology - 4/24 underwent Cysto, clot evacuation, Fulguration, SPT placement --> CBI to SPT  - orgovyx held for now. with hematuria presumed d/t radiation cystitis from prostate cancer therapy  management per Urology - 4/24 underwent Cysto, clot evacuation, Fulguration, SPT placement --> SPT  - orgovyx held for now --> resume on d/c

## 2024-04-25 NOTE — DISCHARGE NOTE PROVIDER - NSDCFUSCHEDAPPT_GEN_ALL_CORE_FT
Andrew Melgoza  [facility]  UROLOGY [address]  Scheduled Appointment: 04/30/2024    Anthony Huang  [facility]  CARDIOLOGY [address]  Scheduled Appointment: 05/08/2024    Bogdan Hoang  [facility]  UROLOGY [address]  Scheduled Appointment: 05/14/2024    Stanislav Claudio  [facility]  ONCORTHO [address]  Scheduled Appointment: 05/21/2024    [facility]  CARDIOLOGY [address]  Scheduled Appointment: 06/19/2024    Arya Guzmán  [facility]  [address]ARYNG [address]  Scheduled Appointment: 07/15/2024

## 2024-04-25 NOTE — DISCHARGE NOTE PROVIDER - NSDCCPCAREPLAN_GEN_ALL_CORE_FT
PRINCIPAL DISCHARGE DIAGNOSIS  Diagnosis: Gross hematuria  Assessment and Plan of Treatment: Empty SPT bag as needed.  You may shower.  Change bandage when soiled.  Dr. Hoang's office will call you with a follow up appointment.  Call the office if you have fever greater than 101, no urine in bag, pain not relieved with pain medication, nausea/vomiting.        SECONDARY DISCHARGE DIAGNOSES  Diagnosis: HTN (hypertension)  Assessment and Plan of Treatment: Continue current home medications and follow up with your primary care provider      Diagnosis: Aortic stenosis  Assessment and Plan of Treatment: Continue current home medications and follow up with Dr. Huang      Diagnosis: Prostate cancer  Assessment and Plan of Treatment: Continue current home medications and follow up with Dr. Hoang       PRINCIPAL DISCHARGE DIAGNOSIS  Diagnosis: Gross hematuria  Assessment and Plan of Treatment: Empty SPT bag as needed.  You may shower.  Change bandage when soiled. You may have some urine leaking from your penis for a few days.  Dr. Hoang's office will call you with a follow up appointment.  Call the office if you have fever greater than 101, no urine in bag, pain not relieved with pain medication, nausea/vomiting.        SECONDARY DISCHARGE DIAGNOSES  Diagnosis: HTN (hypertension)  Assessment and Plan of Treatment: Continue current home medications and follow up with your primary care provider      Diagnosis: Aortic stenosis  Assessment and Plan of Treatment: Continue current home medications and follow up with Dr. Huang      Diagnosis: Prostate cancer  Assessment and Plan of Treatment: Continue current home medications and follow up with Dr. Hoang

## 2024-04-29 ENCOUNTER — NON-APPOINTMENT (OUTPATIENT)
Age: 74
End: 2024-04-29

## 2024-04-29 NOTE — H&P PST ADULT - PHARYNX
Attempted to reach patient regarding missed appointment on 4/29/24. Unable to contact at this time. Left message to reschedule.  
no redness/no discharge

## 2024-04-30 ENCOUNTER — APPOINTMENT (OUTPATIENT)
Dept: UROLOGY | Facility: CLINIC | Age: 74
End: 2024-04-30

## 2024-05-08 ENCOUNTER — APPOINTMENT (OUTPATIENT)
Dept: CARDIOLOGY | Facility: CLINIC | Age: 74
End: 2024-05-08
Payer: MEDICARE

## 2024-05-08 ENCOUNTER — NON-APPOINTMENT (OUTPATIENT)
Age: 74
End: 2024-05-08

## 2024-05-08 VITALS
SYSTOLIC BLOOD PRESSURE: 125 MMHG | DIASTOLIC BLOOD PRESSURE: 73 MMHG | HEIGHT: 61 IN | HEART RATE: 80 BPM | WEIGHT: 201 LBS | BODY MASS INDEX: 37.95 KG/M2 | OXYGEN SATURATION: 97 %

## 2024-05-08 DIAGNOSIS — I10 ESSENTIAL (PRIMARY) HYPERTENSION: ICD-10-CM

## 2024-05-08 DIAGNOSIS — Z95.3 PRESENCE OF XENOGENIC HEART VALVE: ICD-10-CM

## 2024-05-08 DIAGNOSIS — I45.10 UNSPECIFIED RIGHT BUNDLE-BRANCH BLOCK: ICD-10-CM

## 2024-05-08 PROCEDURE — 93000 ELECTROCARDIOGRAM COMPLETE: CPT

## 2024-05-08 PROCEDURE — G2211 COMPLEX E/M VISIT ADD ON: CPT

## 2024-05-08 PROCEDURE — 99214 OFFICE O/P EST MOD 30 MIN: CPT

## 2024-05-08 NOTE — DISCUSSION/SUMMARY
[FreeTextEntry1] : Salvador's symptoms have improved since his TAVR procedure.  He is also now status post [employer] dual permanent pacemaker implantation for (SSS) a 3.1-second pause.     I reviewed his echocardiogram from September 15, 2022.  This revealed an ejection fraction of 62%, with mild mitral regurgitation.  His transcatheter aortic valve was functioning well, with mild paravalvular aortic regurgitation.  Echocardiography was performed November 1, 2023.  This revealed a normal ejection fraction, with mild mitral regurgitation.  There was mild to moderate paravalvular regurgitation associated with his transcatheter aortic valve replacement.  Inpatient echocardiography was performed April 23, 2024.  This revealed a normal ejection fraction, with normal function of his aortic valve replacement.  Coronary angiography in 2020 prior to his aortic valve replacement revealed no significant coronary artery disease. Most recent device check from March, 2024 demonstrates normal function. Battery longevity of 10.5 yrs.    He had a prominent diastolic murmur on examination, on the basis of his aortic insufficiency.  His cardiac murmur is much less pronounced today, and his most recent echocardiogram did not reveal meaningful aortic insufficiency.    There is no need for any further testing at this time.  If all is well, he will see me in 6 months.   [EKG obtained to assist in diagnosis and management of assessed problem(s)] : EKG obtained to assist in diagnosis and management of assessed problem(s)

## 2024-05-08 NOTE — PHYSICAL EXAM
[Well Developed] : well developed [Well Nourished] : well nourished [No Acute Distress] : no acute distress [Normal Venous Pressure] : normal venous pressure [No Carotid Bruit] : no carotid bruit [Normal S1, S2] : normal S1, S2 [No Rub] : no rub [No Murmur] : no murmurs heard [Clear Lung Fields] : clear lung fields [Good Air Entry] : good air entry [No Respiratory Distress] : no respiratory distress  [Soft] : abdomen soft [Non Tender] : non-tender [No Masses/organomegaly] : no masses/organomegaly [Normal Bowel Sounds] : normal bowel sounds [Normal Gait] : normal gait [No Edema] : no edema [No Cyanosis] : no cyanosis [No Clubbing] : no clubbing [No Varicosities] : no varicosities [No Rash] : no rash [No Skin Lesions] : no skin lesions [Moves all extremities] : moves all extremities [No Focal Deficits] : no focal deficits [Normal Speech] : normal speech [Alert and Oriented] : alert and oriented [Normal memory] : normal memory [General Appearance - Well Developed] : well developed [Normal Appearance] : normal appearance [Well Groomed] : well groomed [General Appearance - Well Nourished] : well nourished [No Deformities] : no deformities [General Appearance - In No Acute Distress] : no acute distress [Normal Conjunctiva] : the conjunctiva exhibited no abnormalities [Eyelids - No Xanthelasma] : the eyelids demonstrated no xanthelasmas [Normal Oral Mucosa] : normal oral mucosa [No Oral Pallor] : no oral pallor [No Oral Cyanosis] : no oral cyanosis [Normal Jugular Venous A Waves Present] : normal jugular venous A waves present [Normal Jugular Venous V Waves Present] : normal jugular venous V waves present [No Jugular Venous Reynolds A Waves] : no jugular venous reynolds A waves [Respiration, Rhythm And Depth] : normal respiratory rhythm and effort [Auscultation Breath Sounds / Voice Sounds] : lungs were clear to auscultation bilaterally [Exaggerated Use Of Accessory Muscles For Inspiration] : no accessory muscle use [Abdomen Soft] : soft [Abdomen Tenderness] : non-tender [Abdomen Mass (___ Cm)] : no abdominal mass palpated [Abnormal Walk] : normal gait [Gait - Sufficient For Exercise Testing] : the gait was sufficient for exercise testing [Nail Clubbing] : no clubbing of the fingernails [Cyanosis, Localized] : no localized cyanosis [Petechial Hemorrhages (___cm)] : no petechial hemorrhages [Skin Color & Pigmentation] : normal skin color and pigmentation [] : no rash [No Venous Stasis] : no venous stasis [Skin Lesions] : no skin lesions [No Skin Ulcers] : no skin ulcer [No Xanthoma] : no  xanthoma was observed [Oriented To Time, Place, And Person] : oriented to person, place, and time [Affect] : the affect was normal [Mood] : the mood was normal [No Anxiety] : not feeling anxious [5th Left ICS - MCL] : palpated at the 5th LICS in the midclavicular line [Normal] : normal [No Precordial Heave] : no precordial heave was noted [Normal Rate] : normal [Heart Rate ___] : [unfilled] bpm [Rhythm Regular] : regular [Normal S1] : normal S1 [Normal S2] : normal S2 [A2 Diminished] : had a diminished A2 [No Gallop] : no gallop heard [Crescendo-Decrescendo] : crescendo-decrescendo [Medium] : medium pitched [Blowing] : blowing [Late] : late [I] : a grade 1 [II] : a grade 2 [2+] : left 2+ [No Abnormalities] : the abdominal aorta was not enlarged and no bruit was heard [No Pitting Edema] : no pitting edema present [Apical Thrill] : no thrill palpable at the apex [S3] : no S3 [S4] : no S4 [Click] : no click [Pericardial Rub] : no pericardial rub [Right Carotid Bruit] : no bruit heard over the right carotid [Left Carotid Bruit] : no bruit heard over the left carotid [Right Femoral Bruit] : no bruit heard over the right femoral artery [Left Femoral Bruit] : no bruit heard over the left femoral artery [Bruit] : no bruit heard [Rt] : no varicose veins of the right leg [Lt] : no varicose veins of the left leg

## 2024-05-08 NOTE — REVIEW OF SYSTEMS
[Dizziness] : dizziness [Negative] : Integumentary [SOB] : no shortness of breath [Dyspnea on exertion] : not dyspnea during exertion [Chest Discomfort] : no chest discomfort [Lower Ext Edema] : no extremity edema [Leg Claudication] : no intermittent leg claudication [Palpitations] : no palpitations [Orthopnea] : no orthopnea [PND] : no PND [Syncope] : no syncope [Cough] : no cough [Wheezing] : no wheezing [Abdominal Pain] : no abdominal pain [Nausea] : no nausea [Vomiting] : no vomiting [Change in Appetite] : no change in appetite [Change In The Stool] : no change in stool [Constipation] : no constipation [Blood in stool] : no blood in stoo

## 2024-05-08 NOTE — REASON FOR VISIT
[Structural Heart and Valve Disease] : structural heart and valve disease [Hypertension] : hypertension [Aortic Stenosis] : aortic stenosis [Dyspnea] : dyspnea

## 2024-05-08 NOTE — HISTORY OF PRESENT ILLNESS
[FreeTextEntry1] : Salvador presented for a followup evaluation.  He was last seen in the office 6 months ago.  He is now 73 years old with AV disease s/p TAVR in 12/2020.He developed SSS post TAVR and a PPM was placed. He has minimal CAD by cath from 9/3/2020.  I saw him in the office in September 2022, at which time he was being planned for bilateral cataract surgery.  I did not feel that additional testing was required prior to that procedure.  I saw him in May 2023, at which time he was feeling generally well from a cardiovascular perspective.  He reported ongoing dizziness, for which he was being followed in an ongoing fashion by neurology.  He seemed clinically stable when seen in the office in November, 2023.  He presents to the office today feeling well overall.  He reports no symptoms of angina or heart failure. He had progressive urologic issues, with hematuria, and obstructive clots.  In April, 2024, he required urgent cystoscopy with fulguration, and ultimately, a suprapubic tube was placed.   He still has some occasional hematuria, and sees urology soon. He remains sedentary, but has been trying to walk recently.   Past medical history is remarkable for hypertension, obesity, prostate cancer in 2015 status post radical prostatectomy, radiation complicated by radiation proctitis, and now hormonal therapy He was seen in 4/2022 and reported dyspnea and lightheadedness.  He was told to follow with neurology for the dizziness.  His dyspnea was felt to be functional.  No clear explanation was discovered for his dizziness.

## 2024-05-08 NOTE — CARDIOLOGY SUMMARY
[Normal] : normal LA size [None] : no mitral regurgitation [___] : [unfilled] [de-identified] : November 8, 2023 normal sinus rhythm right bundle branch block May 8, 2024 normal sinus rhythm right bundle branch block [de-identified] : September 2022\par  LVEF 62%\par  Transcatheter aortic valve-peak gradient 19/mean\par  Mild paravalvular regurgitation\par  Stage I diastolic\par  Mild MR [de-identified] : PPM remote\par  [employer]\par  Battery longevity 12.5 years\par  Atrial paced 68%\par  AT AF burden 0% [de-identified] : September 2020\par  No flow-limiting lesions

## 2024-05-09 ENCOUNTER — APPOINTMENT (OUTPATIENT)
Dept: UROLOGY | Facility: CLINIC | Age: 74
End: 2024-05-09
Payer: MEDICARE

## 2024-05-09 ENCOUNTER — OUTPATIENT (OUTPATIENT)
Dept: OUTPATIENT SERVICES | Facility: HOSPITAL | Age: 74
LOS: 1 days | End: 2024-05-09
Payer: MEDICARE

## 2024-05-09 VITALS
HEART RATE: 65 BPM | OXYGEN SATURATION: 98 % | WEIGHT: 197 LBS | SYSTOLIC BLOOD PRESSURE: 143 MMHG | DIASTOLIC BLOOD PRESSURE: 87 MMHG

## 2024-05-09 DIAGNOSIS — N30.40 IRRADIATION CYSTITIS WITHOUT HEMATURIA: ICD-10-CM

## 2024-05-09 DIAGNOSIS — R31.0 GROSS HEMATURIA: ICD-10-CM

## 2024-05-09 PROCEDURE — 51700 IRRIGATION OF BLADDER: CPT

## 2024-05-09 PROCEDURE — 99214 OFFICE O/P EST MOD 30 MIN: CPT | Mod: 25

## 2024-05-11 NOTE — HISTORY OF PRESENT ILLNESS
[FreeTextEntry1] : Salvador Sy returns to the office today.  He is 73 years old with metastatic prostate cancer.  He has undergone local treatment with both surgery and radiation therapy prior to my being involved with his care.  I met him when he was in the hospital with hemorrhagic radiation related cystitis.  He went through a course of alum bladder irrigation and also has been through hyperbaric oxygen.  He did have some significant improvements after the hyperbaric oxygen but more recently has been having significant challenges with recurrences of hematuria and retention of urine.  I had recently brought him to the operating room for cystoscopy with fulguration of the bladder near the bladder neck where there were multiple friable vessels noted.  I also converted his catheter to a suprapubic tube because he was failing multiple voiding trials and my thought was that removing the catheter from the bladder neck region may help to mitigate the risk of recurrent bleeding.  This has been somewhat successful in minimizing his trips to the emergency room which had been frequent, but he is back today reporting that the catheter is not draining.  He is also having some leakage per urethra.  He feels some suprapubic discomfort.  There has been no drainage of any fluid around the suprapubic catheter.

## 2024-05-11 NOTE — LETTER BODY
[Dear  ___] : Dear  [unfilled], [Courtesy Letter:] : I had the pleasure of seeing your patient, [unfilled], in my office today. [Please see my note below.] : Please see my note below. [FreeTextEntry2] : Hesham Duque,  [address] [place] [id number] [FreeTextEntry3] : Sincerely,      Bogdan Hoang MD, FACS Director of Urology Services, [facility] Chief of Urology, [facility]  of Urology   [facility], [place] [id number] P: [phone] F: [phone] [email or web address]

## 2024-05-11 NOTE — ASSESSMENT
[FreeTextEntry1] : Bladder irrigation was performed through the suprapubic catheter.  A few tiny blood clots were removed and this did help the catheter to start to drain.  It appears that these were blocking the lumen of the catheter.  No significant clots were noted.  The color of the urine was clear after the irrigation, slightly pink but clear.  The catheter was used to fill the bladder and then let the bladder drained to confirm it would continue to drain and it did so.  I elected to straight drainage and a new drainage bag.  He is due next week for his first exchange of the suprapubic catheter which I will be performing in the office for him.  I encouraged him to contact me should he develop any new issues with significant recurrence of the hematuria.  If he does develop recurrent hematuria, we may consider repeating hyperbaric oxygen therapy.  Alternative measures could include placing formalin in the bladder although this may defunctionalized the bladder and cause him to remain with a suprapubic catheter for his lifetime.

## 2024-05-14 ENCOUNTER — APPOINTMENT (OUTPATIENT)
Dept: UROLOGY | Facility: CLINIC | Age: 74
End: 2024-05-14
Payer: MEDICARE

## 2024-05-14 ENCOUNTER — OUTPATIENT (OUTPATIENT)
Dept: OUTPATIENT SERVICES | Facility: HOSPITAL | Age: 74
LOS: 1 days | End: 2024-05-14
Payer: MEDICARE

## 2024-05-14 DIAGNOSIS — N30.41 IRRADIATION CYSTITIS WITH HEMATURIA: ICD-10-CM

## 2024-05-14 DIAGNOSIS — Z90.79 ACQUIRED ABSENCE OF OTHER GENITAL ORGAN(S): Chronic | ICD-10-CM

## 2024-05-14 PROCEDURE — 99214 OFFICE O/P EST MOD 30 MIN: CPT | Mod: 25

## 2024-05-14 PROCEDURE — 51700 IRRIGATION OF BLADDER: CPT

## 2024-05-14 NOTE — ASSESSMENT
[FreeTextEntry1] : I examined him today and the suprapubic tube insertion site appears to be normal.  There is no signs of any significant surrounding erythema or granulation tissue.  There is no bleeding or exudate.  The catheter was irrigated today in the office as well as the urine color was clear but red.  A small blood clot was removed and the irrigation process and the catheter then drained more clear.  The son and the patient were both educated on how to properly irrigate the catheter and communicated their understanding.  He will return next week for his first exchange of the suprapubic tube.  We did review options for management of recurrent gross hematuria from radiation cystitis should the bleeding become more significant again.  These would include repeating alum bladder irrigation, hyperbaric oxygen, or formalin bladder instillation.  For now we will hold off on all of these as the hematuria post fulguration of the bladder and suprapubic catheter placement seems to be getting less significant.

## 2024-05-14 NOTE — HISTORY OF PRESENT ILLNESS
[FreeTextEntry1] : Salvador Sy returns for follow-up today.  He has metastatic prostate cancer and has been treated previously with surgery and radiation therapy for prostate cancer.  He has been having intermittent issues and especially lately with gross hematuria related to radiation cystitis.  I brought him to the operating room recently to perform a fulguration of the bladder near the bladder neck region and I also placed a suprapubic catheter at the time to try to help minimize some of the trauma at the bladder neck related to the catheter balloon.  He is in urinary retention and has been in retention for the last few months.  He continues to have intermittent issues with passing clots and passing blood per urethra when the catheter may not be draining as well.  This has been intermittently self resolving over the last several days.  I saw him in the office last week to irrigate the existing catheter and I removed a few tiny clots but they did appear to be obstructing the catheter.  The catheter was draining well when he left the visit.  Over this last weekend, he did temporarily become obstructed but his son had irrigated the catheter and was able to dislodge a clot sparing him from a visit to the emergency room.  The patient says when he bent over this morning to put his shoes and socks on he did notice some pink color to the urine after.

## 2024-05-16 DIAGNOSIS — R35.0 FREQUENCY OF MICTURITION: ICD-10-CM

## 2024-05-21 ENCOUNTER — APPOINTMENT (OUTPATIENT)
Dept: ORTHOPEDIC SURGERY | Facility: CLINIC | Age: 74
End: 2024-05-21
Payer: MEDICARE

## 2024-05-21 VITALS — WEIGHT: 197 LBS | HEIGHT: 61 IN | BODY MASS INDEX: 37.19 KG/M2

## 2024-05-21 DIAGNOSIS — M19.012 PRIMARY OSTEOARTHRITIS, LEFT SHOULDER: ICD-10-CM

## 2024-05-21 DIAGNOSIS — M19.011 PRIMARY OSTEOARTHRITIS, RIGHT SHOULDER: ICD-10-CM

## 2024-05-21 DIAGNOSIS — M75.121 COMPLETE ROTATOR CUFF TEAR OR RUPTURE OF RIGHT SHOULDER, NOT SPECIFIED AS TRAUMATIC: ICD-10-CM

## 2024-05-21 PROCEDURE — 20611 DRAIN/INJ JOINT/BURSA W/US: CPT | Mod: 50

## 2024-05-21 PROCEDURE — J3490M: CUSTOM

## 2024-05-21 PROCEDURE — 99213 OFFICE O/P EST LOW 20 MIN: CPT | Mod: 25

## 2024-05-21 NOTE — HISTORY OF PRESENT ILLNESS
[de-identified] : 5/21/24: Here for follow up.  He got relief from both injecitons last visit, R is more painful than the left.  He completed PT.   2/20/24: Here to f/u on bilateral shoulders.  He got minimal relief on the R side, but about 10-15% on the left side.    01/09/2024: Here for b/l shoulder orthovisc #4.  01/02/24: Here for B/L shoulders orthovisc #3.   12/19/23: Here for bilateral shoulder orthovisc #2.  12/12/2023: Here for follow. He has been going to PT with some relief, but was recently discharged. Did have relief with CSI at last visit. Still notes limited ROM.  9/21/23: Here for follow up.  He is in PT with improvement.  He feels improvement in pain, and some increased motion.  Right is worse than left.   6/29/23:   Here for follow up, he reports continued pain, the injections are wearing off more quickly  3/28/23:  Here for follow up.  His shoulders have been more painful recently.  He was also hospitalized for an issue related to the prostrate.   12/1/22: Here for follow up, he got about 2.5 months relief.   8/30/22: Here to f/u. Pain started to return approx 1 month ago.  5/17/22: Here for follow up.    2/17/22: Here for follow up. He reports the injections are working but not lasting as long as he is used to.  11/2/21: Here for follow up, EMG review. His left shoulder is more painful.  EMG b/l UE: R > L chronic C5, C6 radiculopathy, mild b/l CTS  8/3/21: Here for follow up. The left side is more painful today. He is complaining of some paresthesias in both arms today and some pain posteriorly.  5/4/21: Follow up bilateral shoulder. Injections last visit helped. He requests to repeat them today.  2/4/21: Here for follow up. Injections are still helping. He has some complications after his aortic valve procedure.  11/10/20: Here for follow up. He did get relief from the injections last visit. He is having an aortic valve procedure.  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 Pt had moderate pain relief with previous CSI's this past May and is hoping for repeat injections.  5/19/20: Here for fu and repeat cortisone injection bilateral shoulders. During COVID his PT was cancelled. Waking him at night now. L is worse than the R. 3 months relief with prior injections.  1/28/20: Here for fu and repeat cortisone injection bilateral shoulders. PT helps - no new symptoms. 3 months relief with prior injections.  1/7/20: Here for follow up. he has been out of PT for the shoulders for 3 weeks. He has been dealing with a sinus infection.  10/8/19: Here for follow up. he did start PT. The shoulders feel achy.  7/9/19: Here for follow up. The shoulders have become more painful recently. He wants to consider PT next month. Right is more painful than the left.  4/2/19: Here for fu. Recurring R shoulder pain, was unable to do PT as planned. Now with L shoulder pain as well x 2-3 months. No injury recalled. Pain with motion. He is unable to lift things, uses caution carrying laundry. Concerned about loss of strength. Advil 400mg prn.  12/11/18: Here for follow up. He had the injection last visit. He recently had radiation for prostate cancer, just finished.  6/26/18: He returns for f/u right shoulder. He is having more pain in the shoulder.  4/24/18: He returns for f/u right shoulder. He continue to have some pain radiating into neck, overall feeling better. He has not been to PT recently.  1/23/18: Here for follow up. He has had some return of pain in the shoulder. He takes advil. He has been out of PT due to other issues but wants to start again soon.  12/12/17: Here for follow up. His shoulder feels good today. He has been in PT but recently stopped. He has only mild pain.  9/12/17: Here for follow up. He does notice some improvement with PT.  7/25/17: Here for follow up. Has been in PT weekly which is helping. 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 Pain persists intermittently with movement. Requesting injection.  Given subacromial injection.  6/6/17: Here for follow up. Has been in PT. Only significant pain with movement. Did get improvement with injection.  4/25/17: 65 y/o RHD male here for the r shoulder. Felt pain in the shoulder in 9/16 when carrying some groceries up the stairs. No specific trauma. Eventually got an MRI. He has been going to PT with some improvement. He also takes NSAIDs prn.  MRI R shoulder: 1. Advanced rotator cuff tear with complete full-thickness discontinuity in a near complete full-thickness tear of infraspinatus is stable since the prior study. There is also persistent high-grade partial tearing of subscapularis and elongation of subscapularis tendon. No head is high riding. There is atrophy of supraspinatus and infraspinatus muscle bellies. 2. Chronic rupture and distal retraction long head of the biceps tendon 3. Mild degenerative tearing of the labrum.  Prior MD Notes: 3/9/17: Patient is a 65 yo RHD male c/o right shoulder pain since Sept 2016 after he felt pain while carrying groceries. No n/t. Has been seeing Dr. Jennings who did Xrays, ordered MRI and started PT. PT giving some relief, but he would like to discuss his surgical options. Taking advil which gives some relief. No previous injuries or surgeries to right shoulder. Occupation: Veterans counselor for [employer] [FreeTextEntry5] : Pt is here for a follow up of renetta shoulders, would like csi

## 2024-05-21 NOTE — ASSESSMENT
[FreeTextEntry1] : Complete tear of R supra infra with retraction and atrophy, mild DJD. Now also with L GH DJD. EMG shows: R > L chronic C5, C6 radiculopathy, mild b/l CTS He completed PT with some relief. Bilateral shoulder Orthovisc with minimal relief- I do not recommend any more visco injections.   Repeat R SA and L SA injections 2/20/24, and today.  Warned about the dangers of repeated injections.  RTO prn.   Procedure Note: Large Joint Injection was performed because of pain and inflammation, failure of conservative treatment.   Medications: Depo-Medrol: 1 cc, 80 mg. Lidocaine: 2 cc, 1%.  Marcaine: 2 cc, .25%.   Medication was injected in the right and left subacromial spaces. Patient has tried OTC's including aspirin, Ibuprofen, Aleve etc or prescription NSAIDs, and/or exercises at home and/ or physical therapy without satisfactory response. The risks, benefits, and alternatives to cortisone injection were explained in full to the patient. Risks outlined include but are not limited to infection, sepsis, bleeding, scarring, skin discoloration, temporary increase in pain, syncopal episode, failure to resolve symptoms, allergic reaction, symptom recurrence, and elevation of blood sugar in diabetics. Patient understood the risks. All questions were answered. After discussion of options, patient requested an injection. Oral informed consent was obtained and sterile prep of the injection site was performed using alcohol. Sterile technique was utilized for the procedure including the preparation of the solutions used for the injection. Ethyl chloride spray was used topically.  Sterile technique used. Patient tolerated procedure well. Post Procedure Instructions: Patient was advised to call if redness, pain, or fever occur and apply ice for 15 min. out of every hour for the next 12-24 hours as tolerated. patient was advised to rest the joint(s) for 2 days.  Ultrasound Guidance was used for the following reasons: for prior failure or difficult injection and to visualize tearing and inflammation. Ultrasound guided injection was performed of the shoulder, visualization of the needle and placement of injection was performed without complication.

## 2024-05-23 ENCOUNTER — APPOINTMENT (OUTPATIENT)
Dept: UROLOGY | Facility: CLINIC | Age: 74
End: 2024-05-23
Payer: MEDICARE

## 2024-05-23 ENCOUNTER — OUTPATIENT (OUTPATIENT)
Dept: OUTPATIENT SERVICES | Facility: HOSPITAL | Age: 74
LOS: 1 days | End: 2024-05-23
Payer: MEDICARE

## 2024-05-23 VITALS — SYSTOLIC BLOOD PRESSURE: 121 MMHG | HEART RATE: 66 BPM | DIASTOLIC BLOOD PRESSURE: 72 MMHG

## 2024-05-23 DIAGNOSIS — R33.8 OTHER RETENTION OF URINE: ICD-10-CM

## 2024-05-23 DIAGNOSIS — Z90.89 ACQUIRED ABSENCE OF OTHER ORGANS: Chronic | ICD-10-CM

## 2024-05-23 DIAGNOSIS — R35.0 FREQUENCY OF MICTURITION: ICD-10-CM

## 2024-05-23 DIAGNOSIS — Z90.79 ACQUIRED ABSENCE OF OTHER GENITAL ORGAN(S): Chronic | ICD-10-CM

## 2024-05-23 PROCEDURE — 51705 CHANGE OF BLADDER TUBE: CPT

## 2024-05-24 DIAGNOSIS — R33.8 OTHER RETENTION OF URINE: ICD-10-CM

## 2024-05-24 DIAGNOSIS — N30.40 IRRADIATION CYSTITIS WITHOUT HEMATURIA: ICD-10-CM

## 2024-06-14 DIAGNOSIS — N30.41 IRRADIATION CYSTITIS WITH HEMATURIA: ICD-10-CM

## 2024-06-18 ENCOUNTER — OUTPATIENT (OUTPATIENT)
Dept: OUTPATIENT SERVICES | Facility: HOSPITAL | Age: 74
LOS: 1 days | End: 2024-06-18
Payer: MEDICARE

## 2024-06-18 ENCOUNTER — APPOINTMENT (OUTPATIENT)
Dept: UROLOGY | Facility: CLINIC | Age: 74
End: 2024-06-18
Payer: MEDICARE

## 2024-06-18 ENCOUNTER — NON-APPOINTMENT (OUTPATIENT)
Age: 74
End: 2024-06-18

## 2024-06-18 VITALS — HEART RATE: 51 BPM | SYSTOLIC BLOOD PRESSURE: 125 MMHG | DIASTOLIC BLOOD PRESSURE: 71 MMHG | RESPIRATION RATE: 16 BRPM

## 2024-06-18 DIAGNOSIS — R33.9 RETENTION OF URINE, UNSPECIFIED: ICD-10-CM

## 2024-06-18 DIAGNOSIS — R31.0 GROSS HEMATURIA: ICD-10-CM

## 2024-06-18 DIAGNOSIS — N30.40 IRRADIATION CYSTITIS WITHOUT HEMATURIA: ICD-10-CM

## 2024-06-18 DIAGNOSIS — N30.40 IRRADIATION CYSTITIS W/OUT HEMATURIA: ICD-10-CM

## 2024-06-18 DIAGNOSIS — R35.0 FREQUENCY OF MICTURITION: ICD-10-CM

## 2024-06-18 DIAGNOSIS — Z90.79 ACQUIRED ABSENCE OF OTHER GENITAL ORGAN(S): Chronic | ICD-10-CM

## 2024-06-18 PROCEDURE — 51705 CHANGE OF BLADDER TUBE: CPT

## 2024-06-18 PROCEDURE — C2627: CPT

## 2024-06-19 ENCOUNTER — APPOINTMENT (OUTPATIENT)
Dept: CARDIOLOGY | Facility: CLINIC | Age: 74
End: 2024-06-19
Payer: MEDICARE

## 2024-06-19 PROCEDURE — 93296 REM INTERROG EVL PM/IDS: CPT

## 2024-06-19 PROCEDURE — 93294 REM INTERROG EVL PM/LDLS PM: CPT

## 2024-06-20 ENCOUNTER — APPOINTMENT (OUTPATIENT)
Dept: UROLOGY | Facility: CLINIC | Age: 74
End: 2024-06-20

## 2024-06-24 PROBLEM — N30.40 RADIATION CYSTITIS: Status: ACTIVE | Noted: 2023-03-12

## 2024-06-24 PROBLEM — R33.9 URINARY RETENTION: Status: ACTIVE | Noted: 2024-06-24

## 2024-06-24 PROBLEM — R31.0 GROSS HEMATURIA: Status: ACTIVE | Noted: 2024-04-09

## 2024-06-26 NOTE — PRE-ANESTHESIA EVALUATION ADULT - NSANTHBMIRD_ENT_A_CORE
Health Maintenance       Pneumococcal Vaccine 0-64 (1 of 2 - PCV)  Never done    Diabetes Eye Exam (Yearly)  Never done    Diabetes Foot Exam (Yearly)  Never done    Hepatitis B Vaccine (1 of 3 - 19+ 3-dose series)  Never done    Depression Screening (Yearly)  Overdue since 12/1/2023           Following review of the above:  Patient is not proceeding with: Diabetes Eye Exam, Hep B, and Pneumococcal    Note: Refer to final orders and clinician documentation.      Recent PHQ 2/9 Score    PHQ 2:  PHQ 2 Score Adult PHQ 2 Score Adult PHQ 2 Interpretation Little interest or pleasure in activity?   6/26/2024  11:11 AM 0 No further screening needed 0       PHQ 9:  PHQ 9 Score Adult PHQ 9 Score   6/26/2024  11:11 AM 0         No

## 2024-07-14 ENCOUNTER — NON-APPOINTMENT (OUTPATIENT)
Age: 74
End: 2024-07-14

## 2024-07-15 ENCOUNTER — APPOINTMENT (OUTPATIENT)
Dept: OTOLARYNGOLOGY | Facility: CLINIC | Age: 74
End: 2024-07-15
Payer: MEDICARE

## 2024-07-15 VITALS
HEART RATE: 59 BPM | DIASTOLIC BLOOD PRESSURE: 76 MMHG | WEIGHT: 206 LBS | BODY MASS INDEX: 29.49 KG/M2 | HEIGHT: 70 IN | SYSTOLIC BLOOD PRESSURE: 154 MMHG

## 2024-07-15 DIAGNOSIS — H61.22 IMPACTED CERUMEN, LEFT EAR: ICD-10-CM

## 2024-07-15 DIAGNOSIS — H90.3 SENSORINEURAL HEARING LOSS, BILATERAL: ICD-10-CM

## 2024-07-15 PROCEDURE — 99213 OFFICE O/P EST LOW 20 MIN: CPT | Mod: 25

## 2024-07-15 PROCEDURE — 92557 COMPREHENSIVE HEARING TEST: CPT

## 2024-07-15 PROCEDURE — G0268 REMOVAL OF IMPACTED WAX MD: CPT

## 2024-07-15 PROCEDURE — 92567 TYMPANOMETRY: CPT

## 2024-07-16 ENCOUNTER — APPOINTMENT (OUTPATIENT)
Dept: UROLOGY | Facility: CLINIC | Age: 74
End: 2024-07-16
Payer: MEDICARE

## 2024-07-16 ENCOUNTER — OUTPATIENT (OUTPATIENT)
Dept: OUTPATIENT SERVICES | Facility: HOSPITAL | Age: 74
LOS: 1 days | End: 2024-07-16
Payer: MEDICARE

## 2024-07-16 VITALS
SYSTOLIC BLOOD PRESSURE: 154 MMHG | RESPIRATION RATE: 16 BRPM | DIASTOLIC BLOOD PRESSURE: 72 MMHG | HEART RATE: 70 BPM | OXYGEN SATURATION: 99 %

## 2024-07-16 DIAGNOSIS — R31.0 GROSS HEMATURIA: ICD-10-CM

## 2024-07-16 DIAGNOSIS — N30.40 IRRADIATION CYSTITIS W/OUT HEMATURIA: ICD-10-CM

## 2024-07-16 DIAGNOSIS — Z90.79 ACQUIRED ABSENCE OF OTHER GENITAL ORGAN(S): Chronic | ICD-10-CM

## 2024-07-16 DIAGNOSIS — R35.0 FREQUENCY OF MICTURITION: ICD-10-CM

## 2024-07-16 DIAGNOSIS — Z90.89 ACQUIRED ABSENCE OF OTHER ORGANS: Chronic | ICD-10-CM

## 2024-07-16 DIAGNOSIS — L92.9 GRANULOMATOUS DISORDER OF THE SKIN AND SUBCUTANEOUS TISSUE, UNSPECIFIED: ICD-10-CM

## 2024-07-16 PROCEDURE — 51705 CHANGE OF BLADDER TUBE: CPT

## 2024-07-16 PROCEDURE — 17250 CHEM CAUT OF GRANLTJ TISSUE: CPT

## 2024-07-16 PROCEDURE — C2627: CPT

## 2024-07-17 DIAGNOSIS — N30.40 IRRADIATION CYSTITIS WITHOUT HEMATURIA: ICD-10-CM

## 2024-07-17 DIAGNOSIS — R31.0 GROSS HEMATURIA: ICD-10-CM

## 2024-07-17 DIAGNOSIS — L92.9 GRANULOMATOUS DISORDER OF THE SKIN AND SUBCUTANEOUS TISSUE, UNSPECIFIED: ICD-10-CM

## 2024-08-13 ENCOUNTER — APPOINTMENT (OUTPATIENT)
Dept: UROLOGY | Facility: CLINIC | Age: 74
End: 2024-08-13
Payer: MEDICARE

## 2024-08-13 VITALS
WEIGHT: 206 LBS | RESPIRATION RATE: 16 BRPM | SYSTOLIC BLOOD PRESSURE: 114 MMHG | BODY MASS INDEX: 38.89 KG/M2 | DIASTOLIC BLOOD PRESSURE: 66 MMHG | HEIGHT: 61 IN | HEART RATE: 68 BPM

## 2024-08-13 DIAGNOSIS — R33.9 RETENTION OF URINE, UNSPECIFIED: ICD-10-CM

## 2024-08-13 DIAGNOSIS — L92.9 GRANULOMATOUS DISORDER OF THE SKIN AND SUBCUTANEOUS TISSUE, UNSPECIFIED: ICD-10-CM

## 2024-08-13 DIAGNOSIS — N30.40 IRRADIATION CYSTITIS W/OUT HEMATURIA: ICD-10-CM

## 2024-08-13 PROCEDURE — 17250 CHEM CAUT OF GRANLTJ TISSUE: CPT

## 2024-08-13 PROCEDURE — 51705 CHANGE OF BLADDER TUBE: CPT

## 2024-08-14 LAB
BASOPHILS # BLD AUTO: 0.08 K/UL
BASOPHILS NFR BLD AUTO: 1.4 %
EOSINOPHIL # BLD AUTO: 0.43 K/UL
EOSINOPHIL NFR BLD AUTO: 7.3 %
FERRITIN SERPL-MCNC: 22 NG/ML
HCT VFR BLD CALC: 36.8 %
HGB BLD-MCNC: 11.5 G/DL
IMM GRANULOCYTES NFR BLD AUTO: 0.2 %
IRON SATN MFR SERPL: 12 %
IRON SERPL-MCNC: 54 UG/DL
LYMPHOCYTES # BLD AUTO: 1.14 K/UL
LYMPHOCYTES NFR BLD AUTO: 19.3 %
MAN DIFF?: NORMAL
MCHC RBC-ENTMCNC: 28 PG
MCHC RBC-ENTMCNC: 31.3 GM/DL
MCV RBC AUTO: 89.8 FL
MONOCYTES # BLD AUTO: 0.91 K/UL
MONOCYTES NFR BLD AUTO: 15.4 %
NEUTROPHILS # BLD AUTO: 3.33 K/UL
NEUTROPHILS NFR BLD AUTO: 56.4 %
PLATELET # BLD AUTO: 223 K/UL
RBC # BLD: 4.1 M/UL
RBC # FLD: 13.5 %
TIBC SERPL-MCNC: 445 UG/DL
UIBC SERPL-MCNC: 391 UG/DL
WBC # FLD AUTO: 5.9 K/UL

## 2024-08-20 ENCOUNTER — APPOINTMENT (OUTPATIENT)
Dept: ORTHOPEDIC SURGERY | Facility: CLINIC | Age: 74
End: 2024-08-20
Payer: MEDICARE

## 2024-08-20 VITALS — BODY MASS INDEX: 38.89 KG/M2 | HEIGHT: 61 IN | WEIGHT: 206 LBS

## 2024-08-20 DIAGNOSIS — M19.012 PRIMARY OSTEOARTHRITIS, LEFT SHOULDER: ICD-10-CM

## 2024-08-20 DIAGNOSIS — M19.011 PRIMARY OSTEOARTHRITIS, RIGHT SHOULDER: ICD-10-CM

## 2024-08-20 PROCEDURE — 99214 OFFICE O/P EST MOD 30 MIN: CPT | Mod: 25

## 2024-08-20 PROCEDURE — J3490M: CUSTOM | Mod: JZ

## 2024-08-20 PROCEDURE — 20611 DRAIN/INJ JOINT/BURSA W/US: CPT | Mod: 50

## 2024-08-20 NOTE — ASSESSMENT
[FreeTextEntry1] : Complete tear of R supra infra with retraction and atrophy, mild DJD. Now also with L GH DJD. EMG shows: R > L chronic C5, C6 radiculopathy, mild b/l CTS He completed PT with some relief. Bilateral shoulder Orthovisc with minimal relief- I do not recommend any more visco injections.   Repeat R SA and L SA injections 5/21/24, repeat today. Warned about the dangers of repeated injections.  RTO prn. Advised to wait 4 months before next injection.  Procedure Note: Large Joint Injection was performed because of pain and inflammation, failure of conservative treatment.   Medications: Depo-Medrol: 1 cc, 80 mg. Lidocaine: 2 cc, 1%.  Marcaine: 2 cc, .25%.   Medication was injected in the right and left subacromial spaces. Patient has tried OTC's including aspirin, Ibuprofen, Aleve etc or prescription NSAIDs, and/or exercises at home and/ or physical therapy without satisfactory response. The risks, benefits, and alternatives to cortisone injection were explained in full to the patient. Risks outlined include but are not limited to infection, sepsis, bleeding, scarring, skin discoloration, temporary increase in pain, syncopal episode, failure to resolve symptoms, allergic reaction, symptom recurrence, and elevation of blood sugar in diabetics. Patient understood the risks. All questions were answered. After discussion of options, patient requested an injection. Oral informed consent was obtained and sterile prep of the injection site was performed using alcohol. Sterile technique was utilized for the procedure including the preparation of the solutions used for the injection. Ethyl chloride spray was used topically.  Sterile technique used. Patient tolerated procedure well. Post Procedure Instructions: Patient was advised to call if redness, pain, or fever occur and apply ice for 15 min. out of every hour for the next 12-24 hours as tolerated. patient was advised to rest the joint(s) for 2 days.  Ultrasound Guidance was used for the following reasons: for prior failure or difficult injection and to visualize tearing and inflammation. Ultrasound guided injection was performed of the shoulder, visualization of the needle and placement of injection was performed without complication.

## 2024-08-20 NOTE — HISTORY OF PRESENT ILLNESS
[de-identified] : 8/20/24: Here for follow up. He reports continued pain in both shoulders. Request CSI today.  Discussed importance of more time in between injections.   5/21/24: Here for follow up.  He got relief from both injections last visit, R is more painful than the left.  He completed PT.   2/20/24: Here to f/u on bilateral shoulders.  He got minimal relief on the R side, but about 10-15% on the left side.    01/09/2024: Here for b/l shoulder orthovisc #4.  01/02/24: Here for B/L shoulders orthovisc #3.   12/19/23: Here for bilateral shoulder orthovisc #2.  12/12/2023: Here for follow. He has been going to PT with some relief, but was recently discharged. Did have relief with CSI at last visit. Still notes limited ROM.  9/21/23: Here for follow up.  He is in PT with improvement.  He feels improvement in pain, and some increased motion.  Right is worse than left.   6/29/23:   Here for follow up, he reports continued pain, the injections are wearing off more quickly  3/28/23:  Here for follow up.  His shoulders have been more painful recently.  He was also hospitalized for an issue related to the prostrate.   12/1/22: Here for follow up, he got about 2.5 months relief.   8/30/22: Here to f/u. Pain started to return approx 1 month ago.  5/17/22: Here for follow up.    2/17/22: Here for follow up. He reports the injections are working but not lasting as long as he is used to.  11/2/21: Here for follow up, EMG review. His left shoulder is more painful.  EMG b/l UE: R > L chronic C5, C6 radiculopathy, mild b/l CTS  8/3/21: Here for follow up. The left side is more painful today. He is complaining of some paresthesias in both arms today and some pain posteriorly.  5/4/21: Follow up bilateral shoulder. Injections last visit helped. He requests to repeat them today.  2/4/21: Here for follow up. Injections are still helping. He has some complications after his aortic valve procedure.  11/10/20: Here for follow up. He did get relief from the injections last visit. He is having an aortic valve procedure.  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 Pt had moderate pain relief with previous CSI's this past May and is hoping for repeat injections.  5/19/20: Here for fu and repeat cortisone injection bilateral shoulders. During COVID his PT was cancelled. Waking him at night now. L is worse than the R. 3 months relief with prior injections.  1/28/20: Here for fu and repeat cortisone injection bilateral shoulders. PT helps - no new symptoms. 3 months relief with prior injections.  1/7/20: Here for follow up. he has been out of PT for the shoulders for 3 weeks. He has been dealing with a sinus infection.  10/8/19: Here for follow up. he did start PT. The shoulders feel achy.  7/9/19: Here for follow up. The shoulders have become more painful recently. He wants to consider PT next month. Right is more painful than the left.  4/2/19: Here for fu. Recurring R shoulder pain, was unable to do PT as planned. Now with L shoulder pain as well x 2-3 months. No injury recalled. Pain with motion. He is unable to lift things, uses caution carrying laundry. Concerned about loss of strength. Advil 400mg prn.  12/11/18: Here for follow up. He had the injection last visit. He recently had radiation for prostate cancer, just finished.  6/26/18: He returns for f/u right shoulder. He is having more pain in the shoulder.  4/24/18: He returns for f/u right shoulder. He continue to have some pain radiating into neck, overall feeling better. He has not been to PT recently.  1/23/18: Here for follow up. He has had some return of pain in the shoulder. He takes advil. He has been out of PT due to other issues but wants to start again soon.  12/12/17: Here for follow up. His shoulder feels good today. He has been in PT but recently stopped. He has only mild pain.  9/12/17: Here for follow up. He does notice some improvement with PT.  7/25/17: Here for follow up. Has been in PT weekly which is helping. 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 Pain persists intermittently with movement. Requesting injection.  Given subacromial injection.  6/6/17: Here for follow up. Has been in PT. Only significant pain with movement. Did get improvement with injection.  4/25/17: 67 y/o RHD male here for the r shoulder. Felt pain in the shoulder in 9/16 when carrying some groceries up the stairs. No specific trauma. Eventually got an MRI. He has been going to PT with some improvement. He also takes NSAIDs prn.  MRI R shoulder: 1. Advanced rotator cuff tear with complete full-thickness discontinuity in a near complete full-thickness tear of infraspinatus is stable since the prior study. There is also persistent high-grade partial tearing of subscapularis and elongation of subscapularis tendon. No head is high riding. There is atrophy of supraspinatus and infraspinatus muscle bellies. 2. Chronic rupture and distal retraction long head of the biceps tendon 3. Mild degenerative tearing of the labrum.  Prior MD Notes: 3/9/17: Patient is a 65 yo RHD male c/o right shoulder pain since Sept 2016 after he felt pain while carrying groceries. No n/t. Has been seeing Dr. Jennings who did Xrays, ordered MRI and started PT. PT giving some relief, but he would like to discuss his surgical options. Taking advil which gives some relief. No previous injuries or surgeries to right shoulder. 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

## 2024-08-29 NOTE — PHYSICAL THERAPY INITIAL EVALUATION ADULT - PERSONAL SAFETY AND JUDGMENT, REHAB EVAL
Received a call from lab about clotted coagulation tube. Will draw PT/INR on day of procedure. intact

## 2024-09-10 ENCOUNTER — APPOINTMENT (OUTPATIENT)
Dept: UROLOGY | Facility: CLINIC | Age: 74
End: 2024-09-10
Payer: MEDICARE

## 2024-09-10 ENCOUNTER — OUTPATIENT (OUTPATIENT)
Dept: OUTPATIENT SERVICES | Facility: HOSPITAL | Age: 74
LOS: 1 days | End: 2024-09-10
Payer: MEDICARE

## 2024-09-10 VITALS — HEART RATE: 89 BPM | RESPIRATION RATE: 16 BRPM | DIASTOLIC BLOOD PRESSURE: 72 MMHG | SYSTOLIC BLOOD PRESSURE: 125 MMHG

## 2024-09-10 DIAGNOSIS — Z90.79 ACQUIRED ABSENCE OF OTHER GENITAL ORGAN(S): Chronic | ICD-10-CM

## 2024-09-10 DIAGNOSIS — Z90.89 ACQUIRED ABSENCE OF OTHER ORGANS: Chronic | ICD-10-CM

## 2024-09-10 DIAGNOSIS — R35.0 FREQUENCY OF MICTURITION: ICD-10-CM

## 2024-09-10 DIAGNOSIS — B37.89 OTHER SITES OF CANDIDIASIS: ICD-10-CM

## 2024-09-10 PROCEDURE — C2627: CPT

## 2024-09-10 PROCEDURE — 51705 CHANGE OF BLADDER TUBE: CPT

## 2024-09-10 RX ORDER — NYSTATIN 100000 1/G
100000 POWDER TOPICAL 3 TIMES DAILY
Qty: 1 | Refills: 11 | Status: ACTIVE | COMMUNITY
Start: 2024-09-10 | End: 1900-01-01

## 2024-09-11 DIAGNOSIS — B37.89 OTHER SITES OF CANDIDIASIS: ICD-10-CM

## 2024-09-16 ENCOUNTER — OFFICE (OUTPATIENT)
Dept: URBAN - METROPOLITAN AREA CLINIC 109 | Facility: CLINIC | Age: 74
Setting detail: OPHTHALMOLOGY
End: 2024-09-16
Payer: MEDICARE

## 2024-09-16 VITALS — HEIGHT: 60 IN

## 2024-09-16 DIAGNOSIS — H35.371: ICD-10-CM

## 2024-09-16 PROCEDURE — 92014 COMPRE OPH EXAM EST PT 1/>: CPT | Performed by: OPHTHALMOLOGY

## 2024-09-16 PROCEDURE — 92134 CPTRZ OPH DX IMG PST SGM RTA: CPT | Performed by: OPHTHALMOLOGY

## 2024-09-16 ASSESSMENT — CONFRONTATIONAL VISUAL FIELD TEST (CVF)
OD_FINDINGS: FULL
OS_FINDINGS: FULL

## 2024-09-17 ENCOUNTER — APPOINTMENT (OUTPATIENT)
Dept: UROLOGY | Facility: CLINIC | Age: 74
End: 2024-09-17
Payer: MEDICARE

## 2024-09-17 ENCOUNTER — OUTPATIENT (OUTPATIENT)
Dept: OUTPATIENT SERVICES | Facility: HOSPITAL | Age: 74
LOS: 1 days | End: 2024-09-17
Payer: MEDICARE

## 2024-09-17 VITALS
TEMPERATURE: 98 F | HEART RATE: 71 BPM | SYSTOLIC BLOOD PRESSURE: 111 MMHG | OXYGEN SATURATION: 97 % | DIASTOLIC BLOOD PRESSURE: 57 MMHG

## 2024-09-17 DIAGNOSIS — R31.0 GROSS HEMATURIA: ICD-10-CM

## 2024-09-17 DIAGNOSIS — Z90.79 ACQUIRED ABSENCE OF OTHER GENITAL ORGAN(S): Chronic | ICD-10-CM

## 2024-09-17 DIAGNOSIS — N32.89 OTHER SPECIFIED DISORDERS OF BLADDER: ICD-10-CM

## 2024-09-17 DIAGNOSIS — R35.0 FREQUENCY OF MICTURITION: ICD-10-CM

## 2024-09-17 PROCEDURE — 51705 CHANGE OF BLADDER TUBE: CPT

## 2024-09-17 PROCEDURE — C2627: CPT

## 2024-09-17 PROCEDURE — 17250 CHEM CAUT OF GRANLTJ TISSUE: CPT

## 2024-09-17 PROCEDURE — 99214 OFFICE O/P EST MOD 30 MIN: CPT | Mod: 25

## 2024-09-17 NOTE — HISTORY OF PRESENT ILLNESS
[FreeTextEntry1] : Salvador Sy returns to the office today.  He is 74 years old and returns today for problems related to catheter drainage.  He has an existing suprapubic catheter that was placed due to issues of chronic retention and incontinence post treatment for prostate cancer with surgery and radiation prior to my meeting him.  He had developed radiation cystitis requiring hyperbaric oxygen therapy after several hospitalizations and blood transfusions.  The hyperbaric oxygen did significantly help and the suprapubic catheter was also help minimize issues of hematuria.  Over the last 36 hours he has developed some issues where the catheter is draining minimal urine and some of the urine is coming out through the penis into a diaper.  His suprapubic catheter was just changed last week.

## 2024-09-17 NOTE — PHYSICAL EXAM
[Normal Appearance] : normal appearance [Well Groomed] : well groomed [General Appearance - In No Acute Distress] : no acute distress [Edema] : no peripheral edema [Respiration, Rhythm And Depth] : normal respiratory rhythm and effort [Exaggerated Use Of Accessory Muscles For Inspiration] : no accessory muscle use [Abdomen Soft] : soft [Abdomen Tenderness] : non-tender [Costovertebral Angle Tenderness] : no ~M costovertebral angle tenderness [Normal Station and Gait] : the gait and station were normal for the patient's age [] : no rash [No Focal Deficits] : no focal deficits [Oriented To Time, Place, And Person] : oriented to person, place, and time [Affect] : the affect was normal [Mood] : the mood was normal [No Palpable Adenopathy] : no palpable adenopathy [de-identified] : Suprapubic site with some granulation tissue and mild oozing

## 2024-09-17 NOTE — ASSESSMENT
[FreeTextEntry1] : I irrigated the existing catheter and found to be in good position but I did change it to eliminate the possibility that there may be some debris stuck in the lumen of the existing catheter.  The position of the new catheter was also verified by irrigating it and ensuring the balloon was in a good spot.  He tolerated the procedure well.  I also placed silver nitrate at the opening to eliminate the oozing from the granulation tissue around the suprapubic catheter.  He tolerated the whole procedure well and I asked him to remain in the office to drink some fluids and ensure that the catheter is draining adequately.  He did so and was discharged from the office to return in 1 month with the neck suprapubic catheter exchange.  I will also provide him a prescription for Myrbetriq to additionally help with bladder spasms which may be a significant contributor to the incontinence.

## 2024-09-18 ENCOUNTER — NON-APPOINTMENT (OUTPATIENT)
Age: 74
End: 2024-09-18

## 2024-09-18 ENCOUNTER — APPOINTMENT (OUTPATIENT)
Dept: CARDIOLOGY | Facility: CLINIC | Age: 74
End: 2024-09-18
Payer: MEDICARE

## 2024-09-18 DIAGNOSIS — N32.89 OTHER SPECIFIED DISORDERS OF BLADDER: ICD-10-CM

## 2024-09-18 DIAGNOSIS — R31.0 GROSS HEMATURIA: ICD-10-CM

## 2024-09-18 PROCEDURE — 93296 REM INTERROG EVL PM/IDS: CPT

## 2024-09-18 PROCEDURE — 93294 REM INTERROG EVL PM/LDLS PM: CPT

## 2024-09-19 RX ORDER — MIRABEGRON 25 MG/1
25 TABLET, EXTENDED RELEASE ORAL
Qty: 90 | Refills: 3 | Status: ACTIVE | COMMUNITY
Start: 2024-09-19 | End: 1900-01-01

## 2024-10-17 ENCOUNTER — APPOINTMENT (OUTPATIENT)
Dept: UROLOGY | Facility: CLINIC | Age: 74
End: 2024-10-17
Payer: MEDICARE

## 2024-10-17 ENCOUNTER — OUTPATIENT (OUTPATIENT)
Dept: OUTPATIENT SERVICES | Facility: HOSPITAL | Age: 74
LOS: 1 days | End: 2024-10-17
Payer: MEDICARE

## 2024-10-17 VITALS — RESPIRATION RATE: 16 BRPM | HEART RATE: 74 BPM | SYSTOLIC BLOOD PRESSURE: 153 MMHG | DIASTOLIC BLOOD PRESSURE: 78 MMHG

## 2024-10-17 DIAGNOSIS — Z90.79 ACQUIRED ABSENCE OF OTHER GENITAL ORGAN(S): Chronic | ICD-10-CM

## 2024-10-17 DIAGNOSIS — Z90.89 ACQUIRED ABSENCE OF OTHER ORGANS: Chronic | ICD-10-CM

## 2024-10-17 DIAGNOSIS — R33.9 RETENTION OF URINE, UNSPECIFIED: ICD-10-CM

## 2024-10-17 DIAGNOSIS — L92.9 GRANULOMATOUS DISORDER OF THE SKIN AND SUBCUTANEOUS TISSUE, UNSPECIFIED: ICD-10-CM

## 2024-10-17 DIAGNOSIS — R35.0 FREQUENCY OF MICTURITION: ICD-10-CM

## 2024-10-17 PROCEDURE — 17250 CHEM CAUT OF GRANLTJ TISSUE: CPT

## 2024-10-17 PROCEDURE — 51705 CHANGE OF BLADDER TUBE: CPT

## 2024-10-18 DIAGNOSIS — L92.9 GRANULOMATOUS DISORDER OF THE SKIN AND SUBCUTANEOUS TISSUE, UNSPECIFIED: ICD-10-CM

## 2024-10-18 DIAGNOSIS — R33.9 RETENTION OF URINE, UNSPECIFIED: ICD-10-CM

## 2024-11-06 ENCOUNTER — APPOINTMENT (OUTPATIENT)
Dept: CARDIOLOGY | Facility: CLINIC | Age: 74
End: 2024-11-06
Payer: MEDICARE

## 2024-11-06 ENCOUNTER — NON-APPOINTMENT (OUTPATIENT)
Age: 74
End: 2024-11-06

## 2024-11-06 VITALS
DIASTOLIC BLOOD PRESSURE: 66 MMHG | WEIGHT: 206 LBS | HEART RATE: 91 BPM | SYSTOLIC BLOOD PRESSURE: 116 MMHG | BODY MASS INDEX: 38.92 KG/M2 | OXYGEN SATURATION: 86 %

## 2024-11-06 DIAGNOSIS — I35.0 NONRHEUMATIC AORTIC (VALVE) STENOSIS: ICD-10-CM

## 2024-11-06 PROCEDURE — 99214 OFFICE O/P EST MOD 30 MIN: CPT

## 2024-11-06 PROCEDURE — 93000 ELECTROCARDIOGRAM COMPLETE: CPT

## 2024-11-06 PROCEDURE — G2211 COMPLEX E/M VISIT ADD ON: CPT

## 2024-11-06 RX ORDER — ENZALUTAMIDE 40 MG/1
40 CAPSULE ORAL
Refills: 0 | Status: ACTIVE | COMMUNITY

## 2024-11-12 ENCOUNTER — OUTPATIENT (OUTPATIENT)
Dept: OUTPATIENT SERVICES | Facility: HOSPITAL | Age: 74
LOS: 1 days | End: 2024-11-12
Payer: MEDICARE

## 2024-11-12 ENCOUNTER — APPOINTMENT (OUTPATIENT)
Dept: UROLOGY | Facility: CLINIC | Age: 74
End: 2024-11-12
Payer: MEDICARE

## 2024-11-12 VITALS
SYSTOLIC BLOOD PRESSURE: 124 MMHG | DIASTOLIC BLOOD PRESSURE: 79 MMHG | OXYGEN SATURATION: 97 % | RESPIRATION RATE: 16 BRPM | HEART RATE: 79 BPM | TEMPERATURE: 98.1 F

## 2024-11-12 DIAGNOSIS — C61 MALIGNANT NEOPLASM OF PROSTATE: ICD-10-CM

## 2024-11-12 DIAGNOSIS — Z90.79 ACQUIRED ABSENCE OF OTHER GENITAL ORGAN(S): Chronic | ICD-10-CM

## 2024-11-12 DIAGNOSIS — Z90.89 ACQUIRED ABSENCE OF OTHER ORGANS: Chronic | ICD-10-CM

## 2024-11-12 DIAGNOSIS — R31.0 GROSS HEMATURIA: ICD-10-CM

## 2024-11-12 DIAGNOSIS — N30.40 IRRADIATION CYSTITIS W/OUT HEMATURIA: ICD-10-CM

## 2024-11-12 DIAGNOSIS — L92.9 GRANULOMATOUS DISORDER OF THE SKIN AND SUBCUTANEOUS TISSUE, UNSPECIFIED: ICD-10-CM

## 2024-11-12 DIAGNOSIS — R35.0 FREQUENCY OF MICTURITION: ICD-10-CM

## 2024-11-12 PROCEDURE — 17250 CHEM CAUT OF GRANLTJ TISSUE: CPT

## 2024-11-12 PROCEDURE — 51705 CHANGE OF BLADDER TUBE: CPT

## 2024-11-12 PROCEDURE — C2627: CPT

## 2024-11-18 ENCOUNTER — APPOINTMENT (OUTPATIENT)
Dept: UROLOGY | Facility: CLINIC | Age: 74
End: 2024-11-18

## 2024-11-18 DIAGNOSIS — L92.9 GRANULOMATOUS DISORDER OF THE SKIN AND SUBCUTANEOUS TISSUE, UNSPECIFIED: ICD-10-CM

## 2024-11-18 DIAGNOSIS — C61 MALIGNANT NEOPLASM OF PROSTATE: ICD-10-CM

## 2024-11-18 DIAGNOSIS — N30.40 IRRADIATION CYSTITIS WITHOUT HEMATURIA: ICD-10-CM

## 2024-11-18 DIAGNOSIS — R31.0 GROSS HEMATURIA: ICD-10-CM

## 2024-11-22 ENCOUNTER — NON-APPOINTMENT (OUTPATIENT)
Age: 74
End: 2024-11-22

## 2024-11-24 ENCOUNTER — INPATIENT (INPATIENT)
Facility: HOSPITAL | Age: 74
LOS: 6 days | Discharge: ANOTHER HOSPITAL | End: 2024-12-01
Attending: STUDENT IN AN ORGANIZED HEALTH CARE EDUCATION/TRAINING PROGRAM | Admitting: STUDENT IN AN ORGANIZED HEALTH CARE EDUCATION/TRAINING PROGRAM
Payer: MEDICARE

## 2024-11-24 VITALS
HEIGHT: 70 IN | RESPIRATION RATE: 20 BRPM | WEIGHT: 205.03 LBS | HEART RATE: 128 BPM | TEMPERATURE: 98 F | SYSTOLIC BLOOD PRESSURE: 84 MMHG | DIASTOLIC BLOOD PRESSURE: 55 MMHG | OXYGEN SATURATION: 97 %

## 2024-11-24 DIAGNOSIS — Z29.9 ENCOUNTER FOR PROPHYLACTIC MEASURES, UNSPECIFIED: ICD-10-CM

## 2024-11-24 DIAGNOSIS — I35.0 NONRHEUMATIC AORTIC (VALVE) STENOSIS: ICD-10-CM

## 2024-11-24 DIAGNOSIS — R94.31 ABNORMAL ELECTROCARDIOGRAM [ECG] [EKG]: ICD-10-CM

## 2024-11-24 DIAGNOSIS — N17.9 ACUTE KIDNEY FAILURE, UNSPECIFIED: ICD-10-CM

## 2024-11-24 DIAGNOSIS — R53.1 WEAKNESS: ICD-10-CM

## 2024-11-24 DIAGNOSIS — R31.9 HEMATURIA, UNSPECIFIED: ICD-10-CM

## 2024-11-24 DIAGNOSIS — A41.9 SEPSIS, UNSPECIFIED ORGANISM: ICD-10-CM

## 2024-11-24 DIAGNOSIS — Z90.79 ACQUIRED ABSENCE OF OTHER GENITAL ORGAN(S): Chronic | ICD-10-CM

## 2024-11-24 DIAGNOSIS — D72.829 ELEVATED WHITE BLOOD CELL COUNT, UNSPECIFIED: ICD-10-CM

## 2024-11-24 DIAGNOSIS — C61 MALIGNANT NEOPLASM OF PROSTATE: ICD-10-CM

## 2024-11-24 DIAGNOSIS — Z90.89 ACQUIRED ABSENCE OF OTHER ORGANS: Chronic | ICD-10-CM

## 2024-11-24 DIAGNOSIS — I95.9 HYPOTENSION, UNSPECIFIED: ICD-10-CM

## 2024-11-24 LAB
ALBUMIN SERPL ELPH-MCNC: 3.5 G/DL — SIGNIFICANT CHANGE UP (ref 3.3–5)
ALP SERPL-CCNC: 103 U/L — SIGNIFICANT CHANGE UP (ref 40–120)
ALT FLD-CCNC: 8 U/L — SIGNIFICANT CHANGE UP (ref 4–41)
ANION GAP SERPL CALC-SCNC: 16 MMOL/L — HIGH (ref 7–14)
ANISOCYTOSIS BLD QL: SLIGHT — SIGNIFICANT CHANGE UP
APPEARANCE UR: ABNORMAL
APTT BLD: 30.4 SEC — SIGNIFICANT CHANGE UP (ref 24.5–35.6)
AST SERPL-CCNC: 15 U/L — SIGNIFICANT CHANGE UP (ref 4–40)
BACTERIA # UR AUTO: ABNORMAL /HPF
BASOPHILS # BLD AUTO: 0.06 K/UL — SIGNIFICANT CHANGE UP (ref 0–0.2)
BASOPHILS # BLD AUTO: 0.21 K/UL — HIGH (ref 0–0.2)
BASOPHILS NFR BLD AUTO: 0.3 % — SIGNIFICANT CHANGE UP (ref 0–2)
BASOPHILS NFR BLD AUTO: 0.9 % — SIGNIFICANT CHANGE UP (ref 0–2)
BILIRUB SERPL-MCNC: 0.7 MG/DL — SIGNIFICANT CHANGE UP (ref 0.2–1.2)
BILIRUB UR-MCNC: ABNORMAL
BLD GP AB SCN SERPL QL: NEGATIVE — SIGNIFICANT CHANGE UP
BLOOD GAS VENOUS COMPREHENSIVE RESULT: SIGNIFICANT CHANGE UP
BLOOD GAS VENOUS COMPREHENSIVE RESULT: SIGNIFICANT CHANGE UP
BUN SERPL-MCNC: 20 MG/DL — SIGNIFICANT CHANGE UP (ref 7–23)
CALCIUM SERPL-MCNC: 9 MG/DL — SIGNIFICANT CHANGE UP (ref 8.4–10.5)
CAST: 0 /LPF — SIGNIFICANT CHANGE UP (ref 0–4)
CHLORIDE SERPL-SCNC: 97 MMOL/L — LOW (ref 98–107)
CO2 SERPL-SCNC: 18 MMOL/L — LOW (ref 22–31)
COLOR SPEC: ABNORMAL
CREAT SERPL-MCNC: 1.74 MG/DL — HIGH (ref 0.5–1.3)
DIFF PNL FLD: ABNORMAL
EGFR: 40 ML/MIN/1.73M2 — LOW
EGFR: 40 ML/MIN/1.73M2 — LOW
EOSINOPHIL # BLD AUTO: 0 K/UL — SIGNIFICANT CHANGE UP (ref 0–0.5)
EOSINOPHIL # BLD AUTO: 0.14 K/UL — SIGNIFICANT CHANGE UP (ref 0–0.5)
EOSINOPHIL NFR BLD AUTO: 0 % — SIGNIFICANT CHANGE UP (ref 0–6)
EOSINOPHIL NFR BLD AUTO: 0.7 % — SIGNIFICANT CHANGE UP (ref 0–6)
FLUAV AG NPH QL: SIGNIFICANT CHANGE UP
FLUBV AG NPH QL: SIGNIFICANT CHANGE UP
GIANT PLATELETS BLD QL SMEAR: PRESENT — SIGNIFICANT CHANGE UP
GLUCOSE SERPL-MCNC: 95 MG/DL — SIGNIFICANT CHANGE UP (ref 70–99)
GLUCOSE UR QL: NEGATIVE MG/DL — SIGNIFICANT CHANGE UP
HCT VFR BLD CALC: 28.4 % — LOW (ref 39–50)
HCT VFR BLD CALC: 29.2 % — LOW (ref 39–50)
HCT VFR BLD CALC: 31.9 % — LOW (ref 39–50)
HGB BLD-MCNC: 10.5 G/DL — LOW (ref 13–17)
HGB BLD-MCNC: 9.5 G/DL — LOW (ref 13–17)
HGB BLD-MCNC: 9.5 G/DL — LOW (ref 13–17)
IANC: 19.19 K/UL — HIGH (ref 1.8–7.4)
IANC: 20.77 K/UL — HIGH (ref 1.8–7.4)
IMM GRANULOCYTES NFR BLD AUTO: 1.4 % — HIGH (ref 0–0.9)
INR BLD: 1.25 RATIO — HIGH (ref 0.85–1.16)
KETONES UR-MCNC: NEGATIVE MG/DL — SIGNIFICANT CHANGE UP
LEUKOCYTE ESTERASE UR-ACNC: ABNORMAL
LYMPHOCYTES # BLD AUTO: 0.21 K/UL — LOW (ref 1–3.3)
LYMPHOCYTES # BLD AUTO: 0.6 K/UL — LOW (ref 1–3.3)
LYMPHOCYTES # BLD AUTO: 0.9 % — LOW (ref 13–44)
LYMPHOCYTES # BLD AUTO: 2.8 % — LOW (ref 13–44)
MAGNESIUM SERPL-MCNC: 1.8 MG/DL — SIGNIFICANT CHANGE UP (ref 1.6–2.6)
MCHC RBC-ENTMCNC: 28.6 PG — SIGNIFICANT CHANGE UP (ref 27–34)
MCHC RBC-ENTMCNC: 28.9 PG — SIGNIFICANT CHANGE UP (ref 27–34)
MCHC RBC-ENTMCNC: 28.9 PG — SIGNIFICANT CHANGE UP (ref 27–34)
MCHC RBC-ENTMCNC: 32.5 G/DL — SIGNIFICANT CHANGE UP (ref 32–36)
MCHC RBC-ENTMCNC: 32.9 G/DL — SIGNIFICANT CHANGE UP (ref 32–36)
MCHC RBC-ENTMCNC: 33.5 G/DL — SIGNIFICANT CHANGE UP (ref 32–36)
MCV RBC AUTO: 86.3 FL — SIGNIFICANT CHANGE UP (ref 80–100)
MCV RBC AUTO: 87.9 FL — SIGNIFICANT CHANGE UP (ref 80–100)
MCV RBC AUTO: 88 FL — SIGNIFICANT CHANGE UP (ref 80–100)
METAMYELOCYTES # FLD: 2.7 % — HIGH (ref 0–1)
METAMYELOCYTES NFR BLD: 2.7 % — HIGH (ref 0–1)
MONOCYTES # BLD AUTO: 0.81 K/UL — SIGNIFICANT CHANGE UP (ref 0–0.9)
MONOCYTES # BLD AUTO: 2.27 K/UL — HIGH (ref 0–0.9)
MONOCYTES NFR BLD AUTO: 3.8 % — SIGNIFICANT CHANGE UP (ref 2–14)
MONOCYTES NFR BLD AUTO: 9.7 % — SIGNIFICANT CHANGE UP (ref 2–14)
NEUTROPHILS # BLD AUTO: 19.19 K/UL — HIGH (ref 1.8–7.4)
NEUTROPHILS # BLD AUTO: 20.07 K/UL — HIGH (ref 1.8–7.4)
NEUTROPHILS NFR BLD AUTO: 62.8 % — SIGNIFICANT CHANGE UP (ref 43–77)
NEUTROPHILS NFR BLD AUTO: 91 % — HIGH (ref 43–77)
NEUTS BAND # BLD: 23 % — CRITICAL HIGH (ref 0–6)
NEUTS BAND NFR BLD: 23 % — CRITICAL HIGH (ref 0–6)
NITRITE UR-MCNC: POSITIVE
NRBC # BLD AUTO: 0 K/UL — SIGNIFICANT CHANGE UP (ref 0–0)
NRBC # BLD AUTO: 0 K/UL — SIGNIFICANT CHANGE UP (ref 0–0)
NRBC # BLD: 0 /100 WBCS — SIGNIFICANT CHANGE UP (ref 0–0)
NRBC # BLD: 0 /100 WBCS — SIGNIFICANT CHANGE UP (ref 0–0)
NRBC # FLD: 0 K/UL — SIGNIFICANT CHANGE UP (ref 0–0)
NRBC # FLD: 0 K/UL — SIGNIFICANT CHANGE UP (ref 0–0)
NRBC BLD-RTO: 0 /100 WBCS — SIGNIFICANT CHANGE UP (ref 0–0)
NRBC BLD-RTO: 0 /100 WBCS — SIGNIFICANT CHANGE UP (ref 0–0)
OVALOCYTES BLD QL SMEAR: SLIGHT — SIGNIFICANT CHANGE UP
PH UR: 5 — SIGNIFICANT CHANGE UP (ref 5–8)
PHOSPHATE SERPL-MCNC: 1.5 MG/DL — LOW (ref 2.5–4.5)
PLAT MORPH BLD: NORMAL — SIGNIFICANT CHANGE UP
PLATELET # BLD AUTO: 210 K/UL — SIGNIFICANT CHANGE UP (ref 150–400)
PLATELET # BLD AUTO: 221 K/UL — SIGNIFICANT CHANGE UP (ref 150–400)
PLATELET # BLD AUTO: 254 K/UL — SIGNIFICANT CHANGE UP (ref 150–400)
PLATELET COUNT - ESTIMATE: NORMAL — SIGNIFICANT CHANGE UP
POIKILOCYTOSIS BLD QL AUTO: SLIGHT — SIGNIFICANT CHANGE UP
POLYCHROMASIA BLD QL SMEAR: SLIGHT — SIGNIFICANT CHANGE UP
POTASSIUM SERPL-MCNC: 3.8 MMOL/L — SIGNIFICANT CHANGE UP (ref 3.5–5.3)
POTASSIUM SERPL-SCNC: 3.8 MMOL/L — SIGNIFICANT CHANGE UP (ref 3.5–5.3)
PROT SERPL-MCNC: 6.9 G/DL — SIGNIFICANT CHANGE UP (ref 6–8.3)
PROT UR-MCNC: 30 MG/DL
PROTHROM AB SERPL-ACNC: 14.8 SEC — HIGH (ref 9.9–13.4)
RBC # BLD: 3.29 M/UL — LOW (ref 4.2–5.8)
RBC # BLD: 3.32 M/UL — LOW (ref 4.2–5.8)
RBC # BLD: 3.63 M/UL — LOW (ref 4.2–5.8)
RBC # FLD: 13.2 % — SIGNIFICANT CHANGE UP (ref 10.3–14.5)
RBC # FLD: 13.3 % — SIGNIFICANT CHANGE UP (ref 10.3–14.5)
RBC # FLD: 13.3 % — SIGNIFICANT CHANGE UP (ref 10.3–14.5)
RBC BLD AUTO: NORMAL — SIGNIFICANT CHANGE UP
RBC CASTS # UR COMP ASSIST: ABNORMAL /HPF
REVIEW: SIGNIFICANT CHANGE UP
RH IG SCN BLD-IMP: POSITIVE — SIGNIFICANT CHANGE UP
RSV RNA NPH QL NAA+NON-PROBE: SIGNIFICANT CHANGE UP
SARS-COV-2 RNA SPEC QL NAA+PROBE: SIGNIFICANT CHANGE UP
SODIUM SERPL-SCNC: 131 MMOL/L — LOW (ref 135–145)
SP GR SPEC: 1.03 — SIGNIFICANT CHANGE UP (ref 1–1.03)
SQUAMOUS # UR AUTO: 1 /HPF — SIGNIFICANT CHANGE UP (ref 0–5)
UROBILINOGEN FLD QL: 0.2 MG/DL — SIGNIFICANT CHANGE UP (ref 0.2–1)
WBC # BLD: 18.06 K/UL — HIGH (ref 3.8–10.5)
WBC # BLD: 21.09 K/UL — HIGH (ref 3.8–10.5)
WBC # BLD: 23.39 K/UL — HIGH (ref 3.8–10.5)
WBC # FLD AUTO: 18.06 K/UL — HIGH (ref 3.8–10.5)
WBC # FLD AUTO: 21.09 K/UL — HIGH (ref 3.8–10.5)
WBC # FLD AUTO: 23.39 K/UL — HIGH (ref 3.8–10.5)
WBC UR QL: 71 /HPF — HIGH (ref 0–5)

## 2024-11-24 PROCEDURE — 99223 1ST HOSP IP/OBS HIGH 75: CPT

## 2024-11-24 PROCEDURE — 99285 EMERGENCY DEPT VISIT HI MDM: CPT

## 2024-11-24 PROCEDURE — 99222 1ST HOSP IP/OBS MODERATE 55: CPT

## 2024-11-24 PROCEDURE — 71045 X-RAY EXAM CHEST 1 VIEW: CPT | Mod: 26

## 2024-11-24 RX ORDER — POLYETHYLENE GLYCOL 3350 17 G/17G
17 POWDER, FOR SOLUTION ORAL DAILY
Refills: 0 | Status: DISCONTINUED | OUTPATIENT
Start: 2024-11-24 | End: 2024-12-01

## 2024-11-24 RX ORDER — VANCOMYCIN HCL IN 5 % DEXTROSE 1.5G/250ML
1000 PLASTIC BAG, INJECTION (ML) INTRAVENOUS ONCE
Refills: 0 | Status: COMPLETED | OUTPATIENT
Start: 2024-11-24 | End: 2024-11-24

## 2024-11-24 RX ORDER — MAGNESIUM, ALUMINUM HYDROXIDE 200-200 MG
30 TABLET,CHEWABLE ORAL ONCE
Refills: 0 | Status: COMPLETED | OUTPATIENT
Start: 2024-11-24 | End: 2024-11-24

## 2024-11-24 RX ORDER — SENNA 187 MG
2 TABLET ORAL AT BEDTIME
Refills: 0 | Status: DISCONTINUED | OUTPATIENT
Start: 2024-11-24 | End: 2024-12-01

## 2024-11-24 RX ORDER — INFLUENZA A VIRUS A/IDAHO/07/2018 (H1N1) ANTIGEN (MDCK CELL DERIVED, PROPIOLACTONE INACTIVATED, INFLUENZA A VIRUS A/INDIANA/08/2018 (H3N2) ANTIGEN (MDCK CELL DERIVED, PROPIOLACTONE INACTIVATED), INFLUENZA B VIRUS B/SINGAPORE/INFTT-16-0610/2016 ANTIGEN (MDCK CELL DERIVED, PROPIOLACTONE INACTIVATED), INFLUENZA B VIRUS B/IOWA/06/2017 ANTIGEN (MDCK CELL DERIVED, PROPIOLACTONE INACTIVATED) 15; 15; 15; 15 UG/.5ML; UG/.5ML; UG/.5ML; UG/.5ML
0.5 INJECTION, SUSPENSION INTRAMUSCULAR ONCE
Refills: 0 | Status: DISCONTINUED | OUTPATIENT
Start: 2024-11-24 | End: 2024-12-01

## 2024-11-24 RX ORDER — ACETAMINOPHEN 500 MG/5ML
650 LIQUID (ML) ORAL EVERY 6 HOURS
Refills: 0 | Status: DISCONTINUED | OUTPATIENT
Start: 2024-11-24 | End: 2024-12-01

## 2024-11-24 RX ORDER — ONDANSETRON HCL/PF 4 MG/2 ML
4 VIAL (ML) INJECTION EVERY 6 HOURS
Refills: 0 | Status: DISCONTINUED | OUTPATIENT
Start: 2024-11-24 | End: 2024-11-25

## 2024-11-24 RX ORDER — MIRABEGRON 50 MG/1
25 TABLET, FILM COATED, EXTENDED RELEASE ORAL DAILY
Refills: 0 | Status: DISCONTINUED | OUTPATIENT
Start: 2024-11-24 | End: 2024-12-01

## 2024-11-24 RX ORDER — CYCLOBENZAPRINE HYDROCHLORIDE 15 MG/1
5 CAPSULE, EXTENDED RELEASE ORAL THREE TIMES A DAY
Refills: 0 | Status: DISCONTINUED | OUTPATIENT
Start: 2024-11-24 | End: 2024-12-01

## 2024-11-24 RX ORDER — CEFTRIAXONE 500 MG/1
1000 INJECTION, POWDER, FOR SOLUTION INTRAMUSCULAR; INTRAVENOUS ONCE
Refills: 0 | Status: COMPLETED | OUTPATIENT
Start: 2024-11-24 | End: 2024-11-24

## 2024-11-24 RX ORDER — CEFTRIAXONE 500 MG/1
1000 INJECTION, POWDER, FOR SOLUTION INTRAMUSCULAR; INTRAVENOUS EVERY 24 HOURS
Refills: 0 | Status: DISCONTINUED | OUTPATIENT
Start: 2024-11-25 | End: 2024-11-25

## 2024-11-24 RX ADMIN — Medication 1000 MILLILITER(S): at 10:11

## 2024-11-24 RX ADMIN — CEFTRIAXONE 100 MILLIGRAM(S): 500 INJECTION, POWDER, FOR SOLUTION INTRAMUSCULAR; INTRAVENOUS at 10:28

## 2024-11-24 RX ADMIN — CEFTRIAXONE 1000 MILLIGRAM(S): 500 INJECTION, POWDER, FOR SOLUTION INTRAMUSCULAR; INTRAVENOUS at 11:00

## 2024-11-24 RX ADMIN — Medication 1000 MILLILITER(S): at 11:41

## 2024-11-24 RX ADMIN — Medication 4 MILLIGRAM(S): at 14:38

## 2024-11-24 RX ADMIN — Medication 75 MILLILITER(S): at 23:09

## 2024-11-24 RX ADMIN — Medication 250 MILLIGRAM(S): at 11:03

## 2024-11-24 RX ADMIN — Medication 1000 MILLILITER(S): at 08:03

## 2024-11-24 RX ADMIN — Medication 10 MILLIGRAM(S): at 21:40

## 2024-11-24 RX ADMIN — Medication 1000 MILLIGRAM(S): at 12:35

## 2024-11-24 RX ADMIN — Medication 2 TABLET(S): at 21:40

## 2024-11-24 RX ADMIN — Medication 30 MILLILITER(S): at 16:59

## 2024-11-24 RX ADMIN — Medication 4 MILLIGRAM(S): at 21:40

## 2024-11-25 LAB
ANION GAP SERPL CALC-SCNC: 13 MMOL/L — SIGNIFICANT CHANGE UP (ref 7–14)
APTT BLD: 33.9 SEC — SIGNIFICANT CHANGE UP (ref 24.5–35.6)
BUN SERPL-MCNC: 21 MG/DL — SIGNIFICANT CHANGE UP (ref 7–23)
CALCIUM SERPL-MCNC: 8.3 MG/DL — LOW (ref 8.4–10.5)
CHLORIDE SERPL-SCNC: 97 MMOL/L — LOW (ref 98–107)
CO2 SERPL-SCNC: 21 MMOL/L — LOW (ref 22–31)
CREAT SERPL-MCNC: 1.43 MG/DL — HIGH (ref 0.5–1.3)
EGFR: 51 ML/MIN/1.73M2 — LOW
EGFR: 51 ML/MIN/1.73M2 — LOW
GLUCOSE SERPL-MCNC: 98 MG/DL — SIGNIFICANT CHANGE UP (ref 70–99)
GRAM STN FLD: ABNORMAL
HCT VFR BLD CALC: 30.7 % — LOW (ref 39–50)
HGB BLD-MCNC: 9.7 G/DL — LOW (ref 13–17)
INR BLD: 1.38 RATIO — HIGH (ref 0.85–1.16)
MAGNESIUM SERPL-MCNC: 2.1 MG/DL — SIGNIFICANT CHANGE UP (ref 1.6–2.6)
MCHC RBC-ENTMCNC: 28 PG — SIGNIFICANT CHANGE UP (ref 27–34)
MCHC RBC-ENTMCNC: 31.6 G/DL — LOW (ref 32–36)
MCV RBC AUTO: 88.7 FL — SIGNIFICANT CHANGE UP (ref 80–100)
METHOD TYPE: SIGNIFICANT CHANGE UP
MSSA DNA SPEC QL NAA+PROBE: SIGNIFICANT CHANGE UP
NRBC # BLD AUTO: 0 K/UL — SIGNIFICANT CHANGE UP (ref 0–0)
NRBC # BLD: 0 /100 WBCS — SIGNIFICANT CHANGE UP (ref 0–0)
NRBC # FLD: 0 K/UL — SIGNIFICANT CHANGE UP (ref 0–0)
NRBC BLD-RTO: 0 /100 WBCS — SIGNIFICANT CHANGE UP (ref 0–0)
PHOSPHATE SERPL-MCNC: 2.2 MG/DL — LOW (ref 2.5–4.5)
PLATELET # BLD AUTO: 195 K/UL — SIGNIFICANT CHANGE UP (ref 150–400)
POTASSIUM SERPL-MCNC: 3.9 MMOL/L — SIGNIFICANT CHANGE UP (ref 3.5–5.3)
POTASSIUM SERPL-SCNC: 3.9 MMOL/L — SIGNIFICANT CHANGE UP (ref 3.5–5.3)
PROTHROM AB SERPL-ACNC: 16 SEC — HIGH (ref 9.9–13.4)
RBC # BLD: 3.46 M/UL — LOW (ref 4.2–5.8)
RBC # FLD: 13.5 % — SIGNIFICANT CHANGE UP (ref 10.3–14.5)
SODIUM SERPL-SCNC: 131 MMOL/L — LOW (ref 135–145)
SPECIMEN SOURCE: SIGNIFICANT CHANGE UP
SPECIMEN SOURCE: SIGNIFICANT CHANGE UP
WBC # BLD: 15.97 K/UL — HIGH (ref 3.8–10.5)
WBC # FLD AUTO: 15.97 K/UL — HIGH (ref 3.8–10.5)

## 2024-11-25 PROCEDURE — 99232 SBSQ HOSP IP/OBS MODERATE 35: CPT | Mod: GC

## 2024-11-25 PROCEDURE — 99232 SBSQ HOSP IP/OBS MODERATE 35: CPT

## 2024-11-25 PROCEDURE — 99223 1ST HOSP IP/OBS HIGH 75: CPT

## 2024-11-25 RX ORDER — ACETAMINOPHEN 500 MG/5ML
1000 LIQUID (ML) ORAL ONCE
Refills: 0 | Status: COMPLETED | OUTPATIENT
Start: 2024-11-25 | End: 2024-11-25

## 2024-11-25 RX ORDER — LORAZEPAM 4 MG/ML
0.5 VIAL (ML) INJECTION EVERY 8 HOURS
Refills: 0 | Status: DISCONTINUED | OUTPATIENT
Start: 2024-11-25 | End: 2024-12-01

## 2024-11-25 RX ORDER — CEFAZOLIN SODIUM IN 0.9 % NACL 3 G/100 ML
2000 INTRAVENOUS SOLUTION, PIGGYBACK (ML) INTRAVENOUS EVERY 8 HOURS
Refills: 0 | Status: DISCONTINUED | OUTPATIENT
Start: 2024-11-25 | End: 2024-12-01

## 2024-11-25 RX ORDER — SODIUM PHOSPHATE,DIBASIC DIHYD
15 POWDER (GRAM) MISCELLANEOUS ONCE
Refills: 0 | Status: COMPLETED | OUTPATIENT
Start: 2024-11-25 | End: 2024-11-25

## 2024-11-25 RX ORDER — CEFAZOLIN SODIUM IN 0.9 % NACL 3 G/100 ML
INTRAVENOUS SOLUTION, PIGGYBACK (ML) INTRAVENOUS
Refills: 0 | Status: DISCONTINUED | OUTPATIENT
Start: 2024-11-25 | End: 2024-12-01

## 2024-11-25 RX ORDER — CEFAZOLIN SODIUM IN 0.9 % NACL 3 G/100 ML
2000 INTRAVENOUS SOLUTION, PIGGYBACK (ML) INTRAVENOUS ONCE
Refills: 0 | Status: COMPLETED | OUTPATIENT
Start: 2024-11-25 | End: 2024-11-25

## 2024-11-25 RX ORDER — ENZALUTAMIDE 40 MG/1
160 CAPSULE ORAL AT BEDTIME
Refills: 0 | Status: DISCONTINUED | OUTPATIENT
Start: 2024-11-25 | End: 2024-11-26

## 2024-11-25 RX ADMIN — Medication 63.75 MILLIMOLE(S): at 10:10

## 2024-11-25 RX ADMIN — Medication 10 MILLIGRAM(S): at 13:56

## 2024-11-25 RX ADMIN — Medication 75 MILLILITER(S): at 23:52

## 2024-11-25 RX ADMIN — Medication 100 MILLIGRAM(S): at 07:27

## 2024-11-25 RX ADMIN — Medication 75 MILLILITER(S): at 10:10

## 2024-11-25 RX ADMIN — Medication 400 MILLIGRAM(S): at 23:27

## 2024-11-25 RX ADMIN — Medication 2 TABLET(S): at 21:17

## 2024-11-25 RX ADMIN — MIRABEGRON 25 MILLIGRAM(S): 50 TABLET, FILM COATED, EXTENDED RELEASE ORAL at 13:56

## 2024-11-25 RX ADMIN — Medication 100 MILLIGRAM(S): at 13:57

## 2024-11-25 RX ADMIN — POLYETHYLENE GLYCOL 3350 17 GRAM(S): 17 POWDER, FOR SOLUTION ORAL at 13:56

## 2024-11-25 RX ADMIN — Medication 100 MILLIGRAM(S): at 21:17

## 2024-11-25 RX ADMIN — Medication 4 MILLIGRAM(S): at 03:50

## 2024-11-26 LAB
-  CEFTAROLINE: SIGNIFICANT CHANGE UP
-  CLINDAMYCIN: SIGNIFICANT CHANGE UP
-  ERYTHROMYCIN: SIGNIFICANT CHANGE UP
-  GENTAMICIN: SIGNIFICANT CHANGE UP
-  GENTAMICIN: SIGNIFICANT CHANGE UP
-  NITROFURANTOIN: SIGNIFICANT CHANGE UP
-  OXACILLIN: SIGNIFICANT CHANGE UP
-  OXACILLIN: SIGNIFICANT CHANGE UP
-  PENICILLIN: SIGNIFICANT CHANGE UP
-  PENICILLIN: SIGNIFICANT CHANGE UP
-  RIFAMPIN: SIGNIFICANT CHANGE UP
-  RIFAMPIN: SIGNIFICANT CHANGE UP
-  TETRACYCLINE: SIGNIFICANT CHANGE UP
-  TETRACYCLINE: SIGNIFICANT CHANGE UP
-  TRIMETHOPRIM/SULFAMETHOXAZOLE: SIGNIFICANT CHANGE UP
-  TRIMETHOPRIM/SULFAMETHOXAZOLE: SIGNIFICANT CHANGE UP
-  VANCOMYCIN: SIGNIFICANT CHANGE UP
-  VANCOMYCIN: SIGNIFICANT CHANGE UP
ANION GAP SERPL CALC-SCNC: 14 MMOL/L — SIGNIFICANT CHANGE UP (ref 7–14)
BUN SERPL-MCNC: 25 MG/DL — HIGH (ref 7–23)
CALCIUM SERPL-MCNC: 7.9 MG/DL — LOW (ref 8.4–10.5)
CHLORIDE SERPL-SCNC: 99 MMOL/L — SIGNIFICANT CHANGE UP (ref 98–107)
CO2 SERPL-SCNC: 20 MMOL/L — LOW (ref 22–31)
CREAT SERPL-MCNC: 1.37 MG/DL — HIGH (ref 0.5–1.3)
CULTURE RESULTS: ABNORMAL
EGFR: 53 ML/MIN/1.73M2 — LOW
EGFR: 53 ML/MIN/1.73M2 — LOW
GLUCOSE SERPL-MCNC: 112 MG/DL — HIGH (ref 70–99)
HCT VFR BLD CALC: 26.4 % — LOW (ref 39–50)
HGB BLD-MCNC: 8.5 G/DL — LOW (ref 13–17)
MAGNESIUM SERPL-MCNC: 2.1 MG/DL — SIGNIFICANT CHANGE UP (ref 1.6–2.6)
MCHC RBC-ENTMCNC: 28.1 PG — SIGNIFICANT CHANGE UP (ref 27–34)
MCHC RBC-ENTMCNC: 32.2 G/DL — SIGNIFICANT CHANGE UP (ref 32–36)
MCV RBC AUTO: 87.4 FL — SIGNIFICANT CHANGE UP (ref 80–100)
METHOD TYPE: SIGNIFICANT CHANGE UP
METHOD TYPE: SIGNIFICANT CHANGE UP
NRBC # BLD AUTO: 0 K/UL — SIGNIFICANT CHANGE UP (ref 0–0)
NRBC # BLD: 0 /100 WBCS — SIGNIFICANT CHANGE UP (ref 0–0)
NRBC # FLD: 0 K/UL — SIGNIFICANT CHANGE UP (ref 0–0)
NRBC BLD-RTO: 0 /100 WBCS — SIGNIFICANT CHANGE UP (ref 0–0)
ORGANISM # SPEC MICROSCOPIC CNT: ABNORMAL
PHOSPHATE SERPL-MCNC: 2.4 MG/DL — LOW (ref 2.5–4.5)
PLATELET # BLD AUTO: 202 K/UL — SIGNIFICANT CHANGE UP (ref 150–400)
POTASSIUM SERPL-MCNC: 3.7 MMOL/L — SIGNIFICANT CHANGE UP (ref 3.5–5.3)
POTASSIUM SERPL-SCNC: 3.7 MMOL/L — SIGNIFICANT CHANGE UP (ref 3.5–5.3)
RBC # BLD: 3.02 M/UL — LOW (ref 4.2–5.8)
RBC # FLD: 13.4 % — SIGNIFICANT CHANGE UP (ref 10.3–14.5)
SODIUM SERPL-SCNC: 133 MMOL/L — LOW (ref 135–145)
SPECIMEN SOURCE: SIGNIFICANT CHANGE UP
WBC # BLD: 11.99 K/UL — HIGH (ref 3.8–10.5)
WBC # FLD AUTO: 11.99 K/UL — HIGH (ref 3.8–10.5)

## 2024-11-26 PROCEDURE — 99232 SBSQ HOSP IP/OBS MODERATE 35: CPT

## 2024-11-26 PROCEDURE — 99231 SBSQ HOSP IP/OBS SF/LOW 25: CPT

## 2024-11-26 PROCEDURE — 99233 SBSQ HOSP IP/OBS HIGH 50: CPT

## 2024-11-26 RX ADMIN — MIRABEGRON 25 MILLIGRAM(S): 50 TABLET, FILM COATED, EXTENDED RELEASE ORAL at 11:10

## 2024-11-26 RX ADMIN — POLYETHYLENE GLYCOL 3350 17 GRAM(S): 17 POWDER, FOR SOLUTION ORAL at 11:10

## 2024-11-26 RX ADMIN — Medication 100 MILLIGRAM(S): at 06:25

## 2024-11-26 RX ADMIN — Medication 1000 MILLIGRAM(S): at 00:00

## 2024-11-26 RX ADMIN — Medication 100 MILLIGRAM(S): at 22:32

## 2024-11-26 RX ADMIN — Medication 10 MILLIGRAM(S): at 11:10

## 2024-11-26 RX ADMIN — Medication 100 MILLIGRAM(S): at 14:25

## 2024-11-27 ENCOUNTER — RESULT REVIEW (OUTPATIENT)
Age: 74
End: 2024-11-27

## 2024-11-27 LAB
ANION GAP SERPL CALC-SCNC: 9 MMOL/L — SIGNIFICANT CHANGE UP (ref 7–14)
BUN SERPL-MCNC: 19 MG/DL — SIGNIFICANT CHANGE UP (ref 7–23)
CALCIUM SERPL-MCNC: 7.9 MG/DL — LOW (ref 8.4–10.5)
CHLORIDE SERPL-SCNC: 101 MMOL/L — SIGNIFICANT CHANGE UP (ref 98–107)
CO2 SERPL-SCNC: 26 MMOL/L — SIGNIFICANT CHANGE UP (ref 22–31)
CREAT SERPL-MCNC: 0.87 MG/DL — SIGNIFICANT CHANGE UP (ref 0.5–1.3)
EGFR: 89 ML/MIN/1.73M2 — SIGNIFICANT CHANGE UP
EGFR: 89 ML/MIN/1.73M2 — SIGNIFICANT CHANGE UP
GLUCOSE SERPL-MCNC: 129 MG/DL — HIGH (ref 70–99)
HCT VFR BLD CALC: 23.9 % — LOW (ref 39–50)
HGB BLD-MCNC: 7.9 G/DL — LOW (ref 13–17)
MCHC RBC-ENTMCNC: 28.1 PG — SIGNIFICANT CHANGE UP (ref 27–34)
MCHC RBC-ENTMCNC: 33.1 G/DL — SIGNIFICANT CHANGE UP (ref 32–36)
MCV RBC AUTO: 85.1 FL — SIGNIFICANT CHANGE UP (ref 80–100)
NRBC # BLD AUTO: 0 K/UL — SIGNIFICANT CHANGE UP (ref 0–0)
NRBC # BLD: 0 /100 WBCS — SIGNIFICANT CHANGE UP (ref 0–0)
NRBC # FLD: 0 K/UL — SIGNIFICANT CHANGE UP (ref 0–0)
NRBC BLD-RTO: 0 /100 WBCS — SIGNIFICANT CHANGE UP (ref 0–0)
PLATELET # BLD AUTO: 207 K/UL — SIGNIFICANT CHANGE UP (ref 150–400)
POTASSIUM SERPL-MCNC: 3.6 MMOL/L — SIGNIFICANT CHANGE UP (ref 3.5–5.3)
POTASSIUM SERPL-SCNC: 3.6 MMOL/L — SIGNIFICANT CHANGE UP (ref 3.5–5.3)
RBC # BLD: 2.81 M/UL — LOW (ref 4.2–5.8)
RBC # FLD: 13.2 % — SIGNIFICANT CHANGE UP (ref 10.3–14.5)
SODIUM SERPL-SCNC: 136 MMOL/L — SIGNIFICANT CHANGE UP (ref 135–145)
WBC # BLD: 7.44 K/UL — SIGNIFICANT CHANGE UP (ref 3.8–10.5)
WBC # FLD AUTO: 7.44 K/UL — SIGNIFICANT CHANGE UP (ref 3.8–10.5)

## 2024-11-27 PROCEDURE — 93306 TTE W/DOPPLER COMPLETE: CPT | Mod: 26

## 2024-11-27 PROCEDURE — 99232 SBSQ HOSP IP/OBS MODERATE 35: CPT

## 2024-11-27 PROCEDURE — 99233 SBSQ HOSP IP/OBS HIGH 50: CPT | Mod: 25

## 2024-11-27 PROCEDURE — 93280 PM DEVICE PROGR EVAL DUAL: CPT | Mod: 26

## 2024-11-27 RX ADMIN — Medication 100 MILLIGRAM(S): at 06:11

## 2024-11-27 RX ADMIN — CYCLOBENZAPRINE HYDROCHLORIDE 5 MILLIGRAM(S): 15 CAPSULE, EXTENDED RELEASE ORAL at 21:21

## 2024-11-27 RX ADMIN — MIRABEGRON 25 MILLIGRAM(S): 50 TABLET, FILM COATED, EXTENDED RELEASE ORAL at 12:53

## 2024-11-27 RX ADMIN — Medication 100 MILLIGRAM(S): at 13:43

## 2024-11-27 RX ADMIN — Medication 10 MILLIGRAM(S): at 12:53

## 2024-11-27 RX ADMIN — Medication 100 MILLIGRAM(S): at 21:21

## 2024-11-28 LAB
ANION GAP SERPL CALC-SCNC: 10 MMOL/L — SIGNIFICANT CHANGE UP (ref 7–14)
BASOPHILS # BLD AUTO: 0.03 K/UL — SIGNIFICANT CHANGE UP (ref 0–0.2)
BASOPHILS NFR BLD AUTO: 0.4 % — SIGNIFICANT CHANGE UP (ref 0–2)
BLD GP AB SCN SERPL QL: NEGATIVE — SIGNIFICANT CHANGE UP
BUN SERPL-MCNC: 14 MG/DL — SIGNIFICANT CHANGE UP (ref 7–23)
CALCIUM SERPL-MCNC: 8 MG/DL — LOW (ref 8.4–10.5)
CHLORIDE SERPL-SCNC: 100 MMOL/L — SIGNIFICANT CHANGE UP (ref 98–107)
CO2 SERPL-SCNC: 27 MMOL/L — SIGNIFICANT CHANGE UP (ref 22–31)
CREAT SERPL-MCNC: 0.75 MG/DL — SIGNIFICANT CHANGE UP (ref 0.5–1.3)
EGFR: 94 ML/MIN/1.73M2 — SIGNIFICANT CHANGE UP
EGFR: 94 ML/MIN/1.73M2 — SIGNIFICANT CHANGE UP
EOSINOPHIL # BLD AUTO: 0.21 K/UL — SIGNIFICANT CHANGE UP (ref 0–0.5)
EOSINOPHIL NFR BLD AUTO: 3.1 % — SIGNIFICANT CHANGE UP (ref 0–6)
GLUCOSE SERPL-MCNC: 100 MG/DL — HIGH (ref 70–99)
HCT VFR BLD CALC: 21.7 % — LOW (ref 39–50)
HCT VFR BLD CALC: 22.9 % — LOW (ref 39–50)
HGB BLD-MCNC: 7.3 G/DL — LOW (ref 13–17)
HGB BLD-MCNC: 7.6 G/DL — LOW (ref 13–17)
IANC: 4.47 K/UL — SIGNIFICANT CHANGE UP (ref 1.8–7.4)
IMM GRANULOCYTES NFR BLD AUTO: 1 % — HIGH (ref 0–0.9)
LYMPHOCYTES # BLD AUTO: 1.01 K/UL — SIGNIFICANT CHANGE UP (ref 1–3.3)
LYMPHOCYTES # BLD AUTO: 14.9 % — SIGNIFICANT CHANGE UP (ref 13–44)
MCHC RBC-ENTMCNC: 28.1 PG — SIGNIFICANT CHANGE UP (ref 27–34)
MCHC RBC-ENTMCNC: 28.4 PG — SIGNIFICANT CHANGE UP (ref 27–34)
MCHC RBC-ENTMCNC: 33.2 G/DL — SIGNIFICANT CHANGE UP (ref 32–36)
MCHC RBC-ENTMCNC: 33.6 G/DL — SIGNIFICANT CHANGE UP (ref 32–36)
MCV RBC AUTO: 84.4 FL — SIGNIFICANT CHANGE UP (ref 80–100)
MCV RBC AUTO: 84.8 FL — SIGNIFICANT CHANGE UP (ref 80–100)
MONOCYTES # BLD AUTO: 0.99 K/UL — HIGH (ref 0–0.9)
MONOCYTES NFR BLD AUTO: 14.6 % — HIGH (ref 2–14)
NEUTROPHILS # BLD AUTO: 4.47 K/UL — SIGNIFICANT CHANGE UP (ref 1.8–7.4)
NEUTROPHILS NFR BLD AUTO: 66 % — SIGNIFICANT CHANGE UP (ref 43–77)
NRBC # BLD AUTO: 0 K/UL — SIGNIFICANT CHANGE UP (ref 0–0)
NRBC # BLD AUTO: 0 K/UL — SIGNIFICANT CHANGE UP (ref 0–0)
NRBC # BLD: 0 /100 WBCS — SIGNIFICANT CHANGE UP (ref 0–0)
NRBC # BLD: 0 /100 WBCS — SIGNIFICANT CHANGE UP (ref 0–0)
NRBC # FLD: 0 K/UL — SIGNIFICANT CHANGE UP (ref 0–0)
NRBC # FLD: 0 K/UL — SIGNIFICANT CHANGE UP (ref 0–0)
NRBC BLD-RTO: 0 /100 WBCS — SIGNIFICANT CHANGE UP (ref 0–0)
NRBC BLD-RTO: 0 /100 WBCS — SIGNIFICANT CHANGE UP (ref 0–0)
PLATELET # BLD AUTO: 223 K/UL — SIGNIFICANT CHANGE UP (ref 150–400)
PLATELET # BLD AUTO: 262 K/UL — SIGNIFICANT CHANGE UP (ref 150–400)
POTASSIUM SERPL-MCNC: 3.5 MMOL/L — SIGNIFICANT CHANGE UP (ref 3.5–5.3)
POTASSIUM SERPL-SCNC: 3.5 MMOL/L — SIGNIFICANT CHANGE UP (ref 3.5–5.3)
RBC # BLD: 2.57 M/UL — LOW (ref 4.2–5.8)
RBC # BLD: 2.7 M/UL — LOW (ref 4.2–5.8)
RBC # FLD: 13.5 % — SIGNIFICANT CHANGE UP (ref 10.3–14.5)
RBC # FLD: 13.5 % — SIGNIFICANT CHANGE UP (ref 10.3–14.5)
RH IG SCN BLD-IMP: POSITIVE — SIGNIFICANT CHANGE UP
SODIUM SERPL-SCNC: 137 MMOL/L — SIGNIFICANT CHANGE UP (ref 135–145)
WBC # BLD: 6.78 K/UL — SIGNIFICANT CHANGE UP (ref 3.8–10.5)
WBC # BLD: 7.21 K/UL — SIGNIFICANT CHANGE UP (ref 3.8–10.5)
WBC # FLD AUTO: 6.78 K/UL — SIGNIFICANT CHANGE UP (ref 3.8–10.5)
WBC # FLD AUTO: 7.21 K/UL — SIGNIFICANT CHANGE UP (ref 3.8–10.5)

## 2024-11-28 PROCEDURE — 99232 SBSQ HOSP IP/OBS MODERATE 35: CPT | Mod: 25

## 2024-11-28 PROCEDURE — 99232 SBSQ HOSP IP/OBS MODERATE 35: CPT | Mod: GC

## 2024-11-28 PROCEDURE — 51700 IRRIGATION OF BLADDER: CPT

## 2024-11-28 RX ORDER — DIPHENHYDRAMINE HCL 12.5MG/5ML
25 ELIXIR ORAL ONCE
Refills: 0 | Status: COMPLETED | OUTPATIENT
Start: 2024-11-28 | End: 2024-11-28

## 2024-11-28 RX ADMIN — Medication 650 MILLIGRAM(S): at 11:24

## 2024-11-28 RX ADMIN — Medication 100 MILLIGRAM(S): at 05:55

## 2024-11-28 RX ADMIN — Medication 100 MILLIGRAM(S): at 21:18

## 2024-11-28 RX ADMIN — Medication 10 MILLIGRAM(S): at 11:22

## 2024-11-28 RX ADMIN — Medication 650 MILLIGRAM(S): at 10:24

## 2024-11-28 RX ADMIN — Medication 25 MILLIGRAM(S): at 02:38

## 2024-11-28 RX ADMIN — MIRABEGRON 25 MILLIGRAM(S): 50 TABLET, FILM COATED, EXTENDED RELEASE ORAL at 11:22

## 2024-11-28 RX ADMIN — Medication 100 MILLIGRAM(S): at 13:43

## 2024-11-29 LAB
ANION GAP SERPL CALC-SCNC: 9 MMOL/L — SIGNIFICANT CHANGE UP (ref 7–14)
BUN SERPL-MCNC: 11 MG/DL — SIGNIFICANT CHANGE UP (ref 7–23)
CALCIUM SERPL-MCNC: 8 MG/DL — LOW (ref 8.4–10.5)
CHLORIDE SERPL-SCNC: 100 MMOL/L — SIGNIFICANT CHANGE UP (ref 98–107)
CO2 SERPL-SCNC: 26 MMOL/L — SIGNIFICANT CHANGE UP (ref 22–31)
CREAT SERPL-MCNC: 0.73 MG/DL — SIGNIFICANT CHANGE UP (ref 0.5–1.3)
EGFR: 94 ML/MIN/1.73M2 — SIGNIFICANT CHANGE UP
EGFR: 94 ML/MIN/1.73M2 — SIGNIFICANT CHANGE UP
GLUCOSE SERPL-MCNC: 106 MG/DL — HIGH (ref 70–99)
HCT VFR BLD CALC: 21.3 % — LOW (ref 39–50)
HGB BLD-MCNC: 7.1 G/DL — LOW (ref 13–17)
MAGNESIUM SERPL-MCNC: 2 MG/DL — SIGNIFICANT CHANGE UP (ref 1.6–2.6)
MCHC RBC-ENTMCNC: 28.5 PG — SIGNIFICANT CHANGE UP (ref 27–34)
MCHC RBC-ENTMCNC: 33.3 G/DL — SIGNIFICANT CHANGE UP (ref 32–36)
MCV RBC AUTO: 85.5 FL — SIGNIFICANT CHANGE UP (ref 80–100)
NRBC # BLD AUTO: 0.02 K/UL — HIGH (ref 0–0)
NRBC # BLD: 0 /100 WBCS — SIGNIFICANT CHANGE UP (ref 0–0)
NRBC # FLD: 0.02 K/UL — HIGH (ref 0–0)
NRBC BLD-RTO: 0 /100 WBCS — SIGNIFICANT CHANGE UP (ref 0–0)
PHOSPHATE SERPL-MCNC: 2.7 MG/DL — SIGNIFICANT CHANGE UP (ref 2.5–4.5)
PLATELET # BLD AUTO: 248 K/UL — SIGNIFICANT CHANGE UP (ref 150–400)
POTASSIUM SERPL-MCNC: 3.3 MMOL/L — LOW (ref 3.5–5.3)
POTASSIUM SERPL-SCNC: 3.3 MMOL/L — LOW (ref 3.5–5.3)
RBC # BLD: 2.49 M/UL — LOW (ref 4.2–5.8)
RBC # FLD: 13.6 % — SIGNIFICANT CHANGE UP (ref 10.3–14.5)
SODIUM SERPL-SCNC: 135 MMOL/L — SIGNIFICANT CHANGE UP (ref 135–145)
WBC # BLD: 7.26 K/UL — SIGNIFICANT CHANGE UP (ref 3.8–10.5)
WBC # FLD AUTO: 7.26 K/UL — SIGNIFICANT CHANGE UP (ref 3.8–10.5)

## 2024-11-29 PROCEDURE — 99232 SBSQ HOSP IP/OBS MODERATE 35: CPT

## 2024-11-29 PROCEDURE — 99231 SBSQ HOSP IP/OBS SF/LOW 25: CPT

## 2024-11-29 RX ADMIN — Medication 10 MILLIGRAM(S): at 12:46

## 2024-11-29 RX ADMIN — Medication 2 TABLET(S): at 21:57

## 2024-11-29 RX ADMIN — MIRABEGRON 25 MILLIGRAM(S): 50 TABLET, FILM COATED, EXTENDED RELEASE ORAL at 12:46

## 2024-11-29 RX ADMIN — CYCLOBENZAPRINE HYDROCHLORIDE 5 MILLIGRAM(S): 15 CAPSULE, EXTENDED RELEASE ORAL at 03:35

## 2024-11-29 RX ADMIN — Medication 100 MILLIGRAM(S): at 21:56

## 2024-11-29 RX ADMIN — Medication 100 MILLIGRAM(S): at 05:58

## 2024-11-29 RX ADMIN — Medication 100 MILLIGRAM(S): at 14:40

## 2024-11-30 ENCOUNTER — TRANSCRIPTION ENCOUNTER (OUTPATIENT)
Age: 74
End: 2024-11-30

## 2024-11-30 LAB
ANION GAP SERPL CALC-SCNC: 11 MMOL/L — SIGNIFICANT CHANGE UP (ref 7–14)
BUN SERPL-MCNC: 10 MG/DL — SIGNIFICANT CHANGE UP (ref 7–23)
CALCIUM SERPL-MCNC: 8.2 MG/DL — LOW (ref 8.4–10.5)
CHLORIDE SERPL-SCNC: 97 MMOL/L — LOW (ref 98–107)
CO2 SERPL-SCNC: 26 MMOL/L — SIGNIFICANT CHANGE UP (ref 22–31)
CREAT SERPL-MCNC: 0.7 MG/DL — SIGNIFICANT CHANGE UP (ref 0.5–1.3)
EGFR: 95 ML/MIN/1.73M2 — SIGNIFICANT CHANGE UP
EGFR: 95 ML/MIN/1.73M2 — SIGNIFICANT CHANGE UP
GLUCOSE SERPL-MCNC: 109 MG/DL — HIGH (ref 70–99)
HCT VFR BLD CALC: 20.6 % — CRITICAL LOW (ref 39–50)
HCT VFR BLD CALC: 24.6 % — LOW (ref 39–50)
HGB BLD-MCNC: 6.9 G/DL — CRITICAL LOW (ref 13–17)
HGB BLD-MCNC: 7.8 G/DL — LOW (ref 13–17)
MAGNESIUM SERPL-MCNC: 2.1 MG/DL — SIGNIFICANT CHANGE UP (ref 1.6–2.6)
MCHC RBC-ENTMCNC: 27.1 PG — SIGNIFICANT CHANGE UP (ref 27–34)
MCHC RBC-ENTMCNC: 28.5 PG — SIGNIFICANT CHANGE UP (ref 27–34)
MCHC RBC-ENTMCNC: 31.7 G/DL — LOW (ref 32–36)
MCHC RBC-ENTMCNC: 33.5 G/DL — SIGNIFICANT CHANGE UP (ref 32–36)
MCV RBC AUTO: 85.1 FL — SIGNIFICANT CHANGE UP (ref 80–100)
MCV RBC AUTO: 85.4 FL — SIGNIFICANT CHANGE UP (ref 80–100)
NRBC # BLD AUTO: 0.03 K/UL — HIGH (ref 0–0)
NRBC # BLD AUTO: 0.04 K/UL — HIGH (ref 0–0)
NRBC # BLD: 0 /100 WBCS — SIGNIFICANT CHANGE UP (ref 0–0)
NRBC # BLD: 0 /100 WBCS — SIGNIFICANT CHANGE UP (ref 0–0)
NRBC # FLD: 0.03 K/UL — HIGH (ref 0–0)
NRBC # FLD: 0.04 K/UL — HIGH (ref 0–0)
NRBC BLD-RTO: 0 /100 WBCS — SIGNIFICANT CHANGE UP (ref 0–0)
NRBC BLD-RTO: 0 /100 WBCS — SIGNIFICANT CHANGE UP (ref 0–0)
PHOSPHATE SERPL-MCNC: 2.9 MG/DL — SIGNIFICANT CHANGE UP (ref 2.5–4.5)
PLATELET # BLD AUTO: 337 K/UL — SIGNIFICANT CHANGE UP (ref 150–400)
PLATELET # BLD AUTO: 350 K/UL — SIGNIFICANT CHANGE UP (ref 150–400)
POTASSIUM SERPL-MCNC: 3.5 MMOL/L — SIGNIFICANT CHANGE UP (ref 3.5–5.3)
POTASSIUM SERPL-SCNC: 3.5 MMOL/L — SIGNIFICANT CHANGE UP (ref 3.5–5.3)
RBC # BLD: 2.42 M/UL — LOW (ref 4.2–5.8)
RBC # BLD: 2.88 M/UL — LOW (ref 4.2–5.8)
RBC # FLD: 13.8 % — SIGNIFICANT CHANGE UP (ref 10.3–14.5)
RBC # FLD: 14.6 % — HIGH (ref 10.3–14.5)
SODIUM SERPL-SCNC: 134 MMOL/L — LOW (ref 135–145)
WBC # BLD: 8.63 K/UL — SIGNIFICANT CHANGE UP (ref 3.8–10.5)
WBC # BLD: 8.81 K/UL — SIGNIFICANT CHANGE UP (ref 3.8–10.5)
WBC # FLD AUTO: 8.63 K/UL — SIGNIFICANT CHANGE UP (ref 3.8–10.5)
WBC # FLD AUTO: 8.81 K/UL — SIGNIFICANT CHANGE UP (ref 3.8–10.5)

## 2024-11-30 PROCEDURE — 99232 SBSQ HOSP IP/OBS MODERATE 35: CPT

## 2024-11-30 PROCEDURE — 99232 SBSQ HOSP IP/OBS MODERATE 35: CPT | Mod: GC

## 2024-11-30 RX ORDER — SENNA 187 MG
2 TABLET ORAL
Qty: 0 | Refills: 0 | DISCHARGE
Start: 2024-11-30

## 2024-11-30 RX ORDER — POLYETHYLENE GLYCOL 3350 17 G/17G
17 POWDER, FOR SOLUTION ORAL
Qty: 0 | Refills: 0 | DISCHARGE
Start: 2024-11-30

## 2024-11-30 RX ADMIN — Medication 100 MILLIGRAM(S): at 05:48

## 2024-11-30 RX ADMIN — Medication 100 MILLIGRAM(S): at 14:33

## 2024-11-30 RX ADMIN — Medication 100 MILLIGRAM(S): at 21:41

## 2024-11-30 RX ADMIN — MIRABEGRON 25 MILLIGRAM(S): 50 TABLET, FILM COATED, EXTENDED RELEASE ORAL at 12:05

## 2024-11-30 RX ADMIN — Medication 10 MILLIGRAM(S): at 12:05

## 2024-12-01 ENCOUNTER — INPATIENT (INPATIENT)
Facility: HOSPITAL | Age: 74
LOS: 4 days | Discharge: NURSING, REHAB OR LONG-TERM CARE | DRG: 982 | End: 2024-12-06
Attending: HOSPITALIST | Admitting: STUDENT IN AN ORGANIZED HEALTH CARE EDUCATION/TRAINING PROGRAM
Payer: MEDICARE

## 2024-12-01 VITALS
TEMPERATURE: 98 F | DIASTOLIC BLOOD PRESSURE: 68 MMHG | HEART RATE: 93 BPM | OXYGEN SATURATION: 96 % | SYSTOLIC BLOOD PRESSURE: 123 MMHG | RESPIRATION RATE: 18 BRPM

## 2024-12-01 VITALS
DIASTOLIC BLOOD PRESSURE: 69 MMHG | RESPIRATION RATE: 18 BRPM | OXYGEN SATURATION: 95 % | HEART RATE: 88 BPM | SYSTOLIC BLOOD PRESSURE: 151 MMHG | TEMPERATURE: 98 F

## 2024-12-01 DIAGNOSIS — R78.81 BACTEREMIA: ICD-10-CM

## 2024-12-01 DIAGNOSIS — A49.9 BACTERIAL INFECTION, UNSPECIFIED: ICD-10-CM

## 2024-12-01 DIAGNOSIS — Z29.9 ENCOUNTER FOR PROPHYLACTIC MEASURES, UNSPECIFIED: ICD-10-CM

## 2024-12-01 DIAGNOSIS — Z95.0 PRESENCE OF CARDIAC PACEMAKER: ICD-10-CM

## 2024-12-01 DIAGNOSIS — C61 MALIGNANT NEOPLASM OF PROSTATE: ICD-10-CM

## 2024-12-01 DIAGNOSIS — N39.0 URINARY TRACT INFECTION, SITE NOT SPECIFIED: ICD-10-CM

## 2024-12-01 DIAGNOSIS — D62 ACUTE POSTHEMORRHAGIC ANEMIA: ICD-10-CM

## 2024-12-01 DIAGNOSIS — K62.7 RADIATION PROCTITIS: ICD-10-CM

## 2024-12-01 DIAGNOSIS — Z90.89 ACQUIRED ABSENCE OF OTHER ORGANS: Chronic | ICD-10-CM

## 2024-12-01 DIAGNOSIS — Z90.79 ACQUIRED ABSENCE OF OTHER GENITAL ORGAN(S): Chronic | ICD-10-CM

## 2024-12-01 DIAGNOSIS — R31.9 HEMATURIA, UNSPECIFIED: ICD-10-CM

## 2024-12-01 DIAGNOSIS — I10 ESSENTIAL (PRIMARY) HYPERTENSION: ICD-10-CM

## 2024-12-01 LAB
ALBUMIN SERPL ELPH-MCNC: 3 G/DL — LOW (ref 3.3–5)
ALP SERPL-CCNC: 91 U/L — SIGNIFICANT CHANGE UP (ref 40–120)
ALT FLD-CCNC: <5 U/L — LOW (ref 4–41)
ANION GAP SERPL CALC-SCNC: 9 MMOL/L — SIGNIFICANT CHANGE UP (ref 7–14)
AST SERPL-CCNC: 19 U/L — SIGNIFICANT CHANGE UP (ref 4–40)
BASOPHILS # BLD AUTO: 0.08 K/UL — SIGNIFICANT CHANGE UP (ref 0–0.2)
BASOPHILS NFR BLD AUTO: 0.8 % — SIGNIFICANT CHANGE UP (ref 0–2)
BILIRUB SERPL-MCNC: 0.3 MG/DL — SIGNIFICANT CHANGE UP (ref 0.2–1.2)
BLD GP AB SCN SERPL QL: NEGATIVE — SIGNIFICANT CHANGE UP
BUN SERPL-MCNC: 10 MG/DL — SIGNIFICANT CHANGE UP (ref 7–23)
CALCIUM SERPL-MCNC: 8.5 MG/DL — SIGNIFICANT CHANGE UP (ref 8.4–10.5)
CHLORIDE SERPL-SCNC: 99 MMOL/L — SIGNIFICANT CHANGE UP (ref 98–107)
CO2 SERPL-SCNC: 28 MMOL/L — SIGNIFICANT CHANGE UP (ref 22–31)
CREAT SERPL-MCNC: 0.71 MG/DL — SIGNIFICANT CHANGE UP (ref 0.5–1.3)
CULTURE RESULTS: SIGNIFICANT CHANGE UP
CULTURE RESULTS: SIGNIFICANT CHANGE UP
EGFR: 95 ML/MIN/1.73M2 — SIGNIFICANT CHANGE UP
EGFR: 95 ML/MIN/1.73M2 — SIGNIFICANT CHANGE UP
EOSINOPHIL # BLD AUTO: 0.31 K/UL — SIGNIFICANT CHANGE UP (ref 0–0.5)
EOSINOPHIL NFR BLD AUTO: 3.2 % — SIGNIFICANT CHANGE UP (ref 0–6)
GLUCOSE SERPL-MCNC: 107 MG/DL — HIGH (ref 70–99)
HCT VFR BLD CALC: 23.9 % — LOW (ref 39–50)
HCT VFR BLD CALC: 24.4 % — LOW (ref 39–50)
HCT VFR BLD CALC: 25.4 % — LOW (ref 39–50)
HGB BLD-MCNC: 7.6 G/DL — LOW (ref 13–17)
HGB BLD-MCNC: 7.8 G/DL — LOW (ref 13–17)
HGB BLD-MCNC: 8.3 G/DL — LOW (ref 13–17)
IANC: 6.66 K/UL — SIGNIFICANT CHANGE UP (ref 1.8–7.4)
IMM GRANULOCYTES NFR BLD AUTO: 2.2 % — HIGH (ref 0–0.9)
LYMPHOCYTES # BLD AUTO: 1.38 K/UL — SIGNIFICANT CHANGE UP (ref 1–3.3)
LYMPHOCYTES # BLD AUTO: 14.4 % — SIGNIFICANT CHANGE UP (ref 13–44)
MAGNESIUM SERPL-MCNC: 2 MG/DL — SIGNIFICANT CHANGE UP (ref 1.6–2.6)
MCHC RBC-ENTMCNC: 27.7 PG — SIGNIFICANT CHANGE UP (ref 27–34)
MCHC RBC-ENTMCNC: 27.8 PG — SIGNIFICANT CHANGE UP (ref 27–34)
MCHC RBC-ENTMCNC: 27.8 PG — SIGNIFICANT CHANGE UP (ref 27–34)
MCHC RBC-ENTMCNC: 31.8 G/DL — LOW (ref 32–36)
MCHC RBC-ENTMCNC: 32 G/DL — SIGNIFICANT CHANGE UP (ref 32–36)
MCHC RBC-ENTMCNC: 32.7 G/DL — SIGNIFICANT CHANGE UP (ref 32–36)
MCV RBC AUTO: 84.9 FL — SIGNIFICANT CHANGE UP (ref 80–100)
MCV RBC AUTO: 86.5 FL — SIGNIFICANT CHANGE UP (ref 80–100)
MCV RBC AUTO: 87.5 FL — SIGNIFICANT CHANGE UP (ref 80–100)
MONOCYTES # BLD AUTO: 0.94 K/UL — HIGH (ref 0–0.9)
MONOCYTES NFR BLD AUTO: 9.8 % — SIGNIFICANT CHANGE UP (ref 2–14)
NEUTROPHILS # BLD AUTO: 6.66 K/UL — SIGNIFICANT CHANGE UP (ref 1.8–7.4)
NEUTROPHILS NFR BLD AUTO: 69.6 % — SIGNIFICANT CHANGE UP (ref 43–77)
NRBC # BLD AUTO: 0.03 K/UL — HIGH (ref 0–0)
NRBC # BLD: 0 /100 WBCS — SIGNIFICANT CHANGE UP (ref 0–0)
NRBC # FLD: 0.03 K/UL — HIGH (ref 0–0)
NRBC BLD-RTO: 0 /100 WBCS — SIGNIFICANT CHANGE UP (ref 0–0)
PHOSPHATE SERPL-MCNC: 2.5 MG/DL — SIGNIFICANT CHANGE UP (ref 2.5–4.5)
PLATELET # BLD AUTO: 388 K/UL — SIGNIFICANT CHANGE UP (ref 150–400)
PLATELET # BLD AUTO: 391 K/UL — SIGNIFICANT CHANGE UP (ref 150–400)
PLATELET # BLD AUTO: 402 K/UL — HIGH (ref 150–400)
POTASSIUM SERPL-MCNC: 3.9 MMOL/L — SIGNIFICANT CHANGE UP (ref 3.5–5.3)
POTASSIUM SERPL-SCNC: 3.9 MMOL/L — SIGNIFICANT CHANGE UP (ref 3.5–5.3)
PROT SERPL-MCNC: 6.4 G/DL — SIGNIFICANT CHANGE UP (ref 6–8.3)
RBC # BLD: 2.73 M/UL — LOW (ref 4.2–5.8)
RBC # BLD: 2.82 M/UL — LOW (ref 4.2–5.8)
RBC # BLD: 2.99 M/UL — LOW (ref 4.2–5.8)
RBC # FLD: 14.8 % — HIGH (ref 10.3–14.5)
RBC # FLD: 14.9 % — HIGH (ref 10.3–14.5)
RBC # FLD: 14.9 % — HIGH (ref 10.3–14.5)
RH IG SCN BLD-IMP: POSITIVE — SIGNIFICANT CHANGE UP
SODIUM SERPL-SCNC: 136 MMOL/L — SIGNIFICANT CHANGE UP (ref 135–145)
SPECIMEN SOURCE: SIGNIFICANT CHANGE UP
SPECIMEN SOURCE: SIGNIFICANT CHANGE UP
WBC # BLD: 8.49 K/UL — SIGNIFICANT CHANGE UP (ref 3.8–10.5)
WBC # BLD: 8.99 K/UL — SIGNIFICANT CHANGE UP (ref 3.8–10.5)
WBC # BLD: 9.58 K/UL — SIGNIFICANT CHANGE UP (ref 3.8–10.5)
WBC # FLD AUTO: 8.49 K/UL — SIGNIFICANT CHANGE UP (ref 3.8–10.5)
WBC # FLD AUTO: 8.99 K/UL — SIGNIFICANT CHANGE UP (ref 3.8–10.5)
WBC # FLD AUTO: 9.58 K/UL — SIGNIFICANT CHANGE UP (ref 3.8–10.5)

## 2024-12-01 PROCEDURE — 33235 REMOVAL PACEMAKER ELECTRODE: CPT

## 2024-12-01 PROCEDURE — 33233 REMOVAL OF PM GENERATOR: CPT

## 2024-12-01 PROCEDURE — 99223 1ST HOSP IP/OBS HIGH 75: CPT

## 2024-12-01 PROCEDURE — 99239 HOSP IP/OBS DSCHRG MGMT >30: CPT | Mod: GC

## 2024-12-01 RX ORDER — ACETAMINOPHEN, DIPHENHYDRAMINE HCL, PHENYLEPHRINE HCL 325; 25; 5 MG/1; MG/1; MG/1
3 TABLET ORAL AT BEDTIME
Refills: 0 | Status: DISCONTINUED | OUTPATIENT
Start: 2024-12-01 | End: 2024-12-02

## 2024-12-01 RX ORDER — ONDANSETRON HYDROCHLORIDE 4 MG/1
4 TABLET, FILM COATED ORAL EVERY 8 HOURS
Refills: 0 | Status: DISCONTINUED | OUTPATIENT
Start: 2024-12-01 | End: 2024-12-02

## 2024-12-01 RX ORDER — PHENAZOPYRIDINE HCL 200 MG
100 TABLET ORAL EVERY 8 HOURS
Refills: 0 | Status: DISCONTINUED | OUTPATIENT
Start: 2024-12-01 | End: 2024-12-02

## 2024-12-01 RX ORDER — ACETAMINOPHEN 500MG 500 MG/1
650 TABLET, COATED ORAL EVERY 6 HOURS
Refills: 0 | Status: DISCONTINUED | OUTPATIENT
Start: 2024-12-01 | End: 2024-12-02

## 2024-12-01 RX ORDER — CEFAZOLIN SODIUM 10 G
2000 VIAL (EA) INJECTION EVERY 8 HOURS
Refills: 0 | Status: DISCONTINUED | OUTPATIENT
Start: 2024-12-01 | End: 2024-12-02

## 2024-12-01 RX ORDER — SENNOSIDES 8.6 MG
1 TABLET ORAL AT BEDTIME
Refills: 0 | Status: DISCONTINUED | OUTPATIENT
Start: 2024-12-01 | End: 2024-12-02

## 2024-12-01 RX ORDER — CEFAZOLIN SODIUM IN 0.9 % NACL 3 G/100 ML
2 INTRAVENOUS SOLUTION, PIGGYBACK (ML) INTRAVENOUS
Qty: 0 | Refills: 0 | DISCHARGE
Start: 2024-12-01

## 2024-12-01 RX ORDER — POLYETHYLENE GLYCOL 3350 17 G/17G
17 POWDER, FOR SOLUTION ORAL DAILY
Refills: 0 | Status: DISCONTINUED | OUTPATIENT
Start: 2024-12-01 | End: 2024-12-02

## 2024-12-01 RX ORDER — MAGNESIUM, ALUMINUM HYDROXIDE 200-225/5
30 SUSPENSION, ORAL (FINAL DOSE FORM) ORAL EVERY 4 HOURS
Refills: 0 | Status: DISCONTINUED | OUTPATIENT
Start: 2024-12-01 | End: 2024-12-02

## 2024-12-01 RX ORDER — ENZALUTAMIDE 40 MG/1
2 CAPSULE ORAL
Refills: 0 | DISCHARGE

## 2024-12-01 RX ADMIN — ACETAMINOPHEN, DIPHENHYDRAMINE HCL, PHENYLEPHRINE HCL 3 MILLIGRAM(S): 325; 25; 5 TABLET ORAL at 23:06

## 2024-12-01 RX ADMIN — Medication 100 MILLIGRAM(S): at 06:24

## 2024-12-01 RX ADMIN — Medication 100 MILLIGRAM(S): at 22:28

## 2024-12-01 RX ADMIN — Medication 100 MILLIGRAM(S): at 15:04

## 2024-12-01 NOTE — H&P ADULT - HISTORY OF PRESENT ILLNESS
73 YO M PMHx aortic stenosis s/p TAVR, HTN, prostate cancer s/p radiation therapy, prostatectomy, with metastasis to spine and pelvis, frequent urinary retention s/p suprapubic catheter presented to [facility] ED on 11/24 for weakness and hematuria.   On admit he was Hypotensive with systolic in 80's, tachycardic, afebrile. Anemia w Hb of 10.5 w baseline of 12-13. Hreceived PRBC on 11/30. He was seen by urology for daily bladder irrigation. Blood cx ( 11/24) grew MSSA, UCX from SPC+ MSSA. CXR clear.  On cefazolin 2g Q8H and  repeat BCx from 11/26 NGTD. He has PPM in 2020 at [facility]. TTE with normal LV fucntion EF 64%, normal RV systolic fucntion, mild aortic intravalvular regurgitation. report as above   was unremarkbale. ID at [facility] recommended Transfer to [facility] for DARIN and possible PPPM extraction.   Consults at [facility]: Urology, ID, Hem-onc, EP.     At [facility]- 12/1:  Hemodynics are stable.   Labs: Normal WBC. Hb 8.3. PLT 402K. Na 136, K 3.9, Cr 0.71.

## 2024-12-01 NOTE — H&P ADULT - PROBLEM SELECTOR PLAN 2
s/p suprapubic catheter placement in 4/2024 (exchanges every 1 month) for urinary retention/prostate CA.   Urine Cx (Suprapubic catheter) + MSSA.

## 2024-12-01 NOTE — H&P ADULT - PROBLEM SELECTOR PLAN 1
Blood Cx (11/24): MSSA (4/4 bottles), Repeat blood Cx (11/26): NGTD  Urine Cx (Suprapubic catheter) + MSSA  -TTE: no documented valvular or device vegetations, bioprosthetic aortic valve with Mild intravalvular regurgitation.  - Continue Cefazolin 2G Q8H.   - Plan for DARIN (ordered) and PPM extraction.   - ID and Cardiology consult.

## 2024-12-01 NOTE — H&P ADULT - PROBLEM SELECTOR PLAN 6
-undergoing care at [facility]  -s/p Radiation Therapy (2018) and Prostatectomy- 2015  -on Enzalutamide and Orgovyx- holding medications until patient is stable per oncology  -Follow up with [facility] after discharge

## 2024-12-01 NOTE — H&P ADULT - NSICDXPASTMEDICALHX_GEN_ALL_CORE_FT
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

## 2024-12-01 NOTE — H&P ADULT - PATIENT'S PREFERRED PRONOUN
Double lumen PICC placed in right basilic vein of RUE, 37cm in length with 0cm exposed and 32cm arm circumference. Lot#[id number]   Him/He

## 2024-12-01 NOTE — H&P ADULT - ASSESSMENT
75 YO M PMHx aortic stenosis s/p TAVR, HTN, prostate cancer s/p radiation therapy, prostatectomy, with metastasis to spine and pelvis, frequent urinary retention s/p suprapubic catheter presented to [facility] ED on 11/24 for weakness and hematuria.   On admit he was Hypotensive with systolic in 80's, tachycardic, afebrile. Anemia w Hb of 10.5 w baseline of 12-13. Hreceived PRBC on 11/30. He was seen by urology for daily bladder irrigation. Blood cx ( 11/24) grew MSSA, UCX from SPC+ MSSA. CXR clear.  On cefazolin 2g Q8H and  repeat BCx from 11/26 NGTD. He has PPM in 2020 at [facility]. TTE with normal LV fucntion EF 64%, normal RV systolic fucntion, mild aortic intravalvular regurgitation. report as above   was unremarkbale. ID at [facility] recommended Transfer to [facility] for DARIN and possible PPPM extraction.   Consults at [facility]: Urology, ID, Hem-onc, EP.     At [facility]- 12/1:  Hemodynics are stable.   Labs: Normal WBC. Hb 8.3. PLT 402K. Na 136, K 3.9, Cr 0.71.

## 2024-12-01 NOTE — PROGRESS NOTE ADULT - ASSESSMENT
74 Y/O M w/ PMH of Prostate CA s/p Radiation Therapy (2018) and Prostatectomy now with radiation cystitis s/p hyperbaric oxygen and SPT placement, HTN, Aortic Stenosis and Sick sinus syndrome s/p pacemaker presents to ER with weakness, hypotension and tachycardia responding to boluses with SANJUANA 1.74 from .72 baseline. Urology was consulted for hematuria and SPT change. He follows with Dr. Hoang    He is now HDS, s/p SPT exchange and manual irrigation with return of 30cc old clot now draining light pink urine well. He is admitted to medicine for UTI/urosepsis.    11/25: urine color improved with irrigation. Will try to avoid cbi at this time as hematuria worsens in the past from any manipulation from below   11/26: urine color quickly clears after irrigation. Irrigated this AM. Will try to avoid CBI as this as caused significant worsening of hematuria.   11/27: SPT draining maroon color, irrigated 5 cc of clots.  11/28: SPT clotted off in AM > exchanged to different [id number] bowers, irrigated out 50cc old clot, draining clear pink. H/H 7.3/21.7 from 7.9/23.9  11/29: SPT draining thin maroon colored urine. Irrigated to clear light punch with return of 10 cc small clots. Hgb 7.1.  11/30: SPT draining thin maroon colored urine. Irrigated to clear light punch with return of 10 cc small clots. Hgb 6.9 > 1u pRBC ordered  12/1: pt transferred [facility] -> [facility].  SPT irrigated to clear/light pink with moderate amount of clots returned    Recs:    -irrigate PRN, will avoid CBI at this time. Patient with prior hx of worsening hematuria with manipulation from below   -trend H&H and transfuse as needed  -Urine with staph aureus, treat accordingly. On ancef  -trend Cr > stable  -monitor I's and O's  -monitor urine color

## 2024-12-01 NOTE — H&P ADULT - PROBLEM SELECTOR PLAN 3
Seen by urology at [facility].   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  Recommended to irrigate PRN, will avoid CBI at this time. Patient with prior hx of worsening hematuria with manipulation from below.   Will consult inhouse urology.

## 2024-12-01 NOTE — PROGRESS NOTE ADULT - SUBJECTIVE AND OBJECTIVE BOX
The patient was seen and examined at bedside.  Patient was just transferred from [facility] for possible PPM extraction.  Urology was consulted there for hematuria.  The patient currently feels comfortable and denies abdominal pain.  He states that he has been leaking urine from the penis and that the SPT has not been draining.  The SPT was irrigated with normal saline to a water clear color with moderate amount of clots returned.    T(C): 36.7 (12-01-24 @ 16:50), Max: 37 (11-30-24 @ 22:15)  HR: 95 (12-01-24 @ 16:50) (88 - 95)  BP: 126/75 (12-01-24 @ 16:50) (123/68 - 151/69)  RR: 18 (12-01-24 @ 16:50) (18 - 18)  SpO2: 97% (12-01-24 @ 16:50) (95% - 97%)  Wt(kg): --    Physical Exam:    General: NAD, A+Ox3  Abdomen: soft, non-tender, non-distended; +20fr SPT in place initially without drainage; after irrigation, draining clear/pink-tinged urine        12-01 @ 07:01  -  12-01 @ 18:11  --------------------------------------------------------  IN: 0 mL / OUT: 15 mL / NET: -15 mL      SPT -

## 2024-12-01 NOTE — H&P ADULT - PROBLEM SELECTOR PLAN 4
Acute on chronic anemia- Normocytic Normochromic.  Baseline Hb 11-12.  Multifactorial from underlying CA/Rx, Blood loss( hematuria).   1U PRBC was given on 11/30 for Hb of 6.9.   Hb 8.3 today. Monitor HH.

## 2024-12-01 NOTE — ADVANCED PRACTICE NURSE CONSULT - ASSESSMENT
Midline Catheter Insertion Note    Catheter type: 4F  : Bard  Power Injectable: Yes  Ref#: [id number]  Procedure assisted by: Tracy Gonsalez (bedside RN)    Time out was preformed, confirming the patient's first and last name, date of birth, procedure, and correct site prior to state of procedure.  Patient was placed with HOB 30 degrees. Patient placement site was prepped with chlorhexidine solution, then draped using maximum sterile barrier protection. Using the Bard Site Rite 8, the catheter was placed using the Modified Seldinger Technique. The area was injected with 2 ml of 1% lidocaine. Using the ultrasound, the catheter was introduced. Strict adherence to outline aseptic technique including handwashing, glove and gown, utilizing mask and cap, plus draping the patient with a sterile drape was observed. Upon completion of line placement, the insertion site was covered with a sterile occlusive CHG dressing. Pt tolerated procedure well.   All materials used for catheter insertion, including the intact guide wires, were accounted for at the end of the procedure.    Number of attempts: 1  Complications/Comments: none  Emergency Placement: no    Site: new site  Anatomical Site of insertion: L Brachial  Catheter size/length: 4Fr., 20cm.   US guided Bard SL Power MIDLINE placed

## 2024-12-01 NOTE — H&P ADULT - NSHPLABSRESULTS_GEN_ALL_CORE
LABS: Personally reviewed labs, imaging, and ECG                          8.3    9.58  )-----------( 402      ( 01 Dec 2024 10:07 )             25.4       12-01    136  |  99  |  10  ----------------------------<  107[H]  3.9   |  28  |  0.71    Ca    8.5      01 Dec 2024 10:07  Phos  2.5     12-01  Mg     2.00     12-01    TPro  6.4  /  Alb  3.0[L]  /  TBili  0.3  /  DBili  x   /  AST  19  /  ALT  <5[L]  /  AlkPhos  91  12-01       LIVER FUNCTIONS - ( 01 Dec 2024 10:07 )  Alb: 3.0 g/dL / Pro: 6.4 g/dL / ALK PHOS: 91 U/L / ALT: <5 U/L / AST: 19 U/L / GGT: x                    Urinalysis Basic - ( 01 Dec 2024 10:07 )    Color: x / Appearance: x / SG: x / pH: x  Gluc: 107 mg/dL / Ketone: x  / Bili: x / Urobili: x   Blood: x / Protein: x / Nitrite: x   Leuk Esterase: x / RBC: x / WBC x   Sq Epi: x / Non Sq Epi: x / Bacteria: x            Lactate Trend            CAPILLARY BLOOD GLUCOSE            Culture Results:   No growth at 48 Hours (11-28 @ 06:25)  Culture Results:   No growth at 48 Hours (11-28 @ 06:05)  Culture Results:   No growth at 5 days (11-26 @ 06:43)  Culture Results:   No growth at 5 days (11-26 @ 05:35)  Culture Results:   >100,000 CFU/ml Staphylococcus aureus (11-24 @ 08:11)  Culture Results:   Growth in aerobic and anaerobic bottles: Staphylococcus aureus  Direct identification is available within approximately 3-5  hours either by Blood Panel Multiplexed PCR or Direct  MALDI-TOF. Details: [email or web address] (11-24 @ 08:10)  Culture Results:   Growth in aerobic and anaerobic bottles: Staphylococcus aureus  See previous culture [id number] (11-24 @ 08:00)      RADIOLOGY & ADDITIONAL TESTS:

## 2024-12-02 LAB
ALBUMIN SERPL ELPH-MCNC: 2.9 G/DL — LOW (ref 3.3–5)
ALP SERPL-CCNC: 90 U/L — SIGNIFICANT CHANGE UP (ref 40–120)
ALT FLD-CCNC: 5 U/L — LOW (ref 10–45)
ANION GAP SERPL CALC-SCNC: 14 MMOL/L — SIGNIFICANT CHANGE UP (ref 5–17)
APTT BLD: 30.7 SEC — SIGNIFICANT CHANGE UP (ref 24.5–35.6)
AST SERPL-CCNC: 16 U/L — SIGNIFICANT CHANGE UP (ref 10–40)
BILIRUB SERPL-MCNC: 0.3 MG/DL — SIGNIFICANT CHANGE UP (ref 0.2–1.2)
BUN SERPL-MCNC: 11 MG/DL — SIGNIFICANT CHANGE UP (ref 7–23)
CALCIUM SERPL-MCNC: 8.4 MG/DL — SIGNIFICANT CHANGE UP (ref 8.4–10.5)
CHLORIDE SERPL-SCNC: 97 MMOL/L — SIGNIFICANT CHANGE UP (ref 96–108)
CO2 SERPL-SCNC: 24 MMOL/L — SIGNIFICANT CHANGE UP (ref 22–31)
CREAT SERPL-MCNC: 0.72 MG/DL — SIGNIFICANT CHANGE UP (ref 0.5–1.3)
EGFR: 95 ML/MIN/1.73M2 — SIGNIFICANT CHANGE UP
GLUCOSE BLDC GLUCOMTR-MCNC: 112 MG/DL — HIGH (ref 70–99)
GLUCOSE SERPL-MCNC: 104 MG/DL — HIGH (ref 70–99)
HCT VFR BLD CALC: 25 % — LOW (ref 39–50)
HGB BLD-MCNC: 7.9 G/DL — LOW (ref 13–17)
INR BLD: 1.11 RATIO — SIGNIFICANT CHANGE UP (ref 0.85–1.16)
MCHC RBC-ENTMCNC: 27.3 PG — SIGNIFICANT CHANGE UP (ref 27–34)
MCHC RBC-ENTMCNC: 31.6 G/DL — LOW (ref 32–36)
MCV RBC AUTO: 86.5 FL — SIGNIFICANT CHANGE UP (ref 80–100)
NRBC # BLD: 0 /100 WBCS — SIGNIFICANT CHANGE UP (ref 0–0)
PLATELET # BLD AUTO: 432 K/UL — HIGH (ref 150–400)
POTASSIUM SERPL-MCNC: 3.8 MMOL/L — SIGNIFICANT CHANGE UP (ref 3.5–5.3)
POTASSIUM SERPL-SCNC: 3.8 MMOL/L — SIGNIFICANT CHANGE UP (ref 3.5–5.3)
PROT SERPL-MCNC: 6 G/DL — SIGNIFICANT CHANGE UP (ref 6–8.3)
PROTHROM AB SERPL-ACNC: 12.7 SEC — SIGNIFICANT CHANGE UP (ref 9.9–13.4)
RBC # BLD: 2.89 M/UL — LOW (ref 4.2–5.8)
RBC # FLD: 14.6 % — HIGH (ref 10.3–14.5)
SODIUM SERPL-SCNC: 135 MMOL/L — SIGNIFICANT CHANGE UP (ref 135–145)
WBC # BLD: 8.52 K/UL — SIGNIFICANT CHANGE UP (ref 3.8–10.5)
WBC # FLD AUTO: 8.52 K/UL — SIGNIFICANT CHANGE UP (ref 3.8–10.5)

## 2024-12-02 PROCEDURE — 33274 TCAT INSJ/RPL PERM LDLS PM: CPT | Mod: 78

## 2024-12-02 PROCEDURE — 99223 1ST HOSP IP/OBS HIGH 75: CPT

## 2024-12-02 PROCEDURE — 99233 SBSQ HOSP IP/OBS HIGH 50: CPT

## 2024-12-02 PROCEDURE — 99233 SBSQ HOSP IP/OBS HIGH 50: CPT | Mod: 57

## 2024-12-02 PROCEDURE — 93010 ELECTROCARDIOGRAM REPORT: CPT | Mod: 77

## 2024-12-02 PROCEDURE — 93010 ELECTROCARDIOGRAM REPORT: CPT

## 2024-12-02 DEVICE — SHEATH GLIDELIGHT LASER 14FR: Type: IMPLANTABLE DEVICE | Status: FUNCTIONAL

## 2024-12-02 DEVICE — INTRO CATH MICRA 23FR: Type: IMPLANTABLE DEVICE | Status: FUNCTIONAL

## 2024-12-02 DEVICE — GUIDEWIRE AMPLATZ SUPER-STIFF 3MM J .035" X 145CM: Type: IMPLANTABLE DEVICE | Status: FUNCTIONAL

## 2024-12-02 DEVICE — KIT A-LINE 1LUM 20G X 12CM SAFE KIT: Type: IMPLANTABLE DEVICE | Status: FUNCTIONAL

## 2024-12-02 DEVICE — KIT BRIDGE ACESSORY: Type: IMPLANTABLE DEVICE | Status: FUNCTIONAL

## 2024-12-02 DEVICE — DEVICE LOCKING LOCKING LLD EZ CLEARS: Type: IMPLANTABLE DEVICE | Status: FUNCTIONAL

## 2024-12-02 DEVICE — SYS PACEMAKER TRANSCATH MICRA AV2: Type: IMPLANTABLE DEVICE | Status: FUNCTIONAL

## 2024-12-02 DEVICE — INTRODUCER PERFOMER 0.038" X 18FR X 30CM: Type: IMPLANTABLE DEVICE | Status: FUNCTIONAL

## 2024-12-02 RX ORDER — CEFAZOLIN SODIUM 10 G
2000 VIAL (EA) INJECTION EVERY 8 HOURS
Refills: 0 | Status: DISCONTINUED | OUTPATIENT
Start: 2024-12-02 | End: 2024-12-06

## 2024-12-02 RX ORDER — CHLORHEXIDINE GLUCONATE 1.2 MG/ML
1 RINSE ORAL
Refills: 0 | Status: DISCONTINUED | OUTPATIENT
Start: 2024-12-02 | End: 2024-12-02

## 2024-12-02 RX ORDER — 0.9 % SODIUM CHLORIDE 0.9 %
1000 INTRAVENOUS SOLUTION INTRAVENOUS
Refills: 0 | Status: DISCONTINUED | OUTPATIENT
Start: 2024-12-02 | End: 2024-12-02

## 2024-12-02 RX ORDER — ACETAMINOPHEN, DIPHENHYDRAMINE HCL, PHENYLEPHRINE HCL 325; 25; 5 MG/1; MG/1; MG/1
3 TABLET ORAL AT BEDTIME
Refills: 0 | Status: DISCONTINUED | OUTPATIENT
Start: 2024-12-02 | End: 2024-12-03

## 2024-12-02 RX ORDER — MAGNESIUM, ALUMINUM HYDROXIDE 200-225/5
30 SUSPENSION, ORAL (FINAL DOSE FORM) ORAL EVERY 4 HOURS
Refills: 0 | Status: DISCONTINUED | OUTPATIENT
Start: 2024-12-02 | End: 2024-12-03

## 2024-12-02 RX ORDER — ACETAMINOPHEN 500MG 500 MG/1
650 TABLET, COATED ORAL EVERY 6 HOURS
Refills: 0 | Status: DISCONTINUED | OUTPATIENT
Start: 2024-12-02 | End: 2024-12-03

## 2024-12-02 RX ORDER — HYDROMORPHONE HYDROCHLORIDE 2 MG/1
0.25 TABLET ORAL
Refills: 0 | Status: DISCONTINUED | OUTPATIENT
Start: 2024-12-02 | End: 2024-12-02

## 2024-12-02 RX ORDER — ACETAMINOPHEN 500MG 500 MG/1
1000 TABLET, COATED ORAL ONCE
Refills: 0 | Status: COMPLETED | OUTPATIENT
Start: 2024-12-02 | End: 2024-12-02

## 2024-12-02 RX ORDER — PHENAZOPYRIDINE HCL 200 MG
100 TABLET ORAL EVERY 8 HOURS
Refills: 0 | Status: COMPLETED | OUTPATIENT
Start: 2024-12-02 | End: 2024-12-05

## 2024-12-02 RX ORDER — POLYETHYLENE GLYCOL 3350 17 G/17G
17 POWDER, FOR SOLUTION ORAL DAILY
Refills: 0 | Status: DISCONTINUED | OUTPATIENT
Start: 2024-12-02 | End: 2024-12-06

## 2024-12-02 RX ORDER — ONDANSETRON HYDROCHLORIDE 4 MG/1
4 TABLET, FILM COATED ORAL ONCE
Refills: 0 | Status: COMPLETED | OUTPATIENT
Start: 2024-12-02 | End: 2024-12-02

## 2024-12-02 RX ORDER — SENNOSIDES 8.6 MG
1 TABLET ORAL AT BEDTIME
Refills: 0 | Status: DISCONTINUED | OUTPATIENT
Start: 2024-12-02 | End: 2024-12-06

## 2024-12-02 RX ORDER — ONDANSETRON HYDROCHLORIDE 4 MG/1
4 TABLET, FILM COATED ORAL EVERY 8 HOURS
Refills: 0 | Status: DISCONTINUED | OUTPATIENT
Start: 2024-12-02 | End: 2024-12-03

## 2024-12-02 RX ADMIN — ACETAMINOPHEN 500MG 1000 MILLIGRAM(S): 500 TABLET, COATED ORAL at 23:18

## 2024-12-02 RX ADMIN — Medication 100 MILLIGRAM(S): at 22:45

## 2024-12-02 RX ADMIN — Medication 100 MILLIGRAM(S): at 06:10

## 2024-12-02 RX ADMIN — Medication 75 MILLILITER(S): at 12:48

## 2024-12-02 RX ADMIN — HYDROMORPHONE HYDROCHLORIDE 0.25 MILLIGRAM(S): 2 TABLET ORAL at 19:30

## 2024-12-02 RX ADMIN — ONDANSETRON HYDROCHLORIDE 4 MILLIGRAM(S): 4 TABLET, FILM COATED ORAL at 19:50

## 2024-12-02 RX ADMIN — Medication 100 MILLIGRAM(S): at 14:48

## 2024-12-02 RX ADMIN — CHLORHEXIDINE GLUCONATE 1 APPLICATION(S): 1.2 RINSE ORAL at 12:52

## 2024-12-02 RX ADMIN — HYDROMORPHONE HYDROCHLORIDE 0.25 MILLIGRAM(S): 2 TABLET ORAL at 19:21

## 2024-12-02 RX ADMIN — ACETAMINOPHEN 500MG 400 MILLIGRAM(S): 500 TABLET, COATED ORAL at 23:18

## 2024-12-02 NOTE — CONSULT NOTE ADULT - SUBJECTIVE AND OBJECTIVE BOX
INFECTIOUS DISEASE CONSULT NOTE    Patient is a 74y old  Male who presents as a transfer from [facility] for DARIN and PPM extraction in the setting of MSSA bacteremia.    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 Patient states since having SPT placed in 4/2024, he has intermittent heamturia and clots every few days that usually clears up. His most recent episode started 5 days ago and then yesterday he noted brighter red blood from the bowers associated with increased fatigue and weakness and shortness of breath with exertion which prompted him to come to the ER. Denies fevers, chills, flank pain, decrease bowers output, flank pain. He denied chest pain, shortness of breath, abdominal pain, nausea, vomiting, new leg swelling, diarrhea. Denies any hardware in the body other than pacemaker and prosthetic heart valve. He denied any pain at pacemaker site. Denies any new skin wounds. Reports that he had a root canal performed on 10/16 and denies any dental pain.       On presentation patient was febrile, tachycardic and hypotensive. Patient had SPT exchanged performed on 11/24. Patient had blood cultures 11/24 growing MSSA in 4/4 bottles and Urine culture 11/24 also growing MSSA. TTE with no documented valvular or device vegetations, bioprosthetic aortic valve with mild intravalvular regurgitation. He is on Cefazolin 2g IV Q8H (11/25- ). He is transferred to [facility] for DARIN and PPM extraction.       REVIEW OF SYSTEMS:      Prior hospital charts reviewed [Yes]  Primary team notes reviewed [Yes]  Other consultant notes reviewed [Yes]    PAST MEDICAL & SURGICAL HISTORY:  Prostate cancer  RT 2018 and prostatectomy in 2015  Proctitis, radiationfrom prostate treatment  HTN (hypertension)  Aortic stenosis  Rectal bleeding  last hospitalization 10/6/20 2/2 radiation proctitis  [place] (hard of hearing)  H/O prostatectomy 2015  S/P T&A (status post tonsillectomy and adenoidectomy) child          SOCIAL HISTORY:  -lives in [place] and born in [facility]  -has one dog at home  -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 650 every 6 hours PRN  aluminum hydroxide/magnesium hydroxide/simethicone Suspension 30 every 4 hours PRN  melatonin 3 at bedtime PRN  ondansetron Injectable 4 every 8 hours PRN  phenazopyridine 100 every 8 hours PRN  polyethylene glycol 3350 17 daily  senna 1 at bedtime PRN      VITALS:  Vital Signs Last 24 Hrs  T(F): 98.1 (12-02-24 @ 04:29), Max: 100.5 (11-25-24 @ 22:48)    Vital Signs Last 24 Hrs  HR: 88 (12-02-24 @ 04:29) (88 - 95)  BP: 125/73 (12-02-24 @ 04:29) (123/68 - 132/83)  RR: 18 (12-02-24 @ 04:29)  SpO2: 95% (12-02-24 @ 04:29) (95% - 97%)  Wt(kg): --    EXAM:      Labs:                        7.8    8.99  )-----------( 388      ( 01 Dec 2024 22:22 )             24.4     12-02    135  |  97  |  11  ----------------------------<  104[H]  3.8   |  24  |  0.72    Ca    8.4      02 Dec 2024 06:39  Phos  2.5     12-01  Mg     2.00     12-01    TPro  6.0  /  Alb  2.9[L]  /  TBili  0.3  /  DBili  x   /  AST  16  /  ALT  5[L]  /  AlkPhos  90  12-02      WBC Trend:  WBC Count: 8.99 (12-01-24 @ 22:22)  WBC Count: 8.49 (12-01-24 @ 16:31)  WBC Count: 9.58 (12-01-24 @ 10:07)  WBC Count: 8.63 (11-30-24 @ 17:05)      Auto Neutrophil #: 6.66 K/uL (12-01-24 @ 10:07)  Auto Neutrophil #: 4.47 K/uL (11-28-24 @ 17:42)  Auto Neutrophil #: 19.19 K/uL (11-24-24 @ 12:34)  Auto Neutrophil #: 20.07 K/uL (11-24-24 @ 08:04)  Band Neutrophils %: 23.0 % (11-24-24 @ 08:04)      Creatine Trend:  Creatinine: 0.72 (12-02)  Creatinine: 0.71 (12-01)  Creatinine: 0.70 (11-30)  Creatinine: 0.73 (11-29)      Liver Biochemical Testing Trend:  Alanine Aminotransferase (ALT/SGPT): 5 *L* (12-02)  Alanine Aminotransferase (ALT/SGPT): <5 *L* (12-01)  Alanine Aminotransferase (ALT/SGPT): 8 (11-24)  Alanine Aminotransferase (ALT/SGPT): 8 (04-23)  Alanine Aminotransferase (ALT/SGPT): 7 (04-22)  Aspartate Aminotransferase (AST/SGOT): 16 (12-02-24 @ 06:39)  Aspartate Aminotransferase (AST/SGOT): 19 (12-01-24 @ 10:07)  Aspartate Aminotransferase (AST/SGOT): 15 (11-24-24 @ 08:04)  Aspartate Aminotransferase (AST/SGOT): 11 (04-23-24 @ 06:07)  Aspartate Aminotransferase (AST/SGOT): 14 (04-22-24 @ 19:20)  Bilirubin Total: 0.3 (12-02)  Bilirubin Total: 0.3 (12-01)  Bilirubin Total: 0.7 (11-24)  Bilirubin Total: 0.4 (04-23)  Bilirubin Total: 0.6 (04-22)      Trend LDH  03-09-23 @ 08:28  176      Auto Eosinophil %: 3.2 % (12-01-24 @ 10:07)      Urinalysis Basic - ( 02 Dec 2024 06:39 )  Urinalysis + Microscopic Examination (11.24.24 @ 08:12)   pH Urine: 5.0  Urine Appearance: Turbid  Color: Red  Specific Gravity: 1.026  Protein, Urine: 30 mg/dL  Glucose Qualitative, Urine: Negative mg/dL  Ketone - Urine: Negative mg/dL  Blood, Urine: Small  Bilirubin: Moderate  Urobilinogen: 0.2 mg/dL  Leukocyte Esterase Concentration: Large  Nitrite: Positive  Review: Reviewed  White Blood Cell - Urine: 71 /HPF  Red Blood Cell - Urine: Too Numerous to count /HPF  Bacteria: Many /HPF  Cast: 0 /LPF  Epithelial Cells: 1 /HPF      MICROBIOLOGY:        Culture - Blood (collected 28 Nov 2024 06:25)  Source: .Blood BLOOD  Preliminary Report:    No growth at 72 Hours    Culture - Blood (collected 28 Nov 2024 06:05)  Source: .Blood BLOOD  Preliminary Report:    No growth at 72 Hours    Culture - Blood (collected 26 Nov 2024 06:43)  Source: .Blood BLOOD  Final Report:    No growth at 5 days    Culture - Blood (collected 26 Nov 2024 05:35)  Source: .Blood BLOOD  Final Report:    No growth at 5 days    Culture - Urine (collected 24 Nov 2024 08:11)  Source: Suprapubic Suprapubic  Final Report:    >100,000 CFU/ml Staphylococcus aureus  Organism: Staphylococcus aureus  Organism: Staphylococcus aureus    Sensitivities:      Method Type: KRISTIN      -  Ceftaroline: S <=0.5      -  Gentamicin: S <=4 Should not be used as monotherapy      -  Nitrofurantoin: S <=32      -  Oxacillin: S <=0.25 Oxacillin predicts susceptibility for dicloxacillin, methicillin, and nafcillin      -  Penicillin: R >2      -  Rifampin: S <=1 Should not be used as monotherapy      -  Tetracycline: S <=4      -  Trimethoprim/Sulfamethoxazole: S <=0.5/9.5      -  Vancomycin: S 0.5    Culture - Blood (collected 24 Nov 2024 08:10)  Source: .Blood BLOOD  Final Report:    Growth in aerobic and anaerobic bottles: Staphylococcus aureus    Direct identification is available within approximately 3-5    hours either by Blood Panel Multiplexed PCR or Direct    MALDI-TOF. Details: [email or web address]  Organism: Blood Culture PCR  Staphylococcus aureus  Organism: Staphylococcus aureus    Sensitivities:      Method Type: KRISTIN      -  Clindamycin: R <=0.25 This isolate is presumed to be clindamycin resistant based on detection of inducible resistance. Clindamycin may still be effective in some patients.      -  Erythromycin: R >4      -  Gentamicin: S <=4 Should not be used as monotherapy      -  Oxacillin: S 0.5 Oxacillin predicts susceptibility for dicloxacillin, methicillin, and nafcillin      -  Penicillin: R >2      -  Rifampin: S <=1 Should not be used as monotherapy      -  Tetracycline: S <=4      -  Trimethoprim/Sulfamethoxazole: S <=0.5/9.5      -  Vancomycin: S 1  Organism: Blood Culture PCR    Sensitivities:      Method Type: PCR      -  Methicillin SENSITIVE Staphylococcus aureus (MSSA): Detec Any isolate of Staphylococcus aureus from a blood culture is NOT considered a contaminant.    Culture - Blood (collected 24 Nov 2024 08:00)  Source: .Blood BLOOD  Final Report:    Growth in aerobic and anaerobic bottles: Staphylococcus aureus    See previous culture [id number]    Culture - Urine (collected 22 Apr 2024 19:20)  Source: Clean Catch Clean Catch (Midstream)  Final Report:    No growth    Culture - Urine (collected 21 Apr 2024 19:28)  Source: Catheterized Catheterized  Final Report:    >100,000 CFU/ml Enterococcus faecalis  Organism: Enterococcus faecalis  Organism: Enterococcus faecalis    Sensitivities:      Method Type: KRISTIN      -  Ampicillin: S <=2 Predicts results to ampicillin/sulbactam, amoxacillin-clavulanate and  piperacillin-tazobactam.      -  Ciprofloxacin: R >2      -  Levofloxacin: R >4      -  Nitrofurantoin: S <=32 Should not be used to treat pyelonephritis.      -  Tetracycline: R >8      -  Vancomycin: S 2    Culture - Urine (collected 03 Apr 2024 20:46)  Source: Clean Catch Clean Catch (Midstream)  Final Report:    No growth        RADIOLOGY:  TTE W or WO Ultrasound Enhancing Agent (11.27.24 @ 07:23)   CONCLUSIONS:      1. Left ventricular systolic function is normal with an ejection fraction of 64 % by Kohli's method of disks.   2. The right ventricle isnot well visualized. Normal right ventricular cavity size, with normal wall thickness, and probably normal right ventricular systolic function.   3. A bioprosthetic valve replacement Transcatheter deployed (TAVR) valve replacement is present in the aortic position The prosthetic valve is well seated. Mild intravalvular regurgitation.   4. Normal left and right atrial size.   5. No echocardiographic evidence of pulmonary hypertension.   6. No pericardial effusion seen.   7. Analysis of left ventricular diastolic function and filling pressure is made challenging by the presence of merged E and A wave.   8. The inferior vena cava is normal in size measuring 0.97 cm in diameter, (normal <2.1cm) with normal inspiratory collapse (normal >50%) consistent with normal right atrial pressure (~3, range 0-5mmHg).   9. The peak transaortic velocity is 2.30 m/s, peak transaortic gradient is 21.1 mmHg and mean transaortic gradient is 10.7 mmHg with an LVOT/aortic valve VTI ratio of 0.62.    Xray Chest 1 View- PORTABLE-Urgent (Xray Chest 1 View- PORTABLE-Urgent .) (11.24.24 @ 09:19) >  IMPRESSION: Clear lungs.    CT Pelvis No Cont (03.08.23 @ 18:18) >  IMPRESSION:  Bladder is collapsed with Bowers catheter, limiting its evaluation. No   significant clot burden is seen.       INFECTIOUS DISEASE CONSULT NOTE    Patient is a 74y old  Male who presents as a transfer from [facility] for DARIN and PPM extraction in the setting of MSSA bacteremia.    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 Patient states since having SPT placed in 4/2024, he has intermittent heamturia and clots every few days that usually clears up. His most recent episode started 5 days ago and then yesterday he noted brighter red blood from the bowers associated with increased fatigue and weakness and shortness of breath with exertion which prompted him to come to the ER. Denies fevers, chills, flank pain, decrease bowers output, flank pain. He denied chest pain, shortness of breath, abdominal pain, nausea, vomiting, new leg swelling, diarrhea. Denies any hardware in the body other than pacemaker and prosthetic heart valve. He denied any pain at pacemaker site. Denies any new skin wounds. Reports that he had a root canal performed on 10/16 and denies any dental pain.       On presentation patient was febrile, tachycardic and hypotensive. Patient had SPT exchanged performed on 11/24. Patient had blood cultures 11/24 growing MSSA in 4/4 bottles and Urine culture 11/24 also growing MSSA. TTE with no documented valvular or device vegetations, bioprosthetic aortic valve with mild intravalvular regurgitation. He is on Cefazolin 2g IV Q8H (11/25- ). He is transferred to [facility] for DARIN and PPM extraction.       REVIEW OF SYSTEMS:  Constitutional: No fevers, No chills  Skin: No rash, no phlebitis		  Respiratory: No cough,   Cardiovascular:  No chest pain, No palpitations   Gastrointestinal: No pain, No nausea, No vomiting,  Genitourinary: No dysuria, No frequency  MSK: No Joint pain, No back pain  Neurological: No HA, no weakness      Prior hospital charts reviewed [Yes]  Primary team notes reviewed [Yes]  Other consultant notes reviewed [Yes]    PAST MEDICAL & SURGICAL HISTORY:  Prostate cancer  RT 2018 and prostatectomy in 2015  Proctitis, radiationfrom prostate treatment  HTN (hypertension)  Aortic stenosis  Rectal bleeding  last hospitalization 10/6/20 2/2 radiation proctitis  [place] (hard of hearing)  H/O prostatectomy 2015  S/P T&A (status post tonsillectomy and adenoidectomy) child          SOCIAL HISTORY:  -[place] in [place] [facility]  -has one dog at home  -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 650 every 6 hours PRN  aluminum hydroxide/magnesium hydroxide/simethicone Suspension 30 every 4 hours PRN  melatonin 3 at bedtime PRN  ondansetron Injectable 4 every 8 hours PRN  phenazopyridine 100 every 8 hours PRN  polyethylene glycol 3350 17 daily  senna 1 at bedtime PRN      VITALS:  Vital Signs Last 24 Hrs  T(F): 98.1 (12-02-24 @ 04:29), Max: 100.5 (11-25-24 @ 22:48)    Vital Signs Last 24 Hrs  HR: 88 (12-02-24 @ 04:29) (88 - 95)  BP: 125/73 (12-02-24 @ 04:29) (123/68 - 132/83)  RR: 18 (12-02-24 @ 04:29)  SpO2: 95% (12-02-24 @ 04:29) (95% - 97%)  Wt(kg): --    EXAM:  General: Patient appears comfortable  HEENT: NCAT, PERRL, anicteric sclera  CV: +S1/S2, RRR, no M/R/G. +pacemaker in place over left chest with no surrounding tenderness, swelling, warmth or fluctuance.   Lungs: No respiratory distress, CTA b/l, no wheezing, rales or rhonchi  Abd:  BS4+, Soft, NTND, no guarding  : No suprapubic tenderness.+suprapubic catheter in place with site c/d/i.   Neuro: AAOx3. No focal deficits noted.   Ext: No cyanosis, no edema  Msk: freely moving upper and lower extremities. No midline or paraspinal tenderness b/l  Skin: No rash, no phlebitis, No erythema. LUE PICC      Labs:                        7.8    8.99  )-----------( 388      ( 01 Dec 2024 22:22 )             24.4     12-02    135  |  97  |  11  ----------------------------<  104[H]  3.8   |  24  |  0.72    Ca    8.4      02 Dec 2024 06:39  Phos  2.5     12-01  Mg     2.00     12-01    TPro  6.0  /  Alb  2.9[L]  /  TBili  0.3  /  DBili  x   /  AST  16  /  ALT  5[L]  /  AlkPhos  90  12-02      WBC Trend:  WBC Count: 8.99 (12-01-24 @ 22:22)  WBC Count: 8.49 (12-01-24 @ 16:31)  WBC Count: 9.58 (12-01-24 @ 10:07)  WBC Count: 8.63 (11-30-24 @ 17:05)      Auto Neutrophil #: 6.66 K/uL (12-01-24 @ 10:07)  Auto Neutrophil #: 4.47 K/uL (11-28-24 @ 17:42)  Auto Neutrophil #: 19.19 K/uL (11-24-24 @ 12:34)  Auto Neutrophil #: 20.07 K/uL (11-24-24 @ 08:04)  Band Neutrophils %: 23.0 % (11-24-24 @ 08:04)      Creatine Trend:  Creatinine: 0.72 (12-02)  Creatinine: 0.71 (12-01)  Creatinine: 0.70 (11-30)  Creatinine: 0.73 (11-29)      Liver Biochemical Testing Trend:  Alanine Aminotransferase (ALT/SGPT): 5 *L* (12-02)  Alanine Aminotransferase (ALT/SGPT): <5 *L* (12-01)  Alanine Aminotransferase (ALT/SGPT): 8 (11-24)  Alanine Aminotransferase (ALT/SGPT): 8 (04-23)  Alanine Aminotransferase (ALT/SGPT): 7 (04-22)  Aspartate Aminotransferase (AST/SGOT): 16 (12-02-24 @ 06:39)  Aspartate Aminotransferase (AST/SGOT): 19 (12-01-24 @ 10:07)  Aspartate Aminotransferase (AST/SGOT): 15 (11-24-24 @ 08:04)  Aspartate Aminotransferase (AST/SGOT): 11 (04-23-24 @ 06:07)  Aspartate Aminotransferase (AST/SGOT): 14 (04-22-24 @ 19:20)  Bilirubin Total: 0.3 (12-02)  Bilirubin Total: 0.3 (12-01)  Bilirubin Total: 0.7 (11-24)  Bilirubin Total: 0.4 (04-23)  Bilirubin Total: 0.6 (04-22)      Trend LDH  03-09-23 @ 08:28  176      Auto Eosinophil %: 3.2 % (12-01-24 @ 10:07)      Urinalysis Basic - ( 02 Dec 2024 06:39 )  Urinalysis + Microscopic Examination (11.24.24 @ 08:12)   pH Urine: 5.0  Urine Appearance: Turbid  Color: Red  Specific Gravity: 1.026  Protein, Urine: 30 mg/dL  Glucose Qualitative, Urine: Negative mg/dL  Ketone - Urine: Negative mg/dL  Blood, Urine: Small  Bilirubin: Moderate  Urobilinogen: 0.2 mg/dL  Leukocyte Esterase Concentration: Large  Nitrite: Positive  Review: Reviewed  White Blood Cell - Urine: 71 /HPF  Red Blood Cell - Urine: Too Numerous to count /HPF  Bacteria: Many /HPF  Cast: 0 /LPF  Epithelial Cells: 1 /HPF      MICROBIOLOGY:        Culture - Blood (collected 28 Nov 2024 06:25)  Source: .Blood BLOOD  Preliminary Report:    No growth at 72 Hours    Culture - Blood (collected 28 Nov 2024 06:05)  Source: .Blood BLOOD  Preliminary Report:    No growth at 72 Hours    Culture - Blood (collected 26 Nov 2024 06:43)  Source: .Blood BLOOD  Final Report:    No growth at 5 days    Culture - Blood (collected 26 Nov 2024 05:35)  Source: .Blood BLOOD  Final Report:    No growth at 5 days    Culture - Urine (collected 24 Nov 2024 08:11)  Source: Suprapubic Suprapubic  Final Report:    >100,000 CFU/ml Staphylococcus aureus  Organism: Staphylococcus aureus  Organism: Staphylococcus aureus    Sensitivities:      Method Type: KRISTIN      -  Ceftaroline: S <=0.5      -  Gentamicin: S <=4 Should not be used as monotherapy      -  Nitrofurantoin: S <=32      -  Oxacillin: S <=0.25 Oxacillin predicts susceptibility for dicloxacillin, methicillin, and nafcillin      -  Penicillin: R >2      -  Rifampin: S <=1 Should not be used as monotherapy      -  Tetracycline: S <=4      -  Trimethoprim/Sulfamethoxazole: S <=0.5/9.5      -  Vancomycin: S 0.5    Culture - Blood (collected 24 Nov 2024 08:10)  Source: .Blood BLOOD  Final Report:    Growth in aerobic and anaerobic bottles: Staphylococcus aureus    Direct identification is available within approximately 3-5    hours either by Blood Panel Multiplexed PCR or Direct    MALDI-TOF. Details: [email or web address]  Organism: Blood Culture PCR  Staphylococcus aureus  Organism: Staphylococcus aureus    Sensitivities:      Method Type: KRISTIN      -  Clindamycin: R <=0.25 This isolate is presumed to be clindamycin resistant based on detection of inducible resistance. Clindamycin may still be effective in some patients.      -  Erythromycin: R >4      -  Gentamicin: S <=4 Should not be used as monotherapy      -  Oxacillin: S 0.5 Oxacillin predicts susceptibility for dicloxacillin, methicillin, and nafcillin      -  Penicillin: R >2      -  Rifampin: S <=1 Should not be used as monotherapy      -  Tetracycline: S <=4      -  Trimethoprim/Sulfamethoxazole: S <=0.5/9.5      -  Vancomycin: S 1  Organism: Blood Culture PCR    Sensitivities:      Method Type: PCR      -  Methicillin SENSITIVE Staphylococcus aureus (MSSA): Detec Any isolate of Staphylococcus aureus from a blood culture is NOT considered a contaminant.    Culture - Blood (collected 24 Nov 2024 08:00)  Source: .Blood BLOOD  Final Report:    Growth in aerobic and anaerobic bottles: Staphylococcus aureus    See previous culture [id number]    Culture - Urine (collected 22 Apr 2024 19:20)  Source: Clean Catch Clean Catch (Midstream)  Final Report:    No growth    Culture - Urine (collected 21 Apr 2024 19:28)  Source: Catheterized Catheterized  Final Report:    >100,000 CFU/ml Enterococcus faecalis  Organism: Enterococcus faecalis  Organism: Enterococcus faecalis    Sensitivities:      Method Type: KRISTIN      -  Ampicillin: S <=2 Predicts results to ampicillin/sulbactam, amoxacillin-clavulanate and  piperacillin-tazobactam.      -  Ciprofloxacin: R >2      -  Levofloxacin: R >4      -  Nitrofurantoin: S <=32 Should not be used to treat pyelonephritis.      -  Tetracycline: R >8      -  Vancomycin: S 2    Culture - Urine (collected 03 Apr 2024 20:46)  Source: Clean Catch Clean Catch (Midstream)  Final Report:    No growth        RADIOLOGY:  TTE W or WO Ultrasound Enhancing Agent (11.27.24 @ 07:23)   CONCLUSIONS:      1. Left ventricular systolic function is normal with an ejection fraction of 64 % by Kohli's method of disks.   2. The right ventricle isnot well visualized. Normal right ventricular cavity size, with normal wall thickness, and probably normal right ventricular systolic function.   3. A bioprosthetic valve replacement Transcatheter deployed (TAVR) valve replacement is present in the aortic position The prosthetic valve is well seated. Mild intravalvular regurgitation.   4. Normal left and right atrial size.   5. No echocardiographic evidence of pulmonary hypertension.   6. No pericardial effusion seen.   7. Analysis of left ventricular diastolic function and filling pressure is made challenging by the presence of merged E and A wave.   8. The inferior vena cava is normal in size measuring 0.97 cm in diameter, (normal <2.1cm) with normal inspiratory collapse (normal >50%) consistent with normal right atrial pressure (~3, range 0-5mmHg).   9. The peak transaortic velocity is 2.30 m/s, peak transaortic gradient is 21.1 mmHg and mean transaortic gradient is 10.7 mmHg with an LVOT/aortic valve VTI ratio of 0.62.    Xray Chest 1 View- PORTABLE-Urgent (Xray Chest 1 View- PORTABLE-Urgent .) (11.24.24 @ 09:19) >  IMPRESSION: Clear lungs.    CT Pelvis No Cont (03.08.23 @ 18:18) >  IMPRESSION:  Bladder is collapsed with Bowers catheter, limiting its evaluation. No   significant clot burden is seen.

## 2024-12-02 NOTE — CONSULT NOTE ADULT - ASSESSMENT
74 year old male undergoing [place] at [facility] [place] CR prostate cancer to bone and lymph nodes has recently increased dose of Enzalutamide to 160 mg  admitted with weakness and hematuria    Prostate cancer  -undergoing care at [facility] [place]  - s/p Radiation Therapy (2018) and Prostatectomy   -on Enzalutamide and Orgovyx  -hold medications until patient is stable  -Follow up with [facility] after discharge    Hematuria  -in setting of  radiation cystitis s/p hyperbaric oxygen   -- irrigate PRN, will avoid CBI at this time  --H&H 8.5/26.4  --Transfuse for Hgb < 7.0  --urology recs appreciated    Bacteremia  --MSSA bacteremia  -patient with aortic prosthetic valve and ppm in place   -continue with Cefazolin. 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 has recently increased dose of Enzalutamide to 160 mg  admitted with weakness and hematuria    Prostate cancer  -undergoing care at [facility] [place]  -s/p Radiation Therapy (2018) and Prostatectomy   -on Enzalutamide and Orgovyx  -hold medications until patient is stable  -Follow up with [facility] after discharge    Anemia  Hematuria  --in setting of  radiation cystitis s/p hyperbaric oxygen   --Transfuse for Hgb < 7.0  --urology recs appreciated    Bacteremia  --MSSA bacteremia  --ID following,  Blood Cx (11/24): MSSA (4/4 bottles)--> Repeat blood Cx (11/26): NGTD  --UA + RBC and WBC , + LE and nitrite and bacteria   --Urine Cx (Suprapubic catheter) + MSSA  --CXR: clear lungs   --TTE: no documented valvular or device vegetations, bioprosthetic aortic valve with Mild intravalvular regurgitation.  --awaiting PPM removal today 12/02  --continues on Cefazolin. Tmax 100.5  --f/u TTE to evaluate for endocarditis     continue to follow    Aida Kendrick NP  Hematology/ Oncology  [facility]  [phone] (office)  [phone] (alt office)  Evenings and weekends please call MD on call or office

## 2024-12-02 NOTE — CONSULT NOTE ADULT - SUBJECTIVE AND OBJECTIVE BOX
CHIEF COMPLAINT: "I'm told this pacemaker needs to come out"     HISTORY OF PRESENT ILLNESS: 74 year old male with PMHx of HTN, AS s/p TAVR in 12/2020, sick sinus syndrome s/p PPM 2020, prostate cancer s/p radical prostatectomy 2015 with radiation 2018 cystitis/procitits with mets to spine and pelvis on Relugolix and Xtandi daily, s/p suprapubic catheter placement in 4/2024 (exchanges every 1 month), presents to [facility] ED on 11/24  for weakness and hematuria. Patient states since having SPT placed in 4/2024, he has intermittent heamturia and clots every few days that usually clears up. His most recent episode started 5 days ago and then yesterday he noted brighter red blood from the bowers associated with increased fatigue and weakness and shortness of breath with exertion which prompted him to come to the ER. Denies fevers, chills, flank pain, decrease bowers output, flank pain. He denied chest pain, shortness of breath, abdominal pain, nausea, vomiting, new leg swelling, diarrhea. Denies any hardware in the body other than pacemaker and prosthetic heart valve. He denied any pain at pacemaker site. On presentation to [facility] patient was febrile, tachycardic and hypotensive. Patient had SPT exchanged performed on 11/24. Patient had blood cultures 11/24 growing MSSA in 4/4 bottles and Urine culture 11/24 also growing MSSA. TTE with no documented valvular or device vegetations, bioprosthetic aortic valve with mild intravalvular regurgitation. He is on Cefazolin 2g IV Q8H and was transferred to [facility] for DARIN and PPM extraction.  He is NPO today for procedure.     Allergies  penicillin V potassium (Rash)  penicillin (Diarrhea; Pruritus; Hives)    Intolerances  codeine (Nausea)  	    MEDICATIONS:  ceFAZolin   IVPB 2000 milliGRAM(s) IV Intermittent every 8 hours    acetaminophen     Tablet .. 650 milliGRAM(s) Oral every 6 hours PRN  melatonin 3 milliGRAM(s) Oral at bedtime PRN  ondansetron Injectable 4 milliGRAM(s) IV Push every 8 hours PRN    aluminum hydroxide/magnesium hydroxide/simethicone Suspension 30 milliLiter(s) Oral every 4 hours PRN  polyethylene glycol 3350 17 Gram(s) Oral daily  senna 1 Tablet(s) Oral at bedtime PRN      chlorhexidine 2% Cloths 1 Application(s) Topical <User Schedule>  dextrose 5% + sodium chloride 0.45%. 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 Otherwise, 12 point ROS done and otherwise negative.    PHYSICAL EXAM:  T(C): 37 (12-02-24 @ 11:08), Max: 37 (12-02-24 @ 11:08)  HR: 91 (12-02-24 @ 11:08) (88 - 95)  BP: 126/77 (12-02-24 @ 11:08) (125/73 - 132/83)  RR: 18 (12-02-24 @ 11:08) (18 - 18)  SpO2: 96% (12-02-24 @ 11:08) (95% - 97%)    01 Dec 2024 07:01  -  02 Dec 2024 07:00  --------------------------------------------------------  IN: 0 mL / OUT: 515 mL / NET: -515 mL    Appearance: Normal	  HEENT:   Normal oral mucosa, PERRL, EOMI	  Cardiovascular: Normal S1 S2, regular. No JVD, No murmurs, No edema  Respiratory: Lungs clear to auscultation	  Psychiatry: A & O x 3, Mood & affect appropriate  Gastrointestinal:  Soft, Non-tender, + BS	  Skin: No rashes, No ecchymoses, No cyanosis	  Extremities: Normal range of motion, No clubbing, cyanosis.  Trace BL/LE edema.   Vascular: Peripheral pulses palpable 2+ bilaterally      LABS:	 	    CBC Full  -  ( 01 Dec 2024 22:22 )  WBC Count : 8.99 K/uL  Hemoglobin : 7.8 g/dL  Hematocrit : 24.4 %  Platelet Count - Automated : 388 K/uL  Mean Cell Volume : 86.5 fl  Mean Cell Hemoglobin : 27.7 pg  Mean Cell Hemoglobin Concentration : 32.0 g/dL  Auto Neutrophil # : x  Auto Lymphocyte # : x  Auto Monocyte # : x  Auto Eosinophil # : x  Auto Basophil # : x  Auto Neutrophil % : x  Auto Lymphocyte % : x  Auto Monocyte % : x  Auto Eosinophil % : x  Auto Basophil % : x    12-02    135  |  97  |  11  ----------------------------<  104[H]  3.8   |  24  |  0.72  12-01    136  |  99  |  10  ----------------------------<  107[H]  3.9   |  28  |  0.71    Ca    8.4      02 Dec 2024 06:39  Ca    8.5      01 Dec 2024 10:07  Phos  2.5     12-01  Mg     2.00     12-01    TPro  6.0  /  Alb  2.9[L]  /  TBili  0.3  /  DBili  x   /  AST  16  /  ALT  5[L]  /  AlkPhos  90  12-02  TPro  6.4  /  Alb  3.0[L]  /  TBili  0.3  /  DBili  x   /  AST  19  /  ALT  <5[L]  /  AlkPhos  91  12-01      TELEMETRY: 	 Atrial sensed, ventricular paced 85bpm     TTE:  TRANSTHORACIC ECHOCARDIOGRAM REPORT  Pt. Name:       HERBIE KERR Study Date:    11/27/2024  MRN: [id number]             YOB: 1950  Accession #:    [id number]             Age:           74 years  Account#:       [id number]              Gender:        M  Heart Rate:                           Height:        70.00 in (177.80 cm)  Rhythm:                              Weight:        205.00 lb (92.99 kg)  Blood Pressure: 147/74 mmHg           BSA/BMI:       2.11 m² / 29.41 kg/m²  ________________________________________________________________________________________  Referring Physician:    Najma Thompson MD  Interpreting Physician: Beau Schaffer M.D.  Primary Sonographer:    Lauren R. Finkelstein [facility]    CPT:               ECHO TTE WO CON COMP W DOPP - 66309.m  Indication(s):     Abnormal electrocardiogram ECG EKG - R94.31  Procedure:         Transthoracic echocardiogram with 2-D, M-mode and complete                     spectral and color flow Doppler.  Ordering Location: [facility]  Admission Status:  Inpatient  Study Information: Image quality for this study is less than ideal.     CONCLUSIONS:      1. Left ventricular systolic function is normal with an ejection fraction of 64 % by Kohli's method of disks.   2. The right ventricle isnot well visualized. Normal right ventricular cavity size, with normal wall thickness, and probably normal right ventricular systolic function.   3. A bioprosthetic valve replacement Transcatheter deployed (TAVR) valve replacement is present in the aortic position The prosthetic valve is well seated. Mild intravalvular regurgitation.   4. Normal left and right atrial size.   5. No echocardiographic evidence of pulmonary hypertension.   6. No pericardial effusion seen.   7. Analysis of left ventricular diastolic function and filling pressure is made challenging by the presence of merged E and A wave.   8. The inferior vena cava is normal in size measuring 0.97 cm in diameter, (normal <2.1cm) with normal inspiratory collapse (normal >50%) consistent with normal right atrial pressure (~3, range 0-5mmHg).   9. The peak transaortic velocity is 2.30 m/s, peak transaortic gradient is 21.1 mmHg and mean transaortic gradient is 10.7 mmHg with an LVOT/aortic valve VTI ratio of 0.62.    FINDINGS:     Left Ventricle:  Left ventricular systolic function is normal with a calculated ejection fraction of 64 % by the Kohli's biplane method of disks. Analysis ofleft ventricular diastolic function and filling pressure is made challenging by the presence of merged E and A wave.     Right Ventricle:  The right ventricle is not well visualized. The right ventricular cavity is normal in size, with normal wall thickness and right ventricular systolic function is probably normal.     Left Atrium:  The left atrium is normal in size with an indexed volume of 16.27 ml/m².     Right Atrium:  The right atrium is normal in size.     Aortic Valve:  A bioprosthetic valve replacement is present in the aortic position. A a transcatheter deployed (TAVR) is present in the aortic position. A transcatheter deployed (TAVR) (valve-in-valve) is present in the aortic position. The prosthetic valve is well seated. The peak transaortic velocity is 2.30 m/s, peak transaortic gradient is 21.1 mmHg and mean transaortic gradient is 10.7 mmHg with an LVOT/aortic valve VTI ratio of 0.62. There is mild intravalvular regurgitation. The peak transaortic velocity is 2.30 m/s, peak transaortic gradient is 21.1 mmHg and mean transaortic gradient is 10.7 mmHg with an LVOT/aortic valve VTI ratio of 0.62. There is mild aortic regurgitation.     Mitral Valve:  Structurally normal mitral valve with normal leaflet excursion. There is calcification of the mitral valve annulus. There is trace mitral regurgitation.     Tricuspid Valve:  Structurally normal tricuspid valve with normal leaflet excursion. There is trace tricuspid regurgitation. There is no echocardiographic evidence of pulmonary hypertension.     Pulmonic Valve:  Structurally normal pulmonic valve with normal leaflet excursion. There is trace pulmonic regurgitation.     Aorta:  The aortic root appears normal in size.     Pericardium:  No pericardial effusion seen.     Systemic Veins:  The inferior vena cava is normal in size measuring 0.97 cm in diameter, (normal <2.1cm) with normal inspiratory collapse (normal >50%) consistent with normal right atrial pressure (~3, range 0-5mmHg).  ____________________________________________________________________  QUANTITATIVE DATA:  Left Ventricle Measurements: (Indexed to BSA)     IVSd (2D):   0.8 cm  LVPWd (2D):  1.2 cm  LVIDd (2D):  5.1 cm  LVIDs (2D):  3.1 cm  LV Mass:     181 g  85.8 g/m²  LV Vol d, MOD A2C: 84.6 ml40.09 ml/m²  LV Vol d, MOD A4C: 97.2 ml 46.09 ml/m²  LV Vol d, MOD BP:  93.0 ml 44.08 ml/m²  LV Vol s, MOD A2C: 36.5 ml 17.29 ml/m²  LV Vol s, MOD A4C: 29.7 ml 14.10 ml/m²  LV Vol s, MOD BP:  33.0 ml 15.65 ml/m²  LVOT SV MOD BP:    60.0 ml  LV EF% MOD BP:     64 %    Aorta Measurements: (Normal range) (Indexed to BSA)     Ao Root d    2.50 cm (3.1 - 3.7 cm) 1.19 cm/m²  Ao Root      2.5 cm  Ao Asc:      3.2 cm  Ao Asc prox: 3.20 cm                1.52 cm/m²  Ao Arch:     2.0 cm    Left Atrium Measurements: (Indexed to BSA)  LA Diam 2D:        3.66 cm  LA Vol s, MOD A4C: 33.86 ml.  LA Vol s, MOD A2C: 34.82 ml.  LA Vol s, MOD BP:  34.33 ml  16.27 ml/m²     LVOT / RVOT/ Qp/Qs Data: (Indexed to BSA)  LVOT Vmax:      1.37 m/s  LVOT VTI:       22.19 cm  LVOT peak grad: 8 mmHg  LVOT mean grad: 4.6 mmHg    Aortic Valve Measurements:  AV Vmax:                2.3 m/s  AV Peak Gradient:       21.1 mmHg  AV Mean Gradient:       10.7 mmHg  AV VTI:                 36.1 cm  AV VTI Ratio:        0.62  AoV Dimensionless Index 0.62    Tricuspid Valve Measurements:     RA Pressure: 3 mmHg  ________________________________________________________________________________________  Electronically signed on 11/27/2024 at 9:02:36 AM by Beau Schaffer M.D.     *** Final ***

## 2024-12-02 NOTE — PROGRESS NOTE ADULT - ASSESSMENT
74 Y/O M w/ PMH of Prostate CA s/p Radiation Therapy (2018) and Prostatectomy now with radiation cystitis s/p hyperbaric oxygen and SPT placement, HTN, Aortic Stenosis and Sick sinus syndrome s/p pacemaker presents to ER with weakness, hypotension and tachycardia responding to boluses with SANJUANA 1.74 from .72 baseline. Urology was consulted for hematuria and SPT change. He follows with Dr. Hoang    He is now HDS, s/p SPT exchange and manual irrigation with return of 30cc old clot now draining light pink urine well. He is admitted to medicine for UTI/urosepsis.    11/25: urine color improved with irrigation. Will try to avoid cbi at this time as hematuria worsens in the past from any manipulation from below   11/26: urine color quickly clears after irrigation. Irrigated this AM. Will try to avoid CBI as this as caused significant worsening of hematuria.   11/27: SPT draining maroon color, irrigated 5 cc of clots.  11/28: SPT clotted off in AM > exchanged to different [id number] bowers, irrigated out 50cc old clot, draining clear pink. H/H 7.3/21.7 from 7.9/23.9  11/29: SPT draining thin maroon colored urine. Irrigated to clear light punch with return of 10 cc small clots. Hgb 7.1.  11/30: SPT draining thin maroon colored urine. Irrigated to clear light punch with return of 10 cc small clots. Hgb 6.9 > 1u pRBC ordered  12/1: pt transferred [facility].  SPT irrigated to clear/light pink with moderate amount of clots returned  12/2: SPT irrigated with translucent grace with 5cc of clot    Recs:    -irrigate PRN, will avoid CBI at this time. Patient with prior hx of worsening hematuria with manipulation from below -> color translucent grace with return of minimal clot, CBI not needed  -trend H&H and transfuse as needed -> Hgb stable  -Urine with staph aureus, treat accordingly. On ancef  -trend Cr > stable  -monitor I's and O's  -monitor urine color  - Discussed with Dr. Hoang

## 2024-12-02 NOTE — CONSULT NOTE ADULT - SUBJECTIVE AND OBJECTIVE BOX
Patient is a 74y old  Male who presents with a chief complaint of DARIN and PPM extraction.   MSSA bacteremia (01 Dec 2024 18:11)      MEDICATIONS  (STANDING):  ceFAZolin   IVPB 2000 milliGRAM(s) IV Intermittent every 8 hours  polyethylene glycol 3350 17 Gram(s) Oral daily    MEDICATIONS  (PRN):  acetaminophen     Tablet .. 650 milliGRAM(s) Oral every 6 hours PRN Temp greater or equal to 38C (100.4F), Mild Pain (1 - 3)  aluminum hydroxide/magnesium hydroxide/simethicone Suspension 30 milliLiter(s) Oral every 4 hours PRN Dyspepsia  melatonin 3 milliGRAM(s) Oral at bedtime PRN Insomnia  ondansetron Injectable 4 milliGRAM(s) IV Push every 8 hours PRN Nausea and/or Vomiting  phenazopyridine 100 milliGRAM(s) Oral every 8 hours PRN bladder spasm  senna 1 Tablet(s) Oral at bedtime PRN Constipation      ROS  No fever, sweats, chills  No epistaxis, HA, sore throat  No CP, SOB, cough, sputum  No n/v/d, abd pain, melena, hematochezia  No edema  No rash  No anxiety  No back pain, joint pain  No bleeding, bruising  No dysuria, hematuria    Vital Signs Last 24 Hrs  T(C): 36.7 (02 Dec 2024 04:29), Max: 36.9 (01 Dec 2024 20:57)  T(F): 98.1 (02 Dec 2024 04:29), Max: 98.4 (01 Dec 2024 20:57)  HR: 88 (02 Dec 2024 04:29) (88 - 95)  BP: 125/73 (02 Dec 2024 04:29) (123/68 - 132/83)  BP(mean): --  RR: 18 (02 Dec 2024 04:29) (18 - 18)  SpO2: 95% (02 Dec 2024 04:29) (95% - 97%)    Parameters below as of 02 Dec 2024 04:29  Patient On (Oxygen Delivery Method): room air        PE  NAD  Awake, alert  Anicteric, MMM  RRR  CTAB  Abd soft, NT, ND  No c/c/e  No rash grossly  FROM                          7.8    8.99  )-----------( 388      ( 01 Dec 2024 22:22 )             24.4       12-02    135  |  97  |  11  ----------------------------<  104[H]  3.8   |  24  |  0.72    Ca    8.4      02 Dec 2024 06:39  Phos  2.5     12-01  Mg     2.00     12-01    TPro  6.0  /  Alb  2.9[L]  /  TBili  0.3  /  DBili  x   /  AST  16  /  ALT  5[L]  /  AlkPhos  90  12-02       Patient is a 74y old  Male who presents with a chief complaint of DARIN and PPM extraction.   MSSA bacteremia (01 Dec 2024 18:11)    Patient seen and examined  Appears comfortable, family at bedside    MEDICATIONS  (STANDING):  ceFAZolin   IVPB 2000 milliGRAM(s) IV Intermittent every 8 hours  polyethylene glycol 3350 17 Gram(s) Oral daily    MEDICATIONS  (PRN):  acetaminophen     Tablet .. 650 milliGRAM(s) Oral every 6 hours PRN Temp greater or equal to 38C (100.4F), Mild Pain (1 - 3)  aluminum hydroxide/magnesium hydroxide/simethicone Suspension 30 milliLiter(s) Oral every 4 hours PRN Dyspepsia  melatonin 3 milliGRAM(s) Oral at bedtime PRN Insomnia  ondansetron Injectable 4 milliGRAM(s) IV Push every 8 hours PRN Nausea and/or Vomiting  phenazopyridine 100 milliGRAM(s) Oral every 8 hours PRN bladder spasm  senna 1 Tablet(s) Oral at bedtime PRN Constipation          Vital Signs Last 24 Hrs  T(C): 36.7 (02 Dec 2024 04:29), Max: 36.9 (01 Dec 2024 20:57)  T(F): 98.1 (02 Dec 2024 04:29), Max: 98.4 (01 Dec 2024 20:57)  HR: 88 (02 Dec 2024 04:29) (88 - 95)  BP: 125/73 (02 Dec 2024 04:29) (123/68 - 132/83)  BP(mean): --  RR: 18 (02 Dec 2024 04:29) (18 - 18)  SpO2: 95% (02 Dec 2024 04:29) (95% - 97%)    Parameters below as of 02 Dec 2024 04:29  Patient On (Oxygen Delivery Method): room air        PE  frail  Awake, alert  Anicteric, MMM  RRR  CTAB  Abd soft, NT, ND  No c/c                        7.8    8.99  )-----------( 388      ( 01 Dec 2024 22:22 )             24.4       12-02    135  |  97  |  11  ----------------------------<  104[H]  3.8   |  24  |  0.72    Ca    8.4      02 Dec 2024 06:39  Phos  2.5     12-01  Mg     2.00     12-01    TPro  6.0  /  Alb  2.9[L]  /  TBili  0.3  /  DBili  x   /  AST  16  /  ALT  5[L]  /  AlkPhos  90  12-02

## 2024-12-02 NOTE — CONSULT NOTE ADULT - NS ATTEND AMEND GEN_ALL_CORE FT
74 year old male with PMHx of HTN, AS s/p TAVR in 12/2020, second degree AV block s/p PPM 2020, prostate cancer s/p radical prostatectomy 2015 with radiation 2018 cystitis/procitits with mets to spine and pelvis on Relugolix and Xtandi daily, s/p suprapubic catheter placement in 4/2024 (exchanges every 1 month), presents to [facility] ED on 11/24  for weakness and hematuria and found to have MSSA bacteremia. He presents now for pacemaker lead extraction with Micra implant. Long discussion with patient about risks and benefits, including risk of open heart surgery, perforation, DVT/PE, and death. Patient agreeable to proceed. Keep NPO. Hold all AC.
Transferred from [facility]  ADT and pauly have been on hold for prostate ca d/t acute issues  On antibiotics for MSSA bacteremia  Planned for PPM removal and DARIN

## 2024-12-02 NOTE — CONSULT NOTE ADULT - ATTENDING COMMENTS
74-year-old male with a past medical history of hypertension, aortic stenosis status post TAVR in December 2020, sick sinus syndrome status post PPM placement, prostate cancer with metastatic spread to spine and pelvis on chemotherapy, suprapubic catheter placement in April 2024 who was initially admitted to [facility] in 11/24/2024.  Patient been complaining of weakness and hematuria at the time.    Patient does report intermittent hematuria and clotting every few days following placement of suprapubic catheter.  Reports episode started 5 days prior to presentation then noticed worsening brighter red blood from Gr.  Patient and at the time of admission denied any fever, chills, flank pain, shortness of breath, abdominal pain, chest pain, nausea/vomiting, diarrhea.  Recent dental work in October 2024.    Patient was admitted for further management, underwent suprapubic catheter exchange.  Blood cultures obtained grew MSSA and 4 out of 4 bottles, urine culture also with MSSA.  TTE was obtained with no evidence for valvular or device vegetation, started on cefazolin and then transferred to [facility] for DARIN and PPM extraction.    Upon arrival to [facility], evaluated by EP service.  Patient seen ahead of procedure today.  Patient remains afebrile.  Otherwise hemodynamically stable on room air.  Latest labs show no leukocytosis, anemia 7.9/25, thrombocytosis 432.  BMP with renal function within normal limits, hepatic function within normal limits.  Blood cultures pending on 11/26/2024 and 11/28/2024 are negative.    #MSSA bacteremia  #Presence of cardiac hardware, PPM and bioprosthetic aortic valve  #Positive urine culture, MSSA  #Listed penicillin allergy    Recommendations  Continue cefazolin  Follow DARIN  PPM extraction on 12/2/2024 with EP service  Follow fever curve and WBC count    Willie Irizarry MD  Division of Infectious Diseases

## 2024-12-02 NOTE — PROGRESS NOTE ADULT - SUBJECTIVE AND OBJECTIVE BOX
Subjective  Catheter continue to flow with 700cc of output documented over the shift. Patient denies spasms and has had no leakage of urine around the catheter.    Objective    Vital signs  T(F): , Max: 98.4 (12-01-24 @ 20:57)  HR: 88 (12-02-24 @ 04:29)  BP: 125/73 (12-02-24 @ 04:29)  SpO2: 95% (12-02-24 @ 04:29)  Wt(kg): --    Output         Gen: NAD  Abd: soft, nontender, nondistended  : bowers secured in place, draining translucent grace urine. Catheter irrigated with return of minimal clot and still translucent grace.    Labs      12-02 @ 06:39    WBC --    / Hct --    / SCr 0.72     12-01 @ 22:22    WBC 8.99  / Hct 24.4  / SCr --           Culture - Blood (collected 11-28-24 @ 06:25)  Source: .Blood BLOOD  Preliminary Report (12-01-24 @ 15:00):    No growth at 72 Hours    Culture - Blood (collected 11-28-24 @ 06:05)  Source: .Blood BLOOD  Preliminary Report (12-01-24 @ 15:00):    No growth at 72 Hours    Culture - Blood (collected 11-26-24 @ 06:43)  Source: .Blood BLOOD  Final Report (12-01-24 @ 09:00):    No growth at 5 days    Culture - Blood (collected 11-26-24 @ 05:35)  Source: .Blood BLOOD  Final Report (12-01-24 @ 09:00):    No growth at 5 days        Urine Cx: ?  Blood Cx: ?    Imaging

## 2024-12-02 NOTE — PROGRESS NOTE ADULT - ASSESSMENT
74yoM w/ PMHx ofaortic stenosis s/p TAVR, HTN, prostate cancer s/p radiation therapy, prostatectomy, with metastasis to spine and pelvis, frequent urinary retention s/p suprapubic catheter presented to [facility] ED on 11/24 for weakness and hematuria; transferred to [facility] for DARIN and possible PPM extraction in setting of MSSA bacteremia.

## 2024-12-02 NOTE — CONSULT NOTE ADULT - ASSESSMENT
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 He initially presented to [facility] ED on 11/24  for weakness and hematuria.  On presentation patient was febrile, tachycardic and hypotensive. Patient had SPT exchanged performed on 11/24. Patient had blood cultures 11/24 growing MSSA in 4/4 bottles and Urine culture 11/24 also growing MSSA. TTE with no documented valvular or device vegetations, bioprosthetic aortic valve with mild intravalvular regurgitation. He is on Cefazolin 2g IV Q8H (11/25- ). ID asked to help manage.     Antimicrobials:  Cefazolin 2g IV Q8H (11/25-)    ID Workup:   -Blood Cx (11/24): MSSA (4/4 bottles)  -Repeat blood Cx (11/26): NGTD  -UA + RBC and WBC , + LE and nitrite and bacteria   -Urine Cx (Suprapubic catheter) + MSSA  -Covid/RSV/Flu neg   -CXR: clear lungs   -TTE: no documented valvular or device vegetations, bioprosthetic aortic valve with Mild intravalvular regurgitation.    Assessment:  #Sepsis - resolved  #MSSA bacteremia  #Presence of PPM    Recommendations: PLEASE DEFER ALL CHANGES IN PLAN UNTIL SIGNED BY ATTENDING. All recommendations are tentative pending Attending Attestation.  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 He initially presented to [facility] ED on 11/24  for weakness and hematuria.  On presentation patient was febrile, tachycardic and hypotensive. Patient had SPT exchanged performed on 11/24. Patient had blood cultures 11/24 growing MSSA in 4/4 bottles and Urine culture 11/24 also growing MSSA. TTE with no documented valvular or device vegetations, bioprosthetic aortic valve with mild intravalvular regurgitation. He is on Cefazolin 2g IV Q8H (11/25- ). ID asked to help manage.     Antimicrobials:  Cefazolin 2g IV Q8H (11/25-)    ID Workup:   -Blood Cx (11/24): MSSA (4/4 bottles)  -Repeat blood Cx (11/26): NGTD  -UA + RBC and WBC , + LE and nitrite and bacteria   -Urine Cx (Suprapubic catheter) + MSSA  -Covid/RSV/Flu neg   -CXR: clear lungs   -TTE: no documented valvular or device vegetations, bioprosthetic aortic valve with Mild intravalvular regurgitation.    Assessment:  #Sepsis - resolved  #MSSA bacteremia  #Presence of PPM    Recommendations:   -Continue with Cefazolin 2g IV Q8H  -Follow up DARIN  -Agree with PPM extraction   -Monitor vitals      Case discussed with attending     aRf Funez DO, PGY-4  Infectious Disease Fellow  [employer] Teams Preferred  After 5pm/weekends call [phone]

## 2024-12-02 NOTE — CONSULT NOTE ADULT - ASSESSMENT
74 year old male with PMHx of HTN, AS s/p TAVR in 12/2020, sick sinus syndrome s/p PPM 2020, prostate cancer s/p radical prostatectomy 2015 with radiation 2018 cystitis/procitits with mets to spine and pelvis on Relugolix and Xtandi daily, s/p suprapubic catheter placement in 4/2024 (exchanges every 1 month), presents to [facility] ED on 11/24  for weakness and hematuria. Patient states since having SPT placed in 4/2024, he has intermittent heamturia and clots every few days that usually clears up. His most recent episode started 5 days ago and then yesterday he noted brighter red blood from the bowers associated with increased fatigue and weakness and shortness of breath with exertion which prompted him to come to the ER. Denies fevers, chills, flank pain, decrease bowers output, flank pain. He denied chest pain, shortness of breath, abdominal pain, nausea, vomiting, new leg swelling, diarrhea. Denies any hardware in the body other than pacemaker and prosthetic heart valve. He denied any pain at pacemaker site. On presentation to [facility] patient was febrile, tachycardic and hypotensive. Patient had SPT exchanged performed on 11/24. Patient had blood cultures 11/24 growing MSSA in 4/4 bottles and Urine culture 11/24 also growing MSSA. TTE with no documented valvular or device vegetations, bioprosthetic aortic valve with mild intravalvular regurgitation. He is on Cefazolin 2g IV Q8H and was transferred to [facility] for DARIN and PPM extraction.  He is NPO today for procedure.  Left ventricular systolic function is normal with an ejection fraction of 64 %.     1.  MSSA Bacteremia with dual chamber pacemaker in place  2.  underlying rhythm is high degree AV Block  3.  Hematuria with suprapubic catheter exchanges monthly   4.  Anemia      - Maintain NPO for pacemaker extraction today with leadless pacemaker implantation  - Type and screen x 2 are active  - IVF for hydration for now  - Check CBC today  - Avoid any DVT prophylaxis pre and post extraction procedure   - Above discussed with primary team, patient and son at bedside    LISANDRA Salomon [facility]  [phone]

## 2024-12-02 NOTE — PROGRESS NOTE ADULT - SUBJECTIVE AND OBJECTIVE BOX
INCOMPLETE NOTE    Esperanza Tirado M.D.  Division of Hospital Medicine  Available on [employer] TEAMS    SUBJECTIVE / ACUTE INTERVAL EVENTS: Patient seen and examined. No overnight events. No subjective complaints.     OBJECTIVE:   Vital Signs Last 24 Hrs  T(F): 98.1 (02 Dec 2024 04:29), Max: 98.4 (01 Dec 2024 20:57)  HR: 88 (02 Dec 2024 04:29) (88 - 95)  BP: 125/73 (02 Dec 2024 04:29) (123/68 - 132/83)  RR: 18 (02 Dec 2024 04:29) (18 - 18)  SpO2: 95% (02 Dec 2024 04:29) (95% - 97%)  Parameters below as of 02 Dec 2024 04:29  Patient On (Oxygen Delivery Method): room air    I&O's Summary  01 Dec 2024 07:01  -  02 Dec 2024 07:00  --------------------------------------------------------  IN: 0 mL / OUT: 515 mL / NET: -515 mL    Physical Examination:  GEN: thin man, laying in stretcher in NAD  PSYCH: A&Ox3, mood and affect appear appropriate   SKIN: intact, no e/o rash  NEURO: no focal neurologic deficits appreciated; CN II-XII intact, sensation intact B/L, motor 5/5 in all mm groups  EYES: PERRL, anicteric, EOMI, no vertical/horizontal nystagmus  HEAD: NC, AT  NECK: supple  RESPI: no accessory muscle use, B/L air entry, CTAB   CARDIO: regular rate/rhythm, no LE edema B/L  ABD: soft, NT, ND, +BS  EXT: patient able to move all extremities spontaneously  VASC: peripheral pulses palpated    Labs:                     7.8    8.99  )-----------( 388      ( 01 Dec 2024 22:22 )             24.4     12-02  135  |  97  |  11  ----------------------------<  104[H]  3.8   |  24  |  0.72    Ca    8.4      02 Dec 2024 06:39  Phos  2.5     12-01  Mg     2.00     12-01    TPro  6.0  /  Alb  2.9[L]  /  TBili  0.3  /  DBili  x   /  AST  16  /  ALT  5[L]  /  AlkPhos  90  12-02    PT/INR - ( 02 Dec 2024 06:38 )   PT: 12.7 sec;   INR: 1.11 ratio    PTT - ( 02 Dec 2024 06:38 )  PTT:30.7 sec    Urinalysis Basic - ( 02 Dec 2024 06:39 )  Color: x / Appearance: x / SG: x / pH: x  Gluc: 104 mg/dL / Ketone: x  / Bili: x / Urobili: x   Blood: x / Protein: x / Nitrite: x   Leuk Esterase: x / RBC: x / WBC x   Sq Epi: x / Non Sq Epi: x / Bacteria: x    Consultant(s) Notes Reviewed: Urology, ID, Heme-Onc  Care Discussed with Consultants/Other Providers: SHWETA Alexander    MEDICATIONS  (STANDING):  ceFAZolin   IVPB 2000 milliGRAM(s) IV Intermittent every 8 hours  polyethylene glycol 3350 17 Gram(s) Oral daily    MEDICATIONS  (PRN):  acetaminophen     Tablet .. 650 milliGRAM(s) Oral every 6 hours PRN Temp greater or equal to 38C (100.4F), Mild Pain (1 - 3)  aluminum hydroxide/magnesium hydroxide/simethicone Suspension 30 milliLiter(s) Oral every 4 hours PRN Dyspepsia  melatonin 3 milliGRAM(s) Oral at bedtime PRN Insomnia  ondansetron Injectable 4 milliGRAM(s) IV Push every 8 hours PRN Nausea and/or Vomiting  phenazopyridine 100 milliGRAM(s) Oral every 8 hours PRN bladder spasm  senna 1 Tablet(s) Oral at bedtime PRN Constipation Esperanza Tirado M.D.  Division of Hospital Medicine  Available on [employer] TEAMS    SUBJECTIVE / ACUTE INTERVAL EVENTS: Patient seen and examined with his son at his bedside. No overnight events. No subjective complaints except re: patient being NPO for planned EP extraction/DARIN today.     OBJECTIVE:   Vital Signs Last 24 Hrs  T(F): 98.1 (02 Dec 2024 04:29), Max: 98.4 (01 Dec 2024 20:57)  HR: 88 (02 Dec 2024 04:29) (88 - 95)  BP: 125/73 (02 Dec 2024 04:29) (123/68 - 132/83)  RR: 18 (02 Dec 2024 04:29) (18 - 18)  SpO2: 95% (02 Dec 2024 04:29) (95% - 97%)  Parameters below as of 02 Dec 2024 04:29  Patient On (Oxygen Delivery Method): room air    I&O's Summary  01 Dec 2024 07:01  -  02 Dec 2024 07:00  --------------------------------------------------------  IN: 0 mL / OUT: 515 mL / NET: -515 mL    Physical Examination:  GEN: elderly man, sitting up in bed, in NAD  PSYCH: A&Ox3, mood and affect appear appropriate   NEURO: no focal neurologic deficits appreciated  RESPI: no accessory muscle use, B/L air entry   CARDIO: regular rate/rhythm, no LE edema B/L  ABD: soft, NT, ND  EXT: patient able to move all extremities spontaneously  VASC: peripheral pulses palpated    Labs:                     7.8    8.99  )-----------( 388      ( 01 Dec 2024 22:22 )             24.4     12-02  135  |  97  |  11  ----------------------------<  104[H]  3.8   |  24  |  0.72    Ca    8.4      02 Dec 2024 06:39  Phos  2.5     12-01  Mg     2.00     12-01    TPro  6.0  /  Alb  2.9[L]  /  TBili  0.3  /  DBili  x   /  AST  16  /  ALT  5[L]  /  AlkPhos  90  12-02    PT/INR - ( 02 Dec 2024 06:38 )   PT: 12.7 sec;   INR: 1.11 ratio    PTT - ( 02 Dec 2024 06:38 )  PTT:30.7 sec    Urinalysis Basic - ( 02 Dec 2024 06:39 )  Color: x / Appearance: x / SG: x / pH: x  Gluc: 104 mg/dL / Ketone: x  / Bili: x / Urobili: x   Blood: x / Protein: x / Nitrite: x   Leuk Esterase: x / RBC: x / WBC x   Sq Epi: x / Non Sq Epi: x / Bacteria: x    Consultant(s) Notes Reviewed: Urology, ID, Heme-Onc, EP  Care Discussed with Consultants/Other Providers: SHWETA Alexander    MEDICATIONS  (STANDING):  ceFAZolin   IVPB 2000 milliGRAM(s) IV Intermittent every 8 hours  polyethylene glycol 3350 17 Gram(s) Oral daily    MEDICATIONS  (PRN):  acetaminophen     Tablet .. 650 milliGRAM(s) Oral every 6 hours PRN Temp greater or equal to 38C (100.4F), Mild Pain (1 - 3)  aluminum hydroxide/magnesium hydroxide/simethicone Suspension 30 milliLiter(s) Oral every 4 hours PRN Dyspepsia  melatonin 3 milliGRAM(s) Oral at bedtime PRN Insomnia  ondansetron Injectable 4 milliGRAM(s) IV Push every 8 hours PRN Nausea and/or Vomiting  phenazopyridine 100 milliGRAM(s) Oral every 8 hours PRN bladder spasm  senna 1 Tablet(s) Oral at bedtime PRN Constipation

## 2024-12-02 NOTE — CONSULT NOTE ADULT - REASON FOR ADMISSION
DARIN and PPM extraction.   MSSA bacteremia

## 2024-12-03 LAB
ANION GAP SERPL CALC-SCNC: 10 MMOL/L — SIGNIFICANT CHANGE UP (ref 5–17)
BASOPHILS # BLD AUTO: 0.05 K/UL — SIGNIFICANT CHANGE UP (ref 0–0.2)
BASOPHILS NFR BLD AUTO: 0.5 % — SIGNIFICANT CHANGE UP (ref 0–2)
BUN SERPL-MCNC: 11 MG/DL — SIGNIFICANT CHANGE UP (ref 7–23)
CALCIUM SERPL-MCNC: 8.5 MG/DL — SIGNIFICANT CHANGE UP (ref 8.4–10.5)
CHLORIDE SERPL-SCNC: 100 MMOL/L — SIGNIFICANT CHANGE UP (ref 96–108)
CO2 SERPL-SCNC: 25 MMOL/L — SIGNIFICANT CHANGE UP (ref 22–31)
CREAT SERPL-MCNC: 0.77 MG/DL — SIGNIFICANT CHANGE UP (ref 0.5–1.3)
CULTURE RESULTS: SIGNIFICANT CHANGE UP
CULTURE RESULTS: SIGNIFICANT CHANGE UP
EGFR: 93 ML/MIN/1.73M2 — SIGNIFICANT CHANGE UP
EOSINOPHIL # BLD AUTO: 0.16 K/UL — SIGNIFICANT CHANGE UP (ref 0–0.5)
EOSINOPHIL NFR BLD AUTO: 1.6 % — SIGNIFICANT CHANGE UP (ref 0–6)
FERRITIN SERPL-MCNC: 54 NG/ML — SIGNIFICANT CHANGE UP (ref 30–400)
FOLATE SERPL-MCNC: 9 NG/ML — SIGNIFICANT CHANGE UP
GLUCOSE SERPL-MCNC: 101 MG/DL — HIGH (ref 70–99)
HCT VFR BLD CALC: 26.4 % — LOW (ref 39–50)
HGB BLD-MCNC: 8.4 G/DL — LOW (ref 13–17)
IMM GRANULOCYTES NFR BLD AUTO: 1.4 % — HIGH (ref 0–0.9)
IRON SATN MFR SERPL: 26 UG/DL — LOW (ref 45–165)
IRON SATN MFR SERPL: 9 % — LOW (ref 16–55)
LYMPHOCYTES # BLD AUTO: 1.39 K/UL — SIGNIFICANT CHANGE UP (ref 1–3.3)
LYMPHOCYTES # BLD AUTO: 13.9 % — SIGNIFICANT CHANGE UP (ref 13–44)
MCHC RBC-ENTMCNC: 27.7 PG — SIGNIFICANT CHANGE UP (ref 27–34)
MCHC RBC-ENTMCNC: 31.8 G/DL — LOW (ref 32–36)
MCV RBC AUTO: 87.1 FL — SIGNIFICANT CHANGE UP (ref 80–100)
MONOCYTES # BLD AUTO: 1.01 K/UL — HIGH (ref 0–0.9)
MONOCYTES NFR BLD AUTO: 10.1 % — SIGNIFICANT CHANGE UP (ref 2–14)
NEUTROPHILS # BLD AUTO: 7.24 K/UL — SIGNIFICANT CHANGE UP (ref 1.8–7.4)
NEUTROPHILS NFR BLD AUTO: 72.5 % — SIGNIFICANT CHANGE UP (ref 43–77)
NRBC # BLD: 0 /100 WBCS — SIGNIFICANT CHANGE UP (ref 0–0)
PLATELET # BLD AUTO: 404 K/UL — HIGH (ref 150–400)
POTASSIUM SERPL-MCNC: 4.1 MMOL/L — SIGNIFICANT CHANGE UP (ref 3.5–5.3)
POTASSIUM SERPL-SCNC: 4.1 MMOL/L — SIGNIFICANT CHANGE UP (ref 3.5–5.3)
RBC # BLD: 3.03 M/UL — LOW (ref 4.2–5.8)
RBC # FLD: 14.9 % — HIGH (ref 10.3–14.5)
SODIUM SERPL-SCNC: 135 MMOL/L — SIGNIFICANT CHANGE UP (ref 135–145)
SPECIMEN SOURCE: SIGNIFICANT CHANGE UP
SPECIMEN SOURCE: SIGNIFICANT CHANGE UP
TIBC SERPL-MCNC: 286 UG/DL — SIGNIFICANT CHANGE UP (ref 220–430)
UIBC SERPL-MCNC: 260 UG/DL — SIGNIFICANT CHANGE UP (ref 110–370)
VIT B12 SERPL-MCNC: 840 PG/ML — SIGNIFICANT CHANGE UP (ref 232–1245)
WBC # BLD: 9.99 K/UL — SIGNIFICANT CHANGE UP (ref 3.8–10.5)
WBC # FLD AUTO: 9.99 K/UL — SIGNIFICANT CHANGE UP (ref 3.8–10.5)

## 2024-12-03 PROCEDURE — 99232 SBSQ HOSP IP/OBS MODERATE 35: CPT

## 2024-12-03 PROCEDURE — 93010 ELECTROCARDIOGRAM REPORT: CPT

## 2024-12-03 PROCEDURE — 71046 X-RAY EXAM CHEST 2 VIEWS: CPT | Mod: 26

## 2024-12-03 PROCEDURE — 99233 SBSQ HOSP IP/OBS HIGH 50: CPT

## 2024-12-03 RX ORDER — ONDANSETRON HYDROCHLORIDE 4 MG/1
4 TABLET, FILM COATED ORAL EVERY 6 HOURS
Refills: 0 | Status: DISCONTINUED | OUTPATIENT
Start: 2024-12-03 | End: 2024-12-06

## 2024-12-03 RX ORDER — CHLORHEXIDINE GLUCONATE 1.2 MG/ML
1 RINSE ORAL
Refills: 0 | Status: DISCONTINUED | OUTPATIENT
Start: 2024-12-03 | End: 2024-12-06

## 2024-12-03 RX ORDER — ACETAMINOPHEN, DIPHENHYDRAMINE HCL, PHENYLEPHRINE HCL 325; 25; 5 MG/1; MG/1; MG/1
5 TABLET ORAL AT BEDTIME
Refills: 0 | Status: DISCONTINUED | OUTPATIENT
Start: 2024-12-03 | End: 2024-12-06

## 2024-12-03 RX ADMIN — Medication 100 MILLIGRAM(S): at 21:51

## 2024-12-03 RX ADMIN — Medication 100 MILLIGRAM(S): at 05:11

## 2024-12-03 RX ADMIN — ONDANSETRON HYDROCHLORIDE 4 MILLIGRAM(S): 4 TABLET, FILM COATED ORAL at 13:18

## 2024-12-03 RX ADMIN — Medication 100 MILLIGRAM(S): at 13:03

## 2024-12-03 RX ADMIN — ACETAMINOPHEN, DIPHENHYDRAMINE HCL, PHENYLEPHRINE HCL 5 MILLIGRAM(S): 325; 25; 5 TABLET ORAL at 22:14

## 2024-12-03 RX ADMIN — CHLORHEXIDINE GLUCONATE 1 APPLICATION(S): 1.2 RINSE ORAL at 13:19

## 2024-12-03 NOTE — PROGRESS NOTE ADULT - ASSESSMENT
74-year-old male with a past medical history of hypertension, aortic stenosis status post TAVR in December 2020, sick sinus syndrome status post PPM placement, prostate cancer with metastatic spread to spine and pelvis on chemotherapy, suprapubic catheter placement in April 2024 who was initially admitted to [facility] in 11/24/2024.  Patient been complaining of weakness and hematuria at the time.    Patient does report intermittent hematuria and clotting every few days following placement of suprapubic catheter.  Reports episode started 5 days prior to presentation then noticed worsening brighter red blood from Gr.  Patient and at the time of admission denied any fever, chills, flank pain, shortness of breath, abdominal pain, chest pain, nausea/vomiting, diarrhea.  Recent dental work in October 2024.    Patient was admitted for further management, underwent suprapubic catheter exchange.  Blood cultures obtained grew MSSA and 4 out of 4 bottles, urine culture also with MSSA.  TTE was obtained with no evidence for valvular or device vegetation, started on cefazolin and then transferred to [facility] for DARIN and PPM extraction.    Upon arrival to [facility], evaluated by EP service.  Patient seen ahead of procedure today.  Patient remains afebrile.  Otherwise hemodynamically stable on room air.  Latest labs show no leukocytosis, anemia 7.9/25, thrombocytosis 432.  BMP with renal function within normal limits, hepatic function within normal limits.  Blood cultures pending on 11/26/2024 and 11/28/2024 are negative.    #MSSA bacteremia  #Presence of cardiac hardware, PPM s/p removal 12/2/24  #Positive urine culture, MSSA  #Listed penicillin allergy    Recommendations  Continue cefazolin  DARIN without vegetations per verbal report, official read is pending  PPM extraction on 12/2/2024 with EP service  Will plan for 6 weeks of cefazolin from 12/2/24, end date; 1/13/25  Will need weekly CBC, CMP and email results to [email or web address]  ID clinic follow-up, please place referral at discharge  Follow fever curve and WBC count    ID to sign off. Please contact as issues arise.    Willie Irizarry MD  Division of Infectious Diseases

## 2024-12-03 NOTE — PHYSICAL THERAPY INITIAL EVALUATION ADULT - GENERAL OBSERVATIONS, REHAB EVAL
Pt received supine in bed, A&0x4, +tele, +catheter, following 100% multi-step commands. Pt presenting to [facility] from [facility] for weakness and hematuria. Pt admitted Sepsis 2/2 MSSA bacteremia and UTI. Pt s/p PPM extraction on 12/1 and s/p Lead Extraction and MICRA on 12/2. Pt cleared for OOB activity by SHABNAM Marvin.

## 2024-12-03 NOTE — PROGRESS NOTE ADULT - SUBJECTIVE AND OBJECTIVE BOX
Follow Up:  bacteremia    Interval History/ROS:  Overnight: No acute events.  Patient remains afebrile.  Otherwise hemodynamically stable on room air.  Latest labs show no leukocytosis, anemia 8.4/26.4, BMP with renal function within normal limits.  Blood cultures remain negative to date.  DARIN obtained, PPM extracted    Patient seen examined bedside.  No new complaints.    Allergies  penicillin V potassium (Rash)  penicillin (Diarrhea; Pruritus; Hives)        ANTIMICROBIALS:  ceFAZolin   IVPB 2000 every 8 hours      OTHER MEDS:  MEDICATIONS  (STANDING):  ondansetron    Tablet 4 every 6 hours PRN  phenazopyridine 100 every 8 hours PRN  polyethylene glycol 3350 17 daily  senna 1 at bedtime PRN      Vital Signs Last 24 Hrs  T(C): 36.9 (03 Dec 2024 08:40), Max: 37.5 (02 Dec 2024 15:55)  T(F): 98.5 (03 Dec 2024 08:40), Max: 99.5 (02 Dec 2024 15:55)  HR: 90 (03 Dec 2024 11:08) (83 - 101)  BP: 132/79 (03 Dec 2024 11:08) (118/57 - 168/77)  BP(mean): 85 (02 Dec 2024 19:45) (85 - 111)  RR: 18 (03 Dec 2024 08:40) (16 - 18)  SpO2: 95% (03 Dec 2024 11:08) (94% - 100%)    Parameters below as of 03 Dec 2024 11:08  Patient On (Oxygen Delivery Method): room air        PHYSICAL EXAM:  General: Patient appears comfortable  HEENT: NCAT, PERRL, anicteric sclera  CV: +S1/S2, RRR, no M/R/G. pacemaker site with no evidence for infection, swelling, warmth or fluctuance.   Lungs: No respiratory distress, CTA b/l, no wheezing, rales or rhonchi  Abd:  BS4+, Soft, NTND, no guarding  : No suprapubic tenderness.+suprapubic catheter in place with site c/d/i.   Neuro: AAOx3. No focal deficits noted.   Ext: No cyanosis, no edema  Msk: freely moving upper and lower extremities. No midline or paraspinal tenderness b/l  Skin: No rash, no phlebitis, No erythema. LUE PICC                            8.4    9.99  )-----------( 404      ( 03 Dec 2024 06:29 )             26.4       12-03    135  |  100  |  11  ----------------------------<  101[H]  4.1   |  25  |  0.77    Ca    8.5      03 Dec 2024 06:31    TPro  6.0  /  Alb  2.9[L]  /  TBili  0.3  /  DBili  x   /  AST  16  /  ALT  5[L]  /  AlkPhos  90  12-02      Urinalysis Basic - ( 03 Dec 2024 06:31 )    Color: x / Appearance: x / SG: x / pH: x  Gluc: 101 mg/dL / Ketone: x  / Bili: x / Urobili: x   Blood: x / Protein: x / Nitrite: x   Leuk Esterase: x / RBC: x / WBC x   Sq Epi: x / Non Sq Epi: x / Bacteria: x        MICROBIOLOGY:  v                  RADIOLOGY:  Imaging reviewed

## 2024-12-03 NOTE — PROGRESS NOTE ADULT - SUBJECTIVE AND OBJECTIVE BOX
24H hour events: No over night events. Resting comfortably in bed.     MEDICATIONS:    ceFAZolin   IVPB 2000 milliGRAM(s) IV Intermittent every 8 hours    ondansetron    Tablet 4 milliGRAM(s) Oral every 6 hours PRN    polyethylene glycol 3350 17 Gram(s) Oral daily  senna 1 Tablet(s) Oral at bedtime PRN    chlorhexidine 2% Cloths 1 Application(s) Topical <User Schedule>  phenazopyridine 100 milliGRAM(s) Oral every 8 hours PRN    REVIEW OF SYSTEMS:  Complete 12point ROS negative.    PHYSICAL EXAM:  T(C): 36.9 (12-03-24 @ 08:40), Max: 37.5 (12-02-24 @ 15:55)  HR: 90 (12-03-24 @ 11:08) (83 - 101)  BP: 132/79 (12-03-24 @ 11:08) (118/57 - 168/77)  RR: 18 (12-03-24 @ 08:40) (16 - 18)  SpO2: 95% (12-03-24 @ 11:08) (94% - 100%)    02 Dec 2024 07:01  -  03 Dec 2024 07:00  --------------------------------------------------------  IN: 0 mL / OUT: 1400 mL / NET: -1400 mL    03 Dec 2024 07:01  -  03 Dec 2024 13:00  --------------------------------------------------------  IN: 480 mL / OUT: 0 mL / NET: 480 mL    Appearance: Normal	  HEENT:   Normal oral mucosa, PERRL, EOMI	  Cardiovascular: Normal S1 S2, regular.    Respiratory: Lungs clear to auscultation	  Psychiatry: A & O x 3, Mood & affect appropriate  Gastrointestinal:  Soft, Non-tender, + BS	  Skin: right groin s/p suture removal, soft, non tender with mild ecchymosis.  Left infraclavicular chest wall incision open to air with Dermabond.  Clean and intact, no swelling. 	  Extremities: Normal range of motion, No clubbing, cyanosis.  Trace BL/LE edema.  Suprapubic catheter with hematuria in bag.   Vascular: Peripheral pulses palpable 2+ bilaterally    LABS:	 	    CBC Full  -  ( 03 Dec 2024 06:29 )  WBC Count : 9.99 K/uL  Hemoglobin : 8.4 g/dL  Hematocrit : 26.4 %  Platelet Count - Automated : 404 K/uL  Mean Cell Volume : 87.1 fl  Mean Cell Hemoglobin : 27.7 pg  Mean Cell Hemoglobin Concentration : 31.8 g/dL  Auto Neutrophil # : 7.24 K/uL  Auto Lymphocyte # : 1.39 K/uL  Auto Monocyte # : 1.01 K/uL  Auto Eosinophil # : 0.16 K/uL  Auto Basophil # : 0.05 K/uL  Auto Neutrophil % : 72.5 %  Auto Lymphocyte % : 13.9 %  Auto Monocyte % : 10.1 %  Auto Eosinophil % : 1.6 %  Auto Basophil % : 0.5 %    12-03    135  |  100  |  11  ----------------------------<  101[H]  4.1   |  25  |  0.77  12-02    135  |  97  |  11  ----------------------------<  104[H]  3.8   |  24  |  0.72    Ca    8.5      03 Dec 2024 06:31  Ca    8.4      02 Dec 2024 06:39    TPro  6.0  /  Alb  2.9[L]  /  TBili  0.3  /  DBili  x   /  AST  16  /  ALT  5[L]  /  AlkPhos  90  12-02      TELEMETRY: 	Atrial sensed, ventricular paced 70-90's    TTE:  TRANSTHORACIC ECHOCARDIOGRAM REPORT  Pt. Name:       HERBIE KERR Study Date:    11/27/2024  MRN: [id number]             YOB: 1950  Accession #:    [id number]             Age:           74 years  Account#:       [id number]              Gender:        M  Heart Rate:                           Height:        70.00 in (177.80 cm)  Rhythm:                              Weight:        205.00 lb (92.99 kg)  Blood Pressure: 147/74 mmHg           BSA/BMI:       2.11 m² / 29.41 kg/m²  ________________________________________________________________________________________  Referring Physician:    Najma Thompson MD  Interpreting Physician: Beau Schaffer M.D.  Primary Sonographer:    Lauren R. Finkelstein [facility]    CPT:               ECHO TTE WO CON COMP W DOPP - 79057.m  Indication(s):     Abnormal electrocardiogram ECG EKG - R94.31  Procedure:         Transthoracic echocardiogram with 2-D, M-mode and complete                     spectral and color flow Doppler.  Ordering Location: [facility]  Admission Status:  Inpatient  Study Information: Image quality for this study is less than ideal.    CONCLUSIONS:      1. Left ventricular systolic function is normal with an ejection fraction of 64 % by Kohli's method of disks.   2. The right ventricle isnot well visualized. Normal right ventricular cavity size, with normal wall thickness, and probably normal right ventricular systolic function.   3. A bioprosthetic valve replacement Transcatheter deployed (TAVR) valve replacement is present in the aortic position The prosthetic valve is well seated. Mild intravalvular regurgitation.   4. Normal left and right atrial size.   5. No echocardiographic evidence of pulmonary hypertension.   6. No pericardial effusion seen.   7. Analysis of left ventricular diastolic function and filling pressure is made challenging by the presence of merged E and A wave.   8. The inferior vena cava is normal in size measuring 0.97 cm in diameter, (normal <2.1cm) with normal inspiratory collapse (normal >50%) consistent with normal right atrial pressure (~3, range 0-5mmHg).   9. The peak transaortic velocity is 2.30 m/s, peak transaortic gradient is 21.1 mmHg and mean transaortic gradient is 10.7 mmHg with an LVOT/aortic valve VTI ratio of 0.62.    FINDINGS:     Left Ventricle:  Left ventricular systolic function is normal with a calculated ejection fraction of 64 % by the Kohli's biplane method of disks. Analysis ofleft ventricular diastolic function and filling pressure is made challenging by the presence of merged E and A wave.     Right Ventricle:  The right ventricle is not well visualized. The right ventricular cavity is normal in size, with normal wall thickness and right ventricular systolic function is probably normal.     Left Atrium:  The left atrium is normal in size with an indexed volume of 16.27 ml/m².     Right Atrium:  The right atrium is normal in size.     Aortic Valve:  A bioprosthetic valve replacement is present in the aortic position. A a transcatheter deployed (TAVR) is present in the aortic position. A transcatheter deployed (TAVR) (valve-in-valve) is present in the aortic position. The prosthetic valve is well seated. The peak transaortic velocity is 2.30 m/s, peak transaortic gradient is 21.1 mmHg and mean transaortic gradient is 10.7 mmHg with an LVOT/aortic valve VTI ratio of 0.62. There is mild intravalvular regurgitation. The peak transaortic velocity is 2.30 m/s, peak transaortic gradient is 21.1 mmHg and mean transaortic gradient is 10.7 mmHg with an LVOT/aortic valve VTI ratio of 0.62. There is mild aortic regurgitation.     Mitral Valve:  Structurally normal mitral valve with normal leaflet excursion. There is calcification of the mitral valve annulus. There is trace mitral regurgitation.     Tricuspid Valve:  Structurally normal tricuspid valve with normal leaflet excursion. There is trace tricuspid regurgitation. There is no echocardiographic evidence of pulmonary hypertension.     Pulmonic Valve:  Structurally normal pulmonic valve with normal leaflet excursion. There is trace pulmonic regurgitation.     Aorta:  The aortic root appears normal in size.     Pericardium:  No pericardial effusion seen.     Systemic Veins:  The inferior vena cava is normal in size measuring 0.97 cm in diameter, (normal <2.1cm) with normal inspiratory collapse (normal >50%) consistent with normal right atrial pressure (~3, range 0-5mmHg).  ____________________________________________________________________  QUANTITATIVE DATA:  Left Ventricle Measurements: (Indexed to BSA)     IVSd (2D):   0.8 cm  LVPWd (2D):  1.2 cm  LVIDd (2D):  5.1 cm  LVIDs (2D):  3.1 cm  LV Mass:     181 g  85.8 g/m²  LV Vol d, MOD A2C: 84.6 ml40.09 ml/m²  LV Vol d, MOD A4C: 97.2 ml 46.09 ml/m²  LV Vol d, MOD BP:  93.0 ml 44.08 ml/m²  LV Vol s, MOD A2C: 36.5 ml 17.29 ml/m²  LV Vol s, MOD A4C: 29.7 ml 14.10 ml/m²  LV Vol s, MOD BP:  33.0 ml 15.65 ml/m²  LVOT SV MOD BP:    60.0 ml  LV EF% MOD BP:     64 %    Aorta Measurements: (Normal range) (Indexed to BSA)     Ao Root d    2.50 cm (3.1 - 3.7 cm) 1.19 cm/m²  Ao Root      2.5 cm  Ao Asc:      3.2 cm  Ao Asc prox: 3.20 cm                1.52 cm/m²  Ao Arch:     2.0 cm    Left Atrium Measurements: (Indexed to BSA)  LA Diam 2D:        3.66 cm  LA Vol s, MOD A4C: 33.86 ml.  LA Vol s, MOD A2C: 34.82 ml.  LA Vol s, MOD BP:  34.33 ml  16.27 ml/m²     LVOT / RVOT/ Qp/Qs Data: (Indexed to BSA)  LVOT Vmax:      1.37 m/s  LVOT VTI:       22.19 cm  LVOT peak grad: 8 mmHg  LVOT mean grad: 4.6 mmHg    Aortic Valve Measurements:  AV Vmax:                2.3 m/s  AV Peak Gradient:       21.1 mmHg  AV Mean Gradient:       10.7 mmHg  AV VTI:                 36.1 cm  AV VTI Ratio:        0.62  AoV Dimensionless Index 0.62    Tricuspid Valve Measurements:     RA Pressure: 3 mmHg  Electronically signed on 11/27/2024 at 9:02:36 AM by Beau Schaffer M.D.     *** Final ***

## 2024-12-03 NOTE — PHARMACOTHERAPY INTERVENTION NOTE - COMMENTS
Performed medication reconciliation and home medication list updated in prescription writer/outpatient medication review. Medications verified with patient's discharge paper from [facility] on 12/1.     Home Pharmacy: [facility]  Allergies: PCN (Rash, Nausea, Diarrea)    Home Medications:  aluminum hydroxide-magnesium hydroxide 200 mg-200 mg/5 mL oral suspension: 30 milliliter(s) orally every 4 hours, As needed, Dyspepsia  ceFAZolin 2 g/100 mL-D5% intravenous solution: 2 gram(s) intravenous every 8 hours  cyclobenzaprine 5 mg oral tablet: 1 tab(s) orally once a day  levocetirizine 5 mg oral tablet: 1 tab(s) orally once a day  metoprolol succinate 50 mg oral tablet, extended release: 1 tab(s) orally once a day  mirabegron 25 mg oral tablet, extended release: 1 tab(s) orally once a day  Multiple Vitamins oral tablet: 1 tab(s) orally once a day  Orgovyx 120 mg oral tablet: 1 tab(s) orally once a day  polyethylene glycol 3350 oral powder for reconstitution: 17 gram(s) orally once a day  senna leaf extract oral tablet: 2 tab(s) orally once a day (at bedtime)    Patient was taking Xtandi 160mg once a day (said discontinue on [facility] discharge papers) and Orgovyx 120mg once a day for his Prostate Cancer. He also occasionally takes some vitamins C, D and colace 100 mg orally once a day.    ------------------------------------------------------------------------------------------------------------     MEDICATIONS  (STANDING):  ceFAZolin   IVPB 2000 milliGRAM(s) IV Intermittent every 8 hours  chlorhexidine 2% Cloths 1 Application(s) Topical <User Schedule>  polyethylene glycol 3350 17 Gram(s) Oral daily    MEDICATIONS  (PRN):  ondansetron    Tablet 4 milliGRAM(s) Oral every 6 hours PRN Nausea and/or Vomiting  phenazopyridine 100 milliGRAM(s) Oral every 8 hours PRN bladder spasm  senna 1 Tablet(s) Oral at bedtime PRN Constipation    Kee (Abdelrahman Guzman) Aryan - PharmD, BCPS  Transitions of Care Pharmacist  Available on Microsoft Teams (Preferred)  Spectra: [phone]

## 2024-12-03 NOTE — PROGRESS NOTE ADULT - ASSESSMENT
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 has recently increased dose of Enzalutamide to 160 mg  admitted with weakness and hematuria    Prostate cancer  -undergoing care at [facility]  -s/p Radiation Therapy (2018) and Prostatectomy   -on Enzalutamide and Orgovyx  -hold medications until patient is stable  -Follow up with [facility] after discharge    Anemia  Hematuria  --in setting of  radiation cystitis s/p hyperbaric oxygen   --Transfuse for Hgb < 7.0  --urology recs appreciated    Bacteremia  --MSSA bacteremia  --ID following,  Blood Cx (11/24): MSSA (4/4 bottles)--> Repeat blood Cx (11/26): NGTD  --UA + RBC and WBC , + LE and nitrite and bacteria   --Urine Cx (Suprapubic catheter) + MSSA  --CXR: clear lungs   --TTE: no documented valvular or device vegetations, bioprosthetic aortic valve with Mild intravalvular regurgitation.  --awaiting PPM removal today 12/02  --continues on Cefazolin. 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 has recently increased dose of Enzalutamide to 160 mg  admitted with weakness and hematuria    Prostate cancer  -undergoing care at [facility]  -s/p Radiation Therapy (2018) and Prostatectomy   -on Enzalutamide and Orgovyx  -hold medications until patient is stable  -Follow up with [facility] after discharge    Anemia  Hematuria  --in setting of  radiation cystitis s/p hyperbaric oxygen   --Transfuse for Hgb < 7.0  --urology recs appreciated    Bacteremia  --MSSA bacteremia  --ID following,  Blood Cx (11/24): MSSA (4/4 bottles)--> Repeat blood Cx (11/26): NGTD  --UA + RBC and WBC , + LE and nitrite and bacteria   --Urine Cx (Suprapubic catheter) + MSSA  --CXR: clear lungs   --TTE: no documented valvular or device vegetations, bioprosthetic aortic valve with Mild intravalvular regurgitation.  -- pacemaker extraction today with leadless pacemaker implantation  --continues on Cefazolin. Tmax 100.5  --f/u TTE to evaluate for endocarditis     continue to follow    Aida Kendrick NP  Hematology/ Oncology  [facility]  [phone] (office)  [phone] (alt office)  Evenings and weekends please call MD on call or office

## 2024-12-03 NOTE — PHYSICAL THERAPY INITIAL EVALUATION ADULT - PLANNED THERAPY INTERVENTIONS, PT EVAL
Stairs: GOAL: Pt will ascend/descend 12 stairs with supervision in order to return home safety in 2 weeks./gait training/strengthening/transfer training

## 2024-12-03 NOTE — PROGRESS NOTE ADULT - SUBJECTIVE AND OBJECTIVE BOX
Patient is a 74y old  Male who presents with a chief complaint of DARIN and PPM extraction.   MSSA bacteremia (03 Dec 2024 07:52)    Patient seen and examined    MEDICATIONS  (STANDING):  ceFAZolin   IVPB 2000 milliGRAM(s) IV Intermittent every 8 hours  polyethylene glycol 3350 17 Gram(s) Oral daily    MEDICATIONS  (PRN):  phenazopyridine 100 milliGRAM(s) Oral every 8 hours PRN bladder spasm  senna 1 Tablet(s) Oral at bedtime PRN Constipation      Vital Signs Last 24 Hrs  T(C): 36.6 (03 Dec 2024 04:04), Max: 37.5 (02 Dec 2024 15:55)  T(F): 97.9 (03 Dec 2024 04:04), Max: 99.5 (02 Dec 2024 15:55)  HR: 90 (03 Dec 2024 04:04) (83 - 101)  BP: 119/68 (03 Dec 2024 04:04) (118/57 - 168/77)  BP(mean): 85 (02 Dec 2024 19:45) (85 - 111)  RR: 18 (03 Dec 2024 04:04) (16 - 18)  SpO2: 97% (03 Dec 2024 04:04) (94% - 100%)    Parameters below as of 03 Dec 2024 04:04  Patient On (Oxygen Delivery Method): room air      PE  Awake, alert  Anicteric, MMM  RRR  CTAB  Abd soft, NT, ND  No c/c/e  No rash grossly  FROM                          8.4    9.99  )-----------( 404      ( 03 Dec 2024 06:29 )             26.4       12-03    135  |  100  |  11  ----------------------------<  101[H]  4.1   |  25  |  0.77    Ca    8.5      03 Dec 2024 06:31  Phos  2.5     12-01  Mg     2.00     12-01    TPro  6.0  /  Alb  2.9[L]  /  TBili  0.3  /  DBili  x   /  AST  16  /  ALT  5[L]  /  AlkPhos  90  12-02       Patient is a 74y old  Male who presents with a chief complaint of DARIN and PPM extraction.   MSSA bacteremia (03 Dec 2024 07:52)    Patient seen and examined  Recovering from PPM extraction, no new complaints offered    MEDICATIONS  (STANDING):  ceFAZolin   IVPB 2000 milliGRAM(s) IV Intermittent every 8 hours  polyethylene glycol 3350 17 Gram(s) Oral daily    MEDICATIONS  (PRN):  phenazopyridine 100 milliGRAM(s) Oral every 8 hours PRN bladder spasm  senna 1 Tablet(s) Oral at bedtime PRN Constipation      Vital Signs Last 24 Hrs  T(C): 36.6 (03 Dec 2024 04:04), Max: 37.5 (02 Dec 2024 15:55)  T(F): 97.9 (03 Dec 2024 04:04), Max: 99.5 (02 Dec 2024 15:55)  HR: 90 (03 Dec 2024 04:04) (83 - 101)  BP: 119/68 (03 Dec 2024 04:04) (118/57 - 168/77)  BP(mean): 85 (02 Dec 2024 19:45) (85 - 111)  RR: 18 (03 Dec 2024 04:04) (16 - 18)  SpO2: 97% (03 Dec 2024 04:04) (94% - 100%)    Parameters below as of 03 Dec 2024 04:04  Patient On (Oxygen Delivery Method): room air      PE  Awake, alert  Anicteric, MMM  RRR  CTAB  Abd soft, NT, ND  No c/c/e  No rash grossly  FROM                          8.4    9.99  )-----------( 404      ( 03 Dec 2024 06:29 )             26.4       12-03    135  |  100  |  11  ----------------------------<  101[H]  4.1   |  25  |  0.77    Ca    8.5      03 Dec 2024 06:31  Phos  2.5     12-01  Mg     2.00     12-01    TPro  6.0  /  Alb  2.9[L]  /  TBili  0.3  /  DBili  x   /  AST  16  /  ALT  5[L]  /  AlkPhos  90  12-02

## 2024-12-03 NOTE — PHYSICAL THERAPY INITIAL EVALUATION ADULT - ADDITIONAL COMMENTS
Pt resides in private house with spouse. Pt has 12stairs inside to 2nd floor. Pt independent prior to admission, owns no DME.

## 2024-12-03 NOTE — PROGRESS NOTE ADULT - ASSESSMENT
72 Y/O M w/ PMH of Prostate CA s/p Radiation Therapy (2018) and Prostatectomy now with radiation cystitis s/p hyperbaric oxygen and SPT placement, HTN, Aortic Stenosis and Sick sinus syndrome s/p pacemaker presents to ER with weakness, hypotension and tachycardia responding to boluses with SANJUANA 1.74 from .72 baseline. Urology was consulted for hematuria and SPT change. He follows with Dr. Hoang    He is now HDS, s/p SPT exchange and manual irrigation with return of 30cc old clot now draining light pink urine well. He is admitted to medicine for UTI/urosepsis.    11/25: urine color improved with irrigation. Will try to avoid cbi at this time as hematuria worsens in the past from any manipulation from below   11/26: urine color quickly clears after irrigation. Irrigated this AM. Will try to avoid CBI as this as caused significant worsening of hematuria.   11/27: SPT draining maroon color, irrigated 5 cc of clots.  11/28: SPT clotted off in AM > exchanged to different [id number] bowers, irrigated out 50cc old clot, draining clear pink. H/H 7.3/21.7 from 7.9/23.9  11/29: SPT draining thin maroon colored urine. Irrigated to clear light punch with return of 10 cc small clots. Hgb 7.1.  11/30: SPT draining thin maroon colored urine. Irrigated to clear light punch with return of 10 cc small clots. Hgb 6.9 > 1u pRBC ordered  12/1: pt transferred [facility] -> [facility].  SPT irrigated to clear/light pink with moderate amount of clots returned  12/2: SPT irrigated with translucent grace with 5cc of clot  12/3: SPT irrigated with translucent pink with 5-10cc of clot    Recs:    -irrigate PRN, will avoid CBI at this time. Patient with prior hx of worsening hematuria with manipulation from below -> color translucent pink with return of minimal clot, CBI not needed  -trend H&H and transfuse as needed -> Hgb stable  -Urine with staph aureus, treat accordingly. On ancef  -trend Cr > stable  -monitor I's and O's  -monitor urine color  - Urology to follow along and if color consistent tomorrow and H/H remains stable then will likely sign off tomorrow with plan for PRN flushes  - Discussed with Dr. Hoang and Dr. Puente

## 2024-12-03 NOTE — PROGRESS NOTE ADULT - SUBJECTIVE AND OBJECTIVE BOX
Subjective  Denies fevers, chills, nausea, vomiting, SOB, CP. Patient feels well after his procedure yesterday with pacemaker removal then reinsertion. SPT color is draining light pink urine. Catheter irrigated with return of 5-10cc of clot and then draining light pink urine.     Objective    Vital signs  T(F): , Max: 99.5 (12-02-24 @ 15:55)  HR: 90 (12-03-24 @ 04:04)  BP: 119/68 (12-03-24 @ 04:04)  SpO2: 97% (12-03-24 @ 04:04)  Wt(kg): --    Output         Gen: NAD  Abd: soft, nontender, nondistended  : SPT secured in place, draining light pink urine    Labs      12-03 @ 06:31    WBC --    / Hct --    / SCr 0.77     12-03 @ 06:29    WBC 9.99  / Hct 26.4  / SCr --           Culture - Blood (collected 11-28-24 @ 06:25)  Source: .Blood BLOOD  Preliminary Report (12-02-24 @ 15:01):    No growth at 4 days    Culture - Blood (collected 11-28-24 @ 06:05)  Source: .Blood BLOOD  Preliminary Report (12-02-24 @ 15:01):    No growth at 4 days        Urine Cx: ?  Blood Cx: ?    Imaging

## 2024-12-03 NOTE — PROGRESS NOTE ADULT - ASSESSMENT
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 ED on 11/24  for weakness and hematuria. Patient states since having SPT placed in 4/2024, he has intermittent heamturia and clots every few days that usually clears up. His most recent episode started 5 days ago and then yesterday he noted brighter red blood from the bowers associated with increased fatigue and weakness and shortness of breath with exertion which prompted him to come to the ER. Denies fevers, chills, flank pain, decrease bowers output, flank pain. He denied chest pain, shortness of breath, abdominal pain, nausea, vomiting, new leg swelling, diarrhea. Denies any hardware in the body other than pacemaker and prosthetic heart valve. He denied any pain at pacemaker site. On presentation to [facility] patient was febrile, tachycardic and hypotensive. Patient had SPT exchanged performed on 11/24. Patient had blood cultures 11/24 growing MSSA in 4/4 bottles and Urine culture 11/24 also growing MSSA. TTE with no documented valvular or device vegetations, bioprosthetic aortic valve with mild intravalvular regurgitation. He is on Cefazolin 2g IV Q8H and was transferred to [facility] for DARIN and PPM extraction.  He is NPO today for procedure.  Left ventricular systolic function is normal with an ejection fraction of 64 %.     1.  MSSA Bacteremia with dual chamber pacemaker in place  2.  underlying rhythm is high degree AV Block  3.  Hematuria with suprapubic catheter exchanges monthly   4.  Anemia      - patient is s/p pacemaker extraction on 12/2 with leadless pacemaker implantation  Post procedure activity and restrictions reviewed with patient and son at bedside.  Micra pacer booklet, ID Card and fact sheet provided.   - s/p Medtronic Micra interrogation by Rep with normal device function  - Follow up CXRAY today  - Follow up at [facility] device Clinic on 12/16/24 at 9am.   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 Patient states since having SPT placed in 4/2024, he has intermittent heamturia and clots every few days that usually clears up. His most recent episode started 5 days ago and then yesterday he noted brighter red blood from the bowers associated with increased fatigue and weakness and shortness of breath with exertion which prompted him to come to the ER. Denies fevers, chills, flank pain, decrease bowers output, flank pain. He denied chest pain, shortness of breath, abdominal pain, nausea, vomiting, new leg swelling, diarrhea. Denies any hardware in the body other than pacemaker and prosthetic heart valve. He denied any pain at pacemaker site. On presentation to [facility] patient was febrile, tachycardic and hypotensive. Patient had SPT exchanged performed on 11/24. Patient had blood cultures 11/24 growing MSSA in 4/4 bottles and Urine culture 11/24 also growing MSSA. TTE with no documented valvular or device vegetations, bioprosthetic aortic valve with mild intravalvular regurgitation. He is on Cefazolin 2g IV Q8H and was transferred to [facility] for DARIN and PPM extraction.  He is NPO today for procedure.  Left ventricular systolic function is normal with an ejection fraction of 64 %.     1.  MSSA Bacteremia with dual chamber pacemaker in place  2.  underlying rhythm is high degree AV Block  3.  Hematuria with suprapubic catheter exchanges monthly   4.  Anemia      - patient is s/p pacemaker extraction on 12/2 with leadless pacemaker implantation.  Intra Op DARIN reportedly without vegetations noted, await official report.   Post procedure activity and restrictions reviewed with patient and son at bedside.  Micra pacer booklet, ID Card and fact sheet provided.   - s/p Medtronic Micra interrogation by Rep with normal device function  - Follow up CXRAY today  - Follow up at [facility] device Clinic on 12/16/24 at 9am.   - Avoid DVT prophylaxis post extraction procedure        LISANDRA Salomon 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 Patient states since having SPT placed in 4/2024, he has intermittent heamturia and clots every few days that usually clears up. His most recent episode started 5 days ago and then yesterday he noted brighter red blood from the bowers associated with increased fatigue and weakness and shortness of breath with exertion which prompted him to come to the ER. Denies fevers, chills, flank pain, decrease bowers output, flank pain. He denied chest pain, shortness of breath, abdominal pain, nausea, vomiting, new leg swelling, diarrhea. Denies any hardware in the body other than pacemaker and prosthetic heart valve. He denied any pain at pacemaker site. On presentation to [facility] patient was febrile, tachycardic and hypotensive. Patient had SPT exchanged performed on 11/24. Patient had blood cultures 11/24 growing MSSA in 4/4 bottles and Urine culture 11/24 also growing MSSA. TTE with no documented valvular or device vegetations, bioprosthetic aortic valve with mild intravalvular regurgitation. He is on Cefazolin 2g IV Q8H and was transferred to [facility] for DARIN and PPM extraction.  He is NPO today for procedure.  Left ventricular systolic function is normal with an ejection fraction of 64 %.     1.  MSSA Bacteremia with dual chamber pacemaker in place  2.  underlying rhythm is high degree AV Block  3.  Hematuria with suprapubic catheter exchanges monthly   4.  Anemia      - patient is s/p pacemaker extraction on 12/2 with leadless pacemaker implantation.  Intra Op DARIN reportedly without vegetations noted, await official report.   Post procedure activity and restrictions reviewed with patient and son at bedside.  Micra pacer booklet, ID Card and fact sheet provided.   - s/p Medtronic Micra interrogation by Rep with normal device function  - Follow up CXRAY today  - Continue antibiotics per ID   - Follow up at [facility] device Clinic on 12/16/24 at 9am.   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 Patient states since having SPT placed in 4/2024, he has intermittent heamturia and clots every few days that usually clears up. His most recent episode started 5 days ago and then yesterday he noted brighter red blood from the bowers associated with increased fatigue and weakness and shortness of breath with exertion which prompted him to come to the ER. Denies fevers, chills, flank pain, decrease bowers output, flank pain. He denied chest pain, shortness of breath, abdominal pain, nausea, vomiting, new leg swelling, diarrhea. Denies any hardware in the body other than pacemaker and prosthetic heart valve. He denied any pain at pacemaker site. On presentation to [facility] patient was febrile, tachycardic and hypotensive. Patient had SPT exchanged performed on 11/24. Patient had blood cultures 11/24 growing MSSA in 4/4 bottles and Urine culture 11/24 also growing MSSA. TTE with no documented valvular or device vegetations, bioprosthetic aortic valve with mild intravalvular regurgitation. He is on Cefazolin 2g IV Q8H and was transferred to [facility] for DARIN and PPM extraction.  He is NPO today for procedure.  Left ventricular systolic function is normal with an ejection fraction of 64 %.     1.  MSSA Bacteremia with dual chamber pacemaker in place  2.  underlying rhythm is high degree AV Block  3.  Hematuria with suprapubic catheter exchanges monthly   4.  Anemia      - patient is s/p pacemaker extraction on 12/2 with leadless pacemaker implantation.  Intra Op DARIN reportedly without vegetations noted, await official report.   Post procedure activity and restrictions reviewed with patient and son at bedside.  Micra pacer booklet, ID Card and fact sheet provided.   - s/p Medtronic Micra interrogation by Rep with normal device function  - CXRAY with appropriate Micra position   - Continue antibiotics per ID   - Follow up at [facility] device Clinic on 12/16/24 at 9am as provided to patient's son.   - Avoid DVT prophylaxis post extraction procedure   - EP will sign off, reconsult as needed        LISANDRA Salomon [facility]  [phone]

## 2024-12-03 NOTE — PROGRESS NOTE ADULT - ASSESSMENT
74yoM w/ PMHx ofaortic stenosis s/p TAVR, HTN, prostate cancer s/p radiation therapy, prostatectomy, with metastasis to spine and pelvis, frequent urinary retention s/p suprapubic catheter presented to [facility] ED on 11/24 for weakness and hematuria; transferred to [facility] for DARIN, now s/p PPM extraction in setting of MSSA bacteremia.

## 2024-12-03 NOTE — PHYSICAL THERAPY INITIAL EVALUATION ADULT - PERTINENT HX OF CURRENT PROBLEM, REHAB EVAL
Pt is a 73 YO M PMHx aortic stenosis s/p TAVR, HTN, prostate cancer s/p radiation therapy, prostatectomy, with metastasis to spine and pelvis, frequent urinary retention s/p suprapubic catheter presented to [facility] ED on 11/24 for weakness and hematuria.   On admit he was Hypotensive with systolic in 80's, tachycardic, afebrile. Anemia w Hb of 10.5 w baseline of 12-13. Received PRBC on 11/30. He was seen by urology for daily bladder irrigation. Blood cx ( 11/24) grew MSSA, UCX from SPC+ MSSA. CXR clear.  On cefazolin 2g Q8H and  repeat BCx from 11/26 NGTD. He has PPM in 2020 at [facility]. TTE with normal LV fucntion EF 64%, normal RV systolic fucntion, mild aortic intravalvular regurgitation. report as above   was unremarkbale. ID at [facility] recommended Transfer to [facility] for DARIN and possible PPPM extraction. Pt is a 75 YO M PMHx aortic stenosis s/p TAVR, HTN, prostate cancer s/p radiation therapy, prostatectomy, with metastasis to spine and pelvis, frequent urinary retention s/p suprapubic catheter presented to [facility] ED on 11/24 for weakness and hematuria.   On admit he was Hypotensive with systolic in 80's, tachycardic, afebrile. Anemia w Hb of 10.5 w baseline of 12-13. Received PRBC on 11/30. He was seen by urology for daily bladder irrigation. Blood cx ( 11/24) grew MSSA, UCX from SPC+ MSSA. CXR clear.  On cefazolin 2g Q8H and  repeat BCx from 11/26 NGTD. He has PPM in 2020 at [facility]. TTE with normal LV fucntion EF 64%, normal RV systolic function, mild aortic intravalvular regurgitation. report as above   was unremarkbale. ID at [facility] recommended Transfer to [facility] for DARIN and possible PPPM extraction.

## 2024-12-03 NOTE — PHYSICAL THERAPY INITIAL EVALUATION ADULT - BALANCE DISTURBANCE, SYSTEM IMPAIRMENT CONTRIBUTE, REHAB EVAL
Left message on [facility] [facility] emergency #s offering SD appt 12/21 2:15 Daniela Campos   Ask for call back
musculoskeletal

## 2024-12-03 NOTE — PHYSICAL THERAPY INITIAL EVALUATION ADULT - ACTIVE RANGE OF MOTION EXAMINATION, REHAB EVAL
renetta. upper extremity Active ROM was WNL (within normal limits)/bilateral lower extremity Active ROM was WNL (within normal limits)

## 2024-12-03 NOTE — PROGRESS NOTE ADULT - SUBJECTIVE AND OBJECTIVE BOX
Esperanza Tirado M.D.  Division of Hospital Medicine  Available on [employer] TEAMS    SUBJECTIVE / ACUTE INTERVAL EVENTS: Patient seen and examined. No overnight events s/p PPM extraction per EP. No subjective complaints except for mild pain over groin/insertion site, improved w/ Tylenol.    OBJECTIVE:   Vital Signs Last 24 Hrs  T(F): 98.6 (03 Dec 2024 16:37), Max: 98.6 (03 Dec 2024 16:37)  HR: 84 (03 Dec 2024 16:37) (84 - 101)  BP: 122/62 (03 Dec 2024 16:37) (118/57 - 132/79)  RR: 18 (03 Dec 2024 16:37) (18 - 18)  SpO2: 96% (03 Dec 2024 16:37) (95% - 97%)  Parameters below as of 03 Dec 2024 16:37  Patient On (Oxygen Delivery Method): room air    I&O's Summary  02 Dec 2024 07:01  -  03 Dec 2024 07:00  --------------------------------------------------------  IN: 0 mL / OUT: 1400 mL / NET: -1400 mL    03 Dec 2024 07:01  -  03 Dec 2024 19:45  --------------------------------------------------------  IN: 480 mL / OUT: 500 mL / NET: -20 mL    Physical Examination:  GEN: elderly man, sitting up in bed, in NAD  PSYCH: A&Ox3, mood and affect appear appropriate   NEURO: no focal neurologic deficits appreciated  RESPI: no accessory muscle use, B/L air entry   CARDIO: regular rate/rhythm, no LE edema B/L  ABD: soft, NT, ND  EXT: patient able to move all extremities spontaneously  VASC: peripheral pulses palpated    Labs:                     8.4    9.99  )-----------( 404      ( 03 Dec 2024 06:29 )             26.4     12-03  135  |  100  |  11  ----------------------------<  101[H]  4.1   |  25  |  0.77    Ca    8.5      03 Dec 2024 06:31    TPro  6.0  /  Alb  2.9[L]  /  TBili  0.3  /  DBili  x   /  AST  16  /  ALT  5[L]  /  AlkPhos  90  12-02    PT/INR - ( 02 Dec 2024 06:38 )   PT: 12.7 sec;   INR: 1.11 ratio    PTT - ( 02 Dec 2024 06:38 )  PTT:30.7 sec    Urinalysis Basic - ( 03 Dec 2024 06:31 )  Color: x / Appearance: x / SG: x / pH: x  Gluc: 101 mg/dL / Ketone: x  / Bili: x / Urobili: x   Blood: x / Protein: x / Nitrite: x   Leuk Esterase: x / RBC: x / WBC x   Sq Epi: x / Non Sq Epi: x / Bacteria: x    Consultant(s) Notes Reviewed: EP, ID, Urology, Heme-Onc  Care Discussed with Consultants/Other Providers: SHWETA Alexander    MEDICATIONS  (STANDING):  ceFAZolin   IVPB 2000 milliGRAM(s) IV Intermittent every 8 hours  chlorhexidine 2% Cloths 1 Application(s) Topical <User Schedule>  polyethylene glycol 3350 17 Gram(s) Oral daily    MEDICATIONS  (PRN):  ondansetron    Tablet 4 milliGRAM(s) Oral every 6 hours PRN Nausea and/or Vomiting  phenazopyridine 100 milliGRAM(s) Oral every 8 hours PRN bladder spasm  senna 1 Tablet(s) Oral at bedtime PRN Constipation

## 2024-12-04 LAB
HCT VFR BLD CALC: 28.1 % — LOW (ref 39–50)
HGB BLD-MCNC: 8.8 G/DL — LOW (ref 13–17)
MCHC RBC-ENTMCNC: 27.6 PG — SIGNIFICANT CHANGE UP (ref 27–34)
MCHC RBC-ENTMCNC: 31.3 G/DL — LOW (ref 32–36)
MCV RBC AUTO: 88.1 FL — SIGNIFICANT CHANGE UP (ref 80–100)
NRBC # BLD: 0 /100 WBCS — SIGNIFICANT CHANGE UP (ref 0–0)
PLATELET # BLD AUTO: 380 K/UL — SIGNIFICANT CHANGE UP (ref 150–400)
RBC # BLD: 3.19 M/UL — LOW (ref 4.2–5.8)
RBC # FLD: 14.6 % — HIGH (ref 10.3–14.5)
WBC # BLD: 7.69 K/UL — SIGNIFICANT CHANGE UP (ref 3.8–10.5)
WBC # FLD AUTO: 7.69 K/UL — SIGNIFICANT CHANGE UP (ref 3.8–10.5)

## 2024-12-04 PROCEDURE — 99233 SBSQ HOSP IP/OBS HIGH 50: CPT

## 2024-12-04 RX ORDER — CEFAZOLIN SODIUM 10 G
2 VIAL (EA) INJECTION
Qty: 1 | Refills: 0
Start: 2024-12-04 | End: 2025-01-12

## 2024-12-04 RX ORDER — ACETAMINOPHEN 500MG 500 MG/1
650 TABLET, COATED ORAL EVERY 6 HOURS
Refills: 0 | Status: DISCONTINUED | OUTPATIENT
Start: 2024-12-04 | End: 2024-12-06

## 2024-12-04 RX ORDER — ACETAMINOPHEN 500MG 500 MG/1
650 TABLET, COATED ORAL ONCE
Refills: 0 | Status: COMPLETED | OUTPATIENT
Start: 2024-12-04 | End: 2024-12-04

## 2024-12-04 RX ORDER — ACETAMINOPHEN, DIPHENHYDRAMINE HCL, PHENYLEPHRINE HCL 325; 25; 5 MG/1; MG/1; MG/1
5 TABLET ORAL AT BEDTIME
Refills: 0 | Status: DISCONTINUED | OUTPATIENT
Start: 2024-12-04 | End: 2024-12-04

## 2024-12-04 RX ADMIN — Medication 100 MILLIGRAM(S): at 21:47

## 2024-12-04 RX ADMIN — Medication 100 MILLIGRAM(S): at 05:10

## 2024-12-04 RX ADMIN — ONDANSETRON HYDROCHLORIDE 4 MILLIGRAM(S): 4 TABLET, FILM COATED ORAL at 23:49

## 2024-12-04 RX ADMIN — ACETAMINOPHEN, DIPHENHYDRAMINE HCL, PHENYLEPHRINE HCL 5 MILLIGRAM(S): 325; 25; 5 TABLET ORAL at 21:47

## 2024-12-04 RX ADMIN — CHLORHEXIDINE GLUCONATE 1 APPLICATION(S): 1.2 RINSE ORAL at 05:11

## 2024-12-04 RX ADMIN — ACETAMINOPHEN 500MG 650 MILLIGRAM(S): 500 TABLET, COATED ORAL at 20:19

## 2024-12-04 RX ADMIN — Medication 100 MILLIGRAM(S): at 14:16

## 2024-12-04 NOTE — PROGRESS NOTE ADULT - SUBJECTIVE AND OBJECTIVE BOX
INCOMPLETE NOTE    Esperanza Tirado M.D.  Division of Hospital Medicine  Available on [employer] TEAMS    SUBJECTIVE / ACUTE INTERVAL EVENTS: Patient seen and examined. No overnight events. No subjective complaints. Discharge planning underway with home set up of IV antibiotics (via midline) to complete 6 week course per ID's recommendations.      OBJECTIVE:   Vital Signs Last 24 Hrs  T(F): 98.2 (04 Dec 2024 04:00), Max: 99 (03 Dec 2024 20:28)  HR: 84 (04 Dec 2024 07:58) (84 - 90)  BP: 147/82 (04 Dec 2024 07:58) (122/62 - 147/82)  RR: 18 (04 Dec 2024 07:58) (18 - 18)  SpO2: 95% (04 Dec 2024 07:58) (94% - 96%)  Parameters below as of 04 Dec 2024 07:58  Patient On (Oxygen Delivery Method): room air    I&O's Summary  03 Dec 2024 07:01  -  04 Dec 2024 07:00  --------------------------------------------------------  IN: 1080 mL / OUT: 2200 mL / NET: -1120 mL    04 Dec 2024 07:01  -  04 Dec 2024 10:27  --------------------------------------------------------  IN: 240 mL / OUT: 0 mL / NET: 240 mL    Physical Examination:  GEN: elderly man, sitting up in bed, in NAD  PSYCH: A&Ox3, mood and affect appear appropriate   NEURO: no focal neurologic deficits appreciated  RESPI: no accessory muscle use, B/L air entry   CARDIO: regular rate/rhythm, no LE edema B/L  ABD: soft, NT, ND  EXT: patient able to move all extremities spontaneously  VASC: peripheral pulses palpated    Labs:                     8.4    9.99  )-----------( 404      ( 03 Dec 2024 06:29 )             26.4     12-03  135  |  100  |  11  ----------------------------<  101[H]  4.1   |  25  |  0.77    Ca    8.5      03 Dec 2024 06:31    Urinalysis Basic - ( 03 Dec 2024 06:31 )  Color: x / Appearance: x / SG: x / pH: x  Gluc: 101 mg/dL / Ketone: x  / Bili: x / Urobili: x   Blood: x / Protein: x / Nitrite: x   Leuk Esterase: x / RBC: x / WBC x   Sq Epi: x / Non Sq Epi: x / Bacteria: x    Consultant(s) Notes Reviewed: EP, ID, Heme-Onc, Urology    Care Discussed with Consultants/Other Providers: SHWETA Alexander    MEDICATIONS  (STANDING):  ceFAZolin   IVPB 2000 milliGRAM(s) IV Intermittent every 8 hours  chlorhexidine 2% Cloths 1 Application(s) Topical <User Schedule>  melatonin 5 milliGRAM(s) Oral at bedtime  polyethylene glycol 3350 17 Gram(s) Oral daily    MEDICATIONS  (PRN):  ondansetron    Tablet 4 milliGRAM(s) Oral every 6 hours PRN Nausea and/or Vomiting  phenazopyridine 100 milliGRAM(s) Oral every 8 hours PRN bladder spasm  senna 1 Tablet(s) Oral at bedtime PRN Constipation Esperanza Tirado M.D.  Division of Hospital Medicine  Available on [employer] TEAMS    SUBJECTIVE / ACUTE INTERVAL EVENTS: Patient seen and examined with his son at his bedside. No overnight events. No subjective complaints. Discharge planning underway with home set up of IV antibiotics (via midline) to complete 6 week course per ID's recommendations, but patient very adamantly upset about this prospect as he states he is unable to stand without assistance and is very weak and unable to care for himself. Will appreciate PT's re-evaluation.     OBJECTIVE:   Vital Signs Last 24 Hrs  T(F): 98.2 (04 Dec 2024 04:00), Max: 99 (03 Dec 2024 20:28)  HR: 84 (04 Dec 2024 07:58) (84 - 90)  BP: 147/82 (04 Dec 2024 07:58) (122/62 - 147/82)  RR: 18 (04 Dec 2024 07:58) (18 - 18)  SpO2: 95% (04 Dec 2024 07:58) (94% - 96%)  Parameters below as of 04 Dec 2024 07:58  Patient On (Oxygen Delivery Method): room air    I&O's Summary  03 Dec 2024 07:01  -  04 Dec 2024 07:00  --------------------------------------------------------  IN: 1080 mL / OUT: 2200 mL / NET: -1120 mL    04 Dec 2024 07:01  -  04 Dec 2024 10:27  --------------------------------------------------------  IN: 240 mL / OUT: 0 mL / NET: 240 mL    Physical Examination:  GEN: elderly man, sitting up in bed, in NAD  PSYCH: A&Ox3, mood and affect appear appropriate   NEURO: no focal neurologic deficits appreciated  RESPI: no accessory muscle use, B/L air entry   CARDIO: regular rate/rhythm, no LE edema B/L  ABD: soft, NT, ND  EXT: patient able to move all extremities spontaneously  VASC: peripheral pulses palpated    Labs:                     8.4    9.99  )-----------( 404      ( 03 Dec 2024 06:29 )             26.4     12-03  135  |  100  |  11  ----------------------------<  101[H]  4.1   |  25  |  0.77    Ca    8.5      03 Dec 2024 06:31    Urinalysis Basic - ( 03 Dec 2024 06:31 )  Color: x / Appearance: x / SG: x / pH: x  Gluc: 101 mg/dL / Ketone: x  / Bili: x / Urobili: x   Blood: x / Protein: x / Nitrite: x   Leuk Esterase: x / RBC: x / WBC x   Sq Epi: x / Non Sq Epi: x / Bacteria: x    Consultant(s) Notes Reviewed: EP, ID, Heme-Onc, Urology    Care Discussed with Consultants/Other Providers: SHWETA Alexander    MEDICATIONS  (STANDING):  ceFAZolin   IVPB 2000 milliGRAM(s) IV Intermittent every 8 hours  chlorhexidine 2% Cloths 1 Application(s) Topical <User Schedule>  melatonin 5 milliGRAM(s) Oral at bedtime  polyethylene glycol 3350 17 Gram(s) Oral daily    MEDICATIONS  (PRN):  ondansetron    Tablet 4 milliGRAM(s) Oral every 6 hours PRN Nausea and/or Vomiting  phenazopyridine 100 milliGRAM(s) Oral every 8 hours PRN bladder spasm  senna 1 Tablet(s) Oral at bedtime PRN Constipation

## 2024-12-04 NOTE — PROGRESS NOTE ADULT - SUBJECTIVE AND OBJECTIVE BOX
Subjective  No acute events overnight. No complaints this morning. Urine clear yellow.    Objective    Vital signs  T(F): , Max: 99 (12-03-24 @ 20:28)  HR: 88 (12-04-24 @ 04:00)  BP: 126/75 (12-04-24 @ 04:00)  SpO2: 94% (12-04-24 @ 04:00)  Wt(kg): --    Output         Gen: NAD  Abd: soft, nontender, nondistended  : SPT secured in place, draining clear yellow urine    Labs      12-03 @ 06:31    WBC --    / Hct --    / SCr 0.77     12-03 @ 06:29    WBC 9.99  / Hct 26.4  / SCr --           Culture - Blood (collected 11-28-24 @ 06:25)  Source: .Blood BLOOD  Final Report (12-03-24 @ 15:00):    No growth at 5 days    Culture - Blood (collected 11-28-24 @ 06:05)  Source: .Blood BLOOD  Final Report (12-03-24 @ 15:00):    No growth at 5 days

## 2024-12-04 NOTE — PROGRESS NOTE ADULT - TIME BILLING
conducting history and physical examination, reviewing and ordering diagnostic workup as above, discussing with consultants, determining acute diagnoses / plan for continued monitoring and management, and communication with patient.
conducting history and physical examination, reviewing and ordering diagnostic workup as above, discussing with consultants, determining acute diagnoses / plan for continued monitoring and management, and communication with patient and caregivers.
conducting history and physical examination, reviewing and ordering diagnostic workup as above, discussing with consultants, determining acute diagnoses / plan for continued monitoring and management, and communication with patient and caregivers.

## 2024-12-04 NOTE — PROGRESS NOTE ADULT - SUBJECTIVE AND OBJECTIVE BOX
Patient is a 74y old  Male who presents with a chief complaint of DARIN and PPM extraction.   MSSA bacteremia (04 Dec 2024 10:26)    Patient seen and examined  Appears comfortable, no acute overnight events noted.   Family at bedside    MEDICATIONS  (STANDING):  ceFAZolin   IVPB 2000 milliGRAM(s) IV Intermittent every 8 hours  chlorhexidine 2% Cloths 1 Application(s) Topical <User Schedule>  melatonin 5 milliGRAM(s) Oral at bedtime  polyethylene glycol 3350 17 Gram(s) Oral daily    MEDICATIONS  (PRN):  ondansetron    Tablet 4 milliGRAM(s) Oral every 6 hours PRN Nausea and/or Vomiting  phenazopyridine 100 milliGRAM(s) Oral every 8 hours PRN bladder spasm  senna 1 Tablet(s) Oral at bedtime PRN Constipation      Vital Signs Last 24 Hrs  T(C): 36.8 (04 Dec 2024 04:00), Max: 37.2 (03 Dec 2024 20:28)  T(F): 98.2 (04 Dec 2024 04:00), Max: 99 (03 Dec 2024 20:28)  HR: 80 (04 Dec 2024 11:17) (80 - 89)  BP: 133/72 (04 Dec 2024 11:17) (122/62 - 147/82)  BP(mean): --  RR: 18 (04 Dec 2024 11:17) (18 - 18)  SpO2: 95% (04 Dec 2024 11:17) (94% - 96%)    Parameters below as of 04 Dec 2024 11:17  Patient On (Oxygen Delivery Method): room air        PE  Awake, alert  Anicteric, MMM  RRR  CTAB  Abd soft, NT, ND  No c/c                        8.8    7.69  )-----------( 380      ( 04 Dec 2024 11:00 )             28.1       12-03    135  |  100  |  11  ----------------------------<  101[H]  4.1   |  25  |  0.77    Ca    8.5      03 Dec 2024 06:31

## 2024-12-04 NOTE — PROGRESS NOTE ADULT - ASSESSMENT
74 year [place] [facility] [place] to bone and lymph nodes has recently increased dose of Enzalutamide to 160 mg  admitted with weakness and hematuria    Prostate cancer  -[place] [facility] [place]  -s/p Radiation Therapy (2018) and Prostatectomy   -on Enzalutamide and Orgovyx  -hold medications until patient is stable  -Follow up with [facility] after discharge    Anemia  Hematuria  --in setting of  radiation cystitis s/p hyperbaric oxygen   --Transfuse for Hgb < 7.0  --urology recs appreciated    Bacteremia  --MSSA bacteremia  --ID following,  Blood Cx (11/24): MSSA (4/4 bottles)--> Repeat blood Cx (11/26): NGTD  --UA + RBC and WBC , + LE and nitrite and bacteria   --Urine Cx (Suprapubic catheter) + MSSA  --CXR: clear lungs   --TTE: no documented valvular or device vegetations, bioprosthetic aortic valve with Mild intravalvular regurgitation.  --s/p  pacemaker extraction today with leadless pacemaker implantation  --continues on Cefazolin, plan for discharge on IV abx (x 6 weeks)    Discharge planning in progress      Aida Kendrick NP  Hematology/ Oncology  [facility]  [phone] (office)  [phone] (alt office)  Evenings and weekends please call MD on call or office

## 2024-12-04 NOTE — PROGRESS NOTE ADULT - ASSESSMENT
74yoM w/ PMHx ofaortic stenosis s/p TAVR, HTN, prostate cancer s/p radiation therapy, prostatectomy, with metastasis to spine and pelvis, frequent urinary retention s/p suprapubic catheter presented to [facility] ED on 11/24 for weakness and hematuria; transferred to [facility] for DARIN, now s/p PPM extraction in setting of MSSA bacteremia.

## 2024-12-04 NOTE — PROGRESS NOTE ADULT - ASSESSMENT
74 Y/O M w/ PMH of Prostate CA s/p Radiation Therapy (2018) and Prostatectomy now with radiation cystitis s/p hyperbaric oxygen and SPT placement, HTN, Aortic Stenosis and Sick sinus syndrome s/p pacemaker presents to ER with weakness, hypotension and tachycardia responding to boluses with SANJUANA 1.74 from .72 baseline. Urology was consulted for hematuria and SPT change. He follows with Dr. Hoang    He is now HDS, s/p SPT exchange and manual irrigation with return of 30cc old clot now draining light pink urine well. He is admitted to medicine for UTI/urosepsis.    11/25: urine color improved with irrigation. Will try to avoid cbi at this time as hematuria worsens in the past from any manipulation from below   11/26: urine color quickly clears after irrigation. Irrigated this AM. Will try to avoid CBI as this as caused significant worsening of hematuria.   11/27: SPT draining maroon color, irrigated 5 cc of clots.  11/28: SPT clotted off in AM > exchanged to different [id number] bowers, irrigated out 50cc old clot, draining clear pink. H/H 7.3/21.7 from 7.9/23.9  11/29: SPT draining thin maroon colored urine. Irrigated to clear light punch with return of 10 cc small clots. Hgb 7.1.  11/30: SPT draining thin maroon colored urine. Irrigated to clear light punch with return of 10 cc small clots. Hgb 6.9 > 1u pRBC ordered  12/1: pt transferred [facility] -> [facility].  SPT irrigated to clear/light pink with moderate amount of clots returned  12/2: SPT irrigated with translucent grace with 5cc of clot  12/3: SPT irrigated with translucent pink with 5-10cc of clot  12/4: SPT clear yellow without evidence of clot    Recs:  - Irrigate PRN, will avoid CBI at this time. Patient with prior hx of worsening hematuria with manipulation from below -> color clear yellow  - Trend H&H and transfuse as needed -> Hgb stable  - Urine with Staph aureus, treat accordingly - on Ancef  - Trend Cr > stable  - Monitor urine color  - Urology to sign off 74 Y/O M w/ PMH of Prostate CA s/p Radiation Therapy (2018) and Prostatectomy now with radiation cystitis s/p hyperbaric oxygen and SPT placement, HTN, Aortic Stenosis and Sick sinus syndrome s/p pacemaker presents to ER with weakness, hypotension and tachycardia responding to boluses with SANJUANA 1.74 from .72 baseline. Urology was consulted for hematuria and SPT change. He follows with Dr. Hoang    He is now HDS, s/p SPT exchange and manual irrigation with return of 30cc old clot now draining light pink urine well. He is admitted to medicine for UTI/urosepsis.    11/25: urine color improved with irrigation. Will try to avoid cbi at this time as hematuria worsens in the past from any manipulation from below   11/26: urine color quickly clears after irrigation. Irrigated this AM. Will try to avoid CBI as this as caused significant worsening of hematuria.   11/27: SPT draining maroon color, irrigated 5 cc of clots.  11/28: SPT clotted off in AM > exchanged to different [id number] bowers, irrigated out 50cc old clot, draining clear pink. H/H 7.3/21.7 from 7.9/23.9  11/29: SPT draining thin maroon colored urine. Irrigated to clear light punch with return of 10 cc small clots. Hgb 7.1.  11/30: SPT draining thin maroon colored urine. Irrigated to clear light punch with return of 10 cc small clots. Hgb 6.9 > 1u pRBC ordered  12/1: pt transferred [facility] -> [facility].  SPT irrigated to clear/light pink with moderate amount of clots returned  12/2: SPT irrigated with translucent grace with 5cc of clot  12/3: SPT irrigated with translucent pink with 5-10cc of clot  12/4: SPT clear yellow without evidence of clot    Recs:  - Irrigate PRN, will avoid CBI at this time. Patient with prior hx of worsening hematuria with manipulation from below -> color clear yellow  - Trend H&H and transfuse as needed -> Hgb stable  - Urine with Staph aureus, treat accordingly - on Ancef  - Trend Cr > stable  - Monitor urine color  - Urology to sign off. Please notify urology if urine color worsens  - Pt to followup with Dr. Bogdan Hoang after discharge

## 2024-12-04 NOTE — CHART NOTE - NSCHARTNOTEFT_GEN_A_CORE
Rolling Walker:  Patient requires rolling walker at home due to metastatic prostate cancer to help complete their MRADL's    Page Alexander NP  [phone]
0

## 2024-12-05 ENCOUNTER — TRANSCRIPTION ENCOUNTER (OUTPATIENT)
Age: 74
End: 2024-12-05

## 2024-12-05 PROCEDURE — 99232 SBSQ HOSP IP/OBS MODERATE 35: CPT

## 2024-12-05 RX ORDER — INFLUENZA VIRUS VACCINE 15; 15; 15; 15 UG/.5ML; UG/.5ML; UG/.5ML; UG/.5ML
0.5 SUSPENSION INTRAMUSCULAR ONCE
Refills: 0 | Status: DISCONTINUED | OUTPATIENT
Start: 2024-12-05 | End: 2024-12-06

## 2024-12-05 RX ADMIN — Medication 100 MILLIGRAM(S): at 13:04

## 2024-12-05 RX ADMIN — Medication 100 MILLIGRAM(S): at 21:20

## 2024-12-05 RX ADMIN — Medication 100 MILLIGRAM(S): at 12:23

## 2024-12-05 RX ADMIN — CHLORHEXIDINE GLUCONATE 1 APPLICATION(S): 1.2 RINSE ORAL at 05:05

## 2024-12-05 RX ADMIN — ACETAMINOPHEN, DIPHENHYDRAMINE HCL, PHENYLEPHRINE HCL 5 MILLIGRAM(S): 325; 25; 5 TABLET ORAL at 21:20

## 2024-12-05 RX ADMIN — Medication 100 MILLIGRAM(S): at 05:05

## 2024-12-05 NOTE — PATIENT PROFILE ADULT - FALL HARM RISK - HARM RISK INTERVENTIONS

## 2024-12-05 NOTE — PROGRESS NOTE ADULT - PROBLEM SELECTOR PROBLEM 6
Prostate cancer metastatic to bone

## 2024-12-05 NOTE — PATIENT PROFILE ADULT - HAVE YOU EXPERIENCED VIOLENCE OR A TRAUMATIC EVENT?
ANABELL Gonzales is here with his mother. He is here today for ear wax removal. Mother denies any hearing concerns, speech delay, otalgia, otorrhea, runny nose, coughing, snoring, or apnea. He recently had a cold.    Family Hx of childhood hearing loss. Previous results from 8/23/2021 revealed hearing WNL bilaterally. Mom reports that pt has been reporting not hearing well recently and needing to turn things up louder.  Results: Reliability fair. Pt very active. Results show WNL to mild conductive loss bilaterally. Good agreement btw speech and tone  thresholds. 100% word rec. bilaterally. Fla tymps. DPOAE 2-6 kHz as marked.    Review of Systems   Constitutional: Negative.    HENT: Positive for congestion and hearing loss. Negative for ear discharge, ear pain, nosebleeds, sinus pain and sore throat.    Eyes: Negative for blurred vision, double vision and photophobia.   Respiratory: Negative for cough, hemoptysis, sputum production and stridor.    Gastrointestinal: Negative for nausea and vomiting.   Skin: Negative.    Neurological: Negative for dizziness, tingling, tremors and headaches.   Endo/Heme/Allergies: Negative for environmental allergies. Does not bruise/bleed easily.       Physical Exam  Vitals and nursing note reviewed.   Constitutional:       General: He is active.      Appearance: Normal appearance. He is well-developed.   HENT:      Head: Normocephalic and atraumatic.      Jaw: There is normal jaw occlusion.      Right Ear: Ear canal and external ear normal. Decreased hearing noted. A middle ear effusion is present. There is no impacted cerumen.      Left Ear: Ear canal and external ear normal. Decreased hearing noted. A middle ear effusion is present. There is no impacted cerumen.      Nose: Mucosal edema, congestion and rhinorrhea present.      Right Turbinates: Swollen.      Left Turbinates: Swollen.      Mouth/Throat:      Mouth: Mucous membranes are moist.      Pharynx: Oropharynx is clear. Uvula  midline.      Tonsils: 2+ on the right. 2+ on the left.   Eyes:      Extraocular Movements: Extraocular movements intact.      Pupils: Pupils are equal, round, and reactive to light.   Neurological:      Mental Status: He is alert.       A/P  Christian is having bilateral otitis media with effusion. I will give him a topical nasal steroid spray once a day for 2 months and see him in the f/u with a hearing test.   no

## 2024-12-05 NOTE — PROGRESS NOTE ADULT - PROBLEM SELECTOR PLAN 10
SCDs only d/t anemia and blood loss    Dispo: medically stable for d/c to [facility] (pending auth)
SCDs only d/t anemia and blood loss
SCDs only d/t anemia and blood loss    Disposition to be clarified w/ PT re-evaluation
SCDs only d/t anemia and blood loss

## 2024-12-05 NOTE — PROGRESS NOTE ADULT - PROBLEM SELECTOR PLAN 1
Blood Cx (11/24): MSSA (4/4 bottles), Repeat blood Cx (11/26): NGTD  Urine Cx (Suprapubic catheter) + MSSA  - TTE: no documented valvular or device vegetations, bioprosthetic aortic valve with Mild intravalvular regurgitation.  - Continue Cefazolin 2G Q8H.   - s/p PPM extraction per EP on 12/2  - ID recommendations appreciated: Continue cefazolin; DARIN without vegetations per verbal report, official read is pending; PPM extraction on 12/2/2024 with EP service; Will plan for 6 weeks of cefazolin from 12/2/24, end date; 1/13/25; Will need weekly CBC, CMP and email results to [email or web address]; ID clinic follow-up, please place referral at discharge
Blood Cx (11/24): MSSA (4/4 bottles), Repeat blood Cx (11/26): NGTD  Urine Cx (Suprapubic catheter) + MSSA  - TTE: no documented valvular or device vegetations, bioprosthetic aortic valve with Mild intravalvular regurgitation.  - Continue Cefazolin 2G Q8H.   - Plan for DARIN and PPM extraction per EP  - ID recommendations appreciated
Blood Cx (11/24): MSSA (4/4 bottles), Repeat blood Cx (11/26): NGTD  Urine Cx (Suprapubic catheter) + MSSA  - TTE: no documented valvular or device vegetations, bioprosthetic aortic valve with Mild intravalvular regurgitation.  - Continue Cefazolin 2G Q8H.   - s/p PPM extraction per EP on 12/2  - ID recommendations appreciated: Continue cefazolin; DARIN without vegetations per verbal report, official read is pending; PPM extraction on 12/2/2024 with EP service; Will plan for 6 weeks of cefazolin from 12/2/24, end date; 1/13/25; Will need weekly CBC, CMP and email results to [email or web address]; ID clinic follow-up, please place referral at discharge
Blood Cx (11/24): MSSA (4/4 bottles), Repeat blood Cx (11/26): NGTD  Urine Cx (Suprapubic catheter) + MSSA  - TTE: no documented valvular or device vegetations, bioprosthetic aortic valve with Mild intravalvular regurgitation.  - Continue Cefazolin 2G Q8H.   - s/p PPM extraction per EP on 12/2  - ID recommendations appreciated: Continue cefazolin; DARIN without vegetations per verbal report, official read is pending; PPM extraction on 12/2/2024 with EP service; Will plan for 6 weeks of cefazolin from 12/2/24, end date; 1/13/25; Will need weekly CBC, CMP and email results to [email or web address]; ID clinic follow-up, please place referral at discharge

## 2024-12-05 NOTE — PROGRESS NOTE ADULT - SUBJECTIVE AND OBJECTIVE BOX
Patient is a 74y old  Male who presents with a chief complaint of DARIN and PPM extraction. MSSA bacteremia. Patient seen and examined at bedside, son present. Has been feeling okay, no hematuria. [facility] pending plan.     MEDICATIONS  (STANDING):  ceFAZolin   IVPB 2000 milliGRAM(s) IV Intermittent every 8 hours  chlorhexidine 2% Cloths 1 Application(s) Topical <User Schedule>  melatonin 5 milliGRAM(s) Oral at bedtime  polyethylene glycol 3350 17 Gram(s) Oral daily    MEDICATIONS  (PRN):  acetaminophen     Tablet .. 650 milliGRAM(s) Oral every 6 hours PRN Mild Pain (1 - 3)  ondansetron    Tablet 4 milliGRAM(s) Oral every 6 hours PRN Nausea and/or Vomiting  senna 1 Tablet(s) Oral at bedtime PRN Constipation    Vital Signs Last 24 Hrs  T(C): 36.7 (05 Dec 2024 11:11), Max: 37.4 (05 Dec 2024 04:00)  T(F): 98 (05 Dec 2024 11:11), Max: 99.4 (05 Dec 2024 04:00)  HR: 86 (05 Dec 2024 11:11) (84 - 93)  BP: 143/80 (05 Dec 2024 11:11) (143/80 - 158/73)  BP(mean): --  RR: 18 (05 Dec 2024 11:11) (17 - 18)  SpO2: 95% (05 Dec 2024 11:11) (95% - 95%)    Parameters below as of 05 Dec 2024 11:11  Patient On (Oxygen Delivery Method): room air    PE  NAD  Awake, alert  Anicteric, MMM  RRR  CTAB  Abd soft, NT, ND  No c/c/e  No rash grossly                        8.8    7.69  )-----------( 380      ( 04 Dec 2024 11:00 )             28.1

## 2024-12-05 NOTE — PROGRESS NOTE ADULT - ASSESSMENT
74yoM w/ PMHx ofaortic stenosis s/p TAVR, HTN, prostate cancer s/p radiation therapy, prostatectomy, with metastasis to spine and pelvis, frequent urinary retention s/p suprapubic catheter presented to [facility] ED on 11/24 for weakness and hematuria; transferred to [facility] for DARIN, now s/p PPM extraction in setting of MSSA bacteremia.

## 2024-12-05 NOTE — PROGRESS NOTE ADULT - SUBJECTIVE AND OBJECTIVE BOX
Patient is a 74y old  Male who presents with a chief complaint of Bacterial infection     (05 Dec 2024 11:28)      SUBJECTIVE / OVERNIGHT EVENTS: Patient seen and examined at bedside. State he feels well today. no events overnight.     ROS:  All other review of systems negative    Allergies    penicillin V potassium (Rash)  penicillin (Diarrhea; Pruritus; Hives)    Intolerances    codeine (Nausea)      MEDICATIONS  (STANDING):  ceFAZolin   IVPB 2000 milliGRAM(s) IV Intermittent every 8 hours  chlorhexidine 2% Cloths 1 Application(s) Topical <User Schedule>  melatonin 5 milliGRAM(s) Oral at bedtime  polyethylene glycol 3350 17 Gram(s) Oral daily    MEDICATIONS  (PRN):  acetaminophen     Tablet .. 650 milliGRAM(s) Oral every 6 hours PRN Mild Pain (1 - 3)  ondansetron    Tablet 4 milliGRAM(s) Oral every 6 hours PRN Nausea and/or Vomiting  senna 1 Tablet(s) Oral at bedtime PRN Constipation      Vital Signs Last 24 Hrs  T(C): 36.7 (05 Dec 2024 11:11), Max: 37.4 (05 Dec 2024 04:00)  T(F): 98 (05 Dec 2024 11:11), Max: 99.4 (05 Dec 2024 04:00)  HR: 86 (05 Dec 2024 11:11) (84 - 93)  BP: 143/80 (05 Dec 2024 11:11) (143/80 - 158/73)  BP(mean): --  RR: 18 (05 Dec 2024 11:11) (17 - 18)  SpO2: 95% (05 Dec 2024 11:11) (95% - 95%)    Parameters below as of 05 Dec 2024 11:11  Patient On (Oxygen Delivery Method): room air      CAPILLARY BLOOD GLUCOSE        I&O's Summary    04 Dec 2024 07:01  -  05 Dec 2024 07:00  --------------------------------------------------------  IN: 830 mL / OUT: 3500 mL / NET: -2670 mL    05 Dec 2024 07:01  -  05 Dec 2024 12:51  --------------------------------------------------------  IN: 240 mL / OUT: 0 mL / NET: 240 mL        PHYSICAL EXAM:  GENERAL: elderly  HEAD:  Atraumatic, Normocephalic  EYES: EOMI, PERRLA, conjunctiva and sclera clear  NECK: Supple, No JVD  CHEST/LUNG: Clear to auscultation bilaterally; No wheeze  HEART: Regular rate and rhythm; No murmurs, rubs, or gallops  ABDOMEN: + suprapubic cath (no drainage noted)  EXTREMITIES:  2+ Peripheral Pulses, No clubbing, cyanosis, or edema  NEUROLOGY: AAOx3, non-focal      LABS:                        8.8    7.69  )-----------( 380      ( 04 Dec 2024 11:00 )             28.1                     RADIOLOGY & ADDITIONAL TESTS:      Care Discussed with Consultants/Other Providers: Medicine ACP     d/w Patient's son at bedside

## 2024-12-05 NOTE — DIETITIAN INITIAL EVALUATION ADULT - ADD RECOMMEND
1) RD recommended liberalizing diet from DASH to Regular diet free of therapeutic restrictions to promote adequate PO intake.   2) Recommend Multivitamin daily pending no medical contraindications.  3) Continue to monitor PO intake, weight, labs, skin, GI status, and diet.

## 2024-12-05 NOTE — DIETITIAN INITIAL EVALUATION ADULT - PROBLEM SELECTOR PLAN 6
-undergoing care at [facility] [place]  -s/p Radiation Therapy (2018) and Prostatectomy- 2015  -on Enzalutamide and Orgovyx- holding medications until patient is stable per oncology  -Follow up with [facility] after discharge

## 2024-12-05 NOTE — PROGRESS NOTE ADULT - PROBLEM SELECTOR PLAN 4
Acute on chronic anemia- Normocytic Normochromic.  Baseline Hb 11-12.  currently stable 8s  Multifactorial from underlying CA/Rx, Blood loss( hematuria).
Acute on chronic anemia- Normocytic Normochromic.  Baseline Hb 11-12.  Multifactorial from underlying CA/Rx, Blood loss( hematuria).   1U PRBC was given on 11/30 for Hb of 6.9.   Monitor HH.

## 2024-12-05 NOTE — PROGRESS NOTE ADULT - ASSESSMENT
74 year [place] [facility] [place] cancer to bone and lymph nodes has recently increased dose of Enzalutamide to 160 mg admitted with weakness and hematuria.     Prostate cancer  -[place] [facility] [place]  [place]s/p Radiation Therapy (2018) and Prostatectomy   -on Enzalutamide and Orgovyx, may continue.   -Follow up with [facility] after discharge    Anemia  Hematuria  --in setting of radiation cystitis s/p hyperbaric oxygen   --Transfuse for Hgb < 7.0  --urology recs appreciated, hematuria improved today    Bacteremia  --MSSA bacteremia  --ID following,  Blood Cx (11/24): MSSA (4/4 bottles)--> Repeat blood Cx (11/26): NGTD  --UA + RBC and WBC , + LE and nitrite and bacteria   --Urine Cx (Suprapubic catheter) + MSSA  --CXR: clear lungs   --TTE: no documented valvular or device vegetations, bioprosthetic aortic valve with Mild intravalvular regurgitation.  --s/p  pacemaker extraction with leadless pacemaker implantation on 12/2  --continues on Cefazolin, plan for discharge on IV abx (x 6 weeks)    Discharge planning in progress to JAYDON Paniagua PA-C  Hematology/Oncology  [facility]  [phone] (office)

## 2024-12-05 NOTE — DIETITIAN INITIAL EVALUATION ADULT - ORAL INTAKE PTA/DIET HISTORY
Pt reports having a decreased appetite and PO intake x 1 month PTA but still forcing self to eat. Follows regular diet; no therapeutic restrictions. Pt denies any known food allergies or intolerances. Pt takes Multivitamin at home. Denies any difficulty chewing/swallowing at this time.

## 2024-12-05 NOTE — DIETITIAN INITIAL EVALUATION ADULT - NSICDXPASTMEDICALHX_GEN_ALL_CORE_FT
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

## 2024-12-05 NOTE — PROGRESS NOTE ADULT - PROBLEM SELECTOR PLAN 3
Seen by urology at [facility].   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  Recommended to irrigate PRN, will avoid CBI at this time. Patient with prior hx of worsening hematuria with manipulation from below.   Appreciate urology's evaluation and recommendations
RESOLVED  Likely 2/2 Radiation cystitis.  Recommended to irrigate PRN, will avoid CBI at this time. Patient with prior hx of worsening hematuria with manipulation from below.   Appreciate urology's evaluation and recommendations
Seen by urology at [facility].   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  Recommended to irrigate PRN, will avoid CBI at this time. Patient with prior hx of worsening hematuria with manipulation from below.   Appreciate urology's evaluation and recommendations
Seen by urology at [facility].   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  Recommended to irrigate PRN, will avoid CBI at this time. Patient with prior hx of worsening hematuria with manipulation from below.   Appreciate urology's evaluation and recommendations

## 2024-12-05 NOTE — PATIENT PROFILE ADULT - FUNCTIONAL ASSESSMENT - DAILY ACTIVITY SECTION LABEL
[FreeTextEntry1] : Mr. Cannon is a 58yo man here for followup for his hemorrhagic stroke. His prior visit was on 8/12/2019 and since then he has been doing well.  He is still on anticoagulation for DVTs found in the hospital however the wife is unsure how long he will need anticoagulation.  She was told to ask the neurologist about anticoagulation.  I explained to patient and wife that for DVT's the decision to start and how long to be on is the medical doctors decision.  The decision of when to start and whether it was safe to be on anticoagulation was my decision.\par He is doing well his neuropsych results were reviewed and since August he has continued to improve and is now driving with no issues.  She gives him verbal lists of things to get from the grocery store and he is able to go out and get everything.
.

## 2024-12-05 NOTE — PROGRESS NOTE ADULT - PROBLEM SELECTOR PLAN 9
Placed for SSS in   Now plan for extraction d/t bacteremia.

## 2024-12-05 NOTE — PROGRESS NOTE ADULT - NSPROGADDITIONALINFOA_GEN_ALL_CORE
time spent reviewing prior charts, meds, discussing plan with patient= 40 min     d/w Medicine ACP Nataly

## 2024-12-05 NOTE — DIETITIAN INITIAL EVALUATION ADULT - REASON INDICATOR FOR ASSESSMENT
Nutrition Consult for assessment / education.   Source: Pt, Pt's son at bedside, Electronic Medical Record.   Chart reviewed, events noted.

## 2024-12-05 NOTE — PROGRESS NOTE ADULT - PROBLEM SELECTOR PLAN 7
Monitor. Prior Hx of rectal bleed in 2020 2/2 radiation proctitis.

## 2024-12-05 NOTE — PATIENT PROFILE ADULT - HEALTH LITERACY
In an effort to ensure that our patients LiveWell, a Team Member has reviewed your chart and identified an opportunity to provide the best care possible. An attempt was made to discuss or schedule overdue Preventive or Disease Management screening.     The Outcome was Contact was not made, no answer/busyIf you have any questions or need help with scheduling, contact our Health Outreach Team at [phone]. Care Gaps include Breast Cancer Screening.       Mailbox Full     no

## 2024-12-05 NOTE — PROGRESS NOTE ADULT - PROBLEM SELECTOR PLAN 6
- undergoing [place] at [facility] [place]  - s/p Radiation Therapy (2018) and Prostatectomy- 2015  - on Enzalutamide and Orgovyx- holding medications until patient is stable per oncology  - Follow up with [facility] after discharge
- undergoing care at [facility] [place]  - s/p Radiation Therapy (2018) and Prostatectomy- 2015  - on Enzalutamide and Orgovyx- holding medications until patient is stable per oncology  - Follow up with [facility] after discharge
- undergoing care at [facility] [place]  - s/p Radiation Therapy (2018) and Prostatectomy- 2015  - on Enzalutamide and Orgovyx- holding medications until patient is stable per oncology  - Follow up with [facility] after discharge
- [place] [facility] [place]  [place] s/p Radiation Therapy (2018) and Prostatectomy- 2015  - on Enzalutamide and Orgovyx- holding medications until patient is stable per oncology  - Follow up with [facility] after discharge

## 2024-12-05 NOTE — DIETITIAN INITIAL EVALUATION ADULT - PERSON TAUGHT/METHOD
- Encouraged adequate consumption of meals/supplements to optimize protein-energy intake. Encouraged small/frequent meals, nutrient dense snacks, prioritizing protein foods at meal time. Pt and pt's son made aware RD remains available PRN./verbal instruction/patient instructed/son instructed

## 2024-12-05 NOTE — PROGRESS NOTE ADULT - PROBLEM SELECTOR PROBLEM 2
Complicated UTI (urinary tract infection)

## 2024-12-05 NOTE — DIETITIAN INITIAL EVALUATION ADULT - ENERGY INTAKE
In house, pt reports continued decreased appetite but forces self to eat meals. Flowsheets indicate pt consuming >75% of meals. RD encouraged pt to prioritize protein at meals; consuming Ensure oral nutrition supplements daily.  Fair (50-75%)

## 2024-12-05 NOTE — DIETITIAN INITIAL EVALUATION ADULT - PERTINENT MEDS FT
MEDICATIONS  (STANDING):  ceFAZolin   IVPB 2000 milliGRAM(s) IV Intermittent every 8 hours  chlorhexidine 2% Cloths 1 Application(s) Topical <User Schedule>  melatonin 5 milliGRAM(s) Oral at bedtime  polyethylene glycol 3350 17 Gram(s) Oral daily    MEDICATIONS  (PRN):  acetaminophen     Tablet .. 650 milliGRAM(s) Oral every 6 hours PRN Mild Pain (1 - 3)  ondansetron    Tablet 4 milliGRAM(s) Oral every 6 hours PRN Nausea and/or Vomiting  phenazopyridine 100 milliGRAM(s) Oral every 8 hours PRN bladder spasm  senna 1 Tablet(s) Oral at bedtime PRN Constipation

## 2024-12-05 NOTE — PROGRESS NOTE ADULT - PROBLEM SELECTOR PLAN 2
s/p suprapubic catheter placement in 4/2024 (exchanges every 1 month) for urinary retention/prostate CA.   Urine Cx (Suprapubic catheter) + MSSA.  Appreciate urology's evaluation and recommendations

## 2024-12-05 NOTE — DIETITIAN INITIAL EVALUATION ADULT - PROBLEM SELECTOR PLAN 3
Seen by urology at [facility].   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  Recommended to irrigate PRN, will avoid CBI at this time. Patient with prior hx of worsening hematuria with manipulation from below.   Will consult inhouse urology.

## 2024-12-05 NOTE — PATIENT PROFILE ADULT - NSFALLSECTIONLABEL_GEN_A_CORE
Patient requesting a refill on potassium. Last level done in November = 4.5. order pending with refills   ANAID BOOGIE LPN      .

## 2024-12-05 NOTE — DIETITIAN INITIAL EVALUATION ADULT - NS FNS DIET ORDER
Diet, Regular:   Supplement Feeding Modality:  Oral  Ensure Enlive Cans or Servings Per Day:  2       Frequency:  Daily (12-05-24 @ 10:16)

## 2024-12-05 NOTE — DIETITIAN INITIAL EVALUATION ADULT - OTHER INFO
- Wt hx:         - UBW: ~200 pounds per pt; pt and pt's son deny any significant weight loss.          - Dosing wt: not present in patient profile.          - Height per chart review: 5'10.          - No wts available per chart at this time; Wt hx per [facility] in pounds: 205 (11/26), 206 (8/20), 206 (7/16), 201 (5/09)          - RD to continue to monitor weight trends as able.   - Nutritionally Pertinent Meds in-house: Flash.     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  	- Regimen PTA: Enzalutamide and Orgovyx.

## 2024-12-06 ENCOUNTER — TRANSCRIPTION ENCOUNTER (OUTPATIENT)
Age: 74
End: 2024-12-06

## 2024-12-06 VITALS
HEART RATE: 94 BPM | OXYGEN SATURATION: 95 % | SYSTOLIC BLOOD PRESSURE: 148 MMHG | RESPIRATION RATE: 18 BRPM | DIASTOLIC BLOOD PRESSURE: 75 MMHG | TEMPERATURE: 98 F

## 2024-12-06 PROCEDURE — 80048 BASIC METABOLIC PNL TOTAL CA: CPT

## 2024-12-06 PROCEDURE — 76000 FLUOROSCOPY <1 HR PHYS/QHP: CPT

## 2024-12-06 PROCEDURE — 85730 THROMBOPLASTIN TIME PARTIAL: CPT

## 2024-12-06 PROCEDURE — C9399: CPT

## 2024-12-06 PROCEDURE — 83550 IRON BINDING TEST: CPT

## 2024-12-06 PROCEDURE — 85025 COMPLETE CBC W/AUTO DIFF WBC: CPT

## 2024-12-06 PROCEDURE — C1751: CPT

## 2024-12-06 PROCEDURE — 97161 PT EVAL LOW COMPLEX 20 MIN: CPT

## 2024-12-06 PROCEDURE — 97530 THERAPEUTIC ACTIVITIES: CPT

## 2024-12-06 PROCEDURE — 80053 COMPREHEN METABOLIC PANEL: CPT

## 2024-12-06 PROCEDURE — 86923 COMPATIBILITY TEST ELECTRIC: CPT

## 2024-12-06 PROCEDURE — C1894: CPT

## 2024-12-06 PROCEDURE — 83540 ASSAY OF IRON: CPT

## 2024-12-06 PROCEDURE — 85610 PROTHROMBIN TIME: CPT

## 2024-12-06 PROCEDURE — 85027 COMPLETE CBC AUTOMATED: CPT

## 2024-12-06 PROCEDURE — 86900 BLOOD TYPING SEROLOGIC ABO: CPT

## 2024-12-06 PROCEDURE — 86850 RBC ANTIBODY SCREEN: CPT

## 2024-12-06 PROCEDURE — 97116 GAIT TRAINING THERAPY: CPT

## 2024-12-06 PROCEDURE — 82746 ASSAY OF FOLIC ACID SERUM: CPT

## 2024-12-06 PROCEDURE — 93005 ELECTROCARDIOGRAM TRACING: CPT

## 2024-12-06 PROCEDURE — 86901 BLOOD TYPING SEROLOGIC RH(D): CPT

## 2024-12-06 PROCEDURE — C1773: CPT

## 2024-12-06 PROCEDURE — 82607 VITAMIN B-12: CPT

## 2024-12-06 PROCEDURE — C1769: CPT

## 2024-12-06 PROCEDURE — P9016: CPT

## 2024-12-06 PROCEDURE — 36569 INSJ PICC 5 YR+ W/O IMAGING: CPT

## 2024-12-06 PROCEDURE — 99239 HOSP IP/OBS DSCHRG MGMT >30: CPT

## 2024-12-06 PROCEDURE — C2629: CPT

## 2024-12-06 PROCEDURE — 71046 X-RAY EXAM CHEST 2 VIEWS: CPT

## 2024-12-06 PROCEDURE — C1786: CPT

## 2024-12-06 PROCEDURE — 82962 GLUCOSE BLOOD TEST: CPT

## 2024-12-06 PROCEDURE — 82728 ASSAY OF FERRITIN: CPT

## 2024-12-06 PROCEDURE — 36415 COLL VENOUS BLD VENIPUNCTURE: CPT

## 2024-12-06 RX ORDER — CYCLOBENZAPRINE HYDROCHLORIDE 15 MG/1
1 CAPSULE, EXTENDED RELEASE ORAL
Refills: 0 | DISCHARGE

## 2024-12-06 RX ORDER — ONDANSETRON HYDROCHLORIDE 4 MG/1
1 TABLET, FILM COATED ORAL
Qty: 0 | Refills: 0 | DISCHARGE
Start: 2024-12-06

## 2024-12-06 RX ORDER — ACETAMINOPHEN, DIPHENHYDRAMINE HCL, PHENYLEPHRINE HCL 325; 25; 5 MG/1; MG/1; MG/1
1 TABLET ORAL
Qty: 0 | Refills: 0 | DISCHARGE
Start: 2024-12-06

## 2024-12-06 RX ORDER — MIRABEGRON 50 MG/1
1 TABLET, FILM COATED, EXTENDED RELEASE ORAL
Refills: 0 | DISCHARGE

## 2024-12-06 RX ORDER — LEVOCETIRIZINE DIHYDROCHLORIDE 5 MG/1
1 TABLET ORAL
Refills: 0 | DISCHARGE

## 2024-12-06 RX ORDER — ACETAMINOPHEN 500MG 500 MG/1
2 TABLET, COATED ORAL
Qty: 0 | Refills: 0 | DISCHARGE
Start: 2024-12-06

## 2024-12-06 RX ADMIN — Medication 100 MILLIGRAM(S): at 05:30

## 2024-12-06 RX ADMIN — CHLORHEXIDINE GLUCONATE 1 APPLICATION(S): 1.2 RINSE ORAL at 05:30

## 2024-12-06 NOTE — DISCHARGE NOTE PROVIDER - NSDCFUSCHEDAPPT_GEN_ALL_CORE_FT
[facility]  UROLOGY [address]  Scheduled Appointment: 12/10/2024    Bogdan Hoang  [facility]  UROLOGY [address]  Scheduled Appointment: 12/10/2024    Mandy Patel  [facility]  [facility] URP-Urology  Scheduled Appointment: 12/10/2024    [facility]  ELECTROPH [id number]  Scheduled Appointment: 12/16/2024    Stanislav Claudio  [facility]  ONCORTHO [address]  Scheduled Appointment: 12/17/2024    [facility]  CARDIOLOGY [address]  Scheduled Appointment: 12/18/2024     [facility]  UROLOGY [address]  Scheduled Appointment: 01/02/2025    Bogdan Hoang  [facility]  UROLOGY [address]  Scheduled Appointment: 01/02/2025    Stanislav Claudio  [facility]  [address]  Scheduled Appointment: 01/21/2025    [facility]  ELECTROPH [id number]  Scheduled Appointment: 03/03/2025

## 2024-12-06 NOTE — DISCHARGE NOTE PROVIDER - HOSPITAL COURSE
HPI:  73 YO M PMHx aortic stenosis s/p TAVR, HTN, prostate cancer s/p radiation therapy, prostatectomy, with metastasis to spine and pelvis, frequent urinary retention s/p suprapubic catheter presented to [facility] ED on 11/24 for weakness and hematuria.   On admit he was Hypotensive with systolic in 80's, tachycardic, afebrile. Anemia w Hb of 10.5 w baseline of 12-13. Hreceived PRBC on 11/30. He was seen by urology for daily bladder irrigation. Blood cx ( 11/24) grew MSSA, UCX from SPC+ MSSA. CXR clear.  On cefazolin 2g Q8H and  repeat BCx from 11/26 NGTD. He has PPM in 2020 at [facility]. TTE with normal LV fucntion EF 64%, normal RV systolic fucntion, mild aortic intravalvular regurgitation. report as above   was unremarkbale. ID at [facility] recommended Transfer to [facility] for DARIN and possible PPM extraction.   Consults at [facility]: Urology, ID, Hem-onc, EP.     At [facility]- 12/1:  Hemodynics are stable.   Labs: Normal WBC. Hb 8.3. PLT 402K. Na 136, K 3.9, Cr 0.71.    (01 Dec 2024 14:22)    Hospital Course:  Found to have MSSA bacteremia from UTI source. S/p suprapubic catheter placement in 4/2024 (exchanges every 1 month) for urinary retention/prostate CA.  - Blood Cx (11/24): MSSA (4/4 bottles), Repeat blood Cx (11/26): NGTD  - Urine Cx (Suprapubic catheter) + MSSA  - TTE: no documented valvular or device vegetations, bioprosthetic aortic valve with Mild intravalvular regurgitation.  - s/p PPM extraction per EP on 12/2  - ID recommendations appreciated: will plan for 6 weeks of cefazolin from 12/2/24, end date; 1/13/25; Will need weekly CBC, CMP and email results to [email or web address]; ID clinic follow-up, please place referral at discharge.    Scheduled for outpatient follow up at [facility] device Clinic on 12/16/24 at 9am.     Also seen by urology for SPT and hematuria. Hematuria now resolved and recommended to follow up with Dr Bogdan Hoang after discharge.     Important Medication Changes and Reason: see med rec    Active or Pending Issues Requiring Follow-up: PCP, ID, [facility] device clinic, urology    Advanced Directives:   [x] Full code  [ ] DNR  [ ] Hospice    Discharge Diagnoses:  MSSA bacteremia  SPT           HPI:  73 YO M PMHx aortic stenosis s/p TAVR, HTN, prostate cancer s/p radiation therapy, prostatectomy, with metastasis to spine and pelvis, frequent urinary retention s/p suprapubic catheter presented to [facility] ED on 11/24 for weakness and hematuria.   On admit he was Hypotensive with systolic in 80's, tachycardic, afebrile. Anemia w Hb of 10.5 w baseline of 12-13. Hreceived PRBC on 11/30. He was seen by urology for daily bladder irrigation. Blood cx ( 11/24) grew MSSA, UCX from SPC+ MSSA. CXR clear.  On cefazolin 2g Q8H and  repeat BCx from 11/26 NGTD. He has PPM in 2020 at [facility]. TTE with normal LV fucntion EF 64%, normal RV systolic fucntion, mild aortic intravalvular regurgitation. report as above   was unremarkbale. ID at [facility] recommended Transfer to [facility] for DARIN and possible PPM extraction.   Consults at [facility]: Urology, ID, Hem-onc, EP.     At [facility]- 12/1:  Hemodynics are stable.   Labs: Normal WBC. Hb 8.3. PLT 402K. Na 136, K 3.9, Cr 0.71.    (01 Dec 2024 14:22)    Hospital Course:  Found to have MSSA bacteremia from UTI source. S/p suprapubic catheter placement in 4/2024 (exchanges every 1 month) for urinary retention/prostate CA.  - Blood Cx (11/24): MSSA (4/4 bottles), Repeat blood Cx (11/26): NGTD  - Urine Cx (Suprapubic catheter) + MSSA  - TTE: no documented valvular or device vegetations, bioprosthetic aortic valve with Mild intravalvular regurgitation.  - s/p PPM extraction per EP on 12/2  - ID recommendations appreciated: will plan for 6 weeks of cefazolin from 12/2/24, end date; 1/13/25; Will need weekly CBC, CMP and email results to [email or web address]; ID clinic follow-up, please place referral at discharge.    Scheduled for outpatient follow up at [facility] device Clinic on 12/16/24 at 9am.     Also seen by urology for SPT and hematuria. Hematuria now resolved and recommended to follow up with Dr Bogdan Hoang after discharge.     Important Medication Changes and Reason: see med rec + can start probiotic    Active or Pending Issues Requiring Follow-up: PCP, ID, [facility] device clinic, urology    Advanced Directives:   [x] Full code  [ ] DNR  [ ] Hospice    Discharge Diagnoses:  MSSA bacteremia  SPT

## 2024-12-06 NOTE — PROGRESS NOTE ADULT - ASSESSMENT
74 year old male undergoing care at [facility] for metastatic CR prostate cancer to bone and lymph nodes has recently increased dose of Enzalutamide to 160 mg admitted with weakness and hematuria.     Prostate cancer  -undergoing care at [facility]  -s/p Radiation Therapy (2018) and Prostatectomy   -on Enzalutamide and Orgovyx, may continue in rehab.   -Follow up with [facility] after discharge    Anemia  Hematuria  --in setting of radiation cystitis s/p hyperbaric oxygen   --Transfuse for Hgb < 7.0  --urology recs appreciated, hematuria improved     Bacteremia  --MSSA bacteremia  --ID following,  Blood Cx (11/24): MSSA (4/4 bottles)--> Repeat blood Cx (11/26): NGTD  --UA + RBC and WBC , + LE and nitrite and bacteria   --Urine Cx (Suprapubic catheter) + MSSA  --CXR: clear lungs   --TTE: no documented valvular or device vegetations, bioprosthetic aortic valve with Mild intravalvular regurgitation.  --s/p  pacemaker extraction with leadless pacemaker implantation on 12/2  --continues on Cefazolin, plan for discharge on IV abx (x 6 weeks)    Discharge planning in progress to [facility].    Antonio Paniagua PA-C  Hematology/Oncology  [facility]  [phone] (office)

## 2024-12-06 NOTE — DISCHARGE NOTE PROVIDER - CARE PROVIDER_API CALL
Bogdan Hoang)  Urology  [address] [place] [address], [address]  [place] [address]  Phone: [phone]  Fax: [phone]  Scheduled Appointment: 12/10/2024 09:20 AM    [facility]  [address] [place] [address], [address] [place] [address]  Phone: [phone]  Fax: (   )    -  Scheduled Appointment: 12/16/2024 09:00 AM

## 2024-12-06 NOTE — DISCHARGE NOTE PROVIDER - PROVIDER TOKENS
PROVIDER:[TOKEN:[[id number]],SCHEDULEDAPPT:[12/10/2024],SCHEDULEDAPPTTIME:[09:20 AM]],FREE:[LAST:[[facility]],FIRST:[[facility]IC CLINIC],PHONE:[phone]],FAX:[(   )    -],ADDRESS:[[address] [place] [address]],SCHEDULEDAPPT:[12/16/2024],SCHEDULEDAPPTTIME:[09:00 AM]]

## 2024-12-06 NOTE — DISCHARGE NOTE PROVIDER - NSFOLLOWUPCLINICS_GEN_ALL_ED_FT
[facility] - Infectious Disease  Infectious Disease  [address], [place]ious [place] [address]  Phone: [phone]  Fax:   Follow Up Time: 1 week

## 2024-12-06 NOTE — DISCHARGE NOTE PROVIDER - NSDCMRMEDTOKEN_GEN_ALL_CORE_FT
acetaminophen 325 mg oral tablet: 2 tab(s) orally every 6 hours As needed Mild Pain (1 - 3)  aluminum hydroxide-magnesium hydroxide 200 mg-200 mg/5 mL oral suspension: 30 milliliter(s) orally every 4 hours, As needed, Dyspepsia  CBC and CMP weekly. Please send results to [email or web address] .  ceFAZolin 2 g/100 mL-D5% intravenous solution: 2 gram(s) intravenous every 8 hours End date: 1/13/25  melatonin 5 mg oral tablet: 1 tab(s) orally once a day (at bedtime)  ondansetron 4 mg oral tablet: 1 tab(s) orally every 6 hours As needed Nausea and/or Vomiting  polyethylene glycol 3350 oral powder for reconstitution: 17 gram(s) orally once a day  senna leaf extract oral tablet: 2 tab(s) orally once a day (at bedtime)  Walker: .

## 2024-12-06 NOTE — PROGRESS NOTE ADULT - SUBJECTIVE AND OBJECTIVE BOX
Patient is a 74y old  Male who presents with a chief complaint of DARIN and PPM extraction.   MSSA bacteremia (05 Dec 2024 15:12)    Patient seen and examined at bedside, no acute events. Plan for [facility].    MEDICATIONS  (STANDING):  ceFAZolin   IVPB 2000 milliGRAM(s) IV Intermittent every 8 hours  chlorhexidine 2% Cloths 1 Application(s) Topical <User Schedule>  influenza  Vaccine (HIGH DOSE) 0.5 milliLiter(s) IntraMuscular once  melatonin 5 milliGRAM(s) Oral at bedtime  polyethylene glycol 3350 17 Gram(s) Oral daily    MEDICATIONS  (PRN):  acetaminophen     Tablet .. 650 milliGRAM(s) Oral every 6 hours PRN Mild Pain (1 - 3)  ondansetron    Tablet 4 milliGRAM(s) Oral every 6 hours PRN Nausea and/or Vomiting  senna 1 Tablet(s) Oral at bedtime PRN Constipation    Vital Signs Last 24 Hrs  T(C): 36.7 (06 Dec 2024 11:12), Max: 37.3 (05 Dec 2024 20:20)  T(F): 98.1 (06 Dec 2024 11:12), Max: 99.1 (05 Dec 2024 20:20)  HR: 89 (06 Dec 2024 11:12) (85 - 91)  BP: 140/70 (06 Dec 2024 11:12) (126/78 - 157/71)  BP(mean): --  RR: 18 (06 Dec 2024 11:12) (18 - 18)  SpO2: 96% (06 Dec 2024 11:12) (95% - 96%)    Parameters below as of 06 Dec 2024 11:12  Patient On (Oxygen Delivery Method): room air    PE  NAD  Awake, alert  Anicteric, MMM  RRR  CTAB  Abd soft, NT, ND  No c/c/e  No rash grossly

## 2024-12-06 NOTE — DISCHARGE NOTE NURSING/CASE MANAGEMENT/SOCIAL WORK - NSDCPEFALRISK_GEN_ALL_CORE
For information on Fall & Injury Prevention, visit: [email or web address] OR  [email or web address] OR  https://www.cdc.gov/steadi/patient.html

## 2024-12-06 NOTE — DISCHARGE NOTE NURSING/CASE MANAGEMENT/SOCIAL WORK - FINANCIAL ASSISTANCE
[facility] provides services at a reduced cost to those who are determined to be eligible through [facility]’s financial assistance program. Information regarding [facility]’s financial assistance program can be found by going to [email or web address] or by calling [phone].

## 2024-12-06 NOTE — DISCHARGE NOTE NURSING/CASE MANAGEMENT/SOCIAL WORK - PATIENT PORTAL LINK FT
You can access the FollowMyHealth Patient Portal offered by [facility] by registering at the following website: [email or web address]. By joining [employer]’s FollowMyHealth portal, you will also be able to view your health information using other applications (apps) compatible with our system.

## 2024-12-06 NOTE — PROGRESS NOTE ADULT - REASON FOR ADMISSION
DARIN and PPM extraction.   MSSA bacteremia

## 2024-12-06 NOTE — DISCHARGE NOTE PROVIDER - NSDCFUADDAPPT_GEN_ALL_CORE_FT
APPTS ARE READY TO BE MADE: [x] YES    Best Family or Patient Contact (if needed):    Additional Information about above appointments (if needed):    1:   2:   3:     Other comments or requests:    APPTS ARE READY TO BE MADE: [x] YES    Best Family or Patient Contact (if needed):    Additional Information about above appointments (if needed):    1:   2:   3:     Other comments or requests:     Patient is being discharged to [facility] for [facility]. Caregiver will arrange follow up.

## 2024-12-06 NOTE — DISCHARGE NOTE PROVIDER - ATTENDING DISCHARGE PHYSICAL EXAMINATION:
Patient seen and examined. Plan as discussed w/ ACP Digna Aldridge: patient is stable for discharge to [facility] today; he will have close F/U w/ urology, EP, and ID as he continues 6 week course of IV ABx via midline w/ weekly labs to be monitored per ID's recommendations. All questions answered.   Vital Signs Last 24 Hrs  T(F): 98.3 (06 Dec 2024 13:28), Max: 99.1 (05 Dec 2024 20:20)  HR: 94 (06 Dec 2024 13:28) (85 - 94)  BP: 148/75 (06 Dec 2024 13:28) (126/78 - 157/71)  RR: 18 (06 Dec 2024 13:28) (18 - 18)  SpO2: 95% (06 Dec 2024 13:28) (95% - 96%)  Parameters below as of 06 Dec 2024 13:28  Patient On (Oxygen Delivery Method): room air  Physical Examination:  GEN: elderly man, sitting up in bed, in NAD  PSYCH: A&Ox3, mood and affect appear appropriate   NEURO: no focal neurologic deficits appreciated  RESPI: no accessory muscle use, B/L air entry   CARDIO: regular rate/rhythm, no LE edema B/L  ABD: soft, NT, ND  EXT: patient able to move all extremities spontaneously  VASC: peripheral pulses palpated

## 2024-12-06 NOTE — DISCHARGE NOTE PROVIDER - NSDCCPCAREPLAN_GEN_ALL_CORE_FT
PRINCIPAL DISCHARGE DIAGNOSIS  Diagnosis: MSSA bacteremia  Assessment and Plan of Treatment:   - Blood Cx (11/24): MSSA (4/4 bottles), Repeat blood Cx (11/26): NGTD  - Urine Cx (Suprapubic catheter) + MSSA  - TTE: no documented valvular or device vegetations, bioprosthetic aortic valve with Mild intravalvular regurgitation  - s/p PPM extraction per EP on 12/2  - ID recommendations appreciated: will plan for 6 weeks of cefazolin from 12/2/24, end date; 1/13/25; Will need weekly CBC, CMP and email results to [email or web address]; please follow up after discharge at Infectious Disease clinic.  You have a follow up appointment at [facility] on 12/16/24 at 9am.      SECONDARY DISCHARGE DIAGNOSES  Diagnosis: History of suprapubic catheter  Assessment and Plan of Treatment: S/p suprapubic catheter placement in 4/2024 (exchanges every 1 month) for urinary retention/prostate cancer.  Recommended to follow up with Dr Bogdan Hoang after discharge.

## 2024-12-06 NOTE — PROGRESS NOTE ADULT - PROVIDER SPECIALTY LIST ADULT
Heme/Onc
Hospitalist
Urology
Hospitalist
Urology
Urology
Electrophysiology
Heme/Onc
Heme/Onc
Infectious Disease
Urology
Heme/Onc
Hospitalist
Hospitalist

## 2024-12-06 NOTE — PROGRESS NOTE ADULT - NS ATTEND AMEND GEN_ALL_CORE FT
Agree with above assessment and plan.
likely will need long term abx. can resume orgovix and xtandi
Agree with above assessment and plan.   Prostate cancer on enzalutamide and orgovyx, may continue. Hematuria resolved, follow up with urology.   Follow up at [facility] after discharge
s/p PPM removal  On antibiotics for MSSA bacteremia  Had some hematuria this morning, urology following, Hgb stable

## 2024-12-06 NOTE — DISCHARGE NOTE PROVIDER - CARE PROVIDERS DIRECT ADDRESSES
[email or web address]

## 2024-12-10 ENCOUNTER — APPOINTMENT (OUTPATIENT)
Dept: UROLOGY | Facility: CLINIC | Age: 74
End: 2024-12-10

## 2024-12-13 ENCOUNTER — OUTPATIENT (OUTPATIENT)
Dept: OUTPATIENT SERVICES | Facility: HOSPITAL | Age: 74
LOS: 1 days | End: 2024-12-13
Payer: MEDICARE

## 2024-12-13 ENCOUNTER — TRANSCRIPTION ENCOUNTER (OUTPATIENT)
Age: 74
End: 2024-12-13

## 2024-12-13 VITALS
RESPIRATION RATE: 18 BRPM | HEIGHT: 70 IN | OXYGEN SATURATION: 96 % | SYSTOLIC BLOOD PRESSURE: 161 MMHG | HEART RATE: 89 BPM | WEIGHT: 197.09 LBS | DIASTOLIC BLOOD PRESSURE: 84 MMHG | TEMPERATURE: 98 F

## 2024-12-13 DIAGNOSIS — Z90.89 ACQUIRED ABSENCE OF OTHER ORGANS: Chronic | ICD-10-CM

## 2024-12-13 DIAGNOSIS — Z00.00 ENCOUNTER FOR GENERAL ADULT MEDICAL EXAMINATION WITHOUT ABNORMAL FINDINGS: ICD-10-CM

## 2024-12-13 DIAGNOSIS — Z90.79 ACQUIRED ABSENCE OF OTHER GENITAL ORGAN(S): Chronic | ICD-10-CM

## 2024-12-13 PROCEDURE — 36410 VNPNXR 3YR/> PHY/QHP DX/THER: CPT

## 2024-12-13 PROCEDURE — 76937 US GUIDE VASCULAR ACCESS: CPT | Mod: 26

## 2024-12-13 PROCEDURE — 76937 US GUIDE VASCULAR ACCESS: CPT

## 2024-12-13 PROCEDURE — C1751: CPT

## 2024-12-13 NOTE — ASU DISCHARGE PLAN (ADULT/PEDIATRIC) - NS MD DC FALL RISK RISK
For information on Fall & Injury Prevention, visit: [email or web address]/[email or web address]-[email or web address] OR  [email or web address] OR  https://www.cdc.gov/steadi/patient.html

## 2024-12-13 NOTE — PRE PROCEDURE NOTE - PRE PROCEDURE EVALUATION
Interventional Radiology    HPI: 74M, PMHx aortic stenosis s/p TAVR, HTN, prostate cancer s/p radiation therapy, prostatectomy, with metastasis to spine and pelvis, frequent urinary retention/Gr clogging now s/p suprapubic catheter with recent admission last month found to have MSSA bacteremia s/p PPM extraction on 12/2 and discharged to [facility] on 12/6 with antibiotics- planned for 6 week course (to complete 1/13/25). At rehab today, leaking noted from midline catheter and was removed. Pt presents to IR today for new midline placement.       Review of Systems:   Constitutional: No fever, weight loss, or fatigue  Neurological: No headaches, memory loss, loss of strength, numbness, or tremors  Respiratory: No cough, wheezing, chills or hemoptysis; No shortness of breath  Cardiovascular: No chest pain, palpitations, dizziness, or leg swelling  Gastrointestinal: No abdominal or epigastric pain. No nausea, vomiting, or hematemesis; No diarrhea or constipation. No melena or hematochezia.  Skin: No itching, burning, rashes, or lesions   Musculoskeletal: No joint pain or swelling; No muscle, back, or extremity pain    Allergies: penicillin V potassium (Rash)  penicillin (Diarrhea; Pruritus; Hives)    Medications (Abx/Cardiac/Anticoagulation/Blood Products)      Data:    T(C): --  HR: --  BP: --  RR: --  SpO2: --            Physical Exam  General: No acute distress, nontoxic, A&Ox3  Chest: Non labored breathing  Extremities: No swelling, warm,  pulses + . No pedal edema or calf tenderness notes.  Skin: No rashes or lesions    RADIOLOGY & ADDITIONAL TESTS:    Imaging Reviewed    H & P Note Reviewed from:12/1/24    Plan: 74y Male presents for midline placement  -Risks/Benefits/alternatives explained with the patient and/or healthcare proxy and witnessed informed consent obtained.

## 2024-12-13 NOTE — ASU PATIENT PROFILE, ADULT - FALL HARM RISK - RISK INTERVENTIONS

## 2024-12-13 NOTE — ASU DISCHARGE PLAN (ADULT/PEDIATRIC) - ASU DC SPECIAL INSTRUCTIONSFT
Discharge Instructions- Midline Catheter Placement:    - You have had a midline catheter placed  - The catheter is ready for use.  - You may shower as long as the catheter and dressing remains dry.  -  No soaking or swimming until the catheter is removed or when the site is completely healed.  - Keep the area covered and dry for as long as the catheter remains in. It may be removed and changed by a nurse as needed.  - Do not perform any heavy lifting or put tension on the area for the next week or until the site is healed.  - You may resume your normal diet.  - You may resume your normal medications however you should wait 48 hours before restarting aspirin, plavix, or blood thinners.  - It is normal to experience some pain over the site for the next few days. You may take apply ice to the area (20 minutes on, 20 minutes off) and take Tylenol for that pain. Do not take more frequently than every 6 hours and do not exceed more than 3000mg of Tylenol in a 24 hour period.    Notify your primary physician and/or Interventional Radiology IMMEDIATELY if you experience any of the following       - Fever of 101F or 38C       - Chills or Rigors/ Shakes       - Swelling and/or Redness in the area around the port       - Worsening Pain       - Blood soaked bandages or worsening bleeding       - Lightheadedness and/or dizziness upon standing       - Chest Pain/ Tightness       - Shortness of Breath       - Difficulty walking    If you have a problem that you believe requires IMMEDIATE attention, please go to your NEAREST Emergency Room. If you believe your problem can safely wait until you speak to a physician, please call Interventional Radiology for any concerns.    Please feel free to contact us at [phone] if any problems arise. After 6PM, Monday through Friday, on weekends and on holidays, please call [phone] and ask for the radiology resident on call to be paged.

## 2024-12-13 NOTE — ASU PATIENT PROFILE, ADULT - NSICDXPASTMEDICALHX_GEN_ALL_CORE_FT
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

## 2024-12-13 NOTE — ASU DISCHARGE PLAN (ADULT/PEDIATRIC) - FINANCIAL ASSISTANCE
[facility] provides services at a reduced cost to those who are determined to be eligible through [facility]’s financial assistance program. Information regarding [facility]’s financial assistance program can be found by going to [email or web address] or by calling [phone].

## 2024-12-16 ENCOUNTER — APPOINTMENT (OUTPATIENT)
Dept: ELECTROPHYSIOLOGY | Facility: CLINIC | Age: 74
End: 2024-12-16

## 2024-12-18 ENCOUNTER — APPOINTMENT (OUTPATIENT)
Dept: CARDIOLOGY | Facility: CLINIC | Age: 74
End: 2024-12-18

## 2024-12-20 DIAGNOSIS — R78.81 BACTEREMIA: ICD-10-CM

## 2024-12-20 DIAGNOSIS — B95.61 METHICILLIN SUSCEPTIBLE STAPHYLOCOCCUS AUREUS INFECTION AS THE CAUSE OF DISEASES CLASSIFIED ELSEWHERE: ICD-10-CM

## 2024-12-20 NOTE — PROGRESS NOTE ADULT - PROBLEM SELECTOR PROBLEM 9
Provider addressed results with patient during visit.
Cardiac pacemaker

## 2024-12-31 ENCOUNTER — NON-APPOINTMENT (OUTPATIENT)
Age: 74
End: 2024-12-31

## 2025-01-02 ENCOUNTER — APPOINTMENT (OUTPATIENT)
Dept: UROLOGY | Facility: CLINIC | Age: 75
End: 2025-01-02
Payer: MEDICARE

## 2025-01-02 ENCOUNTER — OUTPATIENT (OUTPATIENT)
Dept: OUTPATIENT SERVICES | Facility: HOSPITAL | Age: 75
LOS: 1 days | End: 2025-01-02
Payer: MEDICARE

## 2025-01-02 VITALS
HEART RATE: 79 BPM | HEIGHT: 61 IN | OXYGEN SATURATION: 98 % | BODY MASS INDEX: 38.89 KG/M2 | WEIGHT: 206 LBS | RESPIRATION RATE: 16 BRPM | SYSTOLIC BLOOD PRESSURE: 125 MMHG | DIASTOLIC BLOOD PRESSURE: 77 MMHG

## 2025-01-02 DIAGNOSIS — C61 MALIGNANT NEOPLASM OF PROSTATE: ICD-10-CM

## 2025-01-02 DIAGNOSIS — R33.9 RETENTION OF URINE, UNSPECIFIED: ICD-10-CM

## 2025-01-02 DIAGNOSIS — N30.40 IRRADIATION CYSTITIS W/OUT HEMATURIA: ICD-10-CM

## 2025-01-02 DIAGNOSIS — R35.0 FREQUENCY OF MICTURITION: ICD-10-CM

## 2025-01-02 DIAGNOSIS — Z90.89 ACQUIRED ABSENCE OF OTHER ORGANS: Chronic | ICD-10-CM

## 2025-01-02 DIAGNOSIS — Z90.79 ACQUIRED ABSENCE OF OTHER GENITAL ORGAN(S): Chronic | ICD-10-CM

## 2025-01-02 PROCEDURE — 99214 OFFICE O/P EST MOD 30 MIN: CPT | Mod: 25

## 2025-01-02 PROCEDURE — 51705 CHANGE OF BLADDER TUBE: CPT

## 2025-01-02 PROCEDURE — C2627: CPT

## 2025-01-03 DIAGNOSIS — R33.9 RETENTION OF URINE, UNSPECIFIED: ICD-10-CM

## 2025-01-03 DIAGNOSIS — N30.40 IRRADIATION CYSTITIS WITHOUT HEMATURIA: ICD-10-CM

## 2025-01-03 DIAGNOSIS — C61 MALIGNANT NEOPLASM OF PROSTATE: ICD-10-CM

## 2025-01-28 ENCOUNTER — APPOINTMENT (OUTPATIENT)
Dept: ORTHOPEDIC SURGERY | Facility: CLINIC | Age: 75
End: 2025-01-28
Payer: MEDICARE

## 2025-01-28 DIAGNOSIS — M75.121 COMPLETE ROTATOR CUFF TEAR OR RUPTURE OF RIGHT SHOULDER, NOT SPECIFIED AS TRAUMATIC: ICD-10-CM

## 2025-01-28 DIAGNOSIS — M19.012 PRIMARY OSTEOARTHRITIS, LEFT SHOULDER: ICD-10-CM

## 2025-01-28 DIAGNOSIS — M19.011 PRIMARY OSTEOARTHRITIS, RIGHT SHOULDER: ICD-10-CM

## 2025-01-28 PROCEDURE — 20611 DRAIN/INJ JOINT/BURSA W/US: CPT | Mod: 50

## 2025-01-28 PROCEDURE — 99213 OFFICE O/P EST LOW 20 MIN: CPT | Mod: 25

## 2025-01-28 PROCEDURE — J3490M: CUSTOM | Mod: JZ

## 2025-01-29 ENCOUNTER — APPOINTMENT (OUTPATIENT)
Dept: UROLOGY | Facility: CLINIC | Age: 75
End: 2025-01-29
Payer: MEDICARE

## 2025-01-29 ENCOUNTER — APPOINTMENT (OUTPATIENT)
Dept: UROLOGY | Facility: CLINIC | Age: 75
End: 2025-01-29

## 2025-01-29 ENCOUNTER — OUTPATIENT (OUTPATIENT)
Dept: OUTPATIENT SERVICES | Facility: HOSPITAL | Age: 75
LOS: 1 days | End: 2025-01-29
Payer: MEDICARE

## 2025-01-29 VITALS
BODY MASS INDEX: 27.77 KG/M2 | WEIGHT: 194 LBS | HEIGHT: 70 IN | OXYGEN SATURATION: 96 % | RESPIRATION RATE: 16 BRPM | HEART RATE: 100 BPM | DIASTOLIC BLOOD PRESSURE: 71 MMHG | SYSTOLIC BLOOD PRESSURE: 114 MMHG | TEMPERATURE: 97.3 F

## 2025-01-29 DIAGNOSIS — Z90.79 ACQUIRED ABSENCE OF OTHER GENITAL ORGAN(S): Chronic | ICD-10-CM

## 2025-01-29 DIAGNOSIS — R35.0 FREQUENCY OF MICTURITION: ICD-10-CM

## 2025-01-29 DIAGNOSIS — Z85.46 PERSONAL HISTORY OF MALIGNANT NEOPLASM OF PROSTATE: ICD-10-CM

## 2025-01-29 DIAGNOSIS — Z90.89 ACQUIRED ABSENCE OF OTHER ORGANS: Chronic | ICD-10-CM

## 2025-01-29 DIAGNOSIS — R31.0 GROSS HEMATURIA: ICD-10-CM

## 2025-01-29 PROCEDURE — C2627: CPT

## 2025-01-29 PROCEDURE — 51705 CHANGE OF BLADDER TUBE: CPT

## 2025-01-30 RX ORDER — NITROFURANTOIN MACROCRYSTALS 50 MG/1
50 CAPSULE ORAL
Qty: 90 | Refills: 3 | Status: ACTIVE | COMMUNITY
Start: 2025-01-30 | End: 1900-01-01

## 2025-01-31 DIAGNOSIS — Z85.46 PERSONAL HISTORY OF MALIGNANT NEOPLASM OF PROSTATE: ICD-10-CM

## 2025-01-31 DIAGNOSIS — R31.0 GROSS HEMATURIA: ICD-10-CM

## 2025-02-03 LAB — BACTERIA UR CULT: ABNORMAL

## 2025-02-03 RX ORDER — SULFAMETHOXAZOLE AND TRIMETHOPRIM 800; 160 MG/1; MG/1
800-160 TABLET ORAL TWICE DAILY
Qty: 14 | Refills: 0 | Status: DISCONTINUED | COMMUNITY
Start: 2025-01-30 | End: 2025-02-03

## 2025-02-03 RX ORDER — CIPROFLOXACIN HYDROCHLORIDE 500 MG/1
500 TABLET, FILM COATED ORAL
Qty: 14 | Refills: 0 | Status: ACTIVE | COMMUNITY
Start: 2025-02-03 | End: 1900-01-01

## 2025-02-12 ENCOUNTER — APPOINTMENT (OUTPATIENT)
Dept: CARDIOLOGY | Facility: CLINIC | Age: 75
End: 2025-02-12
Payer: MEDICARE

## 2025-02-12 ENCOUNTER — NON-APPOINTMENT (OUTPATIENT)
Age: 75
End: 2025-02-12

## 2025-02-12 VITALS
BODY MASS INDEX: 27.77 KG/M2 | SYSTOLIC BLOOD PRESSURE: 107 MMHG | WEIGHT: 194 LBS | HEIGHT: 70 IN | HEART RATE: 86 BPM | DIASTOLIC BLOOD PRESSURE: 69 MMHG | OXYGEN SATURATION: 98 %

## 2025-02-12 DIAGNOSIS — Z09 ENCOUNTER FOR FOLLOW-UP EXAMINATION AFTER COMPLETED TREATMENT FOR CONDITIONS OTHER THAN MALIGNANT NEOPLASM: ICD-10-CM

## 2025-02-12 DIAGNOSIS — I35.0 NONRHEUMATIC AORTIC (VALVE) STENOSIS: ICD-10-CM

## 2025-02-12 PROCEDURE — 99214 OFFICE O/P EST MOD 30 MIN: CPT | Mod: 25

## 2025-02-12 PROCEDURE — 93000 ELECTROCARDIOGRAM COMPLETE: CPT

## 2025-02-13 PROBLEM — Z09 HOSPITAL DISCHARGE FOLLOW-UP: Status: ACTIVE | Noted: 2025-02-13

## 2025-02-27 ENCOUNTER — APPOINTMENT (OUTPATIENT)
Dept: UROLOGY | Facility: CLINIC | Age: 75
End: 2025-02-27
Payer: MEDICARE

## 2025-02-27 ENCOUNTER — OUTPATIENT (OUTPATIENT)
Dept: OUTPATIENT SERVICES | Facility: HOSPITAL | Age: 75
LOS: 1 days | End: 2025-02-27
Payer: MEDICARE

## 2025-02-27 VITALS — DIASTOLIC BLOOD PRESSURE: 72 MMHG | SYSTOLIC BLOOD PRESSURE: 130 MMHG | HEART RATE: 84 BPM | OXYGEN SATURATION: 97 %

## 2025-02-27 DIAGNOSIS — R35.0 FREQUENCY OF MICTURITION: ICD-10-CM

## 2025-02-27 DIAGNOSIS — Z90.79 ACQUIRED ABSENCE OF OTHER GENITAL ORGAN(S): Chronic | ICD-10-CM

## 2025-02-27 DIAGNOSIS — Z90.89 ACQUIRED ABSENCE OF OTHER ORGANS: Chronic | ICD-10-CM

## 2025-02-27 DIAGNOSIS — R33.9 RETENTION OF URINE, UNSPECIFIED: ICD-10-CM

## 2025-02-27 PROCEDURE — 51705 CHANGE OF BLADDER TUBE: CPT

## 2025-03-03 ENCOUNTER — NON-APPOINTMENT (OUTPATIENT)
Age: 75
End: 2025-03-03

## 2025-03-03 ENCOUNTER — APPOINTMENT (OUTPATIENT)
Dept: ELECTROPHYSIOLOGY | Facility: CLINIC | Age: 75
End: 2025-03-03
Payer: MEDICARE

## 2025-03-03 PROCEDURE — 93294 REM INTERROG EVL PM/LDLS PM: CPT

## 2025-03-03 PROCEDURE — 93296 REM INTERROG EVL PM/IDS: CPT

## 2025-03-04 ENCOUNTER — APPOINTMENT (OUTPATIENT)
Dept: CARDIOLOGY | Facility: CLINIC | Age: 75
End: 2025-03-04
Payer: MEDICARE

## 2025-03-04 PROCEDURE — 93306 TTE W/DOPPLER COMPLETE: CPT

## 2025-03-05 DIAGNOSIS — R33.9 RETENTION OF URINE, UNSPECIFIED: ICD-10-CM

## 2025-03-12 ENCOUNTER — NON-APPOINTMENT (OUTPATIENT)
Age: 75
End: 2025-03-12

## 2025-03-12 ENCOUNTER — APPOINTMENT (OUTPATIENT)
Dept: CARDIOLOGY | Facility: CLINIC | Age: 75
End: 2025-03-12

## 2025-03-12 VITALS
WEIGHT: 194 LBS | HEIGHT: 70 IN | SYSTOLIC BLOOD PRESSURE: 111 MMHG | HEART RATE: 83 BPM | DIASTOLIC BLOOD PRESSURE: 69 MMHG | TEMPERATURE: 98.1 F | OXYGEN SATURATION: 94 % | BODY MASS INDEX: 27.77 KG/M2

## 2025-03-12 DIAGNOSIS — Z95.0 PRESENCE OF CARDIAC PACEMAKER: ICD-10-CM

## 2025-03-12 DIAGNOSIS — I44.2 ATRIOVENTRICULAR BLOCK, COMPLETE: ICD-10-CM

## 2025-03-12 PROCEDURE — 93288 INTERROG EVL PM/LDLS PM IP: CPT

## 2025-03-20 ENCOUNTER — APPOINTMENT (OUTPATIENT)
Dept: UROLOGY | Facility: CLINIC | Age: 75
End: 2025-03-20
Payer: MEDICARE

## 2025-03-20 ENCOUNTER — OUTPATIENT (OUTPATIENT)
Dept: OUTPATIENT SERVICES | Facility: HOSPITAL | Age: 75
LOS: 1 days | End: 2025-03-20
Payer: MEDICARE

## 2025-03-20 VITALS — SYSTOLIC BLOOD PRESSURE: 103 MMHG | TEMPERATURE: 98.3 F | HEART RATE: 102 BPM | DIASTOLIC BLOOD PRESSURE: 66 MMHG

## 2025-03-20 DIAGNOSIS — Z90.79 ACQUIRED ABSENCE OF OTHER GENITAL ORGAN(S): Chronic | ICD-10-CM

## 2025-03-20 DIAGNOSIS — R35.0 FREQUENCY OF MICTURITION: ICD-10-CM

## 2025-03-20 DIAGNOSIS — Z90.89 ACQUIRED ABSENCE OF OTHER ORGANS: Chronic | ICD-10-CM

## 2025-03-20 DIAGNOSIS — N30.40 IRRADIATION CYSTITIS W/OUT HEMATURIA: ICD-10-CM

## 2025-03-20 DIAGNOSIS — R33.9 RETENTION OF URINE, UNSPECIFIED: ICD-10-CM

## 2025-03-20 PROCEDURE — C2627: CPT

## 2025-03-20 PROCEDURE — 51705 CHANGE OF BLADDER TUBE: CPT

## 2025-03-21 DIAGNOSIS — N30.40 IRRADIATION CYSTITIS WITHOUT HEMATURIA: ICD-10-CM

## 2025-03-21 DIAGNOSIS — R33.9 RETENTION OF URINE, UNSPECIFIED: ICD-10-CM

## 2025-03-25 NOTE — ED ADULT NURSE NOTE - BREATHING, MLM
How Did The Hair Loss Occur?: gradual in onset How Severe Is Your Hair Loss?: moderate Spontaneous, unlabored and symmetrical

## 2025-03-27 ENCOUNTER — APPOINTMENT (OUTPATIENT)
Dept: UROLOGY | Facility: CLINIC | Age: 75
End: 2025-03-27

## 2025-04-02 ENCOUNTER — APPOINTMENT (OUTPATIENT)
Dept: UROLOGY | Facility: CLINIC | Age: 75
End: 2025-04-02
Payer: MEDICARE

## 2025-04-02 ENCOUNTER — OUTPATIENT (OUTPATIENT)
Dept: OUTPATIENT SERVICES | Facility: HOSPITAL | Age: 75
LOS: 1 days | End: 2025-04-02
Payer: MEDICARE

## 2025-04-02 VITALS — SYSTOLIC BLOOD PRESSURE: 139 MMHG | DIASTOLIC BLOOD PRESSURE: 70 MMHG | HEART RATE: 90 BPM

## 2025-04-02 DIAGNOSIS — Z90.79 ACQUIRED ABSENCE OF OTHER GENITAL ORGAN(S): Chronic | ICD-10-CM

## 2025-04-02 DIAGNOSIS — Z85.46 PERSONAL HISTORY OF MALIGNANT NEOPLASM OF PROSTATE: ICD-10-CM

## 2025-04-02 DIAGNOSIS — R33.9 RETENTION OF URINE, UNSPECIFIED: ICD-10-CM

## 2025-04-02 DIAGNOSIS — Z90.89 ACQUIRED ABSENCE OF OTHER ORGANS: Chronic | ICD-10-CM

## 2025-04-02 DIAGNOSIS — R35.0 FREQUENCY OF MICTURITION: ICD-10-CM

## 2025-04-02 PROCEDURE — 51705 CHANGE OF BLADDER TUBE: CPT

## 2025-04-02 PROCEDURE — C2627: CPT

## 2025-04-03 ENCOUNTER — APPOINTMENT (OUTPATIENT)
Dept: CARDIOLOGY | Facility: CLINIC | Age: 75
End: 2025-04-03
Payer: MEDICARE

## 2025-04-03 ENCOUNTER — NON-APPOINTMENT (OUTPATIENT)
Age: 75
End: 2025-04-03

## 2025-04-03 VITALS
WEIGHT: 200 LBS | DIASTOLIC BLOOD PRESSURE: 79 MMHG | OXYGEN SATURATION: 96 % | SYSTOLIC BLOOD PRESSURE: 143 MMHG | HEIGHT: 70 IN | BODY MASS INDEX: 28.63 KG/M2 | HEART RATE: 94 BPM

## 2025-04-03 DIAGNOSIS — R33.9 RETENTION OF URINE, UNSPECIFIED: ICD-10-CM

## 2025-04-03 DIAGNOSIS — I35.0 NONRHEUMATIC AORTIC (VALVE) STENOSIS: ICD-10-CM

## 2025-04-03 PROCEDURE — 93000 ELECTROCARDIOGRAM COMPLETE: CPT

## 2025-04-03 PROCEDURE — 99214 OFFICE O/P EST MOD 30 MIN: CPT | Mod: 25

## 2025-05-01 ENCOUNTER — APPOINTMENT (OUTPATIENT)
Dept: UROLOGY | Facility: CLINIC | Age: 75
End: 2025-05-01
Payer: MEDICARE

## 2025-05-01 ENCOUNTER — OUTPATIENT (OUTPATIENT)
Dept: OUTPATIENT SERVICES | Facility: HOSPITAL | Age: 75
LOS: 1 days | End: 2025-05-01
Payer: MEDICARE

## 2025-05-01 VITALS — DIASTOLIC BLOOD PRESSURE: 69 MMHG | HEART RATE: 35 BPM | SYSTOLIC BLOOD PRESSURE: 125 MMHG

## 2025-05-01 DIAGNOSIS — R35.0 FREQUENCY OF MICTURITION: ICD-10-CM

## 2025-05-01 DIAGNOSIS — R33.9 RETENTION OF URINE, UNSPECIFIED: ICD-10-CM

## 2025-05-01 DIAGNOSIS — Z90.89 ACQUIRED ABSENCE OF OTHER ORGANS: Chronic | ICD-10-CM

## 2025-05-01 DIAGNOSIS — Z90.79 ACQUIRED ABSENCE OF OTHER GENITAL ORGAN(S): Chronic | ICD-10-CM

## 2025-05-01 PROCEDURE — 51705 CHANGE OF BLADDER TUBE: CPT

## 2025-05-01 PROCEDURE — C2627: CPT

## 2025-05-02 DIAGNOSIS — R33.9 RETENTION OF URINE, UNSPECIFIED: ICD-10-CM

## 2025-05-07 ENCOUNTER — APPOINTMENT (OUTPATIENT)
Dept: CARDIOLOGY | Facility: CLINIC | Age: 75
End: 2025-05-07

## 2025-05-09 ENCOUNTER — OUTPATIENT (OUTPATIENT)
Dept: OUTPATIENT SERVICES | Facility: HOSPITAL | Age: 75
LOS: 1 days | End: 2025-05-09
Payer: MEDICARE

## 2025-05-09 ENCOUNTER — APPOINTMENT (OUTPATIENT)
Dept: UROLOGY | Facility: CLINIC | Age: 75
End: 2025-05-09
Payer: MEDICARE

## 2025-05-09 DIAGNOSIS — R31.0 GROSS HEMATURIA: ICD-10-CM

## 2025-05-09 DIAGNOSIS — N30.40 IRRADIATION CYSTITIS W/OUT HEMATURIA: ICD-10-CM

## 2025-05-09 DIAGNOSIS — Z90.79 ACQUIRED ABSENCE OF OTHER GENITAL ORGAN(S): Chronic | ICD-10-CM

## 2025-05-09 DIAGNOSIS — Z90.89 ACQUIRED ABSENCE OF OTHER ORGANS: Chronic | ICD-10-CM

## 2025-05-09 DIAGNOSIS — R33.8 OTHER RETENTION OF URINE: ICD-10-CM

## 2025-05-09 PROCEDURE — 51700 IRRIGATION OF BLADDER: CPT

## 2025-05-09 PROCEDURE — 51710 CHANGE OF BLADDER TUBE: CPT

## 2025-05-09 PROCEDURE — C2627: CPT

## 2025-05-13 LAB — BACTERIA UR CULT: ABNORMAL

## 2025-05-15 DIAGNOSIS — R31.0 GROSS HEMATURIA: ICD-10-CM

## 2025-05-15 DIAGNOSIS — N30.40 IRRADIATION CYSTITIS WITHOUT HEMATURIA: ICD-10-CM

## 2025-05-15 DIAGNOSIS — R33.8 OTHER RETENTION OF URINE: ICD-10-CM

## 2025-05-20 ENCOUNTER — APPOINTMENT (OUTPATIENT)
Dept: ORTHOPEDIC SURGERY | Facility: CLINIC | Age: 75
End: 2025-05-20
Payer: MEDICARE

## 2025-05-20 VITALS — BODY MASS INDEX: 28.63 KG/M2 | WEIGHT: 200 LBS | HEIGHT: 70 IN

## 2025-05-20 DIAGNOSIS — M19.011 PRIMARY OSTEOARTHRITIS, RIGHT SHOULDER: ICD-10-CM

## 2025-05-20 DIAGNOSIS — M75.121 COMPLETE ROTATOR CUFF TEAR OR RUPTURE OF RIGHT SHOULDER, NOT SPECIFIED AS TRAUMATIC: ICD-10-CM

## 2025-05-20 DIAGNOSIS — M19.012 PRIMARY OSTEOARTHRITIS, LEFT SHOULDER: ICD-10-CM

## 2025-05-20 PROCEDURE — 99213 OFFICE O/P EST LOW 20 MIN: CPT | Mod: 25

## 2025-05-20 PROCEDURE — 20611 DRAIN/INJ JOINT/BURSA W/US: CPT | Mod: 50

## 2025-05-20 PROCEDURE — J3490M: CUSTOM | Mod: JZ

## 2025-05-27 ENCOUNTER — APPOINTMENT (OUTPATIENT)
Dept: UROLOGY | Facility: CLINIC | Age: 75
End: 2025-05-27
Payer: MEDICARE

## 2025-05-27 ENCOUNTER — OUTPATIENT (OUTPATIENT)
Dept: OUTPATIENT SERVICES | Facility: HOSPITAL | Age: 75
LOS: 1 days | End: 2025-05-27
Payer: MEDICARE

## 2025-05-27 VITALS
HEART RATE: 66 BPM | OXYGEN SATURATION: 98 % | RESPIRATION RATE: 17 BRPM | SYSTOLIC BLOOD PRESSURE: 112 MMHG | TEMPERATURE: 98 F | DIASTOLIC BLOOD PRESSURE: 71 MMHG

## 2025-05-27 DIAGNOSIS — R35.0 FREQUENCY OF MICTURITION: ICD-10-CM

## 2025-05-27 DIAGNOSIS — Z90.79 ACQUIRED ABSENCE OF OTHER GENITAL ORGAN(S): Chronic | ICD-10-CM

## 2025-05-27 DIAGNOSIS — Z85.46 PERSONAL HISTORY OF MALIGNANT NEOPLASM OF PROSTATE: ICD-10-CM

## 2025-05-27 DIAGNOSIS — Z90.89 ACQUIRED ABSENCE OF OTHER ORGANS: Chronic | ICD-10-CM

## 2025-05-27 DIAGNOSIS — R33.9 RETENTION OF URINE, UNSPECIFIED: ICD-10-CM

## 2025-05-27 PROCEDURE — 51700 IRRIGATION OF BLADDER: CPT

## 2025-05-27 PROCEDURE — 51700 IRRIGATION OF BLADDER: CPT | Mod: 59

## 2025-05-27 PROCEDURE — 51710 CHANGE OF BLADDER TUBE: CPT

## 2025-05-27 PROCEDURE — C2627: CPT

## 2025-05-28 DIAGNOSIS — R33.9 RETENTION OF URINE, UNSPECIFIED: ICD-10-CM

## 2025-06-02 ENCOUNTER — APPOINTMENT (OUTPATIENT)
Dept: ELECTROPHYSIOLOGY | Facility: CLINIC | Age: 75
End: 2025-06-02
Payer: MEDICARE

## 2025-06-02 ENCOUNTER — NON-APPOINTMENT (OUTPATIENT)
Age: 75
End: 2025-06-02

## 2025-06-02 PROCEDURE — 93296 REM INTERROG EVL PM/IDS: CPT

## 2025-06-02 PROCEDURE — 93294 REM INTERROG EVL PM/LDLS PM: CPT

## 2025-06-24 ENCOUNTER — APPOINTMENT (OUTPATIENT)
Dept: UROLOGY | Facility: CLINIC | Age: 75
End: 2025-06-24

## 2025-06-24 ENCOUNTER — APPOINTMENT (OUTPATIENT)
Dept: UROLOGY | Facility: CLINIC | Age: 75
End: 2025-06-24
Payer: MEDICARE

## 2025-06-24 ENCOUNTER — OUTPATIENT (OUTPATIENT)
Dept: OUTPATIENT SERVICES | Facility: HOSPITAL | Age: 75
LOS: 1 days | End: 2025-06-24
Payer: MEDICARE

## 2025-06-24 VITALS
TEMPERATURE: 97.5 F | OXYGEN SATURATION: 100 % | DIASTOLIC BLOOD PRESSURE: 73 MMHG | HEART RATE: 70 BPM | SYSTOLIC BLOOD PRESSURE: 114 MMHG | RESPIRATION RATE: 16 BRPM

## 2025-06-24 DIAGNOSIS — R35.0 FREQUENCY OF MICTURITION: ICD-10-CM

## 2025-06-24 DIAGNOSIS — Z90.79 ACQUIRED ABSENCE OF OTHER GENITAL ORGAN(S): Chronic | ICD-10-CM

## 2025-06-24 DIAGNOSIS — Z90.89 ACQUIRED ABSENCE OF OTHER ORGANS: Chronic | ICD-10-CM

## 2025-06-24 PROBLEM — N13.30 HYDRONEPHROSIS, LEFT: Status: ACTIVE | Noted: 2025-06-24

## 2025-06-24 PROCEDURE — 51705 CHANGE OF BLADDER TUBE: CPT

## 2025-06-24 PROCEDURE — C2627: CPT

## 2025-06-24 PROCEDURE — 99214 OFFICE O/P EST MOD 30 MIN: CPT | Mod: 25

## 2025-06-24 PROCEDURE — 76775 US EXAM ABDO BACK WALL LIM: CPT | Mod: 26

## 2025-06-25 DIAGNOSIS — R33.9 RETENTION OF URINE, UNSPECIFIED: ICD-10-CM

## 2025-06-25 DIAGNOSIS — N30.40 IRRADIATION CYSTITIS WITHOUT HEMATURIA: ICD-10-CM

## 2025-06-25 DIAGNOSIS — N13.30 UNSPECIFIED HYDRONEPHROSIS: ICD-10-CM

## 2025-06-25 DIAGNOSIS — C61 MALIGNANT NEOPLASM OF PROSTATE: ICD-10-CM

## 2025-06-30 ENCOUNTER — APPOINTMENT (OUTPATIENT)
Dept: CARDIOLOGY | Facility: CLINIC | Age: 75
End: 2025-06-30
Payer: MEDICARE

## 2025-06-30 VITALS
HEART RATE: 77 BPM | WEIGHT: 185 LBS | BODY MASS INDEX: 26.48 KG/M2 | HEIGHT: 70 IN | OXYGEN SATURATION: 94 % | SYSTOLIC BLOOD PRESSURE: 132 MMHG | DIASTOLIC BLOOD PRESSURE: 79 MMHG

## 2025-06-30 PROCEDURE — 99214 OFFICE O/P EST MOD 30 MIN: CPT | Mod: 25

## 2025-06-30 PROCEDURE — 93000 ELECTROCARDIOGRAM COMPLETE: CPT

## 2025-07-01 ENCOUNTER — OUTPATIENT (OUTPATIENT)
Dept: OUTPATIENT SERVICES | Facility: HOSPITAL | Age: 75
LOS: 1 days | End: 2025-07-01
Payer: MEDICARE

## 2025-07-01 ENCOUNTER — APPOINTMENT (OUTPATIENT)
Dept: NUCLEAR MEDICINE | Facility: HOSPITAL | Age: 75
End: 2025-07-01

## 2025-07-01 DIAGNOSIS — N13.30 UNSPECIFIED HYDRONEPHROSIS: ICD-10-CM

## 2025-07-01 PROCEDURE — 78708 K FLOW/FUNCT IMAGE W/DRUG: CPT | Mod: 26

## 2025-07-02 ENCOUNTER — NON-APPOINTMENT (OUTPATIENT)
Age: 75
End: 2025-07-02

## 2025-07-08 ENCOUNTER — APPOINTMENT (OUTPATIENT)
Dept: OTOLARYNGOLOGY | Facility: CLINIC | Age: 75
End: 2025-07-08

## 2025-07-14 ENCOUNTER — APPOINTMENT (OUTPATIENT)
Dept: OTOLARYNGOLOGY | Facility: CLINIC | Age: 75
End: 2025-07-14

## 2025-07-22 ENCOUNTER — APPOINTMENT (OUTPATIENT)
Dept: OTOLARYNGOLOGY | Facility: CLINIC | Age: 75
End: 2025-07-22
Payer: MEDICARE

## 2025-07-22 VITALS
BODY MASS INDEX: 26.63 KG/M2 | HEART RATE: 81 BPM | SYSTOLIC BLOOD PRESSURE: 193 MMHG | WEIGHT: 186 LBS | HEIGHT: 70 IN | DIASTOLIC BLOOD PRESSURE: 66 MMHG

## 2025-07-22 VITALS — SYSTOLIC BLOOD PRESSURE: 145 MMHG | DIASTOLIC BLOOD PRESSURE: 70 MMHG

## 2025-07-22 DIAGNOSIS — H90.3 SENSORINEURAL HEARING LOSS, BILATERAL: ICD-10-CM

## 2025-07-22 PROCEDURE — 99213 OFFICE O/P EST LOW 20 MIN: CPT

## 2025-07-22 PROCEDURE — 92557 COMPREHENSIVE HEARING TEST: CPT

## 2025-07-22 PROCEDURE — 92567 TYMPANOMETRY: CPT

## 2025-07-24 ENCOUNTER — APPOINTMENT (OUTPATIENT)
Dept: UROLOGY | Facility: CLINIC | Age: 75
End: 2025-07-24
Payer: MEDICARE

## 2025-07-24 ENCOUNTER — OUTPATIENT (OUTPATIENT)
Dept: OUTPATIENT SERVICES | Facility: HOSPITAL | Age: 75
LOS: 1 days | End: 2025-07-24
Payer: MEDICARE

## 2025-07-24 VITALS — HEART RATE: 69 BPM | DIASTOLIC BLOOD PRESSURE: 73 MMHG | SYSTOLIC BLOOD PRESSURE: 177 MMHG

## 2025-07-24 DIAGNOSIS — N30.40 IRRADIATION CYSTITIS W/OUT HEMATURIA: ICD-10-CM

## 2025-07-24 DIAGNOSIS — Z90.89 ACQUIRED ABSENCE OF OTHER ORGANS: Chronic | ICD-10-CM

## 2025-07-24 DIAGNOSIS — R35.0 FREQUENCY OF MICTURITION: ICD-10-CM

## 2025-07-24 DIAGNOSIS — Z90.79 ACQUIRED ABSENCE OF OTHER GENITAL ORGAN(S): Chronic | ICD-10-CM

## 2025-07-24 PROCEDURE — 51705 CHANGE OF BLADDER TUBE: CPT

## 2025-07-24 PROCEDURE — C2627: CPT

## 2025-07-28 DIAGNOSIS — N30.40 IRRADIATION CYSTITIS WITHOUT HEMATURIA: ICD-10-CM

## 2025-07-28 DIAGNOSIS — R33.9 RETENTION OF URINE, UNSPECIFIED: ICD-10-CM

## 2025-08-10 ENCOUNTER — EMERGENCY (EMERGENCY)
Facility: HOSPITAL | Age: 75
LOS: 1 days | End: 2025-08-10
Attending: STUDENT IN AN ORGANIZED HEALTH CARE EDUCATION/TRAINING PROGRAM | Admitting: STUDENT IN AN ORGANIZED HEALTH CARE EDUCATION/TRAINING PROGRAM
Payer: MEDICARE

## 2025-08-10 VITALS
WEIGHT: 175.05 LBS | TEMPERATURE: 98 F | SYSTOLIC BLOOD PRESSURE: 154 MMHG | HEART RATE: 71 BPM | RESPIRATION RATE: 16 BRPM | DIASTOLIC BLOOD PRESSURE: 64 MMHG | HEIGHT: 70 IN | OXYGEN SATURATION: 95 %

## 2025-08-10 VITALS
HEART RATE: 76 BPM | TEMPERATURE: 98 F | DIASTOLIC BLOOD PRESSURE: 56 MMHG | OXYGEN SATURATION: 98 % | RESPIRATION RATE: 16 BRPM | SYSTOLIC BLOOD PRESSURE: 124 MMHG

## 2025-08-10 DIAGNOSIS — Z90.79 ACQUIRED ABSENCE OF OTHER GENITAL ORGAN(S): Chronic | ICD-10-CM

## 2025-08-10 DIAGNOSIS — Z90.89 ACQUIRED ABSENCE OF OTHER ORGANS: Chronic | ICD-10-CM

## 2025-08-10 LAB
ALBUMIN SERPL ELPH-MCNC: 3.5 G/DL — SIGNIFICANT CHANGE UP (ref 3.3–5)
ALP SERPL-CCNC: 1144 U/L — HIGH (ref 40–120)
ALT FLD-CCNC: 7 U/L — SIGNIFICANT CHANGE UP (ref 4–41)
ANION GAP SERPL CALC-SCNC: 16 MMOL/L — HIGH (ref 7–14)
APPEARANCE UR: ABNORMAL
AST SERPL-CCNC: 32 U/L — SIGNIFICANT CHANGE UP (ref 4–40)
BACTERIA # UR AUTO: ABNORMAL /HPF
BILIRUB SERPL-MCNC: 0.7 MG/DL — SIGNIFICANT CHANGE UP (ref 0.2–1.2)
BILIRUB UR-MCNC: NEGATIVE — SIGNIFICANT CHANGE UP
BUN SERPL-MCNC: 14 MG/DL — SIGNIFICANT CHANGE UP (ref 7–23)
CALCIUM SERPL-MCNC: 8.7 MG/DL — SIGNIFICANT CHANGE UP (ref 8.4–10.5)
CHLORIDE SERPL-SCNC: 99 MMOL/L — SIGNIFICANT CHANGE UP (ref 98–107)
CO2 SERPL-SCNC: 18 MMOL/L — LOW (ref 22–31)
COLOR SPEC: YELLOW — SIGNIFICANT CHANGE UP
CREAT SERPL-MCNC: 0.61 MG/DL — SIGNIFICANT CHANGE UP (ref 0.5–1.3)
DIFF PNL FLD: NEGATIVE — SIGNIFICANT CHANGE UP
EGFR: 100 ML/MIN/1.73M2 — SIGNIFICANT CHANGE UP
EGFR: 100 ML/MIN/1.73M2 — SIGNIFICANT CHANGE UP
GLUCOSE SERPL-MCNC: 93 MG/DL — SIGNIFICANT CHANGE UP (ref 70–99)
GLUCOSE UR QL: NEGATIVE MG/DL — SIGNIFICANT CHANGE UP
HCT VFR BLD CALC: 31.5 % — LOW (ref 39–50)
HGB BLD-MCNC: 10.4 G/DL — LOW (ref 13–17)
KETONES UR QL: 15 MG/DL
LEUKOCYTE ESTERASE UR-ACNC: ABNORMAL
MCHC RBC-ENTMCNC: 30.3 PG — SIGNIFICANT CHANGE UP (ref 27–34)
MCHC RBC-ENTMCNC: 33 G/DL — SIGNIFICANT CHANGE UP (ref 32–36)
MCV RBC AUTO: 91.8 FL — SIGNIFICANT CHANGE UP (ref 80–100)
NITRITE UR-MCNC: NEGATIVE — SIGNIFICANT CHANGE UP
NRBC # BLD AUTO: 0 K/UL — SIGNIFICANT CHANGE UP (ref 0–0)
NRBC # FLD: 0 K/UL — SIGNIFICANT CHANGE UP (ref 0–0)
NRBC BLD AUTO-RTO: 0 /100 WBCS — SIGNIFICANT CHANGE UP (ref 0–0)
PH UR: >=9 (ref 5–8)
PLATELET # BLD AUTO: 169 K/UL — SIGNIFICANT CHANGE UP (ref 150–400)
PMV BLD: 9.2 FL — SIGNIFICANT CHANGE UP (ref 7–13)
POTASSIUM SERPL-MCNC: 3.7 MMOL/L — SIGNIFICANT CHANGE UP (ref 3.5–5.3)
POTASSIUM SERPL-SCNC: 3.7 MMOL/L — SIGNIFICANT CHANGE UP (ref 3.5–5.3)
PROT SERPL-MCNC: 6.4 G/DL — SIGNIFICANT CHANGE UP (ref 6–8.3)
PROT UR-MCNC: 100 MG/DL
RBC # BLD: 3.43 M/UL — LOW (ref 4.2–5.8)
RBC # FLD: 15 % — HIGH (ref 10.3–14.5)
RBC CASTS # UR COMP ASSIST: 0 /HPF — SIGNIFICANT CHANGE UP (ref 0–4)
SODIUM SERPL-SCNC: 133 MMOL/L — LOW (ref 135–145)
SP GR SPEC: 1.02 — SIGNIFICANT CHANGE UP (ref 1–1.03)
TRI-PHOS CRY UR QL COMP ASSIST: PRESENT
UROBILINOGEN FLD QL: 1 MG/DL — SIGNIFICANT CHANGE UP (ref 0.2–1)
WBC # BLD: 5.89 K/UL — SIGNIFICANT CHANGE UP (ref 3.8–10.5)
WBC # FLD AUTO: 5.89 K/UL — SIGNIFICANT CHANGE UP (ref 3.8–10.5)
WBC UR QL: 1 /HPF — SIGNIFICANT CHANGE UP (ref 0–5)

## 2025-08-10 PROCEDURE — 99284 EMERGENCY DEPT VISIT MOD MDM: CPT

## 2025-08-10 RX ORDER — CEFDINIR 250 MG/5ML
1 POWDER, FOR SUSPENSION ORAL
Qty: 14 | Refills: 0
Start: 2025-08-10 | End: 2025-08-16

## 2025-08-10 RX ORDER — CEPHALEXIN 250 MG/1
1 CAPSULE ORAL
Qty: 21 | Refills: 0
Start: 2025-08-10 | End: 2025-08-16

## 2025-08-14 ENCOUNTER — APPOINTMENT (OUTPATIENT)
Dept: UROLOGY | Facility: CLINIC | Age: 75
End: 2025-08-14
Payer: MEDICARE

## 2025-08-14 ENCOUNTER — OUTPATIENT (OUTPATIENT)
Dept: OUTPATIENT SERVICES | Facility: HOSPITAL | Age: 75
LOS: 1 days | End: 2025-08-14
Payer: MEDICARE

## 2025-08-14 DIAGNOSIS — R35.0 FREQUENCY OF MICTURITION: ICD-10-CM

## 2025-08-14 DIAGNOSIS — Z90.79 ACQUIRED ABSENCE OF OTHER GENITAL ORGAN(S): Chronic | ICD-10-CM

## 2025-08-14 DIAGNOSIS — Z90.89 ACQUIRED ABSENCE OF OTHER ORGANS: Chronic | ICD-10-CM

## 2025-08-14 DIAGNOSIS — R33.9 RETENTION OF URINE, UNSPECIFIED: ICD-10-CM

## 2025-08-14 PROCEDURE — 51705 CHANGE OF BLADDER TUBE: CPT

## 2025-08-14 PROCEDURE — C2627: CPT

## 2025-08-18 DIAGNOSIS — R33.9 RETENTION OF URINE, UNSPECIFIED: ICD-10-CM

## 2025-09-03 ENCOUNTER — APPOINTMENT (OUTPATIENT)
Dept: CT IMAGING | Facility: CLINIC | Age: 75
End: 2025-09-03
Payer: MEDICARE

## 2025-09-03 PROCEDURE — 71275 CT ANGIOGRAPHY CHEST: CPT

## 2025-09-05 ENCOUNTER — RESULT REVIEW (OUTPATIENT)
Age: 75
End: 2025-09-05

## 2025-09-08 ENCOUNTER — RESULT REVIEW (OUTPATIENT)
Age: 75
End: 2025-09-08

## 2025-09-09 ENCOUNTER — APPOINTMENT (OUTPATIENT)
Dept: UROLOGY | Facility: CLINIC | Age: 75
End: 2025-09-09

## 2025-09-12 ENCOUNTER — RESULT REVIEW (OUTPATIENT)
Age: 75
End: 2025-09-12

## (undated) DEVICE — FOLEY CATH 2-WAY 16FR 5CC SILASTIC

## (undated) DEVICE — DRAPE IOBAN 23" X 23"

## (undated) DEVICE — UROVAC

## (undated) DEVICE — DRSG MASTISOL

## (undated) DEVICE — WARMING BLANKET UPPER ADULT

## (undated) DEVICE — SOL IRR POUR NS 0.9% 500ML

## (undated) DEVICE — SUT VICRYL 2-0 27" CT-1 UNDYED

## (undated) DEVICE — SUT ETHIBOND 0 44" EN3

## (undated) DEVICE — GLV 7.5 PROTEXIS (WHITE)

## (undated) DEVICE — VENODYNE/SCD SLEEVE CALF MEDIUM

## (undated) DEVICE — STEALTH CLAMP INSERT FIBRA/FIBRA 90MM

## (undated) DEVICE — SLV STER PROGRAM WAND

## (undated) DEVICE — SUT SOFSILK 0 18" TIES

## (undated) DEVICE — BLADE SCALPEL SAFETYLOCK #15

## (undated) DEVICE — LAP PAD 18 X 18"

## (undated) DEVICE — NDL HYPO REGULAR BEVEL 22G X 1.5" (TURQUOISE)

## (undated) DEVICE — FOLEY CATH 3-WAY 24FR 30CC LATEX HEMATURIA

## (undated) DEVICE — SUT POLYSORB 2-0 30" GS-21 UNDYED

## (undated) DEVICE — WARMING BLANKET DUO-THERM HYPER/HYPOTHERM ADULT

## (undated) DEVICE — POSITIONER FOAM EGG CRATE ULNAR 2PCS (PINK)

## (undated) DEVICE — SUT BIOSYN 4-0 18" P-12

## (undated) DEVICE — SUT SOFSILK 2-0 18" TIES

## (undated) DEVICE — SUCTION YANKAUER NO CONTROL VENT

## (undated) DEVICE — SOL IRR BAG H2O 3000ML

## (undated) DEVICE — SUT SILK 0 30" CT-1

## (undated) DEVICE — DRSG CURITY GAUZE SPONGE 4 X 4" 12-PLY

## (undated) DEVICE — SOL NORMOSOL-R PH7.4 1000ML

## (undated) DEVICE — NDL HYPO SAFE 25G X 5/8" (ORANGE)

## (undated) DEVICE — DRAPE DRAINAGE BAG URO CATCH II

## (undated) DEVICE — TUBING LEVEL ONE NORMOFLO SET

## (undated) DEVICE — MEDICATION LABELS W MARKER

## (undated) DEVICE — PLV-SCD MACHINE: Type: DURABLE MEDICAL EQUIPMENT

## (undated) DEVICE — SUT STAINLESS STEEL 5 18" SCC

## (undated) DEVICE — SUT DOUBLE 6 WIRE STERNAL

## (undated) DEVICE — TUBING SET TUR BLADDER IRRIGATION Y-TYPE 81"

## (undated) DEVICE — DRSG TEGADERM 2.5 X 3"

## (undated) DEVICE — PACK UNIVERSAL CARDIAC

## (undated) DEVICE — SUT SILK 5-0 60" TIES

## (undated) DEVICE — SYR LUER LOK 20CC

## (undated) DEVICE — GOWN LG

## (undated) DEVICE — SOL IRR POUR H2O 250ML

## (undated) DEVICE — TUBING CONTRAST INJECTION HIGH PRESSURE 1200PSI 72"

## (undated) DEVICE — PACK CARDIAC MINOR

## (undated) DEVICE — Device

## (undated) DEVICE — SYR ASEPTO

## (undated) DEVICE — SUT MONOCRYL 4-0 18" PS-2

## (undated) DEVICE — GLV 7.5 PROTEXIS (CREAM) MICRO

## (undated) DEVICE — PREP CHLORAPREP HI-LITE ORANGE 26ML

## (undated) DEVICE — DRSG OPSITE 13.75 X 4"

## (undated) DEVICE — FOLEY TRAY 16FR 5CC LF LUBRISIL ADVANCE TEMP CLOSED

## (undated) DEVICE — GLV 7.5 PROTEXIS (BLUE)

## (undated) DEVICE — SUT SOFSILK 0 30" V-20

## (undated) DEVICE — SPECIMEN CONTAINER 100ML

## (undated) DEVICE — DRSG DERMABOND PRINEO 60CM

## (undated) DEVICE — SOL IRR POUR H2O 1000ML

## (undated) DEVICE — DRAPE 1/2 SHEET 40X57"

## (undated) DEVICE — WARMING BLANKET FULL UNDERBODY

## (undated) DEVICE — SYR LUER LOK 10CC

## (undated) DEVICE — PACK CYSTO

## (undated) DEVICE — DRAPE TOWEL BLUE 17" X 24"

## (undated) DEVICE — SUT POLYSORB 0 36" GS-25 UNDYED

## (undated) DEVICE — VENODYNE/SCD SLEEVE CALF LARGE

## (undated) DEVICE — DRAPE MAYO STAND 30"

## (undated) DEVICE — BLADE SCALPEL SAFETYLOCK #11

## (undated) DEVICE — BLADE SCALPEL SAFETYLOCK #10

## (undated) DEVICE — DRAPE CAMERA HANDLE COVER

## (undated) DEVICE — SAW BLADE MICROAIRE STERNUM 1X34X9.4MM

## (undated) DEVICE — SOL IRR POUR H2O 1500ML

## (undated) DEVICE — SUT PLEDGET PRE PUNCH 4.8 X 9.5 X 1.5 MM

## (undated) DEVICE — STEALTH CLAMP INSERT FIBRA/FIBRA 60MM

## (undated) DEVICE — SUT BLUNT SZ 5

## (undated) DEVICE — MNFLD SETUP

## (undated) DEVICE — DRAPE C ARM UNIVERSAL

## (undated) DEVICE — NDL HYPO SAFE 22G X 1.5" (BLACK)

## (undated) DEVICE — SAW BLADE STRYKER STERNUM 31MM X 6.27 X .79

## (undated) DEVICE — GLV 8 PROTEXIS (CREAM) MICRO

## (undated) DEVICE — DRAPE 3/4 SHEET W REINFORCEMENT 56X77"

## (undated) DEVICE — DRAPE INSTRUMENT POUCH 6.75" X 11"

## (undated) DEVICE — ELCTR BOVIE PENCIL HANDPIECE ROCKER SWITCH 15FT

## (undated) DEVICE — BAG URINE W METER 2L

## (undated) DEVICE — ELCTR DEFIB PAD PRO-PADZ W 10FT LEAD WIRES ADULT

## (undated) DEVICE — DILATOR COONS TAPER 20FR X 20CM

## (undated) DEVICE — CANISTER DISPOSABLE THIN WALL 3000CC

## (undated) DEVICE — SUT VICRYL 3-0 27" CT-1

## (undated) DEVICE — TUBING THERMADX UROLOGY

## (undated) DEVICE — POSITIONER STRAP ARMBOARD VELCRO TS-30

## (undated) DEVICE — ELCTR PLASMA BUTTON 24FR 12-30 DEG

## (undated) DEVICE — SUT SURGICAL STEEL 6 30" BP-1

## (undated) DEVICE — VISITEC 4X4

## (undated) DEVICE — STAPLER SKIN VISI-STAT 35 WIDE